# Patient Record
Sex: MALE | Race: WHITE
[De-identification: names, ages, dates, MRNs, and addresses within clinical notes are randomized per-mention and may not be internally consistent; named-entity substitution may affect disease eponyms.]

---

## 2018-06-05 ENCOUNTER — HOSPITAL ENCOUNTER (INPATIENT)
Dept: HOSPITAL 47 - EC | Age: 68
LOS: 1 days | Discharge: HOME | DRG: 190 | End: 2018-06-06
Payer: MEDICARE

## 2018-06-05 VITALS — BODY MASS INDEX: 26.6 KG/M2

## 2018-06-05 VITALS — RESPIRATION RATE: 16 BRPM

## 2018-06-05 DIAGNOSIS — J44.1: Primary | ICD-10-CM

## 2018-06-05 DIAGNOSIS — Z87.891: ICD-10-CM

## 2018-06-05 DIAGNOSIS — Z98.42: ICD-10-CM

## 2018-06-05 DIAGNOSIS — Z79.51: ICD-10-CM

## 2018-06-05 DIAGNOSIS — M06.9: ICD-10-CM

## 2018-06-05 DIAGNOSIS — Z99.81: ICD-10-CM

## 2018-06-05 DIAGNOSIS — J84.10: ICD-10-CM

## 2018-06-05 DIAGNOSIS — E78.5: ICD-10-CM

## 2018-06-05 DIAGNOSIS — Z83.3: ICD-10-CM

## 2018-06-05 DIAGNOSIS — Z80.6: ICD-10-CM

## 2018-06-05 DIAGNOSIS — Z98.41: ICD-10-CM

## 2018-06-05 DIAGNOSIS — G47.33: ICD-10-CM

## 2018-06-05 DIAGNOSIS — J20.8: ICD-10-CM

## 2018-06-05 DIAGNOSIS — Z96.1: ICD-10-CM

## 2018-06-05 DIAGNOSIS — Z79.899: ICD-10-CM

## 2018-06-05 DIAGNOSIS — J96.21: ICD-10-CM

## 2018-06-05 DIAGNOSIS — F32.9: ICD-10-CM

## 2018-06-05 DIAGNOSIS — F41.9: ICD-10-CM

## 2018-06-05 DIAGNOSIS — Z79.52: ICD-10-CM

## 2018-06-05 LAB
ALBUMIN SERPL-MCNC: 3.5 G/DL (ref 3.5–5)
ALP SERPL-CCNC: 93 U/L (ref 38–126)
ALT SERPL-CCNC: 38 U/L (ref 21–72)
ANION GAP SERPL CALC-SCNC: 12 MMOL/L
APTT BLD: 22 SEC (ref 22–30)
AST SERPL-CCNC: 25 U/L (ref 17–59)
BASOPHILS # BLD AUTO: 0.1 K/UL (ref 0–0.2)
BASOPHILS NFR BLD AUTO: 1 %
BUN SERPL-SCNC: 22 MG/DL (ref 9–20)
CALCIUM SPEC-MCNC: 9.3 MG/DL (ref 8.4–10.2)
CHLORIDE SERPL-SCNC: 99 MMOL/L (ref 98–107)
CK SERPL-CCNC: 28 U/L (ref 55–170)
CO2 SERPL-SCNC: 25 MMOL/L (ref 22–30)
EOSINOPHIL # BLD AUTO: 0.3 K/UL (ref 0–0.7)
EOSINOPHIL NFR BLD AUTO: 2 %
ERYTHROCYTE [DISTWIDTH] IN BLOOD BY AUTOMATED COUNT: 4.39 M/UL (ref 4.3–5.9)
ERYTHROCYTE [DISTWIDTH] IN BLOOD: 14.6 % (ref 11.5–15.5)
GLUCOSE SERPL-MCNC: 113 MG/DL (ref 74–99)
HCT VFR BLD AUTO: 41.9 % (ref 39–53)
HGB BLD-MCNC: 13.9 GM/DL (ref 13–17.5)
INR PPP: 1.1 (ref ?–1.2)
LYMPHOCYTES # SPEC AUTO: 1 K/UL (ref 1–4.8)
LYMPHOCYTES NFR SPEC AUTO: 8 %
MCH RBC QN AUTO: 31.6 PG (ref 25–35)
MCHC RBC AUTO-ENTMCNC: 33.1 G/DL (ref 31–37)
MCV RBC AUTO: 95.5 FL (ref 80–100)
MONOCYTES # BLD AUTO: 0.9 K/UL (ref 0–1)
MONOCYTES NFR BLD AUTO: 7 %
NEUTROPHILS # BLD AUTO: 11.2 K/UL (ref 1.3–7.7)
NEUTROPHILS NFR BLD AUTO: 82 %
PLATELET # BLD AUTO: 368 K/UL (ref 150–450)
POTASSIUM SERPL-SCNC: 4.1 MMOL/L (ref 3.5–5.1)
PROT SERPL-MCNC: 6.2 G/DL (ref 6.3–8.2)
PT BLD: 10.4 SEC (ref 9–12)
SODIUM SERPL-SCNC: 136 MMOL/L (ref 137–145)
TROPONIN I SERPL-MCNC: 0.01 NG/ML (ref 0–0.03)
WBC # BLD AUTO: 13.7 K/UL (ref 3.8–10.6)

## 2018-06-05 PROCEDURE — 85025 COMPLETE CBC W/AUTO DIFF WBC: CPT

## 2018-06-05 PROCEDURE — 87070 CULTURE OTHR SPECIMN AEROBIC: CPT

## 2018-06-05 PROCEDURE — 80053 COMPREHEN METABOLIC PANEL: CPT

## 2018-06-05 PROCEDURE — 82550 ASSAY OF CK (CPK): CPT

## 2018-06-05 PROCEDURE — 99291 CRITICAL CARE FIRST HOUR: CPT

## 2018-06-05 PROCEDURE — 96374 THER/PROPH/DIAG INJ IV PUSH: CPT

## 2018-06-05 PROCEDURE — 71250 CT THORAX DX C-: CPT

## 2018-06-05 PROCEDURE — 87186 SC STD MICRODIL/AGAR DIL: CPT

## 2018-06-05 PROCEDURE — 82553 CREATINE MB FRACTION: CPT

## 2018-06-05 PROCEDURE — 84484 ASSAY OF TROPONIN QUANT: CPT

## 2018-06-05 PROCEDURE — 83605 ASSAY OF LACTIC ACID: CPT

## 2018-06-05 PROCEDURE — 94760 N-INVAS EAR/PLS OXIMETRY 1: CPT

## 2018-06-05 PROCEDURE — 87040 BLOOD CULTURE FOR BACTERIA: CPT

## 2018-06-05 PROCEDURE — 36415 COLL VENOUS BLD VENIPUNCTURE: CPT

## 2018-06-05 PROCEDURE — 71046 X-RAY EXAM CHEST 2 VIEWS: CPT

## 2018-06-05 PROCEDURE — 85730 THROMBOPLASTIN TIME PARTIAL: CPT

## 2018-06-05 PROCEDURE — 94640 AIRWAY INHALATION TREATMENT: CPT

## 2018-06-05 PROCEDURE — 87205 SMEAR GRAM STAIN: CPT

## 2018-06-05 PROCEDURE — 85610 PROTHROMBIN TIME: CPT

## 2018-06-05 PROCEDURE — 93005 ELECTROCARDIOGRAM TRACING: CPT

## 2018-06-05 PROCEDURE — 87077 CULTURE AEROBIC IDENTIFY: CPT

## 2018-06-05 RX ADMIN — IPRATROPIUM BROMIDE AND ALBUTEROL SULFATE SCH ML: .5; 3 SOLUTION RESPIRATORY (INHALATION) at 16:01

## 2018-06-05 RX ADMIN — IPRATROPIUM BROMIDE AND ALBUTEROL SULFATE SCH ML: .5; 3 SOLUTION RESPIRATORY (INHALATION) at 11:22

## 2018-06-05 RX ADMIN — PREDNISOLONE ACETATE SCH DROPS: 10 SUSPENSION/ DROPS OPHTHALMIC at 20:52

## 2018-06-05 RX ADMIN — IPRATROPIUM BROMIDE AND ALBUTEROL SULFATE SCH ML: .5; 3 SOLUTION RESPIRATORY (INHALATION) at 19:51

## 2018-06-05 RX ADMIN — BUDESONIDE AND FORMOTEROL FUMARATE DIHYDRATE SCH PUFF: 160; 4.5 AEROSOL RESPIRATORY (INHALATION) at 19:51

## 2018-06-05 RX ADMIN — METHYLPREDNISOLONE SODIUM SUCCINATE SCH MG: 125 INJECTION, POWDER, FOR SOLUTION INTRAMUSCULAR; INTRAVENOUS at 11:48

## 2018-06-05 RX ADMIN — KETOROLAC TROMETHAMINE SCH DROPS: 5 SOLUTION OPHTHALMIC at 20:51

## 2018-06-05 RX ADMIN — BENZONATATE SCH MG: 100 CAPSULE ORAL at 17:52

## 2018-06-05 RX ADMIN — CEFAZOLIN SCH MLS/HR: 330 INJECTION, POWDER, FOR SOLUTION INTRAMUSCULAR; INTRAVENOUS at 11:47

## 2018-06-05 RX ADMIN — METHYLPREDNISOLONE SODIUM SUCCINATE SCH MG: 125 INJECTION, POWDER, FOR SOLUTION INTRAMUSCULAR; INTRAVENOUS at 23:39

## 2018-06-05 RX ADMIN — OFLOXACIN SCH DROPS: 3 SOLUTION/ DROPS OPHTHALMIC at 20:51

## 2018-06-05 RX ADMIN — OFLOXACIN SCH DROPS: 3 SOLUTION/ DROPS OPHTHALMIC at 17:54

## 2018-06-05 RX ADMIN — PREDNISOLONE ACETATE SCH DROPS: 10 SUSPENSION/ DROPS OPHTHALMIC at 17:54

## 2018-06-05 RX ADMIN — CEFAZOLIN SCH: 330 INJECTION, POWDER, FOR SOLUTION INTRAMUSCULAR; INTRAVENOUS at 22:35

## 2018-06-05 RX ADMIN — KETOROLAC TROMETHAMINE SCH DROPS: 5 SOLUTION OPHTHALMIC at 17:52

## 2018-06-05 RX ADMIN — METHYLPREDNISOLONE SODIUM SUCCINATE SCH MG: 125 INJECTION, POWDER, FOR SOLUTION INTRAMUSCULAR; INTRAVENOUS at 17:55

## 2018-06-05 NOTE — XR
EXAMINATION TYPE: XR chest 2V

 

DATE OF EXAM: 6/5/2018

 

COMPARISON: NONE

 

HISTORY: Difficulty breathing, cough and COPD

 

TECHNIQUE:  Frontal and lateral views of the chest are obtained.

 

FINDINGS:  Prominent lung volumes are compatible with COPD. Interstitium is increased. Heart is thoug
ht to be enlarged although patient is rotated. Azygos lobe noted incidentally. No evident pneumothora
x or pleural effusion. Nodular appearance present in the left upper lobe as well as mid and lower luisa
g laterally in the left may be related to callus formation and rib fractures. Difficult to exclude an
 underlying mass. There are

Cardiac leads.

 

IMPRESSION:  Prior rib fractures with nodularity in the left lung, difficult to exclude lung mass. Co
mparison with old films would be of benefit if available or alternatively consider short interval fol
low-up or chest CT. Suspect COPD, interstitial lung disease. Cardiomegaly. Correlate to exclude pulmo
nary venous hypertension and interstitial edema.

## 2018-06-05 NOTE — HP
HISTORY AND PHYSICAL



DATE OF ADMISSION:

2018



DATE OF SERVICE:

2018



PRESENTING COMPLAINT:

Short of breath and wheezing.



HISTORY OF PRESENTING COMPLAINT:

This is a very pleasant 68-year-old patient of Dr. Brittaney Berry who also follows with

pulmonologist, Dr. Salazar.  Chronic stable medical conditions include hyperlipidemia,

rheumatoid arthritis, obstructive sleep apnea, on home oxygen 2 L, anxiety, depression,

rheumatoid arthritis.  The patient presents with worsening short of breath, cough,

clear sputum, and wheezing, getting very restless at rest.  Admitted for the same.

Appetite has been okay.  No nausea, vomiting.  No chest pain.  Started on breathing

treatments and steroids to which he is feeling a shade better.



REVIEW OF SYSTEMS:

CONSTITUTIONAL:  Weak and tired.

HEENT:  As above.

RESPIRATORY:  As above.

CARDIOVASCULAR:  None.

GASTROINTESTINAL:  None.

GENITOURINARY: None.

MUSCULOSKELETAL:  _____ in multiple joints.

DERMATOLOGICAL: None.

HEMATOLOGICAL: None.

LYMPHATICS: None.

PSYCHIATRY: Some anxiety.

NEUROLOGICAL: None.



PAST MEDICAL HISTORY:

COPD, hyperlipidemia, rheumatoid arthritis, obstructive sleep apnea, does not use any

device.  Some low back pain.  Seen by Dr. Morse.  Due for MRI on 2018, home

oxygen 2-3 L, rheumatoid arthritis bilateral multiple joints.



PAST SURGICAL HISTORY:

Bilateral cataract removal, lens implant, left knee arthroscopy, colonoscopy.



SOCIAL HISTORY:

.  Drinks 1 or 2 beers a day.  Smoked for close to 42 years.  Stopped in .

Alcohol occasional.



FAMILY HISTORY:

Father  of leukemia.



HOME MEDICATIONS:

1. Ambien 10 mg at bedtime p.r.n.

2. Ventolin 2.5 t.i.d.

3. Neurontin taper.

4. Combivent 1 puff t.i.d.

5. Symbicort 2 puffs T b.i.d.

6. Prednisone 20 mg b.i.d.

7. Zoloft 100 mg p.o. daily.

8. Acular 1 drop to both eyes t.i.d.

9. Tessalon Perles 200 mg q.8.

10.Prednisone Forte 1% 1 drop to both eyes t.i.d.

11.Ativan 1 mg p.o. t.i.d. p.r.n.

12.Ocuflox 0.3% 1 drop to right eye t.i.d.



ALLERGIES:

None.



PHYSICAL EXAMINATION:

Temperature 97.9, pulse 89, respirations 22, blood pressure 113/71, pulse ox 96% on 4

L.  The patient also had a temperature 100.8 and pulse up to 112, and the patient's

pulse ox initial was 73% on 2.5 L.

GENERAL APPEARANCE:  Resting in bed, short of breath.

EYES:  Pupils are equal.  Conjunctivae are normal.

HEENT:  External appearance of ears and nose normal.  Oral cavity normal.

NECK:  JVD not raised.  Mass not palpable.  Respiratory effort increased.  Accessory

muscles are working.  The patient not able to speak in full sentences.

LUNGS:  Diminished breath sounds, prolonged expiration and wheezing.

CARDIOVASCULAR: First and second sounds normal. No edema.

ABDOMEN:  Soft, nontender.  Liver and spleen not palpable.

LYMPHATICS:  No lymph node palpable in the neck or axillae.

PSYCHIATRY:  Alert and oriented x3.  Mood and affect anxious-appearing.

NEUROLOGICAL:  Pupils equal.  Cranial nerves grossly intact. Power and sensation

grossly intact.



INVESTIGATIONS:

White count 17.7, hemoglobin 13.9, potassium 4.1, BUN 22, creatinine 0.72.  EKG sinus

tachycardia.  Chest x-ray shows some interstitial lung disease, evidence of pulmonary

venous hypertension.  Chest CT shows some pulmonary fibrosis, emphysema.



ASSESSMENT:

1. Acute severe chronic obstructive pulmonary disease exacerbation, probably from

    viral tracheobronchitis, cannot rule out pneumonia.

2. Pulmonary fibrosis.

3. Acute hypoxic respiratory failure present on admission.

4. Chronic hypoxic respiratory failure.

5. Chronic obstructive pulmonary disease in an ex-smoker.

6. Hyperlipidemia.

7. Bilateral rheumatoid arthritis.

8. Obstructive sleep apnea, does not use a CPAP machine.

9. Chronic hypoxic respiratory failure, underlying chronic obstructive pulmonary

    disease.

10.Anxiety and depression, not otherwise specified.



PLAN:

The patient is put on DuoNeb, nebulized bronchodilator and IV steroids.  Also put the

patient on nebulized bronchodilators.  Home medications are resumed.  Care was

discussed with the patient.  Pulmonary was consulted.





MMLATRELLL / IJN: [id number] / Job#: [id number]

## 2018-06-05 NOTE — CT
EXAMINATION TYPE: CT chest wo con

 

DATE OF EXAM: 6/5/2018

 

COMPARISON: NONE

 

HISTORY: SOB, ILD

 

CT DLP: 572.7 mGycm, Automated exposure control for dose reduction was used.

 

CONTRAST: None

 

TECHNIQUE: Axial images were obtained at 1 mm thick sections at 10 mm intervals.   This will limit po
rtions of the examination which may not be visualized within the field-of-view.  Images were obtained
 in the prone and supine views.  

 

FINDINGS: Portion of the thyroid visualized is normal.  There are increased lung markings present com
patible some pulmonary fibrosis. Emphysematous changes are present. There is a calcification mid righ
t lung measuring 0.6 cm periphery. Some patchy increased densities in the periphery of the left lower
 lobe, series 8 images 22-23.

 

 No enlarged mediastinal or hilar adenopathy is evident.  The ascending aorta diameter at the level o
f the main pulmonary artery is 3.7 cm.  The main pulmonary artery diameter at the bifurcation is 3.7 
cm. Some coronary artery calcifications present. Some calcification in the right hilar lymph node is 
present.

 

Limited CT sections are obtained through the upper abdomen. Abdomen is essentially unremarkable.

 

No significant change between prone and supine imaging is evident.

 

IMPRESSIONS:

1. Pulmonary fibrosis.

2. Emphysema.

3. Monitoring chest CT can be performed 6 months. Follow-up of the liver is increased recommended.

## 2018-06-05 NOTE — ED
General Adult HPI





- General


Chief complaint: Shortness of Breath


Stated complaint: SOB


Time Seen by Provider: 06/05/18 08:57


Source: patient, family, RN notes reviewed


Mode of arrival: wheelchair


Limitations: no limitations





- History of Present Illness


Initial comments: 





Patient is a pleasant 68-year-old male presenting to the emergency department 

with difficulty in breathing.  Symptoms have progressed with the past several 

days.  Patient does have cough with occasional clear sputum.  Symptoms are 

similar to previous COPD.  No chest pain.  No leg swelling.  No fevers.





- Related Data


 Home Medications











 Medication  Instructions  Recorded  Confirmed


 


Albuterol Nebulized [Ventolin 2.5 mg INHALATION RT-TID 06/05/18 06/05/18





Nebulized]   


 


Benzonatate [Tessalon Perles] 200 mg PO Q8H 06/05/18 06/05/18


 


Budesonide/Formoterol Fumarate 2 puff INHALATION RT-BID 06/05/18 06/05/18





[Symbicort 160-4.5 Mcg Inhaler]   


 


Gabapentin [Neurontin] See Taper PO AS DIRECTED 06/05/18 06/05/18


 


Ipratropium/Albuterol Sulfate 1 puff INHALATION RT-TID 06/05/18 06/05/18





[Combivent Respimat Inhaler]   


 


Ketorolac 0.5% Ophth Soln [Acular] 1 drops BOTH EYES TID 06/05/18 06/05/18


 


LORazepam [Ativan] 1 mg PO TID PRN 06/05/18 06/05/18


 


Ofloxacin 0.3% Ophth Soln [Ocuflox 1 drops RIGHT EYE TID 06/05/18 06/05/18





Ophth Soln]   


 


Sertraline [Zoloft] 100 mg PO DAILY 06/05/18 06/05/18


 


Zolpidem [Ambien] 10 mg PO HS PRN 06/05/18 06/05/18


 


predniSONE 20 mg PO BID 06/05/18 06/05/18


 


prednisoLONE ACETATE 1% OPHTH 1 drops BOTH EYES TID 06/05/18 06/05/18





[Pred Forte 1%]   











 Allergies











Allergy/AdvReac Type Severity Reaction Status Date / Time


 


No Known Allergies Allergy   Verified 06/05/18 09:17














Review of Systems


ROS Statement: 


Those systems with pertinent positive or pertinent negative responses have been 

documented in the HPI.





ROS Other: All systems not noted in ROS Statement are negative.


Constitutional: Denies: fever


Eyes: Denies: eye pain


ENT: Denies: ear pain


Respiratory: Reports: cough, dyspnea


Cardiovascular: Denies: chest pain


Endocrine: Reports: fatigue


Gastrointestinal: Denies: abdominal pain


Genitourinary: Denies: dysuria


Musculoskeletal: Denies: back pain


Skin: Denies: rash


Neurological: Denies: weakness





Past Medical History


Past Medical History: COPD, Hyperlipidemia, Sleep Apnea/CPAP/BIPAP


Additional Past Medical History / Comment(s): RA, sepsis from pneumonia in 

February 2018


History of Any Multi-Drug Resistant Organisms: None Reported


Additional Past Surgical History / Comment(s): cataract sx, left knee sx


Past Psychological History: Anxiety, Depression


Smoking Status: Former smoker


Past Alcohol Use History: Occasional


Past Drug Use History: None Reported





General Exam


Limitations: no limitations


General appearance: alert


Head exam: Present: atraumatic


Eye exam: Present: normal appearance, PERRL


ENT exam: Present: normal oropharynx


Neck exam: Present: normal inspection


Respiratory exam: Present: wheezes, rhonchi


Cardiovascular Exam: Present: tachycardia


GI/Abdominal exam: Present: soft.  Absent: tenderness


Extremities exam: Present: normal inspection.  Absent: pedal edema, calf 

tenderness


Neurological exam: Present: alert


Psychiatric exam: Present: normal affect, normal mood


Skin exam: Present: normal color





Course


 Vital Signs











  06/05/18 06/05/18 06/05/18





  08:48 09:02 09:05


 


Temperature 97.8 F  


 


Pulse Rate 117 H  112 H


 


Respiratory 24 20 20





Rate   


 


Blood Pressure 115/66  128/78


 


O2 Sat by Pulse 73 L  95





Oximetry   














  06/05/18 06/05/18 06/05/18





  09:14 09:34 09:46


 


Temperature 100.8 F H  


 


Pulse Rate  108 H 109 H


 


Respiratory   





Rate   


 


Blood Pressure   


 


O2 Sat by Pulse   





Oximetry   














- Reevaluation(s)


Reevaluation #1: 





06/05/18 10:17


Patient does meet sepsis criteria diagnosed at 10:17 AM.  Blood culture and 

lactic acid have been ordered.  IV antibiotics will be ordered.





EKG Findings





- EKG Comments:


EKG Findings:: Sinus tachycardia 113.  .  QRS 84.  .  .  

Right axis.  Low QRS voltage.  No acute ST change.





Medical Decision Making





- Medical Decision Making





Patient reevaluated and somewhat improved.  Patient still appears somewhat 

short of breath.  Patient states he does feel much better than when he arrived.

  Case was discussed in detail with Dr. hill, who will admit for Dr. Berry.





- Lab Data


Result diagrams: 


 06/05/18 09:15





 06/05/18 09:15


 Lab Results











  06/05/18 06/05/18 06/05/18 Range/Units





  09:15 09:15 09:15 


 


WBC  13.7 H    (3.8-10.6)  k/uL


 


RBC  4.39    (4.30-5.90)  m/uL


 


Hgb  13.9    (13.0-17.5)  gm/dL


 


Hct  41.9    (39.0-53.0)  %


 


MCV  95.5    (80.0-100.0)  fL


 


MCH  31.6    (25.0-35.0)  pg


 


MCHC  33.1    (31.0-37.0)  g/dL


 


RDW  14.6    (11.5-15.5)  %


 


Plt Count  368    (150-450)  k/uL


 


Neutrophils %  82    %


 


Lymphocytes %  8    %


 


Monocytes %  7    %


 


Eosinophils %  2    %


 


Basophils %  1    %


 


Neutrophils #  11.2 H    (1.3-7.7)  k/uL


 


Lymphocytes #  1.0    (1.0-4.8)  k/uL


 


Monocytes #  0.9    (0-1.0)  k/uL


 


Eosinophils #  0.3    (0-0.7)  k/uL


 


Basophils #  0.1    (0-0.2)  k/uL


 


PT    10.4  (9.0-12.0)  sec


 


INR    1.1  (<1.2)  


 


APTT    22.0  (22.0-30.0)  sec


 


Sodium   136 L   (137-145)  mmol/L


 


Potassium   4.1   (3.5-5.1)  mmol/L


 


Chloride   99   ()  mmol/L


 


Carbon Dioxide   25   (22-30)  mmol/L


 


Anion Gap   12   mmol/L


 


BUN   22 H   (9-20)  mg/dL


 


Creatinine   0.72   (0.66-1.25)  mg/dL


 


Est GFR (CKD-EPI)AfAm   >90   (>60 ml/min/1.73 sqM)  


 


Est GFR (CKD-EPI)NonAf   >90   (>60 ml/min/1.73 sqM)  


 


Glucose   113 H   (74-99)  mg/dL


 


Plasma Lactic Acid José Miguel     (0.7-2.0)  mmol/L


 


Calcium   9.3   (8.4-10.2)  mg/dL


 


Total Bilirubin   0.5   (0.2-1.3)  mg/dL


 


AST   25   (17-59)  U/L


 


ALT   38   (21-72)  U/L


 


Alkaline Phosphatase   93   ()  U/L


 


Total Protein   6.2 L   (6.3-8.2)  g/dL


 


Albumin   3.5   (3.5-5.0)  g/dL














  06/05/18 Range/Units





  09:15 


 


WBC   (3.8-10.6)  k/uL


 


RBC   (4.30-5.90)  m/uL


 


Hgb   (13.0-17.5)  gm/dL


 


Hct   (39.0-53.0)  %


 


MCV   (80.0-100.0)  fL


 


MCH   (25.0-35.0)  pg


 


MCHC   (31.0-37.0)  g/dL


 


RDW   (11.5-15.5)  %


 


Plt Count   (150-450)  k/uL


 


Neutrophils %   %


 


Lymphocytes %   %


 


Monocytes %   %


 


Eosinophils %   %


 


Basophils %   %


 


Neutrophils #   (1.3-7.7)  k/uL


 


Lymphocytes #   (1.0-4.8)  k/uL


 


Monocytes #   (0-1.0)  k/uL


 


Eosinophils #   (0-0.7)  k/uL


 


Basophils #   (0-0.2)  k/uL


 


PT   (9.0-12.0)  sec


 


INR   (<1.2)  


 


APTT   (22.0-30.0)  sec


 


Sodium   (137-145)  mmol/L


 


Potassium   (3.5-5.1)  mmol/L


 


Chloride   ()  mmol/L


 


Carbon Dioxide   (22-30)  mmol/L


 


Anion Gap   mmol/L


 


BUN   (9-20)  mg/dL


 


Creatinine   (0.66-1.25)  mg/dL


 


Est GFR (CKD-EPI)AfAm   (>60 ml/min/1.73 sqM)  


 


Est GFR (CKD-EPI)NonAf   (>60 ml/min/1.73 sqM)  


 


Glucose   (74-99)  mg/dL


 


Plasma Lactic Acid José Miguel  0.8  (0.7-2.0)  mmol/L


 


Calcium   (8.4-10.2)  mg/dL


 


Total Bilirubin   (0.2-1.3)  mg/dL


 


AST   (17-59)  U/L


 


ALT   (21-72)  U/L


 


Alkaline Phosphatase   ()  U/L


 


Total Protein   (6.3-8.2)  g/dL


 


Albumin   (3.5-5.0)  g/dL














- Radiology Data


Radiology results: image reviewed (Chest x-ray shows COPD.  Interstitial lung 

disease.)





Critical Care Time


Critical Care Time: Yes


Total Critical Care Time: 32





Disposition


Clinical Impression: 


 Acute exacerbation of chronic obstructive airways disease, Sepsis





Disposition: ADMITTED AS IP TO THIS HOSP


Is patient prescribed a controlled substance at d/c from ED?: No


Referrals: 


Brittaney Berry MD [Primary Care Provider] - 1-2 days


Decision Time: 10:19

## 2018-06-05 NOTE — P.CNPUL
History of Present Illness


Consult date: 18


Requesting physician: Jasen Persaud


Reason for consult: dyspnea, COPD


Chief complaint: Shortness of breath, cough congestion


History of present illness: 





This is a very pleasant 68-year-old male patient who follows with Dr. Berry as 

her his primary care physician.  He has a history of insomnia anxiety and low 

back pain.  He also has a history of interstitial lung disease and chronic 

obstructive pulmonary disease and follows with Dr. Salazar in our office for the 

same.  His FEV1 value is 46% of predicted.  He is maintained on albuterol, 

Combivent, Symbicort in the outpatient setting.  He is both oxygen and steroid 

dependent on prednisone 10 mg daily.  He quit smoking approximate 6 years ago.  

He was last seen 2018 at that time his COPD he had been under fairly good 

control.  He presented here to the emergency room however after this past week 

having increasing shortness of breath, cough, productive clear sputum.  No 

fever chills or night sweats.  He was having oxygen saturations in the 70s on 2 

L/m per nasal cannula.  Currently in the low 90s on 4 L/m per nasal cannula.  

No fever, chills or night sweats.  White count 13.7.  Hemoglobin 13.9.  

Creatinine 0.72.  He is seen today in consultation on the regular medical 

floor.  He is awake and alert in no acute distress.  His only complaint is that 

of fatigue as he had a difficult night last night.  He is fairly comfortable at 

rest.  He is quite dyspneic on minimal exertion.  He has been initiated on 

DuoNeb inhalations, IV Solu-Medrol and antibiotics in the form of ceftriaxone 

and azithromycin.





Review of Systems


Constitutional: Reports fatigue


Eyes: denies blurred vision (Recent cataract surgery.), denies decreased vision


Ears: bilateral: decreased hearing


Ears, nose, mouth and throat: Denies headache, Denies sore throat


Cardiovascular: Reports dyspnea on exertion, Reports shortness of breath


Respiratory: Reports cough with sputum, Reports dyspnea, Reports home oxygen, 

Reports wheezing


Gastrointestinal: Denies abdominal pain, Denies diarrhea, Denies nausea, Denies 

vomiting


Genitourinary: Reports as per HPI


Musculoskeletal: Reports low back pain


Integumentary: Denies pruritus, Denies rash


Neurological: Denies numbness, Denies weakness


Psychiatric: Denies anxiety, Denies depression


Endocrine: Denies fatigue, Denies weight change





Past Medical History


Past Medical History: COPD, Hyperlipidemia, Pneumonia, Rheumatoid Arthritis (RA)

, Sleep Apnea/CPAP/BIPAP


Additional Past Medical History / Comment(s): Pneumonia with sepsis twice-2018 and in 2016, POLO with no device (cannot tolerate), recently injured low 

back-saw Dr. Morse and is to have a MRI on 18, home O2 at 2-3 L/NC prn, 

rheumatoid arthritis in bilateral hips,knees hands and back.


History of Any Multi-Drug Resistant Organisms: None Reported


Past Surgical History: Orthopedic Surgery


Additional Past Surgical History / Comment(s): bilateral cataract removals with 

lens implants, left knee arthroscopy, colonoscopy.


Past Anesthesia/Blood Transfusion Reactions: No Reported Reaction


Past Psychological History: Anxiety, Depression


Additional Psychological History / Comment(s): Pt resides with his spouse of 38 

yrs.  He has home oxygen which he wears prn at 2-3L/NC, mostly at night.  He 

has a nebulizer.  He uses no assistive device.  He drives.


Smoking Status: Former smoker


Past Alcohol Use History: Occasional


Additional Past Alcohol Use History / Comment(s): Pt started smoking as a teen 

and quit in .  He states he drinks 1-2 beers a day.


Past Drug Use History: None Reported





- Past Family History


  ** Father


Family Medical History: Cancer


Additional Family Medical History / Comment(s): Father  of leukemia.





  ** Mother


Family Medical History: Dementia, Diabetes Mellitus


Additional Family Medical History / Comment(s): Mother is 88yrs old.





Medications and Allergies


 Home Medications











 Medication  Instructions  Recorded  Confirmed  Type


 


Albuterol Nebulized [Ventolin 2.5 mg INHALATION RT-TID 18 History





Nebulized]    


 


Benzonatate [Tessalon Perles] 200 mg PO Q8H 18 History


 


Budesonide/Formoterol Fumarate 2 puff INHALATION RT-BID 18 

History





[Symbicort 160-4.5 Mcg Inhaler]    


 


Gabapentin [Neurontin] See Taper PO AS DIRECTED 18 History


 


Ipratropium/Albuterol Sulfate 1 puff INHALATION RT-TID 18 History





[Combivent Respimat Inhaler]    


 


Ketorolac 0.5% Ophth Soln [Acular] 1 drops BOTH EYES TID 18 

History


 


LORazepam [Ativan] 1 mg PO TID PRN 18 History


 


Ofloxacin 0.3% Ophth Soln [Ocuflox 1 drops RIGHT EYE TID 18 

History





Ophth Soln]    


 


Sertraline [Zoloft] 100 mg PO DAILY 18 History


 


Zolpidem [Ambien] 10 mg PO HS PRN 18 History


 


predniSONE 20 mg PO BID 18 History


 


prednisoLONE ACETATE 1% OPHTH 1 drops BOTH EYES TID 18 History





[Pred Forte 1%]    











 Allergies











Allergy/AdvReac Type Severity Reaction Status Date / Time


 


No Known Allergies Allergy   Verified 18 09:17














Physical Exam


Vitals: 


 Vital Signs











  Temp Pulse Pulse Resp BP BP Pulse Ox


 


 18 11:39   108 H     


 


 18 11:35  98.1 F   74  20   119/48  91 L


 


 18 11:26   100      92 L


 


 18 11:01   98   18  123/71   98


 


 18 09:46   109 H     


 


 18 09:34   108 H     


 


 18 09:14  100.8 F H      


 


 18 09:05   112 H   20  128/78   95


 


 18 09:02     20   


 


 18 08:48  97.8 F  117 H   24  115/66   73 L








 Intake and Output











 18





 22:59 06:59 14:59


 


Other:   


 


  Weight   74.843 kg














- Constitutional


General appearance: average body habitus





- EENT


Eyes: EOMI, PERRLA


ENT: hard of hearing


Ears: bilateral: normal





- Neck


Neck: normal ROM


Carotids: bilateral: upstroke normal


Thyroid: bilateral: normal size





- Respiratory


Respiratory: bilateral: diminished, rales, wheezing, prolonged expiration





- Cardiovascular


Rhythm: regular


Heart sounds: normal: S1, S2





- Gastrointestinal


General gastrointestinal: no organomegaly, soft, no tenderness





- Neurologic


Neurologic: CNII-XII intact





- Musculoskeletal


Musculoskeletal: gait normal, generalized weakness





- Psychiatric


Psychiatric: A&O x's 3, appropriate affect, intact judgment & insight





Results





- Laboratory Findings


CBC and BMP: 


 18 09:15





 18 09:15


PT/INR, D-dimer











PT  10.4 sec (9.0-12.0)   18  09:15    


 


INR  1.1  (<1.2)   18  09:15    








Abnormal lab findings: 


 Abnormal Labs











  18





  09:15 09:15 09:15


 


WBC   13.7 H 


 


Neutrophils #   11.2 H 


 


Sodium    136 L


 


BUN    22 H


 


Glucose    113 H


 


Total Creatine Kinase  28 L  


 


Total Protein    6.2 L














- Diagnostic Findings


Chest x-ray: image reviewed





Assessment and Plan


Assessment: 





Impression:





#1 Acute on chronic hypoxic respiratory failure secondary to an acute 

exacerbation of chronic obstructive pulmonary disease.





#2 Severe oxygen-dependent, steroid-dependent chronic obstructive pulmonary 

disease.  FEV1 value of 46% of predicted.





#3 Interstitial lung disease.





#4 Anxiety.





#5 Low back pain.





#6 Remote history of chronic tobacco dependence.





Plan:





The patient was seen and evaluated by Dr. Thakur.  Chest x-ray labs were 

reviewed.  The patient may require a CT scan of the chest for further 

evaluation.  In the interim, we'll treat him for his COPD exacerbation.  IV Solu

-Medrol, DuoNeb inhalations, add Symbicort, empiric antibiotics.  Titrate down 

the FiO2 well maintain O2 saturations greater than 88%.  At heparin for DVT 

prophylaxis.  Protonix for GI prophylaxis.  We will continue to follow and make 

further recommendations based on his clinical status. 





I, the cosigning physician, performed a history & physical examination of the 

patient. Lungs sounds bilateral end expiratory wheeze, coarse crackles in the 

bases. Maintaining good O2 saturations in the 90s on 4 L/m per nasal cannula.  

I discussed the assessment and plan of care with my nurse practitioner, Sylwia Flaherty. I attest to the above note as dictated by her.


Time with Patient: Greater than 30

## 2018-06-06 VITALS — SYSTOLIC BLOOD PRESSURE: 101 MMHG | DIASTOLIC BLOOD PRESSURE: 59 MMHG | TEMPERATURE: 97.3 F

## 2018-06-06 VITALS — HEART RATE: 86 BPM

## 2018-06-06 RX ADMIN — BENZONATATE SCH: 100 CAPSULE ORAL at 01:25

## 2018-06-06 RX ADMIN — IPRATROPIUM BROMIDE AND ALBUTEROL SULFATE SCH ML: .5; 3 SOLUTION RESPIRATORY (INHALATION) at 07:22

## 2018-06-06 RX ADMIN — METHYLPREDNISOLONE SODIUM SUCCINATE SCH MG: 125 INJECTION, POWDER, FOR SOLUTION INTRAMUSCULAR; INTRAVENOUS at 06:27

## 2018-06-06 RX ADMIN — IPRATROPIUM BROMIDE AND ALBUTEROL SULFATE SCH: .5; 3 SOLUTION RESPIRATORY (INHALATION) at 04:43

## 2018-06-06 RX ADMIN — PREDNISOLONE ACETATE SCH DROPS: 10 SUSPENSION/ DROPS OPHTHALMIC at 08:49

## 2018-06-06 RX ADMIN — IPRATROPIUM BROMIDE AND ALBUTEROL SULFATE SCH: .5; 3 SOLUTION RESPIRATORY (INHALATION) at 11:11

## 2018-06-06 RX ADMIN — METHYLPREDNISOLONE SODIUM SUCCINATE SCH MG: 125 INJECTION, POWDER, FOR SOLUTION INTRAMUSCULAR; INTRAVENOUS at 12:42

## 2018-06-06 RX ADMIN — BUDESONIDE AND FORMOTEROL FUMARATE DIHYDRATE SCH PUFF: 160; 4.5 AEROSOL RESPIRATORY (INHALATION) at 07:22

## 2018-06-06 RX ADMIN — OFLOXACIN SCH DROPS: 3 SOLUTION/ DROPS OPHTHALMIC at 08:50

## 2018-06-06 RX ADMIN — CEFAZOLIN SCH MLS/HR: 330 INJECTION, POWDER, FOR SOLUTION INTRAMUSCULAR; INTRAVENOUS at 09:25

## 2018-06-06 RX ADMIN — KETOROLAC TROMETHAMINE SCH DROPS: 5 SOLUTION OPHTHALMIC at 08:50

## 2018-06-06 RX ADMIN — BENZONATATE SCH MG: 100 CAPSULE ORAL at 08:48

## 2018-06-06 NOTE — P.PN
Subjective


Progress Note Date: 06/06/18


Principal diagnosis: 





Acute on chronic hypoxic respiratory failure secondary to an acute exacerbation 

of COPD


This is a very pleasant 68-year-old male patient who follows with Dr. Berry as 

her his primary care physician.  He has a history of insomnia anxiety and low 

back pain.  He also has a history of interstitial lung disease and chronic 

obstructive pulmonary disease and follows with Dr. Salazar in our office for the 

same.  His FEV1 value is 46% of predicted.  He is maintained on albuterol, 

Combivent, Symbicort in the outpatient setting.  He is both oxygen and steroid 

dependent on prednisone 10 mg daily.  He quit smoking approximate 6 years ago.  

He was last seen 05/11/2018 at that time his COPD he had been under fairly good 

control.  He presented here to the emergency room however after this past week 

having increasing shortness of breath, cough, productive clear sputum.  No 

fever chills or night sweats.  He was having oxygen saturations in the 70s on 2 

L/m per nasal cannula.  Currently in the low 90s on 4 L/m per nasal cannula.  

No fever, chills or night sweats.  White count 13.7.  Hemoglobin 13.9.  

Creatinine 0.72.  He is seen today in consultation on the regular medical 

floor.  He is awake and alert in no acute distress.  His only complaint is that 

of fatigue as he had a difficult night last night.  He is fairly comfortable at 

rest.  He is quite dyspneic on minimal exertion.  He has been initiated on 

DuoNeb inhalations, IV Solu-Medrol and antibiotics in the form of ceftriaxone 

and azithromycin.





On 06/06/2018 patient seen again in follow-up on medical surgical floor.  

Remains on folate liters per nasal cannula with O2 sat 97%, he is afebrile, 

vital signs are stable, respirations nonlabored, patient's CT chest showed 

moderate fibrosis, emphysema, no enlarged mediastinal or hilar adenopathy.  No 

new labs today, patient continues on Zithromax and Rocephin, nebulized 

bronchodilators, and IV Solu-Medrol, he reports  improvement with his breathing

, patient states he is back to his baseline, and is requesting to go home 

today.  Lung sounds are positive for a few scattered crackles, but no wheezes 

or rhonchi, no significant chest congestion, vitals are stable.  Blood cultures 

negative at the 24-hour petey, sputum cultures pending.  Clinically patient has 

significantly improved, and is stable for discharge home today.








Objective





- Vital Signs


Vital signs: 


 Vital Signs











Temp  97.3 F L  06/06/18 06:35


 


Pulse  86   06/06/18 07:35


 


Resp  16   06/06/18 06:35


 


BP  101/59   06/06/18 06:35


 


Pulse Ox  97   06/06/18 06:35








 Intake & Output











 06/05/18 06/06/18 06/06/18





 18:59 06:59 18:59


 


Weight 74.843 kg  


 


Other:   


 


  Voiding Method  Toilet 





  Urinal 


 


  # Voids 1 1 














- Exam


- Constitutional


General appearance: average body habitus





- EENT


Eyes: EOMI, PERRLA


ENT: hard of hearing


Ears: bilateral: normal





- Neck


Neck: normal ROM


Carotids: bilateral: upstroke normal


Thyroid: bilateral: normal size





- Respiratory


Respiratory: bilateral: diminished, minimal scattered crackles





- Cardiovascular


Rhythm: regular


Heart sounds: normal: S1, S2





- Gastrointestinal


General gastrointestinal: no organomegaly, soft, no tenderness





- Neurologic


Neurologic: CNII-XII intact





- Musculoskeletal


Musculoskeletal: gait normal, generalized weakness





- Psychiatric


Psychiatric: A&O x's 3, appropriate affect, intact judgment & insight








- Labs


CBC & Chem 7: 


 06/05/18 09:15





 06/05/18 09:15


Labs: 


 Microbiology - Last 24 Hours (Table)











 06/05/18 19:36 Gram Stain - Preliminary





 Sputum Sputum Culture - Preliminary














Assessment and Plan


Plan: 


Assessment:





#1 Acute on chronic hypoxic respiratory failure secondary to an acute 

exacerbation of chronic obstructive pulmonary disease.





#2 Severe oxygen-dependent, steroid-dependent chronic obstructive pulmonary 

disease.  FEV1 value of 46% of predicted.





#3 Interstitial lung disease.





#4 Anxiety.





#5 Low back pain.





#6 Remote history of chronic tobacco dependence.





Plan:





CT chest results have been reviewed, and showed emphysema, and pulmonary 

fibrosis without adenopathy.  Clinically patient is improving, he states he is 

back to his baseline, breathing easier today, no fever or chills, no worsening 

dyspnea, no worsening chest congestion.  Patient is stable for discharge home 

today from pulmonary standpoint, follow-up with Dr. Dr. Salazar in the outpatient 

setting within 7 days.





I performed a history & physical examination of the patient and discussed their 

management with my nurse practitioner, La Louie.  I reviewed the nurse 

practitioner's note and agree with the documented findings and plan of care.  

Lung sounds are diminished, will minimal rales.  The findings and the 

impression was discussed with the patient.  I attest to the documentation by 

the nurse practitioner. 


Time with Patient: Less than 30

## 2018-06-08 NOTE — DS
DISCHARGE SUMMARY



DATE OF ADMISSION:

06/05/2018.



DATE OF DISCHARGE:

06/08/2018



FINAL DIAGNOSES:

1. Acute severe chronic obstructive pulmonary disease exacerbation, possibly viral

    tracheobronchitis.

2. Chronic pulmonary fibrosis.

3. Acute hypoxic respiratory failure, present on admission from chronic obstructive

    pulmonary disease exacerbation.

4. Chronic hypoxic respiratory failure from chronic obstructive pulmonary disease.

5. Hyperlipidemia.

6. Bilateral rheumatoid arthritis.

7. Obstructive sleep apnea, does not use CPAP.

8. Anxiety and depression, not otherwise specified.



CONSULTATION:

Dr. Thakur.



HOSPITAL COURSE:

This patient presented with COPD exacerbation, doing much better at the time of

discharge.  The patient was treated with bronchodilators, steroids.



EXAM:

LUNGS:  Decreased breath sounds.

CARDIOVASCULAR:  First and second sounds normal.



The patient did have a CT of the chest that revealed pulmonary fibrosis.  The patient

is okayed by Pulmonary to go home.



DISCHARGE MEDICATIONS:

1. Ventolin 2.5 nebulizer t.i.d.

2. Tessalon Perles 200 mg q.8.

3. Symbicort 160/4.5, 2 puffs b.i.d.

4. Neurontin.

5. Combivent.

6. Respimat 1 puff t.i.d.

7. Acular 0.5% 1 drop to both eyes t.i.d.

8. Ativan 1 mg p.o. t.i.d. p.r.n.

9. Ocuflox 1 drop right eye t.i.d.

10.Zoloft 100 mg p.o. daily.

11.Ambien 10 mg q.h.s. p.r.n.

12.Prednisone taper.

13.Prednisolone 1% 1 drop to both eyes t.i.d.

14.Zithromax 100 g p.o. daily 4 tablets.



FOLLOWUP:

With Dr. Brittaney Berry in a week.  Follow up with Dr. Salazar on 06/19/2018.



EXAMINATION:

Decreased breath sounds, minimal wheezing.

CARDIOVASCULAR:  First and second sounds normal.

PSYCH:  AO x3.





MMODL / IJN: [id number] / Job#: [id number]

## 2019-05-23 ENCOUNTER — HOSPITAL ENCOUNTER (INPATIENT)
Dept: HOSPITAL 47 - EC | Age: 69
LOS: 15 days | Discharge: HOME HEALTH | DRG: 326 | End: 2019-06-07
Payer: MEDICARE

## 2019-05-23 VITALS — BODY MASS INDEX: 25.6 KG/M2

## 2019-05-23 DIAGNOSIS — A41.9: ICD-10-CM

## 2019-05-23 DIAGNOSIS — G89.29: ICD-10-CM

## 2019-05-23 DIAGNOSIS — K65.1: ICD-10-CM

## 2019-05-23 DIAGNOSIS — J44.1: ICD-10-CM

## 2019-05-23 DIAGNOSIS — Z99.81: ICD-10-CM

## 2019-05-23 DIAGNOSIS — Z79.52: ICD-10-CM

## 2019-05-23 DIAGNOSIS — Z91.19: ICD-10-CM

## 2019-05-23 DIAGNOSIS — Z87.891: ICD-10-CM

## 2019-05-23 DIAGNOSIS — M19.90: ICD-10-CM

## 2019-05-23 DIAGNOSIS — E87.6: ICD-10-CM

## 2019-05-23 DIAGNOSIS — D63.8: ICD-10-CM

## 2019-05-23 DIAGNOSIS — J96.21: ICD-10-CM

## 2019-05-23 DIAGNOSIS — Z86.19: ICD-10-CM

## 2019-05-23 DIAGNOSIS — M06.9: ICD-10-CM

## 2019-05-23 DIAGNOSIS — E78.5: ICD-10-CM

## 2019-05-23 DIAGNOSIS — Z83.3: ICD-10-CM

## 2019-05-23 DIAGNOSIS — T38.0X5A: ICD-10-CM

## 2019-05-23 DIAGNOSIS — Z98.41: ICD-10-CM

## 2019-05-23 DIAGNOSIS — K26.1: Primary | ICD-10-CM

## 2019-05-23 DIAGNOSIS — K42.0: ICD-10-CM

## 2019-05-23 DIAGNOSIS — E83.41: ICD-10-CM

## 2019-05-23 DIAGNOSIS — Z87.01: ICD-10-CM

## 2019-05-23 DIAGNOSIS — Z88.1: ICD-10-CM

## 2019-05-23 DIAGNOSIS — J84.10: ICD-10-CM

## 2019-05-23 DIAGNOSIS — D72.829: ICD-10-CM

## 2019-05-23 DIAGNOSIS — Z80.0: ICD-10-CM

## 2019-05-23 DIAGNOSIS — M54.9: ICD-10-CM

## 2019-05-23 DIAGNOSIS — E87.2: ICD-10-CM

## 2019-05-23 DIAGNOSIS — F32.9: ICD-10-CM

## 2019-05-23 DIAGNOSIS — Z99.89: ICD-10-CM

## 2019-05-23 DIAGNOSIS — F41.9: ICD-10-CM

## 2019-05-23 DIAGNOSIS — Z98.42: ICD-10-CM

## 2019-05-23 DIAGNOSIS — G47.33: ICD-10-CM

## 2019-05-23 DIAGNOSIS — A04.72: ICD-10-CM

## 2019-05-23 DIAGNOSIS — B96.5: ICD-10-CM

## 2019-05-23 DIAGNOSIS — Z79.899: ICD-10-CM

## 2019-05-23 DIAGNOSIS — Z96.1: ICD-10-CM

## 2019-05-23 DIAGNOSIS — Z80.6: ICD-10-CM

## 2019-05-23 DIAGNOSIS — K57.30: ICD-10-CM

## 2019-05-23 LAB
ALBUMIN SERPL-MCNC: 3.3 G/DL (ref 3.5–5)
ALP SERPL-CCNC: 90 U/L (ref 38–126)
ALT SERPL-CCNC: 30 U/L (ref 21–72)
AMYLASE SERPL-CCNC: 43 U/L (ref 30–110)
ANION GAP SERPL CALC-SCNC: 7 MMOL/L
APTT BLD: 21.2 SEC (ref 22–30)
AST SERPL-CCNC: 20 U/L (ref 17–59)
BASOPHILS # BLD AUTO: 0 K/UL (ref 0–0.2)
BASOPHILS NFR BLD AUTO: 0 %
BUN SERPL-SCNC: 27 MG/DL (ref 9–20)
CALCIUM SPEC-MCNC: 9.5 MG/DL (ref 8.4–10.2)
CHLORIDE SERPL-SCNC: 93 MMOL/L (ref 98–107)
CO2 BLDA-SCNC: 33 MMOL/L (ref 19–24)
CO2 SERPL-SCNC: 32 MMOL/L (ref 22–30)
EOSINOPHIL # BLD AUTO: 0.1 K/UL (ref 0–0.7)
EOSINOPHIL NFR BLD AUTO: 1 %
ERYTHROCYTE [DISTWIDTH] IN BLOOD BY AUTOMATED COUNT: 4.87 M/UL (ref 4.3–5.9)
ERYTHROCYTE [DISTWIDTH] IN BLOOD: 15.3 % (ref 11.5–15.5)
GLUCOSE BLD-MCNC: 119 MG/DL (ref 75–99)
GLUCOSE SERPL-MCNC: 147 MG/DL (ref 74–99)
HCO3 BLDA-SCNC: 31 MMOL/L (ref 21–25)
HCT VFR BLD AUTO: 45.7 % (ref 39–53)
HGB BLD-MCNC: 14.9 GM/DL (ref 13–17.5)
INR PPP: 1 (ref ?–1.2)
LIPASE SERPL-CCNC: 93 U/L (ref 23–300)
LYMPHOCYTES # SPEC AUTO: 0.6 K/UL (ref 1–4.8)
LYMPHOCYTES NFR SPEC AUTO: 4 %
MCH RBC QN AUTO: 30.5 PG (ref 25–35)
MCHC RBC AUTO-ENTMCNC: 32.5 G/DL (ref 31–37)
MCV RBC AUTO: 93.8 FL (ref 80–100)
MONOCYTES # BLD AUTO: 0.3 K/UL (ref 0–1)
MONOCYTES NFR BLD AUTO: 2 %
NEUTROPHILS # BLD AUTO: 15 K/UL (ref 1.3–7.7)
NEUTROPHILS NFR BLD AUTO: 93 %
PCO2 BLDA: 51 MMHG (ref 35–45)
PH BLDA: 7.4 [PH] (ref 7.35–7.45)
PLATELET # BLD AUTO: 590 K/UL (ref 150–450)
PO2 BLDA: 125 MMHG (ref 83–108)
POTASSIUM SERPL-SCNC: 5 MMOL/L (ref 3.5–5.1)
PROT SERPL-MCNC: 6.4 G/DL (ref 6.3–8.2)
PT BLD: 10.8 SEC (ref 9–12)
SODIUM SERPL-SCNC: 132 MMOL/L (ref 137–145)
WBC # BLD AUTO: 16.2 K/UL (ref 3.8–10.6)

## 2019-05-23 PROCEDURE — 99285 EMERGENCY DEPT VISIT HI MDM: CPT

## 2019-05-23 PROCEDURE — 84132 ASSAY OF SERUM POTASSIUM: CPT

## 2019-05-23 PROCEDURE — 87186 SC STD MICRODIL/AGAR DIL: CPT

## 2019-05-23 PROCEDURE — 96361 HYDRATE IV INFUSION ADD-ON: CPT

## 2019-05-23 PROCEDURE — 0WQF0ZZ REPAIR ABDOMINAL WALL, OPEN APPROACH: ICD-10-PCS

## 2019-05-23 PROCEDURE — 96376 TX/PRO/DX INJ SAME DRUG ADON: CPT

## 2019-05-23 PROCEDURE — 96366 THER/PROPH/DIAG IV INF ADDON: CPT

## 2019-05-23 PROCEDURE — 87205 SMEAR GRAM STAIN: CPT

## 2019-05-23 PROCEDURE — 71046 X-RAY EXAM CHEST 2 VIEWS: CPT

## 2019-05-23 PROCEDURE — 87077 CULTURE AEROBIC IDENTIFY: CPT

## 2019-05-23 PROCEDURE — 0DQ90ZZ REPAIR DUODENUM, OPEN APPROACH: ICD-10-PCS

## 2019-05-23 PROCEDURE — 94640 AIRWAY INHALATION TREATMENT: CPT

## 2019-05-23 PROCEDURE — 94003 VENT MGMT INPAT SUBQ DAY: CPT

## 2019-05-23 PROCEDURE — 85027 COMPLETE CBC AUTOMATED: CPT

## 2019-05-23 PROCEDURE — 87086 URINE CULTURE/COLONY COUNT: CPT

## 2019-05-23 PROCEDURE — 80053 COMPREHEN METABOLIC PANEL: CPT

## 2019-05-23 PROCEDURE — 94002 VENT MGMT INPAT INIT DAY: CPT

## 2019-05-23 PROCEDURE — 83690 ASSAY OF LIPASE: CPT

## 2019-05-23 PROCEDURE — 83880 ASSAY OF NATRIURETIC PEPTIDE: CPT

## 2019-05-23 PROCEDURE — 84100 ASSAY OF PHOSPHORUS: CPT

## 2019-05-23 PROCEDURE — 94660 CPAP INITIATION&MGMT: CPT

## 2019-05-23 PROCEDURE — 36600 WITHDRAWAL OF ARTERIAL BLOOD: CPT

## 2019-05-23 PROCEDURE — 96375 TX/PRO/DX INJ NEW DRUG ADDON: CPT

## 2019-05-23 PROCEDURE — 96365 THER/PROPH/DIAG IV INF INIT: CPT

## 2019-05-23 PROCEDURE — 87040 BLOOD CULTURE FOR BACTERIA: CPT

## 2019-05-23 PROCEDURE — 85025 COMPLETE CBC W/AUTO DIFF WBC: CPT

## 2019-05-23 PROCEDURE — 84484 ASSAY OF TROPONIN QUANT: CPT

## 2019-05-23 PROCEDURE — 74177 CT ABD & PELVIS W/CONTRAST: CPT

## 2019-05-23 PROCEDURE — 87070 CULTURE OTHR SPECIMN AEROBIC: CPT

## 2019-05-23 PROCEDURE — 80048 BASIC METABOLIC PNL TOTAL CA: CPT

## 2019-05-23 PROCEDURE — 36415 COLL VENOUS BLD VENIPUNCTURE: CPT

## 2019-05-23 PROCEDURE — 85610 PROTHROMBIN TIME: CPT

## 2019-05-23 PROCEDURE — 83605 ASSAY OF LACTIC ACID: CPT

## 2019-05-23 PROCEDURE — 81001 URINALYSIS AUTO W/SCOPE: CPT

## 2019-05-23 PROCEDURE — 94760 N-INVAS EAR/PLS OXIMETRY 1: CPT

## 2019-05-23 PROCEDURE — 82150 ASSAY OF AMYLASE: CPT

## 2019-05-23 PROCEDURE — 87324 CLOSTRIDIUM AG IA: CPT

## 2019-05-23 PROCEDURE — 82805 BLOOD GASES W/O2 SATURATION: CPT

## 2019-05-23 PROCEDURE — 71045 X-RAY EXAM CHEST 1 VIEW: CPT

## 2019-05-23 PROCEDURE — 85730 THROMBOPLASTIN TIME PARTIAL: CPT

## 2019-05-23 PROCEDURE — 83735 ASSAY OF MAGNESIUM: CPT

## 2019-05-23 PROCEDURE — 93005 ELECTROCARDIOGRAM TRACING: CPT

## 2019-05-23 RX ADMIN — PROPOFOL SCH MLS/HR: 10 INJECTION, EMULSION INTRAVENOUS at 22:27

## 2019-05-23 RX ADMIN — HYDROCORTISONE SODIUM SUCCINATE SCH MG: 100 INJECTION, POWDER, FOR SOLUTION INTRAMUSCULAR; INTRAVENOUS at 22:18

## 2019-05-23 RX ADMIN — CEFAZOLIN SCH MLS/HR: 330 INJECTION, POWDER, FOR SOLUTION INTRAMUSCULAR; INTRAVENOUS at 16:16

## 2019-05-23 RX ADMIN — METRONIDAZOLE SCH: 500 INJECTION, SOLUTION INTRAVENOUS at 22:23

## 2019-05-23 RX ADMIN — METRONIDAZOLE SCH MLS/HR: 500 INJECTION, SOLUTION INTRAVENOUS at 23:50

## 2019-05-23 RX ADMIN — HEPARIN SODIUM SCH: 5000 INJECTION, SOLUTION INTRAVENOUS; SUBCUTANEOUS at 22:11

## 2019-05-23 RX ADMIN — PANTOPRAZOLE SODIUM SCH MG: 40 INJECTION, POWDER, FOR SOLUTION INTRAVENOUS at 22:20

## 2019-05-23 RX ADMIN — IPRATROPIUM BROMIDE AND ALBUTEROL SULFATE SCH: .5; 3 SOLUTION RESPIRATORY (INHALATION) at 20:33

## 2019-05-23 RX ADMIN — FLUCONAZOLE IN SODIUM CHLORIDE SCH MLS/HR: 2 INJECTION, SOLUTION INTRAVENOUS at 22:24

## 2019-05-23 NOTE — P.OP
Date of Procedure: 05/23/19


Procedure(s) Performed: 


PREOPERATIVE DIAGNOSIS: Pneumoperitoneum


POSTOPERATIVE DIAGNOSIS: Perforated duodenal ulcer


PROCEDURE: Exploratory laparotomy with repair perforated duodenal ulcer, repair 

incarcerated umbilical hernia


SURGEON: Jason


EBL: 50 mL


ANESTHESIA: Gen.


COMPLICATIONS: None


OPERATIVE PROCEDURE: Patient placed in the operating table in the supine 

position.  The patient was placed under general anesthesia.  Preoperative 

central line and arterial line placed by anesthesia.  Abdomen prepped and draped

sterilely.  Midline incision was created.  The patient's umbilical hernia 

fascial defect was incorporated into our fascial opening.  Once we entered the 

abdomen there was a large volume of green succus present primarily in the upper 

abdomen although involving all 4 quadrants.  There was a staining of the 

transverse mesocolon.  The gastrocolic ligament was opened and a large volume of

this bilious stained fluid was evacuated.  After careful evaluation a 1 cm 

perforation of the proximal duodenum was identified.  The bowel was viable.  The

defect was closed transversely using interrupted 3-0 GI silk sutures.  The 

abdomen was then copiously irrigated with saline.  Tisseel fibrin glue was used 

over the repair.  A drain was placed exiting from the right lateral abdomen 

crossing over into the lesser sac.  A nasogastric tube was confirmed within the 

stomach.  I then placed 2 horizontal #5 Ethibond sutures to act as horizontal 

retention sutures.  The fascia was reapproximated using 2 separate double-

stranded #1 PDS sutures.  The skin was closed using staples leaving 3 openings 4

weeks.  Telfa jaime were placed.  The retention sutures were tied down over 28-

Malay red rubber catheters.  It should be noted the umbilical hernia was 

repaired with the closure of the fascia.  Sterile dressings were applied.  The 

DAVID drain was sutured to the skin using a 3-0 silk stitch.


DISPOSITION: Stable to recovery room

## 2019-05-23 NOTE — P.GSCN
History of Present Illness


Consult date: 19


Reason for Consult: 





Bowel perforation


History of present illness: 





69-year-old male has had a one-week history of abdominal pain.  He went to [facility] ER last week and had a CAT scan performed and was told he had 

diverticulosis without diverticulitis.  The patient's wife is also helping with 

the history.  Over the week the patient states he has had progressive increase 

in discomfort.  Today the pain was more severe and came to the hospital for 

evaluation.  Patient describes the pain as being diffuse in nature.  Appetite is

diminished.  Some constipation.  No rectal bleeding or melena.  Denies fevers or

chills.  Some nausea but no vomiting.  No history of similar events in the past.

 CAT scan was repeated here and shows significant inflammatory changes in the 

epigastric region involving the posterior aspect of the proximal transverse 

colon.  There is evidence of pneumoperitoneum around that and inflammatory 

changes as well with some free fluid.  The stomach wall itself appears normal.  

The patient is on a prednisone taper.  Patient has severe COPD and underlying 

rheumatoid arthritis as well.  Previously was on Humira but not recently.  Last 

colonoscopy 5 years ago.  Patient is on oxygen at home.





Review of Systems





The patient denies any acute changes in vision or hearing, no dysphagia or tnoy

nophagia, no chest pain, no dysuria or hematuria, no headache, no runny nose, no

rectal bleeding or melena, no unexplained weight loss 





Past Medical History


Past Medical History: COPD, Hyperlipidemia, Pneumonia, Rheumatoid Arthritis 

(RA), Sleep Apnea/CPAP/BIPAP


Additional Past Medical History / Comment(s): Pneumonia with sepsis twice-2018

and in 2016, POLO with no device (cannot tolerate), recently injured low back-saw

Dr. Morse and is to have a MRI on 18, home O2 at 2-3 L/NC prn, rheumatoid

arthritis in bilateral hips,knees hands and back.


History of Any Multi-Drug Resistant Organisms: None Reported


Past Surgical History: Orthopedic Surgery


Additional Past Surgical History / Comment(s): bilateral cataract removals with 

lens implants, left knee arthroscopy, colonoscopy.


Past Anesthesia/Blood Transfusion Reactions: No Reported Reaction


Past Psychological History: Anxiety, Depression


Smoking Status: Former smoker


Past Alcohol Use History: Occasional


Past Drug Use History: None Reported





- Past Family History


  ** Father


Family Medical History: Cancer


Additional Family Medical History / Comment(s): Father  of leukemia.





  ** Mother


Family Medical History: Dementia, Diabetes Mellitus


Additional Family Medical History / Comment(s): Mother is 88yrs old.





Medications and Allergies


                                Home Medications











 Medication  Instructions  Recorded  Confirmed  Type


 


LORazepam [Ativan] 1 mg PO TID PRN 18 History


 


predniSONE 10 mg PO DAILY #30 tab 18 Rx


 


Benzonatate [Tessalon Perles] 100 mg PO TID PRN 19 History


 


HYDROcodone/APAP 10-325MG [Norco 1 tab PO Q8H PRN 19 History





]    


 


Sulfamethoxazole/Trimethoprim 1 tab PO BID 19 History





[Bactrim -160 mg]    


 


metroNIDAZOLE [Flagyl] 500 mg PO TID 19 History








                                    Allergies











Allergy/AdvReac Type Severity Reaction Status Date / Time


 


amoxicillin AdvReac  Nausea & Verified 19 13:48





   Vomiting  














Surgical - Exam


                                   Vital Signs











Temp Pulse Resp BP Pulse Ox


 


 97.3 F L  113 H  24   123/73   87 L


 


 19 12:47  19 12:47  19 12:47  19 12:47  19 12:47

















Physical exam:


General: Well-developed, in some distress with shortness of breath and appears 

uncomfortable


HEENT: Normocephalic, sclerae nonicteric


Abdomen: Mild distention, diffuse tenderness, voluntary and involuntary guarding

 present


Extremities: Mild edema


Neuro: Alert and oriented





Results





- Labs





                                 19 12:55





                                 19 12:55


                  Abnormal Lab Results - Last 24 Hours (Table)











  19 Range/Units





  12:55 12:55 12:55 


 


WBC   16.2 H   (3.8-10.6)  k/uL


 


Plt Count   590 H   (150-450)  k/uL


 


Neutrophils #   15.0 H   (1.3-7.7)  k/uL


 


Lymphocytes #   0.6 L   (1.0-4.8)  k/uL


 


APTT     (22.0-30.0)  sec


 


Sodium  132 L    (137-145)  mmol/L


 


Chloride  93 L    ()  mmol/L


 


Carbon Dioxide  32 H    (22-30)  mmol/L


 


BUN  27 H    (9-20)  mg/dL


 


Glucose  147 H    (74-99)  mg/dL


 


Magnesium    3.2 H  (1.6-2.3)  mg/dL


 


Albumin  3.3 L    (3.5-5.0)  g/dL














  19 Range/Units





  12:55 


 


WBC   (3.8-10.6)  k/uL


 


Plt Count   (150-450)  k/uL


 


Neutrophils #   (1.3-7.7)  k/uL


 


Lymphocytes #   (1.0-4.8)  k/uL


 


APTT  21.2 L  (22.0-30.0)  sec


 


Sodium   (137-145)  mmol/L


 


Chloride   ()  mmol/L


 


Carbon Dioxide   (22-30)  mmol/L


 


BUN   (9-20)  mg/dL


 


Glucose   (74-99)  mg/dL


 


Magnesium   (1.6-2.3)  mg/dL


 


Albumin   (3.5-5.0)  g/dL








                                 Diabetes panel











  19 Range/Units





  12:55 


 


Sodium  132 L  (137-145)  mmol/L


 


Potassium  5.0  (3.5-5.1)  mmol/L


 


Chloride  93 L  ()  mmol/L


 


Carbon Dioxide  32 H  (22-30)  mmol/L


 


BUN  27 H  (9-20)  mg/dL


 


Creatinine  0.78  (0.66-1.25)  mg/dL


 


Glucose  147 H  (74-99)  mg/dL


 


Calcium  9.5  (8.4-10.2)  mg/dL


 


AST  20  (17-59)  U/L


 


ALT  30  (21-72)  U/L


 


Alkaline Phosphatase  90  ()  U/L


 


Total Protein  6.4  (6.3-8.2)  g/dL


 


Albumin  3.3 L  (3.5-5.0)  g/dL








                                  Calcium panel











  19 Range/Units





  12:55 


 


Calcium  9.5  (8.4-10.2)  mg/dL


 


Albumin  3.3 L  (3.5-5.0)  g/dL








                                 Pituitary panel











  19 Range/Units





  12:55 


 


Sodium  132 L  (137-145)  mmol/L


 


Potassium  5.0  (3.5-5.1)  mmol/L


 


Chloride  93 L  ()  mmol/L


 


Carbon Dioxide  32 H  (22-30)  mmol/L


 


BUN  27 H  (9-20)  mg/dL


 


Creatinine  0.78  (0.66-1.25)  mg/dL


 


Glucose  147 H  (74-99)  mg/dL


 


Calcium  9.5  (8.4-10.2)  mg/dL








                                  Adrenal panel











  19 Range/Units





  12:55 


 


Sodium  132 L  (137-145)  mmol/L


 


Potassium  5.0  (3.5-5.1)  mmol/L


 


Chloride  93 L  ()  mmol/L


 


Carbon Dioxide  32 H  (22-30)  mmol/L


 


BUN  27 H  (9-20)  mg/dL


 


Creatinine  0.78  (0.66-1.25)  mg/dL


 


Glucose  147 H  (74-99)  mg/dL


 


Calcium  9.5  (8.4-10.2)  mg/dL


 


Total Bilirubin  0.4  (0.2-1.3)  mg/dL


 


AST  20  (17-59)  U/L


 


ALT  30  (21-72)  U/L


 


Alkaline Phosphatase  90  ()  U/L


 


Total Protein  6.4  (6.3-8.2)  g/dL


 


Albumin  3.3 L  (3.5-5.0)  g/dL














Assessment and Plan


(1) Bowel perforation


Narrative/Plan: 


Patient with a very concerning abdominal examination.  CAT scan findings suggest

 perforation of either colon or stomach.  Patient is high risk particularly 

given his pulmonary status however further observation without exploration is 

felt to carry higher risk of progressive sepsis and possible mortality.  Case 

was discussed with the ER physician as well as pulmonary.  Pulmonary plans to 

see this patient preoperatively.  Patient will be sent back to the intensive 

care unit after exploration on the ventilator.  Exploratory laparotomy with 

possible bowel resection, possible ostomy is the consent being obtained.  Risks 

of bleeding, infection, abscess, leak, inability to identify perforation site, 

respiratory failure, cardiac complications, progressive sepsis and death re

viewed.  The patient and his wife understand and wish to proceed.


Current Visit: Yes   Status: Acute   Code(s): K63.1 - PERFORATION OF INTESTINE 

(NONTRAUMATIC)   SNOMED Code(s): 70964624

## 2019-05-23 NOTE — P.CNPUL
History of Present Illness


Consult date: 19


Chief complaint: Abdominal pain


History of present illness: 





69-year-old male patient came into the emergency department with a one-week 

history of abdominal pain.  He initially went to [facility] 

emergency department with a CAT scan was done and the patient was told to have 

diverticulosis without diverticulitis.  Over the past week, the patient 

developed progressive worsening his abdominal discomfort and today came into the

emergency department having more pain and the pain was rather diffuse in nature.

 He had diminished appetite, diminished oral intake and he was having no 

significant bowel movements.  No GI bleeding.  Denies having any fever or 

chills.  He was nauseated when he was getting progressively more lethargic and 

weak and short of breath.  CAT scan of the abdomen was done in the emergency 

department and showed significant inflammatory changes in the epigastric region 

involving posterior aspect of the proximal transverse colon.  There was evidence

of pneumoperitoneum around that along with some inflammatory changes as well as 

some free fluid in the abdomen.  The stomach itself appeared to be within normal

limits.  The patient has long-term history of COPD and pulmonary fibrosis.  

Based on his pulmonary function test in 2017 he has an FVC of 72% and FEV1 of 

46% and he has been steroid dependent for the past few years taking prednisone a

daily basis.  He has also underlying rheumatoid arthritis and previously he was 

taking immunosuppression with Humira none for now.  His oxygen dependent.  Blood

work showed a white cell count 16.2 with a hemoglobin of 14.9.  Platelet count 

is at 519.  He has a BUN of 27 creatinine of 0.7 and lactic acid was at 1.9 with

a troponin being less than 0.01.  LFTs are all within normal limits.  He is 

tachycardic with temperature 98.2.  He was having sinus tachycardia with a heart

rate of 120.  He is currently on 5 L of oxygen by nasal cannula with a pulse ox 

of 90%.  The patient will be taken to the operating room.  He was seen by the 

surgical team following that I've advised the patient coming back to the 

intensive care unit for further evaluation and treatment.  He may need to be 

kept intubated overnight.





Review of Systems


Constitutional: Reports fatigue, Reports fever, Reports lethargy, Reports poor 

appetite, Reports weakness


Eyes: denies blurred vision, denies bulging eye, denies decreased vision


Ears: deny: decreased hearing, ear discharge, earache, tinnitus


Ears, nose, mouth and throat: Denies headache, Denies sore throat


Cardiovascular: Reports decreased exercise tolerance, Reports dyspnea on 

exertion, Reports edema, Reports shortness of breath


Respiratory: Reports dyspnea, Reports home oxygen, Reports wheezing


Gastrointestinal: Reports abdominal pain, Reports change in bowel habits, 

Reports dyspepsia, Reports indigestion, Reports loss of appetite, Reports nausea


Genitourinary: Reports as per HPI


Musculoskeletal: Reports as per HPI


Musculoskeletal: bilateral: ankle swelling, absent: ankle pain, ankle stiffness


Integumentary: Denies pruritus, Denies rash


Neurological: Reports as per HPI, Reports weakness


Psychiatric: Reports as per HPI


Endocrine: Reports fatigue


Hematologic/Lymphatic: Reports as per HPI


Allergic/Immunologic: Reports as per HPI





Past Medical History


Past Medical History: COPD, Hyperlipidemia, Pneumonia, Rheumatoid Arthritis 

(RA), Sleep Apnea/CPAP/BIPAP


Additional Past Medical History / Comment(s): Pneumonia with sepsis twice-2018

and in 2016, POLO with no device (cannot tolerate), recently injured low back-saw

Dr. Morse and is to have a MRI on 18, home O2 at 2-3 L/NC prn, rheumatoid

arthritis in bilateral hips,knees hands and back.


History of Any Multi-Drug Resistant Organisms: None Reported


Past Surgical History: Orthopedic Surgery


Additional Past Surgical History / Comment(s): bilateral cataract removals with 

lens implants, left knee arthroscopy, colonoscopy.


Past Anesthesia/Blood Transfusion Reactions: No Reported Reaction


Past Psychological History: Anxiety, Depression


Smoking Status: Former smoker


Past Alcohol Use History: Occasional


Past Drug Use History: None Reported





- Past Family History


  ** Father


Family Medical History: Cancer


Additional Family Medical History / Comment(s): Father  of leukemia.





  ** Mother


Family Medical History: Dementia, Diabetes Mellitus


Additional Family Medical History / Comment(s): Mother is 88yrs old.





Medications and Allergies


                                Home Medications











 Medication  Instructions  Recorded  Confirmed  Type


 


LORazepam [Ativan] 1 mg PO TID PRN 18 History


 


predniSONE 10 mg PO DAILY #30 tab 18 Rx


 


Benzonatate [Tessalon Perles] 100 mg PO TID PRN 19 History


 


HYDROcodone/APAP 10-325MG [Norco 1 tab PO Q8H PRN 19 History





]    


 


Sulfamethoxazole/Trimethoprim 1 tab PO BID 19 History





[Bactrim -160 mg]    


 


metroNIDAZOLE [Flagyl] 500 mg PO TID 19 History








                                    Allergies











Allergy/AdvReac Type Severity Reaction Status Date / Time


 


amoxicillin AdvReac  Nausea & Verified 19 13:48





   Vomiting  














Physical Exam


Vitals: 


                                   Vital Signs











  Temp Pulse Pulse Resp BP BP Pulse Ox


 


 19 18:09    122 H  24   129/67  90 L


 


 19 18:04  98.2 F  123 H   22  125/79   94 L


 


 19 15:33   120 H   24  104/71   90 L


 


 19 13:42   114 H     


 


 19 13:40      116/73  


 


 19 13:33   112 H     


 


 19 13:22     24    93 L


 


 19 12:47  97.3 F L  113 H   24  123/73   87 L








                                Intake and Output











 19





 06:59 14:59 22:59


 


Other:   


 


  Weight  71.668 kg 














Appearance the patient in mild-to-moderate degree of respiratory distress, quite

 uncomfortable as the patient is having abdominal pain.  He has obvious 

cushingoid features related to chronic steroid use.  He also seems to be quite 

flushed.


Head exam was generally normal. There was no scleral icterus or corneal arcus. 

Mucous membranes were moist.


Neck was supple and without jugular venous distension, thyromegaly, or carotid 

bruits. Carotids were easily palpable bilaterally. There was no adenopathy.  The

 patient is a Mallampati class IV was significant crowding of posterior 

oropharynx


Lungs are diminished bilaterally and the patient has coarse crackles in lung 

bases and these are Velcro crackles typical of underlying pulmonary fibrosis


Heart sounds are tachycardic, Cardiac exam revealed the PMI to be normally 

situated and sized. The rhythm was regular and no extrasystoles were noted 

during several minutes of auscultation. The first and second heart sounds were 

normal and physiologic splitting of the second heart sound was noted. There were

 no murmurs, rubs, clicks, or gallops.


Abdomen is tender.  There is diffuse tenderness throughout the anterior 

abdominal wall.  No rebound tenderness at this point in time.  No organomegaly. 

 No ascites.  Bowel sounds are hypoactive and quite diminished


Extremities revealed +1 edema lower diminished bilaterally especially in the 

left lower extremity.  Pulses are diminished.  There is no cyanosis or clubbing.


Neurologic the patient is awake and alert.  Following commands and answering 

questions appropriately.  Focal neurological deficit.


Examination of the skin revealed no evidence of significant rashes, suspicious 

appearing nevi or other concerning lesions.





Results





- Laboratory Findings


CBC and BMP: 


                                 19 12:55





                                 19 12:55


PT/INR, D-dimer











PT  10.8 sec (9.0-12.0)   19  12:55    


 


INR  1.0  (<1.2)   19  12:55    








Abnormal lab findings: 


                                  Abnormal Labs











  19





  12:55 12:55 12:55


 


WBC   16.2 H 


 


Plt Count   590 H 


 


Neutrophils #   15.0 H 


 


Lymphocytes #   0.6 L 


 


APTT   


 


Sodium  132 L  


 


Chloride  93 L  


 


Carbon Dioxide  32 H  


 


BUN  27 H  


 


Glucose  147 H  


 


Magnesium    3.2 H


 


Albumin  3.3 L  














  19





  12:55


 


WBC 


 


Plt Count 


 


Neutrophils # 


 


Lymphocytes # 


 


APTT  21.2 L


 


Sodium 


 


Chloride 


 


Carbon Dioxide 


 


BUN 


 


Glucose 


 


Magnesium 


 


Albumin 














- Diagnostic Findings


Chest x-ray: image reviewed





Assessment and Plan


Plan: 











1 acute abdomen with pneumoperitoneum and suspected bowel perforation.  The 

patient has free air/pneumoperitoneum and abdomen along with fluids.  Suspect 

infected diverticulitis with secondary perforation.  The patient will be taken 

to the operating room.





2 diffuse abdominal pain secondary to above





3 acute leukocytosis secondary to above





4 acute sinus tachycardia secondary to above





5 mild lactic acidosis





6 advanced COPD with pulmonary fibrosis, steroid dependent





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





8 rheumatoid arthritis





9 obstructive sleep apnea not receiving any CPAP therapy at this point in time





10 chronic back pain





11 hyperlipidemia





12 osteoarthritis





Plan





Resuscitated the patient IV fluids.  The patient will be taken to the operating 

room.  Meanwhile the patient will need IV access Juve triple-lumen catheter 

that would be inserted and operating room.  Continue normal saline which is 

currently running at 125 mL an hour.  IV Zosyn.  IV Flagyl.  Dilaudid for pain 

control.  Stress dose hydrocortisone as the patient is on long-term steroid 

treatment with 10 mg of prednisone.  We'll likely need postop care unit and ICU 

and further recommendations are to follow.  We'll give the patient heparin subcu

 for DVT prophylaxis, IV Protonix, we'll continue to follow.  Condition is 

critical at this point in time.

## 2019-05-23 NOTE — CT
EXAMINATION TYPE: CT abdomen pelvis w con

 

DATE OF EXAM: 5/23/2019

 

COMPARISON: None

 

INDICATION: generalized abdominal pain

 

DLP: 776.7 mGycm, Automated exposure control for dose reduction was used.

 

CONTRAST:  100 mL of Isovue 370. 

                        Study performed without Oral Contrast

 

TECHNIQUE: Axial images were obtained from above the diaphragm to the pubic rami in the axial plane a
t 5 mm thick sections.  Reconstructed images are reviewed on the computer in the coronal plane.  

 

FINDINGS:

 

Limited CT sections are obtained the lung bases.  The lung bases are clear.  Emphysematous and fibros
is changes are present at the lung bases. Coronary artery calcification is noted.

 

CT ABDOMEN: There are inflammatory changes within the mid mesentery. Small amount of ascites may be p
resent. Small amount of free fluid may be within this area of increased density. Series 201 image 41.
 This may be loculated and not within the nondependent portion of the abdomen phlegmon formation and 
early developing abscess should be considered. Small bowel loops within this region are somewhat prom
inent.

 

Liver: Normal

 

Spleen: No slight splenic granuloma are present.

 

Pancreas: Normal

 

Adrenal glands: The adrenal glands are normal.

 

Gallbladder: Normal  

 

Kidneys: No masses are evident. No hydronephrosis is present.   Right pelvic cyst is within the right
 kidney. No additional cortical renal cysts or masses are evident.  Delayed images were obtained thro
ugh the kidneys, which remain otherwise unremarkable.

 

Aorta: Vascular calcification is within the aorta. 

 

Inferior vena cava: Normal.

 

CT PELVIS: 

Loops of bowel within the abdomen and pelvis are normal.     Diverticular changes are within the sigm
oid colon.

 

Appendix: Normal as visualized.

 

Urinary bladder: No suspicious masses. Some diffuse wall thickening is not excluded. There is incompl
ete distention causing some limitation. 

 

Genitourinary structures: Prostate appears normal

 

Osseous structures: No suspicious lytic or sclerotic lesions.

 

IMPRESSIONS:

1.  Phlegmon formation with a small amount of ascites within the upper mesentery. Small amount of loc
ulated air external to bowel loops may be present suggesting early abscess formation.

2. Report was called to emergency room [facility] by Dr. Hernandez by telephone at time of interpretation
.

## 2019-05-23 NOTE — ED
General Adult HPI





- General


Source: patient, EMS, RN notes reviewed


Mode of arrival: EMS


Limitations: no limitations





<Ghulam Lancaster - Last Filed: 19 15:42>





<London Kiser - Last Filed: 19 15:44>





- General


Chief complaint: Abdominal Pain


Stated complaint: abdominal pain


Time Seen by Provider: 19 12:41





- History of Present Illness


Initial comments: 





69-year-old male with a past medical history of COPD on 4 L at home, 

hyperlipidemia, pneumonia with sepsis presents to the emergency department for a

chief complaint of abdominal pain.  Patient states that he woke up at 2 AM with 

significant abdominal pain.  It is across his entire abdomen.  Denies any 

alleviating or aggravating factors.  States that he did have some abdominal pain

last week and was seen at [facility], CT was negative at that time.  Patient

states he also has COPD exacerbation last week and has been more short of breath

than normal.  Denies any fevers or chills.no history of heart failure but 

patient does have increased swelling in his legs, started on Lasix 2 days ago by

primary care.  Patient has no other complaints at this time including shortness 

of breath, chest pain, nausea or vomiting, headache, or visual changes. 

(Ghulam Lancaster)





- Related Data


                                Home Medications











 Medication  Instructions  Recorded  Confirmed


 


LORazepam [Ativan] 1 mg PO TID PRN 18


 


Benzonatate [Tessalon Perles] 100 mg PO TID PRN 19


 


HYDROcodone/APAP 10-325MG [Norco 1 tab PO Q8H PRN 19





]   


 


Sulfamethoxazole/Trimethoprim 1 tab PO BID 19





[Bactrim -160 mg]   


 


metroNIDAZOLE [Flagyl] 500 mg PO TID 19








                                  Previous Rx's











 Medication  Instructions  Recorded


 


predniSONE 10 mg PO DAILY #30 tab 18











                                    Allergies











Allergy/AdvReac Type Severity Reaction Status Date / Time


 


amoxicillin AdvReac  Nausea & Verified 19 13:48





   Vomiting  














Review of Systems


ROS Other: All systems not noted in ROS Statement are negative.





<Ghulam Lancaster - Last Filed: 19 15:42>


ROS Other: All systems not noted in ROS Statement are negative.





<London Kiser - Last Filed: 19 15:44>


ROS Statement: 


Those systems with pertinent positive or pertinent negative responses have been 

documented in the HPI.








Past Medical History


Past Medical History: COPD, Hyperlipidemia, Pneumonia, Rheumatoid Arthritis 

(RA), Sleep Apnea/CPAP/BIPAP


Additional Past Medical History / Comment(s): Pneumonia with sepsis twice-2018

 and in 2016, POLO with no device (cannot tolerate), recently injured low back-

saw Dr. Morse and is to have a MRI on 18, home O2 at 2-3 L/NC prn, 

rheumatoid arthritis in bilateral hips,knees hands and back.


History of Any Multi-Drug Resistant Organisms: None Reported


Past Surgical History: Orthopedic Surgery


Additional Past Surgical History / Comment(s): bilateral cataract removals with 

lens implants, left knee arthroscopy, colonoscopy.


Past Anesthesia/Blood Transfusion Reactions: No Reported Reaction


Past Psychological History: Anxiety, Depression


Smoking Status: Former smoker


Past Alcohol Use History: Occasional


Past Drug Use History: None Reported





- Past Family History


  ** Father


Family Medical History: Cancer


Additional Family Medical History / Comment(s): Father  of leukemia.





  ** Mother


Family Medical History: Dementia, Diabetes Mellitus


Additional Family Medical History / Comment(s): Mother is 88yrs old.





<Ghulam Lancaster - Last Filed: 19 15:42>





General Exam


Limitations: no limitations


General appearance: alert, in no apparent distress


Head exam: Present: atraumatic, normocephalic, normal inspection


Eye exam: Present: normal appearance, PERRL, EOMI.  Absent: scleral icterus, 

conjunctival injection


ENT exam: Present: normal exam, mucous membranes moist


Neck exam: Present: normal inspection, full ROM.  Absent: tenderness, 

meningismus, lymphadenopathy


Respiratory exam: Present: normal lung sounds bilaterally.  Absent: respiratory 

distress, wheezes, rales, rhonchi, stridor


Cardiovascular Exam: Present: regular rate, normal rhythm, normal heart sounds. 

 Absent: systolic murmur, diastolic murmur, rubs, gallop, clicks


GI/Abdominal exam: Present: soft, distended (Abdomen is distended), tenderness 

(Significant generalized abdominal tenderness with guarding), guarding, normal 

bowel sounds.  Absent: rebound, rigid


Neurological exam: Present: alert, oriented X3, CN II-XII intact


Psychiatric exam: Present: normal affect, normal mood





<Ghulam Lancaster - Last Filed: 19 15:42>





Course





<London Kiser - Last Filed: 19 15:44>





                                   Vital Signs











  19





  12:47 13:22 13:33


 


Temperature 97.3 F L  


 


Pulse Rate 113 H  112 H


 


Respiratory 24 24 





Rate   


 


Blood Pressure 123/73  


 


O2 Sat by Pulse 87 L 93 L 





Oximetry   














  19





  13:40 13:42 15:33


 


Temperature   


 


Pulse Rate  114 H 120 H


 


Respiratory   24





Rate   


 


Blood Pressure 116/73  104/71


 


O2 Sat by Pulse   90 L





Oximetry   














- Reevaluation(s)


Reevaluation #1: 





19 15:43


PA supervision: I proceeded face-to-face evaluation the patient did discuss the 

findings with him and his wife.  Patient does present with shortness of breath 

and abdominal pain.  He was seen at [facility] a week ago and found

 have diverticulosis.  He's had progressively worsening pain since 7 however CAT

 scan today shows evidence of a phlegmon with small area that appears to be 

early abscess.  Small air bubbles noted.  Patient also demonstrates decreased 

breath sounds with expiratory wheezing.  I did discuss the case with Dr. Isaac. 

 Dr. Gomez will be consulted.  Patient will be admitted for inpatient treatment 

also Dr. Power will be consulted. (London Kiser)





EKG Findings





- EKG Comments:


EKG Findings:: Sinus tachycardia, ventricular rate 113, PT int 130, 





<Ghulam Lancaster - Last Filed: 19 15:42>





Medical Decision Making





- Lab Data


Result diagrams: 


                                 19 12:55





                                 19 12:55





<Ghulam Lancaster - Last Filed: 19 15:42>





- Lab Data


Result diagrams: 


                                 19 12:55





                                 19 12:55





<London Kiser - Last Filed: 19 15:44>





- Medical Decision Making





C9-year-old male with a past medical history of COPD on 4 L of O2 at home, 

hyperlipidemia, pneumonia presents to the emergency department for chief pain of

 abdominal pain.  This has been ongoing since last week however worsen s

ignificantly early this morning.  Patient also has had a COPD exacerbation that 

started last week, cough has been improving the patient does doesn't shortness 

of breath.  Patient is in the mid 90s on 6 L of O2.  Patient tachycardic, likely

 secondary to pain.  He did receive 150 mg of fentanyl and a milligram of 

Dilaudid.  Patient will be receiving another milligram of Dilaudid.  Patient did

 have a CT abdomen and pelvis with contrast which did not show any evidence of 

abscess at that time.  This was done at [facility].  Today patient has a 

white count of 16.  Hypermagnesemia noted of 3.2. CO2 noted to be 32, likely 

secondary to hyperventilation.  Chest x-ray shows chronic changes of ILD.  CT 

repeated today does show phlegmon formation with a small amount of ascites in 

the upper mesentery.  Small amount of loculated air external to bowel loop may 

represent early abscess formation.  Patient started on Zosyn.  Given 500 and 

monos of fluids.  Patient will be admitted for further management of possible 

abscess formation and pain management. (Ghulam Lacnaster)





- Lab Data





                                   Lab Results











  19 Range/Units





  12:55 12:55 12:55 


 


WBC   16.2 H   (3.8-10.6)  k/uL


 


RBC   4.87   (4.30-5.90)  m/uL


 


Hgb   14.9   (13.0-17.5)  gm/dL


 


Hct   45.7   (39.0-53.0)  %


 


MCV   93.8   (80.0-100.0)  fL


 


MCH   30.5   (25.0-35.0)  pg


 


MCHC   32.5   (31.0-37.0)  g/dL


 


RDW   15.3   (11.5-15.5)  %


 


Plt Count   590 H   (150-450)  k/uL


 


Neutrophils %   93   %


 


Lymphocytes %   4   %


 


Monocytes %   2   %


 


Eosinophils %   1   %


 


Basophils %   0   %


 


Neutrophils #   15.0 H   (1.3-7.7)  k/uL


 


Lymphocytes #   0.6 L   (1.0-4.8)  k/uL


 


Monocytes #   0.3   (0-1.0)  k/uL


 


Eosinophils #   0.1   (0-0.7)  k/uL


 


Basophils #   0.0   (0-0.2)  k/uL


 


PT     (9.0-12.0)  sec


 


INR     (<1.2)  


 


APTT     (22.0-30.0)  sec


 


Sodium  132 L    (137-145)  mmol/L


 


Potassium  5.0    (3.5-5.1)  mmol/L


 


Chloride  93 L    ()  mmol/L


 


Carbon Dioxide  32 H    (22-30)  mmol/L


 


Anion Gap  7    mmol/L


 


BUN  27 H    (9-20)  mg/dL


 


Creatinine  0.78    (0.66-1.25)  mg/dL


 


Est GFR (CKD-EPI)AfAm  >90    (>60 ml/min/1.73 sqM)  


 


Est GFR (CKD-EPI)NonAf  >90    (>60 ml/min/1.73 sqM)  


 


Glucose  147 H    (74-99)  mg/dL


 


Plasma Lactic Acid José Miguel     (0.7-2.0)  mmol/L


 


Calcium  9.5    (8.4-10.2)  mg/dL


 


Magnesium    3.2 H  (1.6-2.3)  mg/dL


 


Total Bilirubin  0.4    (0.2-1.3)  mg/dL


 


AST  20    (17-59)  U/L


 


ALT  30    (21-72)  U/L


 


Alkaline Phosphatase  90    ()  U/L


 


Troponin I     (0.000-0.034)  ng/mL


 


NT-Pro-B Natriuret Pep     pg/mL


 


Total Protein  6.4    (6.3-8.2)  g/dL


 


Albumin  3.3 L    (3.5-5.0)  g/dL


 


Amylase  43    ()  U/L


 


Lipase  93    ()  U/L














  19 Range/Units





  12:55 12:55 12:55 


 


WBC     (3.8-10.6)  k/uL


 


RBC     (4.30-5.90)  m/uL


 


Hgb     (13.0-17.5)  gm/dL


 


Hct     (39.0-53.0)  %


 


MCV     (80.0-100.0)  fL


 


MCH     (25.0-35.0)  pg


 


MCHC     (31.0-37.0)  g/dL


 


RDW     (11.5-15.5)  %


 


Plt Count     (150-450)  k/uL


 


Neutrophils %     %


 


Lymphocytes %     %


 


Monocytes %     %


 


Eosinophils %     %


 


Basophils %     %


 


Neutrophils #     (1.3-7.7)  k/uL


 


Lymphocytes #     (1.0-4.8)  k/uL


 


Monocytes #     (0-1.0)  k/uL


 


Eosinophils #     (0-0.7)  k/uL


 


Basophils #     (0-0.2)  k/uL


 


PT  10.8    (9.0-12.0)  sec


 


INR  1.0    (<1.2)  


 


APTT  21.2 L    (22.0-30.0)  sec


 


Sodium     (137-145)  mmol/L


 


Potassium     (3.5-5.1)  mmol/L


 


Chloride     ()  mmol/L


 


Carbon Dioxide     (22-30)  mmol/L


 


Anion Gap     mmol/L


 


BUN     (9-20)  mg/dL


 


Creatinine     (0.66-1.25)  mg/dL


 


Est GFR (CKD-EPI)AfAm     (>60 ml/min/1.73 sqM)  


 


Est GFR (CKD-EPI)NonAf     (>60 ml/min/1.73 sqM)  


 


Glucose     (74-99)  mg/dL


 


Plasma Lactic Acid José Miguel    1.9  (0.7-2.0)  mmol/L


 


Calcium     (8.4-10.2)  mg/dL


 


Magnesium     (1.6-2.3)  mg/dL


 


Total Bilirubin     (0.2-1.3)  mg/dL


 


AST     (17-59)  U/L


 


ALT     (21-72)  U/L


 


Alkaline Phosphatase     ()  U/L


 


Troponin I   <0.012   (0.000-0.034)  ng/mL


 


NT-Pro-B Natriuret Pep     pg/mL


 


Total Protein     (6.3-8.2)  g/dL


 


Albumin     (3.5-5.0)  g/dL


 


Amylase     ()  U/L


 


Lipase     ()  U/L














  19 Range/Units





  12:55 


 


WBC   (3.8-10.6)  k/uL


 


RBC   (4.30-5.90)  m/uL


 


Hgb   (13.0-17.5)  gm/dL


 


Hct   (39.0-53.0)  %


 


MCV   (80.0-100.0)  fL


 


MCH   (25.0-35.0)  pg


 


MCHC   (31.0-37.0)  g/dL


 


RDW   (11.5-15.5)  %


 


Plt Count   (150-450)  k/uL


 


Neutrophils %   %


 


Lymphocytes %   %


 


Monocytes %   %


 


Eosinophils %   %


 


Basophils %   %


 


Neutrophils #   (1.3-7.7)  k/uL


 


Lymphocytes #   (1.0-4.8)  k/uL


 


Monocytes #   (0-1.0)  k/uL


 


Eosinophils #   (0-0.7)  k/uL


 


Basophils #   (0-0.2)  k/uL


 


PT   (9.0-12.0)  sec


 


INR   (<1.2)  


 


APTT   (22.0-30.0)  sec


 


Sodium   (137-145)  mmol/L


 


Potassium   (3.5-5.1)  mmol/L


 


Chloride   ()  mmol/L


 


Carbon Dioxide   (22-30)  mmol/L


 


Anion Gap   mmol/L


 


BUN   (9-20)  mg/dL


 


Creatinine   (0.66-1.25)  mg/dL


 


Est GFR (CKD-EPI)AfAm   (>60 ml/min/1.73 sqM)  


 


Est GFR (CKD-EPI)NonAf   (>60 ml/min/1.73 sqM)  


 


Glucose   (74-99)  mg/dL


 


Plasma Lactic Acid José Miguel   (0.7-2.0)  mmol/L


 


Calcium   (8.4-10.2)  mg/dL


 


Magnesium   (1.6-2.3)  mg/dL


 


Total Bilirubin   (0.2-1.3)  mg/dL


 


AST   (17-59)  U/L


 


ALT   (21-72)  U/L


 


Alkaline Phosphatase   ()  U/L


 


Troponin I   (0.000-0.034)  ng/mL


 


NT-Pro-B Natriuret Pep  934  pg/mL


 


Total Protein   (6.3-8.2)  g/dL


 


Albumin   (3.5-5.0)  g/dL


 


Amylase   ()  U/L


 


Lipase   ()  U/L














Disposition


Is patient prescribed a controlled substance at d/c from ED?: No


Time of Disposition: 15:42





<Ghulam Lancaster - Last Filed: 19 15:42>





<London Kiser - Last Filed: 19 15:44>


Clinical Impression: 


 Abdominal abscess, Hypermagnesemia, Leukocytosis





Disposition: ADMITTED AS IP TO THIS HOSP


Condition: Serious


Referrals: 


Brittaney Berry MD [Primary Care Provider] - 1-2 days

## 2019-05-24 LAB
ALBUMIN SERPL-MCNC: 2 G/DL (ref 3.5–5)
ALP SERPL-CCNC: 53 U/L (ref 38–126)
ALT SERPL-CCNC: 24 U/L (ref 21–72)
ANION GAP SERPL CALC-SCNC: 1 MMOL/L
AST SERPL-CCNC: 21 U/L (ref 17–59)
BASOPHILS # BLD AUTO: 0 K/UL (ref 0–0.2)
BASOPHILS NFR BLD AUTO: 0 %
BUN SERPL-SCNC: 25 MG/DL (ref 9–20)
CALCIUM SPEC-MCNC: 7.3 MG/DL (ref 8.4–10.2)
CHLORIDE SERPL-SCNC: 104 MMOL/L (ref 98–107)
CO2 BLDA-SCNC: 29 MMOL/L (ref 19–24)
CO2 BLDA-SCNC: 31 MMOL/L (ref 19–24)
CO2 SERPL-SCNC: 30 MMOL/L (ref 22–30)
EOSINOPHIL # BLD AUTO: 0 K/UL (ref 0–0.7)
EOSINOPHIL NFR BLD AUTO: 0 %
ERYTHROCYTE [DISTWIDTH] IN BLOOD BY AUTOMATED COUNT: 3.88 M/UL (ref 4.3–5.9)
ERYTHROCYTE [DISTWIDTH] IN BLOOD: 15.3 % (ref 11.5–15.5)
GLUCOSE BLD-MCNC: 97 MG/DL (ref 75–99)
GLUCOSE SERPL-MCNC: 109 MG/DL (ref 74–99)
HCO3 BLDA-SCNC: 28 MMOL/L (ref 21–25)
HCO3 BLDA-SCNC: 30 MMOL/L (ref 21–25)
HCT VFR BLD AUTO: 37.3 % (ref 39–53)
HGB BLD-MCNC: 11.6 GM/DL (ref 13–17.5)
LYMPHOCYTES # SPEC AUTO: 0.5 K/UL (ref 1–4.8)
LYMPHOCYTES NFR SPEC AUTO: 4 %
MAGNESIUM SPEC-SCNC: 3.3 MG/DL (ref 1.6–2.3)
MCH RBC QN AUTO: 30 PG (ref 25–35)
MCHC RBC AUTO-ENTMCNC: 31.2 G/DL (ref 31–37)
MCV RBC AUTO: 96.2 FL (ref 80–100)
MONOCYTES # BLD AUTO: 0.3 K/UL (ref 0–1)
MONOCYTES NFR BLD AUTO: 2 %
NEUTROPHILS # BLD AUTO: 11.6 K/UL (ref 1.3–7.7)
NEUTROPHILS NFR BLD AUTO: 93 %
PCO2 BLDA: 53 MMHG (ref 35–45)
PCO2 BLDA: 59 MMHG (ref 35–45)
PH BLDA: 7.28 [PH] (ref 7.35–7.45)
PH BLDA: 7.35 [PH] (ref 7.35–7.45)
PH UR: 6 [PH] (ref 5–8)
PLATELET # BLD AUTO: 475 K/UL (ref 150–450)
PO2 BLDA: 93 MMHG (ref 83–108)
PO2 BLDA: 96 MMHG (ref 83–108)
POTASSIUM SERPL-SCNC: 5.2 MMOL/L (ref 3.5–5.1)
PROT SERPL-MCNC: 4.3 G/DL (ref 6.3–8.2)
PROT UR QL: (no result)
RBC UR QL: 8 /HPF (ref 0–5)
SODIUM SERPL-SCNC: 135 MMOL/L (ref 137–145)
SP GR UR: >1.05 (ref 1–1.03)
UROBILINOGEN UR QL STRIP: <2 MG/DL (ref ?–2)
WBC # BLD AUTO: 12.4 K/UL (ref 3.8–10.6)
WBC #/AREA URNS HPF: 5 /HPF (ref 0–5)

## 2019-05-24 PROCEDURE — 5A09357 ASSISTANCE WITH RESPIRATORY VENTILATION, LESS THAN 24 CONSECUTIVE HOURS, CONTINUOUS POSITIVE AIRWAY PRESSURE: ICD-10-PCS

## 2019-05-24 RX ADMIN — METRONIDAZOLE SCH MLS/HR: 500 INJECTION, SOLUTION INTRAVENOUS at 23:03

## 2019-05-24 RX ADMIN — HEPARIN SODIUM SCH UNIT: 5000 INJECTION, SOLUTION INTRAVENOUS; SUBCUTANEOUS at 08:42

## 2019-05-24 RX ADMIN — HYDROCORTISONE SODIUM SUCCINATE SCH MG: 100 INJECTION, POWDER, FOR SOLUTION INTRAMUSCULAR; INTRAVENOUS at 08:35

## 2019-05-24 RX ADMIN — IPRATROPIUM BROMIDE AND ALBUTEROL SULFATE SCH ML: .5; 3 SOLUTION RESPIRATORY (INHALATION) at 07:47

## 2019-05-24 RX ADMIN — HEPARIN SODIUM SCH UNIT: 5000 INJECTION, SOLUTION INTRAVENOUS; SUBCUTANEOUS at 01:28

## 2019-05-24 RX ADMIN — PIPERACILLIN AND TAZOBACTAM SCH MLS/HR: 3; .375 INJECTION, POWDER, FOR SOLUTION INTRAVENOUS at 08:50

## 2019-05-24 RX ADMIN — HYDROMORPHONE HYDROCHLORIDE PRN MG: 1 INJECTION, SOLUTION INTRAMUSCULAR; INTRAVENOUS; SUBCUTANEOUS at 13:24

## 2019-05-24 RX ADMIN — HYDROMORPHONE HYDROCHLORIDE PRN MG: 1 INJECTION, SOLUTION INTRAMUSCULAR; INTRAVENOUS; SUBCUTANEOUS at 22:48

## 2019-05-24 RX ADMIN — METRONIDAZOLE SCH MLS/HR: 500 INJECTION, SOLUTION INTRAVENOUS at 15:51

## 2019-05-24 RX ADMIN — ACETAMINOPHEN SCH MLS/HR: 10 INJECTION, SOLUTION INTRAVENOUS at 18:41

## 2019-05-24 RX ADMIN — IPRATROPIUM BROMIDE AND ALBUTEROL SULFATE SCH ML: .5; 3 SOLUTION RESPIRATORY (INHALATION) at 12:38

## 2019-05-24 RX ADMIN — HYDROMORPHONE HYDROCHLORIDE PRN MG: 1 INJECTION, SOLUTION INTRAMUSCULAR; INTRAVENOUS; SUBCUTANEOUS at 15:50

## 2019-05-24 RX ADMIN — CEFAZOLIN SCH MLS/HR: 330 INJECTION, POWDER, FOR SOLUTION INTRAMUSCULAR; INTRAVENOUS at 18:44

## 2019-05-24 RX ADMIN — ACETAMINOPHEN SCH MLS/HR: 10 INJECTION, SOLUTION INTRAVENOUS at 12:49

## 2019-05-24 RX ADMIN — IPRATROPIUM BROMIDE AND ALBUTEROL SULFATE SCH ML: .5; 3 SOLUTION RESPIRATORY (INHALATION) at 01:49

## 2019-05-24 RX ADMIN — PROPOFOL SCH MLS/HR: 10 INJECTION, EMULSION INTRAVENOUS at 06:27

## 2019-05-24 RX ADMIN — FLUCONAZOLE IN SODIUM CHLORIDE SCH MLS/HR: 2 INJECTION, SOLUTION INTRAVENOUS at 10:30

## 2019-05-24 RX ADMIN — HYDROCORTISONE SODIUM SUCCINATE SCH: 100 INJECTION, POWDER, FOR SOLUTION INTRAMUSCULAR; INTRAVENOUS at 00:05

## 2019-05-24 RX ADMIN — PANTOPRAZOLE SODIUM SCH MG: 40 INJECTION, POWDER, FOR SOLUTION INTRAVENOUS at 08:37

## 2019-05-24 RX ADMIN — CEFAZOLIN SCH MLS/HR: 330 INJECTION, POWDER, FOR SOLUTION INTRAMUSCULAR; INTRAVENOUS at 11:36

## 2019-05-24 RX ADMIN — METRONIDAZOLE SCH MLS/HR: 500 INJECTION, SOLUTION INTRAVENOUS at 08:43

## 2019-05-24 RX ADMIN — HYDROMORPHONE HYDROCHLORIDE PRN MG: 1 INJECTION, SOLUTION INTRAMUSCULAR; INTRAVENOUS; SUBCUTANEOUS at 11:08

## 2019-05-24 RX ADMIN — HYDROMORPHONE HYDROCHLORIDE PRN MG: 1 INJECTION, SOLUTION INTRAMUSCULAR; INTRAVENOUS; SUBCUTANEOUS at 03:49

## 2019-05-24 RX ADMIN — HYDROCORTISONE SODIUM SUCCINATE SCH MG: 100 INJECTION, POWDER, FOR SOLUTION INTRAMUSCULAR; INTRAVENOUS at 15:52

## 2019-05-24 RX ADMIN — HEPARIN SODIUM SCH UNIT: 5000 INJECTION, SOLUTION INTRAVENOUS; SUBCUTANEOUS at 15:51

## 2019-05-24 RX ADMIN — HYDROMORPHONE HYDROCHLORIDE PRN MG: 1 INJECTION, SOLUTION INTRAMUSCULAR; INTRAVENOUS; SUBCUTANEOUS at 20:19

## 2019-05-24 RX ADMIN — IPRATROPIUM BROMIDE AND ALBUTEROL SULFATE SCH ML: .5; 3 SOLUTION RESPIRATORY (INHALATION) at 19:25

## 2019-05-24 RX ADMIN — CEFAZOLIN SCH MLS/HR: 330 INJECTION, POWDER, FOR SOLUTION INTRAMUSCULAR; INTRAVENOUS at 00:05

## 2019-05-24 RX ADMIN — PIPERACILLIN AND TAZOBACTAM SCH MLS/HR: 3; .375 INJECTION, POWDER, FOR SOLUTION INTRAVENOUS at 15:53

## 2019-05-24 RX ADMIN — PIPERACILLIN AND TAZOBACTAM SCH MLS/HR: 3; .375 INJECTION, POWDER, FOR SOLUTION INTRAVENOUS at 01:20

## 2019-05-24 NOTE — P.HPIM
History of Present Illness





This is a 69 years old male with past medical history of COPD on home oxygen, 

hyperlipidemia, rheumatoid arthritis, sleep apnea on CPAP.  Presents with 

abdominal pain , has been evaluated by surgery on admission and taken to the ope

ration room found to have perforated duodenal ulcer and incarcerated umbilical 

hernia.  After the surgery patient was taken to the intensive care unit, he got 

intubated.  He was started on Zosyn, and Flagyl.  He is also on Protonix for DVT

prophylaxis.  And he is in normal cement 120 L/h.  Patient currently is not on 

pressors.  He has J drain with 200 mL of bloody discharge from abdominal wound. 

NG tube was about 200 mL drainage.


Currently patient is in ICU, vital showing blood pressure is 87/44, saturating 

95% on FiO2 of 50%.  Temperature 97.5.  WBC 12.4 K, potassium 5.2 sodium 135.  

Creatinine 0.7.  Urinalysis is not suspicious of infection.  Chest x-ray: 

Interstitial lung disease, endotracheal tube is in place.  CT of the abdomen and

pelvis done prior to surgery was reviewed.  EKG sinus tachycardia at 113.  No 

significant ST-T changes.











Review of Systems





not applicable as pt is intubated 





Past Medical History


Past Medical History: COPD, Hyperlipidemia, Osteoarthritis (OA), Pneumonia, 

Rheumatoid Arthritis (RA), Sleep Apnea/CPAP/BIPAP


Additional Past Medical History / Comment(s): Pneumonia with sepsis twice-2018

and in 2016. 3.5L home o2, POLO with no device (cannot tolerate), recently 

injured low back-saw Dr. Morse. Rheumatoid arthritis in bilateral hips,knees 

hands and back.


History of Any Multi-Drug Resistant Organisms: None Reported


Past Surgical History: Orthopedic Surgery


Additional Past Surgical History / Comment(s): bilateral cataract removals with 

lens implants, left knee arthroscopy, colonoscopy.


Past Anesthesia/Blood Transfusion Reactions: No Reported Reaction


Past Psychological History: Anxiety, Depression


Additional Psychological History / Comment(s): Pt resides with his spouse of 38 

yrs.  He has home oxygen which he wears prn at 3.5L/NC.  He has a nebulizer.


Smoking Status: Former smoker


Past Alcohol Use History: Occasional


Additional Past Alcohol Use History / Comment(s): Pt started smoking as a teen 

and quit in .  He states he drinks 3-4 beers a day.


Past Drug Use History: None Reported





- Past Family History


  ** Father


Family Medical History: Cancer


Additional Family Medical History / Comment(s): Father  of leukemia.





  ** Mother


Family Medical History: Cancer, CVA/TIA, Dementia, Diabetes Mellitus


Additional Family Medical History / Comment(s): Mother is 88yrs old. Cancer 

colon.





Medications and Allergies


                                Home Medications











 Medication  Instructions  Recorded  Confirmed  Type


 


LORazepam [Ativan] 1 mg PO TID PRN 18 History


 


predniSONE 10 mg PO DAILY #30 tab 18 Rx


 


Benzonatate [Tessalon Perles] 100 mg PO TID PRN 19 History


 


HYDROcodone/APAP 10-325MG [Norco 1 tab PO Q8H PRN 19 History





]    


 


Sulfamethoxazole/Trimethoprim 1 tab PO BID 19 History





[Bactrim -160 mg]    


 


metroNIDAZOLE [Flagyl] 500 mg PO TID 19 History








                                    Allergies











Allergy/AdvReac Type Severity Reaction Status Date / Time


 


amoxicillin AdvReac  Nausea & Verified 19 13:48





   Vomiting  














Physical Exam


Vitals: 


                                   Vital Signs











  Temp Pulse Pulse Resp BP BP Pulse Ox


 


 19 08:13   92     


 


 19 07:51   79     


 


 19 07:00   79   12  89/62  


 


 19 06:00   87   11 L  99/64   95


 


 19 05:00   85   12  88/60   95


 


 19 04:00  97.5 F L  90   12  104/84   96


 


 19 03:00   85   13   


 


 19 02:02   101 H     


 


 19 02:00   97   12  95/78   95


 


 19 01:49   100     


 


 19 01:00   94   17  90/63  


 


 19 00:40   96   12  95/64   96


 


 19 00:00  97.8 F  101 H   12  102/69   90 L


 


 19 23:00   101 H   12  96/64  


 


 19 22:00  97.8 F  101 H   12  114/71  


 


 19 18:09    122 H  24   129/67  90 L


 


 19 18:04  98.2 F  123 H   22  125/79   94 L


 


 19 16:32     12    96


 


 19 15:33   120 H   24  104/71   90 L


 


 19 13:42   114 H     


 


 19 13:40      116/73  


 


 19 13:33   112 H     


 


 19 13:22     24    93 L


 


 19 12:47  97.3 F L  113 H   24  123/73   87 L








                                Intake and Output











 19





 22:59 06:59 14:59


 


Intake Total 1320 1078.00 126


 


Output Total 230 285 40


 


Balance 1090 793.00 86


 


Intake:   


 


  IV 1320 978 126


 


    Pressure  18 6


 


    Sodium Chloride 0.9% 1, 120 960 120





    000 ml @ 120 mls/hr IV .   





    Q8H20M [facility] Rx#:[id number]   


 


  Intake, IV Titration  100.00 





  Amount   


 


    Propofol 1,000 mg In  100.00 





    Empty Bag 1 bag @ Titrate   





    IV .Q0M [facility] Rx#:   





    [id number]   


 


Output:   


 


  Urine 220 285 40


 


  Estimated Blood Loss 10  


 


Other:   


 


  Voiding Method  Indwelling Catheter 


 


  Weight  72 kg 








                         ABP, PAP, CO, CI - Last 8 Hours











Arterial Blood Pressure        87/44


 


Arterial Blood Pressure        88/49


 


Arterial Blood Pressure        88/49


 


Arterial Blood Pressure        94/51


 


Arterial Blood Pressure        86/49


 


Arterial Blood Pressure        92/52


 


Arterial Blood Pressure        82/49


 


Arterial Blood Pressure        88/50

















GENERAL: The patient is intubated and sedated


HEENT: Pupils are round and equally reacting to light. EOMI. No scleral icterus.

 No conjunctival pallor. Normocephalic, atraumatic. No pharyngeal erythema. No 

thyromegaly. 


CARDIOVASCULAR: S1 and S2 present. No murmurs, rubs, or gallops. 


PULMONARY: Chest is clear to auscultation, no wheezing or crackles. 


ABDOMEN: Soft, is status post exploratory laparotomy, dressing is in place, 

wound is CLOSED.


MUSCULOSKELETAL: No joint swelling or deformity. 


EXTREMITIES: No cyanosis, clubbing, or pedal edema. 


NEUROLOGICAL: Gross neurological examination did not reveal any focal deficits. 


SKIN: No rashes.





Results


CBC & Chem 7: 


                                 19 04:50





                                 19 04:50


Labs: 


                  Abnormal Lab Results - Last 24 Hours (Table)











  05/19 Range/Units





  12:55 12:55 12:55 


 


WBC   16.2 H   (3.8-10.6)  k/uL


 


RBC     (4.30-5.90)  m/uL


 


Hgb     (13.0-17.5)  gm/dL


 


Hct     (39.0-53.0)  %


 


Plt Count   590 H   (150-450)  k/uL


 


Neutrophils #   15.0 H   (1.3-7.7)  k/uL


 


Lymphocytes #   0.6 L   (1.0-4.8)  k/uL


 


APTT     (22.0-30.0)  sec


 


ABG pCO2     (35-45)  mmHg


 


ABG pO2     ()  mmHg


 


ABG HCO3     (21-25)  mmol/L


 


ABG Total CO2     (19-24)  mmol/L


 


ABG O2 Saturation     (94-97)  %


 


Sodium  132 L    (137-145)  mmol/L


 


Potassium     (3.5-5.1)  mmol/L


 


Chloride  93 L    ()  mmol/L


 


Carbon Dioxide  32 H    (22-30)  mmol/L


 


BUN  27 H    (9-20)  mg/dL


 


Glucose  147 H    (74-99)  mg/dL


 


POC Glucose (mg/dL)     (75-99)  mg/dL


 


Calcium     (8.4-10.2)  mg/dL


 


Magnesium    3.2 H  (1.6-2.3)  mg/dL


 


Total Protein     (6.3-8.2)  g/dL


 


Albumin  3.3 L    (3.5-5.0)  g/dL


 


Ur Specific Gravity     (1.001-1.035)  


 


Urine Protein     (Negative)  


 


Urine RBC     (0-5)  /hpf


 


Urine Mucus     (None)  /hpf














  19 Range/Units





  12:55 21:08 21:40 


 


WBC     (3.8-10.6)  k/uL


 


RBC     (4.30-5.90)  m/uL


 


Hgb     (13.0-17.5)  gm/dL


 


Hct     (39.0-53.0)  %


 


Plt Count     (150-450)  k/uL


 


Neutrophils #     (1.3-7.7)  k/uL


 


Lymphocytes #     (1.0-4.8)  k/uL


 


APTT  21.2 L    (22.0-30.0)  sec


 


ABG pCO2    51 H  (35-45)  mmHg


 


ABG pO2    125 H  ()  mmHg


 


ABG HCO3    31 H  (21-25)  mmol/L


 


ABG Total CO2    33 H  (19-24)  mmol/L


 


ABG O2 Saturation    98.1 H  (94-97)  %


 


Sodium     (137-145)  mmol/L


 


Potassium     (3.5-5.1)  mmol/L


 


Chloride     ()  mmol/L


 


Carbon Dioxide     (22-30)  mmol/L


 


BUN     (9-20)  mg/dL


 


Glucose     (74-99)  mg/dL


 


POC Glucose (mg/dL)   119 H   (75-99)  mg/dL


 


Calcium     (8.4-10.2)  mg/dL


 


Magnesium     (1.6-2.3)  mg/dL


 


Total Protein     (6.3-8.2)  g/dL


 


Albumin     (3.5-5.0)  g/dL


 


Ur Specific Gravity     (1.001-1.035)  


 


Urine Protein     (Negative)  


 


Urine RBC     (0-5)  /hpf


 


Urine Mucus     (None)  /hpf














  19 Range/Units





  01:50 04:50 04:50 


 


WBC   12.4 H   (3.8-10.6)  k/uL


 


RBC   3.88 L   (4.30-5.90)  m/uL


 


Hgb   11.6 L D   (13.0-17.5)  gm/dL


 


Hct   37.3 L   (39.0-53.0)  %


 


Plt Count   475 H   (150-450)  k/uL


 


Neutrophils #   11.6 H   (1.3-7.7)  k/uL


 


Lymphocytes #   0.5 L   (1.0-4.8)  k/uL


 


APTT     (22.0-30.0)  sec


 


ABG pCO2     (35-45)  mmHg


 


ABG pO2     ()  mmHg


 


ABG HCO3     (21-25)  mmol/L


 


ABG Total CO2     (19-24)  mmol/L


 


ABG O2 Saturation     (94-97)  %


 


Sodium    135 L  (137-145)  mmol/L


 


Potassium    5.2 H  (3.5-5.1)  mmol/L


 


Chloride     ()  mmol/L


 


Carbon Dioxide     (22-30)  mmol/L


 


BUN    25 H  (9-20)  mg/dL


 


Glucose    109 H  (74-99)  mg/dL


 


POC Glucose (mg/dL)     (75-99)  mg/dL


 


Calcium    7.3 L  (8.4-10.2)  mg/dL


 


Magnesium    3.3 H  (1.6-2.3)  mg/dL


 


Total Protein    4.3 L  (6.3-8.2)  g/dL


 


Albumin    2.0 L  (3.5-5.0)  g/dL


 


Ur Specific Gravity  >1.050 H    (1.001-1.035)  


 


Urine Protein  1+ H    (Negative)  


 


Urine RBC  8 H    (0-5)  /hpf


 


Urine Mucus  Rare H    (None)  /hpf














  19 Range/Units





  05:09 


 


WBC   (3.8-10.6)  k/uL


 


RBC   (4.30-5.90)  m/uL


 


Hgb   (13.0-17.5)  gm/dL


 


Hct   (39.0-53.0)  %


 


Plt Count   (150-450)  k/uL


 


Neutrophils #   (1.3-7.7)  k/uL


 


Lymphocytes #   (1.0-4.8)  k/uL


 


APTT   (22.0-30.0)  sec


 


ABG pCO2  53 H  (35-45)  mmHg


 


ABG pO2   ()  mmHg


 


ABG HCO3  30 H  (21-25)  mmol/L


 


ABG Total CO2  31 H  (19-24)  mmol/L


 


ABG O2 Saturation   (94-97)  %


 


Sodium   (137-145)  mmol/L


 


Potassium   (3.5-5.1)  mmol/L


 


Chloride   ()  mmol/L


 


Carbon Dioxide   (22-30)  mmol/L


 


BUN   (9-20)  mg/dL


 


Glucose   (74-99)  mg/dL


 


POC Glucose (mg/dL)   (75-99)  mg/dL


 


Calcium   (8.4-10.2)  mg/dL


 


Magnesium   (1.6-2.3)  mg/dL


 


Total Protein   (6.3-8.2)  g/dL


 


Albumin   (3.5-5.0)  g/dL


 


Ur Specific Gravity   (1.001-1.035)  


 


Urine Protein   (Negative)  


 


Urine RBC   (0-5)  /hpf


 


Urine Mucus   (None)  /hpf














Thrombosis Risk Factor Assmnt





- Choose All That Apply


Each Factor Represents 1 point: Abnormal pulmonary function (COPD), Medical pt 

on bed rest


Each Risk Factor Represents 2 Points: Age 61-74 years, Major surgery


Thrombosis Risk Factor Assessment Total Risk Factor Score: 6


Thrombosis Risk Factor Assessment Level: High Risk





Assessment and Plan


Assessment: 





Acute abdomen, status post exploratory laparotomy with perforated duodenal ulcer

 and incarcerated umbilical hernia


Acute hypoxic respiratory failure.


Acute COPD exacerbation


Hyperlipidemia


History of rheumatoid arthritis











Plan: 





This is a 69 years old male who presents with perforated duodenal ulcers and 

incarcerated hernia, status post exploratory laparotomy.  Patient after the 

surgery went to the intensive care unit.  Status post intubating.  Critical care

 team and surgery team R following the case closely.  Continue with pain 

management, IV fluids and antibiotic.Labs and medication were reviewed..  

Continue same treatment.  Continue with symptomatic treatment.  Resume home 

medication.  Monitor lytes and vitals.  DVT and GI prophylaxis.  Further 

recommendations of the clinical course of the patient


DVT prophylaxis: Subcutaneous heparin


GI Prophylaxis: PPI


Prognosis is guarded

## 2019-05-24 NOTE — P.PN
Subjective


Progress Note Date: 05/24/19


Principal diagnosis: 





Perforated duodenal ulcer





Patient was extubated this morning.  Is currently on CPAP.  Says his pain in the

abdomen is improved.  His DAVID drain is sanguinous.  Nasogastric tube is bilious. 

Dressings are otherwise clean and dry.  Labs noted.  Marginal urine output 

overnight.





Objective





- Vital Signs


Vital signs: 


                                   Vital Signs











Temp  98 F   05/24/19 08:00


 


Pulse  92   05/24/19 08:13


 


Resp  12   05/24/19 08:00


 


BP  86/64   05/24/19 08:00


 


Pulse Ox  97   05/24/19 08:00








                                 Intake & Output











 05/23/19 05/24/19 05/24/19





 18:59 06:59 18:59


 


Intake Total  2398.00 1930


 


Output Total  515 225


 


Balance  1883.00 1705


 


Weight 71.668 kg 72 kg 


 


Intake:   


 


  IV  2298 1930


 


    Fluconazole in NaCl,Iso-   100





    Osm 200 mg In Saline 1   





    100ml.bag @ 100 mls/hr   





    IVPB DAILY [facility] Rx#:   





    [id number]   


 


    Piperacillin-Tazobactam 3   100





    .375 gm In Sodium   





    Chloride 0.9% 100 ml @ 25   





    mls/hr IVPB Q8HR [facility] Rx#   





    [id number]   


 


    Pressure  18 30


 


    Sodium Chloride 0.9% 1,  1080 600





    000 ml @ 120 mls/hr IV .   





    Q8H20M [facility] Rx#:[id number]   


 


    Sodium Chloride 0.9% 1,   1000





    000 ml @ 999 mls/hr IV .   





    Q1H1M [facility] Rx#:[id number]   


 


    metroNIDAZOLE-NS    100





    mg In Saline 1 100ml.bag   





    @ 100 mls/hr IVPB Q8HR   





    [facility] Rx#:[id number]   


 


  Intake, IV Titration  100.00 





  Amount   


 


    Propofol 1,000 mg In  100.00 





    Empty Bag 1 bag @ Titrate   





    IV .Q0M [facility] Rx#:   





    [id number]   


 


Output:   


 


  Urine  505 225


 


  Estimated Blood Loss  10 


 


Other:   


 


  Voiding Method  Indwelling Catheter 








                       ABP, PAP, CO, CI - Last Documented











Arterial Blood Pressure        93/49

















- Exam





Abdomen: Soft, mild distention, incision clean with jaime present, retention 

sutures intact, drain noted





- Labs


CBC & Chem 7: 


                                 05/24/19 04:50





                                 05/24/19 04:50


Labs: 


                  Abnormal Lab Results - Last 24 Hours (Table)











  05/23/19 05/23/19 05/23/19 Range/Units





  12:55 12:55 12:55 


 


WBC   16.2 H   (3.8-10.6)  k/uL


 


RBC     (4.30-5.90)  m/uL


 


Hgb     (13.0-17.5)  gm/dL


 


Hct     (39.0-53.0)  %


 


Plt Count   590 H   (150-450)  k/uL


 


Neutrophils #   15.0 H   (1.3-7.7)  k/uL


 


Lymphocytes #   0.6 L   (1.0-4.8)  k/uL


 


APTT     (22.0-30.0)  sec


 


ABG pH     (7.35-7.45)  


 


ABG pCO2     (35-45)  mmHg


 


ABG pO2     ()  mmHg


 


ABG HCO3     (21-25)  mmol/L


 


ABG Total CO2     (19-24)  mmol/L


 


ABG O2 Saturation     (94-97)  %


 


Sodium  132 L    (137-145)  mmol/L


 


Potassium     (3.5-5.1)  mmol/L


 


Chloride  93 L    ()  mmol/L


 


Carbon Dioxide  32 H    (22-30)  mmol/L


 


BUN  27 H    (9-20)  mg/dL


 


Glucose  147 H    (74-99)  mg/dL


 


POC Glucose (mg/dL)     (75-99)  mg/dL


 


Calcium     (8.4-10.2)  mg/dL


 


Magnesium    3.2 H  (1.6-2.3)  mg/dL


 


Total Protein     (6.3-8.2)  g/dL


 


Albumin  3.3 L    (3.5-5.0)  g/dL


 


Ur Specific Gravity     (1.001-1.035)  


 


Urine Protein     (Negative)  


 


Urine RBC     (0-5)  /hpf


 


Urine Mucus     (None)  /hpf














  05/23/19 05/23/19 05/23/19 Range/Units





  12:55 21:08 21:40 


 


WBC     (3.8-10.6)  k/uL


 


RBC     (4.30-5.90)  m/uL


 


Hgb     (13.0-17.5)  gm/dL


 


Hct     (39.0-53.0)  %


 


Plt Count     (150-450)  k/uL


 


Neutrophils #     (1.3-7.7)  k/uL


 


Lymphocytes #     (1.0-4.8)  k/uL


 


APTT  21.2 L    (22.0-30.0)  sec


 


ABG pH     (7.35-7.45)  


 


ABG pCO2    51 H  (35-45)  mmHg


 


ABG pO2    125 H  ()  mmHg


 


ABG HCO3    31 H  (21-25)  mmol/L


 


ABG Total CO2    33 H  (19-24)  mmol/L


 


ABG O2 Saturation    98.1 H  (94-97)  %


 


Sodium     (137-145)  mmol/L


 


Potassium     (3.5-5.1)  mmol/L


 


Chloride     ()  mmol/L


 


Carbon Dioxide     (22-30)  mmol/L


 


BUN     (9-20)  mg/dL


 


Glucose     (74-99)  mg/dL


 


POC Glucose (mg/dL)   119 H   (75-99)  mg/dL


 


Calcium     (8.4-10.2)  mg/dL


 


Magnesium     (1.6-2.3)  mg/dL


 


Total Protein     (6.3-8.2)  g/dL


 


Albumin     (3.5-5.0)  g/dL


 


Ur Specific Gravity     (1.001-1.035)  


 


Urine Protein     (Negative)  


 


Urine RBC     (0-5)  /hpf


 


Urine Mucus     (None)  /hpf














  05/24/19 05/24/19 05/24/19 Range/Units





  01:50 04:50 04:50 


 


WBC   12.4 H   (3.8-10.6)  k/uL


 


RBC   3.88 L   (4.30-5.90)  m/uL


 


Hgb   11.6 L D   (13.0-17.5)  gm/dL


 


Hct   37.3 L   (39.0-53.0)  %


 


Plt Count   475 H   (150-450)  k/uL


 


Neutrophils #   11.6 H   (1.3-7.7)  k/uL


 


Lymphocytes #   0.5 L   (1.0-4.8)  k/uL


 


APTT     (22.0-30.0)  sec


 


ABG pH     (7.35-7.45)  


 


ABG pCO2     (35-45)  mmHg


 


ABG pO2     ()  mmHg


 


ABG HCO3     (21-25)  mmol/L


 


ABG Total CO2     (19-24)  mmol/L


 


ABG O2 Saturation     (94-97)  %


 


Sodium    135 L  (137-145)  mmol/L


 


Potassium    5.2 H  (3.5-5.1)  mmol/L


 


Chloride     ()  mmol/L


 


Carbon Dioxide     (22-30)  mmol/L


 


BUN    25 H  (9-20)  mg/dL


 


Glucose    109 H  (74-99)  mg/dL


 


POC Glucose (mg/dL)     (75-99)  mg/dL


 


Calcium    7.3 L  (8.4-10.2)  mg/dL


 


Magnesium    3.3 H  (1.6-2.3)  mg/dL


 


Total Protein    4.3 L  (6.3-8.2)  g/dL


 


Albumin    2.0 L  (3.5-5.0)  g/dL


 


Ur Specific Gravity  >1.050 H    (1.001-1.035)  


 


Urine Protein  1+ H    (Negative)  


 


Urine RBC  8 H    (0-5)  /hpf


 


Urine Mucus  Rare H    (None)  /hpf














  05/24/19 05/24/19 Range/Units





  05:09 10:16 


 


WBC    (3.8-10.6)  k/uL


 


RBC    (4.30-5.90)  m/uL


 


Hgb    (13.0-17.5)  gm/dL


 


Hct    (39.0-53.0)  %


 


Plt Count    (150-450)  k/uL


 


Neutrophils #    (1.3-7.7)  k/uL


 


Lymphocytes #    (1.0-4.8)  k/uL


 


APTT    (22.0-30.0)  sec


 


ABG pH   7.28 L  (7.35-7.45)  


 


ABG pCO2  53 H  59 H  (35-45)  mmHg


 


ABG pO2    ()  mmHg


 


ABG HCO3  30 H  28 H  (21-25)  mmol/L


 


ABG Total CO2  31 H  29 H  (19-24)  mmol/L


 


ABG O2 Saturation    (94-97)  %


 


Sodium    (137-145)  mmol/L


 


Potassium    (3.5-5.1)  mmol/L


 


Chloride    ()  mmol/L


 


Carbon Dioxide    (22-30)  mmol/L


 


BUN    (9-20)  mg/dL


 


Glucose    (74-99)  mg/dL


 


POC Glucose (mg/dL)    (75-99)  mg/dL


 


Calcium    (8.4-10.2)  mg/dL


 


Magnesium    (1.6-2.3)  mg/dL


 


Total Protein    (6.3-8.2)  g/dL


 


Albumin    (3.5-5.0)  g/dL


 


Ur Specific Gravity    (1.001-1.035)  


 


Urine Protein    (Negative)  


 


Urine RBC    (0-5)  /hpf


 


Urine Mucus    (None)  /hpf








                      Microbiology - Last 24 Hours (Table)











 05/24/19 01:50 Urine Culture - Preliminary





 Urine,Catheterized 


 


 05/23/19 23:41 Sputum Culture - Preliminary





 Sputum 














Assessment and Plan


(1) Bowel perforation


Narrative/Plan: 


Continue pulmonary toilet.  Continue antiacids antifungals and antibiotics.  

Keep nasogastric tube to suction.  Possibly start TPN 24-48 hours.


Current Visit: Yes   Status: Acute   Code(s): K63.1 - PERFORATION OF INTESTINE 

(NONTRAUMATIC)   SNOMED Code(s): 38012576

## 2019-05-24 NOTE — P.PN
Subjective


Progress Note Date: 05/24/19








On 05/24/2019 the patient is postop day #1.  The patient was taken to the 

operating room for pneumoperitoneum.  The patient was found to have a perforated

duodenal ulcer.  He underwent expiratory laparotomy with repair of a perforated 

duodenal ulcer and repair of an incarcerated umbilical hernia.  Postop, the 

patient was kept intubated and he was brought into the intensive care unit.  

Overnight the patient was given a bolus of IV fluids.  He was kept sedated.  He 

was kept on a mechanical ventilator and earlier this morning with an assist-

control mode at the rate of 12 with an FiO2 of 50% and PEEP of 5 and a tidal 

volume of 500.  The patient otherwise did well.  He did not require any 

pressors.  He is on a combination of Flagyl and Zosyn and Diflucan.  He is 

afebrile hemodynamically stable.  Based on all this, I reviewed the chest x-ray 

and shows chronic fibrotic changes in lung bases bilaterally.  ET tube was in a 

good location.  At that this patient off sedation this morning.  Weaning para

meters were checked.  Following that the patient was given a spontaneous 

breathing trial.  The post trial blood gases showed a component of mild 

respiratory acidosis.  Nevertheless, I decided to extubate this patient to a 

BiPAP and currently is on a BiPAP pressure of 10/5 with an FiO2 of 50%.  He is 

alert and awake and is following simple commands.  He is quite comfortable.  He 

is on stress dose hydrocortisone.  He is on bronchodilators around-the-clock.  

He has no issues with pain.  Surgical wound site is dry clean and intact.  He 

has a right IJ triple-lumen catheter.  He has a Artline catheter in place.  DAVID 

drains also in place.  No abdominal distention.  No other complaints otherwise 

for now.  Family is at the bedside.





Objective





- Vital Signs


Vital signs: 


                                   Vital Signs











Temp  98.2 F   05/24/19 12:00


 


Pulse  98   05/24/19 12:51


 


Resp  15   05/24/19 12:00


 


BP  116/73   05/24/19 12:00


 


Pulse Ox  96   05/24/19 12:00








                                 Intake & Output











 05/23/19 05/24/19 05/24/19





 18:59 06:59 18:59


 


Intake Total  2398.00 2056


 


Output Total  515 260


 


Balance  1883.00 1796


 


Weight 71.668 kg 72 kg 


 


Intake:   


 


  IV  2298 2056


 


    Fluconazole in NaCl,Iso-   100





    Osm 200 mg In Saline 1   





    100ml.bag @ 100 mls/hr   





    IVPB DAILY [facility] Rx#:   





    [id number]   


 


    Piperacillin-Tazobactam 3   100





    .375 gm In Sodium   





    Chloride 0.9% 100 ml @ 25   





    mls/hr IVPB Q8HR [facility] Rx#   





    :[id number]   


 


    Pressure  18 36


 


    Sodium Chloride 0.9% 1,  1080 720





    000 ml @ 120 mls/hr IV .   





    Q8H20M [facility] Rx#:[id number]   


 


    Sodium Chloride 0.9% 1,   1000





    000 ml @ 999 mls/hr IV .   





    Q1H1M [facility] Rx#:[id number]   


 


    metroNIDAZOLE-NS    100





    mg In Saline 1 100ml.bag   





    @ 100 mls/hr IVPB Q8HR   





    [facility] Rx#:[id number]   


 


  Intake, IV Titration  100.00 





  Amount   


 


    Propofol 1,000 mg In  100.00 





    Empty Bag 1 bag @ Titrate   





    IV .Q0M [facility] Rx#:   





    [id number]   


 


Output:   


 


  Urine  505 260


 


  Estimated Blood Loss  10 


 


Other:   


 


  Voiding Method  Indwelling Catheter 








                       ABP, PAP, CO, CI - Last Documented











Arterial Blood Pressure        135/59

















- Exam








Gen. appearance is calm comfortable cushingoid nonacute distress currently mary

ating a BiPAP at a pressure of 10/5 with an FiO2 of 50%, is wearing a full face 

mask


Head exam was generally normal. There was no scleral icterus or corneal arcus. 

Mucous membranes were moist..  The patient is cushingoid features related to 

chronic steroid use.


Neck was supple and without jugular venous distension, thyromegaly, or carotid 

bruits. Carotids were easily palpable bilaterally. There was no adenopathy.  The

patient has a right IJ triple-lumen catheter in place.


Lungs sounds are diminished bilaterally and the patient has coarse crackles in 

the mid and lower lung fields bilaterally consistent with pulmonary fibrosis


Cardiac exam revealed the PMI to be normally situated and sized. The rhythm was 

regular and no extrasystoles were noted during several minutes of auscultation. 

The first and second heart sounds were normal and physiologic splitting of the 

second heart sound was noted. There were no murmurs, rubs, clicks, or gallops.


Abdomen is soft.  Bowel sounds are hypoactive and very sluggish.  Surgical wound

site is dry clean and intact.  DAVID drains in place.  No direct tenderness.  No 

rebound tenderness.  No guarding.


Examination of the extremities revealed easily palpable radial, femoral and 

pedal pulses. There was no cyanosis, clubbing or edema.


Examination of the skin revealed no evidence of significant rashes, suspicious 

appearing nevi or other concerning lesions.


Neurologically is awake and alert and there is a focal neurological deficit.





- Labs


CBC & Chem 7: 


                                 05/24/19 04:50





                                 05/24/19 04:50


Labs: 


                  Abnormal Lab Results - Last 24 Hours (Table)











  05/23/19 05/23/19 05/23/19 Range/Units





  12:55 12:55 21:08 


 


WBC     (3.8-10.6)  k/uL


 


RBC     (4.30-5.90)  m/uL


 


Hgb     (13.0-17.5)  gm/dL


 


Hct     (39.0-53.0)  %


 


Plt Count     (150-450)  k/uL


 


Neutrophils #     (1.3-7.7)  k/uL


 


Lymphocytes #     (1.0-4.8)  k/uL


 


APTT   21.2 L   (22.0-30.0)  sec


 


ABG pH     (7.35-7.45)  


 


ABG pCO2     (35-45)  mmHg


 


ABG pO2     ()  mmHg


 


ABG HCO3     (21-25)  mmol/L


 


ABG Total CO2     (19-24)  mmol/L


 


ABG O2 Saturation     (94-97)  %


 


Sodium     (137-145)  mmol/L


 


Potassium     (3.5-5.1)  mmol/L


 


BUN     (9-20)  mg/dL


 


Glucose     (74-99)  mg/dL


 


POC Glucose (mg/dL)    119 H  (75-99)  mg/dL


 


Calcium     (8.4-10.2)  mg/dL


 


Magnesium  3.2 H    (1.6-2.3)  mg/dL


 


Total Protein     (6.3-8.2)  g/dL


 


Albumin     (3.5-5.0)  g/dL


 


Ur Specific Gravity     (1.001-1.035)  


 


Urine Protein     (Negative)  


 


Urine RBC     (0-5)  /hpf


 


Urine Mucus     (None)  /hpf














  05/23/19 05/24/19 05/24/19 Range/Units





  21:40 01:50 04:50 


 


WBC    12.4 H  (3.8-10.6)  k/uL


 


RBC    3.88 L  (4.30-5.90)  m/uL


 


Hgb    11.6 L D  (13.0-17.5)  gm/dL


 


Hct    37.3 L  (39.0-53.0)  %


 


Plt Count    475 H  (150-450)  k/uL


 


Neutrophils #    11.6 H  (1.3-7.7)  k/uL


 


Lymphocytes #    0.5 L  (1.0-4.8)  k/uL


 


APTT     (22.0-30.0)  sec


 


ABG pH     (7.35-7.45)  


 


ABG pCO2  51 H    (35-45)  mmHg


 


ABG pO2  125 H    ()  mmHg


 


ABG HCO3  31 H    (21-25)  mmol/L


 


ABG Total CO2  33 H    (19-24)  mmol/L


 


ABG O2 Saturation  98.1 H    (94-97)  %


 


Sodium     (137-145)  mmol/L


 


Potassium     (3.5-5.1)  mmol/L


 


BUN     (9-20)  mg/dL


 


Glucose     (74-99)  mg/dL


 


POC Glucose (mg/dL)     (75-99)  mg/dL


 


Calcium     (8.4-10.2)  mg/dL


 


Magnesium     (1.6-2.3)  mg/dL


 


Total Protein     (6.3-8.2)  g/dL


 


Albumin     (3.5-5.0)  g/dL


 


Ur Specific Gravity   >1.050 H   (1.001-1.035)  


 


Urine Protein   1+ H   (Negative)  


 


Urine RBC   8 H   (0-5)  /hpf


 


Urine Mucus   Rare H   (None)  /hpf














  05/24/19 05/24/19 05/24/19 Range/Units





  04:50 05:09 10:16 


 


WBC     (3.8-10.6)  k/uL


 


RBC     (4.30-5.90)  m/uL


 


Hgb     (13.0-17.5)  gm/dL


 


Hct     (39.0-53.0)  %


 


Plt Count     (150-450)  k/uL


 


Neutrophils #     (1.3-7.7)  k/uL


 


Lymphocytes #     (1.0-4.8)  k/uL


 


APTT     (22.0-30.0)  sec


 


ABG pH    7.28 L  (7.35-7.45)  


 


ABG pCO2   53 H  59 H  (35-45)  mmHg


 


ABG pO2     ()  mmHg


 


ABG HCO3   30 H  28 H  (21-25)  mmol/L


 


ABG Total CO2   31 H  29 H  (19-24)  mmol/L


 


ABG O2 Saturation     (94-97)  %


 


Sodium  135 L    (137-145)  mmol/L


 


Potassium  5.2 H    (3.5-5.1)  mmol/L


 


BUN  25 H    (9-20)  mg/dL


 


Glucose  109 H    (74-99)  mg/dL


 


POC Glucose (mg/dL)     (75-99)  mg/dL


 


Calcium  7.3 L    (8.4-10.2)  mg/dL


 


Magnesium  3.3 H    (1.6-2.3)  mg/dL


 


Total Protein  4.3 L    (6.3-8.2)  g/dL


 


Albumin  2.0 L    (3.5-5.0)  g/dL


 


Ur Specific Gravity     (1.001-1.035)  


 


Urine Protein     (Negative)  


 


Urine RBC     (0-5)  /hpf


 


Urine Mucus     (None)  /hpf








                      Microbiology - Last 24 Hours (Table)











 05/24/19 01:50 Urine Culture - Preliminary





 Urine,Catheterized 


 


 05/23/19 23:41 Sputum Culture - Preliminary





 Sputum 














Assessment and Plan


Plan: 











1  pneumoperitoneum and the patient is status post expiratory laparotomy and 

repair of a perforated duodenal ulcer and repair of an incarcerated umbilical 

hernia.  The patient is postop day #1..  The patient is hemodynamically stable. 

The patient is currently on a combination of Zosyn and Flagyl and Diflucan.  He 

is well resuscitated.  The patient was weaned off and extubated earlier this 

morning.





2 ventilator management.  The patient was extubated and the patient is currently

on a BiPAP at a pressure of 10/5 cm of water with an FiO2 of 50%.  The patient 

has bilateral pulmonary fibrosis.





3 acute leukocytosis secondary to above, improving and the white cell count is 

down to 12.4 





4 acute sinus tachycardia secondary to above





5 mild lactic acidosis





6 advanced COPD with pulmonary fibrosis, steroid dependent





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





8 rheumatoid arthritis





9 obstructive sleep apnea not receiving any CPAP therapy at this point in time





10 chronic back pain





11 hyperlipidemia





12 osteoarthritis





Plan





Give the patient another bolus of normal saline 1 L.  He is on a maintenance of 

normal saline at 1 20 mL an hour.  Continue BiPAP for respiratory support.  

Continue the bronchodilators around the clock.  Pain control.  Wound care.  

Monitor output from the DAVID.  Monitor the white count.  Monitor hemodynamics.  

Monitor CVP.  Continue same antibiotic coverage including a combination of Zosyn

and Flagyl and Diflucan.  We'll keep the patient denies to follow 24 hours for 

further monitoring.  Would prefer to keep him BiPAP throughout the day and 

overnight he can be weaned down to a nasal cannula hopefully by tomorrow.  We'll

continue to follow.  Critically care evaluation that was done more than 30 

minutes.


Time with Patient: Greater than 30

## 2019-05-25 LAB
ANION GAP SERPL CALC-SCNC: 5 MMOL/L
BASOPHILS # BLD AUTO: 0 K/UL (ref 0–0.2)
BASOPHILS NFR BLD AUTO: 0 %
BUN SERPL-SCNC: 26 MG/DL (ref 9–20)
CALCIUM SPEC-MCNC: 7.3 MG/DL (ref 8.4–10.2)
CHLORIDE SERPL-SCNC: 110 MMOL/L (ref 98–107)
CO2 BLDA-SCNC: 28 MMOL/L (ref 19–24)
CO2 SERPL-SCNC: 24 MMOL/L (ref 22–30)
EOSINOPHIL # BLD AUTO: 0.1 K/UL (ref 0–0.7)
EOSINOPHIL NFR BLD AUTO: 0 %
ERYTHROCYTE [DISTWIDTH] IN BLOOD BY AUTOMATED COUNT: 3.87 M/UL (ref 4.3–5.9)
ERYTHROCYTE [DISTWIDTH] IN BLOOD: 15.7 % (ref 11.5–15.5)
GLUCOSE BLD-MCNC: 89 MG/DL (ref 75–99)
GLUCOSE BLD-MCNC: 98 MG/DL (ref 75–99)
GLUCOSE SERPL-MCNC: 89 MG/DL (ref 74–99)
HCO3 BLDA-SCNC: 26 MMOL/L (ref 21–25)
HCT VFR BLD AUTO: 38.6 % (ref 39–53)
HGB BLD-MCNC: 11.7 GM/DL (ref 13–17.5)
LYMPHOCYTES # SPEC AUTO: 0.7 K/UL (ref 1–4.8)
LYMPHOCYTES NFR SPEC AUTO: 5 %
MCH RBC QN AUTO: 30.3 PG (ref 25–35)
MCHC RBC AUTO-ENTMCNC: 30.3 G/DL (ref 31–37)
MCV RBC AUTO: 99.9 FL (ref 80–100)
MONOCYTES # BLD AUTO: 0.3 K/UL (ref 0–1)
MONOCYTES NFR BLD AUTO: 2 %
NEUTROPHILS # BLD AUTO: 13.3 K/UL (ref 1.3–7.7)
NEUTROPHILS NFR BLD AUTO: 92 %
PCO2 BLDA: 52 MMHG (ref 35–45)
PH BLDA: 7.32 [PH] (ref 7.35–7.45)
PLATELET # BLD AUTO: 478 K/UL (ref 150–450)
PO2 BLDA: 79 MMHG (ref 83–108)
POTASSIUM SERPL-SCNC: 4.9 MMOL/L (ref 3.5–5.1)
SODIUM SERPL-SCNC: 139 MMOL/L (ref 137–145)
WBC # BLD AUTO: 14.5 K/UL (ref 3.8–10.6)

## 2019-05-25 RX ADMIN — METRONIDAZOLE SCH MLS/HR: 500 INJECTION, SOLUTION INTRAVENOUS at 15:12

## 2019-05-25 RX ADMIN — HYDROMORPHONE HYDROCHLORIDE PRN MG: 1 INJECTION, SOLUTION INTRAMUSCULAR; INTRAVENOUS; SUBCUTANEOUS at 17:44

## 2019-05-25 RX ADMIN — HYDROCORTISONE SODIUM SUCCINATE SCH MG: 100 INJECTION, POWDER, FOR SOLUTION INTRAMUSCULAR; INTRAVENOUS at 10:12

## 2019-05-25 RX ADMIN — IPRATROPIUM BROMIDE AND ALBUTEROL SULFATE SCH ML: .5; 3 SOLUTION RESPIRATORY (INHALATION) at 20:06

## 2019-05-25 RX ADMIN — CEFAZOLIN SCH MLS/HR: 330 INJECTION, POWDER, FOR SOLUTION INTRAMUSCULAR; INTRAVENOUS at 03:36

## 2019-05-25 RX ADMIN — IPRATROPIUM BROMIDE AND ALBUTEROL SULFATE SCH ML: .5; 3 SOLUTION RESPIRATORY (INHALATION) at 07:54

## 2019-05-25 RX ADMIN — METRONIDAZOLE SCH MLS/HR: 500 INJECTION, SOLUTION INTRAVENOUS at 10:12

## 2019-05-25 RX ADMIN — ACETAMINOPHEN SCH MLS/HR: 10 INJECTION, SOLUTION INTRAVENOUS at 06:05

## 2019-05-25 RX ADMIN — HYDROMORPHONE HYDROCHLORIDE PRN MG: 1 INJECTION, SOLUTION INTRAMUSCULAR; INTRAVENOUS; SUBCUTANEOUS at 10:30

## 2019-05-25 RX ADMIN — PIPERACILLIN AND TAZOBACTAM SCH MLS/HR: 3; .375 INJECTION, POWDER, FOR SOLUTION INTRAVENOUS at 00:23

## 2019-05-25 RX ADMIN — PANTOPRAZOLE SODIUM SCH MG: 40 INJECTION, POWDER, FOR SOLUTION INTRAVENOUS at 10:13

## 2019-05-25 RX ADMIN — ACETAMINOPHEN SCH MLS/HR: 10 INJECTION, SOLUTION INTRAVENOUS at 00:18

## 2019-05-25 RX ADMIN — HYDROMORPHONE HYDROCHLORIDE PRN MG: 1 INJECTION, SOLUTION INTRAMUSCULAR; INTRAVENOUS; SUBCUTANEOUS at 22:11

## 2019-05-25 RX ADMIN — HYDROCORTISONE SODIUM SUCCINATE SCH MG: 100 INJECTION, POWDER, FOR SOLUTION INTRAMUSCULAR; INTRAVENOUS at 15:11

## 2019-05-25 RX ADMIN — CEFAZOLIN SCH MLS/HR: 330 INJECTION, POWDER, FOR SOLUTION INTRAMUSCULAR; INTRAVENOUS at 10:12

## 2019-05-25 RX ADMIN — HYDROMORPHONE HYDROCHLORIDE PRN MG: 1 INJECTION, SOLUTION INTRAMUSCULAR; INTRAVENOUS; SUBCUTANEOUS at 12:19

## 2019-05-25 RX ADMIN — HEPARIN SODIUM SCH UNIT: 5000 INJECTION, SOLUTION INTRAVENOUS; SUBCUTANEOUS at 15:12

## 2019-05-25 RX ADMIN — FLUCONAZOLE IN SODIUM CHLORIDE SCH MLS/HR: 2 INJECTION, SOLUTION INTRAVENOUS at 10:30

## 2019-05-25 RX ADMIN — PIPERACILLIN AND TAZOBACTAM SCH MLS/HR: 3; .375 INJECTION, POWDER, FOR SOLUTION INTRAVENOUS at 10:13

## 2019-05-25 RX ADMIN — PIPERACILLIN AND TAZOBACTAM SCH MLS/HR: 3; .375 INJECTION, POWDER, FOR SOLUTION INTRAVENOUS at 15:12

## 2019-05-25 RX ADMIN — HEPARIN SODIUM SCH UNIT: 5000 INJECTION, SOLUTION INTRAVENOUS; SUBCUTANEOUS at 10:12

## 2019-05-25 RX ADMIN — HYDROMORPHONE HYDROCHLORIDE PRN MG: 1 INJECTION, SOLUTION INTRAMUSCULAR; INTRAVENOUS; SUBCUTANEOUS at 05:09

## 2019-05-25 RX ADMIN — IPRATROPIUM BROMIDE AND ALBUTEROL SULFATE SCH ML: .5; 3 SOLUTION RESPIRATORY (INHALATION) at 01:41

## 2019-05-25 RX ADMIN — HEPARIN SODIUM SCH UNIT: 5000 INJECTION, SOLUTION INTRAVENOUS; SUBCUTANEOUS at 00:15

## 2019-05-25 RX ADMIN — HYDROMORPHONE HYDROCHLORIDE PRN MG: 1 INJECTION, SOLUTION INTRAMUSCULAR; INTRAVENOUS; SUBCUTANEOUS at 15:11

## 2019-05-25 RX ADMIN — IPRATROPIUM BROMIDE AND ALBUTEROL SULFATE SCH ML: .5; 3 SOLUTION RESPIRATORY (INHALATION) at 12:39

## 2019-05-25 RX ADMIN — HYDROCORTISONE SODIUM SUCCINATE SCH MG: 100 INJECTION, POWDER, FOR SOLUTION INTRAMUSCULAR; INTRAVENOUS at 00:05

## 2019-05-25 RX ADMIN — HYDROMORPHONE HYDROCHLORIDE PRN MG: 1 INJECTION, SOLUTION INTRAMUSCULAR; INTRAVENOUS; SUBCUTANEOUS at 07:42

## 2019-05-25 RX ADMIN — HYDROMORPHONE HYDROCHLORIDE PRN MG: 1 INJECTION, SOLUTION INTRAMUSCULAR; INTRAVENOUS; SUBCUTANEOUS at 00:54

## 2019-05-25 RX ADMIN — HYDROMORPHONE HYDROCHLORIDE PRN MG: 1 INJECTION, SOLUTION INTRAMUSCULAR; INTRAVENOUS; SUBCUTANEOUS at 03:07

## 2019-05-25 NOTE — P.PN
Subjective


Progress Note Date: 05/25/19


Principal diagnosis: 





Perforated duodenal ulcer





The patient is doing well.  He is awake and alert.  He is requesting more to 

eat.





Objective





- Vital Signs


Vital signs: 


                                   Vital Signs











Temp  98 F   05/25/19 04:00


 


Pulse  92   05/25/19 08:04


 


Resp  12   05/25/19 07:00


 


BP  131/76   05/24/19 20:00


 


Pulse Ox  94 L  05/25/19 07:56








                                 Intake & Output











 05/24/19 05/25/19 05/25/19





 18:59 06:59 18:59


 


Intake Total 3012 2112 126


 


Output Total 655 610 45


 


Balance 2357 1502 81


 


Weight  79.1 kg 


 


Intake:   


 


  IV 3012 2112 126


 


    ACETAMINOPHEN IV (For NPO  400 





    ) 1,000 mg In Empty Bag 1   





    bag @ 400 mls/hr IVPB   





    Q6HR [facility] Rx#:[id number]   


 


    Fluconazole in NaCl,Iso- 100  





    Osm 200 mg In Saline 1   





    100ml.bag @ 100 mls/hr   





    IVPB DAILY [facility] Rx#:   





    [id number]   


 


    Piperacillin-Tazobactam 3 200 100 





    .375 gm In Sodium   





    Chloride 0.9% 100 ml @ 25   





    mls/hr IVPB Q8HR [facility] Rx#   





    :[id number]   


 


    Pressure 72 72 6


 


    Sodium Chloride 0.9% 1, 1440 1440 120





    000 ml @ 120 mls/hr IV .   





    Q8H20M [facility] Rx#:[id number]   


 


    Sodium Chloride 0.9% 1, 1000  





    000 ml @ 999 mls/hr IV .   





    Q1H1M [facility] Rx#:[id number]   


 


    metroNIDAZOLE-NS  200 100 





    mg In Saline 1 100ml.bag   





    @ 100 mls/hr IVPB Q8HR   





    [facility] Rx#:[id number]   


 


Output:   


 


  Drainage 130 130 


 


    Right Lower Abdomen 130 130 


 


  Urine 525 480 45


 


Other:   


 


  Voiding Method Indwelling Catheter Indwelling Catheter 








                       ABP, PAP, CO, CI - Last Documented











Arterial Blood Pressure        115/84

















- Constitutional


General appearance: Present: average body habitus





- Gastrointestinal


Gastrointestinal Comment(s): 





Abdomen soft.  Incision site is clean dry intact.  DAVID drain has mainly serous.





- Labs


CBC & Chem 7: 


                                 05/25/19 04:23





                                 05/25/19 04:23


Labs: 


                  Abnormal Lab Results - Last 24 Hours (Table)











  05/25/19 05/25/19 05/25/19 Range/Units





  04:23 04:23 08:05 


 


WBC  14.5 H    (3.8-10.6)  k/uL


 


RBC  3.87 L    (4.30-5.90)  m/uL


 


Hgb  11.7 L    (13.0-17.5)  gm/dL


 


Hct  38.6 L    (39.0-53.0)  %


 


MCHC  30.3 L    (31.0-37.0)  g/dL


 


RDW  15.7 H    (11.5-15.5)  %


 


Plt Count  478 H    (150-450)  k/uL


 


Neutrophils #  13.3 H    (1.3-7.7)  k/uL


 


Lymphocytes #  0.7 L    (1.0-4.8)  k/uL


 


ABG pH    7.32 L  (7.35-7.45)  


 


ABG pCO2    52 H  (35-45)  mmHg


 


ABG pO2    79 L  ()  mmHg


 


ABG HCO3    26 H  (21-25)  mmol/L


 


ABG Total CO2    28 H  (19-24)  mmol/L


 


Chloride   110 H   ()  mmol/L


 


BUN   26 H   (9-20)  mg/dL


 


Creatinine   0.62 L   (0.66-1.25)  mg/dL


 


Calcium   7.3 L   (8.4-10.2)  mg/dL








                      Microbiology - Last 24 Hours (Table)











 05/23/19 15:45 Blood Culture - Preliminary





 Blood    No Growth after 24 hours


 


 05/23/19 23:41 Gram Stain - Preliminary





 Sputum Sputum Culture - Preliminary


 


 05/24/19 01:50 Urine Culture - Preliminary





 Urine,Catheterized 














Assessment and Plan


Assessment: 





Status post repair of duodenal ulcer.  Patient will remain nothing by mouth.  We

will start clear liquids hopefully tomorrow.

## 2019-05-25 NOTE — P.PN
Subjective


Progress Note Date: 05/25/19











On 05/25/2019 patient is postop day #2.  He was on BiPAP throughout the night.  

This morning he was started on the BiPAP.  I took the BiPAP off and switch this 

patient to a nasal cannula at 5 L in the blood gases showed a pH of 7.32 with a 

pCO2 of 52 and pO2 of 79.  Despite this has not acidosis, the patient's, 

comfortable.  NG tube is in place.  Output has been noted.  Bowel activity is 

still extremely sluggish at this point in time.  Surgical wound site is clean.  

DAVID drain is in place.  No nausea.  No vomiting.  No abdominal pain.  No fever.  

No chills.  Using incentive spirometer.  No significant issues with pain.  The 

patient is on a combination of Zosyn and Levaquin and Diflucan.  He is postop 

day #2 as the patient was found to have a perforated duodenal ulcer and he 

underwent repair of the ulcer with a repair of an incarcerated umbilical hernia.

 The triple-lumen cath is in place.  Artline catheter is also in place.  No 

confusion.  No altered mentation.  Is doing well for now.  He is on stress dose 

hydrocortisone.





Objective





- Vital Signs


Vital signs: 


                                   Vital Signs











Temp  98.1 F   05/25/19 12:00


 


Pulse  100   05/25/19 12:00


 


Resp  26 H  05/25/19 12:00


 


BP  131/76   05/24/19 20:00


 


Pulse Ox  96   05/25/19 12:00








                                 Intake & Output











 05/24/19 05/25/19 05/25/19





 18:59 06:59 18:59


 


Intake Total 3012 2112 656


 


Output Total 


 


Balance 2357 1502 -1339


 


Weight  79.1 kg 


 


Intake:   


 


  IV 3012 2112 656


 


    ACETAMINOPHEN IV (For NPO  400 





    ) 1,000 mg In Empty Bag 1   





    bag @ 400 mls/hr IVPB   





    Q6HR [facility] Rx#:[id number]   


 


    Fluconazole in NaCl,Iso- 100  100





    Osm 200 mg In Saline 1   





    100ml.bag @ 100 mls/hr   





    IVPB DAILY [facility] Rx#:   





    [id number]   


 


    Piperacillin-Tazobactam 3 200 100 100





    .375 gm In Sodium   





    Chloride 0.9% 100 ml @ 25   





    mls/hr IVPB Q8HR [facility] Rx#   





    :[id number]   


 


    Pressure 72 72 36


 


    Sodium Chloride 0.9% 1, 1440 1440 320





    000 ml @ 120 mls/hr IV .   





    Q8H20M [facility] of [facility] Rx#:[id number]   


 


    Sodium Chloride 0.9% 1, 1000  





    000 ml @ 999 mls/hr IV .   





    Q1H1M [facility] Rx#:[id number]   


 


    metroNIDAZOLE-NS  200 100 100





    mg In Saline 1 100ml.bag   





    @ 100 mls/hr IVPB Q8HR   





    [facility] of [facility] Rx#:[id number]   


 


Output:   


 


  Gastric Drainage   500


 


  Drainage 130 130 


 


    Right Lower Abdomen 130 130 


 


  Urine 


 


Other:   


 


  Voiding Method Indwelling Catheter Indwelling Catheter 








                       ABP, PAP, CO, CI - Last Documented











Arterial Blood Pressure        163/70

















- Exam








Gen. appearance is calm comfortable cushingoid nonacute distress currently 

tolerating a BiPAP at a pressure of 10/5 with an FiO2 of 50%, and he was weaned 

down to 8 L of oxygen by nasal cannula


Head exam was generally normal. There was no scleral icterus or corneal arcus. 

Mucous membranes were moist..  The patient is cushingoid features related to 

chronic steroid use.


Neck was supple and without jugular venous distension, thyromegaly, or carotid 

bruits. Carotids were easily palpable bilaterally. There was no adenopathy.  The

patient has a right IJ triple-lumen catheter in place.


Lungs sounds are diminished bilaterally and the patient has coarse crackles in 

the mid and lower lung fields bilaterally consistent with pulmonary fibrosis


Cardiac exam revealed the PMI to be normally situated and sized. The rhythm was 

regular and no extrasystoles were noted during several minutes of auscultation. 

The first and second heart sounds were normal and physiologic splitting of the 

second heart sound was noted. There were no murmurs, rubs, clicks, or gallops.


Abdomen is soft.  Bowel sounds are hypoactive and very sluggish.  Surgical wound

site is dry clean and intact.  DAVID drains in place.  No direct tenderness.  No 

rebound tenderness.  No guarding.


Examination of the extremities revealed easily palpable radial, femoral and 

pedal pulses. There was no cyanosis, clubbing or edema.


Examination of the skin revealed no evidence of significant rashes, suspicious 

appearing nevi or other concerning lesions.


Neurologically is awake and alert and there is a focal neurological deficit.





- Labs


CBC & Chem 7: 


                                 05/25/19 04:23





                                 05/25/19 04:23


Labs: 


                  Abnormal Lab Results - Last 24 Hours (Table)











  05/25/19 05/25/19 05/25/19 Range/Units





  04:23 04:23 08:05 


 


WBC  14.5 H    (3.8-10.6)  k/uL


 


RBC  3.87 L    (4.30-5.90)  m/uL


 


Hgb  11.7 L    (13.0-17.5)  gm/dL


 


Hct  38.6 L    (39.0-53.0)  %


 


MCHC  30.3 L    (31.0-37.0)  g/dL


 


RDW  15.7 H    (11.5-15.5)  %


 


Plt Count  478 H    (150-450)  k/uL


 


Neutrophils #  13.3 H    (1.3-7.7)  k/uL


 


Lymphocytes #  0.7 L    (1.0-4.8)  k/uL


 


ABG pH    7.32 L  (7.35-7.45)  


 


ABG pCO2    52 H  (35-45)  mmHg


 


ABG pO2    79 L  ()  mmHg


 


ABG HCO3    26 H  (21-25)  mmol/L


 


ABG Total CO2    28 H  (19-24)  mmol/L


 


Chloride   110 H   ()  mmol/L


 


BUN   26 H   (9-20)  mg/dL


 


Creatinine   0.62 L   (0.66-1.25)  mg/dL


 


Calcium   7.3 L   (8.4-10.2)  mg/dL








                      Microbiology - Last 24 Hours (Table)











 05/23/19 23:41 Gram Stain - Preliminary





 Sputum Sputum Culture - Preliminary





    Pseudomonas spec


 


 05/24/19 01:50 Urine Culture - Final





 Urine,Catheterized 


 


 05/23/19 15:45 Blood Culture - Preliminary





 Blood    No Growth after 24 hours














Assessment and Plan


Plan: 











1  pneumoperitoneum and the patient is status post expiratory laparotomy and 

repair of a perforated duodenal ulcer and repair of an incarcerated umbilical 

hernia.  The patient is postop day #2.  The patient is hemodynamically stable.  

The patient is currently on a combination of Zosyn and Flagyl and Diflucan.  He 

is well resuscitated.  The patient was weaned off and extubated and currently is

alternating between a BiPAP and high flow oxygen at 8 L per minute nasal 

cannula.





2 ventilator management.  The patient was extubated and the patient is currently

on a BiPAP at a pressure of 10/5 cm of water with an FiO2 of 50% and his 

alternating with 8 L of oxygen by nasal cannula.  He has chronic hypoxic 

respiratory failure.  He has acute on chronic hypercapnic respiratory failure.  

He is extubated.





3 acute leukocytosis secondary to above, improving





4 acute sinus tachycardia secondary to above





5 mild lactic acidosis





6 advanced COPD with pulmonary fibrosis, steroid dependent with chronic hypoxic 

and hypercapnic respiratory failure





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





8 rheumatoid arthritis





9 obstructive sleep apnea not receiving any CPAP therapy at this point in time





10 chronic back pain





11 hyperlipidemia





12 osteoarthritis





Plan





Reduced IV fluids to 50 mL an hour of normal saline.  Give the patient dose of 

Lasix 20 mg IV push.  Oxygen at 8 L per minute nasal cannula alternates with 

BiPAP for respiratory support.  Blood gases been noted.  Keep NG tube  in place.

 Monitor the output.  Hypoactive bowel sounds.  No bowel activity or flatus yet.

 Keep him in ICU for now.  Continue same antibiotic coverage.  Continue stress 

dose hydrocortisone regarding his chronic prednisone intake.

## 2019-05-25 NOTE — P.CONS
History of Present Illness





- Reason for Consult


Consult date: 19


Abdominal abscess


Requesting physician: Corina Pickard





- Chief Complaint


Abdominal pain





- History of Present Illness





Patient is a 69 year old  male presenting to the ER at [facility] on 2019 with a chief complaints of abdominal pain that apparently

woke up the patient around 2 in the morning patient pain has been across his 

entire abdominal without any alleviating or aggravating factor family the 

patient did have a similar pain about a week ago for the patient was evaluated 

at [facility] he did have C. diff abdominal was negative, the 

patient did have a CT of abdominal pelvis repeated here which was felt to 

formation with a small amount of ascites within the upper mesentery small amount

of local it is extended to the bowel was made observed suggesting early abscess 

formation, patient was evaluated by surgery he was taken to the OR and status 

post exploratory laparotomy noticed to have perforated duodenal ulcer patient is

status post repair of perforated ulcer and repair of incarcerated umbilical 

hernia patient has been treated with Zosyn and Flagyl and Diflucan infectious 

disease was consulted for further recommendation regarding antibiotic therapy.


The patient has been afebrile throughout his hospital stay patient did have 

elevated white count was 16.2 on admission is slightly down to 14.5 today the 

patient is a breathing comfortably did mention his abdominal pain is currently 

controlled with the pain medication the patient did have NG to suction denies 

having any nausea and vomiting no chest pain or shortness of breath he did have 

some cough denies having any diarrhea or any other symptoms





Review of Systems





Positive points has been mentioned in HPI rest of the systems are negative





Past Medical History


Past Medical History: COPD, Hyperlipidemia, Osteoarthritis (OA), Pneumonia, 

Rheumatoid Arthritis (RA), Sleep Apnea/CPAP/BIPAP


Additional Past Medical History / Comment(s): Pneumonia with sepsis twice-2018

and in 2016. 3.5L home o2, POLO with no device (cannot tolerate), recently 

injured low back-saw Dr. Morse. Rheumatoid arthritis in bilateral hips,knees 

hands and back.


History of Any Multi-Drug Resistant Organisms: None Reported


Past Surgical History: Orthopedic Surgery


Additional Past Surgical History / Comment(s): bilateral cataract removals with 

lens implants, left knee arthroscopy, colonoscopy.


Past Anesthesia/Blood Transfusion Reactions: No Reported Reaction


Past Psychological History: Anxiety, Depression


Additional Psychological History / Comment(s): Pt resides with his spouse of 38 

yrs.  He has home oxygen which he wears prn at 3.5L/NC.  He has a nebulizer.


Smoking Status: Former smoker


Past Alcohol Use History: Occasional


Additional Past Alcohol Use History / Comment(s): Pt started smoking as a teen 

and quit in .  He states he drinks 3-4 beers a day.


Past Drug Use History: None Reported





- Past Family History


  ** Father


Family Medical History: Cancer


Additional Family Medical History / Comment(s): Father  of leukemia.





  ** Mother


Family Medical History: Cancer, CVA/TIA, Dementia, Diabetes Mellitus


Additional Family Medical History / Comment(s): Mother is 88yrs old. Cancer 

colon.





Medications and Allergies


                                Home Medications











 Medication  Instructions  Recorded  Confirmed  Type


 


LORazepam [Ativan] 1 mg PO TID PRN 18 History


 


predniSONE 10 mg PO DAILY #30 tab 18 Rx


 


Benzonatate [Tessalon Perles] 100 mg PO TID PRN 19 History


 


HYDROcodone/APAP 10-325MG [Norco 1 tab PO Q8H PRN 19 History





]    


 


Sulfamethoxazole/Trimethoprim 1 tab PO BID 19 History





[Bactrim -160 mg]    


 


metroNIDAZOLE [Flagyl] 500 mg PO TID 19 History








                                    Allergies











Allergy/AdvReac Type Severity Reaction Status Date / Time


 


amoxicillin AdvReac  Nausea & Verified 19 13:48





   Vomiting  














Physical Exam


Vitals: 


                                   Vital Signs











  Temp Pulse Pulse Resp BP Pulse Ox


 


 19 15:00   94   13   95


 


 19 14:00   86  101 H  12   94 L


 


 19 13:00   95   12   94 L


 


 19 12:40   96    


 


 19 12:00  98.1 F  100   26 H   96


 


 19 11:00   88   40 H   95


 


 19 10:00   101 H   26 H   96


 


 19 09:00   96   12   95


 


 19 08:04   92    


 


 19 08:00  98.1 F  94   19   97


 


 19 07:56   93     94 L


 


 19 07:00   92   12   96


 


 19 06:00   98   17   95


 


 19 05:00   100   26 H   97


 


 19 04:00  98 F  101 H   20   90 L


 


 19 03:00   92   13   97


 


 19 02:00   104 H   18   97


 


 19 01:53   98    


 


 19 01:41   98    


 


 19 01:00   95   13   96


 


 19 00:23   89   12   96


 


 19 00:00  98.1 F  99   19   94 L


 


 19 23:00   97   23   92 L


 


 19 22:00   98   17   99


 


 19 21:00   98   21   95


 


 19 20:00  98.4 F  95   17  131/76  96


 


 19 19:41   92    


 


 19 19:26   96    


 


 19 19:25       97


 


 19 19:00   89   11 L   97


 


 19 18:00   94   16  131/76  95


 


 19 17:00   92   20  133/81  96


 


 19 16:00  98.1 F  93   19  122/74  96








                                Intake and Output











 19





 06:59 14:59 22:59


 


Intake Total 1408 768 56


 


Output Total 395 2420 100


 


Balance 1013 -6302 -44


 


Intake:   


 


  IV 1408 768 56


 


    ACETAMINOPHEN IV (For   





    ) 1,000 mg In Empty Bag 1   





    bag @ 400 mls/hr IVPB   





    Q6HR [facility] Rx#:[id number]   


 


    Fluconazole in NaCl,Iso-  100 





    Osm 200 mg In Saline 1   





    100ml.bag @ 100 mls/hr   





    IVPB DAILY [facility] Rx#:   





    [id number]   


 


    Piperacillin-Tazobactam 3 100 100 





    .375 gm In Sodium   





    Chloride 0.9% 100 ml @ 25   





    mls/hr IVPB Q8HR [facility] Rx#   





    :[id number]   


 


    Pressure 48 48 6


 


    Sodium Chloride 0.9% 1, 960 320 





    000 ml @ 120 mls/hr IV .   





    Q8H20M [facility] Rx#:[id number]   


 


    Sodium Chloride 0.9% 1,  100 50





    000 ml @ 50 mls/hr IV .   





    Q20H [facility] Rx#:[id number]   


 


    metroNIDAZOLE-NS  100 100 





    mg In Saline 1 100ml.bag   





    @ 100 mls/hr IVPB Q8HR   





    [facility] Rx#:[id number]   


 


Output:   


 


  Gastric Drainage  500 


 


  Drainage 50  


 


    Right Lower Abdomen 50  


 


  Urine 345 1920 100


 


Other:   


 


  Voiding Method Indwelling Catheter Indwelling Catheter 


 


  Weight 79.1 kg  








                         ABP, PAP, CO, CI - Last 8 Hours











Arterial Blood Pressure        153/57


 


Arterial Blood Pressure        164/59


 


Arterial Blood Pressure        160/65


 


Arterial Blood Pressure        169/66


 


Arterial Blood Pressure        163/70


 


Arterial Blood Pressure        161/66


 


Arterial Blood Pressure        138/73


 


Arterial Blood Pressure        157/68


 


Arterial Blood Pressure        158/67














GENERAL DESCRIPTION: Elderly male lying in bed, no distress. No tachypnea or 

accessory muscle of respiration use.


HEENT: Shows Pallor , no scleral icterus. Oral mucous membrane is dry. 


NECK: Trachea central, no thyromegaly.


LUNGS: Unlabored breathing.  Some course breath sound at the base. No wheeze 


HEART: S1, S2, regular rate and rhythm. No loud murmur


ABDOMEN: Soft, slightly distended, mild tenderness , no guarding or rigidity


EXTREMITIES: No edema of feet.


SKIN: No rash, no masses palpable.


NEUROLOGICAL: The patient is awake, alert, oriented x3, mood and affect normal.














Results


CBC & Chem 7: 


                                 19 04:23





                                 19 04:23


Labs: 


                  Abnormal Lab Results - Last 24 Hours (Table)











  19 Range/Units





  04:23 04:23 08:05 


 


WBC  14.5 H    (3.8-10.6)  k/uL


 


RBC  3.87 L    (4.30-5.90)  m/uL


 


Hgb  11.7 L    (13.0-17.5)  gm/dL


 


Hct  38.6 L    (39.0-53.0)  %


 


MCHC  30.3 L    (31.0-37.0)  g/dL


 


RDW  15.7 H    (11.5-15.5)  %


 


Plt Count  478 H    (150-450)  k/uL


 


Neutrophils #  13.3 H    (1.3-7.7)  k/uL


 


Lymphocytes #  0.7 L    (1.0-4.8)  k/uL


 


ABG pH    7.32 L  (7.35-7.45)  


 


ABG pCO2    52 H  (35-45)  mmHg


 


ABG pO2    79 L  ()  mmHg


 


ABG HCO3    26 H  (21-25)  mmol/L


 


ABG Total CO2    28 H  (19-24)  mmol/L


 


Chloride   110 H   ()  mmol/L


 


BUN   26 H   (9-20)  mg/dL


 


Creatinine   0.62 L   (0.66-1.25)  mg/dL


 


Calcium   7.3 L   (8.4-10.2)  mg/dL








                      Microbiology - Last 24 Hours (Table)











 19 23:41 Gram Stain - Preliminary





 Sputum Sputum Culture - Preliminary





    Pseudomonas spec


 


 19 01:50 Urine Culture - Final





 Urine,Catheterized 


 


 19 15:45 Blood Culture - Preliminary





 Blood    No Growth after 24 hours














Assessment and Plan


Assessment: 


Patient admitted to the hospital with abdominal pain in this patient who did 

have evidence of perforated duodenal ulcer status post laparotomy and repair of 

that ulcer in addition to repair of incarcerated ventral hernia the likely 

organism.  Coronary with enteric gram-negative both anaerobes and less likely 

anaerobes in addition to Candida 


2-patient with amoxicillin ALLERGY on the chart however has tolerated Zosyn 

without any problem clinically doubt true penicillin ALLERGY 


(1) Abdominal abscess


Current Visit: Yes   Status: Acute   Code(s): PKN9422 -    SNOMED Code(s): 

98733899


   





(2) Bowel perforation


Current Visit: Yes   Status: Acute   Code(s): K63.1 - PERFORATION OF INTESTINE 

(NONTRAUMATIC)   SNOMED Code(s): 08404418


   


Plan: 





1-We will keep the patient on Zosyn 3.375 g every 8 in addition to IV Diflucan, 

however discontinue the Flagyl


2-gentle IV fluid


We will follow her clinical condition and culture to further this medication if 

needed


Thank you for this consultation will follow this patient along with you

## 2019-05-26 LAB
ANION GAP SERPL CALC-SCNC: 2 MMOL/L
BUN SERPL-SCNC: 25 MG/DL (ref 9–20)
CALCIUM SPEC-MCNC: 7.4 MG/DL (ref 8.4–10.2)
CHLORIDE SERPL-SCNC: 107 MMOL/L (ref 98–107)
CO2 SERPL-SCNC: 31 MMOL/L (ref 22–30)
ERYTHROCYTE [DISTWIDTH] IN BLOOD BY AUTOMATED COUNT: 3.56 M/UL (ref 4.3–5.9)
ERYTHROCYTE [DISTWIDTH] IN BLOOD: 15.8 % (ref 11.5–15.5)
GLUCOSE BLD-MCNC: 93 MG/DL (ref 75–99)
GLUCOSE SERPL-MCNC: 98 MG/DL (ref 74–99)
HCT VFR BLD AUTO: 34.8 % (ref 39–53)
HGB BLD-MCNC: 10.9 GM/DL (ref 13–17.5)
MCH RBC QN AUTO: 30.4 PG (ref 25–35)
MCHC RBC AUTO-ENTMCNC: 31.1 G/DL (ref 31–37)
MCV RBC AUTO: 97.8 FL (ref 80–100)
PLATELET # BLD AUTO: 491 K/UL (ref 150–450)
POTASSIUM SERPL-SCNC: 4 MMOL/L (ref 3.5–5.1)
SODIUM SERPL-SCNC: 140 MMOL/L (ref 137–145)
WBC # BLD AUTO: 14.7 K/UL (ref 3.8–10.6)

## 2019-05-26 RX ADMIN — IPRATROPIUM BROMIDE AND ALBUTEROL SULFATE SCH ML: .5; 3 SOLUTION RESPIRATORY (INHALATION) at 20:19

## 2019-05-26 RX ADMIN — HYDRALAZINE HYDROCHLORIDE PRN MG: 20 INJECTION INTRAMUSCULAR; INTRAVENOUS at 21:19

## 2019-05-26 RX ADMIN — HYDROCORTISONE SODIUM SUCCINATE SCH MG: 100 INJECTION, POWDER, FOR SOLUTION INTRAMUSCULAR; INTRAVENOUS at 00:18

## 2019-05-26 RX ADMIN — HYDROCORTISONE SODIUM SUCCINATE SCH MG: 100 INJECTION, POWDER, FOR SOLUTION INTRAMUSCULAR; INTRAVENOUS at 10:42

## 2019-05-26 RX ADMIN — HYDROMORPHONE HYDROCHLORIDE PRN MG: 1 INJECTION, SOLUTION INTRAMUSCULAR; INTRAVENOUS; SUBCUTANEOUS at 03:47

## 2019-05-26 RX ADMIN — HEPARIN SODIUM SCH UNIT: 5000 INJECTION, SOLUTION INTRAVENOUS; SUBCUTANEOUS at 10:42

## 2019-05-26 RX ADMIN — CEFAZOLIN SCH MLS/HR: 330 INJECTION, POWDER, FOR SOLUTION INTRAMUSCULAR; INTRAVENOUS at 03:47

## 2019-05-26 RX ADMIN — HYDROMORPHONE HYDROCHLORIDE PRN MG: 1 INJECTION, SOLUTION INTRAMUSCULAR; INTRAVENOUS; SUBCUTANEOUS at 22:20

## 2019-05-26 RX ADMIN — IPRATROPIUM BROMIDE AND ALBUTEROL SULFATE SCH ML: .5; 3 SOLUTION RESPIRATORY (INHALATION) at 12:53

## 2019-05-26 RX ADMIN — PIPERACILLIN AND TAZOBACTAM SCH MLS/HR: 3; .375 INJECTION, POWDER, FOR SOLUTION INTRAVENOUS at 00:17

## 2019-05-26 RX ADMIN — IPRATROPIUM BROMIDE AND ALBUTEROL SULFATE SCH ML: .5; 3 SOLUTION RESPIRATORY (INHALATION) at 03:07

## 2019-05-26 RX ADMIN — HEPARIN SODIUM SCH UNIT: 5000 INJECTION, SOLUTION INTRAVENOUS; SUBCUTANEOUS at 00:16

## 2019-05-26 RX ADMIN — HYDROMORPHONE HYDROCHLORIDE PRN MG: 1 INJECTION, SOLUTION INTRAMUSCULAR; INTRAVENOUS; SUBCUTANEOUS at 00:32

## 2019-05-26 RX ADMIN — HYDROMORPHONE HYDROCHLORIDE PRN MG: 1 INJECTION, SOLUTION INTRAMUSCULAR; INTRAVENOUS; SUBCUTANEOUS at 10:46

## 2019-05-26 RX ADMIN — HYDROCORTISONE SODIUM SUCCINATE SCH MG: 100 INJECTION, POWDER, FOR SOLUTION INTRAMUSCULAR; INTRAVENOUS at 16:15

## 2019-05-26 RX ADMIN — HEPARIN SODIUM SCH UNIT: 5000 INJECTION, SOLUTION INTRAVENOUS; SUBCUTANEOUS at 16:15

## 2019-05-26 RX ADMIN — PIPERACILLIN AND TAZOBACTAM SCH MLS/HR: 3; .375 INJECTION, POWDER, FOR SOLUTION INTRAVENOUS at 16:15

## 2019-05-26 RX ADMIN — PANTOPRAZOLE SODIUM SCH MG: 40 INJECTION, POWDER, FOR SOLUTION INTRAVENOUS at 10:49

## 2019-05-26 RX ADMIN — PIPERACILLIN AND TAZOBACTAM SCH MLS/HR: 3; .375 INJECTION, POWDER, FOR SOLUTION INTRAVENOUS at 10:42

## 2019-05-26 RX ADMIN — HYDROMORPHONE HYDROCHLORIDE PRN MG: 1 INJECTION, SOLUTION INTRAMUSCULAR; INTRAVENOUS; SUBCUTANEOUS at 15:26

## 2019-05-26 RX ADMIN — HYDROMORPHONE HYDROCHLORIDE PRN MG: 1 INJECTION, SOLUTION INTRAMUSCULAR; INTRAVENOUS; SUBCUTANEOUS at 19:53

## 2019-05-26 RX ADMIN — FLUCONAZOLE IN SODIUM CHLORIDE SCH MLS/HR: 2 INJECTION, SOLUTION INTRAVENOUS at 11:50

## 2019-05-26 RX ADMIN — IPRATROPIUM BROMIDE AND ALBUTEROL SULFATE SCH ML: .5; 3 SOLUTION RESPIRATORY (INHALATION) at 07:17

## 2019-05-26 NOTE — P.PN
Progress Note - Text


Progress Note Date: 05/26/19





The patient is resting comfortably in his bed.  He states he is hungry.  He is 

requesting something to eat.





His NG tube had 350 mL of mainly icewater in the NG tube canister.





On exam his vital signs appear stable.  His abdomen is soft.  Incisions clean 

dry intact.  DAVID drain has mainly serous.





Status post repair of duodenal ulcer.  Patient will have nasogastric tube 

removed today.  We will start clear liquids tomorrow.

## 2019-05-26 NOTE — P.PN
Subjective





This is a 69 years old male with past medical history of COPD on home oxygen, 

hyperlipidemia, rheumatoid arthritis, sleep apnea on CPAP.  Presents with 

abdominal pain , has been evaluated by surgery on admission and taken to the 

operation room found to have perforated duodenal ulcer and incarcerated 

umbilical hernia.  After the surgery patient was taken to the intensive care 

unit, he got intubated.  He was started on Zosyn, and Flagyl.  He is also on 

Protonix for DVT prophylaxis.  And he is in normal cement 120 L/h.  Patient 

currently is not on pressors.  He has J drain with 200 mL of bloody discharge 

from abdominal wound.  NG tube was about 200 mL drainage.


Currently patient is in ICU, vital showing blood pressure is 87/44, saturating 

95% on FiO2 of 50%.  Temperature 97.5.  WBC 12.4 K, potassium 5.2 sodium 135.  

Creatinine 0.7.  Urinalysis is not suspicious of infection.  Chest x-ray: 

Interstitial lung disease, endotracheal tube is in place.  CT of the abdomen and

pelvis done prior to surgery was reviewed.  EKG sinus tachycardia at 113.  No 

significant ST-T changes.





Subjective


Date of service 05/25/2019


Patient went to the ICU.  He is status post extubation.  With no chest pain or 

dyspnea.  Vitas looks stable.  Still complaining from pain at the surgical 

abdominal site.  No bowel movement yet.  WBC 14.5 K.  Creatinine 0.6.





Objective





- Vital Signs


Vital signs: 


                                   Vital Signs











Temp  98.5 F   05/26/19 04:00


 


Pulse  84   05/26/19 06:00


 


Resp  24   05/26/19 06:00


 


BP  131/76   05/24/19 20:00


 


Pulse Ox  92 L  05/26/19 06:00








                                 Intake & Output











 05/25/19 05/25/19 05/26/19





 06:59 18:59 06:59


 


Intake Total 2112 1248 613


 


Output Total 610 9732 044


 


Balance 9062 -6394 -092


 


Weight 79.1 kg  77.8 kg


 


Intake:   


 


  IV 2112 1248 613


 


    ACETAMINOPHEN IV (For   





    ) 1,000 mg In Empty Bag 1   





    bag @ 400 mls/hr IVPB   





    Q6HR [facility] Rx#:[id number]   


 


    Fluconazole in NaCl,Iso-  100 





    Osm 200 mg In Saline 1   





    100ml.bag @ 100 mls/hr   





    IVPB DAILY [facility] Rx#:   





    [id number]   


 


    Piperacillin-Tazobactam 3 100 200 





    .375 gm In Sodium   





    Chloride 0.9% 100 ml @ 25   





    mls/hr IVPB Q8HR [facility] Rx#   





    :[id number]   


 


    Pressure 72 78 63


 


    Sodium Chloride 0.9% 1, 1440 320 





    000 ml @ 120 mls/hr IV .   





    Q8H20M [facility] Rx#:[id number]   


 


    Sodium Chloride 0.9% 1,  350 550





    000 ml @ 50 mls/hr IV .   





    Q20H [facility] Rx#:[id number]   


 


    metroNIDAZOLE-NS  100 200 





    mg In Saline 1 100ml.bag   





    @ 100 mls/hr IVPB Q8HR   





    [facility] Rx#:[id number]   


 


Output:   


 


  Gastric Drainage  500 250


 


  Drainage 130  25


 


    Right Lower Abdomen 130  25


 


  Urine 480 2240 510


 


  Emesis   150


 


Other:   


 


  Voiding Method Indwelling Catheter Indwelling Catheter Indwelling Catheter








                       ABP, PAP, CO, CI - Last Documented











Arterial Blood Pressure        162/63

















- Exam





GENERAL: The patient is alert oriented, not in distress


HEENT: Pupils are round and equally reacting to light. EOMI. No scleral icterus.

No conjunctival pallor. Normocephalic, atraumatic. No pharyngeal erythema. No 

thyromegaly. 


CARDIOVASCULAR: S1 and S2 present. No murmurs, rubs, or gallops. 


PULMONARY: Chest is clear to auscultation, no wheezing or crackles. 


ABDOMEN: Soft, is status post exploratory laparotomy, dressing is in place, 

wound is CLOSED.


MUSCULOSKELETAL: No joint swelling or deformity. 


EXTREMITIES: No cyanosis, clubbing, or pedal edema. 


NEUROLOGICAL: Gross neurological examination did not reveal any focal deficits. 


SKIN: No rashes.





- Labs


CBC & Chem 7: 


                                 05/26/19 05:10





                                 05/26/19 05:10


Labs: 


                  Abnormal Lab Results - Last 24 Hours (Table)











  05/25/19 05/26/19 05/26/19 Range/Units





  08:05 05:10 05:10 


 


WBC   14.7 H   (3.8-10.6)  k/uL


 


RBC   3.56 L   (4.30-5.90)  m/uL


 


Hgb   10.9 L   (13.0-17.5)  gm/dL


 


Hct   34.8 L   (39.0-53.0)  %


 


RDW   15.8 H   (11.5-15.5)  %


 


Plt Count   491 H   (150-450)  k/uL


 


ABG pH  7.32 L    (7.35-7.45)  


 


ABG pCO2  52 H    (35-45)  mmHg


 


ABG pO2  79 L    ()  mmHg


 


ABG HCO3  26 H    (21-25)  mmol/L


 


ABG Total CO2  28 H    (19-24)  mmol/L


 


Carbon Dioxide    31 H  (22-30)  mmol/L


 


BUN    25 H  (9-20)  mg/dL


 


Creatinine    0.57 L  (0.66-1.25)  mg/dL


 


Calcium    7.4 L  (8.4-10.2)  mg/dL








                      Microbiology - Last 24 Hours (Table)











 05/23/19 15:45 Blood Culture - Preliminary





 Blood    No Growth after 48 hours


 


 05/23/19 23:41 Gram Stain - Preliminary





 Sputum Sputum Culture - Preliminary





    Pseudomonas spec


 


 05/24/19 01:50 Urine Culture - Final





 Urine,Catheterized 














Assessment and Plan


Assessment: 





Acute abdomen, status post exploratory laparotomy with perforated duodenal ulcer

and incarcerated umbilical hernia


Acute hypoxic respiratory failure.


Acute COPD exacerbation


Hyperlipidemia


History of rheumatoid arthritis











Plan: 





This is a 69 years old male who presents with perforated duodenal ulcers and 

incarcerated hernia, status post exploratory laparotomy.  Patient after the 

surgery went to the intensive care unit.  Status post intubating.  Critical care

team and surgery team R following the case closely.  Continue with pain 

management, IV fluids and antibiotic.Labs and medication were reviewed..  

Continue same treatment.  Continue with symptomatic treatment.  Resume home 

medication.  Monitor lytes and vitals.  DVT and GI prophylaxis.  Further 

recommendations of the clinical course of the patient


DVT prophylaxis: Subcutaneous heparin


GI Prophylaxis: PPI


Prognosis is guarded

## 2019-05-26 NOTE — PN
PROGRESS NOTE



DATE OF SERVICE:

05/26/2019.



REASON FOR FOLLOWUP:

Secondary peritonitis from perforated peptic ulcer disease.



INTERVAL HISTORY:

The patient is currently afebrile.  Patient NG has been discontinued.  The 
patient has

been wanting to eat.  No chest pain.  No shortness of breath.  Occasional cough.
 No

nausea, vomiting.  Did not have any bowel movement.



PHYSICAL EXAMINATION:

Blood pressure 152/63 with a pulse of 73, temperature 98.3. He is 97% on 6 L 
high-flow

oxygen.

General description is an elderly male lying in bed in no distress.

Respiratory system:  Unlabored breathing.  Clear to auscultation anteriorly.

Heart S1, S2.  Regular rate and rhythm.

Abdomen soft. No tenderness.



LABS:

Hemoglobin is 10.8, white count 14.7, BUN of 25, creatinine 0.57.  Sputum with

Pseudomonas aeruginosa.  Urine cultures have been negative.  Blood culture has 
been

negative so far.



DIAGNOSTIC IMPRESSION AND PLAN:

Patient admitted to the hospital with abdominal pain , perforated peptic ulcer 
disease.  The

patient is status post laparotomy and operative repair of the same with repair 
of the

incarcerated hernia.  Sputum is showing a Pseudomonas aeruginosa.  Chest x-ray 
is

mostly volume overload with possible basilar atelectasis.  Patient is currently 
covered

with Zosyn and Diflucan to continue to continue while monitoring his clinical 
course

closely.  Continue supportive care.





MMODL / IJN: [id number] / Job#: [id number]

MTDD

## 2019-05-26 NOTE — P.PN
Subjective


Progress Note Date: 05/26/19














On today's evaluation of 05/26/2019 the patient is postop day #3.  Awake and 

alert.  He is currently off the BiPAP.  No significant respiratory distress.  

Upper from the NG tube has been 100 mL over the past 8 hours.  Hypoactive bowel 

sounds.  No flatus or bowel movements..  Surgical wound site is clean.  DAVID 

drains in place.  Surgery evaluated the patient and the NG tube was removed 

today.  He is taking only shifts for now.  No Significant pain and the patient 

is on hydrocortisone stress dose.  Producing adequate amount of urine output.  

No fever.  No chills.  No altered mentation.  He has a chronic cough.  He has 

limited sputum production.  His previous sputum analysis at shown Pseudomonas 

species.  The patient is on IV Zosyn.  We'll be awaiting further cultures and 

sensitivities.





Objective





- Vital Signs


Vital signs: 


                                   Vital Signs











Temp  98.3 F   05/26/19 08:00


 


Pulse  70   05/26/19 10:00


 


Resp  25 H  05/26/19 10:00


 


BP  137/71   05/26/19 10:00


 


Pulse Ox  94 L  05/26/19 10:00








                                 Intake & Output











 05/25/19 05/26/19 05/26/19





 18:59 06:59 18:59


 


Intake Total 1248 613 235


 


Output Total 2740 935 550


 


Balance -8188 -322 -968


 


Weight  77.8 kg 


 


Intake:   


 


  IV 1248 613 235


 


    Fluconazole in NaCl,Iso- 100  





    Osm 200 mg In Saline 1   





    100ml.bag @ 100 mls/hr   





    IVPB DAILY [facility] Rx#:   





    [id number]   


 


    Piperacillin-Tazobactam 3 200  





    .375 gm In Sodium   





    Chloride 0.9% 100 ml @ 25   





    mls/hr IVPB Q8HR [facility] Rx#   





    :[id number]   


 


    Pressure 78 63 15


 


    Sodium Chloride 0.9% 1, 320  





    000 ml @ 120 mls/hr IV .   





    Q8H20M [facility] Rx#:[id number]   


 


    Sodium Chloride 0.9% 1, 350 550 220





    000 ml @ 50 mls/hr IV .   





    Q20H [facility] Rx#:[id number]   


 


    metroNIDAZOLE-NS  200  





    mg In Saline 1 100ml.bag   





    @ 100 mls/hr IVPB Q8HR   





    [facility] Rx#:[id number]   


 


Output:   


 


  Gastric Drainage 500 250 350


 


  Drainage  25 


 


    Right Lower Abdomen  25 


 


  Urine 2240 510 200


 


  Emesis  150 0


 


Other:   


 


  Voiding Method Indwelling Catheter Indwelling Catheter Indwelling Catheter








                       ABP, PAP, CO, CI - Last Documented











Arterial Blood Pressure        146/47

















- Exam








Gen. appearance is calm comfortable cushingoid nonacute distress currently on 

oxygen at 6 L per minute nasal cannula with a pulse ox of 94%.  NG tube has been

removed.


Head exam was generally normal. There was no scleral icterus or corneal arcus. 

Mucous membranes were moist..  The patient is cushingoid features related to 

chronic steroid use.


Neck was supple and without jugular venous distension, thyromegaly, or carotid 

bruits. Carotids were easily palpable bilaterally. There was no adenopathy.  The

patient has a right IJ triple-lumen catheter in place.


Lungs sounds are diminished bilaterally and the patient has coarse crackles in 

the mid and lower lung fields bilaterally consistent with pulmonary fibrosis


Cardiac exam revealed the PMI to be normally situated and sized. The rhythm was 

regular and no extrasystoles were noted during several minutes of auscultation. 

The first and second heart sounds were normal and physiologic splitting of the 

second heart sound was noted. There were no murmurs, rubs, clicks, or gallops.


Abdomen is soft.  Bowel sounds are hypoactive and very sluggish.  Surgical wound

site is dry clean and intact.  DAVID drains in place.  No direct tenderness.  No 

rebound tenderness.  No guarding.


Examination of the extremities revealed easily palpable radial, femoral and 

pedal pulses. There was no cyanosis, clubbing or edema.


Examination of the skin revealed no evidence of significant rashes, suspicious 

appearing nevi or other concerning lesions.


Neurologically is awake and alert and there is a focal neurological deficit.





- Labs


CBC & Chem 7: 


                                 05/26/19 05:10





                                 05/26/19 05:10


Labs: 


                  Abnormal Lab Results - Last 24 Hours (Table)











  05/26/19 05/26/19 Range/Units





  05:10 05:10 


 


WBC  14.7 H   (3.8-10.6)  k/uL


 


RBC  3.56 L   (4.30-5.90)  m/uL


 


Hgb  10.9 L   (13.0-17.5)  gm/dL


 


Hct  34.8 L   (39.0-53.0)  %


 


RDW  15.8 H   (11.5-15.5)  %


 


Plt Count  491 H   (150-450)  k/uL


 


Carbon Dioxide   31 H  (22-30)  mmol/L


 


BUN   25 H  (9-20)  mg/dL


 


Creatinine   0.57 L  (0.66-1.25)  mg/dL


 


Calcium   7.4 L  (8.4-10.2)  mg/dL








                      Microbiology - Last 24 Hours (Table)











 05/23/19 15:45 Blood Culture - Preliminary





 Blood    No Growth after 48 hours


 


 05/23/19 23:41 Gram Stain - Preliminary





 Sputum Sputum Culture - Preliminary





    Pseudomonas spec


 


 05/24/19 01:50 Urine Culture - Final





 Urine,Catheterized 














Assessment and Plan


Plan: 











1  pneumoperitoneum and the patient is status post expiratory laparotomy and 

repair of a perforated duodenal ulcer and repair of an incarcerated umbilical 

hernia.  The patient is postop day #3.  The patient is hemodynamically stable.  

The patient is currently on a combination of Zosyn and Flagyl and Diflucan.  He 

is well resuscitated.  The patient was weaned off the mechanical ventilator and 

had to be placed on BiPAP and currently is on oxygen between 5 l per minute 

nasal cannula.  He is recovering well from the surgery.  His NG tube has been r

emoved.





2 ventilator management.  The patient was extubated currently on 5 L of oxygen 

by nasal cannula





3 acute leukocytosis secondary to above, improving





4 acute sinus tachycardia secondary to above, improved





5 mild lactic acidosis





6 advanced COPD with pulmonary fibrosis, steroid dependent with chronic hypoxic 

and hypercapnic respiratory failure





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





8 rheumatoid arthritis





9 obstructive sleep apnea not receiving any CPAP therapy at this point in time





10 chronic back pain





11 hyperlipidemia





12 osteoarthritis





13 pseudomonas in the sputum, pending further cultures and sensitivities.





Plan





We will wean down the FiO2 as tolerated to maintain a saturation above 90%.  

Continue using incentive spirometer.  Sputum is showing pseudomonas species and 

will continue the IV Zosyn for now pending further cultures and sensitivities.  

Sit him up on a chair.  We will with NG tube.  Ice chips only still the patient 

has more bowel activity and bowel movements.  We'll continue to follow.

## 2019-05-27 LAB
ANION GAP SERPL CALC-SCNC: 4 MMOL/L
BASOPHILS # BLD AUTO: 0 K/UL (ref 0–0.2)
BASOPHILS NFR BLD AUTO: 0 %
BUN SERPL-SCNC: 27 MG/DL (ref 9–20)
CALCIUM SPEC-MCNC: 7.7 MG/DL (ref 8.4–10.2)
CHLORIDE SERPL-SCNC: 108 MMOL/L (ref 98–107)
CO2 SERPL-SCNC: 31 MMOL/L (ref 22–30)
EOSINOPHIL # BLD AUTO: 0 K/UL (ref 0–0.7)
EOSINOPHIL NFR BLD AUTO: 0 %
ERYTHROCYTE [DISTWIDTH] IN BLOOD BY AUTOMATED COUNT: 3.89 M/UL (ref 4.3–5.9)
ERYTHROCYTE [DISTWIDTH] IN BLOOD: 15.9 % (ref 11.5–15.5)
GLUCOSE SERPL-MCNC: 95 MG/DL (ref 74–99)
HCT VFR BLD AUTO: 37.8 % (ref 39–53)
HGB BLD-MCNC: 11.4 GM/DL (ref 13–17.5)
LYMPHOCYTES # SPEC AUTO: 0.6 K/UL (ref 1–4.8)
LYMPHOCYTES NFR SPEC AUTO: 5 %
MCH RBC QN AUTO: 29.3 PG (ref 25–35)
MCHC RBC AUTO-ENTMCNC: 30.1 G/DL (ref 31–37)
MCV RBC AUTO: 97.3 FL (ref 80–100)
MONOCYTES # BLD AUTO: 0.4 K/UL (ref 0–1)
MONOCYTES NFR BLD AUTO: 4 %
NEUTROPHILS # BLD AUTO: 9.8 K/UL (ref 1.3–7.7)
NEUTROPHILS NFR BLD AUTO: 90 %
PLATELET # BLD AUTO: 539 K/UL (ref 150–450)
POTASSIUM SERPL-SCNC: 4 MMOL/L (ref 3.5–5.1)
SODIUM SERPL-SCNC: 143 MMOL/L (ref 137–145)
WBC # BLD AUTO: 10.9 K/UL (ref 3.8–10.6)

## 2019-05-27 RX ADMIN — CEFAZOLIN SCH MLS/HR: 330 INJECTION, POWDER, FOR SOLUTION INTRAMUSCULAR; INTRAVENOUS at 20:53

## 2019-05-27 RX ADMIN — HEPARIN SODIUM SCH UNIT: 5000 INJECTION, SOLUTION INTRAVENOUS; SUBCUTANEOUS at 07:46

## 2019-05-27 RX ADMIN — PIPERACILLIN AND TAZOBACTAM SCH MLS/HR: 3; .375 INJECTION, POWDER, FOR SOLUTION INTRAVENOUS at 16:06

## 2019-05-27 RX ADMIN — HYDROCORTISONE SODIUM SUCCINATE SCH MG: 100 INJECTION, POWDER, FOR SOLUTION INTRAMUSCULAR; INTRAVENOUS at 16:06

## 2019-05-27 RX ADMIN — FLUCONAZOLE IN SODIUM CHLORIDE SCH MLS/HR: 2 INJECTION, SOLUTION INTRAVENOUS at 08:24

## 2019-05-27 RX ADMIN — CEFAZOLIN SCH MLS/HR: 330 INJECTION, POWDER, FOR SOLUTION INTRAMUSCULAR; INTRAVENOUS at 02:15

## 2019-05-27 RX ADMIN — HYDROMORPHONE HYDROCHLORIDE PRN MG: 1 INJECTION, SOLUTION INTRAMUSCULAR; INTRAVENOUS; SUBCUTANEOUS at 12:22

## 2019-05-27 RX ADMIN — HYDROMORPHONE HYDROCHLORIDE PRN MG: 1 INJECTION, SOLUTION INTRAMUSCULAR; INTRAVENOUS; SUBCUTANEOUS at 14:34

## 2019-05-27 RX ADMIN — HEPARIN SODIUM SCH UNIT: 5000 INJECTION, SOLUTION INTRAVENOUS; SUBCUTANEOUS at 00:21

## 2019-05-27 RX ADMIN — IPRATROPIUM BROMIDE AND ALBUTEROL SULFATE SCH ML: .5; 3 SOLUTION RESPIRATORY (INHALATION) at 01:32

## 2019-05-27 RX ADMIN — HYDROMORPHONE HYDROCHLORIDE PRN MG: 1 INJECTION, SOLUTION INTRAMUSCULAR; INTRAVENOUS; SUBCUTANEOUS at 03:33

## 2019-05-27 RX ADMIN — HYDROMORPHONE HYDROCHLORIDE PRN MG: 1 INJECTION, SOLUTION INTRAMUSCULAR; INTRAVENOUS; SUBCUTANEOUS at 20:46

## 2019-05-27 RX ADMIN — IPRATROPIUM BROMIDE AND ALBUTEROL SULFATE SCH ML: .5; 3 SOLUTION RESPIRATORY (INHALATION) at 19:09

## 2019-05-27 RX ADMIN — HYDROMORPHONE HYDROCHLORIDE PRN MG: 1 INJECTION, SOLUTION INTRAMUSCULAR; INTRAVENOUS; SUBCUTANEOUS at 00:21

## 2019-05-27 RX ADMIN — PANTOPRAZOLE SODIUM SCH MG: 40 INJECTION, POWDER, FOR SOLUTION INTRAVENOUS at 08:25

## 2019-05-27 RX ADMIN — HYDROMORPHONE HYDROCHLORIDE PRN MG: 1 INJECTION, SOLUTION INTRAMUSCULAR; INTRAVENOUS; SUBCUTANEOUS at 16:43

## 2019-05-27 RX ADMIN — HYDRALAZINE HYDROCHLORIDE PRN MG: 20 INJECTION INTRAMUSCULAR; INTRAVENOUS at 05:29

## 2019-05-27 RX ADMIN — IPRATROPIUM BROMIDE AND ALBUTEROL SULFATE SCH ML: .5; 3 SOLUTION RESPIRATORY (INHALATION) at 13:16

## 2019-05-27 RX ADMIN — PIPERACILLIN AND TAZOBACTAM SCH MLS/HR: 3; .375 INJECTION, POWDER, FOR SOLUTION INTRAVENOUS at 00:19

## 2019-05-27 RX ADMIN — HYDROMORPHONE HYDROCHLORIDE PRN MG: 1 INJECTION, SOLUTION INTRAMUSCULAR; INTRAVENOUS; SUBCUTANEOUS at 07:45

## 2019-05-27 RX ADMIN — IPRATROPIUM BROMIDE AND ALBUTEROL SULFATE SCH ML: .5; 3 SOLUTION RESPIRATORY (INHALATION) at 06:55

## 2019-05-27 RX ADMIN — HYDROCORTISONE SODIUM SUCCINATE SCH MG: 100 INJECTION, POWDER, FOR SOLUTION INTRAMUSCULAR; INTRAVENOUS at 00:20

## 2019-05-27 RX ADMIN — HEPARIN SODIUM SCH UNIT: 5000 INJECTION, SOLUTION INTRAVENOUS; SUBCUTANEOUS at 16:06

## 2019-05-27 RX ADMIN — HYDROMORPHONE HYDROCHLORIDE PRN MG: 1 INJECTION, SOLUTION INTRAMUSCULAR; INTRAVENOUS; SUBCUTANEOUS at 10:05

## 2019-05-27 RX ADMIN — HYDROCORTISONE SODIUM SUCCINATE SCH MG: 100 INJECTION, POWDER, FOR SOLUTION INTRAMUSCULAR; INTRAVENOUS at 07:46

## 2019-05-27 RX ADMIN — HYDROMORPHONE HYDROCHLORIDE PRN MG: 1 INJECTION, SOLUTION INTRAMUSCULAR; INTRAVENOUS; SUBCUTANEOUS at 05:34

## 2019-05-27 RX ADMIN — HYDRALAZINE HYDROCHLORIDE PRN MG: 20 INJECTION INTRAMUSCULAR; INTRAVENOUS at 00:20

## 2019-05-27 RX ADMIN — HYDRALAZINE HYDROCHLORIDE PRN MG: 20 INJECTION INTRAMUSCULAR; INTRAVENOUS at 22:15

## 2019-05-27 RX ADMIN — PIPERACILLIN AND TAZOBACTAM SCH MLS/HR: 3; .375 INJECTION, POWDER, FOR SOLUTION INTRAVENOUS at 07:46

## 2019-05-27 NOTE — PN
PROGRESS NOTE



DATE OF SERVICE:

05/27/2019.



REASON FOR FOLLOWUP:

Perforated duodenal ulcer/secondary peritonitis.



INTERVAL HISTORY:

The patient is currently afebrile.  The patient has been breathing comfortably.  The

patient was started on a clear liquid which the patient has been tolerating.  Currently

denies having any chest pain or cough or any abdominal pain.  No nausea, no vomiting.



PHYSICAL EXAMINATION:

Blood pressure is 150/73 with a pulse of 82.  Temperature 97.9.  He is 93% on 15 L high-

flow oxygen.

General description is an elderly male lying in bed in no distress.

Respiratory system:  Unlabored breathing.  Clear to auscultation anteriorly.

Heart S1, S2.  Regular rate and rhythm.

Abdomen soft, mildly distended. No nuchal rigidity.



LABS:

Hemoglobin 11.4, white count 10.9 with a BUN of 27, creatinine 0.49.



DIAGNOSTIC IMPRESSION AND PLAN:

Patient with a perforated duodenal ulcer status post operative repair with likely

compressed secondary peritonitis.  The patient's sputum also showing Pseudomonas

aeruginosa, however, no significant cough or worsening respiratory status.  The patient

is currently covered on Zosyn and Diflucan to continue while monitoring his clinical

course closely.  Continue supportive care.





MMODL / IJN: [id number] / Job#: [id number]

## 2019-05-27 NOTE — P.PN
Progress Note - Text


Progress Note Date: 05/27/19





The patient is resting comfortably in his bed.  He is requesting some knee.  

He's had no nausea.





His white count is 10.4.





On exam is lesser stable.  Abdomen soft.  Incision sites clean dry intact.





Patient was started on clear liquid diet.

## 2019-05-27 NOTE — P.PN
Subjective





This is a 69 years old male with past medical history of COPD on home oxygen, 

hyperlipidemia, rheumatoid arthritis, sleep apnea on CPAP.  Presents with 

abdominal pain , has been evaluated by surgery on admission and taken to the 

operation room found to have perforated duodenal ulcer and incarcerated 

umbilical hernia.  After the surgery patient was taken to the intensive care 

unit, he got intubated.  He was started on Zosyn, and Flagyl.  He is also on 

Protonix for DVT prophylaxis.  And he is in normal cement 120 L/h.  Patient 

currently is not on pressors.  He has J drain with 200 mL of bloody discharge 

from abdominal wound.  NG tube was about 200 mL drainage.


Currently patient is in ICU, vital showing blood pressure is 87/44, saturating 

95% on FiO2 of 50%.  Temperature 97.5.  WBC 12.4 K, potassium 5.2 sodium 135.  

Creatinine 0.7.  Urinalysis is not suspicious of infection.  Chest x-ray: 

Interstitial lung disease, endotracheal tube is in place.  CT of the abdomen and

pelvis done prior to surgery was reviewed.  EKG sinus tachycardia at 113.  No 

significant ST-T changes.





Subjective


Date of service 05/25/2019


Patient went to the ICU.  He is status post extubation.  With no chest pain or 

dyspnea.  Vitas looks stable.  Still complaining from pain at the surgical 

abdominal site.  No bowel movement yet.  WBC 14.5 K.  Creatinine 0.6.








05/26/2019


pt is in ICU, he is awake and alert asking for food, still has NG tube with more

than 300 ml drained , sluggish bowel movement, passing gas only, NG tube is 

planed to be taken off by surgery team and start on clear liquid diet tomorrow, 

sputum is growing pseudomonas and pt is currently on zosyn and diflucan, ID team

are following the pt closely , WBC 14.7K, BMP is unremarkable, vials stable





Objective





- Vital Signs


Vital signs: 


                                   Vital Signs











Temp  97.6 F   05/26/19 20:00


 


Pulse  77   05/26/19 21:00


 


Resp  20   05/26/19 21:00


 


BP  161/71   05/26/19 21:00


 


Pulse Ox  96   05/26/19 21:00








                                 Intake & Output











 05/26/19 05/26/19 05/27/19





 06:59 18:59 06:59


 


Intake Total 613 1068 159


 


Output Total 935 935 105


 


Balance -322 133 54


 


Weight 77.8 kg 77.8 kg 


 


Intake:   


 


   868 159


 


    Fluconazole in NaCl,Iso-  100 





    Osm 200 mg In Saline 1   





    100ml.bag @ 100 mls/hr   





    IVPB DAILY [facility] Rx#:   





    [id number]   


 


    Piperacillin-Tazobactam 3  100 





    .375 gm In Sodium   





    Chloride 0.9% 100 ml @ 25   





    mls/hr IVPB Q8HR [facility] Rx#   





    :[id number]   


 


    Pressure 63 48 9


 


    Sodium Chloride 0.9% 1, 550 620 150





    000 ml @ 50 mls/hr IV .   





    Q20H [facility] Rx#:[id number]   


 


  Oral  200 


 


Output:   


 


  Gastric Drainage 250 350 


 


  Drainage 25 10 15


 


    Right Lower Abdomen 25 10 15


 


  Urine 510 575 90


 


  Emesis 150 0 


 


Other:   


 


  Voiding Method Indwelling Catheter Indwelling Catheter Indwelling Catheter








                       ABP, PAP, CO, CI - Last Documented











Arterial Blood Pressure        146/47

















- Exam





GENERAL: The patient is alert oriented, not in distress


HEENT: Pupils are round and equally reacting to light. EOMI. No scleral icterus.

No conjunctival pallor. Normocephalic, atraumatic. No pharyngeal erythema. No 

thyromegaly. 


CARDIOVASCULAR: S1 and S2 present. No murmurs, rubs, or gallops. 


PULMONARY: Chest is clear to auscultation, no wheezing or crackles. 


ABDOMEN: Soft, is status post exploratory laparotomy, dressing is in place, 

wound is CLOSED.


MUSCULOSKELETAL: No joint swelling or deformity. 


EXTREMITIES: No cyanosis, clubbing, or pedal edema. 


NEUROLOGICAL: Gross neurological examination did not reveal any focal deficits. 


SKIN: No rashes.





- Labs


CBC & Chem 7: 


                                 05/26/19 05:10





                                 05/26/19 05:10


Labs: 


                  Abnormal Lab Results - Last 24 Hours (Table)











  05/26/19 05/26/19 Range/Units





  05:10 05:10 


 


WBC  14.7 H   (3.8-10.6)  k/uL


 


RBC  3.56 L   (4.30-5.90)  m/uL


 


Hgb  10.9 L   (13.0-17.5)  gm/dL


 


Hct  34.8 L   (39.0-53.0)  %


 


RDW  15.8 H   (11.5-15.5)  %


 


Plt Count  491 H   (150-450)  k/uL


 


Carbon Dioxide   31 H  (22-30)  mmol/L


 


BUN   25 H  (9-20)  mg/dL


 


Creatinine   0.57 L  (0.66-1.25)  mg/dL


 


Calcium   7.4 L  (8.4-10.2)  mg/dL








                      Microbiology - Last 24 Hours (Table)











 05/23/19 15:45 Blood Culture - Preliminary





 Blood    No Growth after 72 hours


 


 05/23/19 23:41 Gram Stain - Final





 Sputum Sputum Culture - Final





    Pseudomonas aeruginosa














Assessment and Plan


Assessment: 





Acute abdomen, status post exploratory laparotomy with perforated duodenal ulcer

and incarcerated umbilical hernia


Acute hypoxic respiratory failure.


Acute COPD exacerbation


Hyperlipidemia


History of rheumatoid arthritis











Plan: 





This is a 69 years old male who presents with perforated duodenal ulcers and 

incarcerated hernia, status post exploratory laparotomy.  Patient after the 

surgery went to the intensive care unit.  Status post intubating.  Critical care

team and surgery team R following the case closely.  Continue with pain 

management, IV fluids and antibiotic.Labs and medication were reviewed..  

Continue same treatment.  Continue with symptomatic treatment.  Resume home 

medication.  Monitor lytes and vitals.  DVT and GI prophylaxis.  Further rec

ommendations of the clinical course of the patient


DVT prophylaxis: Subcutaneous heparin


GI Prophylaxis: PPI


Prognosis is guarded

## 2019-05-27 NOTE — P.PN
Subjective


Progress Note Date: 05/27/19











On 05/27/2019, the patient is postop day #4.  The patient is doing well.  He is 

awake and alert.  History requiring high flow oxygen at 10 L.  He doesn't have 

much reserve and he desaturates easily.  NG tube was removed yesterday and this 

morning the patient was given some clear liquid diet.  Surgical wound site is 

clean.  Output from the DAVID drain is minimal in the order of 30 mL over the past 

24 hours.  Surgical wound site is dry clean and intact.  He is passing flatus.  

No bowel activity as.  He remains on IV Zosyn.  He is using incentive 

spirometer.  He is on status post hydrocortisone.  He is awake and alert.  He 

has pseudomonas aeruginosa in his sputum for which she is covered with IV Zosyn.

 No other significant events overnight.





Objective





- Vital Signs


Vital signs: 


                                   Vital Signs











Temp  97.7 F   05/27/19 08:00


 


Pulse  102 H  05/27/19 10:00


 


Resp  16   05/27/19 10:00


 


BP  144/73   05/27/19 10:00


 


Pulse Ox  94 L  05/27/19 10:00








                                 Intake & Output











 05/26/19 05/27/19 05/27/19





 18:59 06:59 18:59


 


Intake Total 1068 1236 712


 


Output Total 935 485 165


 


Balance 133 751 547


 


Weight 77.8 kg 76.9 kg 


 


Intake:   


 


   736 412


 


    Fluconazole in NaCl,Iso- 100  100





    Osm 200 mg In Saline 1   





    100ml.bag @ 100 mls/hr   





    IVPB DAILY [facility] Rx#:   





    [id number]   


 


    Piperacillin-Tazobactam 3 100 100 100





    .375 gm In Sodium   





    Chloride 0.9% 100 ml @ 25   





    mls/hr IVPB Q8HR [facility] Rx#   





    :[id number]   


 


    Pressure 48 36 12


 


    Sodium Chloride 0.9% 1, 620 600 200





    000 ml @ 50 mls/hr IV .   





    Q20H [facility] Rx#:[id number]   


 


  Oral 200 500 300


 


Output:   


 


  Gastric Drainage 350  


 


  Drainage 10 15 0


 


    Right Lower Abdomen 10 15 0


 


  Urine 575 470 165


 


  Emesis 0 0 


 


Other:   


 


  Voiding Method Indwelling Catheter Indwelling Catheter Indwelling Catheter








                       ABP, PAP, CO, CI - Last Documented











Arterial Blood Pressure        146/47

















- Exam








Gen. appearance is calm comfortable cushingoid nonacute distress currently on 

oxygen at 10 L per minute nasal cannula with a pulse ox of 94%.  NG tube has 

been removed.


Head exam was generally normal. There was no scleral icterus or corneal arcus. 

Mucous membranes were moist..  The patient is cushingoid features related to 

chronic steroid use.


Neck was supple and without jugular venous distension, thyromegaly, or carotid 

bruits. Carotids were easily palpable bilaterally. There was no adenopathy.  The

patient has a right IJ triple-lumen catheter in place.


Lungs sounds are diminished bilaterally and the patient has coarse crackles in 

the mid and lower lung fields bilaterally consistent with pulmonary fibrosis


Cardiac exam revealed the PMI to be normally situated and sized. The rhythm was 

regular and no extrasystoles were noted during several minutes of auscultation. 

The first and second heart sounds were normal and physiologic splitting of the 

second heart sound was noted. There were no murmurs, rubs, clicks, or gallops.


Abdomen is soft.  Bowel sounds are hypoactive and very sluggish.  Surgical wound

site is dry clean and intact.  DAVID drains in place.  No direct tenderness.  No 

rebound tenderness.  No guarding.


Examination of the extremities revealed easily palpable radial, femoral and 

pedal pulses. There was no cyanosis, clubbing or edema.


Examination of the skin revealed no evidence of significant rashes, suspicious 

appearing nevi or other concerning lesions.


Neurologically is awake and alert and there is a focal neurological deficit.





- Labs


CBC & Chem 7: 


                                 05/27/19 04:25





                                 05/27/19 04:25


Labs: 


                  Abnormal Lab Results - Last 24 Hours (Table)











  05/27/19 05/27/19 Range/Units





  04:25 04:25 


 


WBC   10.9 H  (3.8-10.6)  k/uL


 


RBC   3.89 L  (4.30-5.90)  m/uL


 


Hgb   11.4 L  (13.0-17.5)  gm/dL


 


Hct   37.8 L  (39.0-53.0)  %


 


MCHC   30.1 L  (31.0-37.0)  g/dL


 


RDW   15.9 H  (11.5-15.5)  %


 


Plt Count   539 H  (150-450)  k/uL


 


Neutrophils #   9.8 H  (1.3-7.7)  k/uL


 


Lymphocytes #   0.6 L  (1.0-4.8)  k/uL


 


Chloride  108 H   ()  mmol/L


 


Carbon Dioxide  31 H   (22-30)  mmol/L


 


BUN  27 H   (9-20)  mg/dL


 


Creatinine  0.49 L   (0.66-1.25)  mg/dL


 


Calcium  7.7 L   (8.4-10.2)  mg/dL








                      Microbiology - Last 24 Hours (Table)











 05/23/19 15:45 Blood Culture - Preliminary





 Blood    No Growth after 72 hours


 


 05/23/19 23:41 Gram Stain - Final





 Sputum Sputum Culture - Final





    Pseudomonas aeruginosa














Assessment and Plan


Plan: 











1  pneumoperitoneum and the patient is status post expiratory laparotomy and 

repair of a perforated duodenal ulcer and repair of an incarcerated umbilical 

hernia.  The patient is postop day #4.  The patient is hemodynamically stable.  

The patient is currently on a combination of Zosyn and Diflucan.  The NG tube 

has been removed and the patient is currently on liquid diet.





2 ventilator management.  The patient was extubated currently on 10 L of oxygen 

by nasal cannula





3 acute leukocytosis secondary to above, improving





4 acute sinus tachycardia secondary to above, improved





5 mild lactic acidosis





6 advanced COPD with pulmonary fibrosis, steroid dependent with chronic hypoxic 

and hypercapnic respiratory failure





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





8 rheumatoid arthritis





9 obstructive sleep apnea not receiving any CPAP therapy at this point in time





10 chronic back pain





11 hyperlipidemia





12 osteoarthritis





13 pseudomonas aeruginosa in the sputum, infection is felt to be less likely.  

Consider colonization.  Doubt a pseudomonal pneumonia.





Plan





Will continue tapering the FiO2 as tolerated.  The patient be asked to continue 

using incentive spirometer.  Advance diet as tolerated.  He is currently on 

liquid diet.  Monitor bowel sounds.  Monitor for any bowel movement with 

activity.  Last the patient to sit up on a chair.  History cardiac is abdominal 

surgery.  He has advanced lung disease in the form of COPD and fibrosis.  

Continue bronchodilators.  Aspiration precautions.  We'll continue to follow.  

Continue the stressors hydrocortisone to the patient out of the ICU.

## 2019-05-28 LAB
ANION GAP SERPL CALC-SCNC: 2 MMOL/L
BASOPHILS # BLD AUTO: 0 K/UL (ref 0–0.2)
BASOPHILS NFR BLD AUTO: 0 %
BUN SERPL-SCNC: 17 MG/DL (ref 9–20)
CALCIUM SPEC-MCNC: 7.5 MG/DL (ref 8.4–10.2)
CHLORIDE SERPL-SCNC: 103 MMOL/L (ref 98–107)
CO2 SERPL-SCNC: 35 MMOL/L (ref 22–30)
EOSINOPHIL # BLD AUTO: 0 K/UL (ref 0–0.7)
EOSINOPHIL NFR BLD AUTO: 0 %
ERYTHROCYTE [DISTWIDTH] IN BLOOD BY AUTOMATED COUNT: 3.81 M/UL (ref 4.3–5.9)
ERYTHROCYTE [DISTWIDTH] IN BLOOD: 15.7 % (ref 11.5–15.5)
GLUCOSE SERPL-MCNC: 124 MG/DL (ref 74–99)
HCT VFR BLD AUTO: 36.8 % (ref 39–53)
HGB BLD-MCNC: 11.5 GM/DL (ref 13–17.5)
LYMPHOCYTES # SPEC AUTO: 0.4 K/UL (ref 1–4.8)
LYMPHOCYTES NFR SPEC AUTO: 4 %
MAGNESIUM SPEC-SCNC: 2.3 MG/DL (ref 1.6–2.3)
MCH RBC QN AUTO: 30.2 PG (ref 25–35)
MCHC RBC AUTO-ENTMCNC: 31.2 G/DL (ref 31–37)
MCV RBC AUTO: 96.6 FL (ref 80–100)
MONOCYTES # BLD AUTO: 0.4 K/UL (ref 0–1)
MONOCYTES NFR BLD AUTO: 5 %
NEUTROPHILS # BLD AUTO: 7.8 K/UL (ref 1.3–7.7)
NEUTROPHILS NFR BLD AUTO: 89 %
PLATELET # BLD AUTO: 458 K/UL (ref 150–450)
POTASSIUM SERPL-SCNC: 3.2 MMOL/L (ref 3.5–5.1)
SODIUM SERPL-SCNC: 140 MMOL/L (ref 137–145)
WBC # BLD AUTO: 8.7 K/UL (ref 3.8–10.6)

## 2019-05-28 RX ADMIN — HEPARIN SODIUM SCH UNIT: 5000 INJECTION, SOLUTION INTRAVENOUS; SUBCUTANEOUS at 00:38

## 2019-05-28 RX ADMIN — HYDROCORTISONE SODIUM SUCCINATE SCH MG: 100 INJECTION, POWDER, FOR SOLUTION INTRAMUSCULAR; INTRAVENOUS at 23:21

## 2019-05-28 RX ADMIN — PIPERACILLIN AND TAZOBACTAM SCH MLS/HR: 3; .375 INJECTION, POWDER, FOR SOLUTION INTRAVENOUS at 15:56

## 2019-05-28 RX ADMIN — KETOROLAC TROMETHAMINE SCH MG: 30 INJECTION, SOLUTION INTRAMUSCULAR at 16:10

## 2019-05-28 RX ADMIN — POTASSIUM CHLORIDE SCH MLS/HR: 14.9 INJECTION, SOLUTION INTRAVENOUS at 09:34

## 2019-05-28 RX ADMIN — HYDROMORPHONE HYDROCHLORIDE PRN MG: 1 INJECTION, SOLUTION INTRAMUSCULAR; INTRAVENOUS; SUBCUTANEOUS at 00:39

## 2019-05-28 RX ADMIN — HYDROMORPHONE HYDROCHLORIDE PRN MG: 1 INJECTION, SOLUTION INTRAMUSCULAR; INTRAVENOUS; SUBCUTANEOUS at 12:58

## 2019-05-28 RX ADMIN — HEPARIN SODIUM SCH UNIT: 5000 INJECTION, SOLUTION INTRAVENOUS; SUBCUTANEOUS at 23:21

## 2019-05-28 RX ADMIN — HYDROMORPHONE HYDROCHLORIDE PRN MG: 1 INJECTION, SOLUTION INTRAMUSCULAR; INTRAVENOUS; SUBCUTANEOUS at 17:46

## 2019-05-28 RX ADMIN — HYDROMORPHONE HYDROCHLORIDE PRN MG: 1 INJECTION, SOLUTION INTRAMUSCULAR; INTRAVENOUS; SUBCUTANEOUS at 04:24

## 2019-05-28 RX ADMIN — HYDRALAZINE HYDROCHLORIDE PRN MG: 20 INJECTION INTRAMUSCULAR; INTRAVENOUS at 06:06

## 2019-05-28 RX ADMIN — IPRATROPIUM BROMIDE AND ALBUTEROL SULFATE SCH ML: .5; 3 SOLUTION RESPIRATORY (INHALATION) at 19:06

## 2019-05-28 RX ADMIN — IPRATROPIUM BROMIDE AND ALBUTEROL SULFATE SCH ML: .5; 3 SOLUTION RESPIRATORY (INHALATION) at 02:02

## 2019-05-28 RX ADMIN — CEFAZOLIN SCH MLS/HR: 330 INJECTION, POWDER, FOR SOLUTION INTRAMUSCULAR; INTRAVENOUS at 15:56

## 2019-05-28 RX ADMIN — HYDRALAZINE HYDROCHLORIDE PRN MG: 20 INJECTION INTRAMUSCULAR; INTRAVENOUS at 13:53

## 2019-05-28 RX ADMIN — HYDROMORPHONE HYDROCHLORIDE PRN MG: 1 INJECTION, SOLUTION INTRAMUSCULAR; INTRAVENOUS; SUBCUTANEOUS at 15:24

## 2019-05-28 RX ADMIN — HYDROCORTISONE SODIUM SUCCINATE SCH MG: 100 INJECTION, POWDER, FOR SOLUTION INTRAMUSCULAR; INTRAVENOUS at 00:38

## 2019-05-28 RX ADMIN — HEPARIN SODIUM SCH UNIT: 5000 INJECTION, SOLUTION INTRAVENOUS; SUBCUTANEOUS at 15:55

## 2019-05-28 RX ADMIN — IPRATROPIUM BROMIDE AND ALBUTEROL SULFATE SCH ML: .5; 3 SOLUTION RESPIRATORY (INHALATION) at 08:06

## 2019-05-28 RX ADMIN — PANTOPRAZOLE SODIUM SCH MG: 40 INJECTION, POWDER, FOR SOLUTION INTRAVENOUS at 08:17

## 2019-05-28 RX ADMIN — HYDROMORPHONE HYDROCHLORIDE PRN MG: 1 INJECTION, SOLUTION INTRAMUSCULAR; INTRAVENOUS; SUBCUTANEOUS at 10:24

## 2019-05-28 RX ADMIN — PIPERACILLIN AND TAZOBACTAM SCH MLS/HR: 3; .375 INJECTION, POWDER, FOR SOLUTION INTRAVENOUS at 08:18

## 2019-05-28 RX ADMIN — PIPERACILLIN AND TAZOBACTAM SCH MLS/HR: 3; .375 INJECTION, POWDER, FOR SOLUTION INTRAVENOUS at 23:22

## 2019-05-28 RX ADMIN — PIPERACILLIN AND TAZOBACTAM SCH MLS/HR: 3; .375 INJECTION, POWDER, FOR SOLUTION INTRAVENOUS at 00:38

## 2019-05-28 RX ADMIN — HEPARIN SODIUM SCH UNIT: 5000 INJECTION, SOLUTION INTRAVENOUS; SUBCUTANEOUS at 08:18

## 2019-05-28 RX ADMIN — IPRATROPIUM BROMIDE AND ALBUTEROL SULFATE SCH ML: .5; 3 SOLUTION RESPIRATORY (INHALATION) at 13:06

## 2019-05-28 RX ADMIN — HYDROCORTISONE SODIUM SUCCINATE SCH MG: 100 INJECTION, POWDER, FOR SOLUTION INTRAMUSCULAR; INTRAVENOUS at 08:17

## 2019-05-28 RX ADMIN — HYDROMORPHONE HYDROCHLORIDE PRN MG: 1 INJECTION, SOLUTION INTRAMUSCULAR; INTRAVENOUS; SUBCUTANEOUS at 06:04

## 2019-05-28 RX ADMIN — HYDROMORPHONE HYDROCHLORIDE PRN MG: 1 INJECTION, SOLUTION INTRAMUSCULAR; INTRAVENOUS; SUBCUTANEOUS at 08:15

## 2019-05-28 RX ADMIN — FLUCONAZOLE IN SODIUM CHLORIDE SCH MLS/HR: 2 INJECTION, SOLUTION INTRAVENOUS at 08:18

## 2019-05-28 RX ADMIN — HYDROCORTISONE SODIUM SUCCINATE SCH MG: 100 INJECTION, POWDER, FOR SOLUTION INTRAMUSCULAR; INTRAVENOUS at 15:54

## 2019-05-28 RX ADMIN — KETOROLAC TROMETHAMINE SCH MG: 30 INJECTION, SOLUTION INTRAMUSCULAR at 23:21

## 2019-05-28 RX ADMIN — POTASSIUM CHLORIDE SCH MLS/HR: 14.9 INJECTION, SOLUTION INTRAVENOUS at 05:35

## 2019-05-28 NOTE — P.PN
Subjective


Progress Note Date: 05/28/19


Principal diagnosis: 





Acute pneumoperitoneum secondary to perforated duodenal ulcer.


69-year-old male patient came into the emergency department with a one-week 

history of abdominal pain.  He initially went to [facility] 

emergency department with a CAT scan was done and the patient was told to have 

diverticulosis without diverticulitis.  Over the past week, the patient 

developed progressive worsening his abdominal discomfort and today came into the

emergency department having more pain and the pain was rather diffuse in nature.

 He had diminished appetite, diminished oral intake and he was having no 

significant bowel movements.  No GI bleeding.  Denies having any fever or 

chills.  He was nauseated when he was getting progressively more lethargic and 

weak and short of breath.  CAT scan of the abdomen was done in the emergency 

department and showed significant inflammatory changes in the epigastric region 

involving posterior aspect of the proximal transverse colon.  There was evidence

of pneumoperitoneum around that along with some inflammatory changes as well as 

some free fluid in the abdomen.  The stomach itself appeared to be within normal

limits.  The patient has long-term history of COPD and pulmonary fibrosis.  

Based on his pulmonary function test in 2017 he has an FVC of 72% and FEV1 of 

46% and he has been steroid dependent for the past few years taking prednisone a

daily basis.  He has also underlying rheumatoid arthritis and previously he was 

taking immunosuppression with Humira none for now.  His oxygen dependent.  Blood

work showed a white cell count 16.2 with a hemoglobin of 14.9.  Platelet count 

is at 519.  He has a BUN of 27 creatinine of 0.7 and lactic acid was at 1.9 with

a troponin being less than 0.01.  LFTs are all within normal limits.  He is 

tachycardic with temperature 98.2.  He was having sinus tachycardia with a heart

rate of 120.  He is currently on 5 L of oxygen by nasal cannula with a pulse ox 

of 90%.  The patient will be taken to the operating room.  He was seen by the 

surgical team following that I've advised the patient coming back to the 

intensive care unit for further evaluation and treatment.  He may need to be 

kept intubated overnight.





On 05/27/2019, the patient is postop day #4.  The patient is doing well.  He is 

awake and alert.  History requiring high flow oxygen at 10 L.  He doesn't have 

much reserve and he desaturates easily.  NG tube was removed yesterday and this 

morning the patient was given some clear liquid diet.  Surgical wound site is 

clean.  Output from the DAVID drain is minimal in the order of 30 mL over the past 

24 hours.  Surgical wound site is dry clean and intact.  He is passing flatus.  

No bowel activity as.  He remains on IV Zosyn.  He is using incentive 

spirometer.  He is on status post hydrocortisone.  He is awake and alert.  He 

has pseudomonas aeruginosa in his sputum for which she is covered with IV Zosyn.

 No other significant events overnight.





Patient was reevaluated today on 5/28/2019, remains on high flow nasal cannula 7

L/m, continues to have significantly abnormal chest x-ray with diffuse 

interstitial lung disease consistent with pulmonary fibrosis, underlying 

pneumonia is not entirely ruled out.  His FiO2 is being titrated down, patient 

is noted to be short of breath with any activity.  His sputum was positive for 

pseudomonas aeruginosa, remains on Zosyn.  All labs were reviewed today, 

potassium is a bit low at 3.2 being corrected as per protocol.








Objective





- Vital Signs


Vital signs: 


                                   Vital Signs











Temp  98.3 F   05/28/19 08:00


 


Pulse  96   05/28/19 13:09


 


Resp  22   05/28/19 11:00


 


BP  131/70   05/28/19 11:00


 


Pulse Ox  91 L  05/28/19 11:00








                                 Intake & Output











 05/27/19 05/28/19 05/28/19





 18:59 06:59 18:59


 


Intake Total 1939 1371 1321


 


Output Total 570 730 320


 


Balance 1547 897 5121


 


Weight   76.9 kg


 


Intake:   


 


   786 721


 


    Fluconazole in NaCl,Iso- 100  100





    Osm 200 mg In Saline 1   





    100ml.bag @ 100 mls/hr   





    IVPB DAILY IMTIAZ Rx#:   





    [id number]   


 


    Piperacillin-Tazobactam 3 200 100 100





    .375 gm In Sodium   





    Chloride 0.9% 100 ml @ 25   





    mls/hr IVPB Q8HR IMTIAZ Rx#   





    [id number]   


 


    Potassium Chloride 20 meq  50 150





    In Water For Injection 1   





    100ml.bag @ 50 mls/hr   





    IVPB Q2H IMTIAZ Rx#:   





    [id number]   


 


    Pressure 39 36 21


 


    Sodium Chloride 0.9% 1, 600 600 350





    000 ml @ 50 mls/hr IV .   





    Q20H IMTIAZ Rx#:[id number]   


 


  Oral 1000 585 600


 


Output:   


 


  Drainage 30 15 


 


    Right Lower Abdomen 30 15 


 


  Urine 540 715 320


 


  Emesis   0


 


Other:   


 


  Voiding Method Indwelling Catheter Indwelling Catheter Indwelling Catheter








                       ABP, PAP, CO, CI - Last Documented











Arterial Blood Pressure        146/47

















- Exam





Gen. appearance revealed a 69-year-old white male in no distress, on high flow 

nasal cannula.  Noted to be short of breath with any activity.


HEENT: Cushingoid, no neck masses, no JVD, no stridor, moist mucous membranes, 

PERRLA, EOMI.


Lungs sounds minimal fine crackles at the bases, no rhonchi no wheezes 

mechanical chest expansion


Cardiac exam revealed normal S1 and S2, no S3 gallop, no murmur. 


Abdomen is soft.  Nontender, no megaly, no rebound, DAVID drain is noted


Examination of the extremities no clubbing edema or cyanosis


Examination of the skin revealed no rashes.


Neurologically alert oriented 3, no gross focal neurologic deficits.


Acute: Normal mood, affect and mental status examination.





- Labs


CBC & Chem 7: 


                                 05/28/19 04:15





                                 05/28/19 04:15


Labs: 


                  Abnormal Lab Results - Last 24 Hours (Table)











  05/28/19 05/28/19 Range/Units





  04:15 04:15 


 


RBC  3.81 L   (4.30-5.90)  m/uL


 


Hgb  11.5 L   (13.0-17.5)  gm/dL


 


Hct  36.8 L   (39.0-53.0)  %


 


RDW  15.7 H   (11.5-15.5)  %


 


Plt Count  458 H   (150-450)  k/uL


 


Neutrophils #  7.8 H   (1.3-7.7)  k/uL


 


Lymphocytes #  0.4 L   (1.0-4.8)  k/uL


 


Potassium   3.2 L  (3.5-5.1)  mmol/L


 


Carbon Dioxide   35 H  (22-30)  mmol/L


 


Creatinine   0.39 L  (0.66-1.25)  mg/dL


 


Glucose   124 H  (74-99)  mg/dL


 


Calcium   7.5 L  (8.4-10.2)  mg/dL


 


Phosphorus   1.7 L  (2.5-4.5)  mg/dL








                      Microbiology - Last 24 Hours (Table)











 05/23/19 15:45 Blood Culture - Preliminary





 Blood    No Growth after 96 hours














Assessment and Plan


Assessment: 





Impression:





1 acute pneumoperitoneum secondary to perforation of duodenal ulcer status post 

treatment.  Of duodenal ulcer and repair of incarcerated umbilical hernia 

postoperative day #5.





2 advanced COPD and severe pulmonary fibrosis with chronic hypoxic respiratory 

failure





3 history of rheumatoid arthritis





4 obstructive sleep apnea syndrome not compliant with CPAP.





5 chronic back pain





6 osteoarthritis





7 pseudomonas aeruginosa in the sputum, could be a colonization or could be 

secondary to pseudomonal pneumonia.  Best to continue antibiotics at present 

since the patient's pulmonary status is rather marginal.





Recommendation: Continue to treat with antibiotics, steroids, bronchodilators, 

incentive spirometry, hydrocortisone, prognosis is definitely poor and guarded 

considering his underlying interstitial lung disease and COPD.  We'll continue 

to monitor in the ICU for the next 24 hours.


Time with Patient: Less than 30

## 2019-05-28 NOTE — PN
PROGRESS NOTE



DATE OF SERVICE:

05/28/2019



REASON FOR FOLLOWUP:

1. Secondary peritonitis.

2. Positive sputum culture with pseudomonas.



INTERVAL HISTORY:

The patient is currently afebrile.  The patient has been breathing comfortably.  The

patient did have a bowel movement today.  No nausea. No vomiting.  He has been

tolerating his diet and his breathing has been baseline.



PHYSICAL EXAMINATION:

Blood pressure 127/82 with a pulse of 113, temperature of 98. He is 93% on 9 L high-

flow oxygen.

General description is an elderly male up in the chair in no distress.

RESPIRATORY SYSTEM: Unlabored breathing. Clear to auscultation anteriorly.

HEART: S1, S2.  Regular rate and rhythm.

ABDOMEN: Soft. There is no tenderness.



LABS:

Hemoglobin is 11.5, white count normal at 8.7, BUN of 17, creatinine 0.39.  Blood

culture has been negative.  Sputum with Pseudomonas aeruginosa.



DIAGNOSTIC IMPRESSION AND PLAN:

Patient admitted to hospital with abdominal pain.  He did have a perforated duodenal

ulcer.  Patient is status post laparotomy and surgical repair of the same.  Currently

covered with Zosyn and Diflucan; to continue while watching his clinical course

closely.  Continue supportive care.





MMODL / IJN: [id number] / Job#: [id number]

## 2019-05-28 NOTE — P.PN
Subjective





This is a 69 years old male with past medical history of COPD on home oxygen, 

hyperlipidemia, rheumatoid arthritis, sleep apnea on CPAP.  Presents with 

abdominal pain , has been evaluated by surgery on admission and taken to the 

operation room found to have perforated duodenal ulcer and incarcerated 

umbilical hernia.  After the surgery patient was taken to the intensive care 

unit, he got intubated.  He was started on Zosyn, and Flagyl.  He is also on 

Protonix for DVT prophylaxis.  And he is in normal cement 120 L/h.  Patient 

currently is not on pressors.  He has J drain with 200 mL of bloody discharge 

from abdominal wound.  NG tube was about 200 mL drainage.


Currently patient is in ICU, vital showing blood pressure is 87/44, saturating 

95% on FiO2 of 50%.  Temperature 97.5.  WBC 12.4 K, potassium 5.2 sodium 135.  

Creatinine 0.7.  Urinalysis is not suspicious of infection.  Chest x-ray: 

Interstitial lung disease, endotracheal tube is in place.  CT of the abdomen and

pelvis done prior to surgery was reviewed.  EKG sinus tachycardia at 113.  No 

significant ST-T changes.





Subjective


Date of service 05/25/2019


Patient went to the ICU.  He is status post extubation.  With no chest pain or 

dyspnea.  Vitas looks stable.  Still complaining from pain at the surgical 

abdominal site.  No bowel movement yet.  WBC 14.5 K.  Creatinine 0.6.








05/26/2019


pt is in ICU, he is awake and alert asking for food, still has NG tube with more

than 300 ml drained , sluggish bowel movement, passing gas only, NG tube is 

planed to be taken off by surgery team and start on clear liquid diet tomorrow, 

sputum is growing pseudomonas and pt is currently on zosyn and diflucan, ID team

are following the pt closely , WBC 14.7K, BMP is unremarkable, vials stable





05/27/2019


Patient remains in the ICU.  His sputum culture is growing Pseudomonas, patient 

is currently on Zosyn.  Chest x-ray possible right lower pneumonia but patient 

with no respiratory symptoms.  Infectious disease are following the case 

closely.  Patient is currently on clear liquid.  Once daily, vitals are stable 

and patient is afebrile








Objective





- Vital Signs


Vital signs: 


                                   Vital Signs











Temp  97.9 F   05/27/19 16:00


 


Pulse  70   05/27/19 17:00


 


Resp  22   05/27/19 17:00


 


BP  141/63   05/27/19 17:00


 


Pulse Ox  93 L  05/27/19 17:00








                                 Intake & Output











 05/26/19 05/27/19 05/27/19





 18:59 06:59 18:59


 


Intake Total 1068 1236 1530


 


Output Total 935 485 470


 


Balance 


 


Weight 77.8 kg 76.9 kg 


 


Intake:   


 


   736 830


 


    Fluconazole in NaCl,Iso- 100  100





    Osm 200 mg In Saline 1   





    100ml.bag @ 100 mls/hr   





    IVPB DAILY IMTIAZ Rx#:   





    [id number]   


 


    Piperacillin-Tazobactam 3 100 100 200





    .375 gm In Sodium   





    Chloride 0.9% 100 ml @ 25   





    mls/hr IVPB Q8HR IMTIAZ Rx#   





    [id number]   


 


    Pressure 48 36 30


 


    Sodium Chloride 0.9% 1, 620 600 500





    000 ml @ 50 mls/hr IV .   





    Q20H IMTIAZ Rx#:[id number]   


 


  Oral 200 500 700


 


Output:   


 


  Gastric Drainage 350  


 


  Drainage 10 15 30


 


    Right Lower Abdomen 10 15 30


 


  Urine 575 470 440


 


  Emesis 0 0 


 


Other:   


 


  Voiding Method Indwelling Catheter Indwelling Catheter Indwelling Catheter








                       ABP, PAP, CO, CI - Last Documented











Arterial Blood Pressure        146/47

















- Exam





GENERAL: The patient is alert oriented, not in distress


HEENT: Pupils are round and equally reacting to light. EOMI. No scleral icterus.

No conjunctival pallor. Normocephalic, atraumatic. No pharyngeal erythema. No 

thyromegaly. 


CARDIOVASCULAR: S1 and S2 present. No murmurs, rubs, or gallops. 


PULMONARY: Chest is clear to auscultation, no wheezing or crackles. 


ABDOMEN: Soft, is status post exploratory laparotomy, dressing is in place, 

wound is CLOSED.


MUSCULOSKELETAL: No joint swelling or deformity. 


EXTREMITIES: No cyanosis, clubbing, or pedal edema. 


NEUROLOGICAL: Gross neurological examination did not reveal any focal deficits. 


SKIN: No rashes.





- Labs


CBC & Chem 7: 


                                 05/28/19 04:15





                                 05/28/19 04:15


Labs: 


                  Abnormal Lab Results - Last 24 Hours (Table)











  05/27/19 05/27/19 Range/Units





  04:25 04:25 


 


WBC   10.9 H  (3.8-10.6)  k/uL


 


RBC   3.89 L  (4.30-5.90)  m/uL


 


Hgb   11.4 L  (13.0-17.5)  gm/dL


 


Hct   37.8 L  (39.0-53.0)  %


 


MCHC   30.1 L  (31.0-37.0)  g/dL


 


RDW   15.9 H  (11.5-15.5)  %


 


Plt Count   539 H  (150-450)  k/uL


 


Neutrophils #   9.8 H  (1.3-7.7)  k/uL


 


Lymphocytes #   0.6 L  (1.0-4.8)  k/uL


 


Chloride  108 H   ()  mmol/L


 


Carbon Dioxide  31 H   (22-30)  mmol/L


 


BUN  27 H   (9-20)  mg/dL


 


Creatinine  0.49 L   (0.66-1.25)  mg/dL


 


Calcium  7.7 L   (8.4-10.2)  mg/dL








                      Microbiology - Last 24 Hours (Table)











 05/23/19 15:45 Blood Culture - Preliminary





 Blood    No Growth after 96 hours














Assessment and Plan


Assessment: 





Acute abdomen, status post exploratory laparotomy with perforated duodenal ulcer

and incarcerated umbilical hernia


Acute hypoxic respiratory failure.


Acute COPD exacerbation


Hyperlipidemia


History of rheumatoid arthritis











Plan: 





This is a 69 years old male who presents with perforated duodenal ulcers and 

incarcerated hernia, status post exploratory laparotomy.  Patient after the 

surgery went to the intensive care unit.  Status post intubating.  Critical care

team and surgery team R following the case closely.  Continue with pain manag

ement, IV fluids and antibiotic.Labs and medication were reviewed..  Continue 

same treatment.  Continue with symptomatic treatment.  Resume home medication.  

Monitor lytes and vitals.  DVT and GI prophylaxis.  Further recommendations of 

the clinical course of the patient


DVT prophylaxis: Subcutaneous heparin


GI Prophylaxis: PPI


Prognosis is guarded

## 2019-05-28 NOTE — P.PN
Subjective


Progress Note Date: 05/28/19


Principal diagnosis: 





Perforated duodenal ulcer





Patient doing better today.  Pain is gradually improving.  DAVID drain 

serosanguineous.  Tolerating clears.  Less short of breath.  White blood cell 

count normal.





Objective





- Vital Signs


Vital signs: 


                                   Vital Signs











Temp  98 F   05/28/19 12:00


 


Pulse  101 H  05/28/19 15:00


 


Resp  23   05/28/19 15:00


 


BP  140/78   05/28/19 15:00


 


Pulse Ox  92 L  05/28/19 15:00








                                 Intake & Output











 05/27/19 05/28/19 05/28/19





 18:59 06:59 18:59


 


Intake Total 1939 1371 1527


 


Output Total 570 730 450


 


Balance 0743 409 1616


 


Weight   76.9 kg


 


Intake:   


 


   786 827


 


    Fluconazole in NaCl,Iso- 100  100





    Osm 200 mg In Saline 1   





    100ml.bag @ 100 mls/hr   





    IVPB DAILY IMTIAZ Rx#:   





    [id number]   


 


    Piperacillin-Tazobactam 3 200 100 100





    .375 gm In Sodium   





    Chloride 0.9% 100 ml @ 25   





    mls/hr IVPB Q8HR IMTIAZ Rx#   





    [id number]   


 


    Potassium Chloride 20 meq  50 150





    In Water For Injection 1   





    100ml.bag @ 50 mls/hr   





    IVPB Q2H IMTIAZ Rx#:   





    [id number]   


 


    Pressure 39 36 27


 


    Sodium Chloride 0.9% 1, 600 600 450





    000 ml @ 50 mls/hr IV .   





    Q20H IMTIAZ Rx#:[id number]   


 


  Oral 1000 585 700


 


Output:   


 


  Drainage 30 15 40


 


    Right Lower Abdomen 30 15 40


 


  Urine 540 715 410


 


  Emesis   0


 


Other:   


 


  Voiding Method Indwelling Catheter Indwelling Catheter Indwelling Catheter


 


  # Bowel Movements   1








                       ABP, PAP, CO, CI - Last Documented











Arterial Blood Pressure        146/47

















- Exam





Abdomen: Soft, mild distention, retention sutures intact, dressing clean





- Labs


CBC & Chem 7: 


                                 05/28/19 04:15





                                 05/28/19 04:15


Labs: 


                  Abnormal Lab Results - Last 24 Hours (Table)











  05/28/19 05/28/19 Range/Units





  04:15 04:15 


 


RBC  3.81 L   (4.30-5.90)  m/uL


 


Hgb  11.5 L   (13.0-17.5)  gm/dL


 


Hct  36.8 L   (39.0-53.0)  %


 


RDW  15.7 H   (11.5-15.5)  %


 


Plt Count  458 H   (150-450)  k/uL


 


Neutrophils #  7.8 H   (1.3-7.7)  k/uL


 


Lymphocytes #  0.4 L   (1.0-4.8)  k/uL


 


Potassium   3.2 L  (3.5-5.1)  mmol/L


 


Carbon Dioxide   35 H  (22-30)  mmol/L


 


Creatinine   0.39 L  (0.66-1.25)  mg/dL


 


Glucose   124 H  (74-99)  mg/dL


 


Calcium   7.5 L  (8.4-10.2)  mg/dL


 


Phosphorus   1.7 L  (2.5-4.5)  mg/dL








                      Microbiology - Last 24 Hours (Table)











 05/23/19 15:45 Blood Culture - Preliminary





 Blood    No Growth after 96 hours














Assessment and Plan


(1) Bowel perforation


Narrative/Plan: 


Continue clear liquids.  Add Toradol for pain control.  Start changing midline 

jaime.  Continue pulmonary toilet


Current Visit: Yes   Status: Acute   Code(s): K63.1 - PERFORATION OF INTESTINE 

(NONTRAUMATIC)   SNOMED Code(s): 82058385

## 2019-05-29 LAB
ANION GAP SERPL CALC-SCNC: 0 MMOL/L
BASOPHILS # BLD AUTO: 0 K/UL (ref 0–0.2)
BASOPHILS NFR BLD AUTO: 0 %
BUN SERPL-SCNC: 13 MG/DL (ref 9–20)
CALCIUM SPEC-MCNC: 7.5 MG/DL (ref 8.4–10.2)
CHLORIDE SERPL-SCNC: 102 MMOL/L (ref 98–107)
CO2 SERPL-SCNC: 36 MMOL/L (ref 22–30)
EOSINOPHIL # BLD AUTO: 0 K/UL (ref 0–0.7)
EOSINOPHIL NFR BLD AUTO: 0 %
ERYTHROCYTE [DISTWIDTH] IN BLOOD BY AUTOMATED COUNT: 3.81 M/UL (ref 4.3–5.9)
ERYTHROCYTE [DISTWIDTH] IN BLOOD: 15.8 % (ref 11.5–15.5)
GLUCOSE SERPL-MCNC: 111 MG/DL (ref 74–99)
HCT VFR BLD AUTO: 36.2 % (ref 39–53)
HGB BLD-MCNC: 11.5 GM/DL (ref 13–17.5)
LYMPHOCYTES # SPEC AUTO: 0.6 K/UL (ref 1–4.8)
LYMPHOCYTES NFR SPEC AUTO: 5 %
MCH RBC QN AUTO: 30.2 PG (ref 25–35)
MCHC RBC AUTO-ENTMCNC: 31.7 G/DL (ref 31–37)
MCV RBC AUTO: 95 FL (ref 80–100)
MONOCYTES # BLD AUTO: 0.5 K/UL (ref 0–1)
MONOCYTES NFR BLD AUTO: 4 %
NEUTROPHILS # BLD AUTO: 9.3 K/UL (ref 1.3–7.7)
NEUTROPHILS NFR BLD AUTO: 89 %
PLATELET # BLD AUTO: 436 K/UL (ref 150–450)
POTASSIUM SERPL-SCNC: 3.3 MMOL/L (ref 3.5–5.1)
SODIUM SERPL-SCNC: 138 MMOL/L (ref 137–145)
WBC # BLD AUTO: 10.5 K/UL (ref 3.8–10.6)

## 2019-05-29 RX ADMIN — FLUCONAZOLE IN SODIUM CHLORIDE SCH MLS/HR: 2 INJECTION, SOLUTION INTRAVENOUS at 08:34

## 2019-05-29 RX ADMIN — HYDROCORTISONE SODIUM SUCCINATE SCH MG: 100 INJECTION, POWDER, FOR SOLUTION INTRAMUSCULAR; INTRAVENOUS at 08:33

## 2019-05-29 RX ADMIN — PANTOPRAZOLE SODIUM SCH MG: 40 INJECTION, POWDER, FOR SOLUTION INTRAVENOUS at 08:34

## 2019-05-29 RX ADMIN — HEPARIN SODIUM SCH UNIT: 5000 INJECTION, SOLUTION INTRAVENOUS; SUBCUTANEOUS at 15:18

## 2019-05-29 RX ADMIN — KETOROLAC TROMETHAMINE SCH MG: 30 INJECTION, SOLUTION INTRAMUSCULAR at 23:00

## 2019-05-29 RX ADMIN — HYDROMORPHONE HYDROCHLORIDE PRN MG: 1 INJECTION, SOLUTION INTRAMUSCULAR; INTRAVENOUS; SUBCUTANEOUS at 18:55

## 2019-05-29 RX ADMIN — KETOROLAC TROMETHAMINE SCH MG: 30 INJECTION, SOLUTION INTRAMUSCULAR at 05:29

## 2019-05-29 RX ADMIN — IPRATROPIUM BROMIDE AND ALBUTEROL SULFATE SCH ML: .5; 3 SOLUTION RESPIRATORY (INHALATION) at 13:30

## 2019-05-29 RX ADMIN — HYDROCORTISONE SODIUM SUCCINATE SCH MG: 100 INJECTION, POWDER, FOR SOLUTION INTRAMUSCULAR; INTRAVENOUS at 15:17

## 2019-05-29 RX ADMIN — IPRATROPIUM BROMIDE AND ALBUTEROL SULFATE SCH ML: .5; 3 SOLUTION RESPIRATORY (INHALATION) at 01:34

## 2019-05-29 RX ADMIN — KETOROLAC TROMETHAMINE SCH MG: 30 INJECTION, SOLUTION INTRAMUSCULAR at 17:57

## 2019-05-29 RX ADMIN — HEPARIN SODIUM SCH UNIT: 5000 INJECTION, SOLUTION INTRAVENOUS; SUBCUTANEOUS at 08:33

## 2019-05-29 RX ADMIN — POTASSIUM BICARBONATE SCH MEQ: 782 TABLET, EFFERVESCENT ORAL at 06:36

## 2019-05-29 RX ADMIN — KETOROLAC TROMETHAMINE SCH MG: 30 INJECTION, SOLUTION INTRAMUSCULAR at 13:01

## 2019-05-29 RX ADMIN — HYDROMORPHONE HYDROCHLORIDE PRN MG: 1 INJECTION, SOLUTION INTRAMUSCULAR; INTRAVENOUS; SUBCUTANEOUS at 21:51

## 2019-05-29 RX ADMIN — HYDROMORPHONE HYDROCHLORIDE PRN MG: 1 INJECTION, SOLUTION INTRAMUSCULAR; INTRAVENOUS; SUBCUTANEOUS at 15:18

## 2019-05-29 RX ADMIN — IPRATROPIUM BROMIDE AND ALBUTEROL SULFATE SCH ML: .5; 3 SOLUTION RESPIRATORY (INHALATION) at 08:20

## 2019-05-29 RX ADMIN — HYDROMORPHONE HYDROCHLORIDE PRN MG: 1 INJECTION, SOLUTION INTRAMUSCULAR; INTRAVENOUS; SUBCUTANEOUS at 08:34

## 2019-05-29 RX ADMIN — PIPERACILLIN AND TAZOBACTAM SCH MLS/HR: 3; .375 INJECTION, POWDER, FOR SOLUTION INTRAVENOUS at 15:17

## 2019-05-29 RX ADMIN — HYDROCORTISONE SODIUM SUCCINATE SCH MG: 100 INJECTION, POWDER, FOR SOLUTION INTRAMUSCULAR; INTRAVENOUS at 23:01

## 2019-05-29 RX ADMIN — PIPERACILLIN AND TAZOBACTAM SCH MLS/HR: 3; .375 INJECTION, POWDER, FOR SOLUTION INTRAVENOUS at 23:01

## 2019-05-29 RX ADMIN — IPRATROPIUM BROMIDE AND ALBUTEROL SULFATE SCH ML: .5; 3 SOLUTION RESPIRATORY (INHALATION) at 19:03

## 2019-05-29 RX ADMIN — HEPARIN SODIUM SCH UNIT: 5000 INJECTION, SOLUTION INTRAVENOUS; SUBCUTANEOUS at 23:00

## 2019-05-29 RX ADMIN — POTASSIUM BICARBONATE SCH MEQ: 782 TABLET, EFFERVESCENT ORAL at 08:34

## 2019-05-29 RX ADMIN — HYDROMORPHONE HYDROCHLORIDE PRN MG: 1 INJECTION, SOLUTION INTRAMUSCULAR; INTRAVENOUS; SUBCUTANEOUS at 01:09

## 2019-05-29 RX ADMIN — PIPERACILLIN AND TAZOBACTAM SCH MLS/HR: 3; .375 INJECTION, POWDER, FOR SOLUTION INTRAVENOUS at 08:33

## 2019-05-29 NOTE — P.PN
Subjective





This is a 69 years old male with past medical history of COPD on home oxygen, 

hyperlipidemia, rheumatoid arthritis, sleep apnea on CPAP.  Presents with 

abdominal pain , has been evaluated by surgery on admission and taken to the 

operation room found to have perforated duodenal ulcer and incarcerated 

umbilical hernia.  After the surgery patient was taken to the intensive care 

unit, he got intubated.  He was started on Zosyn, and Flagyl.  He is also on 

Protonix for DVT prophylaxis.  And he is in normal cement 120 L/h.  Patient 

currently is not on pressors.  He has J drain with 200 mL of bloody discharge 

from abdominal wound.  NG tube was about 200 mL drainage.


Currently patient is in ICU, vital showing blood pressure is 87/44, saturating 

95% on FiO2 of 50%.  Temperature 97.5.  WBC 12.4 K, potassium 5.2 sodium 135.  

Creatinine 0.7.  Urinalysis is not suspicious of infection.  Chest x-ray: 

Interstitial lung disease, endotracheal tube is in place.  CT of the abdomen and

pelvis done prior to surgery was reviewed.  EKG sinus tachycardia at 113.  No 

significant ST-T changes.





Subjective


Date of service 05/25/2019


Patient went to the ICU.  He is status post extubation.  With no chest pain or 

dyspnea.  Vitas looks stable.  Still complaining from pain at the surgical 

abdominal site.  No bowel movement yet.  WBC 14.5 K.  Creatinine 0.6.








05/26/2019


pt is in ICU, he is awake and alert asking for food, still has NG tube with more

than 300 ml drained , sluggish bowel movement, passing gas only, NG tube is 

planed to be taken off by surgery team and start on clear liquid diet tomorrow, 

sputum is growing pseudomonas and pt is currently on zosyn and diflucan, ID team

are following the pt closely , WBC 14.7K, BMP is unremarkable, vials stable





05/27/2019


Patient remains in the ICU.  His sputum culture is growing Pseudomonas, patient 

is currently on Zosyn.  Chest x-ray possible right lower pneumonia but patient 

with no respiratory symptoms.  Infectious disease are following the case 

closely.  Patient is currently on clear liquid.  Once daily, vitals are stable 

and patient is afebrile








04/28/2019


Patient seen in the ICU.  His doing well and improving with controlled abdominal

pain, isn't clear liquid diet.  Patient is afebrile and saturating 95% on 5 L of

oxygen.  He is currently on antibiotic Zosyn and fluconazole for Pseudomonas in 

his sputum.  WBC is coming back to normal at 10.5 K, hemoglobin stable at 11.5. 

His also on his steroids Cortef 3 times a day.  Continue breathing treatments 

and oxygen for his advanced COPD and interstitial pulmonary disease.  Chest x-

ray also showed nodular density and the cardiologist recommended 6 month follow-

up with CAT scan.





Objective





- Vital Signs


Vital signs: 


                                   Vital Signs











Temp  98 F   05/28/19 16:00


 


Pulse  96   05/28/19 19:18


 


Resp  28 H  05/28/19 19:00


 


BP  155/82   05/28/19 19:00


 


Pulse Ox  90 L  05/28/19 19:00








                                 Intake & Output











 05/28/19 05/28/19 05/29/19





 06:59 18:59 06:59


 


Intake Total 1371 1839 


 


Output Total 730 625 


 


Balance 641 1214 


 


Weight  76.9 kg 


 


Intake:   


 


   1039 


 


    Fluconazole in NaCl,Iso-  100 





    Osm 200 mg In Saline 1   





    100ml.bag @ 100 mls/hr   





    IVPB DAILY [facility] Rx#:   





    [id number]   


 


    Piperacillin-Tazobactam 3 100 200 





    .375 gm In Sodium   





    Chloride 0.9% 100 ml @ 25   





    mls/hr IVPB Q8HR [facility] Rx#   





    [id number]   


 


    Potassium Chloride 20 meq 50 150 





    In Water For Injection 1   





    100ml.bag @ 50 mls/hr   





    IVPB Q2H [facility] Rx#:   





    [id number]   


 


    Pressure 36 39 


 


    Sodium Chloride 0.9% 1, 600 550 





    000 ml @ 50 mls/hr IV .   





    Q20H [facility] Rx#:[id number]   


 


  Intake, IV Titration  100 





  Amount   


 


    Sodium Chloride 0.9% 1,  100 





    000 ml @ 50 mls/hr IV .   





    [id number] ONE with Mvi,   





    Adult No.4 with Vit K 10   





    ml with Thiamine 100 mg   





    with Folic Acid 1 mg Rx#:   





    [id number]   


 


  Oral 585 700 


 


Output:   


 


  Drainage 15 40 


 


    Right Lower Abdomen 15 40 


 


  Urine 715 585 


 


  Emesis  0 


 


Other:   


 


  Voiding Method Indwelling Catheter Indwelling Catheter 


 


  # Bowel Movements  1 








                       ABP, PAP, CO, CI - Last Documented











Arterial Blood Pressure        146/47

















- Exam





GENERAL: The patient is alert oriented, not in distress


HEENT: Pupils are round and equally reacting to light. EOMI. No scleral icterus.

No conjunctival pallor. Normocephalic, atraumatic. No pharyngeal erythema. No 

thyromegaly. 


CARDIOVASCULAR: S1 and S2 present. No murmurs, rubs, or gallops. 


PULMONARY: Chest is clear to auscultation, no wheezing or crackles. 


ABDOMEN: Soft, is status post exploratory laparotomy, dressing is in place, 

wound is CLOSED.


MUSCULOSKELETAL: No joint swelling or deformity. 


EXTREMITIES: No cyanosis, clubbing, or pedal edema. 


NEUROLOGICAL: Gross neurological examination did not reveal any focal deficits. 


SKIN: No rashes.





- Labs


CBC & Chem 7: 


                                 05/29/19 04:45





                                 05/28/19 04:15


Labs: 


                  Abnormal Lab Results - Last 24 Hours (Table)











  05/28/19 05/28/19 Range/Units





  04:15 04:15 


 


RBC  3.81 L   (4.30-5.90)  m/uL


 


Hgb  11.5 L   (13.0-17.5)  gm/dL


 


Hct  36.8 L   (39.0-53.0)  %


 


RDW  15.7 H   (11.5-15.5)  %


 


Plt Count  458 H   (150-450)  k/uL


 


Neutrophils #  7.8 H   (1.3-7.7)  k/uL


 


Lymphocytes #  0.4 L   (1.0-4.8)  k/uL


 


Potassium   3.2 L  (3.5-5.1)  mmol/L


 


Carbon Dioxide   35 H  (22-30)  mmol/L


 


Creatinine   0.39 L  (0.66-1.25)  mg/dL


 


Glucose   124 H  (74-99)  mg/dL


 


Calcium   7.5 L  (8.4-10.2)  mg/dL


 


Phosphorus   1.7 L  (2.5-4.5)  mg/dL








                      Microbiology - Last 24 Hours (Table)











 05/23/19 15:45 Blood Culture - Preliminary





 Blood    No Growth after 120 hours














Assessment and Plan


Assessment: 





Acute abdomen, status post exploratory laparotomy with perforated duodenal ulcer

and incarcerated umbilical hernia


Acute hypoxic respiratory failure.


Acute COPD exacerbation


Hyperlipidemia


History of rheumatoid arthritis











Plan: 





This is a 69 years old male who presents with perforated duodenal ulcers and 

incarcerated hernia, status post exploratory laparotomy.  Patient after the 

surgery went to the intensive care unit.  Status post intubating.  Critical care

team and surgery team R following the case closely.  Continue with pain 

management, IV fluids and antibiotic.Labs and medication were reviewed..  

Continue same treatment.  Continue with symptomatic treatment.  Resume home 

medication.  Monitor lytes and vitals.  DVT and GI prophylaxis.  Further 

recommendations of the clinical course of the patient


DVT prophylaxis: Subcutaneous heparin


GI Prophylaxis: PPI


Prognosis is guarded

## 2019-05-29 NOTE — P.PN
Subjective


Progress Note Date: 05/29/19


Principal diagnosis: 





Acute pneumoperitoneum secondary to perforated duodenal ulcer.


69-year-old male patient came into the emergency department with a one-week 

history of abdominal pain.  He initially went to [facility] 

emergency department with a CAT scan was done and the patient was told to have 

diverticulosis without diverticulitis.  Over the past week, the patient 

developed progressive worsening his abdominal discomfort and today came into the

emergency department having more pain and the pain was rather diffuse in nature.

 He had diminished appetite, diminished oral intake and he was having no 

significant bowel movements.  No GI bleeding.  Denies having any fever or 

chills.  He was nauseated when he was getting progressively more lethargic and 

weak and short of breath.  CAT scan of the abdomen was done in the emergency 

department and showed significant inflammatory changes in the epigastric region 

involving posterior aspect of the proximal transverse colon.  There was evidence

of pneumoperitoneum around that along with some inflammatory changes as well as 

some free fluid in the abdomen.  The stomach itself appeared to be within normal

limits.  The patient has long-term history of COPD and pulmonary fibrosis.  

Based on his pulmonary function test in 2017 he has an FVC of 72% and FEV1 of 

46% and he has been steroid dependent for the past few years taking prednisone a

daily basis.  He has also underlying rheumatoid arthritis and previously he was 

taking immunosuppression with Humira none for now.  His oxygen dependent.  Blood

work showed a white cell count 16.2 with a hemoglobin of 14.9.  Platelet count 

is at 519.  He has a BUN of 27 creatinine of 0.7 and lactic acid was at 1.9 with

a troponin being less than 0.01.  LFTs are all within normal limits.  He is 

tachycardic with temperature 98.2.  He was having sinus tachycardia with a heart

rate of 120.  He is currently on 5 L of oxygen by nasal cannula with a pulse ox 

of 90%.  The patient will be taken to the operating room.  He was seen by the 

surgical team following that I've advised the patient coming back to the 

intensive care unit for further evaluation and treatment.  He may need to be 

kept intubated overnight.





On 05/27/2019, the patient is postop day #4.  The patient is doing well.  He is 

awake and alert.  History requiring high flow oxygen at 10 L.  He doesn't have 

much reserve and he desaturates easily.  NG tube was removed yesterday and this 

morning the patient was given some clear liquid diet.  Surgical wound site is 

clean.  Output from the DAVID drain is minimal in the order of 30 mL over the past 

24 hours.  Surgical wound site is dry clean and intact.  He is passing flatus.  

No bowel activity as.  He remains on IV Zosyn.  He is using incentive 

spirometer.  He is on status post hydrocortisone.  He is awake and alert.  He 

has pseudomonas aeruginosa in his sputum for which she is covered with IV Zosyn.

 No other significant events overnight.





Patient was reevaluated today on 5/28/2019, remains on high flow nasal cannula 7

L/m, continues to have significantly abnormal chest x-ray with diffuse 

interstitial lung disease consistent with pulmonary fibrosis, underlying 

pneumonia is not entirely ruled out.  His FiO2 is being titrated down, patient 

is noted to be short of breath with any activity.  His sputum was positive for 

pseudomonas aeruginosa, remains on Zosyn.  All labs were reviewed today, 

potassium is a bit low at 3.2 being corrected as per protocol.





Patient was reevaluated today on 5/29/2019, remains on high flow nasal cannula, 

90 L/m, patient is basically about the same, chest x-ray is basically about the 

same showing bibasilar interstitial lung disease consistent with pulmonary 

fibrosis.  Again underlying pneumonia is not entirely ruled out but felt to be 

less likely.  Patient is hemodynamically stable, in no distress, being treated f

or pseudomonas aeruginosa in the sputum, remains on Zosyn.  CBC is relatively 

normal WBC count is 10.5 hemoglobin is 11.5 light was normal except for low 

potassium being corrected as per protocol.  Patient continues to have good urine

output.








Objective





- Vital Signs


Vital signs: 


                                   Vital Signs











Temp  97.3 F L  05/29/19 08:00


 


Pulse  79   05/29/19 11:00


 


Resp  16   05/29/19 11:00


 


BP  163/74   05/29/19 11:00


 


Pulse Ox  97   05/29/19 11:00








                                 Intake & Output











 05/28/19 05/29/19 05/29/19





 18:59 06:59 18:59


 


Intake Total 1839 773 578


 


Output Total 458 730 250


 


Balance 1214 43 328


 


Weight 76.9 kg 79.5 kg 


 


Intake:   


 


  IV 1039 223 278


 


    Fluconazole in NaCl,Iso- 100  100





    Osm 200 mg In Saline 1   





    100ml.bag @ 100 mls/hr   





    IVPB DAILY [facility] Rx#:   





    [id number]   


 


    Piperacillin-Tazobactam 3 200 100 100





    .375 gm In Sodium   





    Chloride 0.9% 100 ml @ 25   





    mls/hr IVPB Q8HR [facility] Rx#   





    [id number]   


 


    Potassium Chloride 20 meq 150  





    In Water For Injection 1   





    100ml.bag @ 50 mls/hr   





    IVPB Q2H [facility] Rx#:   





    [id number]   


 


    Pressure 39 33 18


 


    Sodium Chloride 0.9%  90 60


 


    Sodium Chloride 0.9% 1, 550  





    000 ml @ 50 mls/hr IV .   





    Q20H [facility] Rx#:[id number]   


 


  Intake, IV Titration 100 550 300





  Amount   


 


    Sodium Chloride 0.9% 1, 100 550 300





    000 ml @ 50 mls/hr IV .   





    [id number] ONE with Mvi,   





    Adult No.4 with Vit K 10   





    ml with Thiamine 100 mg   





    with Folic Acid 1 mg Rx#:   





    [id number]   


 


  Oral 700  


 


Output:   


 


  Drainage 40 30 10


 


    Right Lower Abdomen 40 30 10


 


  Urine 585 700 240


 


  Emesis 0  


 


Other:   


 


  Voiding Method Indwelling Catheter Indwelling Catheter Indwelling Catheter


 


  # Bowel Movements 1  








                       ABP, PAP, CO, CI - Last Documented











Arterial Blood Pressure        146/47

















- Exam





Gen. appearance revealed a 69-year-old white male in no distress, on high flow 

nasal cannula.  


HEENT: Cushingoid, no neck masses, no JVD, no stridor, moist mucous membranes, 

PERRLA, EOMI.


Lungs sounds minimal fine crackles at the bases, no rhonchi no wheezes, 

symmetrical chest expansion is noted


Cardiac exam revealed normal S1 and S2, no S3 gallop, no murmur. 


Abdomen is soft.  Nontender, no megaly, no rebound, DAVID drain is noted


Examination of the extremities no clubbing edema or cyanosis


Examination of the skin revealed no rashes.


Neurologically alert oriented 3, no gross focal neurologic deficits.


Acute: Normal mood, affect and mental status examination.





- Labs


CBC & Chem 7: 


                                 05/29/19 04:45





                                 05/29/19 04:45


Labs: 


                  Abnormal Lab Results - Last 24 Hours (Table)











  05/29/19 05/29/19 Range/Units





  04:45 04:45 


 


RBC  3.81 L   (4.30-5.90)  m/uL


 


Hgb  11.5 L   (13.0-17.5)  gm/dL


 


Hct  36.2 L   (39.0-53.0)  %


 


RDW  15.8 H   (11.5-15.5)  %


 


Neutrophils #  9.3 H   (1.3-7.7)  k/uL


 


Lymphocytes #  0.6 L   (1.0-4.8)  k/uL


 


Potassium   3.3 L  (3.5-5.1)  mmol/L


 


Carbon Dioxide   36 H  (22-30)  mmol/L


 


Creatinine   0.46 L  (0.66-1.25)  mg/dL


 


Glucose   111 H  (74-99)  mg/dL


 


Calcium   7.5 L  (8.4-10.2)  mg/dL








                      Microbiology - Last 24 Hours (Table)











 05/23/19 15:45 Blood Culture - Preliminary





 Blood    No Growth after 120 hours














Assessment and Plan


Assessment: 





Impression:





1 acute pneumoperitoneum secondary to perforation of duodenal ulcer status post 

treatment.  Of duodenal ulcer and repair of incarcerated umbilical hernia 

postoperative day #6





2 advanced COPD and severe pulmonary fibrosis with chronic hypoxic respiratory 

failure





3 history of rheumatoid arthritis





4 obstructive sleep apnea syndrome not compliant with CPAP.





5 chronic back pain





6 osteoarthritis





7 pseudomonas aeruginosa in the sputum, could be a colonization or could be 

secondary to pseudomonal pneumonia.  Best to continue antibiotics at present 

since the patient's pulmonary status is rather marginal.





Recommendation: Continue high flow oxygen, continue bronchodilators, 

antibiotics, steroids, advanced diet as tolerated and as recommended by surgery,

patient will be transferred out of the ICU to a surgical floor today.  We'll 

continue to follow considering the patient has severe underlying pulmonary lung 

disease, mostly COPD and interstitial lung disease/pulmonary fibrosis.  We'll 

titrate the FiO2 accordingly.  Continue incentive spirometry.











Time with Patient: Less than 30

## 2019-05-29 NOTE — P.PN
Subjective





This is a 69 years old male with past medical history of COPD on home oxygen, 

hyperlipidemia, rheumatoid arthritis, sleep apnea on CPAP.  Presents with 

abdominal pain , has been evaluated by surgery on admission and taken to the 

operation room found to have perforated duodenal ulcer and incarcerated 

umbilical hernia.  After the surgery patient was taken to the intensive care 

unit, he got intubated.  He was started on Zosyn, and Flagyl.  He is also on 

Protonix for DVT prophylaxis.  And he is in normal cement 120 L/h.  Patient 

currently is not on pressors.  He has J drain with 200 mL of bloody discharge 

from abdominal wound.  NG tube was about 200 mL drainage.


Currently patient is in ICU, vital showing blood pressure is 87/44, saturating 

95% on FiO2 of 50%.  Temperature 97.5.  WBC 12.4 K, potassium 5.2 sodium 135.  

Creatinine 0.7.  Urinalysis is not suspicious of infection.  Chest x-ray: 

Interstitial lung disease, endotracheal tube is in place.  CT of the abdomen and

pelvis done prior to surgery was reviewed.  EKG sinus tachycardia at 113.  No 

significant ST-T changes.





Subjective


Date of service 05/25/2019


Patient went to the ICU.  He is status post extubation.  With no chest pain or 

dyspnea.  Vitas looks stable.  Still complaining from pain at the surgical 

abdominal site.  No bowel movement yet.  WBC 14.5 K.  Creatinine 0.6.








05/26/2019


pt is in ICU, he is awake and alert asking for food, still has NG tube with more

than 300 ml drained , sluggish bowel movement, passing gas only, NG tube is 

planed to be taken off by surgery team and start on clear liquid diet tomorrow, 

sputum is growing pseudomonas and pt is currently on zosyn and diflucan, ID team

are following the pt closely , WBC 14.7K, BMP is unremarkable, vials stable





05/27/2019


Patient remains in the ICU.  His sputum culture is growing Pseudomonas, patient 

is currently on Zosyn.  Chest x-ray possible right lower pneumonia but patient 

with no respiratory symptoms.  Infectious disease are following the case 

closely.  Patient is currently on clear liquid.  Once daily, vitals are stable 

and patient is afebrile








05/28/2019


Patient seen in the ICU.  His doing well and improving with controlled abdominal

pain, isn't clear liquid diet.  Patient is afebrile and saturating 95% on 5 L of

oxygen.  He is currently on antibiotic Zosyn and fluconazole for Pseudomonas in 

his sputum.  WBC is coming back to normal at 10.5 K, hemoglobin stable at 11.5. 

His also on his steroids Cortef 3 times a day.  Continue breathing treatments 

and oxygen for his advanced COPD and interstitial pulmonary disease.  Chest x-

ray also showed nodular density and the cardiologist recommended 6 month follow-

up with CAT scan.





05/29/2019


Patient keep improving, he is awake, slightly improved but better than when he 

came seen.  He is starting his diet, his diet today has been advised to follow 

liquid diet.  Pain at surgical site is controlled and IV fluid was stopped.  

Patient is hemodynamically stable.  He saturating 97% on 9 L oxygen via high 

flow cannula.  Continue to be on Zosyn for his Pseudomonas.  Since he is 

improving his going to be transferred to the general medical floor today





Objective





- Vital Signs


Vital signs: 


                                   Vital Signs











Temp  98.0 F   05/29/19 12:00


 


Pulse  78   05/29/19 15:00


 


Resp  21   05/29/19 15:00


 


BP  212/179   05/29/19 15:00


 


Pulse Ox  97   05/29/19 15:00








                                 Intake & Output











 05/28/19 05/29/19 05/29/19





 18:59 06:59 18:59


 


Intake Total 1839 773 691


 


Output Total 625 730 550


 


Balance 1214 43 141


 


Weight 76.9 kg 79.5 kg 


 


Intake:   


 


  IV 1039 223 391


 


    Fluconazole in NaCl,Iso- 100  100





    Osm 200 mg In Saline 1   





    100ml.bag @ 100 mls/hr   





    IVPB DAILY [facility] Rx#:   





    [id number]   


 


    Piperacillin-Tazobactam 3 200 100 200





    .375 gm In Sodium   





    Chloride 0.9% 100 ml @ 25   





    mls/hr IVPB Q8HR [facility] Rx#   





    [id number]   


 


    Potassium Chloride 20 meq 150  





    In Water For Injection 1   





    100ml.bag @ 50 mls/hr   





    IVPB Q2H [facility] Rx#:   





    [id number]   


 


    Pressure 39 33 21


 


    Sodium Chloride 0.9%  90 70


 


    Sodium Chloride 0.9% 1, 550  





    000 ml @ 50 mls/hr IV .   





    Q20H [facility] Rx#:[id number]   


 


  Intake, IV Titration 100 550 300





  Amount   


 


    Sodium Chloride 0.9% 1, 100 550 300





    000 ml @ 50 mls/hr IV .   





    [id number] ONE with Mvi,   





    Adult No.4 with Vit K 10   





    ml with Thiamine 100 mg   





    with Folic Acid 1 mg Rx#:   





    [id number]   


 


  Oral 700  


 


Output:   


 


  Drainage 40 30 10


 


    Right Lower Abdomen 40 30 10


 


  Urine 585 700 540


 


  Emesis 0  


 


Other:   


 


  Voiding Method Indwelling Catheter Indwelling Catheter Indwelling Catheter


 


  # Bowel Movements 1  








                       ABP, PAP, CO, CI - Last Documented











Arterial Blood Pressure        146/47

















- Exam





GENERAL: The patient is alert oriented, not in distress


HEENT: Pupils are round and equally reacting to light. EOMI. No scleral icterus.

No conjunctival pallor. Normocephalic, atraumatic. No pharyngeal erythema. No 

thyromegaly. 


CARDIOVASCULAR: S1 and S2 present. No murmurs, rubs, or gallops. 


PULMONARY: Chest is clear to auscultation, no wheezing or crackles. 


ABDOMEN: Soft, is status post exploratory laparotomy, dressing is in place, 

wound is CLOSED.


MUSCULOSKELETAL: No joint swelling or deformity. 


EXTREMITIES: No cyanosis, clubbing, or pedal edema. 


NEUROLOGICAL: Gross neurological examination did not reveal any focal deficits. 


SKIN: No rashes.





- Labs


CBC & Chem 7: 


                                 05/29/19 04:45





                                 05/29/19 04:45


Labs: 


                  Abnormal Lab Results - Last 24 Hours (Table)











  05/29/19 05/29/19 Range/Units





  04:45 04:45 


 


RBC  3.81 L   (4.30-5.90)  m/uL


 


Hgb  11.5 L   (13.0-17.5)  gm/dL


 


Hct  36.2 L   (39.0-53.0)  %


 


RDW  15.8 H   (11.5-15.5)  %


 


Neutrophils #  9.3 H   (1.3-7.7)  k/uL


 


Lymphocytes #  0.6 L   (1.0-4.8)  k/uL


 


Potassium   3.3 L  (3.5-5.1)  mmol/L


 


Carbon Dioxide   36 H  (22-30)  mmol/L


 


Creatinine   0.46 L  (0.66-1.25)  mg/dL


 


Glucose   111 H  (74-99)  mg/dL


 


Calcium   7.5 L  (8.4-10.2)  mg/dL








                      Microbiology - Last 24 Hours (Table)











 05/23/19 15:45 Blood Culture - Preliminary





 Blood    No Growth after 120 hours














Assessment and Plan


Assessment: 





Acute abdomen, status post exploratory laparotomy with perforated duodenal ulcer

and incarcerated umbilical hernia


Acute hypoxic respiratory failure.


Acute COPD exacerbation


Hyperlipidemia


History of rheumatoid arthritis











Plan: 





This is a 69 years old male who presents with perforated duodenal ulcers and 

incarcerated hernia, status post exploratory laparotomy.  Patient after the 

surgery went to the intensive care unit.  Status post intubating.  Critical care

team and surgery team R following the case closely.  Continue with pain manageme

nt, IV fluids and antibiotic.Labs and medication were reviewed..  Continue same 

treatment.  Continue with symptomatic treatment.  Resume home medication.  

Monitor lytes and vitals.  DVT and GI prophylaxis.  Further recommendations of 

the clinical course of the patient


DVT prophylaxis: Subcutaneous heparin


GI Prophylaxis: PPI


Prognosis is guarded

## 2019-05-29 NOTE — PN
PROGRESS NOTE



DATE OF SERVICE:

05/29/2019



REASON FOR FOLLOWUP:

Perforated peptic ulcer disease/secondary peritonitis.



INTERVAL HISTORY:

The patient is currently afebrile.  The patient is breathing comfortably.  Has been

tolerating his diet.  No nausea, no vomiting.  Did have a bowel movement. No abdominal

pain.



PHYSICAL EXAMINATION:

Blood pressure is 142/72 with a pulse of 82, temperature 98.3. He is 97% on 8 L high-

flow oxygen.

General description is an elderly male up in the chair in no distress.

RESPIRATORY SYSTEM: Unlabored breathing. Clear to auscultation anteriorly.

HEART: S1, S2.  Regular rate and rhythm.

ABDOMEN: Soft. No tenderness. DAVID drain with minimal secretions.



LABS:

Hemoglobin 11.5, white count 10.5, BUN of 17, creatinine 0.46.



DIAGNOSTIC IMPRESSION AND PLAN:

Patient with secondary peritonitis from perforated duodenal ulcer, status post

laparotomy with repair of the same and repair of _____ hernia.  The patient also had

sputum positive for Pseudomonas aeruginosa, currently covered with Zosyn and Diflucan.

White count is normal.  No fever.  Continue with current antibiotic therapy while

monitoring his clinical course closely.





MMODL / IJN: [id number] / Job#: [id number]

## 2019-05-29 NOTE — P.PN
Subjective


Progress Note Date: 05/29/19


Principal diagnosis: 





Perforated duodenal ulcer





Patient doing well today.  His pain is improved.  Shortness of breath is about 

same.  DAVID draining serous.  White blood cell count normal.  He is hungry for 

more than simply clear liquids.





Objective





- Vital Signs


Vital signs: 


                                   Vital Signs











Temp  97.3 F L  05/29/19 08:00


 


Pulse  79   05/29/19 11:00


 


Resp  16   05/29/19 11:00


 


BP  163/74   05/29/19 11:00


 


Pulse Ox  97   05/29/19 11:00








                                 Intake & Output











 05/28/19 05/29/19 05/29/19





 18:59 06:59 18:59


 


Intake Total 1839 773 578


 


Output Total 625 730 250


 


Balance 1214 43 328


 


Weight 76.9 kg 79.5 kg 


 


Intake:   


 


  IV 1039 223 278


 


    Fluconazole in NaCl,Iso- 100  100





    Osm 200 mg In Saline 1   





    100ml.bag @ 100 mls/hr   





    IVPB DAILY [facility] Rx#:   





    [id number]   


 


    Piperacillin-Tazobactam 3 200 100 100





    .375 gm In Sodium   





    Chloride 0.9% 100 ml @ 25   





    mls/hr IVPB Q8HR [facility] Rx#   





    :[id number]   


 


    Potassium Chloride 20 meq 150  





    In Water For Injection 1   





    100ml.bag @ 50 mls/hr   





    IVPB Q2H [facility] Rx#:   





    [id number]   


 


    Pressure 39 33 18


 


    Sodium Chloride 0.9%  90 60


 


    Sodium Chloride 0.9% 1, 550  





    000 ml @ 50 mls/hr IV .   





    Q20H [facility] Rx#:[id number]   


 


  Intake, IV Titration 100 550 300





  Amount   


 


    Sodium Chloride 0.9% 1, 100 550 300





    000 ml @ 50 mls/hr IV .   





    [id number] ONE with Mvi,   





    Adult No.4 with Vit K 10   





    ml with Thiamine 100 mg   





    with Folic Acid 1 mg Rx#:   





    [id number]   


 


  Oral 700  


 


Output:   


 


  Drainage 40 30 10


 


    Right Lower Abdomen 40 30 10


 


  Urine 585 700 240


 


  Emesis 0  


 


Other:   


 


  Voiding Method Indwelling Catheter Indwelling Catheter Indwelling Catheter


 


  # Bowel Movements 1  








                       ABP, PAP, CO, CI - Last Documented











Arterial Blood Pressure        146/47

















- Exam





Abdomen: Soft, mild incisional tenderness, dressings clean with minimal drainage

at jaime sites





- Labs


CBC & Chem 7: 


                                 05/29/19 04:45





                                 05/29/19 04:45


Labs: 


                  Abnormal Lab Results - Last 24 Hours (Table)











  05/29/19 05/29/19 Range/Units





  04:45 04:45 


 


RBC  3.81 L   (4.30-5.90)  m/uL


 


Hgb  11.5 L   (13.0-17.5)  gm/dL


 


Hct  36.2 L   (39.0-53.0)  %


 


RDW  15.8 H   (11.5-15.5)  %


 


Neutrophils #  9.3 H   (1.3-7.7)  k/uL


 


Lymphocytes #  0.6 L   (1.0-4.8)  k/uL


 


Potassium   3.3 L  (3.5-5.1)  mmol/L


 


Carbon Dioxide   36 H  (22-30)  mmol/L


 


Creatinine   0.46 L  (0.66-1.25)  mg/dL


 


Glucose   111 H  (74-99)  mg/dL


 


Calcium   7.5 L  (8.4-10.2)  mg/dL








                      Microbiology - Last 24 Hours (Table)











 05/23/19 15:45 Blood Culture - Preliminary





 Blood    No Growth after 120 hours














Assessment and Plan


(1) Bowel perforation


Narrative/Plan: 


Advance to full liquids.  Continue antiacids.  Continue antibiotics.


Current Visit: Yes   Status: Acute   Code(s): K63.1 - PERFORATION OF INTESTINE 

(NONTRAUMATIC)   SNOMED Code(s): 45152428

## 2019-05-30 LAB
ANION GAP SERPL CALC-SCNC: 3 MMOL/L
BUN SERPL-SCNC: 9 MG/DL (ref 9–20)
CALCIUM SPEC-MCNC: 7.4 MG/DL (ref 8.4–10.2)
CHLORIDE SERPL-SCNC: 98 MMOL/L (ref 98–107)
CO2 SERPL-SCNC: 35 MMOL/L (ref 22–30)
GLUCOSE BLD-MCNC: 102 MG/DL (ref 75–99)
GLUCOSE BLD-MCNC: 113 MG/DL (ref 75–99)
GLUCOSE BLD-MCNC: 122 MG/DL (ref 75–99)
GLUCOSE BLD-MCNC: 180 MG/DL (ref 75–99)
GLUCOSE SERPL-MCNC: 164 MG/DL (ref 74–99)
MAGNESIUM SPEC-SCNC: 2 MG/DL (ref 1.6–2.3)
POTASSIUM SERPL-SCNC: 3.2 MMOL/L (ref 3.5–5.1)
SODIUM SERPL-SCNC: 136 MMOL/L (ref 137–145)

## 2019-05-30 RX ADMIN — IPRATROPIUM BROMIDE AND ALBUTEROL SULFATE SCH ML: .5; 3 SOLUTION RESPIRATORY (INHALATION) at 19:52

## 2019-05-30 RX ADMIN — PANTOPRAZOLE SODIUM SCH MG: 40 INJECTION, POWDER, FOR SOLUTION INTRAVENOUS at 08:59

## 2019-05-30 RX ADMIN — POTASSIUM BICARBONATE SCH MEQ: 782 TABLET, EFFERVESCENT ORAL at 06:33

## 2019-05-30 RX ADMIN — INSULIN ASPART SCH: 100 INJECTION, SOLUTION INTRAVENOUS; SUBCUTANEOUS at 18:12

## 2019-05-30 RX ADMIN — PIPERACILLIN AND TAZOBACTAM SCH MLS/HR: 3; .375 INJECTION, POWDER, FOR SOLUTION INTRAVENOUS at 16:16

## 2019-05-30 RX ADMIN — HYDROMORPHONE HYDROCHLORIDE PRN MG: 1 INJECTION, SOLUTION INTRAMUSCULAR; INTRAVENOUS; SUBCUTANEOUS at 21:28

## 2019-05-30 RX ADMIN — HEPARIN SODIUM SCH UNIT: 5000 INJECTION, SOLUTION INTRAVENOUS; SUBCUTANEOUS at 08:58

## 2019-05-30 RX ADMIN — IPRATROPIUM BROMIDE AND ALBUTEROL SULFATE SCH ML: .5; 3 SOLUTION RESPIRATORY (INHALATION) at 01:16

## 2019-05-30 RX ADMIN — HYDROCORTISONE SODIUM SUCCINATE SCH MG: 100 INJECTION, POWDER, FOR SOLUTION INTRAMUSCULAR; INTRAVENOUS at 16:20

## 2019-05-30 RX ADMIN — FUROSEMIDE SCH MG: 20 TABLET ORAL at 16:17

## 2019-05-30 RX ADMIN — HYDROCORTISONE SODIUM SUCCINATE SCH MG: 100 INJECTION, POWDER, FOR SOLUTION INTRAMUSCULAR; INTRAVENOUS at 08:58

## 2019-05-30 RX ADMIN — HYDROMORPHONE HYDROCHLORIDE PRN MG: 1 INJECTION, SOLUTION INTRAMUSCULAR; INTRAVENOUS; SUBCUTANEOUS at 01:08

## 2019-05-30 RX ADMIN — KETOROLAC TROMETHAMINE SCH MG: 30 INJECTION, SOLUTION INTRAMUSCULAR at 12:23

## 2019-05-30 RX ADMIN — HYDROMORPHONE HYDROCHLORIDE PRN MG: 1 INJECTION, SOLUTION INTRAMUSCULAR; INTRAVENOUS; SUBCUTANEOUS at 03:26

## 2019-05-30 RX ADMIN — FLUCONAZOLE IN SODIUM CHLORIDE SCH MLS/HR: 2 INJECTION, SOLUTION INTRAVENOUS at 08:59

## 2019-05-30 RX ADMIN — INSULIN ASPART SCH: 100 INJECTION, SOLUTION INTRAVENOUS; SUBCUTANEOUS at 06:53

## 2019-05-30 RX ADMIN — HYDROMORPHONE HYDROCHLORIDE PRN MG: 1 INJECTION, SOLUTION INTRAMUSCULAR; INTRAVENOUS; SUBCUTANEOUS at 16:22

## 2019-05-30 RX ADMIN — INSULIN ASPART SCH: 100 INJECTION, SOLUTION INTRAVENOUS; SUBCUTANEOUS at 12:18

## 2019-05-30 RX ADMIN — IPRATROPIUM BROMIDE AND ALBUTEROL SULFATE SCH ML: .5; 3 SOLUTION RESPIRATORY (INHALATION) at 07:05

## 2019-05-30 RX ADMIN — HEPARIN SODIUM SCH UNIT: 5000 INJECTION, SOLUTION INTRAVENOUS; SUBCUTANEOUS at 16:19

## 2019-05-30 RX ADMIN — IPRATROPIUM BROMIDE AND ALBUTEROL SULFATE SCH ML: .5; 3 SOLUTION RESPIRATORY (INHALATION) at 12:32

## 2019-05-30 RX ADMIN — PIPERACILLIN AND TAZOBACTAM SCH MLS/HR: 3; .375 INJECTION, POWDER, FOR SOLUTION INTRAVENOUS at 08:58

## 2019-05-30 RX ADMIN — KETOROLAC TROMETHAMINE SCH MG: 30 INJECTION, SOLUTION INTRAMUSCULAR at 05:24

## 2019-05-30 RX ADMIN — HYDROMORPHONE HYDROCHLORIDE PRN MG: 1 INJECTION, SOLUTION INTRAMUSCULAR; INTRAVENOUS; SUBCUTANEOUS at 18:15

## 2019-05-30 RX ADMIN — HYDROMORPHONE HYDROCHLORIDE PRN MG: 1 INJECTION, SOLUTION INTRAMUSCULAR; INTRAVENOUS; SUBCUTANEOUS at 14:17

## 2019-05-30 RX ADMIN — HYDROMORPHONE HYDROCHLORIDE PRN MG: 1 INJECTION, SOLUTION INTRAMUSCULAR; INTRAVENOUS; SUBCUTANEOUS at 06:34

## 2019-05-30 RX ADMIN — HYDROMORPHONE HYDROCHLORIDE PRN MG: 1 INJECTION, SOLUTION INTRAMUSCULAR; INTRAVENOUS; SUBCUTANEOUS at 09:00

## 2019-05-30 RX ADMIN — POTASSIUM BICARBONATE SCH MEQ: 782 TABLET, EFFERVESCENT ORAL at 05:45

## 2019-05-30 RX ADMIN — INSULIN ASPART SCH UNIT: 100 INJECTION, SOLUTION INTRAVENOUS; SUBCUTANEOUS at 21:28

## 2019-05-30 NOTE — P.PN
Subjective


Progress Note Date: 05/30/19


Principal diagnosis: 





Acute pneumoperitoneum secondary to perforated duodenal ulcer.


69-year-old male patient came into the emergency department with a one-week 

history of abdominal pain.  He initially went to [facility] 

emergency department with a CAT scan was done and the patient was told to have 

diverticulosis without diverticulitis.  Over the past week, the patient 

developed progressive worsening his abdominal discomfort and today came into the

emergency department having more pain and the pain was rather diffuse in nature.

 He had diminished appetite, diminished oral intake and he was having no 

significant bowel movements.  No GI bleeding.  Denies having any fever or 

chills.  He was nauseated when he was getting progressively more lethargic and 

weak and short of breath.  CAT scan of the abdomen was done in the emergency 

department and showed significant inflammatory changes in the epigastric region 

involving posterior aspect of the proximal transverse colon.  There was evidence

of pneumoperitoneum around that along with some inflammatory changes as well as 

some free fluid in the abdomen.  The stomach itself appeared to be within normal

limits.  The patient has long-term history of COPD and pulmonary fibrosis.  

Based on his pulmonary function test in 2017 he has an FVC of 72% and FEV1 of 

46% and he has been steroid dependent for the past few years taking prednisone a

daily basis.  He has also underlying rheumatoid arthritis and previously he was 

taking immunosuppression with Humira none for now.  His oxygen dependent.  Blood

work showed a white cell count 16.2 with a hemoglobin of 14.9.  Platelet count 

is at 519.  He has a BUN of 27 creatinine of 0.7 and lactic acid was at 1.9 with

a troponin being less than 0.01.  LFTs are all within normal limits.  He is 

tachycardic with temperature 98.2.  He was having sinus tachycardia with a heart

rate of 120.  He is currently on 5 L of oxygen by nasal cannula with a pulse ox 

of 90%.  The patient will be taken to the operating room.  He was seen by the 

surgical team following that I've advised the patient coming back to the 

intensive care unit for further evaluation and treatment.  He may need to be 

kept intubated overnight.





On 05/27/2019, the patient is postop day #4.  The patient is doing well.  He is 

awake and alert.  History requiring high flow oxygen at 10 L.  He doesn't have 

much reserve and he desaturates easily.  NG tube was removed yesterday and this 

morning the patient was given some clear liquid diet.  Surgical wound site is 

clean.  Output from the DAVID drain is minimal in the order of 30 mL over the past 

24 hours.  Surgical wound site is dry clean and intact.  He is passing flatus.  

No bowel activity as.  He remains on IV Zosyn.  He is using incentive 

spirometer.  He is on status post hydrocortisone.  He is awake and alert.  He 

has pseudomonas aeruginosa in his sputum for which she is covered with IV Zosyn.

 No other significant events overnight.





Patient was reevaluated today on 5/28/2019, remains on high flow nasal cannula 7

L/m, continues to have significantly abnormal chest x-ray with diffuse 

interstitial lung disease consistent with pulmonary fibrosis, underlying 

pneumonia is not entirely ruled out.  His FiO2 is being titrated down, patient 

is noted to be short of breath with any activity.  His sputum was positive for 

pseudomonas aeruginosa, remains on Zosyn.  All labs were reviewed today, 

potassium is a bit low at 3.2 being corrected as per protocol.





Patient was reevaluated today on 5/29/2019, remains on high flow nasal cannula, 

90 L/m, patient is basically about the same, chest x-ray is basically about the 

same showing bibasilar interstitial lung disease consistent with pulmonary 

fibrosis.  Again underlying pneumonia is not entirely ruled out but felt to be 

less likely.  Patient is hemodynamically stable, in no distress, being treated f

or pseudomonas aeruginosa in the sputum, remains on Zosyn.  CBC is relatively 

normal WBC count is 10.5 hemoglobin is 11.5 light was normal except for low 

potassium being corrected as per protocol.  Patient continues to have good urine

output.





Reevaluated today on 5/30/2019, remains in the ICU, he is presently overflow 

from selective.  Patient remains on 8 L high flow nasal cannula, pulmonary stat

us remains marginal.  Overall it is better than expected considering his 

underlying COPD and underlying interstitial lung disease.  Patient seems to be 

recovering slowly.  Last chest x-ray showed basically chronic changes of COPD 

and interstitial lung disease.  Underlying pneumonia is definitely not entirely 

ruled out.  Patient had positive Pseudomonas in the sputum and he is being 

treated as such.  Electrolytes are normal except for low potassium being 

corrected as per protocol.  The patient himself denies shortness of breath, 

denies any pain, he is complaining of significant swelling in his upper and 

lower extremities, hence I initiated diuretics today.








Objective





- Vital Signs


Vital signs: 


                                   Vital Signs











Temp  97.7 F   05/30/19 08:00


 


Pulse  86   05/30/19 08:00


 


Resp  18   05/30/19 08:00


 


BP  113/79   05/30/19 08:00


 


Pulse Ox  95   05/30/19 08:00








                                 Intake & Output











 05/29/19 05/30/19 05/30/19





 18:59 06:59 18:59


 


Intake Total 691 235 680


 


Output Total 550 2245 350


 


Balance 141 -2010 330


 


Weight  76 kg 


 


Intake:   


 


   235 200


 


    Fluconazole in NaCl,Iso- 100  100





    Osm 200 mg In Saline 1   





    100ml.bag @ 100 mls/hr   





    IVPB DAILY [facility] Rx#:   





    [id number]   


 


    Piperacillin-Tazobactam 3 200 100 100





    .375 gm In Sodium   





    Chloride 0.9% 100 ml @ 25   





    mls/hr IVPB Q8HR [facility] Rx#   





    :[id number]   


 


    Pressure 21  


 


    Sodium Chloride 0.9% 70 135 


 


  Intake, IV Titration 300 0 





  Amount   


 


    Sodium Chloride 0.9% 1, 300 0 





    000 ml @ 50 mls/hr IV .   





    [id number] ONE with Mvi,   





    Adult No.4 with Vit K 10   





    ml with Thiamine 100 mg   





    with Folic Acid 1 mg Rx#:   





    [id number]   


 


  Oral   480


 


Output:   


 


  Drainage 10 45 


 


    Right Lower Abdomen 10 45 


 


  Urine 540 2200 350


 


Other:   


 


  Voiding Method Indwelling Catheter Indwelling Catheter Indwelling Catheter








                       ABP, PAP, CO, CI - Last Documented











Arterial Blood Pressure        146/47

















- Exam





Gen. appearance revealed a 69-year-old white male in no distress, on 8 L high 

flow nasal cannula.


HEENT: Cushingoid, no neck masses, no JVD, no stridor, moist mucous membranes, 

PERRLA, EOMI.


Lungs sounds minimal fine crackles at the bases, no rhonchi no wheezes, sym

metrical chest expansion is noted


Cardiac exam revealed normal S1 and S2, no S3 gallop, no murmur. 


Abdomen is soft.  Nontender, no megaly, no rebound, DAVID drain is noted


Examination of the extremities no clubbing, 2+ bipedal edema, no cyanosis.  

There is also swelling of upper extremities and hands noted.


Examination of the skin revealed no rashes.


Neurologically alert oriented 3, no gross focal neurologic deficits.


Psychiatric:: Normal mood, affect and mental status examination.


Lymphatics: No lymphadenopathy.





- Labs


CBC & Chem 7: 


                                 05/29/19 04:45





                                 05/30/19 04:27


Labs: 


                  Abnormal Lab Results - Last 24 Hours (Table)











  05/30/19 05/30/19 Range/Units





  04:27 06:50 


 


Sodium  136 L   (137-145)  mmol/L


 


Potassium  3.2 L   (3.5-5.1)  mmol/L


 


Carbon Dioxide  35 H   (22-30)  mmol/L


 


Creatinine  0.46 L   (0.66-1.25)  mg/dL


 


Glucose  164 H   (74-99)  mg/dL


 


POC Glucose (mg/dL)   113 H  (75-99)  mg/dL


 


Calcium  7.4 L   (8.4-10.2)  mg/dL








                      Microbiology - Last 24 Hours (Table)











 05/23/19 15:45 Blood Culture - Final





 Blood    No Growth after 144 hours














Assessment and Plan


Assessment: 





Impression:





1 acute pneumoperitoneum secondary to perforation of duodenal ulcer status post 

treatment.  Of duodenal ulcer and repair of incarcerated umbilical hernia 

postoperative day #7





2 advanced COPD and severe pulmonary fibrosis with chronic hypoxic respiratory 

failure





3 history of rheumatoid arthritis





4 obstructive sleep apnea syndrome not compliant with CPAP.





5 chronic back pain





6 osteoarthritis





7 pseudomonas aeruginosa in the sputum, could be a colonization or could be 

secondary to pseudomonal pneumonia.  Best to continue antibiotics at present 

since the patient's pulmonary status is rather marginal.





8 acute on chronic hypoxic respiratory failure multifactorial mostly related to 

his COPD, pulmonary fibrosis, and suspect some component of p

neumonia/pseudomonal pneumonia.





Recommendation: Continue high flow oxygen, continue bronchodilators, 

antibiotics, steroids, added diuretics today.   patient will be transferred out 

of the ICU to a surgical floor today.  We'll continue to follow considering the 

patient has severe underlying pulmonary lung disease, mostly COPD and 

interstitial lung disease/pulmonary fibrosis.  Advanced diet as recommended by 

surgery, transfer patient out of the ICU once a bed becomes available, we will 

follow.  Monitor electrolytes daily since diuretics were added today.





Time with Patient: Less than 30

## 2019-05-30 NOTE — P.PN
Subjective


Progress Note Date: 05/30/19





CHIEF COMPLAINT: Perforated duodenal ulcer





HISTORY OF PRESENT ILLNESS: Patient seen and examined at the bedside in the 

intensive care unit.  Patient sitting up on the side of the bed.  Patient states

his pain is tolerable. Denies nausea or vomiting. Patient reports only eating a 

couple bites of his oatmeal this morning.





PHYSICAL EXAM: 


VITAL SIGNS: Reviewed.


GENERAL: Well-developed in no acute distress. 


HEENT:  No sclera icterus. Extraocular movements grossly intact.  Moist buccal 

mucosa. Head is atraumatic, normocephalic. 


ABDOMEN:  Soft.  Nondistended. Dressing clean dry intact. DAVID with serous 

drainage.


NEUROLOGIC: Alert and oriented. Cranial nerves II through XII grossly intact.





ASSESSMENT: 


1.  Perforated duodenal ulcer, status post exploratory laparotomy with repair of

perforated duodenal ulcer and repair of incarcerated umbilical hernia





PLAN: 


1. Patient only eating a few bites of full liquid diet. Continue full liquid 

diet until PO intake increases to ensure patient tolerates


2. Ok to DC lewis from surgical standpoint


3. Continue dressing changes


4. Incentive spirometry


5. Activity as tolerated





Nurse practitioner note has been reviewed by physician. Signing provider agrees 

with the documented findings, assessment, and plan of care. 








Objective





- Vital Signs


Vital signs: 


                                   Vital Signs











Temp  97.7 F   05/30/19 08:00


 


Pulse  86   05/30/19 08:00


 


Resp  18   05/30/19 08:00


 


BP  113/79   05/30/19 08:00


 


Pulse Ox  95   05/30/19 08:00








                                 Intake & Output











 05/29/19 05/30/19 05/30/19





 18:59 06:59 18:59


 


Intake Total 691 235 680


 


Output Total 550 2245 350


 


Balance 141 -2010 330


 


Weight  76 kg 76 kg


 


Intake:   


 


   235 200


 


    Fluconazole in NaCl,Iso- 100  100





    Osm 200 mg In Saline 1   





    100ml.bag @ 100 mls/hr   





    IVPB DAILY [facility] Rx#:   





    [id number]   


 


    Piperacillin-Tazobactam 3 200 100 100





    .375 gm In Sodium   





    Chloride 0.9% 100 ml @ 25   





    mls/hr IVPB Q8HR [facility] Rx#   





    :[id number]   


 


    Pressure 21  


 


    Sodium Chloride 0.9% 70 135 


 


  Intake, IV Titration 300 0 





  Amount   


 


    Sodium Chloride 0.9% 1, 300 0 





    000 ml @ 50 mls/hr IV .   





    [id number] ONE with Mvi,   





    Adult No.4 with Vit K 10   





    ml with Thiamine 100 mg   





    with Folic Acid 1 mg Rx#:   





    [id number]   


 


  Oral   480


 


Output:   


 


  Drainage 10 45 


 


    Right Lower Abdomen 10 45 


 


  Urine 540 2200 350


 


Other:   


 


  Voiding Method Indwelling Catheter Indwelling Catheter Indwelling Catheter








                       ABP, PAP, CO, CI - Last Documented











Arterial Blood Pressure        146/47

















- Labs


CBC & Chem 7: 


                                 05/29/19 04:45





                                 05/30/19 04:27


Labs: 


                  Abnormal Lab Results - Last 24 Hours (Table)











  05/30/19 05/30/19 Range/Units





  04:27 06:50 


 


Sodium  136 L   (137-145)  mmol/L


 


Potassium  3.2 L   (3.5-5.1)  mmol/L


 


Carbon Dioxide  35 H   (22-30)  mmol/L


 


Creatinine  0.46 L   (0.66-1.25)  mg/dL


 


Glucose  164 H   (74-99)  mg/dL


 


POC Glucose (mg/dL)   113 H  (75-99)  mg/dL


 


Calcium  7.4 L   (8.4-10.2)  mg/dL








                      Microbiology - Last 24 Hours (Table)











 05/23/19 15:45 Blood Culture - Final





 Blood    No Growth after 144 hours

## 2019-05-30 NOTE — PN
PROGRESS NOTE



DATE OF SERVICE:

05/30/2019.



REASON FOR FOLLOWUP:

Secondary peritonitis from perforated peptic ulcer disease.



INTERVAL HISTORY:

The patient is currently afebrile.  Patient is breathing comfortably.  The patient did

have some occasional cough.  No worsening.  No chest pain.  No abdominal pain or any

diarrhea.



PHYSICAL EXAMINATION:

Blood pressure 155/68 with a pulse of 100.  Temperature of 98.7.

General description is an elderly male up in the chair in no distress.

Respiratory system:  Unlabored breathing, clear to auscultation.  No wheeze or

crackles.

Heart:  S1, S2.  Regular rate and rhythm.

Abdomen: Soft, no tenderness.



LABS:

No new labs have been obtained today.



DIAGNOSTIC IMPRESSION AND PLAN:

Patient admitted to the hospital with perforated peptic ulcer, status post laparotomy,

repair of same.  Sputum culture positive for Pseudomonas.  Patient is currently covered

with Zosyn and fluconazole.  Continue transition to oral antibiotics _____ stable for

discharge.  Continue supportive care.





MMODL / IJN: [id number] / Job#: [id number]

## 2019-05-31 LAB
ANION GAP SERPL CALC-SCNC: 2 MMOL/L
BASOPHILS # BLD AUTO: 0 K/UL (ref 0–0.2)
BASOPHILS NFR BLD AUTO: 0 %
BUN SERPL-SCNC: 13 MG/DL (ref 9–20)
CALCIUM SPEC-MCNC: 7.9 MG/DL (ref 8.4–10.2)
CHLORIDE SERPL-SCNC: 95 MMOL/L (ref 98–107)
CO2 SERPL-SCNC: 39 MMOL/L (ref 22–30)
EOSINOPHIL # BLD AUTO: 0 K/UL (ref 0–0.7)
EOSINOPHIL NFR BLD AUTO: 0 %
ERYTHROCYTE [DISTWIDTH] IN BLOOD BY AUTOMATED COUNT: 3.94 M/UL (ref 4.3–5.9)
ERYTHROCYTE [DISTWIDTH] IN BLOOD: 16.1 % (ref 11.5–15.5)
GLUCOSE BLD-MCNC: 134 MG/DL (ref 75–99)
GLUCOSE BLD-MCNC: 154 MG/DL (ref 75–99)
GLUCOSE BLD-MCNC: 154 MG/DL (ref 75–99)
GLUCOSE BLD-MCNC: 191 MG/DL (ref 75–99)
GLUCOSE SERPL-MCNC: 109 MG/DL (ref 74–99)
HCT VFR BLD AUTO: 36.8 % (ref 39–53)
HGB BLD-MCNC: 11.6 GM/DL (ref 13–17.5)
LYMPHOCYTES # SPEC AUTO: 0.6 K/UL (ref 1–4.8)
LYMPHOCYTES NFR SPEC AUTO: 3 %
MAGNESIUM SPEC-SCNC: 1.9 MG/DL (ref 1.6–2.3)
MCH RBC QN AUTO: 29.6 PG (ref 25–35)
MCHC RBC AUTO-ENTMCNC: 31.6 G/DL (ref 31–37)
MCV RBC AUTO: 93.5 FL (ref 80–100)
MONOCYTES # BLD AUTO: 0.7 K/UL (ref 0–1)
MONOCYTES NFR BLD AUTO: 3 %
NEUTROPHILS # BLD AUTO: 19.5 K/UL (ref 1.3–7.7)
NEUTROPHILS NFR BLD AUTO: 93 %
PLATELET # BLD AUTO: 484 K/UL (ref 150–450)
POTASSIUM SERPL-SCNC: 2.7 MMOL/L (ref 3.5–5.1)
SODIUM SERPL-SCNC: 136 MMOL/L (ref 137–145)
WBC # BLD AUTO: 20.9 K/UL (ref 3.8–10.6)

## 2019-05-31 RX ADMIN — HYDROMORPHONE HYDROCHLORIDE PRN MG: 1 INJECTION, SOLUTION INTRAMUSCULAR; INTRAVENOUS; SUBCUTANEOUS at 10:44

## 2019-05-31 RX ADMIN — HYDROCODONE BITARTRATE AND ACETAMINOPHEN PRN EACH: 5; 325 TABLET ORAL at 20:27

## 2019-05-31 RX ADMIN — HYDROMORPHONE HYDROCHLORIDE PRN MG: 1 INJECTION, SOLUTION INTRAMUSCULAR; INTRAVENOUS; SUBCUTANEOUS at 00:35

## 2019-05-31 RX ADMIN — INSULIN ASPART SCH: 100 INJECTION, SOLUTION INTRAVENOUS; SUBCUTANEOUS at 07:31

## 2019-05-31 RX ADMIN — IPRATROPIUM BROMIDE AND ALBUTEROL SULFATE SCH ML: .5; 3 SOLUTION RESPIRATORY (INHALATION) at 09:05

## 2019-05-31 RX ADMIN — HYDROCODONE BITARTRATE AND ACETAMINOPHEN PRN EACH: 5; 325 TABLET ORAL at 10:33

## 2019-05-31 RX ADMIN — HYDROMORPHONE HYDROCHLORIDE PRN MG: 1 INJECTION, SOLUTION INTRAMUSCULAR; INTRAVENOUS; SUBCUTANEOUS at 08:34

## 2019-05-31 RX ADMIN — POTASSIUM CHLORIDE SCH MEQ: 20 TABLET, EXTENDED RELEASE ORAL at 13:18

## 2019-05-31 RX ADMIN — HYDROCORTISONE SODIUM SUCCINATE SCH MG: 100 INJECTION, POWDER, FOR SOLUTION INTRAMUSCULAR; INTRAVENOUS at 08:34

## 2019-05-31 RX ADMIN — PIPERACILLIN AND TAZOBACTAM SCH MLS/HR: 3; .375 INJECTION, POWDER, FOR SOLUTION INTRAVENOUS at 00:36

## 2019-05-31 RX ADMIN — PIPERACILLIN AND TAZOBACTAM SCH MLS/HR: 3; .375 INJECTION, POWDER, FOR SOLUTION INTRAVENOUS at 17:39

## 2019-05-31 RX ADMIN — FUROSEMIDE SCH MG: 20 TABLET ORAL at 17:39

## 2019-05-31 RX ADMIN — HYDROCODONE BITARTRATE AND ACETAMINOPHEN PRN EACH: 5; 325 TABLET ORAL at 15:36

## 2019-05-31 RX ADMIN — IPRATROPIUM BROMIDE AND ALBUTEROL SULFATE SCH ML: .5; 3 SOLUTION RESPIRATORY (INHALATION) at 16:00

## 2019-05-31 RX ADMIN — IOPAMIDOL PRN ML: 612 INJECTION, SOLUTION INTRAVENOUS at 13:41

## 2019-05-31 RX ADMIN — HEPARIN SODIUM SCH UNIT: 5000 INJECTION, SOLUTION INTRAVENOUS; SUBCUTANEOUS at 23:39

## 2019-05-31 RX ADMIN — PIPERACILLIN AND TAZOBACTAM SCH MLS/HR: 3; .375 INJECTION, POWDER, FOR SOLUTION INTRAVENOUS at 08:34

## 2019-05-31 RX ADMIN — HYDROCODONE BITARTRATE AND ACETAMINOPHEN PRN EACH: 5; 325 TABLET ORAL at 23:40

## 2019-05-31 RX ADMIN — PIPERACILLIN AND TAZOBACTAM SCH MLS/HR: 3; .375 INJECTION, POWDER, FOR SOLUTION INTRAVENOUS at 23:39

## 2019-05-31 RX ADMIN — HYDROCORTISONE SODIUM SUCCINATE SCH MG: 100 INJECTION, POWDER, FOR SOLUTION INTRAMUSCULAR; INTRAVENOUS at 00:35

## 2019-05-31 RX ADMIN — PANTOPRAZOLE SODIUM SCH MG: 40 INJECTION, POWDER, FOR SOLUTION INTRAVENOUS at 08:33

## 2019-05-31 RX ADMIN — MAGNESIUM SULFATE IN DEXTROSE SCH MLS/HR: 10 INJECTION, SOLUTION INTRAVENOUS at 11:57

## 2019-05-31 RX ADMIN — MAGNESIUM SULFATE IN DEXTROSE SCH MLS/HR: 10 INJECTION, SOLUTION INTRAVENOUS at 10:53

## 2019-05-31 RX ADMIN — INSULIN ASPART SCH: 100 INJECTION, SOLUTION INTRAVENOUS; SUBCUTANEOUS at 18:00

## 2019-05-31 RX ADMIN — HYDROCORTISONE SODIUM SUCCINATE SCH MG: 100 INJECTION, POWDER, FOR SOLUTION INTRAMUSCULAR; INTRAVENOUS at 23:39

## 2019-05-31 RX ADMIN — HEPARIN SODIUM SCH UNIT: 5000 INJECTION, SOLUTION INTRAVENOUS; SUBCUTANEOUS at 00:36

## 2019-05-31 RX ADMIN — IPRATROPIUM BROMIDE AND ALBUTEROL SULFATE SCH ML: .5; 3 SOLUTION RESPIRATORY (INHALATION) at 21:40

## 2019-05-31 RX ADMIN — FLUCONAZOLE IN SODIUM CHLORIDE SCH MLS/HR: 2 INJECTION, SOLUTION INTRAVENOUS at 13:05

## 2019-05-31 RX ADMIN — HYDROMORPHONE HYDROCHLORIDE PRN MG: 1 INJECTION, SOLUTION INTRAMUSCULAR; INTRAVENOUS; SUBCUTANEOUS at 13:17

## 2019-05-31 RX ADMIN — IPRATROPIUM BROMIDE AND ALBUTEROL SULFATE SCH ML: .5; 3 SOLUTION RESPIRATORY (INHALATION) at 01:03

## 2019-05-31 RX ADMIN — POTASSIUM CHLORIDE SCH MEQ: 20 TABLET, EXTENDED RELEASE ORAL at 12:05

## 2019-05-31 RX ADMIN — HYDROCORTISONE SODIUM SUCCINATE SCH MG: 100 INJECTION, POWDER, FOR SOLUTION INTRAMUSCULAR; INTRAVENOUS at 17:39

## 2019-05-31 RX ADMIN — IOPAMIDOL PRN ML: 612 INJECTION, SOLUTION INTRAVENOUS at 12:41

## 2019-05-31 RX ADMIN — INSULIN ASPART SCH: 100 INJECTION, SOLUTION INTRAVENOUS; SUBCUTANEOUS at 15:24

## 2019-05-31 RX ADMIN — FUROSEMIDE SCH MG: 20 TABLET ORAL at 08:34

## 2019-05-31 RX ADMIN — HEPARIN SODIUM SCH UNIT: 5000 INJECTION, SOLUTION INTRAVENOUS; SUBCUTANEOUS at 17:38

## 2019-05-31 RX ADMIN — HEPARIN SODIUM SCH UNIT: 5000 INJECTION, SOLUTION INTRAVENOUS; SUBCUTANEOUS at 08:33

## 2019-05-31 RX ADMIN — INSULIN ASPART SCH UNIT: 100 INJECTION, SOLUTION INTRAVENOUS; SUBCUTANEOUS at 20:28

## 2019-05-31 RX ADMIN — HYDROMORPHONE HYDROCHLORIDE PRN MG: 1 INJECTION, SOLUTION INTRAMUSCULAR; INTRAVENOUS; SUBCUTANEOUS at 17:39

## 2019-05-31 NOTE — PN
PROGRESS NOTE



DATE OF SERVICE:

05/31/2019



REASON FOR FOLLOWUP:

Secondary peritonitis from perforated peptic ulcer disease.



INTERVAL HISTORY:

The patient is currently afebrile.  The patient has been breathing comfortably. 
The

patient did have some cough but denies abdominal pain.  No nausea, no vomiting 
or any

diarrhea.



PHYSICAL EXAMINATION:

Blood pressure 150/87 with a pulse of 117, temperature 98. He is 93% on 6 L 
high-flow

oxygen.

General description is an elderly male up in the bed in no distress.

RESPIRATORY SYSTEM: Unlabored breathing with decreased breath sounds at the 
base. No

wheeze.

HEART: S1, S2.  Regular rate and rhythm.

ABDOMEN: Soft. No tenderness.



LABS:

Hemoglobin is 11.6. White count jumped to 20,000. BUN of 13, creatinine 0.57.



DIAGNOSTIC IMPRESSION AND PLAN:

Patient with secondary peritonitis from perforated peptic ulcer disease.  
Patient is

status post laparotomy and repair of the same.  The patient did have a 
significant jump

in his white count today, for which a CT of abdomen and pelvis was completed. It
showed

a decrease in the patchy fluid accumulation involving the small bowel mesentery.

Patient at this time is covered with Zosyn and Diflucan; to continue while 
watching his

clinical course closely.  re-culture and adjust antibiotics if further jump in 
the white 

count develops any fever. Continue with supportive care.





MMODL / IJN: [id number] / Job#: [id number]

MTDD

## 2019-05-31 NOTE — P.PN
<Dilcia Clark FIGUEROA - Last Filed: 05/31/19 14:06>





Subjective


Progress Note Date: 05/31/19





CHIEF COMPLAINT: Perforated duodenal ulcer





HISTORY OF PRESENT ILLNESS: Patient seen and examined at the bedside. Patient 

reports increased abdominal discomfort this morning. He denies nausea or 

vomiting. Reports passing flatus and having BMs. Tolerating full liquid diet. HR

low 100s. Afebrile. WBC increased to 20.9. Hemoglobin 11.6 Potassium 2.7. 

Magnesium 1.9





PHYSICAL EXAM: 


VITAL SIGNS: Reviewed.


GENERAL: Well-developed in no acute distress. 


HEENT:  No sclera icterus. Extraocular movements grossly intact.  Moist buccal 

mucosa. Head is atraumatic, normocephalic. 


ABDOMEN:  Soft.  Nondistended. Dressing clean dry intact. DAVID with dark old 

appearing bloody drainage.


NEUROLOGIC: Alert and oriented. Cranial nerves II through XII grossly intact.





ASSESSMENT: 


1.  Perforated duodenal ulcer, status post exploratory laparotomy with repair of

perforated duodenal ulcer and repair of incarcerated umbilical hernia





PLAN: 


1. Continue full liquid diet


2. Replace potassium (80meq) and magnesium (2gram). Repeat potassium at 1500.


3. Continue dressing changes with Telfa jaime


4. Incentive spirometry


5. Activity as tolerated


6. Continue antibiotics. Monitor WBC


7. CT abdomen/pelvis





Nurse practitioner note has been reviewed by physician. Signing provider agrees 

with the documented findings, assessment, and plan of care. 








Objective





- Vital Signs


Vital signs: 


                                   Vital Signs











Temp  97.5 F L  05/31/19 07:00


 


Pulse  88   05/31/19 07:00


 


Resp  16   05/31/19 07:00


 


BP  113/69   05/31/19 07:00


 


Pulse Ox  95   05/31/19 07:00








                                 Intake & Output











 05/30/19 05/31/19 05/31/19





 18:59 06:59 18:59


 


Intake Total 1160  


 


Output Total 475 430 


 


Balance 685 -430 


 


Weight 76 kg  


 


Intake:   


 


    


 


    Fluconazole in NaCl,Iso- 100  





    Osm 200 mg In Saline 1   





    100ml.bag @ 100 mls/hr   





    IVPB DAILY [facility] Rx#:   





    [id number]   


 


    Piperacillin-Tazobactam 3 100  





    .375 gm In Sodium   





    Chloride 0.9% 100 ml @ 25   





    mls/hr IVPB Q8HR [facility] Rx#   





    :[id number]   


 


  Oral 960  


 


Output:   


 


  Drainage  30 


 


    Right Lower Abdomen  30 


 


  Urine 475 400 


 


Other:   


 


  Voiding Method Indwelling Catheter  


 


  # Voids  1 


 


  # Bowel Movements  1 








                       ABP, PAP, CO, CI - Last Documented











Arterial Blood Pressure        146/47

















- Labs


CBC & Chem 7: 


                                 05/31/19 07:26





                                 05/31/19 07:26


Labs: 


                  Abnormal Lab Results - Last 24 Hours (Table)











  05/30/19 05/30/19 05/30/19 Range/Units





  12:08 12:43 17:54 


 


WBC     (3.8-10.6)  k/uL


 


RBC     (4.30-5.90)  m/uL


 


Hgb     (13.0-17.5)  gm/dL


 


Hct     (39.0-53.0)  %


 


RDW     (11.5-15.5)  %


 


Plt Count     (150-450)  k/uL


 


Neutrophils #     (1.3-7.7)  k/uL


 


Lymphocytes #     (1.0-4.8)  k/uL


 


Sodium     (137-145)  mmol/L


 


Potassium   3.2 L   (3.5-5.1)  mmol/L


 


Chloride     ()  mmol/L


 


Carbon Dioxide     (22-30)  mmol/L


 


Creatinine     (0.66-1.25)  mg/dL


 


Glucose     (74-99)  mg/dL


 


POC Glucose (mg/dL)  102 H   122 H  (75-99)  mg/dL


 


Calcium     (8.4-10.2)  mg/dL














  05/30/19 05/31/19 05/31/19 Range/Units





  21:14 06:55 07:26 


 


WBC    20.9 H  (3.8-10.6)  k/uL


 


RBC    3.94 L  (4.30-5.90)  m/uL


 


Hgb    11.6 L  (13.0-17.5)  gm/dL


 


Hct    36.8 L  (39.0-53.0)  %


 


RDW    16.1 H  (11.5-15.5)  %


 


Plt Count    484 H  (150-450)  k/uL


 


Neutrophils #    19.5 H  (1.3-7.7)  k/uL


 


Lymphocytes #    0.6 L  (1.0-4.8)  k/uL


 


Sodium     (137-145)  mmol/L


 


Potassium     (3.5-5.1)  mmol/L


 


Chloride     ()  mmol/L


 


Carbon Dioxide     (22-30)  mmol/L


 


Creatinine     (0.66-1.25)  mg/dL


 


Glucose     (74-99)  mg/dL


 


POC Glucose (mg/dL)  180 H  134 H   (75-99)  mg/dL


 


Calcium     (8.4-10.2)  mg/dL














  05/31/19 Range/Units





  07:26 


 


WBC   (3.8-10.6)  k/uL


 


RBC   (4.30-5.90)  m/uL


 


Hgb   (13.0-17.5)  gm/dL


 


Hct   (39.0-53.0)  %


 


RDW   (11.5-15.5)  %


 


Plt Count   (150-450)  k/uL


 


Neutrophils #   (1.3-7.7)  k/uL


 


Lymphocytes #   (1.0-4.8)  k/uL


 


Sodium  136 L  (137-145)  mmol/L


 


Potassium  2.7 L*  (3.5-5.1)  mmol/L


 


Chloride  95 L  ()  mmol/L


 


Carbon Dioxide  39 H  (22-30)  mmol/L


 


Creatinine  0.57 L  (0.66-1.25)  mg/dL


 


Glucose  109 H  (74-99)  mg/dL


 


POC Glucose (mg/dL)   (75-99)  mg/dL


 


Calcium  7.9 L  (8.4-10.2)  mg/dL














<Chivo Gomez - Last Filed: 05/31/19 14:30>





Subjective


As above.  Patient's white blood cell count has increased today.  Shortness of 

breath and abdominal pain both seem improved.  Abdominal exam relatively benign.

 We'll order CT abdomen and pelvis at this time.  Continue antibiotics.








Objective





- Vital Signs


Vital signs: 


                                   Vital Signs











Temp  98 F   05/31/19 14:06


 


Pulse  117 H  05/31/19 14:06


 


Resp  16   05/31/19 14:06


 


BP  150/87   05/31/19 14:06


 


Pulse Ox  93 L  05/31/19 14:06








                                 Intake & Output











 05/30/19 05/31/19 05/31/19





 18:59 06:59 18:59


 


Intake Total 1160  


 


Output Total 475 430 


 


Balance 685 -430 


 


Weight 76 kg  


 


Intake:   


 


    


 


    Fluconazole in NaCl,Iso- 100  





    Osm 200 mg In Saline 1   





    100ml.bag @ 100 mls/hr   





    IVPB DAILY [facility] Rx#:   





    [id number]   


 


    Piperacillin-Tazobactam 3 100  





    .375 gm In Sodium   





    Chloride 0.9% 100 ml @ 25   





    mls/hr IVPB Q8HR [facility] Rx#   





    :[id number]   


 


  Oral 960  


 


Output:   


 


  Drainage  30 


 


    Right Lower Abdomen  30 


 


  Urine 475 400 


 


Other:   


 


  Voiding Method Indwelling Catheter  


 


  # Voids  1 


 


  # Bowel Movements  1 








                       ABP, PAP, CO, CI - Last Documented











Arterial Blood Pressure        146/47

















- Labs


CBC & Chem 7: 


                                 05/31/19 07:26





                                 05/31/19 07:26


Labs: 


                  Abnormal Lab Results - Last 24 Hours (Table)











  05/30/19 05/30/19 05/31/19 Range/Units





  17:54 21:14 06:55 


 


WBC     (3.8-10.6)  k/uL


 


RBC     (4.30-5.90)  m/uL


 


Hgb     (13.0-17.5)  gm/dL


 


Hct     (39.0-53.0)  %


 


RDW     (11.5-15.5)  %


 


Plt Count     (150-450)  k/uL


 


Neutrophils #     (1.3-7.7)  k/uL


 


Lymphocytes #     (1.0-4.8)  k/uL


 


Sodium     (137-145)  mmol/L


 


Potassium     (3.5-5.1)  mmol/L


 


Chloride     ()  mmol/L


 


Carbon Dioxide     (22-30)  mmol/L


 


Creatinine     (0.66-1.25)  mg/dL


 


Glucose     (74-99)  mg/dL


 


POC Glucose (mg/dL)  122 H  180 H  134 H  (75-99)  mg/dL


 


Calcium     (8.4-10.2)  mg/dL














  05/31/19 05/31/19 05/31/19 Range/Units





  07:26 07:26 11:07 


 


WBC  20.9 H    (3.8-10.6)  k/uL


 


RBC  3.94 L    (4.30-5.90)  m/uL


 


Hgb  11.6 L    (13.0-17.5)  gm/dL


 


Hct  36.8 L    (39.0-53.0)  %


 


RDW  16.1 H    (11.5-15.5)  %


 


Plt Count  484 H    (150-450)  k/uL


 


Neutrophils #  19.5 H    (1.3-7.7)  k/uL


 


Lymphocytes #  0.6 L    (1.0-4.8)  k/uL


 


Sodium   136 L   (137-145)  mmol/L


 


Potassium   2.7 L*   (3.5-5.1)  mmol/L


 


Chloride   95 L   ()  mmol/L


 


Carbon Dioxide   39 H   (22-30)  mmol/L


 


Creatinine   0.57 L   (0.66-1.25)  mg/dL


 


Glucose   109 H   (74-99)  mg/dL


 


POC Glucose (mg/dL)    154 H  (75-99)  mg/dL


 


Calcium   7.9 L   (8.4-10.2)  mg/dL














Assessment and Plan


(1) Bowel perforation


Current Visit: Yes   Status: Acute   Code(s): K63.1 - PERFORATION OF INTESTINE 

(NONTRAUMATIC)   SNOMED Code(s): 18063550

## 2019-05-31 NOTE — P.PN
Subjective


Progress Note Date: 05/31/19


Principal diagnosis: 


 Acute pneumoperitoneum secondary to perforated duodenal ulcer





69-year-old male patient came into the emergency department with a one-week 

history of abdominal pain.  He initially went to [facility] 

emergency department with a CAT scan was done and the patient was told to have 

diverticulosis without diverticulitis.  Over the past week, the patient 

developed progressive worsening his abdominal discomfort and today came into the

emergency department having more pain and the pain was rather diffuse in nature.

 He had diminished appetite, diminished oral intake and he was having no 

significant bowel movements.  No GI bleeding.  Denies having any fever or 

chills.  He was nauseated when he was getting progressively more lethargic and 

weak and short of breath.  CAT scan of the abdomen was done in the emergency 

department and showed significant inflammatory changes in the epigastric region 

involving posterior aspect of the proximal transverse colon.  There was evidence

of pneumoperitoneum around that along with some inflammatory changes as well as 

some free fluid in the abdomen.  The stomach itself appeared to be within normal

limits.  The patient has long-term history of COPD and pulmonary fibrosis.  

Based on his pulmonary function test in 2017 he has an FVC of 72% and FEV1 of 

46% and he has been steroid dependent for the past few years taking prednisone a

daily basis.  He has also underlying rheumatoid arthritis and previously he was 

taking immunosuppression with Humira none for now.  His oxygen dependent.  Blood

work showed a white cell count 16.2 with a hemoglobin of 14.9.  Platelet count 

is at 519.  He has a BUN of 27 creatinine of 0.7 and lactic acid was at 1.9 with

a troponin being less than 0.01.  LFTs are all within normal limits.  He is 

tachycardic with temperature 98.2.  He was having sinus tachycardia with a heart

rate of 120.  He is currently on 5 L of oxygen by nasal cannula with a pulse ox 

of 90%.  The patient will be taken to the operating room.  He was seen by the 

surgical team following that I've advised the patient coming back to the 

intensive care unit for further evaluation and treatment.  He may need to be 

kept intubated overnight.





On 05/27/2019, the patient is postop day #4.  The patient is doing well.  He is 

awake and alert.  History requiring high flow oxygen at 10 L.  He doesn't have 

much reserve and he desaturates easily.  NG tube was removed yesterday and this 

morning the patient was given some clear liquid diet.  Surgical wound site is 

clean.  Output from the DAVID drain is minimal in the order of 30 mL over the past 

24 hours.  Surgical wound site is dry clean and intact.  He is passing flatus.  

No bowel activity as.  He remains on IV Zosyn.  He is using incentive 

spirometer.  He is on status post hydrocortisone.  He is awake and alert.  He 

has pseudomonas aeruginosa in his sputum for which she is covered with IV Zosyn.

 No other significant events overnight.





Patient was reevaluated today on 5/28/2019, remains on high flow nasal cannula 7

L/m, continues to have significantly abnormal chest x-ray with diffuse 

interstitial lung disease consistent with pulmonary fibrosis, underlying 

pneumonia is not entirely ruled out.  His FiO2 is being titrated down, patient 

is noted to be short of breath with any activity.  His sputum was positive for 

pseudomonas aeruginosa, remains on Zosyn.  All labs were reviewed today, 

potassium is a bit low at 3.2 being corrected as per protocol.





Patient was reevaluated today on 5/29/2019, remains on high flow nasal cannula, 

90 L/m, patient is basically about the same, chest x-ray is basically about the 

same showing bibasilar interstitial lung disease consistent with pulmonary 

fibrosis.  Again underlying pneumonia is not entirely ruled out but felt to be 

less likely.  Patient is hemodynamically stable, in no distress, being treated f

or pseudomonas aeruginosa in the sputum, remains on Zosyn.  CBC is relatively 

normal WBC count is 10.5 hemoglobin is 11.5 light was normal except for low 

potassium being corrected as per protocol.  Patient continues to have good urine

output.





Reevaluated today on 5/30/2019, remains in the ICU, he is presently overflow 

from selective.  Patient remains on 8 L high flow nasal cannula, pulmonary stat

us remains marginal.  Overall it is better than expected considering his 

underlying COPD and underlying interstitial lung disease.  Patient seems to be 

recovering slowly.  Last chest x-ray showed basically chronic changes of COPD 

and interstitial lung disease.  Underlying pneumonia is definitely not entirely 

ruled out.  Patient had positive Pseudomonas in the sputum and he is being 

treated as such.  Electrolytes are normal except for low potassium being 

corrected as per protocol.  The patient himself denies shortness of breath, 

denies any pain, he is complaining of significant swelling in his upper and 

lower extremities, hence I initiated diuretics today.





On 05/31/2019 patient seen in follow-up on medical surgical floor.  He is awake 

and alert, in no acute distress, he is on 6 L of oxygen, with a pulse ox of 95-

96%, he is afebrile, hemodynamically stable.  Work on his incentive spirometer, 

achieving about 1000 on the today.  This is postop day 8 status post exploratory

laparotomy with repair of the perforated duodenal ulcer and repair of the 

incarcerated umbilical hernia.  he is doing well, he was started on some oral 

Lasix yesterday, for generalized anasarca, and he is in -1869 mL fluid balance 

over the last 24 hours.  Still has quite a bit of swelling in his upper and 

lower extremities.  His lab work has been reviewed, showing white blood cell 

count of 20.9, hemoglobin of 11.6, serum sodium is 136, potassium is 2.7, this 

being replaced per protocol, chloride is 95, CO2 39, creatinine of 0.57 and BUN 

of 13.  Patient is tolerating oral intake, he is passing bowel movements.  No 

new chest x-rays today








Objective





- Vital Signs


Vital signs: 


                                   Vital Signs











Temp  97.5 F L  05/31/19 07:00


 


Pulse  88   05/31/19 07:00


 


Resp  16   05/31/19 07:00


 


BP  113/69   05/31/19 07:00


 


Pulse Ox  95   05/31/19 07:00








                                 Intake & Output











 05/30/19 05/31/19 05/31/19





 18:59 06:59 18:59


 


Intake Total 1160  


 


Output Total 475 430 


 


Balance 685 -430 


 


Weight 76 kg  


 


Intake:   


 


    


 


    Fluconazole in NaCl,Iso- 100  





    Osm 200 mg In Saline 1   





    100ml.bag @ 100 mls/hr   





    IVPB DAILY [facility] Rx#:   





    [id number]   


 


    Piperacillin-Tazobactam 3 100  





    .375 gm In Sodium   





    Chloride 0.9% 100 ml @ 25   





    mls/hr IVPB Q8HR [facility] Rx#   





    :[id number]   


 


  Oral 960  


 


Output:   


 


  Drainage  30 


 


    Right Lower Abdomen  30 


 


  Urine 475 400 


 


Other:   


 


  Voiding Method Indwelling Catheter  


 


  # Voids  1 


 


  # Bowel Movements  1 








                       ABP, PAP, CO, CI - Last Documented











Arterial Blood Pressure        146/47

















- Exam


 GENERAL EXAM: Alert, pleasant, 69-year-old white male, on 6 L of oxygen, 

comfortable in no apparent distress.


HEAD: Normocephalic/atraumatic.


EYES: Normal reaction of pupils, equal size.  Conjunctiva pink, sclera white.


NOSE: Clear with pink turbinates.


THROAT: No erythema or exudates.


NECK: No masses, no JVD, no thyroid enlargement, no adenopathy.


CHEST: No chest wall deformity.  Symmetrical expansion. 


LUNGS: Equal air entry with dementia breath sounds, and basilar rales


CVS: Regular rate and rhythm, normal S1 and S2, no gallops, no murmurs, no rubs


ABDOMEN: Soft, nontender.  No hepatosplenomegaly, normal bowel sounds, no 

guarding or rigidity.


EXTREMITIES: No clubbing, generalized edema, no cyanosis, 2+ pulses and upper 

and lower extremities.


MUSCULOSKELETAL: Muscle strength and tone normal.


SPINE: No scoliosis or deformity


SKIN: No rashes


CENTRAL NERVOUS SYSTEM: Alert and oriented -3.  No focal deficits, tone is 

normal in all 4 extremities.


PSYCHIATRIC: Alert and oriented -3.  Appropriate affect.  Intact judgment and 

insight.











- Labs


CBC & Chem 7: 


                                 05/31/19 07:26





                                 05/31/19 07:26


Labs: 


                  Abnormal Lab Results - Last 24 Hours (Table)











  05/30/19 05/30/19 05/30/19 Range/Units





  12:08 12:43 17:54 


 


WBC     (3.8-10.6)  k/uL


 


RBC     (4.30-5.90)  m/uL


 


Hgb     (13.0-17.5)  gm/dL


 


Hct     (39.0-53.0)  %


 


RDW     (11.5-15.5)  %


 


Plt Count     (150-450)  k/uL


 


Neutrophils #     (1.3-7.7)  k/uL


 


Lymphocytes #     (1.0-4.8)  k/uL


 


Sodium     (137-145)  mmol/L


 


Potassium   3.2 L   (3.5-5.1)  mmol/L


 


Chloride     ()  mmol/L


 


Carbon Dioxide     (22-30)  mmol/L


 


Creatinine     (0.66-1.25)  mg/dL


 


Glucose     (74-99)  mg/dL


 


POC Glucose (mg/dL)  102 H   122 H  (75-99)  mg/dL


 


Calcium     (8.4-10.2)  mg/dL














  05/30/19 05/31/19 05/31/19 Range/Units





  21:14 06:55 07:26 


 


WBC    20.9 H  (3.8-10.6)  k/uL


 


RBC    3.94 L  (4.30-5.90)  m/uL


 


Hgb    11.6 L  (13.0-17.5)  gm/dL


 


Hct    36.8 L  (39.0-53.0)  %


 


RDW    16.1 H  (11.5-15.5)  %


 


Plt Count    484 H  (150-450)  k/uL


 


Neutrophils #    19.5 H  (1.3-7.7)  k/uL


 


Lymphocytes #    0.6 L  (1.0-4.8)  k/uL


 


Sodium     (137-145)  mmol/L


 


Potassium     (3.5-5.1)  mmol/L


 


Chloride     ()  mmol/L


 


Carbon Dioxide     (22-30)  mmol/L


 


Creatinine     (0.66-1.25)  mg/dL


 


Glucose     (74-99)  mg/dL


 


POC Glucose (mg/dL)  180 H  134 H   (75-99)  mg/dL


 


Calcium     (8.4-10.2)  mg/dL














  05/31/19 05/31/19 Range/Units





  07:26 11:07 


 


WBC    (3.8-10.6)  k/uL


 


RBC    (4.30-5.90)  m/uL


 


Hgb    (13.0-17.5)  gm/dL


 


Hct    (39.0-53.0)  %


 


RDW    (11.5-15.5)  %


 


Plt Count    (150-450)  k/uL


 


Neutrophils #    (1.3-7.7)  k/uL


 


Lymphocytes #    (1.0-4.8)  k/uL


 


Sodium  136 L   (137-145)  mmol/L


 


Potassium  2.7 L*   (3.5-5.1)  mmol/L


 


Chloride  95 L   ()  mmol/L


 


Carbon Dioxide  39 H   (22-30)  mmol/L


 


Creatinine  0.57 L   (0.66-1.25)  mg/dL


 


Glucose  109 H   (74-99)  mg/dL


 


POC Glucose (mg/dL)   154 H  (75-99)  mg/dL


 


Calcium  7.9 L   (8.4-10.2)  mg/dL














Assessment and Plan


Plan: 


 Assessment:





1 acute pneumoperitoneum secondary to perforation of duodenal ulcer status post 

treatment.  Of duodenal ulcer and repair of incarcerated umbilical hernia 

postoperative day #8





2 advanced COPD and severe pulmonary fibrosis with chronic hypoxic respiratory 

failure





3 history of rheumatoid arthritis





4 obstructive sleep apnea syndrome not compliant with CPAP.





5 chronic back pain





6 osteoarthritis





7 pseudomonas aeruginosa in the sputum, could be a colonization or could be 

secondary to pseudomonal pneumonia.  Best to continue antibiotics at present 

since the patient's pulmonary status is rather marginal.





8 acute on chronic hypoxic respiratory failure multifactorial mostly related to 

his COPD, pulmonary fibrosis, and suspect some component of 

pneumonia/pseudomonal pneumonia.





Plan:





Continue encouraging deep breathing and coughing, continue with oral diuretics, 

patient is maintaining negative fluid balance, still has quite a bit of 

generalized swelling, but no focal to breathing, wean FiO2, monitor electrolytes

and renal profile daily.  Continue current antibiotics, vital signs are stable, 

no fever or chills, tolerating diet, passing bowel movements.  We'll continue to

follow





I performed a history & physical examination of the patient and discussed their 

management with my nurse practitioner, La Louie.  I reviewed the nurse 

practitioner's note and agree with the documented findings and plan of care.  

Lung sounds are positive for diminished breath sounds, with basilar rales.  The 

findings and the impression was discussed with the patient.  I attest to the 

documentation by the nurse practitioner. 





Time with Patient: Less than 30

## 2019-05-31 NOTE — P.PN
Subjective





This is a 69 years old male with past medical history of COPD on home oxygen, 

hyperlipidemia, rheumatoid arthritis, sleep apnea on CPAP.  Presents with 

abdominal pain , has been evaluated by surgery on admission and taken to the 

operation room found to have perforated duodenal ulcer and incarcerated 

umbilical hernia.  After the surgery patient was taken to the intensive care 

unit, he got intubated.  He was started on Zosyn, and Flagyl.  He is also on 

Protonix for DVT prophylaxis.  And he is in normal cement 120 L/h.  Patient 

currently is not on pressors.  He has J drain with 200 mL of bloody discharge 

from abdominal wound.  NG tube was about 200 mL drainage.


Currently patient is in ICU, vital showing blood pressure is 87/44, saturating 

95% on FiO2 of 50%.  Temperature 97.5.  WBC 12.4 K, potassium 5.2 sodium 135.  

Creatinine 0.7.  Urinalysis is not suspicious of infection.  Chest x-ray: 

Interstitial lung disease, endotracheal tube is in place.  CT of the abdomen and

pelvis done prior to surgery was reviewed.  EKG sinus tachycardia at 113.  No 

significant ST-T changes.





Subjective


Date of service 05/25/2019


Patient went to the ICU.  He is status post extubation.  With no chest pain or 

dyspnea.  Vitas looks stable.  Still complaining from pain at the surgical 

abdominal site.  No bowel movement yet.  WBC 14.5 K.  Creatinine 0.6.








05/26/2019


pt is in ICU, he is awake and alert asking for food, still has NG tube with more

than 300 ml drained , sluggish bowel movement, passing gas only, NG tube is 

planed to be taken off by surgery team and start on clear liquid diet tomorrow, 

sputum is growing pseudomonas and pt is currently on zosyn and diflucan, ID team

are following the pt closely , WBC 14.7K, BMP is unremarkable, vials stable





05/27/2019


Patient remains in the ICU.  His sputum culture is growing Pseudomonas, patient 

is currently on Zosyn.  Chest x-ray possible right lower pneumonia but patient 

with no respiratory symptoms.  Infectious disease are following the case 

closely.  Patient is currently on clear liquid.  Once daily, vitals are stable 

and patient is afebrile








05/28/2019


Patient seen in the ICU.  His doing well and improving with controlled abdominal

pain, isn't clear liquid diet.  Patient is afebrile and saturating 95% on 5 L of

oxygen.  He is currently on antibiotic Zosyn and fluconazole for Pseudomonas in 

his sputum.  WBC is coming back to normal at 10.5 K, hemoglobin stable at 11.5. 

His also on his steroids Cortef 3 times a day.  Continue breathing treatments 

and oxygen for his advanced COPD and interstitial pulmonary disease.  Chest x-

ray also showed nodular density and the cardiologist recommended 6 month follow-

up with CAT scan.





05/29/2019


Patient keep improving, he is awake, slightly improved but better than when he 

came seen.  He is starting his diet, his diet today has been advised to follow 

liquid diet.  Pain at surgical site is controlled and IV fluid was stopped.  

Patient is hemodynamically stable.  He saturating 97% on 9 L oxygen via high 

flow cannula.  Continue to be on Zosyn for his Pseudomonas.  Since he is 

improving his going to be transferred to the general medical floor today





05/13/2019


Patient in ICU, going to the general medical floor.  He is awake, tolerating 

diet well with no problems of 18, his abdominal pain is controlled and expected 

some residual tenderness at the surgical site.  Patient is hemodynamically 

stable.  That is been advanced.  Surgery team are following the case closely.  

Hemodynamically stable.  WBC is back to normal level.  Potassium mildly low 

sugar is controlled.  Patient is currently on Lasix 20 mg twice a day.





Objective





- Vital Signs


Vital signs: 


                                   Vital Signs











Temp  98.7 F   05/30/19 14:51


 


Pulse  100   05/30/19 15:12


 


Resp  16   05/30/19 14:51


 


BP  155/68   05/30/19 14:51


 


Pulse Ox  95   05/30/19 14:51








                                 Intake & Output











 05/30/19 05/30/19 05/31/19





 06:59 18:59 06:59


 


Intake Total 235 1160 


 


Output Total 2245 475 


 


Balance -2010 685 


 


Weight 76 kg 76 kg 


 


Intake:   


 


   200 


 


    Fluconazole in NaCl,Iso-  100 





    Osm 200 mg In Saline 1   





    100ml.bag @ 100 mls/hr   





    IVPB DAILY IMTIAZ Rx#:   





    [id number]   


 


    Piperacillin-Tazobactam 3 100 100 





    .375 gm In Sodium   





    Chloride 0.9% 100 ml @ 25   





    mls/hr IVPB Q8HR IMT[facility] Rx#   





    [id number]   


 


    Sodium Chloride 0.9% 135  


 


  Intake, IV Titration 0  





  Amount   


 


    Sodium Chloride 0.9% 1, 0  





    000 ml @ 50 mls/hr IV .   





    [id number] ONE with Mvi,   





    Adult No.4 with Vit K 10   





    ml with Thiamine 100 mg   





    with Folic Acid 1 mg Rx#:   





    [id number]   


 


  Oral  960 


 


Output:   


 


  Drainage 45  


 


    Right Lower Abdomen 45  


 


  Urine 2200 475 


 


Other:   


 


  Voiding Method Indwelling Catheter Indwelling Catheter 








                       ABP, PAP, CO, CI - Last Documented











Arterial Blood Pressure        146/47

















- Exam





GENERAL: The patient is alert oriented, not in distress


HEENT: Pupils are round and equally reacting to light. EOMI. No scleral icterus.

No conjunctival pallor. Normocephalic, atraumatic. No pharyngeal erythema. No 

thyromegaly. 


CARDIOVASCULAR: S1 and S2 present. No murmurs, rubs, or gallops. 


PULMONARY: Chest is clear to auscultation, no wheezing or crackles. 


ABDOMEN: Soft, is status post exploratory laparotomy, dressing is in place, 

wound is CLOSED.


MUSCULOSKELETAL: No joint swelling or deformity. 


EXTREMITIES: No cyanosis, clubbing, or pedal edema. 


NEUROLOGICAL: Gross neurological examination did not reveal any focal deficits. 


SKIN: No rashes.





- Labs


CBC & Chem 7: 


                                 05/29/19 04:45





                                 05/30/19 12:43


Labs: 


                  Abnormal Lab Results - Last 24 Hours (Table)











  05/30/19 05/30/19 05/30/19 Range/Units





  04:27 06:50 12:08 


 


Sodium  136 L    (137-145)  mmol/L


 


Potassium  3.2 L    (3.5-5.1)  mmol/L


 


Carbon Dioxide  35 H    (22-30)  mmol/L


 


Creatinine  0.46 L    (0.66-1.25)  mg/dL


 


Glucose  164 H    (74-99)  mg/dL


 


POC Glucose (mg/dL)   113 H  102 H  (75-99)  mg/dL


 


Calcium  7.4 L    (8.4-10.2)  mg/dL














  05/30/19 05/30/19 Range/Units





  12:43 17:54 


 


Sodium    (137-145)  mmol/L


 


Potassium  3.2 L   (3.5-5.1)  mmol/L


 


Carbon Dioxide    (22-30)  mmol/L


 


Creatinine    (0.66-1.25)  mg/dL


 


Glucose    (74-99)  mg/dL


 


POC Glucose (mg/dL)   122 H  (75-99)  mg/dL


 


Calcium    (8.4-10.2)  mg/dL








                      Microbiology - Last 24 Hours (Table)











 05/23/19 15:45 Blood Culture - Final





 Blood    No Growth after 144 hours














Assessment and Plan


Assessment: 





Acute abdomen, status post exploratory laparotomy with perforated duodenal ulcer

and incarcerated umbilical hernia


Acute hypoxic respiratory failure.


Acute COPD exacerbation


Hyperlipidemia


History of rheumatoid arthritis











Plan: 





This is a 69 years old male who presents with perforated duodenal ulcers and 

incarcerated hernia, status post exploratory laparotomy.  Patient after the 

surgery went to the intensive care unit.  Status post intubating.  Critical care

team and surgery team R following the case closely.  Continue with pain 

management, IV fluids and antibiotic.Labs and medication were reviewed..  

Continue same treatment.  Continue with symptomatic treatment.  Resume home 

medication.  Monitor lytes and vitals.  DVT and GI prophylaxis.  Further re

commendations of the clinical course of the patient


DVT prophylaxis: Subcutaneous heparin


GI Prophylaxis: PPI


Prognosis is guarded

## 2019-05-31 NOTE — CT
EXAMINATION TYPE: CT abdomen pelvis w con

 

DATE OF EXAM: 5/31/2019

 

COMPARISON: 5/23/2019

 

HISTORY: increasing WBC post op.

 

CT DLP: 872.9 mGycm

Automated exposure control for dose reduction was used.

 

TECHNIQUE:  Helical acquisition of images was performed from the lung bases through the pelvis.

 

CONTRAST: 

Performed with Oral Contrast and with IV Contrast, patient injected with 100 mL of Isovue 300.

 

FINDINGS: 

 

There is patchy airspace infiltrate and atelectasis in the lung bases bilaterally. There are bilatera
l small pleural effusions. Stomach appears normal. There is large drainage catheter in the upper abdo
men. There are skin staples in the midline anterior abdomen. Liver shows no focal defect. There is no
 pericardial effusion. There are small calcified splenic granulomata. There is no evidence of pancrea
tic mass. There is patchy fluid accumulation in the anterior upper abdomen involving small bowel mese
ntery and the omentum. Fluid measures up to 1.5 cm.

 

There are numerous sigmoid diverticula. There is no free fluid in the pelvis. There is fluid level in
 the urinary bladder consistent with catheterization. I see no definite free air in the abdomen. Ther
e is no evidence of a bowel obstruction.

 

There is no adrenal mass. Kidneys show satisfactory contrast opacification. There is no hydronephrosi
s. There is 2.3 cm cyst in the parapelvic lower pole right kidney.

 

I see no bony destructive process but there is apparent old mild compression fractures of L3 and L4. 
There is L2-3 spinal stenosis related to endplate spur formation and compression fracture of superior
 L3 vertebral body.

IMPRESSION: 

COMPARED TO LAST EXAM THERE IS RECENT SURGERY WITH ANTERIOR MIDLINE SKIN STAPLES. THERE IS A NEW DRAI
N IN THE ANTERIOR UPPER ABDOMEN. THERE IS DECREASE IN THE PATCHY FLUID ACCUMULATION INVOLVING THE SMA
LL BOWEL MESENTERY. NO EVIDENCE OF ANY NO FLUID COLLECTION. THERE IS REDUCTION OF THE UMBILICAL HERNI
A COMPARED TO LAST EXAM. THERE IS STABLE SIGMOID DIVERTICULOSIS WITHOUT DIVERTICULITIS.

 

THERE ARE PATCHY BILATERAL BASILAR PULMONARY INFILTRATES AND ATELECTASIS AND PLEURAL FLUID INCREASED 
COMPARED TO LAST EXAM.

## 2019-06-01 LAB
ALBUMIN SERPL-MCNC: 2.7 G/DL (ref 3.5–5)
ALP SERPL-CCNC: 75 U/L (ref 38–126)
ALT SERPL-CCNC: 42 U/L (ref 21–72)
ANION GAP SERPL CALC-SCNC: 2 MMOL/L
ANION GAP SERPL CALC-SCNC: 3 MMOL/L
AST SERPL-CCNC: 29 U/L (ref 17–59)
BASOPHILS # BLD AUTO: 0 K/UL (ref 0–0.2)
BASOPHILS # BLD AUTO: 0 K/UL (ref 0–0.2)
BASOPHILS NFR BLD AUTO: 0 %
BASOPHILS NFR BLD AUTO: 0 %
BUN SERPL-SCNC: 12 MG/DL (ref 9–20)
BUN SERPL-SCNC: 15 MG/DL (ref 9–20)
CALCIUM SPEC-MCNC: 8.4 MG/DL (ref 8.4–10.2)
CALCIUM SPEC-MCNC: 8.5 MG/DL (ref 8.4–10.2)
CHLORIDE SERPL-SCNC: 94 MMOL/L (ref 98–107)
CHLORIDE SERPL-SCNC: 94 MMOL/L (ref 98–107)
CO2 SERPL-SCNC: 41 MMOL/L (ref 22–30)
CO2 SERPL-SCNC: 43 MMOL/L (ref 22–30)
EOSINOPHIL # BLD AUTO: 0.1 K/UL (ref 0–0.7)
EOSINOPHIL # BLD AUTO: 0.1 K/UL (ref 0–0.7)
EOSINOPHIL NFR BLD AUTO: 0 %
EOSINOPHIL NFR BLD AUTO: 0 %
ERYTHROCYTE [DISTWIDTH] IN BLOOD BY AUTOMATED COUNT: 4.45 M/UL (ref 4.3–5.9)
ERYTHROCYTE [DISTWIDTH] IN BLOOD BY AUTOMATED COUNT: 4.49 M/UL (ref 4.3–5.9)
ERYTHROCYTE [DISTWIDTH] IN BLOOD: 16.5 % (ref 11.5–15.5)
ERYTHROCYTE [DISTWIDTH] IN BLOOD: 17.1 % (ref 11.5–15.5)
GLUCOSE BLD-MCNC: 105 MG/DL (ref 75–99)
GLUCOSE BLD-MCNC: 141 MG/DL (ref 75–99)
GLUCOSE BLD-MCNC: 144 MG/DL (ref 75–99)
GLUCOSE BLD-MCNC: 155 MG/DL (ref 75–99)
GLUCOSE SERPL-MCNC: 108 MG/DL (ref 74–99)
GLUCOSE SERPL-MCNC: 96 MG/DL (ref 74–99)
HCT VFR BLD AUTO: 42 % (ref 39–53)
HCT VFR BLD AUTO: 43.1 % (ref 39–53)
HGB BLD-MCNC: 13 GM/DL (ref 13–17.5)
HGB BLD-MCNC: 13.2 GM/DL (ref 13–17.5)
LYMPHOCYTES # SPEC AUTO: 0.6 K/UL (ref 1–4.8)
LYMPHOCYTES # SPEC AUTO: 0.9 K/UL (ref 1–4.8)
LYMPHOCYTES NFR SPEC AUTO: 2 %
LYMPHOCYTES NFR SPEC AUTO: 3 %
MAGNESIUM SPEC-SCNC: 2.1 MG/DL (ref 1.6–2.3)
MCH RBC QN AUTO: 29.2 PG (ref 25–35)
MCH RBC QN AUTO: 29.4 PG (ref 25–35)
MCHC RBC AUTO-ENTMCNC: 30.6 G/DL (ref 31–37)
MCHC RBC AUTO-ENTMCNC: 30.9 G/DL (ref 31–37)
MCV RBC AUTO: 94.4 FL (ref 80–100)
MCV RBC AUTO: 96 FL (ref 80–100)
MONOCYTES # BLD AUTO: 0.8 K/UL (ref 0–1)
MONOCYTES # BLD AUTO: 1 K/UL (ref 0–1)
MONOCYTES NFR BLD AUTO: 3 %
MONOCYTES NFR BLD AUTO: 3 %
NEUTROPHILS # BLD AUTO: 27.6 K/UL (ref 1.3–7.7)
NEUTROPHILS # BLD AUTO: 29.3 K/UL (ref 1.3–7.7)
NEUTROPHILS NFR BLD AUTO: 93 %
NEUTROPHILS NFR BLD AUTO: 95 %
PLATELET # BLD AUTO: 522 K/UL (ref 150–450)
PLATELET # BLD AUTO: 524 K/UL (ref 150–450)
POTASSIUM SERPL-SCNC: 3.5 MMOL/L (ref 3.5–5.1)
POTASSIUM SERPL-SCNC: 3.6 MMOL/L (ref 3.5–5.1)
PROT SERPL-MCNC: 5.4 G/DL (ref 6.3–8.2)
SODIUM SERPL-SCNC: 138 MMOL/L (ref 137–145)
SODIUM SERPL-SCNC: 139 MMOL/L (ref 137–145)
WBC # BLD AUTO: 29.7 K/UL (ref 3.8–10.6)
WBC # BLD AUTO: 30.9 K/UL (ref 3.8–10.6)

## 2019-06-01 RX ADMIN — METRONIDAZOLE SCH MLS/HR: 500 INJECTION, SOLUTION INTRAVENOUS at 21:21

## 2019-06-01 RX ADMIN — IPRATROPIUM BROMIDE AND ALBUTEROL SULFATE SCH ML: .5; 3 SOLUTION RESPIRATORY (INHALATION) at 14:07

## 2019-06-01 RX ADMIN — HEPARIN SODIUM SCH UNIT: 5000 INJECTION, SOLUTION INTRAVENOUS; SUBCUTANEOUS at 18:14

## 2019-06-01 RX ADMIN — FLUCONAZOLE IN SODIUM CHLORIDE SCH MLS/HR: 2 INJECTION, SOLUTION INTRAVENOUS at 08:32

## 2019-06-01 RX ADMIN — INSULIN ASPART SCH: 100 INJECTION, SOLUTION INTRAVENOUS; SUBCUTANEOUS at 21:17

## 2019-06-01 RX ADMIN — HYDROMORPHONE HYDROCHLORIDE PRN MG: 1 INJECTION, SOLUTION INTRAMUSCULAR; INTRAVENOUS; SUBCUTANEOUS at 21:59

## 2019-06-01 RX ADMIN — PIPERACILLIN AND TAZOBACTAM SCH MLS/HR: 3; .375 INJECTION, POWDER, FOR SOLUTION INTRAVENOUS at 18:14

## 2019-06-01 RX ADMIN — HYDROMORPHONE HYDROCHLORIDE PRN MG: 1 INJECTION, SOLUTION INTRAMUSCULAR; INTRAVENOUS; SUBCUTANEOUS at 12:41

## 2019-06-01 RX ADMIN — IPRATROPIUM BROMIDE AND ALBUTEROL SULFATE SCH ML: .5; 3 SOLUTION RESPIRATORY (INHALATION) at 20:16

## 2019-06-01 RX ADMIN — IPRATROPIUM BROMIDE AND ALBUTEROL SULFATE SCH ML: .5; 3 SOLUTION RESPIRATORY (INHALATION) at 03:43

## 2019-06-01 RX ADMIN — HYDROCORTISONE SODIUM SUCCINATE SCH MG: 100 INJECTION, POWDER, FOR SOLUTION INTRAMUSCULAR; INTRAVENOUS at 08:33

## 2019-06-01 RX ADMIN — FUROSEMIDE SCH MG: 20 TABLET ORAL at 08:40

## 2019-06-01 RX ADMIN — INSULIN ASPART SCH: 100 INJECTION, SOLUTION INTRAVENOUS; SUBCUTANEOUS at 12:57

## 2019-06-01 RX ADMIN — IPRATROPIUM BROMIDE AND ALBUTEROL SULFATE SCH ML: .5; 3 SOLUTION RESPIRATORY (INHALATION) at 08:45

## 2019-06-01 RX ADMIN — HYDROCODONE BITARTRATE AND ACETAMINOPHEN PRN EACH: 5; 325 TABLET ORAL at 10:55

## 2019-06-01 RX ADMIN — INSULIN ASPART SCH: 100 INJECTION, SOLUTION INTRAVENOUS; SUBCUTANEOUS at 07:24

## 2019-06-01 RX ADMIN — HYDROCORTISONE SODIUM SUCCINATE SCH MG: 100 INJECTION, POWDER, FOR SOLUTION INTRAMUSCULAR; INTRAVENOUS at 18:14

## 2019-06-01 RX ADMIN — PANTOPRAZOLE SODIUM SCH MG: 40 INJECTION, POWDER, FOR SOLUTION INTRAVENOUS at 08:41

## 2019-06-01 RX ADMIN — FUROSEMIDE SCH MG: 20 TABLET ORAL at 18:14

## 2019-06-01 RX ADMIN — HYDROCODONE BITARTRATE AND ACETAMINOPHEN PRN EACH: 5; 325 TABLET ORAL at 05:58

## 2019-06-01 RX ADMIN — HEPARIN SODIUM SCH UNIT: 5000 INJECTION, SOLUTION INTRAVENOUS; SUBCUTANEOUS at 08:38

## 2019-06-01 RX ADMIN — HYDROMORPHONE HYDROCHLORIDE PRN MG: 1 INJECTION, SOLUTION INTRAMUSCULAR; INTRAVENOUS; SUBCUTANEOUS at 00:45

## 2019-06-01 RX ADMIN — INSULIN ASPART SCH: 100 INJECTION, SOLUTION INTRAVENOUS; SUBCUTANEOUS at 18:15

## 2019-06-01 RX ADMIN — PIPERACILLIN AND TAZOBACTAM SCH MLS/HR: 3; .375 INJECTION, POWDER, FOR SOLUTION INTRAVENOUS at 10:11

## 2019-06-01 NOTE — P.PN
Subjective





This is a 69 years old male with past medical history of COPD on home oxygen, 

hyperlipidemia, rheumatoid arthritis, sleep apnea on CPAP.  Presents with 

abdominal pain , has been evaluated by surgery on admission and taken to the 

operation room found to have perforated duodenal ulcer and incarcerated 

umbilical hernia.  After the surgery patient was taken to the intensive care 

unit, he got intubated.  He was started on Zosyn, and Flagyl.  He is also on 

Protonix for DVT prophylaxis.  And he is in normal cement 120 L/h.  Patient 

currently is not on pressors.  He has J drain with 200 mL of bloody discharge 

from abdominal wound.  NG tube was about 200 mL drainage.


Currently patient is in ICU, vital showing blood pressure is 87/44, saturating 

95% on FiO2 of 50%.  Temperature 97.5.  WBC 12.4 K, potassium 5.2 sodium 135.  

Creatinine 0.7.  Urinalysis is not suspicious of infection.  Chest x-ray: 

Interstitial lung disease, endotracheal tube is in place.  CT of the abdomen and

pelvis done prior to surgery was reviewed.  EKG sinus tachycardia at 113.  No 

significant ST-T changes.





Subjective


Date of service 05/25/2019


Patient went to the ICU.  He is status post extubation.  With no chest pain or 

dyspnea.  Vitas looks stable.  Still complaining from pain at the surgical 

abdominal site.  No bowel movement yet.  WBC 14.5 K.  Creatinine 0.6.








05/26/2019


pt is in ICU, he is awake and alert asking for food, still has NG tube with more

than 300 ml drained , sluggish bowel movement, passing gas only, NG tube is 

planed to be taken off by surgery team and start on clear liquid diet tomorrow, 

sputum is growing pseudomonas and pt is currently on zosyn and diflucan, ID team

are following the pt closely , WBC 14.7K, BMP is unremarkable, vials stable





05/27/2019


Patient remains in the ICU.  His sputum culture is growing Pseudomonas, patient 

is currently on Zosyn.  Chest x-ray possible right lower pneumonia but patient 

with no respiratory symptoms.  Infectious disease are following the case 

closely.  Patient is currently on clear liquid.  Once daily, vitals are stable 

and patient is afebrile








05/28/2019


Patient seen in the ICU.  His doing well and improving with controlled abdominal

pain, isn't clear liquid diet.  Patient is afebrile and saturating 95% on 5 L of

oxygen.  He is currently on antibiotic Zosyn and fluconazole for Pseudomonas in 

his sputum.  WBC is coming back to normal at 10.5 K, hemoglobin stable at 11.5. 

His also on his steroids Cortef 3 times a day.  Continue breathing treatments 

and oxygen for his advanced COPD and interstitial pulmonary disease.  Chest x-

ray also showed nodular density and the cardiologist recommended 6 month follow-

up with CAT scan.





05/29/2019


Patient keep improving, he is awake, slightly improved but better than when he 

came seen.  He is starting his diet, his diet today has been advised to follow 

liquid diet.  Pain at surgical site is controlled and IV fluid was stopped.  

Patient is hemodynamically stable.  He saturating 97% on 9 L oxygen via high 

flow cannula.  Continue to be on Zosyn for his Pseudomonas.  Since he is 

improving his going to be transferred to the general medical floor today





05/30/2019


Patient in ICU, going to the general medical floor.  He is awake, tolerating 

diet well with no problems of 18, his abdominal pain is controlled and expected 

some residual tenderness at the surgical site.  Patient is hemodynamically 

stable.  That is been advanced.  Surgery team are following the case closely.  

Hemodynamically stable.  WBC is back to normal level.  Potassium mildly low 

sugar is controlled.  Patient is currently on Lasix 20 mg twice a day.








05/31/2019


pt was sitting at bed side, shaving and wife helping him , he was upset he has 

big bowel movement earlier in the morning, pt with no significant resp or 

urinary symptoms , he has abdominal tenderness but also he has healing abdomen 

incision from his recent surgery , he has increased in wbc from 10 to 20k , 

surgical team evaluated pt , he has ct of abd: no fluid collection , bilateral 

pulmonary infilterate. pul and ID team are following the case . pt currently is 

on zosyn





Objective





- Vital Signs


Vital signs: 


                                   Vital Signs











Temp  98 F   05/31/19 14:06


 


Pulse  100   05/31/19 16:12


 


Resp  6 L  05/31/19 15:00


 


BP  150/87   05/31/19 14:06


 


Pulse Ox  93 L  05/31/19 14:06








                                 Intake & Output











 05/31/19 05/31/19 06/01/19





 06:59 18:59 06:59


 


Intake Total  1860 


 


Output Total 430 430 


 


Balance -430 1430 


 


Intake:   


 


  IV  300 


 


    Fluconazole in NaCl,Iso-  200 





    Osm 200 mg In Saline 1   





    100ml.bag @ 100 mls/hr   





    IVPB DAILY IMT[facility] Rx#:   





    [id number]   


 


    Piperacillin-Tazobactam 3  100 





    .375 gm In Sodium   





    Chloride 0.9% 100 ml @ 25   





    mls/hr IVPB Q8HR IMT[facility] Rx#   





    [id number]   


 


  Intake, IV Titration  200 





  Amount   


 


    Magnesium Sulfate-D5w Pmx  200 





    1 gm In Dextrose/Water 1   





    100ml.bag @ 100 mls/hr   





    IVPB Q1H IMT[facility] Rx#:   





    [id number]   


 


  Oral  1360 


 


Output:   


 


  Drainage 30 30 


 


    Right Lower Abdomen 30 30 


 


  Urine 400 400 


 


Other:   


 


  Voiding Method  Indwelling Catheter 


 


  # Voids 1 2 


 


  # Bowel Movements 1 1 








                       ABP, PAP, CO, CI - Last Documented











Arterial Blood Pressure        146/47

















- Exam





GENERAL: The patient is alert oriented, not in distress


HEENT: Pupils are round and equally reacting to light. EOMI. No scleral icterus.

No conjunctival pallor. Normocephalic, atraumatic. No pharyngeal erythema. No 

thyromegaly. 


CARDIOVASCULAR: S1 and S2 present. No murmurs, rubs, or gallops. 


PULMONARY: Chest is clear to auscultation, no wheezing or crackles. 


ABDOMEN: Soft, is status post exploratory laparotomy, dressing is in place, 

wound is CLOSED.


MUSCULOSKELETAL: No joint swelling or deformity. 


EXTREMITIES: No cyanosis, clubbing, or pedal edema. 


NEUROLOGICAL: Gross neurological examination did not reveal any focal deficits. 


SKIN: No rashes.





- Labs


CBC & Chem 7: 


                                 05/31/19 07:26





                                 06/01/19 07:33


Labs: 


                  Abnormal Lab Results - Last 24 Hours (Table)











  05/31/19 05/31/19 05/31/19 Range/Units





  06:55 07:26 07:26 


 


WBC   20.9 H   (3.8-10.6)  k/uL


 


RBC   3.94 L   (4.30-5.90)  m/uL


 


Hgb   11.6 L   (13.0-17.5)  gm/dL


 


Hct   36.8 L   (39.0-53.0)  %


 


RDW   16.1 H   (11.5-15.5)  %


 


Plt Count   484 H   (150-450)  k/uL


 


Neutrophils #   19.5 H   (1.3-7.7)  k/uL


 


Lymphocytes #   0.6 L   (1.0-4.8)  k/uL


 


Sodium    136 L  (137-145)  mmol/L


 


Potassium    2.7 L*  (3.5-5.1)  mmol/L


 


Chloride    95 L  ()  mmol/L


 


Carbon Dioxide    39 H  (22-30)  mmol/L


 


Creatinine    0.57 L  (0.66-1.25)  mg/dL


 


Glucose    109 H  (74-99)  mg/dL


 


POC Glucose (mg/dL)  134 H    (75-99)  mg/dL


 


Calcium    7.9 L  (8.4-10.2)  mg/dL














  05/31/19 05/31/19 05/31/19 Range/Units





  11:07 15:17 17:08 


 


WBC     (3.8-10.6)  k/uL


 


RBC     (4.30-5.90)  m/uL


 


Hgb     (13.0-17.5)  gm/dL


 


Hct     (39.0-53.0)  %


 


RDW     (11.5-15.5)  %


 


Plt Count     (150-450)  k/uL


 


Neutrophils #     (1.3-7.7)  k/uL


 


Lymphocytes #     (1.0-4.8)  k/uL


 


Sodium     (137-145)  mmol/L


 


Potassium   3.0 L   (3.5-5.1)  mmol/L


 


Chloride     ()  mmol/L


 


Carbon Dioxide     (22-30)  mmol/L


 


Creatinine     (0.66-1.25)  mg/dL


 


Glucose     (74-99)  mg/dL


 


POC Glucose (mg/dL)  154 H   154 H  (75-99)  mg/dL


 


Calcium     (8.4-10.2)  mg/dL














  05/31/19 Range/Units





  20:22 


 


WBC   (3.8-10.6)  k/uL


 


RBC   (4.30-5.90)  m/uL


 


Hgb   (13.0-17.5)  gm/dL


 


Hct   (39.0-53.0)  %


 


RDW   (11.5-15.5)  %


 


Plt Count   (150-450)  k/uL


 


Neutrophils #   (1.3-7.7)  k/uL


 


Lymphocytes #   (1.0-4.8)  k/uL


 


Sodium   (137-145)  mmol/L


 


Potassium   (3.5-5.1)  mmol/L


 


Chloride   ()  mmol/L


 


Carbon Dioxide   (22-30)  mmol/L


 


Creatinine   (0.66-1.25)  mg/dL


 


Glucose   (74-99)  mg/dL


 


POC Glucose (mg/dL)  191 H  (75-99)  mg/dL


 


Calcium   (8.4-10.2)  mg/dL














Assessment and Plan


Assessment: 





Acute abdomen, status post exploratory laparotomy with perforated duodenal ulcer

and incarcerated umbilical hernia


Acute hypoxic respiratory failure.


Acute COPD exacerbation


Hyperlipidemia


History of rheumatoid arthritis











Plan: 





This is a 69 years old male who presents with perforated duodenal ulcers and 

incarcerated hernia, status post exploratory laparotomy.  Patient after the 

surgery went to the intensive care unit.  Status post intubating.  Critical care

team and surgery team R following the case closely.  Continue with pain 

management, IV fluids and antibiotic.Labs and medication were reviewed..  

Continue same treatment.  Continue with symptomatic treatment.  Resume home 

medication.  Monitor lytes and vitals.  DVT and GI prophylaxis.  Further 

recommendations of the clinical course of the patient


DVT prophylaxis: Subcutaneous heparin


GI Prophylaxis: PPI


Prognosis is guarded

## 2019-06-01 NOTE — P.PN
Subjective


Progress Note Date: 06/01/19


Principal diagnosis: 





Acute pneumoperitoneum secondary to perforated duodenal ulcer





69-year-old male patient came into the emergency department with a one-week 

history of abdominal pain.  He initially went to [facility] 

emergency department with a CAT scan was done and the patient was told to have 

diverticulosis without diverticulitis.  Over the past week, the patient 

developed progressive worsening his abdominal discomfort and today came into the

emergency department having more pain and the pain was rather diffuse in nature.

 He had diminished appetite, diminished oral intake and he was having no 

significant bowel movements.  No GI bleeding.  Denies having any fever or 

chills.  He was nauseated when he was getting progressively more lethargic and 

weak and short of breath.  CAT scan of the abdomen was done in the emergency 

department and showed significant inflammatory changes in the epigastric region 

involving posterior aspect of the proximal transverse colon.  There was evidence

of pneumoperitoneum around that along with some inflammatory changes as well as 

some free fluid in the abdomen.  The stomach itself appeared to be within normal

limits.  The patient has long-term history of COPD and pulmonary fibrosis.  

Based on his pulmonary function test in 2017 he has an FVC of 72% and FEV1 of 

46% and he has been steroid dependent for the past few years taking prednisone a

daily basis.  He has also underlying rheumatoid arthritis and previously he was 

taking immunosuppression with Humira none for now.  His oxygen dependent.  Blood

work showed a white cell count 16.2 with a hemoglobin of 14.9.  Platelet count 

is at 519.  He has a BUN of 27 creatinine of 0.7 and lactic acid was at 1.9 with

a troponin being less than 0.01.  LFTs are all within normal limits.  He is 

tachycardic with temperature 98.2.  He was having sinus tachycardia with a heart

rate of 120.  He is currently on 5 L of oxygen by nasal cannula with a pulse ox 

of 90%.  The patient will be taken to the operating room.  He was seen by the 

surgical team following that I've advised the patient coming back to the 

intensive care unit for further evaluation and treatment.  He may need to be 

kept intubated overnight.





On 05/27/2019, the patient is postop day #4.  The patient is doing well.  He is 

awake and alert.  History requiring high flow oxygen at 10 L.  He doesn't have 

much reserve and he desaturates easily.  NG tube was removed yesterday and this 

morning the patient was given some clear liquid diet.  Surgical wound site is 

clean.  Output from the DAVID drain is minimal in the order of 30 mL over the past 

24 hours.  Surgical wound site is dry clean and intact.  He is passing flatus.  

No bowel activity as.  He remains on IV Zosyn.  He is using incentive 

spirometer.  He is on status post hydrocortisone.  He is awake and alert.  He 

has pseudomonas aeruginosa in his sputum for which she is covered with IV Zosyn.

 No other significant events overnight.





Patient was reevaluated today on 5/28/2019, remains on high flow nasal cannula 7

L/m, continues to have significantly abnormal chest x-ray with diffuse 

interstitial lung disease consistent with pulmonary fibrosis, underlying 

pneumonia is not entirely ruled out.  His FiO2 is being titrated down, patient 

is noted to be short of breath with any activity.  His sputum was positive for 

pseudomonas aeruginosa, remains on Zosyn.  All labs were reviewed today, 

potassium is a bit low at 3.2 being corrected as per protocol.





Patient was reevaluated today on 5/29/2019, remains on high flow nasal cannula, 

90 L/m, patient is basically about the same, chest x-ray is basically about the 

same showing bibasilar interstitial lung disease consistent with pulmonary 

fibrosis.  Again underlying pneumonia is not entirely ruled out but felt to be 

less likely.  Patient is hemodynamically stable, in no distress, being treated f

or pseudomonas aeruginosa in the sputum, remains on Zosyn.  CBC is relatively 

normal WBC count is 10.5 hemoglobin is 11.5 light was normal except for low 

potassium being corrected as per protocol.  Patient continues to have good urine

output.





Reevaluated today on 5/30/2019, remains in the ICU, he is presently overflow 

from selective.  Patient remains on 8 L high flow nasal cannula, pulmonary stat

us remains marginal.  Overall it is better than expected considering his 

underlying COPD and underlying interstitial lung disease.  Patient seems to be 

recovering slowly.  Last chest x-ray showed basically chronic changes of COPD 

and interstitial lung disease.  Underlying pneumonia is definitely not entirely 

ruled out.  Patient had positive Pseudomonas in the sputum and he is being 

treated as such.  Electrolytes are normal except for low potassium being 

corrected as per protocol.  The patient himself denies shortness of breath, 

denies any pain, he is complaining of significant swelling in his upper and 

lower extremities, hence I initiated diuretics today.





On 05/31/2019 patient seen in follow-up on medical surgical floor.  He is awake 

and alert, in no acute distress, he is on 6 L of oxygen, with a pulse ox of 95-

96%, he is afebrile, hemodynamically stable.  Work on his incentive spirometer, 

achieving about 1000 on the today.  This is postop day 8 status post exploratory

laparotomy with repair of the perforated duodenal ulcer and repair of the 

incarcerated umbilical hernia.  he is doing well, he was started on some oral 

Lasix yesterday, for generalized anasarca, and he is in -1869 mL fluid balance 

over the last 24 hours.  Still has quite a bit of swelling in his upper and 

lower extremities.  His lab work has been reviewed, showing white blood cell 

count of 20.9, hemoglobin of 11.6, serum sodium is 136, potassium is 2.7, this 

being replaced per protocol, chloride is 95, CO2 39, creatinine of 0.57 and BUN 

of 13.  Patient is tolerating oral intake, he is passing bowel movements.  No 

new chest x-rays today





The patient is seen today 06/01/2019 in follow-up on the regular medical floor. 

He is awake and alert in no acute distress.  Currently sitting up at the 

bedside.  Currently on 6 L high flow nasal cannula.  He utilizes 3.5 L at home. 

He is afebrile.  Hemodynamically stable.  White count 30.9.  Hemoglobin 13.0.  

Creatinine 0.54.  Remains on Zosyn, Solu-Cortef, DuoNeb inhalations.








Objective





- Vital Signs


Vital signs: 


                                   Vital Signs











Temp  98.8 F   06/01/19 07:00


 


Pulse  100   06/01/19 08:56


 


Resp  14   06/01/19 07:00


 


BP  110/72   06/01/19 07:00


 


Pulse Ox  91 L  06/01/19 07:00








                                 Intake & Output











 05/31/19 06/01/19 06/01/19





 18:59 06:59 18:59


 


Intake Total 1860  400


 


Output Total 430 3 400


 


Balance 1430 -3 0


 


Intake:   


 


    


 


    Fluconazole in NaCl,Iso- 200  





    Osm 200 mg In Saline 1   





    100ml.bag @ 100 mls/hr   





    IVPB DAILY [facility] Rx#:   





    [id number]   


 


    Piperacillin-Tazobactam 3 100  





    .375 gm In Sodium   





    Chloride 0.9% 100 ml @ 25   





    mls/hr IVPB Q8HR [facility] Rx#   





    :[id number]   


 


  Intake, IV Titration 200  





  Amount   


 


    Magnesium Sulfate-D5w Pmx 200  





    1 gm In Dextrose/Water 1   





    100ml.bag @ 100 mls/hr   





    IVPB Q1H [facility] Rx#:   





    [id number]   


 


  Oral 1360  400


 


Output:   


 


  Drainage 30  


 


    Right Lower Abdomen 30  


 


  Urine 400  400


 


  Urine/Stool Mix  3 


 


Other:   


 


  Voiding Method Indwelling Catheter Bedside Commode Bedside Commode


 


  # Voids 2 2 2


 


  # Bowel Movements 1  1








                       ABP, PAP, CO, CI - Last Documented











Arterial Blood Pressure        146/47

















- Exam





 GENERAL EXAM: Alert, pleasant, 69-year-old male, on 6 L of oxygen, comfortable 

in no apparent distress.


HEAD: Normocephalic/atraumatic.


EYES: Normal reaction of pupils, equal size.  Conjunctiva pink, sclera white.


NOSE: Clear with pink turbinates.


THROAT: No erythema or exudates.


NECK: No masses, no JVD, no thyroid enlargement, no adenopathy.


CHEST: No chest wall deformity.  Symmetrical expansion. 


LUNGS: Equal air entry with dementia breath sounds, and basilar rales


CVS: Regular rate and rhythm, normal S1 and S2, no gallops, no murmurs, no rubs


ABDOMEN: Soft, nontender.  No hepatosplenomegaly, normal bowel sounds, no 

guarding or rigidity.


EXTREMITIES: No clubbing, generalized edema, no cyanosis, 2+ pulses and upper 

and lower extremities.


MUSCULOSKELETAL: Muscle strength and tone normal.


SPINE: No scoliosis or deformity


SKIN: No rashes


CENTRAL NERVOUS SYSTEM: No focal deficits, tone is normal in all 4 extremities.


PSYCHIATRIC: Alert and oriented -3.  Appropriate affect.  Intact judgment and 

insight.





- Labs


CBC & Chem 7: 


                                 06/01/19 07:33





                                 06/01/19 07:33


Labs: 


                  Abnormal Lab Results - Last 24 Hours (Table)











  05/31/19 05/31/19 05/31/19 Range/Units





  15:17 17:08 20:22 


 


WBC     (3.8-10.6)  k/uL


 


MCHC     (31.0-37.0)  g/dL


 


RDW     (11.5-15.5)  %


 


Plt Count     (150-450)  k/uL


 


Neutrophils #     (1.3-7.7)  k/uL


 


Lymphocytes #     (1.0-4.8)  k/uL


 


Potassium  3.0 L    (3.5-5.1)  mmol/L


 


Chloride     ()  mmol/L


 


Carbon Dioxide     (22-30)  mmol/L


 


Creatinine     (0.66-1.25)  mg/dL


 


Glucose     (74-99)  mg/dL


 


POC Glucose (mg/dL)   154 H  191 H  (75-99)  mg/dL














  06/01/19 06/01/19 06/01/19 Range/Units





  07:12 07:33 07:33 


 


WBC   30.9 H   (3.8-10.6)  k/uL


 


MCHC   30.9 L   (31.0-37.0)  g/dL


 


RDW   16.5 H   (11.5-15.5)  %


 


Plt Count   524 H   (150-450)  k/uL


 


Neutrophils #   29.3 H   (1.3-7.7)  k/uL


 


Lymphocytes #   0.6 L   (1.0-4.8)  k/uL


 


Potassium     (3.5-5.1)  mmol/L


 


Chloride    94 L  ()  mmol/L


 


Carbon Dioxide    41 H*  (22-30)  mmol/L


 


Creatinine    0.54 L  (0.66-1.25)  mg/dL


 


Glucose    108 H  (74-99)  mg/dL


 


POC Glucose (mg/dL)  105 H    (75-99)  mg/dL














  06/01/19 Range/Units





  11:47 


 


WBC   (3.8-10.6)  k/uL


 


MCHC   (31.0-37.0)  g/dL


 


RDW   (11.5-15.5)  %


 


Plt Count   (150-450)  k/uL


 


Neutrophils #   (1.3-7.7)  k/uL


 


Lymphocytes #   (1.0-4.8)  k/uL


 


Potassium   (3.5-5.1)  mmol/L


 


Chloride   ()  mmol/L


 


Carbon Dioxide   (22-30)  mmol/L


 


Creatinine   (0.66-1.25)  mg/dL


 


Glucose   (74-99)  mg/dL


 


POC Glucose (mg/dL)  141 H  (75-99)  mg/dL














Assessment and Plan


Assessment: 





 Assessment:





1 acute pneumoperitoneum secondary to perforation of duodenal ulcer status post 

treatment.  Of duodenal ulcer and repair of incarcerated umbilical hernia 





2 advanced COPD and severe pulmonary fibrosis with chronic hypoxic respiratory 

failure





3 history of rheumatoid arthritis





4 obstructive sleep apnea syndrome not compliant with CPAP.





5 chronic back pain





6 osteoarthritis





7 pseudomonas aeruginosa in the sputum, could be a colonization or could be 

secondary to pseudomonal pneumonia.  Best to continue antibiotics at present 

since the patient's pulmonary status is rather marginal.





8 acute on chronic hypoxic respiratory failure multifactorial mostly related to 

his COPD, pulmonary fibrosis, and suspect some component of 

pneumonia/pseudomonal pneumonia.





Plan:





The patient was seen and evaluated by Dr. Thakur.  He is breathing a bit easier 

today compared to yesterday.  Remains on 6 L high flow nasal cannula.  Continue 

to titrate down the FiO2 as tolerated.  Continue the current treatment plan.  

Increase his activity as tolerated.  We'll continue to follow.





I, the cosigning physician, performed a history & physical examination of the 

patient. Lungs sounds crackles in the bilateral posterior bases, diminished.  

Maintaining good O2 saturations in the 90s on 6 L high flow nasal cannula.  I 

discussed the assessment and plan of care with my nurse practitioner, Sylwia Flaherty. I attest to the above note as dictated by her.

## 2019-06-01 NOTE — P.PN
Subjective


Progress Note Date: 06/01/19




















CHIEF COMPLAINT: Perforated duodenal ulcer





HISTORY OF PRESENT ILLNESS: The patient is a 69-year-old male status post repair

of duodenal ulcer 05/23/2019.  In the last 2 days, he has acute rise in his 

white blood cell count.  Recent CT of the abdomen and pelvis show postsurgical 

changes yesterday.  Last 24 hours rise of WBC from 20-30,000.  At bedside, 

patient is lethargic and difficult to arouse.  Per discussion with nurse, A-TEAM

protocol was initiated.  Patient was refused ICU transfer.





ROS: No reports of nausea and vomiting.  Multiple bowel movements.





PHYSICAL EXAM: 


VITAL SIGNS: Reviewed


CONSTITUTIONAL:  Well developed and in no acute distress. 


EYES:  Conjuctivae without sclera icterus. Extraocular movements grossly intact.




HEAD, EARS, NOSE, THROAT: Moist buccal mucosa. Head is atraumatic, 

normocephalic. Hears conversational speech. No nasal drainage.


NECK:  Supple. No thyroidomegaly.


RESPIRATORY:  Non-labored respirations and equal bilateral excursions.


CARDIOVASCULAR:  Palpable 2+ radial pulses.  Tachycardic.


ABDOMEN: Dressing clean dry and intact.  No peritonitis.


MUSCULOSKELETAL:  No gross deformity of the lower extremities noted.  No 

clubbing.  No cyanosis.


SKIN: Good skin turgor. Well perfused. 


NEUROLOGIC: Cranial nerves I through XII grossly intact.  No focal or l

ateralizing signs. 


PSYCH: Somnolent.  Difficult to arouse.





CLINCAL LABS: White blood cell count up from 20,000 over 30,000.  Additionally 

CO2 level moderately high over 40.





STUDIES: CT of the abdomen and pelvis independent reviewed without 

pneumoperitoneum.  Postsurgical changes identified.





REPORT: Radiology report also reviewed without new masses or lesions in the 

abdomen.





ASSESSMENT: 


1.  Hypercapnia.


2.  Perforated duodenal ulcer.


3.  New sepsis.





PLAN: 


1.  With acute rise of white blood cell count and negative CT, patient high risk

for C. diff colitis which may give similar picture.  Stat C. diff assay 

obtained.  


2.  He has acute and critical changes, Flagyl started 


3.  He also has moderate CO2 retention.  Recommend repeat labs.  





With the patient critical presentation included somnolence and sepsis with 

management, critical care time 32 minutes








Objective





- Vital Signs


Vital signs: 


                                   Vital Signs











Temp  98.2 F   06/01/19 15:00


 


Pulse  119 H  06/01/19 15:00


 


Resp  16   06/01/19 15:00


 


BP  110/72   06/01/19 07:00


 


Pulse Ox  96   06/01/19 15:00








                                 Intake & Output











 05/31/19 06/01/19 06/01/19





 18:59 06:59 18:59


 


Intake Total 1860  1400


 


Output Total 430 3 400


 


Balance 1430 -3 1000


 


Intake:   


 


    200


 


    Fluconazole in NaCl,Iso- 200  100





    Osm 200 mg In Saline 1   





    100ml.bag @ 100 mls/hr   





    IVPB DAILY [facility] Rx#:   





    [id number]   


 


    Piperacillin-Tazobactam 3 100  100





    .375 gm In Sodium   





    Chloride 0.9% 100 ml @ 25   





    mls/hr IVPB Q8HR [facility] Rx#   





    [id number]   


 


  Intake, IV Titration 200  





  Amount   


 


    Magnesium Sulfate-D5w Pmx 200  





    1 gm In Dextrose/Water 1   





    100ml.bag @ 100 mls/hr   





    IVPB Q1H [facility] Rx#:   





    [id number]   


 


  Oral 1360  1200


 


Output:   


 


  Drainage 30  


 


    Right Lower Abdomen 30  


 


  Urine 400  400


 


  Urine/Stool Mix  3 


 


Other:   


 


  Voiding Method Indwelling Catheter Bedside Commode Bedside Commode


 


  # Voids 2 2 2


 


  # Bowel Movements 1  1








                       ABP, PAP, CO, CI - Last Documented











Arterial Blood Pressure        146/47

















- Labs


CBC & Chem 7: 


                                 06/01/19 07:33





                                 06/01/19 07:33


Labs: 


                  Abnormal Lab Results - Last 24 Hours (Table)











  05/31/19 06/01/19 06/01/19 Range/Units





  20:22 07:12 07:33 


 


WBC    30.9 H  (3.8-10.6)  k/uL


 


MCHC    30.9 L  (31.0-37.0)  g/dL


 


RDW    16.5 H  (11.5-15.5)  %


 


Plt Count    524 H  (150-450)  k/uL


 


Neutrophils #    29.3 H  (1.3-7.7)  k/uL


 


Lymphocytes #    0.6 L  (1.0-4.8)  k/uL


 


Chloride     ()  mmol/L


 


Carbon Dioxide     (22-30)  mmol/L


 


Creatinine     (0.66-1.25)  mg/dL


 


Glucose     (74-99)  mg/dL


 


POC Glucose (mg/dL)  191 H  105 H   (75-99)  mg/dL














  06/01/19 06/01/19 06/01/19 Range/Units





  07:33 11:47 16:52 


 


WBC     (3.8-10.6)  k/uL


 


MCHC     (31.0-37.0)  g/dL


 


RDW     (11.5-15.5)  %


 


Plt Count     (150-450)  k/uL


 


Neutrophils #     (1.3-7.7)  k/uL


 


Lymphocytes #     (1.0-4.8)  k/uL


 


Chloride  94 L    ()  mmol/L


 


Carbon Dioxide  41 H*    (22-30)  mmol/L


 


Creatinine  0.54 L    (0.66-1.25)  mg/dL


 


Glucose  108 H    (74-99)  mg/dL


 


POC Glucose (mg/dL)   141 H  144 H  (75-99)  mg/dL














Assessment and Plan


(1) Duodenal ulcer, perforated


Current Visit: Yes   Status: Acute   Code(s): K26.5 - CHRONIC OR UNSPECIFIED 

DUODENAL ULCER WITH PERFORATION   SNOMED Code(s): 55392672


   





(2) Abdominal abscess


Current Visit: Yes   Status: Acute   Code(s): XOS9556 -    SNOMED Code(s): 

45378167


   





(3) Bowel perforation


Current Visit: Yes   Status: Acute   Code(s): K63.1 - PERFORATION OF INTESTINE 

(NONTRAUMATIC)   SNOMED Code(s): 88097853


   





(4) Sepsis


Current Visit: No   Status: Acute   Code(s): A41.9 - SEPSIS, UNSPECIFIED 

ORGANISM   SNOMED Code(s): 88030318

## 2019-06-02 LAB
ANION GAP SERPL CALC-SCNC: 0 MMOL/L
BASOPHILS # BLD AUTO: 0 K/UL (ref 0–0.2)
BASOPHILS NFR BLD AUTO: 0 %
BUN SERPL-SCNC: 17 MG/DL (ref 9–20)
CALCIUM SPEC-MCNC: 7.8 MG/DL (ref 8.4–10.2)
CHLORIDE SERPL-SCNC: 96 MMOL/L (ref 98–107)
CO2 SERPL-SCNC: 44 MMOL/L (ref 22–30)
EOSINOPHIL # BLD AUTO: 0 K/UL (ref 0–0.7)
EOSINOPHIL NFR BLD AUTO: 0 %
ERYTHROCYTE [DISTWIDTH] IN BLOOD BY AUTOMATED COUNT: 3.81 M/UL (ref 4.3–5.9)
ERYTHROCYTE [DISTWIDTH] IN BLOOD: 17 % (ref 11.5–15.5)
GLUCOSE BLD-MCNC: 115 MG/DL (ref 75–99)
GLUCOSE BLD-MCNC: 134 MG/DL (ref 75–99)
GLUCOSE BLD-MCNC: 151 MG/DL (ref 75–99)
GLUCOSE BLD-MCNC: 163 MG/DL (ref 75–99)
GLUCOSE SERPL-MCNC: 114 MG/DL (ref 74–99)
HCT VFR BLD AUTO: 36.5 % (ref 39–53)
HGB BLD-MCNC: 11.3 GM/DL (ref 13–17.5)
LYMPHOCYTES # SPEC AUTO: 0.5 K/UL (ref 1–4.8)
LYMPHOCYTES NFR SPEC AUTO: 2 %
MAGNESIUM SPEC-SCNC: 2 MG/DL (ref 1.6–2.3)
MCH RBC QN AUTO: 29.7 PG (ref 25–35)
MCHC RBC AUTO-ENTMCNC: 31 G/DL (ref 31–37)
MCV RBC AUTO: 95.8 FL (ref 80–100)
MONOCYTES # BLD AUTO: 0.6 K/UL (ref 0–1)
MONOCYTES NFR BLD AUTO: 2 %
NEUTROPHILS # BLD AUTO: 23.8 K/UL (ref 1.3–7.7)
NEUTROPHILS NFR BLD AUTO: 95 %
PLATELET # BLD AUTO: 455 K/UL (ref 150–450)
POTASSIUM SERPL-SCNC: 2.9 MMOL/L (ref 3.5–5.1)
SODIUM SERPL-SCNC: 140 MMOL/L (ref 137–145)
WBC # BLD AUTO: 25 K/UL (ref 3.8–10.6)

## 2019-06-02 RX ADMIN — FUROSEMIDE SCH MG: 20 TABLET ORAL at 15:52

## 2019-06-02 RX ADMIN — HYDROCORTISONE SODIUM SUCCINATE SCH MG: 100 INJECTION, POWDER, FOR SOLUTION INTRAMUSCULAR; INTRAVENOUS at 08:05

## 2019-06-02 RX ADMIN — FUROSEMIDE SCH MG: 20 TABLET ORAL at 08:06

## 2019-06-02 RX ADMIN — HYDROCODONE BITARTRATE AND ACETAMINOPHEN PRN EACH: 5; 325 TABLET ORAL at 17:56

## 2019-06-02 RX ADMIN — PIPERACILLIN AND TAZOBACTAM SCH MLS/HR: 3; .375 INJECTION, POWDER, FOR SOLUTION INTRAVENOUS at 23:10

## 2019-06-02 RX ADMIN — METRONIDAZOLE SCH MLS/HR: 500 INJECTION, SOLUTION INTRAVENOUS at 11:31

## 2019-06-02 RX ADMIN — METRONIDAZOLE SCH MLS/HR: 500 INJECTION, SOLUTION INTRAVENOUS at 05:05

## 2019-06-02 RX ADMIN — HYDROCORTISONE SODIUM SUCCINATE SCH MG: 100 INJECTION, POWDER, FOR SOLUTION INTRAMUSCULAR; INTRAVENOUS at 15:51

## 2019-06-02 RX ADMIN — HEPARIN SODIUM SCH UNIT: 5000 INJECTION, SOLUTION INTRAVENOUS; SUBCUTANEOUS at 00:52

## 2019-06-02 RX ADMIN — HEPARIN SODIUM SCH UNIT: 5000 INJECTION, SOLUTION INTRAVENOUS; SUBCUTANEOUS at 08:05

## 2019-06-02 RX ADMIN — METRONIDAZOLE SCH MLS/HR: 500 INJECTION, SOLUTION INTRAVENOUS at 00:52

## 2019-06-02 RX ADMIN — METRONIDAZOLE SCH MLS/HR: 500 INJECTION, SOLUTION INTRAVENOUS at 12:26

## 2019-06-02 RX ADMIN — INSULIN ASPART SCH UNIT: 100 INJECTION, SOLUTION INTRAVENOUS; SUBCUTANEOUS at 17:58

## 2019-06-02 RX ADMIN — METRONIDAZOLE SCH MLS/HR: 500 INJECTION, SOLUTION INTRAVENOUS at 23:10

## 2019-06-02 RX ADMIN — METRONIDAZOLE SCH MLS/HR: 500 INJECTION, SOLUTION INTRAVENOUS at 19:00

## 2019-06-02 RX ADMIN — PIPERACILLIN AND TAZOBACTAM SCH MLS/HR: 3; .375 INJECTION, POWDER, FOR SOLUTION INTRAVENOUS at 08:04

## 2019-06-02 RX ADMIN — IPRATROPIUM BROMIDE AND ALBUTEROL SULFATE SCH ML: .5; 3 SOLUTION RESPIRATORY (INHALATION) at 08:37

## 2019-06-02 RX ADMIN — IPRATROPIUM BROMIDE AND ALBUTEROL SULFATE SCH ML: .5; 3 SOLUTION RESPIRATORY (INHALATION) at 20:42

## 2019-06-02 RX ADMIN — HYDROMORPHONE HYDROCHLORIDE PRN MG: 1 INJECTION, SOLUTION INTRAMUSCULAR; INTRAVENOUS; SUBCUTANEOUS at 19:50

## 2019-06-02 RX ADMIN — PIPERACILLIN AND TAZOBACTAM SCH MLS/HR: 3; .375 INJECTION, POWDER, FOR SOLUTION INTRAVENOUS at 15:51

## 2019-06-02 RX ADMIN — HEPARIN SODIUM SCH UNIT: 5000 INJECTION, SOLUTION INTRAVENOUS; SUBCUTANEOUS at 15:51

## 2019-06-02 RX ADMIN — INSULIN ASPART SCH UNIT: 100 INJECTION, SOLUTION INTRAVENOUS; SUBCUTANEOUS at 20:51

## 2019-06-02 RX ADMIN — PIPERACILLIN AND TAZOBACTAM SCH MLS/HR: 3; .375 INJECTION, POWDER, FOR SOLUTION INTRAVENOUS at 00:52

## 2019-06-02 RX ADMIN — HYDROCODONE BITARTRATE AND ACETAMINOPHEN PRN EACH: 5; 325 TABLET ORAL at 08:05

## 2019-06-02 RX ADMIN — HYDROCODONE BITARTRATE AND ACETAMINOPHEN PRN EACH: 5; 325 TABLET ORAL at 13:25

## 2019-06-02 RX ADMIN — INSULIN ASPART SCH UNIT: 100 INJECTION, SOLUTION INTRAVENOUS; SUBCUTANEOUS at 11:31

## 2019-06-02 RX ADMIN — IPRATROPIUM BROMIDE AND ALBUTEROL SULFATE SCH ML: .5; 3 SOLUTION RESPIRATORY (INHALATION) at 03:11

## 2019-06-02 RX ADMIN — HYDROCORTISONE SODIUM SUCCINATE SCH MG: 100 INJECTION, POWDER, FOR SOLUTION INTRAMUSCULAR; INTRAVENOUS at 00:52

## 2019-06-02 RX ADMIN — HYDROCORTISONE SODIUM SUCCINATE SCH MG: 100 INJECTION, POWDER, FOR SOLUTION INTRAMUSCULAR; INTRAVENOUS at 23:11

## 2019-06-02 RX ADMIN — HYDROMORPHONE HYDROCHLORIDE PRN MG: 1 INJECTION, SOLUTION INTRAMUSCULAR; INTRAVENOUS; SUBCUTANEOUS at 16:02

## 2019-06-02 RX ADMIN — PANTOPRAZOLE SODIUM SCH MG: 40 INJECTION, POWDER, FOR SOLUTION INTRAVENOUS at 08:05

## 2019-06-02 RX ADMIN — HYDROMORPHONE HYDROCHLORIDE PRN MG: 1 INJECTION, SOLUTION INTRAMUSCULAR; INTRAVENOUS; SUBCUTANEOUS at 23:11

## 2019-06-02 RX ADMIN — FLUCONAZOLE SCH MG: 100 TABLET ORAL at 08:05

## 2019-06-02 RX ADMIN — HEPARIN SODIUM SCH UNIT: 5000 INJECTION, SOLUTION INTRAVENOUS; SUBCUTANEOUS at 23:10

## 2019-06-02 RX ADMIN — HYDROMORPHONE HYDROCHLORIDE PRN MG: 1 INJECTION, SOLUTION INTRAMUSCULAR; INTRAVENOUS; SUBCUTANEOUS at 11:27

## 2019-06-02 RX ADMIN — INSULIN ASPART SCH: 100 INJECTION, SOLUTION INTRAVENOUS; SUBCUTANEOUS at 07:26

## 2019-06-02 RX ADMIN — IPRATROPIUM BROMIDE AND ALBUTEROL SULFATE SCH ML: .5; 3 SOLUTION RESPIRATORY (INHALATION) at 13:30

## 2019-06-02 NOTE — P.PN
Subjective


Progress Note Date: 06/02/19




















CHIEF COMPLAINT: Perforated duodenal ulcer





HISTORY OF PRESENT ILLNESS: The patient is a 69-year-old male status post repair

of duodenal ulcer 05/23/2019.  Yesterday, he had an acute event with altered 

sensorium, somnolence requiring A-team evaluation.  Today he is awake, alert, 

tolerating diet.  "Feel much better."  White blood cell count improving after 

adding Flagyl yesterday for C. diff/colitis.  Additionally, C. diff assay was 

not ran as stool was not liquid per discussion with nurse





ROS: No reports of nausea and vomiting.  Multiple bowel movements.  No active 

dyspnea on exertion.





PHYSICAL EXAM: 


VITAL SIGNS: Reviewed


CONSTITUTIONAL:  Well developed and in no acute distress. 


EYES:  Conjuctivae without sclera icterus. Extraocular movements grossly intact.




HEAD, EARS, NOSE, THROAT: Moist buccal mucosa. Head is atraumatic, 

normocephalic. Hears conversational speech. No nasal drainage.


NECK:  Supple. No thyroidomegaly.


RESPIRATORY:  Non-labored respirations and equal bilateral excursions.


CARDIOVASCULAR:  Palpable 2+ radial pulses.  Regular rate.  Regular rhythm.


ABDOMEN: Dressing clean dry and intact.  No peritonitis.


MUSCULOSKELETAL:  No gross deformity of the lower extremities noted.  No 

clubbing.  No cyanosis.


SKIN: Good skin turgor. Well perfused. 


NEUROLOGIC: Cranial nerves I through XII grossly intact.  No focal or 

lateralizing signs. 


PSYCH: Somnolent.  Difficult to arouse.





CLINCAL LABS: White blood cell count from over 30,000-25,000 after start of 

Flagyl.





ASSESSMENT: 


1.  Hypercapnia.


2.  Perforated duodenal ulcer.


3.  New sepsis.


4.  Hypercarbia





PLAN: 


1.  Leukocytosis improved after initiation of Flagyl.  Recommend repeat WBC 

count


2.  Management of hypercarbia by pulmonology group











Objective





- Vital Signs


Vital signs: 


                                   Vital Signs











Temp  98.5 F   06/02/19 07:00


 


Pulse  97   06/02/19 13:30


 


Resp  16   06/02/19 07:27


 


BP  128/49   06/02/19 07:00


 


Pulse Ox  96   06/02/19 10:00








                                 Intake & Output











 06/01/19 06/02/19 06/02/19





 18:59 06:59 18:59


 


Intake Total 1400 200 


 


Output Total 400 50 


 


Balance 1000 150 


 


Weight   76.5 kg


 


Intake:   


 


   200 


 


    Fluconazole in NaCl,Iso- 100 100 





    Osm 200 mg In Saline 1   





    100ml.bag @ 100 mls/hr   





    IVPB DAILY [facility] Rx#:   





    [id number]   


 


    Piperacillin-Tazobactam 3 100 100 





    .375 gm In Sodium   





    Chloride 0.9% 100 ml @ 25   





    mls/hr IVPB Q8HR [facility] Rx#   





    :[id number]   


 


  Oral 1200  


 


Output:   


 


  Drainage  50 


 


    Right Lower Abdomen  50 


 


  Urine 400  


 


Other:   


 


  Voiding Method Bedside Commode  Bedside Commode


 


  # Voids 2 1 


 


  # Bowel Movements 1 1 








                       ABP, PAP, CO, CI - Last Documented











Arterial Blood Pressure        146/47

















- Labs


CBC & Chem 7: 


                                 06/02/19 08:10





                                 06/02/19 08:10


Labs: 


                  Abnormal Lab Results - Last 24 Hours (Table)











  06/01/19 06/01/19 06/01/19 Range/Units





  16:52 18:36 18:36 


 


WBC   29.7 H   (3.8-10.6)  k/uL


 


RBC     (4.30-5.90)  m/uL


 


Hgb     (13.0-17.5)  gm/dL


 


Hct     (39.0-53.0)  %


 


MCHC   30.6 L   (31.0-37.0)  g/dL


 


RDW   17.1 H   (11.5-15.5)  %


 


Plt Count   522 H   (150-450)  k/uL


 


Neutrophils #   27.6 H   (1.3-7.7)  k/uL


 


Lymphocytes #   0.9 L   (1.0-4.8)  k/uL


 


Potassium     (3.5-5.1)  mmol/L


 


Chloride    94 L  ()  mmol/L


 


Carbon Dioxide    43 H*  (22-30)  mmol/L


 


Creatinine     (0.66-1.25)  mg/dL


 


Glucose     (74-99)  mg/dL


 


POC Glucose (mg/dL)  144 H    (75-99)  mg/dL


 


Calcium     (8.4-10.2)  mg/dL


 


Total Protein    5.4 L  (6.3-8.2)  g/dL


 


Albumin    2.7 L  (3.5-5.0)  g/dL














  06/01/19 06/02/19 06/02/19 Range/Units





  20:48 07:03 08:10 


 


WBC    25.0 H  (3.8-10.6)  k/uL


 


RBC    3.81 L  (4.30-5.90)  m/uL


 


Hgb    11.3 L  (13.0-17.5)  gm/dL


 


Hct    36.5 L  (39.0-53.0)  %


 


MCHC     (31.0-37.0)  g/dL


 


RDW    17.0 H  (11.5-15.5)  %


 


Plt Count    455 H  (150-450)  k/uL


 


Neutrophils #    23.8 H  (1.3-7.7)  k/uL


 


Lymphocytes #    0.5 L  (1.0-4.8)  k/uL


 


Potassium     (3.5-5.1)  mmol/L


 


Chloride     ()  mmol/L


 


Carbon Dioxide     (22-30)  mmol/L


 


Creatinine     (0.66-1.25)  mg/dL


 


Glucose     (74-99)  mg/dL


 


POC Glucose (mg/dL)  155 H  115 H   (75-99)  mg/dL


 


Calcium     (8.4-10.2)  mg/dL


 


Total Protein     (6.3-8.2)  g/dL


 


Albumin     (3.5-5.0)  g/dL














  06/02/19 06/02/19 Range/Units





  08:10 11:17 


 


WBC    (3.8-10.6)  k/uL


 


RBC    (4.30-5.90)  m/uL


 


Hgb    (13.0-17.5)  gm/dL


 


Hct    (39.0-53.0)  %


 


MCHC    (31.0-37.0)  g/dL


 


RDW    (11.5-15.5)  %


 


Plt Count    (150-450)  k/uL


 


Neutrophils #    (1.3-7.7)  k/uL


 


Lymphocytes #    (1.0-4.8)  k/uL


 


Potassium  2.9 L   (3.5-5.1)  mmol/L


 


Chloride  96 L   ()  mmol/L


 


Carbon Dioxide  44 H*   (22-30)  mmol/L


 


Creatinine  0.57 L   (0.66-1.25)  mg/dL


 


Glucose  114 H   (74-99)  mg/dL


 


POC Glucose (mg/dL)   151 H  (75-99)  mg/dL


 


Calcium  7.8 L   (8.4-10.2)  mg/dL


 


Total Protein    (6.3-8.2)  g/dL


 


Albumin    (3.5-5.0)  g/dL














Assessment and Plan


(1) Duodenal ulcer, perforated


Current Visit: Yes   Status: Acute   Code(s): K26.5 - CHRONIC OR UNSPECIFIED 

DUODENAL ULCER WITH PERFORATION   SNOMED Code(s): 51134292


   





(2) Abdominal abscess


Current Visit: Yes   Status: Acute   Code(s): GUE1861 -    SNOMED Code(s): 

68825202


   





(3) Bowel perforation


Current Visit: Yes   Status: Acute   Code(s): K63.1 - PERFORATION OF INTESTINE 

(NONTRAUMATIC)   SNOMED Code(s): 30601511


   





(4) Sepsis


Current Visit: No   Status: Acute   Code(s): A41.9 - SEPSIS, UNSPECIFIED 

ORGANISM   SNOMED Code(s): 10447892

## 2019-06-02 NOTE — P.PN
Subjective


Progress Note Date: 06/02/19


Principal diagnosis: 


 Acute pneumoperitoneum secondary to perforated duodenal ulcer





69-year-old male patient came into the emergency department with a one-week 

history of abdominal pain.  He initially went to [facility] 

emergency department with a CAT scan was done and the patient was told to have 

diverticulosis without diverticulitis.  Over the past week, the patient 

developed progressive worsening his abdominal discomfort and today came into the

emergency department having more pain and the pain was rather diffuse in nature.

 He had diminished appetite, diminished oral intake and he was having no 

significant bowel movements.  No GI bleeding.  Denies having any fever or 

chills.  He was nauseated when he was getting progressively more lethargic and 

weak and short of breath.  CAT scan of the abdomen was done in the emergency 

department and showed significant inflammatory changes in the epigastric region 

involving posterior aspect of the proximal transverse colon.  There was evidence

of pneumoperitoneum around that along with some inflammatory changes as well as 

some free fluid in the abdomen.  The stomach itself appeared to be within normal

limits.  The patient has long-term history of COPD and pulmonary fibrosis.  

Based on his pulmonary function test in 2017 he has an FVC of 72% and FEV1 of 

46% and he has been steroid dependent for the past few years taking prednisone a

daily basis.  He has also underlying rheumatoid arthritis and previously he was 

taking immunosuppression with Humira none for now.  His oxygen dependent.  Blood

work showed a white cell count 16.2 with a hemoglobin of 14.9.  Platelet count 

is at 519.  He has a BUN of 27 creatinine of 0.7 and lactic acid was at 1.9 with

a troponin being less than 0.01.  LFTs are all within normal limits.  He is 

tachycardic with temperature 98.2.  He was having sinus tachycardia with a heart

rate of 120.  He is currently on 5 L of oxygen by nasal cannula with a pulse ox 

of 90%.  The patient will be taken to the operating room.  He was seen by the 

surgical team following that I've advised the patient coming back to the 

intensive care unit for further evaluation and treatment.  He may need to be 

kept intubated overnight.





On 05/27/2019, the patient is postop day #4.  The patient is doing well.  He is 

awake and alert.  History requiring high flow oxygen at 10 L.  He doesn't have 

much reserve and he desaturates easily.  NG tube was removed yesterday and this 

morning the patient was given some clear liquid diet.  Surgical wound site is 

clean.  Output from the DAVID drain is minimal in the order of 30 mL over the past 

24 hours.  Surgical wound site is dry clean and intact.  He is passing flatus.  

No bowel activity as.  He remains on IV Zosyn.  He is using incentive 

spirometer.  He is on status post hydrocortisone.  He is awake and alert.  He 

has pseudomonas aeruginosa in his sputum for which she is covered with IV Zosyn.

 No other significant events overnight.





Patient was reevaluated today on 5/28/2019, remains on high flow nasal cannula 7

L/m, continues to have significantly abnormal chest x-ray with diffuse 

interstitial lung disease consistent with pulmonary fibrosis, underlying 

pneumonia is not entirely ruled out.  His FiO2 is being titrated down, patient 

is noted to be short of breath with any activity.  His sputum was positive for 

pseudomonas aeruginosa, remains on Zosyn.  All labs were reviewed today, 

potassium is a bit low at 3.2 being corrected as per protocol.





Patient was reevaluated today on 5/29/2019, remains on high flow nasal cannula, 

90 L/m, patient is basically about the same, chest x-ray is basically about the 

same showing bibasilar interstitial lung disease consistent with pulmonary 

fibrosis.  Again underlying pneumonia is not entirely ruled out but felt to be 

less likely.  Patient is hemodynamically stable, in no distress, being treated f

or pseudomonas aeruginosa in the sputum, remains on Zosyn.  CBC is relatively 

normal WBC count is 10.5 hemoglobin is 11.5 light was normal except for low 

potassium being corrected as per protocol.  Patient continues to have good urine

output.





Reevaluated today on 5/30/2019, remains in the ICU, he is presently overflow 

from selective.  Patient remains on 8 L high flow nasal cannula, pulmonary stat

us remains marginal.  Overall it is better than expected considering his 

underlying COPD and underlying interstitial lung disease.  Patient seems to be 

recovering slowly.  Last chest x-ray showed basically chronic changes of COPD 

and interstitial lung disease.  Underlying pneumonia is definitely not entirely 

ruled out.  Patient had positive Pseudomonas in the sputum and he is being 

treated as such.  Electrolytes are normal except for low potassium being 

corrected as per protocol.  The patient himself denies shortness of breath, 

denies any pain, he is complaining of significant swelling in his upper and 

lower extremities, hence I initiated diuretics today.





On 05/31/2019 patient seen in follow-up on medical surgical floor.  He is awake 

and alert, in no acute distress, he is on 6 L of oxygen, with a pulse ox of 95-

96%, he is afebrile, hemodynamically stable.  Work on his incentive spirometer, 

achieving about 1000 on the today.  This is postop day 8 status post exploratory

laparotomy with repair of the perforated duodenal ulcer and repair of the 

incarcerated umbilical hernia.  he is doing well, he was started on some oral 

Lasix yesterday, for generalized anasarca, and he is in -1869 mL fluid balance 

over the last 24 hours.  Still has quite a bit of swelling in his upper and 

lower extremities.  His lab work has been reviewed, showing white blood cell 

count of 20.9, hemoglobin of 11.6, serum sodium is 136, potassium is 2.7, this 

being replaced per protocol, chloride is 95, CO2 39, creatinine of 0.57 and BUN 

of 13.  Patient is tolerating oral intake, he is passing bowel movements.  No 

new chest x-rays today





On 06/02/2019 patient seen in follow-up on medical surgical floor.  He sitting 

up in the recliner, in no acute distress, was on 15 L high flow oxygen this 

morning, and his pulse ox was 88-91%, was encouraged to deep breathe and cough, 

and oxygen level did come up to 97%, we will start weaning FiO2, encouraged deep

breathing and coughing, patient is pulling of 1000 ML on his incentive 

spirometer, no fever or chills, lung sounds reveal good air entry bilaterally, 

no significant wheezes or rhonchi, there are bilateral crackles at the bases, 

but they are limited.  We will obtain follow-up chest x-ray today, he is on 

maintenance dose of oral Lasix at 20 mg twice daily, antibiotic coverage in the 

form of Flagyl, and Zosyn, there has been no fever, no chills, hemodynamically 

stable.  He remains on stress doses of hydrocortisone, we will start weaning it 

down, patient is normally on prednisone 10 mg daily for his history of COPD.  

Tolerating oral diet, no nausea vomiting or diarrhea, mid abdominal incision is 

intact, tension sutures are intact, bowel sounds are active, surgery is 

following, CT of abdomen and pelvis showed decrease in fluid accumulation 

involving the small bowel mesentery, no evidence of any fluid collection, there 

is a reduction of the umbilical hernia compared to last exam, and stable sigmoid

diverticulosis without diverticulitis.  It also showed patchy bilateral basilar 

pulmonary infiltrates and atelectasis and pleural fluid. 








Objective





- Vital Signs


Vital signs: 


                                   Vital Signs











Temp  98.5 F   06/02/19 07:00


 


Pulse  92   06/02/19 08:38


 


Resp  16   06/02/19 07:27


 


BP  128/49   06/02/19 07:00


 


Pulse Ox  91 L  06/02/19 07:00








                                 Intake & Output











 06/01/19 06/02/19 06/02/19





 18:59 06:59 18:59


 


Intake Total 1400 200 


 


Output Total 400 50 


 


Balance 1000 150 


 


Intake:   


 


   200 


 


    Fluconazole in NaCl,Iso- 100 100 





    Osm 200 mg In Saline 1   





    100ml.bag @ 100 mls/hr   





    IVPB DAILY IMTIAZ Rx#:   





    [id number]   


 


    Piperacillin-Tazobactam 3 100 100 





    .375 gm In Sodium   





    Chloride 0.9% 100 ml @ 25   





    mls/hr IVPB Q8HR IMTIAZ Rx#   





    :[id number]   


 


  Oral 1200  


 


Output:   


 


  Drainage  50 


 


    Right Lower Abdomen  50 


 


  Urine 400  


 


Other:   


 


  Voiding Method Bedside Commode  Bedside Commode


 


  # Voids 2 1 


 


  # Bowel Movements 1 1 








                       ABP, PAP, CO, CI - Last Documented











Arterial Blood Pressure        146/47

















- Exam


 GENERAL EXAM: Alert, pleasant, 69-year-old white male, on 15 L of oxygen, 

comfortable in no apparent distress.


HEAD: Normocephalic/atraumatic.


EYES: Normal reaction of pupils, equal size.  Conjunctiva pink, sclera white.


NOSE: Clear with pink turbinates.


THROAT: No erythema or exudates.


NECK: No masses, no JVD, no thyroid enlargement, no adenopathy.


CHEST: No chest wall deformity.  Symmetrical expansion. 


LUNGS: Equal air entry with diminished breath sounds, and basilar rales


CVS: Regular rate and rhythm, normal S1 and S2, no gallops, no murmurs, no rubs


ABDOMEN: Soft, nontender.  No hepatosplenomegaly, normal bowel sounds, no 

guarding or rigidity.  Midabdominal incision is clean dry and intact retention 

sutures are intact, DAVID drain is compressed, draining serosanguineous fluid


EXTREMITIES: No clubbing, generalized edema, no cyanosis, 2+ pulses and upper 

and lower extremities.


MUSCULOSKELETAL: Muscle strength and tone normal.


SPINE: No scoliosis or deformity


SKIN: No rashes


CENTRAL NERVOUS SYSTEM: Alert and oriented -3.  No focal deficits, tone is 

normal in all 4 extremities.


PSYCHIATRIC: Alert and oriented -3.  Appropriate affect.  Intact judgment and 

insight.











- Labs


CBC & Chem 7: 


                                 06/01/19 18:36





                                 06/01/19 18:36


Labs: 


                  Abnormal Lab Results - Last 24 Hours (Table)











  06/01/19 06/01/19 06/01/19 Range/Units





  11:47 16:52 18:36 


 


WBC    29.7 H  (3.8-10.6)  k/uL


 


MCHC    30.6 L  (31.0-37.0)  g/dL


 


RDW    17.1 H  (11.5-15.5)  %


 


Plt Count    522 H  (150-450)  k/uL


 


Neutrophils #    27.6 H  (1.3-7.7)  k/uL


 


Lymphocytes #    0.9 L  (1.0-4.8)  k/uL


 


Chloride     ()  mmol/L


 


Carbon Dioxide     (22-30)  mmol/L


 


POC Glucose (mg/dL)  141 H  144 H   (75-99)  mg/dL


 


Total Protein     (6.3-8.2)  g/dL


 


Albumin     (3.5-5.0)  g/dL














  06/01/19 06/01/19 06/02/19 Range/Units





  18:36 20:48 07:03 


 


WBC     (3.8-10.6)  k/uL


 


MCHC     (31.0-37.0)  g/dL


 


RDW     (11.5-15.5)  %


 


Plt Count     (150-450)  k/uL


 


Neutrophils #     (1.3-7.7)  k/uL


 


Lymphocytes #     (1.0-4.8)  k/uL


 


Chloride  94 L    ()  mmol/L


 


Carbon Dioxide  43 H*    (22-30)  mmol/L


 


POC Glucose (mg/dL)   155 H  115 H  (75-99)  mg/dL


 


Total Protein  5.4 L    (6.3-8.2)  g/dL


 


Albumin  2.7 L    (3.5-5.0)  g/dL














Assessment and Plan


Plan: 


 Assessment:





1 acute pneumoperitoneum secondary to perforation of duodenal ulcer status post 

treatment.  Of duodenal ulcer and repair of incarcerated umbilical hernia 

postoperative day #8





2 advanced COPD and severe pulmonary fibrosis with chronic hypoxic respiratory 

failure





3 history of rheumatoid arthritis





4 obstructive sleep apnea syndrome not compliant with CPAP.





5 chronic back pain





6 osteoarthritis





7 pseudomonas aeruginosa in the sputum, could be a colonization or could be 

secondary to pseudomonal pneumonia.  Best to continue antibiotics at present 

since the patient's pulmonary status is rather marginal.





8 acute on chronic hypoxic respiratory failure multifactorial mostly related to 

his COPD, pulmonary fibrosis, and suspect some component of 

pneumonia/pseudomonal pneumonia.





Plan:





Wean FiO2, we'll give an extra dose of IV Lasix today, accurate I&O's, daily 

weights, repeat chest x-ray tomorrow, encourage deep breathing and coughing, we 

will decrease the dose of IV hydrocortisone to 50 mg every 8 hours, continue 

breathing treatments.  Continue to follow.





I performed a history & physical examination of the patient and discussed their 

management with my nurse practitioner, La Louie.  I reviewed the nurse 

practitioner's note and agree with the documented findings and plan of care.  

Lung sounds are positive for diminished breath sounds, with basilar rales.  The 

findings and the impression was discussed with the patient.  I attest to the 

documentation by the nurse practitioner. 





Time with Patient: Less than 30

## 2019-06-02 NOTE — P.PN
Subjective


Progress Note Date: 06/01/19


Principal diagnosis: 





Acute pneumoperitoneum secondary to perforated duodenal ulcer





06/01/2019 


patient is seen and evaluated in roon in follow-up on the regular medical floor.

 He is awake and alert in no acute distress.  Currently sitting up at the beds

maría elena.  Currently on 6 L high flow nasal cannula.  He utilizes 3.5 L at home.  He 

is afebrile.  Hemodynamically stable.  White count 30.9.  Hemoglobin 13.0.  

Creatinine 0.54.  Remains on Zosyn, Solu-Cortef, DuoNeb inhalations.








Objective





- Vital Signs


Vital signs: 


                                   Vital Signs











Temp  97.4 F L  06/01/19 01:06


 


Pulse  100   06/01/19 03:45


 


Resp  20   06/01/19 01:06


 


BP  154/85   06/01/19 01:06


 


Pulse Ox  93 L  06/01/19 01:06








                                 Intake & Output











 05/31/19 06/01/19 06/01/19





 18:59 06:59 18:59


 


Intake Total 1860  


 


Output Total 430 3 


 


Balance 1430 -3 


 


Intake:   


 


    


 


    Fluconazole in NaCl,Iso- 200  





    Osm 200 mg In Saline 1   





    100ml.bag @ 100 mls/hr   





    IVPB DAILY IMTIAZ Rx#:   





    [id number]   


 


    Piperacillin-Tazobactam 3 100  





    .375 gm In Sodium   





    Chloride 0.9% 100 ml @ 25   





    mls/hr IVPB Q8HR IMTIAZ Rx#   





    [id number]   


 


  Intake, IV Titration 200  





  Amount   


 


    Magnesium Sulfate-D5w Pmx 200  





    1 gm In Dextrose/Water 1   





    100ml.bag @ 100 mls/hr   





    IVPB Q1H IMTIAZ Rx#:   





    [id number]   


 


  Oral 1360  


 


Output:   


 


  Drainage 30  


 


    Right Lower Abdomen 30  


 


  Urine 400  


 


  Urine/Stool Mix  3 


 


Other:   


 


  Voiding Method Indwelling Catheter Bedside Commode 


 


  # Voids 2 2 


 


  # Bowel Movements 1  








                       ABP, PAP, CO, CI - Last Documented











Arterial Blood Pressure        146/47

















- Exam


GENERAL EXAM: Alert, pleasant, 69-year-old male, on 6 L of oxygen, comfortable 

in no apparent distress.


HEAD: Normocephalic/atraumatic.


EYES: Normal reaction of pupils, equal size.  Conjunctiva pink, sclera white.


NOSE: Clear with pink turbinates.


THROAT: No erythema or exudates.


NECK: No masses, no JVD, no thyroid enlargement, no adenopathy.


CHEST: No chest wall deformity.  Symmetrical expansion. 


LUNGS: Equal air entry with dementia breath sounds, and basilar rales


CVS: Regular rate and rhythm, normal S1 and S2, no gallops, no murmurs, no rubs


ABDOMEN: Soft, nontender.  No hepatosplenomegaly, normal bowel sounds, no 

guarding or rigidity.


EXTREMITIES: No clubbing, generalized edema, no cyanosis, 2+ pulses and upper 

and lower extremities.


MUSCULOSKELETAL: Muscle strength and tone normal.








- Labs


CBC & Chem 7: 


                                 06/02/19 08:10





                                 06/02/19 08:10


Labs: 


                  Abnormal Lab Results - Last 24 Hours (Table)











  05/31/19 05/31/19 05/31/19 Range/Units





  07:26 07:26 11:07 


 


WBC  20.9 H    (3.8-10.6)  k/uL


 


RBC  3.94 L    (4.30-5.90)  m/uL


 


Hgb  11.6 L    (13.0-17.5)  gm/dL


 


Hct  36.8 L    (39.0-53.0)  %


 


RDW  16.1 H    (11.5-15.5)  %


 


Plt Count  484 H    (150-450)  k/uL


 


Neutrophils #  19.5 H    (1.3-7.7)  k/uL


 


Lymphocytes #  0.6 L    (1.0-4.8)  k/uL


 


Sodium   136 L   (137-145)  mmol/L


 


Potassium   2.7 L*   (3.5-5.1)  mmol/L


 


Chloride   95 L   ()  mmol/L


 


Carbon Dioxide   39 H   (22-30)  mmol/L


 


Creatinine   0.57 L   (0.66-1.25)  mg/dL


 


Glucose   109 H   (74-99)  mg/dL


 


POC Glucose (mg/dL)    154 H  (75-99)  mg/dL


 


Calcium   7.9 L   (8.4-10.2)  mg/dL














  05/31/19 05/31/19 05/31/19 Range/Units





  15:17 17:08 20:22 


 


WBC     (3.8-10.6)  k/uL


 


RBC     (4.30-5.90)  m/uL


 


Hgb     (13.0-17.5)  gm/dL


 


Hct     (39.0-53.0)  %


 


RDW     (11.5-15.5)  %


 


Plt Count     (150-450)  k/uL


 


Neutrophils #     (1.3-7.7)  k/uL


 


Lymphocytes #     (1.0-4.8)  k/uL


 


Sodium     (137-145)  mmol/L


 


Potassium  3.0 L    (3.5-5.1)  mmol/L


 


Chloride     ()  mmol/L


 


Carbon Dioxide     (22-30)  mmol/L


 


Creatinine     (0.66-1.25)  mg/dL


 


Glucose     (74-99)  mg/dL


 


POC Glucose (mg/dL)   154 H  191 H  (75-99)  mg/dL


 


Calcium     (8.4-10.2)  mg/dL














  06/01/19 Range/Units





  07:12 


 


WBC   (3.8-10.6)  k/uL


 


RBC   (4.30-5.90)  m/uL


 


Hgb   (13.0-17.5)  gm/dL


 


Hct   (39.0-53.0)  %


 


RDW   (11.5-15.5)  %


 


Plt Count   (150-450)  k/uL


 


Neutrophils #   (1.3-7.7)  k/uL


 


Lymphocytes #   (1.0-4.8)  k/uL


 


Sodium   (137-145)  mmol/L


 


Potassium   (3.5-5.1)  mmol/L


 


Chloride   ()  mmol/L


 


Carbon Dioxide   (22-30)  mmol/L


 


Creatinine   (0.66-1.25)  mg/dL


 


Glucose   (74-99)  mg/dL


 


POC Glucose (mg/dL)  105 H  (75-99)  mg/dL


 


Calcium   (8.4-10.2)  mg/dL














Assessment and Plan


Plan: 


1 acute pneumoperitoneum secondary to perforation of duodenal ulcer status post 

treatment.  Of duodenal ulcer and repair of incarcerated umbilical hernia 





2 advanced COPD and severe pulmonary fibrosis with chronic hypoxic respiratory 

failure





3 history of rheumatoid arthritis





4 obstructive sleep apnea syndrome not compliant with CPAP.





5 chronic back pain





6 osteoarthritis





7 pseudomonas aeruginosa in the sputum, could be a colonization or could be 

secondary to pseudomonal pneumonia.  Best to continue antibiotics at present 

since the patient's pulmonary status is rather marginal.





8 acute on chronic hypoxic respiratory failure multifactorial mostly related to 

his COPD, pulmonary fibrosis, and suspect some component of 

pneumonia/pseudomonal pneumonia.





Time with Patient: Greater than 30

## 2019-06-02 NOTE — P.PN
Progress Note - Text


Progress Note Date: 06/02/19





PROGRESS NOTE





DATE OF SERVICE:


06/02/2019





REASON FOR FOLLOWUP:


1-Secondary peritonitis from perforated peptic ulcer disease.


2-leukocytosis likely steroid effect





INTERVAL HISTORY:


The patient remains to be afebrile.  The patient has been breathing comfortably.

 The


patient did have occasional cough but no sputum production the patient denies 


abdominal pain.  No nausea, no vomiting or any diarrhea reported by the nursing 

staff





PHYSICAL EXAMINATION:


Blood pressure 119/79 with a pulse of 104, temperature 98. He is 93% on 6 L 

high-flow


oxygen.


General description is an elderly male up in the bed in no distress.


RESPIRATORY SYSTEM: Unlabored breathing with decreased breath sounds at the 

base. No


wheeze.


HEART: S1, S2.  Regular rate and rhythm.


ABDOMEN: Soft. No tenderness.





LABS:


. White count down to 25,000 from yesterday of 29,000. BUN of 13, creatinine 

0.57.





DIAGNOSTIC IMPRESSION AND PLAN:


Patient with secondary peritonitis from perforated peptic ulcer disease.  

Patient is


status post laparotomy and repair of the same.  The patient did have a repeat 


CT of abdomen and pelvis  completed on 05/31/2019 did not show evidence of 

abscess. 


The patient white count elevation is more likely related to steroid that the 

patient has been receiving


With improvement in his white count today as his steroid dose has been cut down


We'll continue to monitor the patient closely continue with the Zosyn and 

Diflucan

## 2019-06-02 NOTE — P.PN
Subjective


Progress Note Date: 06/02/19


Principal diagnosis: 





Acute pneumoperitoneum secondary to perforated duodenal ulcer





06/01/2019 


patient is seen and evaluated in roon in follow-up on the regular medical floor.

 He is awake and alert in no acute distress.  Currently sitting up at the beds

maría elena.  Currently on 6 L high flow nasal cannula.  He utilizes 3.5 L at home.  He 

is afebrile.  Hemodynamically stable.  White count 30.9.  Hemoglobin 13.0.  

Creatinine 0.54.  Remains on Zosyn, Solu-Cortef, DuoNeb inhalations.








06/02/2019 


patient seen in follow-up on medical surgical floor.  He sitting up in the 

recliner, in no acute distress, was on 15 L high flow oxygen this morning, and 

his pulse ox was 88-91%, was encouraged to deep breathe and cough, and oxygen 

level did come up to 97%, we will start weaning FiO2, encouraged deep breathing 

and coughing, patient is pulling of 1000 ML on his incentive spirometer, no 

fever or chills, lung sounds reveal good air entry bilaterally, no significant 

wheezes or rhonchi, there are bilateral crackles at the bases, but they are 

limited.  We will obtain follow-up chest x-ray today, he is on maintenance dose 

of oral Lasix at 20 mg twice daily, antibiotic coverage in the form of Flagyl, 

and Zosyn, there has been no fever, no chills, hemodynamically stable.  He 

remains on stress doses of hydrocortisone, we will start weaning it down, 

patient is normally on prednisone 10 mg daily for his history of COPD.  

Tolerating oral diet, no nausea vomiting or diarrhea, mid abdominal incision is 

intact, tension sutures are intact, bowel sounds are active, surgery is 

following, CT of abdomen and pelvis showed decrease in fluid accumulation 

involving the small bowel mesentery, no evidence of any fluid collection, there 

is a reduction of the umbilical hernia compared to last exam, and stable sigmoid

diverticulosis without diverticulitis.  It also showed patchy bilateral basilar 

pulmonary infiltrates and atelectasis and pleural fluid. 





Objective





- Vital Signs


Vital signs: 


                                   Vital Signs











Temp  98.5 F   06/02/19 07:00


 


Pulse  92   06/02/19 08:50


 


Resp  16   06/02/19 07:27


 


BP  128/49   06/02/19 07:00


 


Pulse Ox  96   06/02/19 10:00








                                 Intake & Output











 06/01/19 06/02/19 06/02/19





 18:59 06:59 18:59


 


Intake Total 1400 200 


 


Output Total 400 50 


 


Balance 1000 150 


 


Weight   76.5 kg


 


Intake:   


 


   200 


 


    Fluconazole in NaCl,Iso- 100 100 





    Osm 200 mg In Saline 1   





    100ml.bag @ 100 mls/hr   





    IVPB DAILY [facility] Rx#:   





    [id number]   


 


    Piperacillin-Tazobactam 3 100 100 





    .375 gm In Sodium   





    Chloride 0.9% 100 ml @ 25   





    mls/hr IVPB Q8HR [facility] Rx#   





    [id number]   


 


  Oral 1200  


 


Output:   


 


  Drainage  50 


 


    Right Lower Abdomen  50 


 


  Urine 400  


 


Other:   


 


  Voiding Method Bedside Commode  Bedside Commode


 


  # Voids 2 1 


 


  # Bowel Movements 1 1 








                       ABP, PAP, CO, CI - Last Documented











Arterial Blood Pressure        146/47

















- Exam


GENERAL EXAM: Alert, pleasant, 69-year-old male, on 6 L of oxygen, comfortable 

in no apparent distress.


HEAD: Normocephalic/atraumatic.


EYES: Normal reaction of pupils, equal size.  Conjunctiva pink, sclera white.


NOSE: Clear with pink turbinates.


THROAT: No erythema or exudates.


NECK: No masses, no JVD, no thyroid enlargement, no adenopathy.


CHEST: No chest wall deformity.  Symmetrical expansion. 


LUNGS: Equal air entry with dementia breath sounds, and basilar rales


CVS: Regular rate and rhythm, normal S1 and S2, no gallops, no murmurs, no rubs


ABDOMEN: Soft, nontender.  No hepatosplenomegaly, normal bowel sounds, no 

guarding or rigidity.


EXTREMITIES: No clubbing, generalized edema, no cyanosis, 2+ pulses and upper 

and lower extremities.


MUSCULOSKELETAL: Muscle strength and tone normal.








- Labs


CBC & Chem 7: 


                                 06/02/19 08:10





                                 06/02/19 08:10


Labs: 


                  Abnormal Lab Results - Last 24 Hours (Table)











  06/01/19 06/01/19 06/01/19 Range/Units





  16:52 18:36 18:36 


 


WBC   29.7 H   (3.8-10.6)  k/uL


 


RBC     (4.30-5.90)  m/uL


 


Hgb     (13.0-17.5)  gm/dL


 


Hct     (39.0-53.0)  %


 


MCHC   30.6 L   (31.0-37.0)  g/dL


 


RDW   17.1 H   (11.5-15.5)  %


 


Plt Count   522 H   (150-450)  k/uL


 


Neutrophils #   27.6 H   (1.3-7.7)  k/uL


 


Lymphocytes #   0.9 L   (1.0-4.8)  k/uL


 


Potassium     (3.5-5.1)  mmol/L


 


Chloride    94 L  ()  mmol/L


 


Carbon Dioxide    43 H*  (22-30)  mmol/L


 


Creatinine     (0.66-1.25)  mg/dL


 


Glucose     (74-99)  mg/dL


 


POC Glucose (mg/dL)  144 H    (75-99)  mg/dL


 


Calcium     (8.4-10.2)  mg/dL


 


Total Protein    5.4 L  (6.3-8.2)  g/dL


 


Albumin    2.7 L  (3.5-5.0)  g/dL














  06/01/19 06/02/19 06/02/19 Range/Units





  20:48 07:03 08:10 


 


WBC    25.0 H  (3.8-10.6)  k/uL


 


RBC    3.81 L  (4.30-5.90)  m/uL


 


Hgb    11.3 L  (13.0-17.5)  gm/dL


 


Hct    36.5 L  (39.0-53.0)  %


 


MCHC     (31.0-37.0)  g/dL


 


RDW    17.0 H  (11.5-15.5)  %


 


Plt Count    455 H  (150-450)  k/uL


 


Neutrophils #    23.8 H  (1.3-7.7)  k/uL


 


Lymphocytes #    0.5 L  (1.0-4.8)  k/uL


 


Potassium     (3.5-5.1)  mmol/L


 


Chloride     ()  mmol/L


 


Carbon Dioxide     (22-30)  mmol/L


 


Creatinine     (0.66-1.25)  mg/dL


 


Glucose     (74-99)  mg/dL


 


POC Glucose (mg/dL)  155 H  115 H   (75-99)  mg/dL


 


Calcium     (8.4-10.2)  mg/dL


 


Total Protein     (6.3-8.2)  g/dL


 


Albumin     (3.5-5.0)  g/dL














  06/02/19 06/02/19 Range/Units





  08:10 11:17 


 


WBC    (3.8-10.6)  k/uL


 


RBC    (4.30-5.90)  m/uL


 


Hgb    (13.0-17.5)  gm/dL


 


Hct    (39.0-53.0)  %


 


MCHC    (31.0-37.0)  g/dL


 


RDW    (11.5-15.5)  %


 


Plt Count    (150-450)  k/uL


 


Neutrophils #    (1.3-7.7)  k/uL


 


Lymphocytes #    (1.0-4.8)  k/uL


 


Potassium  2.9 L   (3.5-5.1)  mmol/L


 


Chloride  96 L   ()  mmol/L


 


Carbon Dioxide  44 H*   (22-30)  mmol/L


 


Creatinine  0.57 L   (0.66-1.25)  mg/dL


 


Glucose  114 H   (74-99)  mg/dL


 


POC Glucose (mg/dL)   151 H  (75-99)  mg/dL


 


Calcium  7.8 L   (8.4-10.2)  mg/dL


 


Total Protein    (6.3-8.2)  g/dL


 


Albumin    (3.5-5.0)  g/dL














Assessment and Plan


Plan: 


1 acute pneumoperitoneum secondary to perforation of duodenal ulcer status post 

treatment.  Of duodenal ulcer and repair of incarcerated umbilical hernia 





2 advanced COPD and severe pulmonary fibrosis with chronic hypoxic respiratory f

ailure





3 history of rheumatoid arthritis





4 obstructive sleep apnea syndrome not compliant with CPAP.





5 chronic back pain





6 osteoarthritis





7 pseudomonas aeruginosa in the sputum, could be a colonization or could be 

secondary to pseudomonal pneumonia.  Best to continue antibiotics at present 

since the patient's pulmonary status is rather marginal.





8 acute on chronic hypoxic respiratory failure multifactorial mostly related to 

his COPD, pulmonary fibrosis, and suspect some component of pneumo

mariann/pseudomonal pneumonia.

## 2019-06-03 LAB
ANION GAP SERPL CALC-SCNC: 3 MMOL/L
BASOPHILS # BLD AUTO: 0 K/UL (ref 0–0.2)
BASOPHILS NFR BLD AUTO: 0 %
BUN SERPL-SCNC: 16 MG/DL (ref 9–20)
CALCIUM SPEC-MCNC: 7.7 MG/DL (ref 8.4–10.2)
CHLORIDE SERPL-SCNC: 89 MMOL/L (ref 98–107)
CO2 SERPL-SCNC: 45 MMOL/L (ref 22–30)
EOSINOPHIL # BLD AUTO: 0.1 K/UL (ref 0–0.7)
EOSINOPHIL NFR BLD AUTO: 1 %
ERYTHROCYTE [DISTWIDTH] IN BLOOD BY AUTOMATED COUNT: 4.09 M/UL (ref 4.3–5.9)
ERYTHROCYTE [DISTWIDTH] IN BLOOD: 16.9 % (ref 11.5–15.5)
GLUCOSE BLD-MCNC: 115 MG/DL (ref 75–99)
GLUCOSE BLD-MCNC: 117 MG/DL (ref 75–99)
GLUCOSE BLD-MCNC: 147 MG/DL (ref 75–99)
GLUCOSE BLD-MCNC: 157 MG/DL (ref 75–99)
GLUCOSE SERPL-MCNC: 116 MG/DL (ref 74–99)
HCT VFR BLD AUTO: 39.1 % (ref 39–53)
HGB BLD-MCNC: 12 GM/DL (ref 13–17.5)
LYMPHOCYTES # SPEC AUTO: 0.5 K/UL (ref 1–4.8)
LYMPHOCYTES NFR SPEC AUTO: 2 %
MCH RBC QN AUTO: 29.3 PG (ref 25–35)
MCHC RBC AUTO-ENTMCNC: 30.6 G/DL (ref 31–37)
MCV RBC AUTO: 95.7 FL (ref 80–100)
MONOCYTES # BLD AUTO: 0.7 K/UL (ref 0–1)
MONOCYTES NFR BLD AUTO: 3 %
NEUTROPHILS # BLD AUTO: 20.7 K/UL (ref 1.3–7.7)
NEUTROPHILS NFR BLD AUTO: 93 %
PLATELET # BLD AUTO: 493 K/UL (ref 150–450)
POTASSIUM SERPL-SCNC: 2.8 MMOL/L (ref 3.5–5.1)
SODIUM SERPL-SCNC: 137 MMOL/L (ref 137–145)
WBC # BLD AUTO: 22.1 K/UL (ref 3.8–10.6)

## 2019-06-03 RX ADMIN — IPRATROPIUM BROMIDE AND ALBUTEROL SULFATE SCH ML: .5; 3 SOLUTION RESPIRATORY (INHALATION) at 05:05

## 2019-06-03 RX ADMIN — METRONIDAZOLE SCH MLS/HR: 500 INJECTION, SOLUTION INTRAVENOUS at 05:32

## 2019-06-03 RX ADMIN — IPRATROPIUM BROMIDE AND ALBUTEROL SULFATE SCH ML: .5; 3 SOLUTION RESPIRATORY (INHALATION) at 20:32

## 2019-06-03 RX ADMIN — FUROSEMIDE SCH MG: 10 INJECTION, SOLUTION INTRAMUSCULAR; INTRAVENOUS at 15:39

## 2019-06-03 RX ADMIN — POTASSIUM CHLORIDE SCH MEQ: 20 TABLET, EXTENDED RELEASE ORAL at 15:40

## 2019-06-03 RX ADMIN — HYDROCODONE BITARTRATE AND ACETAMINOPHEN PRN EACH: 5; 325 TABLET ORAL at 02:59

## 2019-06-03 RX ADMIN — HEPARIN SODIUM SCH UNIT: 5000 INJECTION, SOLUTION INTRAVENOUS; SUBCUTANEOUS at 07:59

## 2019-06-03 RX ADMIN — HYDROMORPHONE HYDROCHLORIDE PRN MG: 1 INJECTION, SOLUTION INTRAMUSCULAR; INTRAVENOUS; SUBCUTANEOUS at 19:15

## 2019-06-03 RX ADMIN — PIPERACILLIN AND TAZOBACTAM SCH MLS/HR: 3; .375 INJECTION, POWDER, FOR SOLUTION INTRAVENOUS at 23:50

## 2019-06-03 RX ADMIN — POTASSIUM CHLORIDE SCH MEQ: 20 TABLET, EXTENDED RELEASE ORAL at 17:41

## 2019-06-03 RX ADMIN — HYDROMORPHONE HYDROCHLORIDE PRN MG: 1 INJECTION, SOLUTION INTRAMUSCULAR; INTRAVENOUS; SUBCUTANEOUS at 23:50

## 2019-06-03 RX ADMIN — METRONIDAZOLE SCH MLS/HR: 500 INJECTION, SOLUTION INTRAVENOUS at 12:18

## 2019-06-03 RX ADMIN — HYDROCORTISONE SODIUM SUCCINATE SCH MG: 100 INJECTION, POWDER, FOR SOLUTION INTRAMUSCULAR; INTRAVENOUS at 08:00

## 2019-06-03 RX ADMIN — IPRATROPIUM BROMIDE AND ALBUTEROL SULFATE SCH ML: .5; 3 SOLUTION RESPIRATORY (INHALATION) at 11:17

## 2019-06-03 RX ADMIN — FUROSEMIDE SCH MG: 20 TABLET ORAL at 07:59

## 2019-06-03 RX ADMIN — HYDROCORTISONE SODIUM SUCCINATE SCH MG: 100 INJECTION, POWDER, FOR SOLUTION INTRAMUSCULAR; INTRAVENOUS at 15:38

## 2019-06-03 RX ADMIN — PANTOPRAZOLE SODIUM SCH MG: 40 INJECTION, POWDER, FOR SOLUTION INTRAVENOUS at 07:58

## 2019-06-03 RX ADMIN — FLUCONAZOLE SCH MG: 100 TABLET ORAL at 07:59

## 2019-06-03 RX ADMIN — PIPERACILLIN AND TAZOBACTAM SCH MLS/HR: 3; .375 INJECTION, POWDER, FOR SOLUTION INTRAVENOUS at 15:40

## 2019-06-03 RX ADMIN — HYDROMORPHONE HYDROCHLORIDE PRN MG: 1 INJECTION, SOLUTION INTRAMUSCULAR; INTRAVENOUS; SUBCUTANEOUS at 21:40

## 2019-06-03 RX ADMIN — HYDROMORPHONE HYDROCHLORIDE PRN MG: 1 INJECTION, SOLUTION INTRAMUSCULAR; INTRAVENOUS; SUBCUTANEOUS at 07:59

## 2019-06-03 RX ADMIN — INSULIN ASPART SCH UNIT: 100 INJECTION, SOLUTION INTRAVENOUS; SUBCUTANEOUS at 17:41

## 2019-06-03 RX ADMIN — INSULIN ASPART SCH: 100 INJECTION, SOLUTION INTRAVENOUS; SUBCUTANEOUS at 07:51

## 2019-06-03 RX ADMIN — HYDROMORPHONE HYDROCHLORIDE PRN MG: 1 INJECTION, SOLUTION INTRAMUSCULAR; INTRAVENOUS; SUBCUTANEOUS at 11:12

## 2019-06-03 RX ADMIN — HYDROMORPHONE HYDROCHLORIDE PRN MG: 1 INJECTION, SOLUTION INTRAMUSCULAR; INTRAVENOUS; SUBCUTANEOUS at 16:16

## 2019-06-03 RX ADMIN — POTASSIUM CHLORIDE SCH MEQ: 20 TABLET, EXTENDED RELEASE ORAL at 12:16

## 2019-06-03 RX ADMIN — HEPARIN SODIUM SCH UNIT: 5000 INJECTION, SOLUTION INTRAVENOUS; SUBCUTANEOUS at 15:39

## 2019-06-03 RX ADMIN — INSULIN ASPART SCH UNIT: 100 INJECTION, SOLUTION INTRAVENOUS; SUBCUTANEOUS at 21:10

## 2019-06-03 RX ADMIN — POTASSIUM CHLORIDE SCH MEQ: 20 TABLET, EXTENDED RELEASE ORAL at 14:36

## 2019-06-03 RX ADMIN — INSULIN ASPART SCH: 100 INJECTION, SOLUTION INTRAVENOUS; SUBCUTANEOUS at 11:57

## 2019-06-03 RX ADMIN — FUROSEMIDE SCH MG: 10 INJECTION, SOLUTION INTRAMUSCULAR; INTRAVENOUS at 23:49

## 2019-06-03 RX ADMIN — HEPARIN SODIUM SCH UNIT: 5000 INJECTION, SOLUTION INTRAVENOUS; SUBCUTANEOUS at 23:49

## 2019-06-03 RX ADMIN — IPRATROPIUM BROMIDE AND ALBUTEROL SULFATE SCH: .5; 3 SOLUTION RESPIRATORY (INHALATION) at 15:57

## 2019-06-03 RX ADMIN — HYDROCORTISONE SODIUM SUCCINATE SCH MG: 100 INJECTION, POWDER, FOR SOLUTION INTRAMUSCULAR; INTRAVENOUS at 23:49

## 2019-06-03 RX ADMIN — HYDROMORPHONE HYDROCHLORIDE PRN MG: 1 INJECTION, SOLUTION INTRAMUSCULAR; INTRAVENOUS; SUBCUTANEOUS at 13:56

## 2019-06-03 RX ADMIN — HYDROMORPHONE HYDROCHLORIDE PRN MG: 1 INJECTION, SOLUTION INTRAMUSCULAR; INTRAVENOUS; SUBCUTANEOUS at 05:31

## 2019-06-03 RX ADMIN — PIPERACILLIN AND TAZOBACTAM SCH MLS/HR: 3; .375 INJECTION, POWDER, FOR SOLUTION INTRAVENOUS at 08:00

## 2019-06-03 NOTE — P.PN
Subjective





Acute pneumoperitoneum secondary to perforated duodenal ulcer





06/01/2019 


patient is seen and evaluated in roon in follow-up on the regular medical floor.

 He is awake and alert in no acute distress.  Currently sitting up at the 

bedside.  Currently on 6 L high flow nasal cannula.  He utilizes 3.5 L at home. 

He is afebrile.  Hemodynamically stable.  White count 30.9.  Hemoglobin 13.0.  

Creatinine 0.54.  Remains on Zosyn, Solu-Cortef, DuoNeb inhalations.








06/02/2019 


patient seen in follow-up on medical surgical floor.  He sitting up in the 

recliner, in no acute distress, was on 15 L high flow oxygen this morning, and 

his pulse ox was 88-91%, was encouraged to deep breathe and cough, and oxygen 

level did come up to 97%, we will start weaning FiO2, encouraged deep breathing 

and coughing, patient is pulling of 1000 ML on his incentive spirometer, no 

fever or chills, lung sounds reveal good air entry bilaterally, no significant 

wheezes or rhonchi, there are bilateral crackles at the bases, but they are 

limited.  We will obtain follow-up chest x-ray today, he is on maintenance dose 

of oral Lasix at 20 mg twice daily, antibiotic coverage in the form of Flagyl, 

and Zosyn, there has been no fever, no chills, hemodynamically stable.  He 

remains on stress doses of hydrocortisone, we will start weaning it down, 

patient is normally on prednisone 10 mg daily for his history of COPD.  To

lerating oral diet, no nausea vomiting or diarrhea, mid abdominal incision is 

intact, tension sutures are intact, bowel sounds are active, surgery is 

following, CT of abdomen and pelvis showed decrease in fluid accumulation 

involving the small bowel mesentery, no evidence of any fluid collection, there 

is a reduction of the umbilical hernia compared to last exam, and stable sigmoid

diverticulosis without diverticulitis.  It also showed patchy bilateral basilar 

pulmonary infiltrates and atelectasis and pleural fluid. 





06/03/2019


Patient is presently on 98 L of oxygen saturating at 92%.  Does have bibasilar 

crackles on exam.  Patient does appear to have advanced pulmonary fibrosis use 

in half liters of oxygen does have COPD as well.  Patient's overall prognosis is

not great same thing was discussed with the patient and talked about the comfort

care down the line if he continues to get readmitted and the explain to him that

the his pulmonary condition is incurable.  After this discussion, patient asked 

for different position.  Patient did or his bowel does have bowel sounds.











Constitutional: Denied any fatigue denied any fever.


Cardio vascular: denied any chest pain, palpitations


Gastrointestinal denied any nausea vomiting


Pulmonary: Shortness of breath is better


Neurologic denied any new focal deficits





All inpatient medications were reviewed and appropriate changes in these m

edications as dictated in the interval history and assessment and plan.








Objective





- Vital Signs


Vital signs: 


                                   Vital Signs











Temp  97.9 F   06/03/19 07:09


 


Pulse  97   06/03/19 11:29


 


Resp  18   06/03/19 08:00


 


BP  141/68   06/03/19 07:09


 


Pulse Ox  92 L  06/03/19 11:20








                                 Intake & Output











 06/02/19 06/03/19 06/03/19





 18:59 06:59 18:59


 


Output Total 20 10 


 


Balance -20 -10 


 


Weight 76.5 kg  76.5 kg


 


Output:   


 


  Drainage 20 10 


 


    Right Lower Abdomen 20 10 


 


Other:   


 


  Voiding Method Bedside Commode  








                       ABP, PAP, CO, CI - Last Documented











Arterial Blood Pressure        146/47

















- Exam








PHYSICAL EXAMINATION: 





GENERAL: The patient is alert and oriented x3, not in any acute distress.  

Obese, does have anasarca edema for both upper and lower left


HEENT: Pupils are round and equally reacting to light. EOMI. No scleral icterus.

No conjunctival pallor. Normocephalic, atraumatic. No pharyngeal erythema. No 

thyromegaly. 


CARDIOVASCULAR: S1 and S2 present. No murmurs, rubs, or gallops. 


PULMONARY: Chest is clear to auscultation, no wheezing or crackles. 


ABDOMEN: Does have bowel sounds surgical site areas are clean


MUSCULOSKELETAL: No joint swelling or deformity.


EXTREMITIES: No cyanosis, clubbing, does have edema of both upper and lower 

limbs.


NEUROLOGICAL: Gross neurological examination did not reveal any focal deficits. 


SKIN: No rashes. 

















- Labs


CBC & Chem 7: 


                                 06/03/19 11:10





                                 06/03/19 11:10


Labs: 


                  Abnormal Lab Results - Last 24 Hours (Table)











  06/02/19 06/02/19 06/03/19 Range/Units





  16:47 20:36 07:10 


 


WBC     (3.8-10.6)  k/uL


 


RBC     (4.30-5.90)  m/uL


 


Hgb     (13.0-17.5)  gm/dL


 


MCHC     (31.0-37.0)  g/dL


 


RDW     (11.5-15.5)  %


 


Plt Count     (150-450)  k/uL


 


Neutrophils #     (1.3-7.7)  k/uL


 


Lymphocytes #     (1.0-4.8)  k/uL


 


Potassium     (3.5-5.1)  mmol/L


 


Chloride     ()  mmol/L


 


Carbon Dioxide     (22-30)  mmol/L


 


Creatinine     (0.66-1.25)  mg/dL


 


Glucose     (74-99)  mg/dL


 


POC Glucose (mg/dL)  134 H  163 H  115 H  (75-99)  mg/dL


 


Calcium     (8.4-10.2)  mg/dL














  06/03/19 06/03/19 06/03/19 Range/Units





  11:10 11:10 11:37 


 


WBC  22.1 H    (3.8-10.6)  k/uL


 


RBC  4.09 L    (4.30-5.90)  m/uL


 


Hgb  12.0 L    (13.0-17.5)  gm/dL


 


MCHC  30.6 L    (31.0-37.0)  g/dL


 


RDW  16.9 H    (11.5-15.5)  %


 


Plt Count  493 H    (150-450)  k/uL


 


Neutrophils #  20.7 H    (1.3-7.7)  k/uL


 


Lymphocytes #  0.5 L    (1.0-4.8)  k/uL


 


Potassium   2.8 L   (3.5-5.1)  mmol/L


 


Chloride   89 L   ()  mmol/L


 


Carbon Dioxide   45 H*   (22-30)  mmol/L


 


Creatinine   0.57 L   (0.66-1.25)  mg/dL


 


Glucose   116 H   (74-99)  mg/dL


 


POC Glucose (mg/dL)    117 H  (75-99)  mg/dL


 


Calcium   7.7 L   (8.4-10.2)  mg/dL














Assessment and Plan


Plan: 





1 acute pneumoperitoneum secondary to perforation of duodenal ulcer status post 

treatment.  Of duodenal ulcer and repair of incarcerated umbilical hernia .  

Patient is presently on Zosyn and metronidazole and infectious disease is 

managing these.  Patient does have pseudomonas in the sputum which is 

pansensitive Zosyn is basically for intra-abdominal pathogens and the 

pseudomonas as well.





2 advanced COPD and severe pulmonary fibrosis with chronic hypoxic respiratory 

failure, presently on 8 L of oxygen patient does have acute respiratory failure 

from there is patient does have chronic hypoxic respiratory failure and uses 3 

and half liters of oxygen at home





3 history of rheumatoid arthritis





4 obstructive sleep apnea syndrome not compliant with CPAP.





5 chronic back pain





6 osteoarthritis





7 pseudomonas aeruginosa in the sputum, could be a colonization or could be 

secondary to pseudomonal pneumonia.  Best to continue antibiotics 





8 acute on chronic hypoxic respiratory failure multifactorial mostly related to 

his COPD, pulmonary fibrosis, and suspect some component of pneumonia/

pseudomonal pneumonia.





Overall prognosis is poor

## 2019-06-03 NOTE — P.PN
<Dilcia Clark - Last Filed: 06/03/19 10:40>





Subjective


Progress Note Date: 06/03/19





CHIEF COMPLAINT: Perforated duodenal ulcer





HISTORY OF PRESENT ILLNESS: Patient seen and examined this morning. He is 

sitting up in the chair.  Patient reports his abdominal pain is controlled. 

Denies nausea or vomiting. Tolerating PO intake but has decreased appetite. 

Passing flatus. Had a bowel movement this morning. Patient remains on 8L high 

flow cannula. He is receiving Lasix and Solu-cortef.  Chest x-ray this morning 

reveals stable trace pleural effusions and bibasilar airspace disease favored to

represent pneumonia. Pulmonary and infectious disease are following. 





PHYSICAL EXAM: 


VITAL SIGNS: Reviewed.


GENERAL: Well-developed in no acute distress. 


HEENT:  No sclera icterus. Extraocular movements grossly intact.  Moist buccal 

mucosa. Head is atraumatic, normocephalic. 


ABDOMEN:  Soft.  Nondistended. Dressing clean dry intact-changed yesterday per 

nursing. DAVID with serosanguinous drainage.


NEUROLOGIC: Alert and oriented. Cranial nerves II through XII grossly intact.





ASSESSMENT: 


1.  Perforated duodenal ulcer, status post exploratory laparotomy with repair of

perforated duodenal ulcer and repair of incarcerated umbilical hernia





PLAN: 


1. Continue current diet


2. CBC and BMP


3. Wound care. Dressing to be changed tomorrow per nursing.


4. Incentive spirometry


5. Activity as tolerated.


6. Continue antibiotics. Monitor WBC.


7. Pulmonary and infectious disease following. 





Nurse practitioner note has been reviewed by physician. Signing provider agrees 

with the documented findings, assessment, and plan of care. 








Objective





- Vital Signs


Vital signs: 


                                   Vital Signs











Temp  97.9 F   06/03/19 07:09


 


Pulse  98   06/03/19 07:09


 


Resp  18   06/03/19 08:00


 


BP  141/68   06/03/19 07:09


 


Pulse Ox  91 L  06/03/19 07:09








                                 Intake & Output











 06/02/19 06/03/19 06/03/19





 18:59 06:59 18:59


 


Output Total 20 10 


 


Balance -20 -10 


 


Weight 76.5 kg  


 


Output:   


 


  Drainage 20 10 


 


    Right Lower Abdomen 20 10 


 


Other:   


 


  Voiding Method Bedside Commode  








                       ABP, PAP, CO, CI - Last Documented











Arterial Blood Pressure        146/47

















- Labs


CBC & Chem 7: 


                                 06/02/19 08:10





                                 06/02/19 08:10


Labs: 


                  Abnormal Lab Results - Last 24 Hours (Table)











  06/02/19 06/02/19 06/02/19 Range/Units





  11:17 16:47 20:36 


 


POC Glucose (mg/dL)  151 H  134 H  163 H  (75-99)  mg/dL














  06/03/19 Range/Units





  07:10 


 


POC Glucose (mg/dL)  115 H  (75-99)  mg/dL














<Chivo Gomez - Last Filed: 06/03/19 13:26>





Subjective





As above.  Patient's white blood cell count remains elevated.  Patient states 

his abdominal pain is minimal at this time.  Chest x-ray shows possible 

pneumonia.  Continue pulmonary optimization.  Continue chopped diet.  Keep DAVID 

drain 1-2 days longer.  Continue local wound care.  We'll follow.





Objective





- Vital Signs


Vital signs: 


                                   Vital Signs











Temp  97.9 F   06/03/19 07:09


 


Pulse  97   06/03/19 11:29


 


Resp  18   06/03/19 08:00


 


BP  141/68   06/03/19 07:09


 


Pulse Ox  92 L  06/03/19 11:20








                                 Intake & Output











 06/02/19 06/03/19 06/03/19





 18:59 06:59 18:59


 


Output Total 20 10 


 


Balance -20 -10 


 


Weight 76.5 kg  76.5 kg


 


Output:   


 


  Drainage 20 10 


 


    Right Lower Abdomen 20 10 


 


Other:   


 


  Voiding Method Bedside Commode  








                       ABP, PAP, CO, CI - Last Documented











Arterial Blood Pressure        146/47

















- Labs


CBC & Chem 7: 


                                 06/03/19 11:10





                                 06/03/19 11:10


Labs: 


                  Abnormal Lab Results - Last 24 Hours (Table)











  06/02/19 06/02/19 06/03/19 Range/Units





  16:47 20:36 07:10 


 


WBC     (3.8-10.6)  k/uL


 


RBC     (4.30-5.90)  m/uL


 


Hgb     (13.0-17.5)  gm/dL


 


MCHC     (31.0-37.0)  g/dL


 


RDW     (11.5-15.5)  %


 


Plt Count     (150-450)  k/uL


 


Neutrophils #     (1.3-7.7)  k/uL


 


Lymphocytes #     (1.0-4.8)  k/uL


 


Potassium     (3.5-5.1)  mmol/L


 


Chloride     ()  mmol/L


 


Carbon Dioxide     (22-30)  mmol/L


 


Creatinine     (0.66-1.25)  mg/dL


 


Glucose     (74-99)  mg/dL


 


POC Glucose (mg/dL)  134 H  163 H  115 H  (75-99)  mg/dL


 


Calcium     (8.4-10.2)  mg/dL














  06/03/19 06/03/19 06/03/19 Range/Units





  11:10 11:10 11:37 


 


WBC  22.1 H    (3.8-10.6)  k/uL


 


RBC  4.09 L    (4.30-5.90)  m/uL


 


Hgb  12.0 L    (13.0-17.5)  gm/dL


 


MCHC  30.6 L    (31.0-37.0)  g/dL


 


RDW  16.9 H    (11.5-15.5)  %


 


Plt Count  493 H    (150-450)  k/uL


 


Neutrophils #  20.7 H    (1.3-7.7)  k/uL


 


Lymphocytes #  0.5 L    (1.0-4.8)  k/uL


 


Potassium   2.8 L   (3.5-5.1)  mmol/L


 


Chloride   89 L   ()  mmol/L


 


Carbon Dioxide   45 H*   (22-30)  mmol/L


 


Creatinine   0.57 L   (0.66-1.25)  mg/dL


 


Glucose   116 H   (74-99)  mg/dL


 


POC Glucose (mg/dL)    117 H  (75-99)  mg/dL


 


Calcium   7.7 L   (8.4-10.2)  mg/dL














Assessment and Plan


(1) Bowel perforation


Current Visit: Yes   Status: Acute   Code(s): K63.1 - PERFORATION OF INTESTINE 

(NONTRAUMATIC)   SNOMED Code(s): 33815371

## 2019-06-03 NOTE — P.PN
Subjective


Progress Note Date: 06/03/19


Principal diagnosis: 


 Acute pneumoperitoneum secondary to perforated duodenal ulcer





69-year-old male patient came into the emergency department with a one-week 

history of abdominal pain.  He initially went to [facility] 

emergency department with a CAT scan was done and the patient was told to have 

diverticulosis without diverticulitis.  Over the past week, the patient 

developed progressive worsening his abdominal discomfort and today came into the

emergency department having more pain and the pain was rather diffuse in nature.

 He had diminished appetite, diminished oral intake and he was having no 

significant bowel movements.  No GI bleeding.  Denies having any fever or 

chills.  He was nauseated when he was getting progressively more lethargic and 

weak and short of breath.  CAT scan of the abdomen was done in the emergency 

department and showed significant inflammatory changes in the epigastric region 

involving posterior aspect of the proximal transverse colon.  There was evidence

of pneumoperitoneum around that along with some inflammatory changes as well as 

some free fluid in the abdomen.  The stomach itself appeared to be within normal

limits.  The patient has long-term history of COPD and pulmonary fibrosis.  

Based on his pulmonary function test in 2017 he has an FVC of 72% and FEV1 of 

46% and he has been steroid dependent for the past few years taking prednisone a

daily basis.  He has also underlying rheumatoid arthritis and previously he was 

taking immunosuppression with Humira none for now.  His oxygen dependent.  Blood

work showed a white cell count 16.2 with a hemoglobin of 14.9.  Platelet count 

is at 519.  He has a BUN of 27 creatinine of 0.7 and lactic acid was at 1.9 with

a troponin being less than 0.01.  LFTs are all within normal limits.  He is 

tachycardic with temperature 98.2.  He was having sinus tachycardia with a heart

rate of 120.  He is currently on 5 L of oxygen by nasal cannula with a pulse ox 

of 90%.  The patient will be taken to the operating room.  He was seen by the 

surgical team following that I've advised the patient coming back to the 

intensive care unit for further evaluation and treatment.  He may need to be 

kept intubated overnight.





On 05/27/2019, the patient is postop day #4.  The patient is doing well.  He is 

awake and alert.  History requiring high flow oxygen at 10 L.  He doesn't have 

much reserve and he desaturates easily.  NG tube was removed yesterday and this 

morning the patient was given some clear liquid diet.  Surgical wound site is 

clean.  Output from the DAVID drain is minimal in the order of 30 mL over the past 

24 hours.  Surgical wound site is dry clean and intact.  He is passing flatus.  

No bowel activity as.  He remains on IV Zosyn.  He is using incentive 

spirometer.  He is on status post hydrocortisone.  He is awake and alert.  He 

has pseudomonas aeruginosa in his sputum for which she is covered with IV Zosyn.

 No other significant events overnight.





Patient was reevaluated today on 5/28/2019, remains on high flow nasal cannula 7

L/m, continues to have significantly abnormal chest x-ray with diffuse 

interstitial lung disease consistent with pulmonary fibrosis, underlying 

pneumonia is not entirely ruled out.  His FiO2 is being titrated down, patient 

is noted to be short of breath with any activity.  His sputum was positive for 

pseudomonas aeruginosa, remains on Zosyn.  All labs were reviewed today, 

potassium is a bit low at 3.2 being corrected as per protocol.





Patient was reevaluated today on 5/29/2019, remains on high flow nasal cannula, 

90 L/m, patient is basically about the same, chest x-ray is basically about the 

same showing bibasilar interstitial lung disease consistent with pulmonary 

fibrosis.  Again underlying pneumonia is not entirely ruled out but felt to be 

less likely.  Patient is hemodynamically stable, in no distress, being treated f

or pseudomonas aeruginosa in the sputum, remains on Zosyn.  CBC is relatively 

normal WBC count is 10.5 hemoglobin is 11.5 light was normal except for low 

potassium being corrected as per protocol.  Patient continues to have good urine

output.





Reevaluated today on 5/30/2019, remains in the ICU, he is presently overflow 

from selective.  Patient remains on 8 L high flow nasal cannula, pulmonary stat

us remains marginal.  Overall it is better than expected considering his 

underlying COPD and underlying interstitial lung disease.  Patient seems to be 

recovering slowly.  Last chest x-ray showed basically chronic changes of COPD 

and interstitial lung disease.  Underlying pneumonia is definitely not entirely 

ruled out.  Patient had positive Pseudomonas in the sputum and he is being 

treated as such.  Electrolytes are normal except for low potassium being 

corrected as per protocol.  The patient himself denies shortness of breath, 

denies any pain, he is complaining of significant swelling in his upper and 

lower extremities, hence I initiated diuretics today.





On 05/31/2019 patient seen in follow-up on medical surgical floor.  He is awake 

and alert, in no acute distress, he is on 6 L of oxygen, with a pulse ox of 95-

96%, he is afebrile, hemodynamically stable.  Work on his incentive spirometer, 

achieving about 1000 on the today.  This is postop day 8 status post exploratory

laparotomy with repair of the perforated duodenal ulcer and repair of the 

incarcerated umbilical hernia.  he is doing well, he was started on some oral 

Lasix yesterday, for generalized anasarca, and he is in -1869 mL fluid balance 

over the last 24 hours.  Still has quite a bit of swelling in his upper and 

lower extremities.  His lab work has been reviewed, showing white blood cell 

count of 20.9, hemoglobin of 11.6, serum sodium is 136, potassium is 2.7, this 

being replaced per protocol, chloride is 95, CO2 39, creatinine of 0.57 and BUN 

of 13.  Patient is tolerating oral intake, he is passing bowel movements.  No 

new chest x-rays today





On 06/02/2019 patient seen in follow-up on medical surgical floor.  He sitting 

up in the recliner, in no acute distress, was on 15 L high flow oxygen this 

morning, and his pulse ox was 88-91%, was encouraged to deep breathe and cough, 

and oxygen level did come up to 97%, we will start weaning FiO2, encouraged deep

breathing and coughing, patient is pulling of 1000 ML on his incentive 

spirometer, no fever or chills, lung sounds reveal good air entry bilaterally, 

no significant wheezes or rhonchi, there are bilateral crackles at the bases, 

but they are limited.  We will obtain follow-up chest x-ray today, he is on 

maintenance dose of oral Lasix at 20 mg twice daily, antibiotic coverage in the 

form of Flagyl, and Zosyn, there has been no fever, no chills, hemodynamically 

stable.  He remains on stress doses of hydrocortisone, we will start weaning it 

down, patient is normally on prednisone 10 mg daily for his history of COPD.  

Tolerating oral diet, no nausea vomiting or diarrhea, mid abdominal incision is 

intact, tension sutures are intact, bowel sounds are active, surgery is 

following, CT of abdomen and pelvis showed decrease in fluid accumulation 

involving the small bowel mesentery, no evidence of any fluid collection, there 

is a reduction of the umbilical hernia compared to last exam, and stable sigmoid

diverticulosis without diverticulitis.  It also showed patchy bilateral basilar 

pulmonary infiltrates and atelectasis and pleural fluid.





On 06/03/2090 patient seen in follow-up on medical surgical floor.  He is awake 

and alert, in no acute distress, currently down to 8 L, with a pulse ox of 92%, 

he is afebrile, hemodynamically stable, lung sounds reveal coarse basilar 

crackles over posterior lower lobes.  No fever or chills, today's labs have been

reviewed, white blood cell count is trending down, down to 22.1, hemoglobin is 

12.0, sodium is 137, potassium is 2.8, chloride is 89, CO2 25, BUN is 16 

creatinine 0.57.  Today's chest x-ray shows bibasilar opacities and trace 

pleural effusions.  He is working on his incentive spirometry.  I&O's are 

difficult to estimate as the patient is voiding, The weight is actually up 0.5 

kg.  And patient still has quite significant amount of generalized edema.  








Objective





- Vital Signs


Vital signs: 


                                   Vital Signs











Temp  97.9 F   06/03/19 07:09


 


Pulse  97   06/03/19 11:29


 


Resp  18   06/03/19 08:00


 


BP  141/68   06/03/19 07:09


 


Pulse Ox  92 L  06/03/19 11:20








                                 Intake & Output











 06/02/19 06/03/19 06/03/19





 18:59 06:59 18:59


 


Output Total 20 10 


 


Balance -20 -10 


 


Weight 76.5 kg  76.5 kg


 


Output:   


 


  Drainage 20 10 


 


    Right Lower Abdomen 20 10 


 


Other:   


 


  Voiding Method Bedside Commode  








                       ABP, PAP, CO, CI - Last Documented











Arterial Blood Pressure        146/47

















- Exam


 GENERAL EXAM: Alert, pleasant, 69-year-old white male, on 8 L of oxygen, 

comfortable in no apparent distress.


HEAD: Normocephalic/atraumatic.


EYES: Normal reaction of pupils, equal size.  Conjunctiva pink, sclera white.


NOSE: Clear with pink turbinates.


THROAT: No erythema or exudates.


NECK: No masses, no JVD, no thyroid enlargement, no adenopathy.


CHEST: No chest wall deformity.  Symmetrical expansion. 


LUNGS: Equal air entry with diminished breath sounds, and basilar rales


CVS: Regular rate and rhythm, normal S1 and S2, no gallops, no murmurs, no rubs


ABDOMEN: Soft, nontender.  No hepatosplenomegaly, normal bowel sounds, no 

guarding or rigidity.  Midabdominal incision is clean dry and intact retention 

sutures are intact, DAVID drain is compressed, draining serosanguineous fluid


EXTREMITIES: No clubbing, generalized edema, no cyanosis, 2+ pulses and upper 

and lower extremities.


MUSCULOSKELETAL: Muscle strength and tone normal.


SPINE: No scoliosis or deformity


SKIN: No rashes


CENTRAL NERVOUS SYSTEM: Alert and oriented -3.  No focal deficits, tone is 

normal in all 4 extremities.


PSYCHIATRIC: Alert and oriented -3.  Appropriate affect.  Intact judgment and 

insight.











- Labs


CBC & Chem 7: 


                                 06/03/19 11:10





                                 06/03/19 11:10


Labs: 


                  Abnormal Lab Results - Last 24 Hours (Table)











  06/02/19 06/02/19 06/03/19 Range/Units





  16:47 20:36 07:10 


 


WBC     (3.8-10.6)  k/uL


 


RBC     (4.30-5.90)  m/uL


 


Hgb     (13.0-17.5)  gm/dL


 


MCHC     (31.0-37.0)  g/dL


 


RDW     (11.5-15.5)  %


 


Plt Count     (150-450)  k/uL


 


Neutrophils #     (1.3-7.7)  k/uL


 


Lymphocytes #     (1.0-4.8)  k/uL


 


Potassium     (3.5-5.1)  mmol/L


 


Chloride     ()  mmol/L


 


Carbon Dioxide     (22-30)  mmol/L


 


Creatinine     (0.66-1.25)  mg/dL


 


Glucose     (74-99)  mg/dL


 


POC Glucose (mg/dL)  134 H  163 H  115 H  (75-99)  mg/dL


 


Calcium     (8.4-10.2)  mg/dL














  06/03/19 06/03/19 06/03/19 Range/Units





  11:10 11:10 11:37 


 


WBC  22.1 H    (3.8-10.6)  k/uL


 


RBC  4.09 L    (4.30-5.90)  m/uL


 


Hgb  12.0 L    (13.0-17.5)  gm/dL


 


MCHC  30.6 L    (31.0-37.0)  g/dL


 


RDW  16.9 H    (11.5-15.5)  %


 


Plt Count  493 H    (150-450)  k/uL


 


Neutrophils #  20.7 H    (1.3-7.7)  k/uL


 


Lymphocytes #  0.5 L    (1.0-4.8)  k/uL


 


Potassium   2.8 L   (3.5-5.1)  mmol/L


 


Chloride   89 L   ()  mmol/L


 


Carbon Dioxide   45 H*   (22-30)  mmol/L


 


Creatinine   0.57 L   (0.66-1.25)  mg/dL


 


Glucose   116 H   (74-99)  mg/dL


 


POC Glucose (mg/dL)    117 H  (75-99)  mg/dL


 


Calcium   7.7 L   (8.4-10.2)  mg/dL














Assessment and Plan


Plan: 


 Assessment:





1 acute pneumoperitoneum secondary to perforation of duodenal ulcer status post 

treatment.  Of duodenal ulcer and repair of incarcerated umbilical hernia 

postoperative day #9





2 advanced COPD and severe pulmonary fibrosis with chronic hypoxic respiratory 

failure





3 history of rheumatoid arthritis





4 obstructive sleep apnea syndrome not compliant with CPAP.





5 chronic back pain





6 osteoarthritis





7 pseudomonas aeruginosa in the sputum, could be a colonization or could be s

econdary to pseudomonal pneumonia.  Best to continue antibiotics at present 

since the patient's pulmonary status is rather marginal.





8 acute on chronic hypoxic respiratory failure multifactorial mostly related to 

his COPD, pulmonary fibrosis, and suspect some component of 

pneumonia/pseudomonal pneumonia.





Plan:





Today's x-ray has been reviewed, and shows stable bibasilar airspace disease, 

and bilateral pleural effusions, patient still has significant generalized 

edema, anasarca, we will switch the oral Lasix to IV Lasix, acute I know's, 

daily weights, continue with current antibiotic coverage, wean FiO2, aggressive 

pulmonary toileting.  We'll continue to follow.  Repeat chest x-ray in the [facility]


I performed a history & physical examination of the patient and discussed their 

management with my nurse practitioner, La Louie.  I reviewed the nurse 

practitioner's note and agree with the documented findings and plan of care.  

Lung sounds are positive for diminished breath sounds, with basilar rales.  The 

findings and the impression was discussed with the patient.  I attest to the d

ocumentation by the nurse practitioner. 





Time with Patient: Less than 30

## 2019-06-03 NOTE — PN
PROGRESS NOTE



DATE OF SERVICE:

06/03/2019.



REASON FOR FOLLOWUP:

1. Secondary peritonitis from perforated peptic ulcer diseased.

2. Rash on the back area.

3. Leukocytosis.



INTERVAL HISTORY:

The patient is currently afebrile. The patient has been breathing comfortably.  Denies

having any chest pain.  Did have some cough.  No nausea, vomiting.  No abdominal pain.

The patient did have a developed a rash on upper back area.  Mostly vesicular, did have

some itching to it and wanted to scratch it.  No rash on any other part of the body

reported.



PHYSICAL EXAMINATION:

Blood pressure is 157/71 with a pulse of 94, temperature of 98.1.  He is 98% on 8 L

high-flow oxygen. General description is an elderly male up in the chair in no

distress.  Respiratory system:  Unlabored breathing, decreased intensity of breath

sounds.

HEART: S1, S2.  Regular rate and rhythm.  Abdomen is soft, no tenderness. Examination

of the backside did have a visible rash _____ both sides of the midline with no

evidence of cellulitis.



LABS:

Hemoglobin is 12, white count 2.1 with a BUN of 16, creatinine 0.57.



DIAGNOSTIC IMPRESSION/PLAN:

1. Patient with secondary peritonitis from a perforated _____ for repair of the same.

    Currently covered with Zosyn _____ to continue, finish therapy with oral

    antibiotics.

2. Elevated white count more likely steroid effect, showing downward trend.

3. Rash on the back, to keep the area dry and off the pressure.  No need for any

    specific lotion or cream.

Discussed with the RN and the family.  Questions were answered.





MMODL / IJN: [id number] / Job#: [id number]

## 2019-06-04 LAB
ANION GAP SERPL CALC-SCNC: 3 MMOL/L
BASOPHILS # BLD AUTO: 0 K/UL (ref 0–0.2)
BASOPHILS NFR BLD AUTO: 0 %
BUN SERPL-SCNC: 16 MG/DL (ref 9–20)
CALCIUM SPEC-MCNC: 7.8 MG/DL (ref 8.4–10.2)
CHLORIDE SERPL-SCNC: 88 MMOL/L (ref 98–107)
CO2 SERPL-SCNC: 47 MMOL/L (ref 22–30)
EOSINOPHIL # BLD AUTO: 0.1 K/UL (ref 0–0.7)
EOSINOPHIL NFR BLD AUTO: 0 %
ERYTHROCYTE [DISTWIDTH] IN BLOOD BY AUTOMATED COUNT: 4 M/UL (ref 4.3–5.9)
ERYTHROCYTE [DISTWIDTH] IN BLOOD: 17.1 % (ref 11.5–15.5)
GLUCOSE BLD-MCNC: 122 MG/DL (ref 75–99)
GLUCOSE BLD-MCNC: 138 MG/DL (ref 75–99)
GLUCOSE BLD-MCNC: 146 MG/DL (ref 75–99)
GLUCOSE BLD-MCNC: 196 MG/DL (ref 75–99)
GLUCOSE SERPL-MCNC: 164 MG/DL (ref 74–99)
HCT VFR BLD AUTO: 38 % (ref 39–53)
HGB BLD-MCNC: 11.7 GM/DL (ref 13–17.5)
LYMPHOCYTES # SPEC AUTO: 0.7 K/UL (ref 1–4.8)
LYMPHOCYTES NFR SPEC AUTO: 4 %
MAGNESIUM SPEC-SCNC: 1.7 MG/DL (ref 1.6–2.3)
MCH RBC QN AUTO: 29.3 PG (ref 25–35)
MCHC RBC AUTO-ENTMCNC: 30.8 G/DL (ref 31–37)
MCV RBC AUTO: 95.1 FL (ref 80–100)
MONOCYTES # BLD AUTO: 0.9 K/UL (ref 0–1)
MONOCYTES NFR BLD AUTO: 5 %
NEUTROPHILS # BLD AUTO: 17.6 K/UL (ref 1.3–7.7)
NEUTROPHILS NFR BLD AUTO: 91 %
PLATELET # BLD AUTO: 540 K/UL (ref 150–450)
POTASSIUM SERPL-SCNC: 2.7 MMOL/L (ref 3.5–5.1)
POTASSIUM SERPL-SCNC: 2.8 MMOL/L (ref 3.5–5.1)
SODIUM SERPL-SCNC: 138 MMOL/L (ref 137–145)
WBC # BLD AUTO: 19.4 K/UL (ref 3.8–10.6)

## 2019-06-04 RX ADMIN — INSULIN ASPART SCH UNIT: 100 INJECTION, SOLUTION INTRAVENOUS; SUBCUTANEOUS at 08:03

## 2019-06-04 RX ADMIN — PANTOPRAZOLE SODIUM SCH MG: 40 INJECTION, POWDER, FOR SOLUTION INTRAVENOUS at 08:02

## 2019-06-04 RX ADMIN — TEMAZEPAM PRN MG: 15 CAPSULE ORAL at 22:02

## 2019-06-04 RX ADMIN — HEPARIN SODIUM SCH UNIT: 5000 INJECTION, SOLUTION INTRAVENOUS; SUBCUTANEOUS at 23:55

## 2019-06-04 RX ADMIN — POTASSIUM CHLORIDE SCH MEQ: 20 TABLET, EXTENDED RELEASE ORAL at 21:23

## 2019-06-04 RX ADMIN — HYDROCORTISONE SODIUM SUCCINATE SCH MG: 100 INJECTION, POWDER, FOR SOLUTION INTRAMUSCULAR; INTRAVENOUS at 16:17

## 2019-06-04 RX ADMIN — INSULIN ASPART SCH: 100 INJECTION, SOLUTION INTRAVENOUS; SUBCUTANEOUS at 21:16

## 2019-06-04 RX ADMIN — POTASSIUM CHLORIDE SCH MEQ: 20 TABLET, EXTENDED RELEASE ORAL at 10:01

## 2019-06-04 RX ADMIN — HYDROMORPHONE HYDROCHLORIDE PRN MG: 1 INJECTION, SOLUTION INTRAMUSCULAR; INTRAVENOUS; SUBCUTANEOUS at 08:11

## 2019-06-04 RX ADMIN — HYDROCORTISONE SODIUM SUCCINATE SCH MG: 100 INJECTION, POWDER, FOR SOLUTION INTRAMUSCULAR; INTRAVENOUS at 08:01

## 2019-06-04 RX ADMIN — HYDROCODONE BITARTRATE AND ACETAMINOPHEN PRN EACH: 5; 325 TABLET ORAL at 14:01

## 2019-06-04 RX ADMIN — FUROSEMIDE SCH MG: 10 INJECTION, SOLUTION INTRAMUSCULAR; INTRAVENOUS at 23:55

## 2019-06-04 RX ADMIN — FLUCONAZOLE SCH MG: 100 TABLET ORAL at 08:02

## 2019-06-04 RX ADMIN — INSULIN ASPART SCH UNIT: 100 INJECTION, SOLUTION INTRAVENOUS; SUBCUTANEOUS at 17:36

## 2019-06-04 RX ADMIN — HYDROMORPHONE HYDROCHLORIDE PRN MG: 1 INJECTION, SOLUTION INTRAMUSCULAR; INTRAVENOUS; SUBCUTANEOUS at 03:41

## 2019-06-04 RX ADMIN — POTASSIUM CHLORIDE SCH MEQ: 20 TABLET, EXTENDED RELEASE ORAL at 11:19

## 2019-06-04 RX ADMIN — FUROSEMIDE SCH MG: 10 INJECTION, SOLUTION INTRAMUSCULAR; INTRAVENOUS at 16:17

## 2019-06-04 RX ADMIN — INSULIN ASPART SCH: 100 INJECTION, SOLUTION INTRAVENOUS; SUBCUTANEOUS at 12:30

## 2019-06-04 RX ADMIN — IPRATROPIUM BROMIDE AND ALBUTEROL SULFATE SCH ML: .5; 3 SOLUTION RESPIRATORY (INHALATION) at 00:47

## 2019-06-04 RX ADMIN — HYDROCODONE BITARTRATE AND ACETAMINOPHEN PRN EACH: 5; 325 TABLET ORAL at 23:56

## 2019-06-04 RX ADMIN — PIPERACILLIN AND TAZOBACTAM SCH MLS/HR: 3; .375 INJECTION, POWDER, FOR SOLUTION INTRAVENOUS at 08:02

## 2019-06-04 RX ADMIN — IPRATROPIUM BROMIDE AND ALBUTEROL SULFATE SCH ML: .5; 3 SOLUTION RESPIRATORY (INHALATION) at 21:11

## 2019-06-04 RX ADMIN — PIPERACILLIN AND TAZOBACTAM SCH MLS/HR: 3; .375 INJECTION, POWDER, FOR SOLUTION INTRAVENOUS at 16:17

## 2019-06-04 RX ADMIN — HYDROCORTISONE SODIUM SUCCINATE SCH MG: 100 INJECTION, POWDER, FOR SOLUTION INTRAMUSCULAR; INTRAVENOUS at 23:56

## 2019-06-04 RX ADMIN — POTASSIUM CHLORIDE SCH MEQ: 20 TABLET, EXTENDED RELEASE ORAL at 14:01

## 2019-06-04 RX ADMIN — POTASSIUM CHLORIDE SCH MEQ: 20 TABLET, EXTENDED RELEASE ORAL at 23:55

## 2019-06-04 RX ADMIN — IPRATROPIUM BROMIDE AND ALBUTEROL SULFATE SCH ML: .5; 3 SOLUTION RESPIRATORY (INHALATION) at 13:22

## 2019-06-04 RX ADMIN — PIPERACILLIN AND TAZOBACTAM SCH MLS/HR: 3; .375 INJECTION, POWDER, FOR SOLUTION INTRAVENOUS at 23:56

## 2019-06-04 RX ADMIN — HEPARIN SODIUM SCH UNIT: 5000 INJECTION, SOLUTION INTRAVENOUS; SUBCUTANEOUS at 08:01

## 2019-06-04 RX ADMIN — FUROSEMIDE SCH MG: 10 INJECTION, SOLUTION INTRAMUSCULAR; INTRAVENOUS at 08:02

## 2019-06-04 RX ADMIN — HEPARIN SODIUM SCH UNIT: 5000 INJECTION, SOLUTION INTRAVENOUS; SUBCUTANEOUS at 16:17

## 2019-06-04 RX ADMIN — IPRATROPIUM BROMIDE AND ALBUTEROL SULFATE SCH ML: .5; 3 SOLUTION RESPIRATORY (INHALATION) at 09:26

## 2019-06-04 RX ADMIN — HYDROCODONE BITARTRATE AND ACETAMINOPHEN PRN EACH: 5; 325 TABLET ORAL at 18:08

## 2019-06-04 NOTE — P.PN
Subjective


Progress Note Date: 06/04/19


Principal diagnosis: 





Acute pneumoperitoneum secondary to perforated duodenal ulcer





69-year-old male patient came into the emergency department with a one-week 

history of abdominal pain.  He initially went to [facility] 

emergency department with a CAT scan was done and the patient was told to have 

diverticulosis without diverticulitis.  Over the past week, the patient 

developed progressive worsening his abdominal discomfort and today came into the

emergency department having more pain and the pain was rather diffuse in nature.

 He had diminished appetite, diminished oral intake and he was having no 

significant bowel movements.  No GI bleeding.  Denies having any fever or 

chills.  He was nauseated when he was getting progressively more lethargic and 

weak and short of breath.  CAT scan of the abdomen was done in the emergency 

department and showed significant inflammatory changes in the epigastric region 

involving posterior aspect of the proximal transverse colon.  There was evidence

of pneumoperitoneum around that along with some inflammatory changes as well as 

some free fluid in the abdomen.  The stomach itself appeared to be within normal

limits.  The patient has long-term history of COPD and pulmonary fibrosis.  

Based on his pulmonary function test in 2017 he has an FVC of 72% and FEV1 of 

46% and he has been steroid dependent for the past few years taking prednisone a

daily basis.  He has also underlying rheumatoid arthritis and previously he was 

taking immunosuppression with Humira none for now.  His oxygen dependent.  Blood

work showed a white cell count 16.2 with a hemoglobin of 14.9.  Platelet count 

is at 519.  He has a BUN of 27 creatinine of 0.7 and lactic acid was at 1.9 with

a troponin being less than 0.01.  LFTs are all within normal limits.  He is 

tachycardic with temperature 98.2.  He was having sinus tachycardia with a heart

rate of 120.  He is currently on 5 L of oxygen by nasal cannula with a pulse ox 

of 90%.  The patient will be taken to the operating room.  He was seen by the 

surgical team following that I've advised the patient coming back to the 

intensive care unit for further evaluation and treatment.  He may need to be 

kept intubated overnight.





On 05/27/2019, the patient is postop day #4.  The patient is doing well.  He is 

awake and alert.  History requiring high flow oxygen at 10 L.  He doesn't have 

much reserve and he desaturates easily.  NG tube was removed yesterday and this 

morning the patient was given some clear liquid diet.  Surgical wound site is 

clean.  Output from the DAVID drain is minimal in the order of 30 mL over the past 

24 hours.  Surgical wound site is dry clean and intact.  He is passing flatus.  

No bowel activity as.  He remains on IV Zosyn.  He is using incentive 

spirometer.  He is on status post hydrocortisone.  He is awake and alert.  He 

has pseudomonas aeruginosa in his sputum for which she is covered with IV Zosyn.

 No other significant events overnight.





Patient was reevaluated today on 5/28/2019, remains on high flow nasal cannula 7

L/m, continues to have significantly abnormal chest x-ray with diffuse 

interstitial lung disease consistent with pulmonary fibrosis, underlying 

pneumonia is not entirely ruled out.  His FiO2 is being titrated down, patient 

is noted to be short of breath with any activity.  His sputum was positive for 

pseudomonas aeruginosa, remains on Zosyn.  All labs were reviewed today, 

potassium is a bit low at 3.2 being corrected as per protocol.





Patient was reevaluated today on 5/29/2019, remains on high flow nasal cannula, 

90 L/m, patient is basically about the same, chest x-ray is basically about the 

same showing bibasilar interstitial lung disease consistent with pulmonary 

fibrosis.  Again underlying pneumonia is not entirely ruled out but felt to be 

less likely.  Patient is hemodynamically stable, in no distress, being treated f

or pseudomonas aeruginosa in the sputum, remains on Zosyn.  CBC is relatively 

normal WBC count is 10.5 hemoglobin is 11.5 light was normal except for low 

potassium being corrected as per protocol.  Patient continues to have good urine

output.





Reevaluated today on 5/30/2019, remains in the ICU, he is presently overflow 

from selective.  Patient remains on 8 L high flow nasal cannula, pulmonary stat

us remains marginal.  Overall it is better than expected considering his 

underlying COPD and underlying interstitial lung disease.  Patient seems to be 

recovering slowly.  Last chest x-ray showed basically chronic changes of COPD 

and interstitial lung disease.  Underlying pneumonia is definitely not entirely 

ruled out.  Patient had positive Pseudomonas in the sputum and he is being 

treated as such.  Electrolytes are normal except for low potassium being 

corrected as per protocol.  The patient himself denies shortness of breath, 

denies any pain, he is complaining of significant swelling in his upper and 

lower extremities, hence I initiated diuretics today.





On 05/31/2019 patient seen in follow-up on medical surgical floor.  He is awake 

and alert, in no acute distress, he is on 6 L of oxygen, with a pulse ox of 95-

96%, he is afebrile, hemodynamically stable.  Work on his incentive spirometer, 

achieving about 1000 on the today.  This is postop day 8 status post exploratory

laparotomy with repair of the perforated duodenal ulcer and repair of the 

incarcerated umbilical hernia.  he is doing well, he was started on some oral 

Lasix yesterday, for generalized anasarca, and he is in -1869 mL fluid balance 

over the last 24 hours.  Still has quite a bit of swelling in his upper and 

lower extremities.  His lab work has been reviewed, showing white blood cell 

count of 20.9, hemoglobin of 11.6, serum sodium is 136, potassium is 2.7, this 

being replaced per protocol, chloride is 95, CO2 39, creatinine of 0.57 and BUN 

of 13.  Patient is tolerating oral intake, he is passing bowel movements.  No 

new chest x-rays today





The patient is seen today 06/01/2019 in follow-up on the regular medical floor. 

He is awake and alert in no acute distress.  Currently sitting up at the 

bedside.  Currently on 6 L high flow nasal cannula.  He utilizes 3.5 L at home. 

He is afebrile.  Hemodynamically stable.  White count 30.9.  Hemoglobin 13.0.  

Creatinine 0.54.  Remains on Zosyn, Solu-Cortef, DuoNeb inhalations.





On 06/02/2019 patient seen in follow-up on medical surgical floor.  He sitting 

up in the recliner, in no acute distress, was on 15 L high flow oxygen this 

morning, and his pulse ox was 88-91%, was encouraged to deep breathe and cough, 

and oxygen level did come up to 97%, we will start weaning FiO2, encouraged deep

breathing and coughing, patient is pulling of 1000 ML on his incentive 

spirometer, no fever or chills, lung sounds reveal good air entry bilaterally, 

no significant wheezes or rhonchi, there are bilateral crackles at the bases, 

but they are limited.  We will obtain follow-up chest x-ray today, he is on 

maintenance dose of oral Lasix at 20 mg twice daily, antibiotic coverage in the 

form of Flagyl, and Zosyn, there has been no fever, no chills, hemodynamically 

stable.  He remains on stress doses of hydrocortisone, we will start weaning it 

down, patient is normally on prednisone 10 mg daily for his history of COPD.  

Tolerating oral diet, no nausea vomiting or diarrhea, mid abdominal incision is 

intact, tension sutures are intact, bowel sounds are active, surgery is 

following, CT of abdomen and pelvis showed decrease in fluid accumulation 

involving the small bowel mesentery, no evidence of any fluid collection, there 

is a reduction of the umbilical hernia compared to last exam, and stable sigmoid

diverticulosis without diverticulitis.  It also showed patchy bilateral basilar 

pulmonary infiltrates and atelectasis and pleural fluid.





On 06/03/2090 patient seen in follow-up on medical surgical floor.  He is awake 

and alert, in no acute distress, currently down to 8 L, with a pulse ox of 92%, 

he is afebrile, hemodynamically stable, lung sounds reveal coarse basilar 

crackles over posterior lower lobes.  No fever or chills, today's labs have been

reviewed, white blood cell count is trending down, down to 22.1, hemoglobin is 

12.0, sodium is 137, potassium is 2.8, chloride is 89, CO2 25, BUN is 16 

creatinine 0.57.  Today's chest x-ray shows bibasilar opacities and trace 

pleural effusions.  He is working on his incentive spirometry.  I&O's are 

difficult to estimate as the patient is voiding, The weight is actually up 0.5 

kg.  And patient still has quite significant amount of generalized edema.  





The patient is seen today 06/04/2019 in follow-up on the regular medical floor. 

He is currently sitting up in a chair at the bedside.  Awake and alert in no 

acute distress.  He denies any worsening shortness of breath, cough or 

congestion.  6 L high flow nasal cannula with O2 saturations in the low 90s.  He

is afebrile.  Hemodynamically stable.  Chest x-ray reveals suspected underlying 

interstitial lung disease.  Difficult to exclude basilar airspace disease versus

atelectasis.  He continues to work well with the incentive spirometer. Sputum 

was positive for pseudomonas aeruginosa.  He is currently on Zosyn.  He also 

remains on IV diuretics.  White count 19.4.  Hemoglobin 11.7.  Potassium 2.7.  

Bicarb 47.  Creatinine 0.55.





Objective





- Vital Signs


Vital signs: 


                                   Vital Signs











Temp  98.4 F   06/04/19 07:46


 


Pulse  88   06/04/19 09:26


 


Resp  18   06/04/19 08:00


 


BP  139/74   06/04/19 07:46


 


Pulse Ox  92 L  06/04/19 07:46








                                 Intake & Output











 06/03/19 06/04/19 06/04/19





 18:59 06:59 18:59


 


Intake Total 800 250 150


 


Output Total 10 15 


 


Balance 790 235 150


 


Weight 76.5 kg 74.9 kg 


 


Intake:   


 


  IV  100 


 


    Piperacillin-Tazobactam 3  100 





    .375 gm In Sodium   





    Chloride 0.9% 100 ml @ 25   





    mls/hr IVPB Q8HR [facility] Rx#   





    :[id number]   


 


  Oral 800 150 150


 


Output:   


 


  Drainage 10 15 


 


    Right Lower Abdomen 10 15 


 


Other:   


 


  Voiding Method  Bedside Commode 





  Urinal 


 


  # Voids  2 


 


  # Bowel Movements   1








                       ABP, PAP, CO, CI - Last Documented











Arterial Blood Pressure        146/47

















- Exam





 GENERAL EXAM: Alert, pleasant, 69-year-old male, on 6 L of oxygen, comfortable 

in no apparent distress.


HEAD: Normocephalic/atraumatic.


EYES: Normal reaction of pupils, equal size.  Conjunctiva pink, sclera white.


NOSE: Clear with pink turbinates.


THROAT: No erythema or exudates.


NECK: No masses, no JVD, no thyroid enlargement, no adenopathy.


CHEST: Symmetric with equal expansion 


LUNGS: Equal air entry with diminished breath sounds, and basilar rales


CVS: Regular rate and rhythm, normal S1 and S2, no gallops, no murmurs, no rubs


ABDOMEN: Soft, nontender.  No hepatosplenomegaly, normal bowel sounds, no 

guarding or rigidity.


EXTREMITIES: No clubbing, generalized edema, no cyanosis, 2+ pulses and upper 

and lower extremities.


MUSCULOSKELETAL: Muscle strength and tone normal.


SPINE: No scoliosis or deformity


SKIN: No rashes


CENTRAL NERVOUS SYSTEM: No focal deficits, tone is normal in all 4 extremities.


PSYCHIATRIC: Alert and oriented -3.  Appropriate affect.  Intact judgment and 

insight.





- Labs


CBC & Chem 7: 


                                 06/04/19 07:12





                                 06/04/19 07:12


Labs: 


                  Abnormal Lab Results - Last 24 Hours (Table)











  06/03/19 06/03/19 06/03/19 Range/Units





  11:10 11:10 11:37 


 


WBC  22.1 H    (3.8-10.6)  k/uL


 


RBC  4.09 L    (4.30-5.90)  m/uL


 


Hgb  12.0 L    (13.0-17.5)  gm/dL


 


Hct     (39.0-53.0)  %


 


MCHC  30.6 L    (31.0-37.0)  g/dL


 


RDW  16.9 H    (11.5-15.5)  %


 


Plt Count  493 H    (150-450)  k/uL


 


Neutrophils #  20.7 H    (1.3-7.7)  k/uL


 


Lymphocytes #  0.5 L    (1.0-4.8)  k/uL


 


Potassium   2.8 L   (3.5-5.1)  mmol/L


 


Chloride   89 L   ()  mmol/L


 


Carbon Dioxide   45 H*   (22-30)  mmol/L


 


Creatinine   0.57 L   (0.66-1.25)  mg/dL


 


Glucose   116 H   (74-99)  mg/dL


 


POC Glucose (mg/dL)    117 H  (75-99)  mg/dL


 


Calcium   7.7 L   (8.4-10.2)  mg/dL














  06/03/19 06/03/19 06/04/19 Range/Units





  17:07 19:45 07:12 


 


WBC    19.4 H  (3.8-10.6)  k/uL


 


RBC    4.00 L  (4.30-5.90)  m/uL


 


Hgb    11.7 L  (13.0-17.5)  gm/dL


 


Hct    38.0 L  (39.0-53.0)  %


 


MCHC    30.8 L  (31.0-37.0)  g/dL


 


RDW    17.1 H  (11.5-15.5)  %


 


Plt Count    540 H  (150-450)  k/uL


 


Neutrophils #    17.6 H  (1.3-7.7)  k/uL


 


Lymphocytes #    0.7 L  (1.0-4.8)  k/uL


 


Potassium     (3.5-5.1)  mmol/L


 


Chloride     ()  mmol/L


 


Carbon Dioxide     (22-30)  mmol/L


 


Creatinine     (0.66-1.25)  mg/dL


 


Glucose     (74-99)  mg/dL


 


POC Glucose (mg/dL)  157 H  147 H   (75-99)  mg/dL


 


Calcium     (8.4-10.2)  mg/dL














  06/04/19 06/04/19 Range/Units





  07:12 07:42 


 


WBC    (3.8-10.6)  k/uL


 


RBC    (4.30-5.90)  m/uL


 


Hgb    (13.0-17.5)  gm/dL


 


Hct    (39.0-53.0)  %


 


MCHC    (31.0-37.0)  g/dL


 


RDW    (11.5-15.5)  %


 


Plt Count    (150-450)  k/uL


 


Neutrophils #    (1.3-7.7)  k/uL


 


Lymphocytes #    (1.0-4.8)  k/uL


 


Potassium  2.7 L*   (3.5-5.1)  mmol/L


 


Chloride  88 L   ()  mmol/L


 


Carbon Dioxide  47 H*   (22-30)  mmol/L


 


Creatinine  0.55 L   (0.66-1.25)  mg/dL


 


Glucose  164 H   (74-99)  mg/dL


 


POC Glucose (mg/dL)   146 H  (75-99)  mg/dL


 


Calcium  7.8 L   (8.4-10.2)  mg/dL














Assessment and Plan


Assessment: 





 Assessment:





1 acute pneumoperitoneum secondary to perforation of duodenal ulcer status post 

treatment.  Of duodenal ulcer and repair of incarcerated umbilical hernia 





2 advanced COPD and severe pulmonary fibrosis with chronic hypoxic respiratory 

failure





3 history of rheumatoid arthritis





4 obstructive sleep apnea syndrome not compliant with CPAP.





5 chronic back pain





6 osteoarthritis





7 pseudomonas aeruginosa in the sputum, could be a colonization or could be 

secondary to pseudomonal pneumonia.  Best to continue antibiotics at present 

since the patient's pulmonary status is rather marginal.





8 acute on chronic hypoxic respiratory failure multifactorial mostly related to 

his COPD, pulmonary fibrosis, and suspect some component of 

pneumonia/pseudomonal pneumonia.





Plan:





The patient was seen and evaluated by Dr. Salazar.  Chest x-ray and labs reviewed.

 Remains on 6 L high flow nasal cannula.  Continue to titrate down the FiO2 as 

tolerated.  Continue the current treatment plan.  Increase his activity as 

tolerated.  We'll continue to follow.





I, the cosigning physician, performed a history & physical examination of the 

patient. Lungs sounds crackles in the bilateral posterior bases, diminished.  

Maintaining good O2 saturations in the 90s on 6 L high flow nasal cannula.  I 

discussed the assessment and plan of care with my nurse practitioner, Sylwia Flaherty. I attest to the above note as dictated by her.

## 2019-06-04 NOTE — P.PN
<ClarkDilcia FIGUEROA - Last Filed: 06/04/19 11:24>





Subjective


Progress Note Date: 06/04/19





CHIEF COMPLAINT: Perforated duodenal ulcer





HISTORY OF PRESENT ILLNESS: Patient seen and examined this morning. He is 

sitting up in the chair. Patient reports his abdominal pain is tolerable. 

However he is utilizing the Dilaudid more than the Norco. Tolerating diet. No 

nausea or vomiting. DAVID with minimal drainage. WBC 19.4. Hemoglobin 11.7.





PHYSICAL EXAM: 


VITAL SIGNS: Reviewed.


GENERAL: Well-developed in no acute distress. 


HEENT:  No sclera icterus. Extraocular movements grossly intact.  Moist buccal 

mucosa. Head is atraumatic, normocephalic. 


ABDOMEN:  Soft.  Nondistended. Dressing clean dry intact. DAVID with serosanguinous

drainage.


NEUROLOGIC: Alert and oriented. Cranial nerves II through XII grossly intact.





ASSESSMENT: 


1.  Perforated duodenal ulcer, status post exploratory laparotomy with repair of

perforated duodenal ulcer and repair of incarcerated umbilical hernia





PLAN: 


1. Continue current diet


2. CBC and BMP daily


3. Wound care. Dressing to be changed today per nursing.


4. Incentive spirometry


5. Activity as tolerated.


6. Continue antibiotics. Monitor WBC.


7. Pulmonary and infectious disease following. 


8. Replace potassium. Check magnesium level. 


9. Likely DC DAVID drain tomorrow





Nurse practitioner note has been reviewed by physician. Signing provider agrees 

with the documented findings, assessment, and plan of care. 








Objective





- Vital Signs


Vital signs: 


                                   Vital Signs











Temp  98.4 F   06/04/19 07:46


 


Pulse  88   06/04/19 09:26


 


Resp  18   06/04/19 08:00


 


BP  139/74   06/04/19 07:46


 


Pulse Ox  92 L  06/04/19 07:46








                                 Intake & Output











 06/03/19 06/04/19 06/04/19





 18:59 06:59 18:59


 


Intake Total 800 250 150


 


Output Total 10 15 


 


Balance 790 235 150


 


Weight 76.5 kg 74.9 kg 


 


Intake:   


 


  IV  100 


 


    Piperacillin-Tazobactam 3  100 





    .375 gm In Sodium   





    Chloride 0.9% 100 ml @ 25   





    mls/hr IVPB Q8HR [facility] Rx#   





    :[id number]   


 


  Oral 800 150 150


 


Output:   


 


  Drainage 10 15 


 


    Right Lower Abdomen 10 15 


 


Other:   


 


  Voiding Method  Bedside Commode 





  Urinal 


 


  # Voids  2 


 


  # Bowel Movements   1








                       ABP, PAP, CO, CI - Last Documented











Arterial Blood Pressure        146/47

















- Labs


CBC & Chem 7: 


                                 06/04/19 07:12





                                 06/04/19 07:12


Labs: 


                  Abnormal Lab Results - Last 24 Hours (Table)











  06/03/19 06/03/19 06/03/19 Range/Units





  11:10 11:10 11:37 


 


WBC  22.1 H    (3.8-10.6)  k/uL


 


RBC  4.09 L    (4.30-5.90)  m/uL


 


Hgb  12.0 L    (13.0-17.5)  gm/dL


 


Hct     (39.0-53.0)  %


 


MCHC  30.6 L    (31.0-37.0)  g/dL


 


RDW  16.9 H    (11.5-15.5)  %


 


Plt Count  493 H    (150-450)  k/uL


 


Neutrophils #  20.7 H    (1.3-7.7)  k/uL


 


Lymphocytes #  0.5 L    (1.0-4.8)  k/uL


 


Potassium   2.8 L   (3.5-5.1)  mmol/L


 


Chloride   89 L   ()  mmol/L


 


Carbon Dioxide   45 H*   (22-30)  mmol/L


 


Creatinine   0.57 L   (0.66-1.25)  mg/dL


 


Glucose   116 H   (74-99)  mg/dL


 


POC Glucose (mg/dL)    117 H  (75-99)  mg/dL


 


Calcium   7.7 L   (8.4-10.2)  mg/dL














  06/03/19 06/03/19 06/04/19 Range/Units





  17:07 19:45 07:12 


 


WBC    19.4 H  (3.8-10.6)  k/uL


 


RBC    4.00 L  (4.30-5.90)  m/uL


 


Hgb    11.7 L  (13.0-17.5)  gm/dL


 


Hct    38.0 L  (39.0-53.0)  %


 


MCHC    30.8 L  (31.0-37.0)  g/dL


 


RDW    17.1 H  (11.5-15.5)  %


 


Plt Count    540 H  (150-450)  k/uL


 


Neutrophils #    17.6 H  (1.3-7.7)  k/uL


 


Lymphocytes #    0.7 L  (1.0-4.8)  k/uL


 


Potassium     (3.5-5.1)  mmol/L


 


Chloride     ()  mmol/L


 


Carbon Dioxide     (22-30)  mmol/L


 


Creatinine     (0.66-1.25)  mg/dL


 


Glucose     (74-99)  mg/dL


 


POC Glucose (mg/dL)  157 H  147 H   (75-99)  mg/dL


 


Calcium     (8.4-10.2)  mg/dL














  06/04/19 06/04/19 Range/Units





  07:12 07:42 


 


WBC    (3.8-10.6)  k/uL


 


RBC    (4.30-5.90)  m/uL


 


Hgb    (13.0-17.5)  gm/dL


 


Hct    (39.0-53.0)  %


 


MCHC    (31.0-37.0)  g/dL


 


RDW    (11.5-15.5)  %


 


Plt Count    (150-450)  k/uL


 


Neutrophils #    (1.3-7.7)  k/uL


 


Lymphocytes #    (1.0-4.8)  k/uL


 


Potassium  2.7 L*   (3.5-5.1)  mmol/L


 


Chloride  88 L   ()  mmol/L


 


Carbon Dioxide  47 H*   (22-30)  mmol/L


 


Creatinine  0.55 L   (0.66-1.25)  mg/dL


 


Glucose  164 H   (74-99)  mg/dL


 


POC Glucose (mg/dL)   146 H  (75-99)  mg/dL


 


Calcium  7.8 L   (8.4-10.2)  mg/dL














<Chivo Gomez - Last Filed: 06/04/19 17:55>





Subjective





As above.  Minimal abdominal pain.  Tolerating diet.  DAVID drain output minimal.  

White blood cell count is improved.  Continue antibiotics.  Pulmonary toilet.





Objective





- Vital Signs


Vital signs: 


                                   Vital Signs











Temp  98.4 F   06/04/19 07:46


 


Pulse  98   06/04/19 16:59


 


Resp  18   06/04/19 08:00


 


BP  139/74   06/04/19 07:46


 


Pulse Ox  92 L  06/04/19 07:46








                                 Intake & Output











 06/03/19 06/04/19 06/04/19





 18:59 06:59 18:59


 


Intake Total 800 250 300


 


Output Total 10 15 


 


Balance 790 235 300


 


Weight 76.5 kg 74.9 kg 


 


Intake:   


 


  IV  100 


 


    Piperacillin-Tazobactam 3  100 





    .375 gm In Sodium   





    Chloride 0.9% 100 ml @ 25   





    mls/hr IVPB Q8HR [facility] Rx#   





    :[id number]   


 


  Oral 800 150 300


 


Output:   


 


  Drainage 10 15 


 


    Right Lower Abdomen 10 15 


 


Other:   


 


  Voiding Method  Bedside Commode 





  Urinal 


 


  # Voids  2 


 


  # Bowel Movements   1








                       ABP, PAP, CO, CI - Last Documented











Arterial Blood Pressure        146/47

















- Labs


CBC & Chem 7: 


                                 06/04/19 07:12





                                 06/04/19 07:12


Labs: 


                  Abnormal Lab Results - Last 24 Hours (Table)











  06/03/19 06/04/19 06/04/19 Range/Units





  19:45 07:12 07:12 


 


WBC   19.4 H   (3.8-10.6)  k/uL


 


RBC   4.00 L   (4.30-5.90)  m/uL


 


Hgb   11.7 L   (13.0-17.5)  gm/dL


 


Hct   38.0 L   (39.0-53.0)  %


 


MCHC   30.8 L   (31.0-37.0)  g/dL


 


RDW   17.1 H   (11.5-15.5)  %


 


Plt Count   540 H   (150-450)  k/uL


 


Neutrophils #   17.6 H   (1.3-7.7)  k/uL


 


Lymphocytes #   0.7 L   (1.0-4.8)  k/uL


 


Potassium    2.7 L*  (3.5-5.1)  mmol/L


 


Chloride    88 L  ()  mmol/L


 


Carbon Dioxide    47 H*  (22-30)  mmol/L


 


Creatinine    0.55 L  (0.66-1.25)  mg/dL


 


Glucose    164 H  (74-99)  mg/dL


 


POC Glucose (mg/dL)  147 H    (75-99)  mg/dL


 


Calcium    7.8 L  (8.4-10.2)  mg/dL














  06/04/19 06/04/19 06/04/19 Range/Units





  07:42 11:42 17:11 


 


WBC     (3.8-10.6)  k/uL


 


RBC     (4.30-5.90)  m/uL


 


Hgb     (13.0-17.5)  gm/dL


 


Hct     (39.0-53.0)  %


 


MCHC     (31.0-37.0)  g/dL


 


RDW     (11.5-15.5)  %


 


Plt Count     (150-450)  k/uL


 


Neutrophils #     (1.3-7.7)  k/uL


 


Lymphocytes #     (1.0-4.8)  k/uL


 


Potassium     (3.5-5.1)  mmol/L


 


Chloride     ()  mmol/L


 


Carbon Dioxide     (22-30)  mmol/L


 


Creatinine     (0.66-1.25)  mg/dL


 


Glucose     (74-99)  mg/dL


 


POC Glucose (mg/dL)  146 H  138 H  196 H  (75-99)  mg/dL


 


Calcium     (8.4-10.2)  mg/dL














Assessment and Plan


(1) Bowel perforation


Current Visit: Yes   Status: Acute   Code(s): K63.1 - PERFORATION OF INTESTINE 

(NONTRAUMATIC)   SNOMED Code(s): 55652079

## 2019-06-04 NOTE — XR
EXAMINATION TYPE: XR chest 1V

 

DATE OF EXAM: 6/3/2019

 

COMPARISON: 6/3/2019

 

HISTORY: Shortness of breath

 

TECHNIQUE: Single frontal view of the chest is obtained.

 

FINDINGS:  Stable exam in comparison to the prior earlier on the same day with particular basilar pre
dominant fibrosis, trace pleural effusions (right greater than left) incidentally noted azygous lobe 
and fissure, old healed left rib fracture deformities, diffuse osseous demineralization, and enlarged
 cardiac mediastinal silhouette. Patient's chin obscures the lung apices on this exam.

 

IMPRESSION:  Unchanged exam in comparison the prior of 6/3/2019 at 6:25 AM with bibasilar opacities a
nd trace pleural effusions favored to represent pneumonia superimposed upon pulmonary fibrosis.
EXAMINATION TYPE: XR chest 1V portable

 

DATE OF EXAM: 5/24/2019

 

COMPARISON: 5/23/2019

 

INDICATION: Tube placement

 

TECHNIQUE: Single frontal view of the chest is obtained.

 

FINDINGS:  

The heart size is normal.  

The pulmonary vasculature is slightly prominent.  

Mild bibasilar infiltrates are present. This is slightly increasing from comparison. Fluid is within 
the azygos fissure.  

Endotracheal tube tip is above the thony. Nasogastric tube transverses the thorax the tip in the upp
er abdomen. Right central venous catheter is present with the tip in the superior vena cava region.

 

IMPRESSION:  

1. Increasing bibasilar infiltrates.

2. Lines and catheters discussed above.

3. Prominent pulmonary vascular markings. Correlate for volume overload.
EXAMINATION TYPE: XR chest 1V portable

 

DATE OF EXAM: 5/25/2019

 

COMPARISON: 5/24/2019

 

INDICATION: Tube placement

 

TECHNIQUE: Single frontal view of the chest is obtained.

 

FINDINGS:  

The heart size is normal.  

The pulmonary vasculature is normal.  

Bibasilar infiltrates are present. This appears to be worsening. Correlate for atelectasis and pneumo
mariann.  Azygos fissure is present. Old left rib fractures are present.

Right central venous catheter is present with the tip in the superior vena cava region, stable. Nasog
astric tube transverses the thorax. Endotracheal tube is not clearly identified.

 

IMPRESSION:  

1. Bibasilar infiltrates worsening. Correlate for atelectasis and pneumonia.

2. Lines and catheters discussed above
EXAMINATION TYPE: XR chest 1V portable

 

DATE OF EXAM: 5/26/2019

 

COMPARISON: Prior chest x-ray 5/25/2019

 

HISTORY: Abnormal chest x-ray, ICU management

 

TECHNIQUE: Single frontal view of the chest is obtained.

 

FINDINGS:  Right jugular central venous catheter shows the distal tip overlying superior vena cava. G
astric tube is present with the tube coursing into the region of the stomach, distal tip not included
 in the exam. Interstitium is increased bilaterally. Heart size is stable and enlarged. Bibasilar inc
reased density persists. There is an azygos lobe. No pneumothorax. Old left-sided rib fractures are p
resent.

 

IMPRESSION:  Correlate to exclude volume overload, pulmonary venous hypertension and interstitial amanda
ma. Possible basilar atelectasis versus edema, correlate to exclude pneumonia. Follow-up recommended.
EXAMINATION TYPE: XR chest 1V portable

 

DATE OF EXAM: 5/27/2019

 

COMPARISON: 5/26/2019, 6/5/2018

 

INDICATION: Tube placement

 

TECHNIQUE: Single frontal view of the chest is obtained.

 

FINDINGS:  

The heart size is normal.  

The pulmonary vasculature is prominent.  

There are increased lung markings present bilaterally.  This is greater in the periphery of the lung 
bases. Portion of this may be chronic.

There is a right central venous catheter with the tip in the superior vena cava, stable in position. 
The right base infiltrate there is a 1.5 cm nodular type density. This should be followed up to clear
ing. This could be related to pneumonia. Azygos fissure is present.

 

IMPRESSION:  

1. Bibasilar infiltrates some of which are chronic. However, this is greater than 2018. Consider pulm
onary edema within the differential.

2. Nodular density in the right lung base could be related to pneumonia. Follow-up to clearing is rec
ommended
EXAMINATION TYPE: XR chest 1V portable

 

DATE OF EXAM: 5/28/2019

 

COMPARISON: CT thorax dated 6/5/2018 and chest x-ray dated 5/27/2019.

 

HISTORY: Shortness of breath. Follow-up exam.

 

TECHNIQUE: Single frontal view of the chest is obtained.

 

FINDINGS:  The previously seen 1.5 cm rounded nodule is less conspicuous on today's exam than on the 
prior of 5/27/2019. Peripheral basilar predominant reticulations suggests a component of fibrosis. Un
derlying emphysema is seen. Azygos lobe is noted. Persistent right basilar opacity overall similar to
 the prior of 5/27/2019. Osseous structures are grossly intact. Cardiomediastinal silhouette is withi
n normal limits of size. Right central venous catheter is unchanged.

 

IMPRESSION:  Suspected underlying pulmonary fibrosis and persistent right basilar opacity that may re
present superimposed pneumonia. The nodular density seen on the prior of 5/27/2019 is less conspicuou
s although 6 month follow-up CT was recommended on the exam of 6/5/2018 for which the patient is due 
is not performed at an outside institution.
EXAMINATION TYPE: XR chest 1V portable

 

DATE OF EXAM: 5/29/2019

 

HISTORY: Shortness of breath.

 

COMPARISON: 5/28/2019

 

TECHNIQUE: Single view of the chest is submitted.

 

FINDINGS:

Demonstrated are scattered senescent parenchymal change.  Underlying fibrosis persists.

 

Patchy basilar densities are stable.

 

The heart is stable.

 

Hilar and mediastinal structures are within normal limits.  

 

Degenerative changes are seen of the dorsal spine. 

 

 IMPRESSION: 

 

1.  Stable chest.
EXAMINATION TYPE: XR chest 1V portable

 

DATE OF EXAM: 6/2/2019

 

HISTORY: shortness of breath.

 

REFERENCE: Previous study dated 5/29/2019.

 

FINDINGS: Lung volumes are prominent. Heart is mildly enlarged. There is worsening right basilar airs
pace disease is also left basilar airspace disease. There are small, bilateral effusions.

 

The patient's right internal jugular catheter has been removed.

 

There is some linear scarring in the right upper lobe. Alternatively, this may represent an azygos fi
ssure.

 

IMPRESSION: 

1. COPD.

2. WORSENING RIGHT BASILAR AIRSPACE DISEASE.

3. CONTINUING LEFT BASILAR AIRSPACE DISEASE.

4. I SUSPECT SMALL, BILATERAL EFFUSIONS.

5. MILD CARDIOMEGALY.
EXAMINATION TYPE: XR chest 1V portable

 

DATE OF EXAM: 6/3/2019

 

COMPARISON: 6/2/2019

 

HISTORY: Shortness of breath. Follow up exam. History of COPD.

 

TECHNIQUE: Single frontal view of the chest is obtained.

 

FINDINGS:  There is peripheral basilar predominant reticulation suggesting underlying fibrosis. Azygo
us lobe and fissure are incidentally noted. Bibasilar airspace disease is similar to the prior. There
 are trace pleural effusions blunting the costophrenic angles. No acute osseous pathology is seen. Ca
rdia mediastinal silhouette is stable and enlarged.

 

IMPRESSION:  Stable trace pleural effusions and bibasilar airspace disease favored to represent pneum
onia given their absence on the exam of 5/23/2019 with underlying pulmonary fibrosis suspected.
EXAMINATION TYPE: XR chest 1V portable

 

DATE OF EXAM: 6/4/2019

 

COMPARISON: Prior chest x-ray 6/3/2019

 

HISTORY: Follow-up, history COPD, abnormal chest x-ray

 

TECHNIQUE: Single frontal view of the chest is obtained.

 

FINDINGS:  Pleural parenchymal changes are similar to prior exam. Azygos lobe noted incidentally. Hea
rt size is likely stable. Prominent lung volumes are noted. Aorta is dense. Old left-sided rib fractu
re suspected, mid left clavicular fracture uncertain age.

 

IMPRESSION:  Possible underlying interstitial lung disease, difficult to exclude basilar airspace dis
ease versus atelectasis or scarring.
EXAMINATION TYPE: XR chest 2V

 

DATE OF EXAM: 5/23/2019

 

COMPARISON: 6/5/2018

 

HISTORY: Chest pain

 

TECHNIQUE:  Frontal and lateral views of the chest are obtained.

 

FINDINGS:  There is chronic peripheral predominant fibrosis an interstitial prominence. Focal thicken
ing is seen along the pleural surfaces laterally similar to the prior. Underlying emphysematous talbot
es noted. No new focal consolidation is seen. There is generalized osseous demineralization. Cardia m
ediastinal silhouette is upper limits of normal.

 

IMPRESSION:  Pulmonary fibrosis and interstitial lung disease or chronic with underlying COPD. No new
 focal consolidation.
EXAMINATION: XR chest 1V portable

DATE AND TIME:  5/23/2019 9:42 PM

 

CLINICAL INDICATION: PHH; mech vent

 

TECHNIQUE: Departmental protocol

 

COMPARISON: 5/23/2019 at 3:13 PM

 

FINDINGS: Since prior study the patient has been intubated, with the ET tube tip superimposed over th
e mid trachea. 

 

Also, an NG tube has been placed, which courses over the expected course of the thoracic esophagus an
d over the stomach. 

 

Right IJ central line tip superimposed over the cavoatrial junction. 

 

EKG leads.

 

The previously seen coarse interstitial pattern is redemonstrated. The current lung inflation pattern
 is similar to the preintubation study. There is no pneumothorax.

 

IMPRESSION: 

Post intubation chest radiograph.
DC instructions

## 2019-06-04 NOTE — PN
PROGRESS NOTE



DATE OF SERVICE:

06/04/2019



This 69-year-old gentleman admitted with acute pneumoperitoneum secondary to duodenal

perforation had surgery. The patient also had significant respiratory issues including

COPD and pulmonary fibrosis, also.  Patient remains on oxygen.  The patient also

complains of weakness and tiredness and PT, OT is also following the patient as well.

ECF rehab is a possibility.  Of note also, the sputum culture showed pseudomonas.  The

patient is on IV Zosyn currently.  The chest x-ray showed bibasilar opacities

consistent with pulmonary fibrosis.  The patient underwent exploratory laparotomy and

repair of perforated duodenal ulcer, repair of incarcerated umbilical hernia by Dr. Gomez.  There is no history of fever, rigors.  No history of headache, loss of

consciousness, seizures.



PAST MEDICAL HISTORY:

Reviewed.



REVIEW OF SYSTEMS:

CARDIOVASCULAR SYSTEM: No angina.

RESPIRATION;  As mentioned earlier.

GI: As mentioned earlier.

:  No dysuria.

NERVOUS SYSTEM:  No numbness or weakness.



CURRENT MEDICATIONS:

1. Norco 5 mg q.4 p.r.n.

2. DuoNeb q.i.d. and p.r.n.

3. Diflucan 200 mg daily.

4. Lasix 40 mg IV q.8.

5. Heparin 5 subcu q.8.

6. Apresoline 10 mg q.4.

7. Solu-Cortef 50 mg IV q.8.

8. Dilaudid 0.5 mg q.4.

9. NovoLog scale.

10.Flagyl 500 mg daily.

11.Potassium.

12.Narcan.

13.Protonix 40 mg.

14.Zosyn 3.375 IV q.8.

15.K-Dur 10 mg p.o. b.i.d.



PHYSICAL EXAMINATION:

Patient is alert, oriented x3.  Pulse 98, Blood pressure 139/74, respiration 18,

temperature 98.4, pulse ox 98% on 6 L.

HEENT:  Conjunctivae normal.  Oral mucosa moist.

NECK:  No jugular venous distention.  No lymph node enlargement.

CARDIOVASCULAR SYSTEM:  S1, S2.

RESPIRATORY:  Breathing efforts increased, breath sounds diminished at the bases.

Bilateral scattered rhonchi and basal crackles also heard.

ABDOMEN:  Soft, status post surgery.

LEGS:  No edema.  No swelling.

NERVOUS SYSTEM: Higher functions as mentioned earlier, moves all 4 limbs.  No focal

motor signs.

LYMPHATICS:  No lymph node enlargement.

JOINTS: No active deforming arthropathy.



LABS:

WBC 19.3, hemoglobin 11.7, sodium 138, potassium 2.7, CO2 is 47.



ASSESSMENT:

1. Acute duodenal ulcer perforation with pneumoperitoneum, status post exploratory

    laparotomy with repair of perforated duodenal ulcer and repair of incarcerated

    umbilical hernia.

2. Advanced chronic obstructive pulmonary disease and severe pulmonary fibrosis with

    acute on chronic hypoxic respiratory failure.

3. Chronic hypoxic respiratory failure on 3 L oxygen at night at home.

4. History rheumatoid arthritis.

5. Gait dysfunction and generalized asthenia.

6. Obstructive sleep apnea.

7. Chronic back pain, degenerative joint disease.

8. Pseudomonas aeruginosa in the sputum.

9. Increased WBC.

10.Anemia of chronic disease.

11.Severe hypokalemia.

12.Hyperlipidemia.

13.History of sleep apnea.

14.Remote history of pneumonia.

15.History of anxiety, depression.

16.Remote history of nicotine dependence.

17.FULL CODE.



RECOMMENDATION:

In this 69-year-old gentleman who presented with multiple complex medical issues, will

monitor the patient closely, continue with the current management and symptomatic

treatment. Will continue with the bronchodilators.  Continue with steroids.  Continue

with the rest of medications.  Incentive spirometry.  Continue with broad-spectrum IV

antibiotics, PT, OT evaluation, possible ECF rehab.  Discussed at length with the

patient and family and further recommendations to follow.  We will also replace the

potassium. Will also check for repeat lytes, magnesium has been checked. Further

recommendations to follow.





MMODL / IJN: [id number] / Job#: [id number]

## 2019-06-05 LAB
ANION GAP SERPL CALC-SCNC: 3 MMOL/L
BASOPHILS # BLD AUTO: 0 K/UL (ref 0–0.2)
BASOPHILS NFR BLD AUTO: 0 %
BUN SERPL-SCNC: 18 MG/DL (ref 9–20)
CALCIUM SPEC-MCNC: 8.2 MG/DL (ref 8.4–10.2)
CHLORIDE SERPL-SCNC: 90 MMOL/L (ref 98–107)
CO2 SERPL-SCNC: 47 MMOL/L (ref 22–30)
EOSINOPHIL # BLD AUTO: 0.1 K/UL (ref 0–0.7)
EOSINOPHIL NFR BLD AUTO: 0 %
ERYTHROCYTE [DISTWIDTH] IN BLOOD BY AUTOMATED COUNT: 3.93 M/UL (ref 4.3–5.9)
ERYTHROCYTE [DISTWIDTH] IN BLOOD: 17 % (ref 11.5–15.5)
GLUCOSE BLD-MCNC: 106 MG/DL (ref 75–99)
GLUCOSE BLD-MCNC: 141 MG/DL (ref 75–99)
GLUCOSE BLD-MCNC: 146 MG/DL (ref 75–99)
GLUCOSE BLD-MCNC: 194 MG/DL (ref 75–99)
GLUCOSE SERPL-MCNC: 142 MG/DL (ref 74–99)
HCT VFR BLD AUTO: 37.8 % (ref 39–53)
HGB BLD-MCNC: 11.9 GM/DL (ref 13–17.5)
LYMPHOCYTES # SPEC AUTO: 0.9 K/UL (ref 1–4.8)
LYMPHOCYTES NFR SPEC AUTO: 7 %
MCH RBC QN AUTO: 30.3 PG (ref 25–35)
MCHC RBC AUTO-ENTMCNC: 31.5 G/DL (ref 31–37)
MCV RBC AUTO: 96.1 FL (ref 80–100)
MONOCYTES # BLD AUTO: 0.7 K/UL (ref 0–1)
MONOCYTES NFR BLD AUTO: 5 %
NEUTROPHILS # BLD AUTO: 11.3 K/UL (ref 1.3–7.7)
NEUTROPHILS NFR BLD AUTO: 86 %
PLATELET # BLD AUTO: 508 K/UL (ref 150–450)
POTASSIUM SERPL-SCNC: 3.2 MMOL/L (ref 3.5–5.1)
SODIUM SERPL-SCNC: 140 MMOL/L (ref 137–145)
WBC # BLD AUTO: 13.1 K/UL (ref 3.8–10.6)

## 2019-06-05 RX ADMIN — PIPERACILLIN AND TAZOBACTAM SCH MLS/HR: 3; .375 INJECTION, POWDER, FOR SOLUTION INTRAVENOUS at 16:48

## 2019-06-05 RX ADMIN — INSULIN ASPART SCH UNIT: 100 INJECTION, SOLUTION INTRAVENOUS; SUBCUTANEOUS at 14:31

## 2019-06-05 RX ADMIN — HYDROCODONE BITARTRATE AND ACETAMINOPHEN PRN EACH: 5; 325 TABLET ORAL at 11:15

## 2019-06-05 RX ADMIN — HYDROCORTISONE SODIUM SUCCINATE SCH MG: 100 INJECTION, POWDER, FOR SOLUTION INTRAMUSCULAR; INTRAVENOUS at 08:01

## 2019-06-05 RX ADMIN — FUROSEMIDE SCH MG: 10 INJECTION, SOLUTION INTRAMUSCULAR; INTRAVENOUS at 16:47

## 2019-06-05 RX ADMIN — IPRATROPIUM BROMIDE AND ALBUTEROL SULFATE SCH ML: .5; 3 SOLUTION RESPIRATORY (INHALATION) at 20:11

## 2019-06-05 RX ADMIN — PIPERACILLIN AND TAZOBACTAM SCH MLS/HR: 3; .375 INJECTION, POWDER, FOR SOLUTION INTRAVENOUS at 08:01

## 2019-06-05 RX ADMIN — IPRATROPIUM BROMIDE AND ALBUTEROL SULFATE SCH ML: .5; 3 SOLUTION RESPIRATORY (INHALATION) at 01:44

## 2019-06-05 RX ADMIN — FUROSEMIDE SCH MG: 10 INJECTION, SOLUTION INTRAMUSCULAR; INTRAVENOUS at 08:00

## 2019-06-05 RX ADMIN — HYDROCODONE BITARTRATE AND ACETAMINOPHEN PRN EACH: 5; 325 TABLET ORAL at 21:06

## 2019-06-05 RX ADMIN — HEPARIN SODIUM SCH UNIT: 5000 INJECTION, SOLUTION INTRAVENOUS; SUBCUTANEOUS at 16:47

## 2019-06-05 RX ADMIN — HYDROCODONE BITARTRATE AND ACETAMINOPHEN PRN EACH: 5; 325 TABLET ORAL at 18:01

## 2019-06-05 RX ADMIN — HYDROCODONE BITARTRATE AND ACETAMINOPHEN PRN EACH: 5; 325 TABLET ORAL at 14:30

## 2019-06-05 RX ADMIN — HEPARIN SODIUM SCH UNIT: 5000 INJECTION, SOLUTION INTRAVENOUS; SUBCUTANEOUS at 23:03

## 2019-06-05 RX ADMIN — INSULIN ASPART SCH: 100 INJECTION, SOLUTION INTRAVENOUS; SUBCUTANEOUS at 16:55

## 2019-06-05 RX ADMIN — IPRATROPIUM BROMIDE AND ALBUTEROL SULFATE SCH ML: .5; 3 SOLUTION RESPIRATORY (INHALATION) at 11:31

## 2019-06-05 RX ADMIN — HYDROCODONE BITARTRATE AND ACETAMINOPHEN PRN EACH: 5; 325 TABLET ORAL at 08:03

## 2019-06-05 RX ADMIN — HEPARIN SODIUM SCH UNIT: 5000 INJECTION, SOLUTION INTRAVENOUS; SUBCUTANEOUS at 08:01

## 2019-06-05 RX ADMIN — PIPERACILLIN AND TAZOBACTAM SCH MLS/HR: 3; .375 INJECTION, POWDER, FOR SOLUTION INTRAVENOUS at 23:02

## 2019-06-05 RX ADMIN — INSULIN ASPART SCH UNIT: 100 INJECTION, SOLUTION INTRAVENOUS; SUBCUTANEOUS at 21:07

## 2019-06-05 RX ADMIN — PANTOPRAZOLE SODIUM SCH MG: 40 TABLET, DELAYED RELEASE ORAL at 08:00

## 2019-06-05 RX ADMIN — INSULIN ASPART SCH UNIT: 100 INJECTION, SOLUTION INTRAVENOUS; SUBCUTANEOUS at 08:00

## 2019-06-05 RX ADMIN — FUROSEMIDE SCH MG: 10 INJECTION, SOLUTION INTRAMUSCULAR; INTRAVENOUS at 23:03

## 2019-06-05 RX ADMIN — POTASSIUM CHLORIDE SCH MEQ: 20 TABLET, EXTENDED RELEASE ORAL at 01:55

## 2019-06-05 RX ADMIN — FLUCONAZOLE SCH MG: 100 TABLET ORAL at 10:42

## 2019-06-05 RX ADMIN — POTASSIUM CHLORIDE SCH MEQ: 20 TABLET, EXTENDED RELEASE ORAL at 21:06

## 2019-06-05 RX ADMIN — POTASSIUM CHLORIDE SCH MEQ: 20 TABLET, EXTENDED RELEASE ORAL at 00:26

## 2019-06-05 RX ADMIN — IPRATROPIUM BROMIDE AND ALBUTEROL SULFATE SCH ML: .5; 3 SOLUTION RESPIRATORY (INHALATION) at 08:41

## 2019-06-05 RX ADMIN — POTASSIUM CHLORIDE SCH MEQ: 20 TABLET, EXTENDED RELEASE ORAL at 10:42

## 2019-06-05 RX ADMIN — TEMAZEPAM PRN MG: 15 CAPSULE ORAL at 21:06

## 2019-06-05 NOTE — PN
PROGRESS NOTE



DATE OF SERVICE:

06/05/2019



This 69-year-old gentleman who was admitted with acute pneumoperitoneum secondary to

duodenal perforation had surgery.  The patient is complaining of abdominal pain.  The

patient taking multiple pain medications.  Patient also had hypoxia with pulse ox 90.

PT/OT evaluating the patient.  At this time, the wife and the patient would like to

return home rather than ECF.  No chest pain.  No palpitations.  No fever.  The most

recent chest x-ray done on June 4 is reviewed.



PAST MEDICAL HISTORY:

Reviewed.



REVIEW OF SYSTEMS:

CARDIOVASCULAR:  No angina. No palpitations.

RESPIRATORY: As mentioned earlier.

  GI mentioned earlier.

: No dysuria.

CENTRAL NERVOUS SYSTEM: No numbness or weakness.



CURRENT MEDICATIONS:

Reviewed and include:

1. Norco 5 mg q.4 p.r.n.

2. DuoNeb q.i.d. and p.r.n.

3. Diflucan 200 mg p.o. daily.

4. Lasix 40 mg IV q.8h.

5. Heparin 5000 subcu q8h.

6. Apresoline 10 mg q.4 p.r.n.

7. Dilaudid 0.5 q.4 p.r.n.

8. NovoLog.

9. Flagyl 500 mg p.o. q.i.d.

10.Potassium.

11.Narcan 0.2 q.2h p.r.n.

12.Zofran.

13.Protonix.

14.Zosyn 3.375 IV q8h.

15.K-Dur 40 mEq p.o. b.i.d.

16.Prednisone 10 mg p.o. daily.

17.Restoril 15 q.h.s. p.r.n.



PHYSICAL EXAM:

Patient is alert, oriented x3.  The pulse is 93, blood pressure 143/70, respirations

18, temperature 97.7, pulse ox 98% on 6 L.

HEENT:  Conjunctivae normal.  Oral mucosa moist.  NECK is no jugular venous distention.

No carotid bruit.  No lymph node enlargement.

CARDIOVASCULAR SYSTEM:  S1, S2 muffled.

RESPIRATORY: Breath sounds diminished in the bases.  Bilateral scattered rhonchi and

crackles.

ABDOMEN:  Soft, nontender.  No mass palpable.

LEGS:  No edema.  No swelling.

CENTRAL NERVOUS SYSTEM:  Higher functions as mentioned earlier.  Moves all four

extremities.  No focal motor or sensory deficits.

LYMPHATICS: No lymph nodes palpable in the neck, axillae or groin.

SKIN: No ulcer. No rashes.  No bleeding.

JOINTS: No active deforming arthropathy.



LABORATORY DATA:

WBC 13.2, hemoglobin 11.7, potassium 3.2.



ASSESSMENT:

1. Acute duodenal ulcer perforation with pneumoperitoneum, status post exploratory

    laparotomy, repair of perforated duodenal ulcer and repair of incarcerated

    umbilical hernia.

2. Advanced chronic obstructive pulmonary disease with severe pulmonary fibrosis with

    acute on chronic hypoxic respiratory failure.

3. Chronic hypoxic respiratory failure on 3 L oxygen at home.

4. History of rheumatoid arthritis.

5. History of gait dysfunction, generalized asthenia.

6. Obstructive sleep apnea.

7. Chronic back pain, degenerative joint disease.

8. Pseudomonas aeruginosa in the sputum.

9. Increased WBC.

10.Anemia of chronic disease.

11.Severe hypokalemia.

12.Hyperlipidemia.

13.History of sleep apnea.

14.Remote history of pneumonia.

15.History of anxiety, depression.

16.Remote history of nicotine dependence.

17.FULL CODE.



RECOMMENDATIONS AND DISCUSSION:

I recommend to continue current medications, continue with monitoring, management.

Symptomatic treatment.  Continue to optimize bronchodilator treatment.  Otherwise pain

treatment. Try to cut down the IV pain medication.  Continue the antibiotics.  PT/OT

evaluation.  Once the patient is stable, the patient may be discharged in the next 24-

48 hours.  Prognosis guarded.  Further recommendations to follow.



ALEXANDER / BLAS: [id number] / Job#: [id number]

## 2019-06-05 NOTE — P.PN
Subjective


Progress Note Date: 06/05/19


Principal diagnosis: 





Perforated duodenal ulcer





Patient doing well today.  Breathing is comfortable and non-labored.  White 

blood cell count is improved at 13.  DAVID drain output remains low.





Objective





- Vital Signs


Vital signs: 


                                   Vital Signs











Temp  97.7 F   06/05/19 07:05


 


Pulse  89   06/05/19 08:51


 


Resp  18   06/05/19 07:05


 


BP  134/71   06/05/19 07:05


 


Pulse Ox  90 L  06/05/19 07:05








                                 Intake & Output











 06/04/19 06/05/19 06/05/19





 18:59 06:59 18:59


 


Intake Total 300 336 


 


Output Total  10 


 


Balance 300 326 


 


Intake:   


 


  IV  100 


 


    Piperacillin-Tazobactam 3  100 





    .375 gm In Sodium   





    Chloride 0.9% 100 ml @ 25   





    mls/hr IVPB Q8HR [facility] Rx#   





    :[id number]   


 


  Oral 300 236 


 


Output:   


 


  Drainage  10 


 


    Right Lower Abdomen  10 


 


Other:   


 


  Voiding Method  Bedside Commode 





  Urinal 


 


  # Voids  1 


 


  # Bowel Movements 1  








                       ABP, PAP, CO, CI - Last Documented











Arterial Blood Pressure        146/47

















- Exam





Abdomen: Soft, nondistended, mild tenderness along incision, wounds clean





- Labs


CBC & Chem 7: 


                                 06/05/19 07:24





                                 06/05/19 07:24


Labs: 


                  Abnormal Lab Results - Last 24 Hours (Table)











  06/04/19 06/04/19 06/04/19 Range/Units





  11:42 17:11 17:47 


 


WBC     (3.8-10.6)  k/uL


 


RBC     (4.30-5.90)  m/uL


 


Hgb     (13.0-17.5)  gm/dL


 


Hct     (39.0-53.0)  %


 


RDW     (11.5-15.5)  %


 


Plt Count     (150-450)  k/uL


 


Neutrophils #     (1.3-7.7)  k/uL


 


Lymphocytes #     (1.0-4.8)  k/uL


 


Potassium    2.8 L  (3.5-5.1)  mmol/L


 


Chloride     ()  mmol/L


 


Carbon Dioxide     (22-30)  mmol/L


 


Creatinine     (0.66-1.25)  mg/dL


 


Glucose     (74-99)  mg/dL


 


POC Glucose (mg/dL)  138 H  196 H   (75-99)  mg/dL


 


Calcium     (8.4-10.2)  mg/dL














  06/04/19 06/05/19 06/05/19 Range/Units





  19:40 07:06 07:24 


 


WBC    13.1 H  (3.8-10.6)  k/uL


 


RBC    3.93 L  (4.30-5.90)  m/uL


 


Hgb    11.9 L  (13.0-17.5)  gm/dL


 


Hct    37.8 L  (39.0-53.0)  %


 


RDW    17.0 H  (11.5-15.5)  %


 


Plt Count    508 H  (150-450)  k/uL


 


Neutrophils #    11.3 H  (1.3-7.7)  k/uL


 


Lymphocytes #    0.9 L  (1.0-4.8)  k/uL


 


Potassium     (3.5-5.1)  mmol/L


 


Chloride     ()  mmol/L


 


Carbon Dioxide     (22-30)  mmol/L


 


Creatinine     (0.66-1.25)  mg/dL


 


Glucose     (74-99)  mg/dL


 


POC Glucose (mg/dL)  122 H  141 H   (75-99)  mg/dL


 


Calcium     (8.4-10.2)  mg/dL














  06/05/19 Range/Units





  07:24 


 


WBC   (3.8-10.6)  k/uL


 


RBC   (4.30-5.90)  m/uL


 


Hgb   (13.0-17.5)  gm/dL


 


Hct   (39.0-53.0)  %


 


RDW   (11.5-15.5)  %


 


Plt Count   (150-450)  k/uL


 


Neutrophils #   (1.3-7.7)  k/uL


 


Lymphocytes #   (1.0-4.8)  k/uL


 


Potassium  3.2 L  (3.5-5.1)  mmol/L


 


Chloride  90 L  ()  mmol/L


 


Carbon Dioxide  47 H*  (22-30)  mmol/L


 


Creatinine  0.58 L  (0.66-1.25)  mg/dL


 


Glucose  142 H  (74-99)  mg/dL


 


POC Glucose (mg/dL)   (75-99)  mg/dL


 


Calcium  8.2 L  (8.4-10.2)  mg/dL














Assessment and Plan


(1) Bowel perforation


Narrative/Plan: 


Remove DAVID drain today.  Continue chopped foods diet.  Antibiotics per infectious

disease.  Continue physical therapy.


Current Visit: Yes   Status: Acute   Code(s): K63.1 - PERFORATION OF INTESTINE 

(NONTRAUMATIC)   SNOMED Code(s): 23835373

## 2019-06-05 NOTE — PN
PROGRESS NOTE



DATE OF SERVICE:

06/05/2019.



REASON FOR FOLLOWUP:

Secondary peritonitis from a perforated peptic ulcer disease.



INTERVAL HISTORY:

The patient is currently afebrile.  The patient has been breathing comfortably.  The

patient did have some cough.  Denies having any worsening sputum production.  No

nausea, vomiting.  No abdominal pain.  No diarrhea.



PHYSICAL EXAMINATION:

Blood pressure 128/70 with a pulse of 95, temperature 98.5.  He is 93% on 4 L nasal

cannula. General  description is an elderly male up in the bed in no distress.

Respiratory system:  Unlabored breathing.  Decreased breath sounds in the bases, with

no wheeze.  Heart S1, S2.  Regular rate and rhythm. Abdomen soft, no tenderness.



LABS:

Hemoglobin 11.2, white count of 13.9, BUN of 18, creatinine 0.58.



DIAGNOSTIC IMPRESSION AND PLAN:

1. Patient with secondary peritonitis from a perforated peptic ulcer disease status

    post _____repair.  Patient is currently on Zosyn and Diflucan transition to oral

    short course to finish course of therapy.

2. White count elevation, more likely steroid dependent, showing downward trend.

3. Continue supportive care.





MMODL / IJN: [id number] / Job#: [id number]

## 2019-06-05 NOTE — P.PN
Subjective


Progress Note Date: 06/05/19


Principal diagnosis: 


 Acute pneumoperitoneum secondary to perforated duodenal ulcer





69-year-old male patient came into the emergency department with a one-week 

history of abdominal pain.  He initially went to [facility] 

emergency department with a CAT scan was done and the patient was told to have 

diverticulosis without diverticulitis.  Over the past week, the patient 

developed progressive worsening his abdominal discomfort and today came into the

emergency department having more pain and the pain was rather diffuse in nature.

 He had diminished appetite, diminished oral intake and he was having no 

significant bowel movements.  No GI bleeding.  Denies having any fever or 

chills.  He was nauseated when he was getting progressively more lethargic and 

weak and short of breath.  CAT scan of the abdomen was done in the emergency 

department and showed significant inflammatory changes in the epigastric region 

involving posterior aspect of the proximal transverse colon.  There was evidence

of pneumoperitoneum around that along with some inflammatory changes as well as 

some free fluid in the abdomen.  The stomach itself appeared to be within normal

limits.  The patient has long-term history of COPD and pulmonary fibrosis.  

Based on his pulmonary function test in 2017 he has an FVC of 72% and FEV1 of 

46% and he has been steroid dependent for the past few years taking prednisone a

daily basis.  He has also underlying rheumatoid arthritis and previously he was 

taking immunosuppression with Humira none for now.  His oxygen dependent.  Blood

work showed a white cell count 16.2 with a hemoglobin of 14.9.  Platelet count 

is at 519.  He has a BUN of 27 creatinine of 0.7 and lactic acid was at 1.9 with

a troponin being less than 0.01.  LFTs are all within normal limits.  He is 

tachycardic with temperature 98.2.  He was having sinus tachycardia with a heart

rate of 120.  He is currently on 5 L of oxygen by nasal cannula with a pulse ox 

of 90%.  The patient will be taken to the operating room.  He was seen by the 

surgical team following that I've advised the patient coming back to the 

intensive care unit for further evaluation and treatment.  He may need to be 

kept intubated overnight.





On 05/27/2019, the patient is postop day #4.  The patient is doing well.  He is 

awake and alert.  History requiring high flow oxygen at 10 L.  He doesn't have 

much reserve and he desaturates easily.  NG tube was removed yesterday and this 

morning the patient was given some clear liquid diet.  Surgical wound site is 

clean.  Output from the DAVID drain is minimal in the order of 30 mL over the past 

24 hours.  Surgical wound site is dry clean and intact.  He is passing flatus.  

No bowel activity as.  He remains on IV Zosyn.  He is using incentive 

spirometer.  He is on status post hydrocortisone.  He is awake and alert.  He 

has pseudomonas aeruginosa in his sputum for which she is covered with IV Zosyn.

 No other significant events overnight.





Patient was reevaluated today on 5/28/2019, remains on high flow nasal cannula 7

L/m, continues to have significantly abnormal chest x-ray with diffuse 

interstitial lung disease consistent with pulmonary fibrosis, underlying 

pneumonia is not entirely ruled out.  His FiO2 is being titrated down, patient 

is noted to be short of breath with any activity.  His sputum was positive for 

pseudomonas aeruginosa, remains on Zosyn.  All labs were reviewed today, 

potassium is a bit low at 3.2 being corrected as per protocol.





Patient was reevaluated today on 5/29/2019, remains on high flow nasal cannula, 

90 L/m, patient is basically about the same, chest x-ray is basically about the 

same showing bibasilar interstitial lung disease consistent with pulmonary 

fibrosis.  Again underlying pneumonia is not entirely ruled out but felt to be 

less likely.  Patient is hemodynamically stable, in no distress, being treated f

or pseudomonas aeruginosa in the sputum, remains on Zosyn.  CBC is relatively 

normal WBC count is 10.5 hemoglobin is 11.5 light was normal except for low 

potassium being corrected as per protocol.  Patient continues to have good urine

output.





Reevaluated today on 5/30/2019, remains in the ICU, he is presently overflow 

from selective.  Patient remains on 8 L high flow nasal cannula, pulmonary stat

us remains marginal.  Overall it is better than expected considering his 

underlying COPD and underlying interstitial lung disease.  Patient seems to be 

recovering slowly.  Last chest x-ray showed basically chronic changes of COPD 

and interstitial lung disease.  Underlying pneumonia is definitely not entirely 

ruled out.  Patient had positive Pseudomonas in the sputum and he is being 

treated as such.  Electrolytes are normal except for low potassium being 

corrected as per protocol.  The patient himself denies shortness of breath, 

denies any pain, he is complaining of significant swelling in his upper and 

lower extremities, hence I initiated diuretics today.





On 05/31/2019 patient seen in follow-up on medical surgical floor.  He is awake 

and alert, in no acute distress, he is on 6 L of oxygen, with a pulse ox of 95-

96%, he is afebrile, hemodynamically stable.  Work on his incentive spirometer, 

achieving about 1000 on the today.  This is postop day 8 status post exploratory

laparotomy with repair of the perforated duodenal ulcer and repair of the 

incarcerated umbilical hernia.  he is doing well, he was started on some oral 

Lasix yesterday, for generalized anasarca, and he is in -1869 mL fluid balance 

over the last 24 hours.  Still has quite a bit of swelling in his upper and 

lower extremities.  His lab work has been reviewed, showing white blood cell 

count of 20.9, hemoglobin of 11.6, serum sodium is 136, potassium is 2.7, this 

being replaced per protocol, chloride is 95, CO2 39, creatinine of 0.57 and BUN 

of 13.  Patient is tolerating oral intake, he is passing bowel movements.  No 

new chest x-rays today





On 06/02/2019 patient seen in follow-up on medical surgical floor.  He sitting 

up in the recliner, in no acute distress, was on 15 L high flow oxygen this 

morning, and his pulse ox was 88-91%, was encouraged to deep breathe and cough, 

and oxygen level did come up to 97%, we will start weaning FiO2, encouraged deep

breathing and coughing, patient is pulling of 1000 ML on his incentive 

spirometer, no fever or chills, lung sounds reveal good air entry bilaterally, 

no significant wheezes or rhonchi, there are bilateral crackles at the bases, 

but they are limited.  We will obtain follow-up chest x-ray today, he is on 

maintenance dose of oral Lasix at 20 mg twice daily, antibiotic coverage in the 

form of Flagyl, and Zosyn, there has been no fever, no chills, hemodynamically 

stable.  He remains on stress doses of hydrocortisone, we will start weaning it 

down, patient is normally on prednisone 10 mg daily for his history of COPD.  

Tolerating oral diet, no nausea vomiting or diarrhea, mid abdominal incision is 

intact, tension sutures are intact, bowel sounds are active, surgery is 

following, CT of abdomen and pelvis showed decrease in fluid accumulation 

involving the small bowel mesentery, no evidence of any fluid collection, there 

is a reduction of the umbilical hernia compared to last exam, and stable sigmoid

diverticulosis without diverticulitis.  It also showed patchy bilateral basilar 

pulmonary infiltrates and atelectasis and pleural fluid.





On 06/03/2090 patient seen in follow-up on medical surgical floor.  He is awake 

and alert, in no acute distress, currently down to 8 L, with a pulse ox of 92%, 

he is afebrile, hemodynamically stable, lung sounds reveal coarse basilar 

crackles over posterior lower lobes.  No fever or chills, today's labs have been

reviewed, white blood cell count is trending down, down to 22.1, hemoglobin is 

12.0, sodium is 137, potassium is 2.8, chloride is 89, CO2 25, BUN is 16 

creatinine 0.57.  Today's chest x-ray shows bibasilar opacities and trace 

pleural effusions.  He is working on his incentive spirometry.  I&O's are 

difficult to estimate as the patient is voiding, The weight is actually up 0.5 

kg.  And patient still has quite significant amount of generalized edema.





On 06/05/2019 patient seen in follow-up on medical surgical floor.  Doing 

better, breathing easier, fluid volume status is improving, less edema in 

bilateral lower extremities and upper extremities.  Lung sounds reveal coarse 

crackles, patient remains on 6 L of oxygen.  Yesterday's chest x-ray has been 

reviewed, showing bibasilar opacities and trace pleural effusions.  Patient is 

working on incentive spirometer, he is maintaining negative fluid balance.  

Today's labs have been reviewed, showed a white blood cell count of 13.1, hemogl

obin is 11.9, serum sodium is 140, potassium is 3.2, chloride is 90, CO2 is 47, 

B1 of 18, creatinine 0.58.  Serum potassium is being replaced per protocol.  

Patient remains on Zosyn for pseudomonas aeruginosa in the sputum cultures, 

remains on Flagyl, and IV hydrocortisone which we are weaning right now.





  








Objective





- Vital Signs


Vital signs: 


                                   Vital Signs











Temp  97.7 F   06/05/19 07:05


 


Pulse  92   06/05/19 11:45


 


Resp  18   06/05/19 07:05


 


BP  134/71   06/05/19 07:05


 


Pulse Ox  90 L  06/05/19 07:05








                                 Intake & Output











 06/04/19 06/05/19 06/05/19





 18:59 06:59 18:59


 


Intake Total 300 336 


 


Output Total  10 


 


Balance 300 326 


 


Intake:   


 


  IV  100 


 


    Piperacillin-Tazobactam 3  100 





    .375 gm In Sodium   





    Chloride 0.9% 100 ml @ 25   





    mls/hr IVPB Q8HR [facility] Rx#   





    :[id number]   


 


  Oral 300 236 


 


Output:   


 


  Drainage  10 


 


    Right Lower Abdomen  10 


 


Other:   


 


  Voiding Method  Bedside Commode 





  Urinal 


 


  # Voids  1 


 


  # Bowel Movements 1  








                       ABP, PAP, CO, CI - Last Documented











Arterial Blood Pressure        146/47

















- Exam


 GENERAL EXAM: Alert, pleasant, 69-year-old white male, on 6 L of oxygen, 

comfortable in no apparent distress.


HEAD: Normocephalic/atraumatic.


EYES: Normal reaction of pupils, equal size.  Conjunctiva pink, sclera white.


NOSE: Clear with pink turbinates.


THROAT: No erythema or exudates.


NECK: No masses, no JVD, no thyroid enlargement, no adenopathy.


CHEST: No chest wall deformity.  Symmetrical expansion. 


LUNGS: Equal air entry with diminished breath sounds, and basilar rales


CVS: Regular rate and rhythm, normal S1 and S2, no gallops, no murmurs, no rubs


ABDOMEN: Soft, nontender.  No hepatosplenomegaly, normal bowel sounds, no 

guarding or rigidity.  Midabdominal incision is clean dry and intact retention 

sutures are intact, DAVID drain is compressed, draining serosanguineous fluid


EXTREMITIES: No clubbing, generalized edema, no cyanosis, 2+ pulses and upper 

and lower extremities.


MUSCULOSKELETAL: Muscle strength and tone normal.


SPINE: No scoliosis or deformity


SKIN: No rashes


CENTRAL NERVOUS SYSTEM: Alert and oriented -3.  No focal deficits, tone is 

normal in all 4 extremities.


PSYCHIATRIC: Alert and oriented -3.  Appropriate affect.  Intact judgment and 

insight.











- Labs


CBC & Chem 7: 


                                 06/05/19 07:24





                                 06/05/19 07:24


Labs: 


                  Abnormal Lab Results - Last 24 Hours (Table)











  06/04/19 06/04/19 06/04/19 Range/Units





  17:11 17:47 19:40 


 


WBC     (3.8-10.6)  k/uL


 


RBC     (4.30-5.90)  m/uL


 


Hgb     (13.0-17.5)  gm/dL


 


Hct     (39.0-53.0)  %


 


RDW     (11.5-15.5)  %


 


Plt Count     (150-450)  k/uL


 


Neutrophils #     (1.3-7.7)  k/uL


 


Lymphocytes #     (1.0-4.8)  k/uL


 


Potassium   2.8 L   (3.5-5.1)  mmol/L


 


Chloride     ()  mmol/L


 


Carbon Dioxide     (22-30)  mmol/L


 


Creatinine     (0.66-1.25)  mg/dL


 


Glucose     (74-99)  mg/dL


 


POC Glucose (mg/dL)  196 H   122 H  (75-99)  mg/dL


 


Calcium     (8.4-10.2)  mg/dL














  06/05/19 06/05/19 06/05/19 Range/Units





  07:06 07:24 07:24 


 


WBC   13.1 H   (3.8-10.6)  k/uL


 


RBC   3.93 L   (4.30-5.90)  m/uL


 


Hgb   11.9 L   (13.0-17.5)  gm/dL


 


Hct   37.8 L   (39.0-53.0)  %


 


RDW   17.0 H   (11.5-15.5)  %


 


Plt Count   508 H   (150-450)  k/uL


 


Neutrophils #   11.3 H   (1.3-7.7)  k/uL


 


Lymphocytes #   0.9 L   (1.0-4.8)  k/uL


 


Potassium    3.2 L  (3.5-5.1)  mmol/L


 


Chloride    90 L  ()  mmol/L


 


Carbon Dioxide    47 H*  (22-30)  mmol/L


 


Creatinine    0.58 L  (0.66-1.25)  mg/dL


 


Glucose    142 H  (74-99)  mg/dL


 


POC Glucose (mg/dL)  141 H    (75-99)  mg/dL


 


Calcium    8.2 L  (8.4-10.2)  mg/dL














  06/05/19 Range/Units





  11:27 


 


WBC   (3.8-10.6)  k/uL


 


RBC   (4.30-5.90)  m/uL


 


Hgb   (13.0-17.5)  gm/dL


 


Hct   (39.0-53.0)  %


 


RDW   (11.5-15.5)  %


 


Plt Count   (150-450)  k/uL


 


Neutrophils #   (1.3-7.7)  k/uL


 


Lymphocytes #   (1.0-4.8)  k/uL


 


Potassium   (3.5-5.1)  mmol/L


 


Chloride   ()  mmol/L


 


Carbon Dioxide   (22-30)  mmol/L


 


Creatinine   (0.66-1.25)  mg/dL


 


Glucose   (74-99)  mg/dL


 


POC Glucose (mg/dL)  146 H  (75-99)  mg/dL


 


Calcium   (8.4-10.2)  mg/dL














Assessment and Plan


Plan: 


 Assessment:





1 acute pneumoperitoneum secondary to perforation of duodenal ulcer status post 

treatment.  Of duodenal ulcer and repair of incarcerated umbilical hernia 

postoperative day #9





2 advanced COPD and severe pulmonary fibrosis with chronic hypoxic respiratory 

failure





3 history of rheumatoid arthritis





4 obstructive sleep apnea syndrome not compliant with CPAP.





5 chronic back pain





6 osteoarthritis





7 pseudomonas aeruginosa in the sputum, could be a colonization or could be 

secondary to pseudomonal pneumonia.  Best to continue antibiotics at present 

since the patient's pulmonary status is rather marginal.





8 acute on chronic hypoxic respiratory failure multifactorial mostly related to 

his COPD, pulmonary fibrosis, and suspect some component of 

pneumonia/pseudomonal pneumonia.





Plan:





We'll continue with the IV diuretics for another 24 hours, we'll obtain a 

follow-up chest x-ray in the morning, acute I know's, daily weights, patient is 

improving in terms of dyspnea, and generalized edema.  His labs have been 

reviewed, serum potassium is being replaced per protocol.  Continue antibiotic 

coverage per ID service recommendations.  We'll continue to follow and make 

further recommendations.





I performed a history & physical examination of the patient and discussed their 

management with my nurse practitioner, La Louie.  I reviewed the nurse 

practitioner's note and agree with the documented findings and plan of care.  

Lung sounds are positive for diminished breath sounds, with basilar rales.  The 

findings and the impression was discussed with the patient.  I attest to the 

documentation by the nurse practitioner. 





Time with Patient: Less than 30

## 2019-06-06 LAB
ANION GAP SERPL CALC-SCNC: 3 MMOL/L
BASOPHILS # BLD AUTO: 0 K/UL (ref 0–0.2)
BASOPHILS NFR BLD AUTO: 0 %
BUN SERPL-SCNC: 21 MG/DL (ref 9–20)
CALCIUM SPEC-MCNC: 8.1 MG/DL (ref 8.4–10.2)
CHLORIDE SERPL-SCNC: 92 MMOL/L (ref 98–107)
CO2 SERPL-SCNC: 45 MMOL/L (ref 22–30)
EOSINOPHIL # BLD AUTO: 0.1 K/UL (ref 0–0.7)
EOSINOPHIL NFR BLD AUTO: 1 %
ERYTHROCYTE [DISTWIDTH] IN BLOOD BY AUTOMATED COUNT: 3.97 M/UL (ref 4.3–5.9)
ERYTHROCYTE [DISTWIDTH] IN BLOOD: 17.4 % (ref 11.5–15.5)
GLUCOSE BLD-MCNC: 107 MG/DL (ref 75–99)
GLUCOSE BLD-MCNC: 111 MG/DL (ref 75–99)
GLUCOSE BLD-MCNC: 122 MG/DL (ref 75–99)
GLUCOSE BLD-MCNC: 136 MG/DL (ref 75–99)
GLUCOSE SERPL-MCNC: 88 MG/DL (ref 74–99)
HCT VFR BLD AUTO: 38.4 % (ref 39–53)
HGB BLD-MCNC: 11.7 GM/DL (ref 13–17.5)
LYMPHOCYTES # SPEC AUTO: 1.9 K/UL (ref 1–4.8)
LYMPHOCYTES NFR SPEC AUTO: 12 %
MCH RBC QN AUTO: 29.5 PG (ref 25–35)
MCHC RBC AUTO-ENTMCNC: 30.4 G/DL (ref 31–37)
MCV RBC AUTO: 96.9 FL (ref 80–100)
MONOCYTES # BLD AUTO: 0.6 K/UL (ref 0–1)
MONOCYTES NFR BLD AUTO: 4 %
NEUTROPHILS # BLD AUTO: 12.5 K/UL (ref 1.3–7.7)
NEUTROPHILS NFR BLD AUTO: 82 %
PLATELET # BLD AUTO: 466 K/UL (ref 150–450)
POTASSIUM SERPL-SCNC: 3.6 MMOL/L (ref 3.5–5.1)
SODIUM SERPL-SCNC: 140 MMOL/L (ref 137–145)
WBC # BLD AUTO: 15.3 K/UL (ref 3.8–10.6)

## 2019-06-06 RX ADMIN — INSULIN ASPART SCH: 100 INJECTION, SOLUTION INTRAVENOUS; SUBCUTANEOUS at 20:19

## 2019-06-06 RX ADMIN — FUROSEMIDE SCH MG: 10 INJECTION, SOLUTION INTRAMUSCULAR; INTRAVENOUS at 15:23

## 2019-06-06 RX ADMIN — PANTOPRAZOLE SODIUM SCH MG: 40 TABLET, DELAYED RELEASE ORAL at 07:47

## 2019-06-06 RX ADMIN — HEPARIN SODIUM SCH UNIT: 5000 INJECTION, SOLUTION INTRAVENOUS; SUBCUTANEOUS at 07:46

## 2019-06-06 RX ADMIN — HYDROCODONE BITARTRATE AND ACETAMINOPHEN PRN EACH: 5; 325 TABLET ORAL at 22:30

## 2019-06-06 RX ADMIN — IPRATROPIUM BROMIDE AND ALBUTEROL SULFATE SCH ML: .5; 3 SOLUTION RESPIRATORY (INHALATION) at 01:18

## 2019-06-06 RX ADMIN — INSULIN ASPART SCH: 100 INJECTION, SOLUTION INTRAVENOUS; SUBCUTANEOUS at 07:39

## 2019-06-06 RX ADMIN — FLUCONAZOLE SCH MG: 100 TABLET ORAL at 07:47

## 2019-06-06 RX ADMIN — HYDROCODONE BITARTRATE AND ACETAMINOPHEN PRN EACH: 5; 325 TABLET ORAL at 10:38

## 2019-06-06 RX ADMIN — PIPERACILLIN AND TAZOBACTAM SCH MLS/HR: 3; .375 INJECTION, POWDER, FOR SOLUTION INTRAVENOUS at 15:23

## 2019-06-06 RX ADMIN — INSULIN ASPART SCH: 100 INJECTION, SOLUTION INTRAVENOUS; SUBCUTANEOUS at 12:15

## 2019-06-06 RX ADMIN — HYDROCODONE BITARTRATE AND ACETAMINOPHEN PRN EACH: 5; 325 TABLET ORAL at 06:10

## 2019-06-06 RX ADMIN — HYDROCODONE BITARTRATE AND ACETAMINOPHEN PRN EACH: 5; 325 TABLET ORAL at 14:12

## 2019-06-06 RX ADMIN — IPRATROPIUM BROMIDE AND ALBUTEROL SULFATE SCH ML: .5; 3 SOLUTION RESPIRATORY (INHALATION) at 12:38

## 2019-06-06 RX ADMIN — HYDROCODONE BITARTRATE AND ACETAMINOPHEN PRN EACH: 5; 325 TABLET ORAL at 00:07

## 2019-06-06 RX ADMIN — POTASSIUM CHLORIDE SCH MEQ: 20 TABLET, EXTENDED RELEASE ORAL at 22:10

## 2019-06-06 RX ADMIN — POTASSIUM CHLORIDE SCH MEQ: 20 TABLET, EXTENDED RELEASE ORAL at 07:47

## 2019-06-06 RX ADMIN — TEMAZEPAM PRN MG: 15 CAPSULE ORAL at 22:30

## 2019-06-06 RX ADMIN — HEPARIN SODIUM SCH UNIT: 5000 INJECTION, SOLUTION INTRAVENOUS; SUBCUTANEOUS at 15:23

## 2019-06-06 RX ADMIN — FUROSEMIDE SCH MG: 10 INJECTION, SOLUTION INTRAMUSCULAR; INTRAVENOUS at 07:46

## 2019-06-06 RX ADMIN — HYDROCODONE BITARTRATE AND ACETAMINOPHEN PRN EACH: 5; 325 TABLET ORAL at 18:36

## 2019-06-06 RX ADMIN — IPRATROPIUM BROMIDE AND ALBUTEROL SULFATE SCH ML: .5; 3 SOLUTION RESPIRATORY (INHALATION) at 08:12

## 2019-06-06 RX ADMIN — IPRATROPIUM BROMIDE AND ALBUTEROL SULFATE SCH ML: .5; 3 SOLUTION RESPIRATORY (INHALATION) at 19:40

## 2019-06-06 RX ADMIN — INSULIN ASPART SCH: 100 INJECTION, SOLUTION INTRAVENOUS; SUBCUTANEOUS at 16:54

## 2019-06-06 RX ADMIN — PIPERACILLIN AND TAZOBACTAM SCH MLS/HR: 3; .375 INJECTION, POWDER, FOR SOLUTION INTRAVENOUS at 07:47

## 2019-06-06 NOTE — P.PN
Subjective


Progress Note Date: 06/06/19


Principal diagnosis: 





Acute pneumoperitoneum secondary to perforated duodenal ulcer





69-year-old male patient came into the emergency department with a one-week 

history of abdominal pain.  He initially went to [facility] 

emergency department with a CAT scan was done and the patient was told to have 

diverticulosis without diverticulitis.  Over the past week, the patient 

developed progressive worsening his abdominal discomfort and today came into the

emergency department having more pain and the pain was rather diffuse in nature.

 He had diminished appetite, diminished oral intake and he was having no 

significant bowel movements.  No GI bleeding.  Denies having any fever or 

chills.  He was nauseated when he was getting progressively more lethargic and 

weak and short of breath.  CAT scan of the abdomen was done in the emergency 

department and showed significant inflammatory changes in the epigastric region 

involving posterior aspect of the proximal transverse colon.  There was evidence

of pneumoperitoneum around that along with some inflammatory changes as well as 

some free fluid in the abdomen.  The stomach itself appeared to be within normal

limits.  The patient has long-term history of COPD and pulmonary fibrosis.  

Based on his pulmonary function test in 2017 he has an FVC of 72% and FEV1 of 

46% and he has been steroid dependent for the past few years taking prednisone a

daily basis.  He has also underlying rheumatoid arthritis and previously he was 

taking immunosuppression with Humira none for now.  His oxygen dependent.  Blood

work showed a white cell count 16.2 with a hemoglobin of 14.9.  Platelet count 

is at 519.  He has a BUN of 27 creatinine of 0.7 and lactic acid was at 1.9 with

a troponin being less than 0.01.  LFTs are all within normal limits.  He is 

tachycardic with temperature 98.2.  He was having sinus tachycardia with a heart

rate of 120.  He is currently on 5 L of oxygen by nasal cannula with a pulse ox 

of 90%.  The patient will be taken to the operating room.  He was seen by the 

surgical team following that I've advised the patient coming back to the 

intensive care unit for further evaluation and treatment.  He may need to be 

kept intubated overnight.





On 05/27/2019, the patient is postop day #4.  The patient is doing well.  He is 

awake and alert.  History requiring high flow oxygen at 10 L.  He doesn't have 

much reserve and he desaturates easily.  NG tube was removed yesterday and this 

morning the patient was given some clear liquid diet.  Surgical wound site is 

clean.  Output from the DAVID drain is minimal in the order of 30 mL over the past 

24 hours.  Surgical wound site is dry clean and intact.  He is passing flatus.  

No bowel activity as.  He remains on IV Zosyn.  He is using incentive 

spirometer.  He is on status post hydrocortisone.  He is awake and alert.  He 

has pseudomonas aeruginosa in his sputum for which she is covered with IV Zosyn.

 No other significant events overnight.





Patient was reevaluated today on 5/28/2019, remains on high flow nasal cannula 7

L/m, continues to have significantly abnormal chest x-ray with diffuse 

interstitial lung disease consistent with pulmonary fibrosis, underlying 

pneumonia is not entirely ruled out.  His FiO2 is being titrated down, patient 

is noted to be short of breath with any activity.  His sputum was positive for 

pseudomonas aeruginosa, remains on Zosyn.  All labs were reviewed today, 

potassium is a bit low at 3.2 being corrected as per protocol.





Patient was reevaluated today on 5/29/2019, remains on high flow nasal cannula, 

90 L/m, patient is basically about the same, chest x-ray is basically about the 

same showing bibasilar interstitial lung disease consistent with pulmonary 

fibrosis.  Again underlying pneumonia is not entirely ruled out but felt to be 

less likely.  Patient is hemodynamically stable, in no distress, being treated f

or pseudomonas aeruginosa in the sputum, remains on Zosyn.  CBC is relatively 

normal WBC count is 10.5 hemoglobin is 11.5 light was normal except for low 

potassium being corrected as per protocol.  Patient continues to have good urine

output.





Reevaluated today on 5/30/2019, remains in the ICU, he is presently overflow 

from selective.  Patient remains on 8 L high flow nasal cannula, pulmonary stat

us remains marginal.  Overall it is better than expected considering his 

underlying COPD and underlying interstitial lung disease.  Patient seems to be 

recovering slowly.  Last chest x-ray showed basically chronic changes of COPD 

and interstitial lung disease.  Underlying pneumonia is definitely not entirely 

ruled out.  Patient had positive Pseudomonas in the sputum and he is being 

treated as such.  Electrolytes are normal except for low potassium being 

corrected as per protocol.  The patient himself denies shortness of breath, 

denies any pain, he is complaining of significant swelling in his upper and 

lower extremities, hence I initiated diuretics today.





On 05/31/2019 patient seen in follow-up on medical surgical floor.  He is awake 

and alert, in no acute distress, he is on 6 L of oxygen, with a pulse ox of 95-

96%, he is afebrile, hemodynamically stable.  Work on his incentive spirometer, 

achieving about 1000 on the today.  This is postop day 8 status post exploratory

laparotomy with repair of the perforated duodenal ulcer and repair of the 

incarcerated umbilical hernia.  he is doing well, he was started on some oral 

Lasix yesterday, for generalized anasarca, and he is in -1869 mL fluid balance 

over the last 24 hours.  Still has quite a bit of swelling in his upper and 

lower extremities.  His lab work has been reviewed, showing white blood cell 

count of 20.9, hemoglobin of 11.6, serum sodium is 136, potassium is 2.7, this 

being replaced per protocol, chloride is 95, CO2 39, creatinine of 0.57 and BUN 

of 13.  Patient is tolerating oral intake, he is passing bowel movements.  No 

new chest x-rays today





The patient is seen today 06/01/2019 in follow-up on the regular medical floor. 

He is awake and alert in no acute distress.  Currently sitting up at the 

bedside.  Currently on 6 L high flow nasal cannula.  He utilizes 3.5 L at home. 

He is afebrile.  Hemodynamically stable.  White count 30.9.  Hemoglobin 13.0.  

Creatinine 0.54.  Remains on Zosyn, Solu-Cortef, DuoNeb inhalations.





On 06/02/2019 patient seen in follow-up on medical surgical floor.  He sitting 

up in the recliner, in no acute distress, was on 15 L high flow oxygen this 

morning, and his pulse ox was 88-91%, was encouraged to deep breathe and cough, 

and oxygen level did come up to 97%, we will start weaning FiO2, encouraged deep

breathing and coughing, patient is pulling of 1000 ML on his incentive 

spirometer, no fever or chills, lung sounds reveal good air entry bilaterally, 

no significant wheezes or rhonchi, there are bilateral crackles at the bases, 

but they are limited.  We will obtain follow-up chest x-ray today, he is on 

maintenance dose of oral Lasix at 20 mg twice daily, antibiotic coverage in the 

form of Flagyl, and Zosyn, there has been no fever, no chills, hemodynamically 

stable.  He remains on stress doses of hydrocortisone, we will start weaning it 

down, patient is normally on prednisone 10 mg daily for his history of COPD.  

Tolerating oral diet, no nausea vomiting or diarrhea, mid abdominal incision is 

intact, tension sutures are intact, bowel sounds are active, surgery is 

following, CT of abdomen and pelvis showed decrease in fluid accumulation 

involving the small bowel mesentery, no evidence of any fluid collection, there 

is a reduction of the umbilical hernia compared to last exam, and stable sigmoid

diverticulosis without diverticulitis.  It also showed patchy bilateral basilar 

pulmonary infiltrates and atelectasis and pleural fluid.





On 06/03/2090 patient seen in follow-up on medical surgical floor.  He is awake 

and alert, in no acute distress, currently down to 8 L, with a pulse ox of 92%, 

he is afebrile, hemodynamically stable, lung sounds reveal coarse basilar 

crackles over posterior lower lobes.  No fever or chills, today's labs have been

reviewed, white blood cell count is trending down, down to 22.1, hemoglobin is 

12.0, sodium is 137, potassium is 2.8, chloride is 89, CO2 25, BUN is 16 

creatinine 0.57.  Today's chest x-ray shows bibasilar opacities and trace 

pleural effusions.  He is working on his incentive spirometry.  I&O's are 

difficult to estimate as the patient is voiding, The weight is actually up 0.5 

kg.  And patient still has quite significant amount of generalized edema.  





The patient is seen today 06/04/2019 in follow-up on the regular medical floor. 

He is currently sitting up in a chair at the bedside.  Awake and alert in no 

acute distress.  He denies any worsening shortness of breath, cough or 

congestion.  6 L high flow nasal cannula with O2 saturations in the low 90s.  He

is afebrile.  Hemodynamically stable.  Chest x-ray reveals suspected underlying 

interstitial lung disease.  Difficult to exclude basilar airspace disease versus

atelectasis.  He continues to work well with the incentive spirometer. Sputum 

was positive for pseudomonas aeruginosa.  He is currently on Zosyn.  He also 

remains on IV diuretics.  White count 19.4.  Hemoglobin 11.7.  Potassium 2.7.  

Bicarb 47.  Creatinine 0.55.





On 06/05/2019 patient seen in follow-up on medical surgical floor.  Doing 

better, breathing easier, fluid volume status is improving, less edema in 

bilateral lower extremities and upper extremities.  Lung sounds reveal coarse 

crackles, patient remains on 6 L of oxygen.  Yesterday's chest x-ray has been 

reviewed, showing bibasilar opacities and trace pleural effusions.  Patient is 

working on incentive spirometer, he is maintaining negative fluid balance.  

Today's labs have been reviewed, showed a white blood cell count of 13.1, 

hemoglobin is 11.9, serum sodium is 140, potassium is 3.2, chloride is 90, CO2 

is 47, B1 of 18, creatinine 0.58.  Serum potassium is being replaced per 

protocol.  Patient remains on Zosyn for pseudomonas aeruginosa in the sputum 

cultures, remains on Flagyl, and IV hydrocortisone which we are weaning right 

now.





The patient is seen today 06/06/2019 in follow-up on the regular medical floor. 

He is currently sitting up in a chair at the bedside.  Awake and alert in no 

acute distress.  Breathing easier today as compared to yesterday.  Currently 

maintaining O2 saturations in the 90s on 4 L/m per nasal cannula.  Sputum was 

positive for pseudomonas aeruginosa.  Urine culture negative.  Blood culture 

negative.  White count 15.3.  Hemoglobin 11.7.  Bicarb 45.  Creatinine 0.75.  

Remains on DuoNeb inhalations, IV diuretics, Flagyl and Zosyn.





Objective





- Vital Signs


Vital signs: 


                                   Vital Signs











Temp  97.2 F L  06/06/19 07:37


 


Pulse  104 H  06/06/19 12:48


 


Resp  18   06/06/19 08:00


 


BP  109/70   06/06/19 07:37


 


Pulse Ox  92 L  06/06/19 07:37








                                 Intake & Output











 06/05/19 06/06/19 06/06/19





 18:59 06:59 18:59


 


Intake Total 486 358 716


 


Balance 486 358 716


 


Intake:   


 


  Oral 486 358 716


 


Other:   


 


  Voiding Method Bedside Commode Bedside Commode 





 Urinal Urinal 


 


  # Voids   1








                       ABP, PAP, CO, CI - Last Documented











Arterial Blood Pressure        146/47

















- Exam





 GENERAL EXAM: Alert, pleasant, 69-year-old male, on 4 L of oxygen, comfortable 

in no apparent distress.


HEAD: Normocephalic/atraumatic.


EYES: Normal reaction of pupils, equal size.  Conjunctiva pink, sclera white.


NOSE: Clear with pink turbinates.


THROAT: No erythema or exudates.


NECK: No masses, no JVD, no thyroid enlargement, no adenopathy.


CHEST: Symmetric with equal expansion 


LUNGS: Equal air entry with diminished breath sounds, and basilar rales


CVS: Regular rate and rhythm, normal S1 and S2, no gallops, no murmurs, no rubs


ABDOMEN: Soft, nontender.  No hepatosplenomegaly, normal bowel sounds, no 

guarding or rigidity.


EXTREMITIES: No clubbing, generalized edema, no cyanosis, 2+ pulses and upper 

and lower extremities.


MUSCULOSKELETAL: Muscle strength and tone normal.


SPINE: No scoliosis or deformity


SKIN: No rashes


CENTRAL NERVOUS SYSTEM: No focal deficits, tone is normal in all 4 extremities.


PSYCHIATRIC: Alert and oriented -3.  Appropriate affect.  Intact judgment and 

insight.





- Labs


CBC & Chem 7: 


                                 06/06/19 05:38





                                 06/06/19 05:38


Labs: 


                  Abnormal Lab Results - Last 24 Hours (Table)











  06/05/19 06/05/19 06/06/19 Range/Units





  16:55 20:37 05:38 


 


WBC    15.3 H  (3.8-10.6)  k/uL


 


RBC    3.97 L  (4.30-5.90)  m/uL


 


Hgb    11.7 L  (13.0-17.5)  gm/dL


 


Hct    38.4 L  (39.0-53.0)  %


 


MCHC    30.4 L  (31.0-37.0)  g/dL


 


RDW    17.4 H  (11.5-15.5)  %


 


Plt Count    466 H  (150-450)  k/uL


 


Neutrophils #    12.5 H  (1.3-7.7)  k/uL


 


Chloride     ()  mmol/L


 


Carbon Dioxide     (22-30)  mmol/L


 


BUN     (9-20)  mg/dL


 


POC Glucose (mg/dL)  106 H  194 H   (75-99)  mg/dL


 


Calcium     (8.4-10.2)  mg/dL














  06/06/19 06/06/19 06/06/19 Range/Units





  05:38 07:38 11:35 


 


WBC     (3.8-10.6)  k/uL


 


RBC     (4.30-5.90)  m/uL


 


Hgb     (13.0-17.5)  gm/dL


 


Hct     (39.0-53.0)  %


 


MCHC     (31.0-37.0)  g/dL


 


RDW     (11.5-15.5)  %


 


Plt Count     (150-450)  k/uL


 


Neutrophils #     (1.3-7.7)  k/uL


 


Chloride  92 L    ()  mmol/L


 


Carbon Dioxide  45 H*    (22-30)  mmol/L


 


BUN  21 H    (9-20)  mg/dL


 


POC Glucose (mg/dL)   107 H  136 H  (75-99)  mg/dL


 


Calcium  8.1 L    (8.4-10.2)  mg/dL














Assessment and Plan


Assessment: 





 Assessment:





1 acute pneumoperitoneum secondary to perforation of duodenal ulcer status post 

duodenal ulcer repair and incarcerated umbilical hernia 





2 advanced COPD and severe pulmonary fibrosis with chronic hypoxic respiratory 

failure





3 history of rheumatoid arthritis





4 obstructive sleep apnea syndrome not compliant with CPAP.





5 chronic back pain





6 osteoarthritis





7 pseudomonas aeruginosa in the sputum, could be a colonization or could be se

condary to pseudomonal pneumonia.  Best to continue antibiotics at present since

the patient's pulmonary status is rather marginal.





8 acute on chronic hypoxic respiratory failure multifactorial mostly related to 

his COPD, pulmonary fibrosis, and suspect some component of 

pneumonia/pseudomonal pneumonia.





Plan:





The patient was seen and evaluated by Dr. Salazar.  He is improved from the 

pulmonary standpoint.  Remains on 4 L high flow nasal cannula. Continue the 

current treatment plan.  Increase his activity as tolerated.  We'll continue to 

follow.





I, the cosigning physician, performed a history & physical examination of the 

patient. Lungs sounds crackles in the bilateral posterior bases, diminished.  

Maintaining good O2 saturations in the 90s on 4 L high flow nasal cannula.  I 

discussed the assessment and plan of care with my nurse practitioner, Sylwia Flaherty. I attest to the above note as dictated by her.

## 2019-06-06 NOTE — P.PN
Subjective


Progress Note Date: 06/06/19





CHIEF COMPLAINT: Perforated duodenal ulcer





HISTORY OF PRESENT ILLNESS: Patient seen and examined this morning. He is 

sitting up in the chair. Patient reports his abdominal pain is tolerable. 

Tolerating diet. No nausea or vomiting. DAVID DC yesterday. WBC 15.3. Hemoglobin 

11.7.Oxygen has been weaned down to 4L.





PHYSICAL EXAM: 


VITAL SIGNS: Reviewed.


GENERAL: Well-developed in no acute distress. 


HEENT:  No sclera icterus. Extraocular movements grossly intact.  Moist buccal 

mucosa. Head is atraumatic, normocephalic. 


ABDOMEN:  Soft.  Nondistended. Dressing clean dry intact.


NEUROLOGIC: Alert and oriented. Cranial nerves II through XII grossly intact.





ASSESSMENT: 


1.  Perforated duodenal ulcer, status post exploratory laparotomy with repair of

perforated duodenal ulcer and repair of incarcerated umbilical hernia





PLAN: 


1. Continue current diet


2. Monitor WBC. Continue antibiotics. 


3. Wound care. Dressing to be changed today per nursing.


4. Incentive spirometry


5. Activity as tolerated.


6. Pulmonary and infectious disease following. 





Nurse practitioner note has been reviewed by physician. Signing provider agrees 

with the documented findings, assessment, and plan of care. 








Objective





- Vital Signs


Vital signs: 


                                   Vital Signs











Temp  97.2 F L  06/06/19 07:37


 


Pulse  88   06/06/19 08:27


 


Resp  18   06/06/19 08:00


 


BP  109/70   06/06/19 07:37


 


Pulse Ox  92 L  06/06/19 07:37








                                 Intake & Output











 06/05/19 06/06/19 06/06/19





 18:59 06:59 18:59


 


Intake Total 486 358 716


 


Balance 486 358 716


 


Intake:   


 


  Oral 486 358 716


 


Other:   


 


  Voiding Method Bedside Commode Bedside Commode 





 Urinal Urinal 


 


  # Voids   1








                       ABP, PAP, CO, CI - Last Documented











Arterial Blood Pressure        146/47

















- Labs


CBC & Chem 7: 


                                 06/06/19 05:38





                                 06/06/19 05:38


Labs: 


                  Abnormal Lab Results - Last 24 Hours (Table)











  06/05/19 06/05/19 06/05/19 Range/Units





  11:27 16:55 20:37 


 


WBC     (3.8-10.6)  k/uL


 


RBC     (4.30-5.90)  m/uL


 


Hgb     (13.0-17.5)  gm/dL


 


Hct     (39.0-53.0)  %


 


MCHC     (31.0-37.0)  g/dL


 


RDW     (11.5-15.5)  %


 


Plt Count     (150-450)  k/uL


 


Neutrophils #     (1.3-7.7)  k/uL


 


Chloride     ()  mmol/L


 


Carbon Dioxide     (22-30)  mmol/L


 


BUN     (9-20)  mg/dL


 


POC Glucose (mg/dL)  146 H  106 H  194 H  (75-99)  mg/dL


 


Calcium     (8.4-10.2)  mg/dL














  06/06/19 06/06/19 06/06/19 Range/Units





  05:38 05:38 07:38 


 


WBC  15.3 H    (3.8-10.6)  k/uL


 


RBC  3.97 L    (4.30-5.90)  m/uL


 


Hgb  11.7 L    (13.0-17.5)  gm/dL


 


Hct  38.4 L    (39.0-53.0)  %


 


MCHC  30.4 L    (31.0-37.0)  g/dL


 


RDW  17.4 H    (11.5-15.5)  %


 


Plt Count  466 H    (150-450)  k/uL


 


Neutrophils #  12.5 H    (1.3-7.7)  k/uL


 


Chloride   92 L   ()  mmol/L


 


Carbon Dioxide   45 H*   (22-30)  mmol/L


 


BUN   21 H   (9-20)  mg/dL


 


POC Glucose (mg/dL)    107 H  (75-99)  mg/dL


 


Calcium   8.1 L   (8.4-10.2)  mg/dL

## 2019-06-06 NOTE — PN
PROGRESS NOTE



DATE OF SERVICE:

06/06/2019.



REASON FOR FOLLOW UP:

Secondary peritonitis from perforated duodenal ulcer.



INTERVAL HISTORY:

The patient is currently afebrile.  The patient has been breathing comfortably.  No

chest pain, shortness of breath or cough.  No worsening abdominal pain or any diarrhea.



PHYSICAL EXAMINATION:

Blood pressure is 105/60 with a pulse of 112, temperature 98.  He is 91% on 4 L.

General description is an elderly male lying in bed in no distress.

Respiratory system:  Unlabored breathing.  Clear to auscultation anteriorly.

HEART S1, S2.  Regular rate and rhythm.

ABDOMEN: Soft, no tenderness.



LABS:

Hemoglobin 11.7, WBC 15.3, BUN of 21, creatinine 0.75.



DIAGNOSTIC IMPRESSION AND PLAN:

Patient with a perforated duodenal ulcer, status post laparotomy and repair of _____.

Currently covered with Zosyn and Diflucan who which the patient has tolerated.

Transition to a short course of oral Augmentin, Diflucan for about a week with close

outpatient followup.





MMODL / IJN: [id number] / Job#: [id number]

## 2019-06-06 NOTE — PN
PROGRESS NOTE



DATE OF SERVICE:

06/06/2019



This is a 69-year-old gentleman who was admitted with multiple medical problems

including acute duodenal ulcer perforation and surgery and as well as COPD is improving

significantly.  The patient also had Pseudomonas grown from the culture.  No chest

pain.  No palpitations.  No fever.  Patient still has some shortness of breath,

saturating satisfactorily at 4 L nasal cannula.



PHYSICAL EXAM:

Alert and oriented x3.  Pulse is 88, blood pressure 109/87, respiration 18, temperature

97.2, pulse ox 98% on 4 L.

HEENT:  Conjunctivae normal.

NECK:  No jugular venous distension.

CARDIOVASCULAR:  S1, S2, muffled.

RESPIRATION:  Breath sounds diminished at the bases, bilateral scattered rhonchi, no

crackles.

ABDOMEN: Soft, status post surgery.

LEGS:  No edema, no swelling.

NERVOUS SYSTEM:  No focal deficits.



LABS:

WBC is 15.8, hemoglobin 11.7, sodium 140, potassium 3.6, CO2 is 45.



ASSESSMENT:

1. Acute duodenal ulcer perforation with pneumoperitoneum, status post exploratory

    laparotomy and repair of perforated duodenal ulcer and repair of incarcerated

    umbilical hernia.

2. Advanced chronic obstructive pulmonary disease with severe pulmonary fibrosis,

    acute on chronic hypoxic respiratory failure.

3. Chronic hypoxic respiratory failure on 3 L nasal cannula at home.

4. History of rheumatoid arthritis, history of gait dysfunction and generalized

    asthenia.

5. History of sleep apnea.

6. Chronic back pain, degenerative joint disease.

7. Pseudomonas aeruginosa in the sputum.

8. Increased WBC.

9. Anemia of chronic disease.

10.Severe hypokalemia.

11.Hyperlipidemia.

12.History of sleep apnea.

13.Pneumonia.

14.History of anxiety, depression.

15.Remote history of nicotine dependence.

16.FULL CODE.



RECOMMENDATION:

In this 69-year-old gentleman who presented with multiple complex medical issues, will

monitor the patient closely, continue with the current management and symptomatic

treatment.  Otherwise, continue with steroids.  Continue with bronchodilators.

Continue the antibiotics.  Closely follow with multiple consultants.  Increase

ambulation.  Pain medications.  Further recommendations to follow. Closely follow with

Surgery.





MMLATRELLL / RUBENN: [id number] / Job#: [id number]

## 2019-06-07 VITALS — TEMPERATURE: 97.5 F | SYSTOLIC BLOOD PRESSURE: 124 MMHG | DIASTOLIC BLOOD PRESSURE: 86 MMHG

## 2019-06-07 VITALS — HEART RATE: 76 BPM

## 2019-06-07 VITALS — RESPIRATION RATE: 16 BRPM

## 2019-06-07 LAB
ANION GAP SERPL CALC-SCNC: 4 MMOL/L
BUN SERPL-SCNC: 20 MG/DL (ref 9–20)
CALCIUM SPEC-MCNC: 8.9 MG/DL (ref 8.4–10.2)
CHLORIDE SERPL-SCNC: 93 MMOL/L (ref 98–107)
CO2 SERPL-SCNC: 42 MMOL/L (ref 22–30)
GLUCOSE BLD-MCNC: 81 MG/DL (ref 75–99)
GLUCOSE BLD-MCNC: 95 MG/DL (ref 75–99)
GLUCOSE SERPL-MCNC: 101 MG/DL (ref 74–99)
POTASSIUM SERPL-SCNC: 5.5 MMOL/L (ref 3.5–5.1)
SODIUM SERPL-SCNC: 139 MMOL/L (ref 137–145)

## 2019-06-07 RX ADMIN — PANTOPRAZOLE SODIUM SCH MG: 40 TABLET, DELAYED RELEASE ORAL at 08:52

## 2019-06-07 RX ADMIN — HEPARIN SODIUM SCH UNIT: 5000 INJECTION, SOLUTION INTRAVENOUS; SUBCUTANEOUS at 08:52

## 2019-06-07 RX ADMIN — INSULIN ASPART SCH: 100 INJECTION, SOLUTION INTRAVENOUS; SUBCUTANEOUS at 11:52

## 2019-06-07 RX ADMIN — FLUCONAZOLE SCH MG: 100 TABLET ORAL at 08:51

## 2019-06-07 RX ADMIN — INSULIN ASPART SCH: 100 INJECTION, SOLUTION INTRAVENOUS; SUBCUTANEOUS at 08:36

## 2019-06-07 RX ADMIN — FUROSEMIDE SCH: 10 INJECTION, SOLUTION INTRAMUSCULAR; INTRAVENOUS at 11:19

## 2019-06-07 RX ADMIN — PIPERACILLIN AND TAZOBACTAM SCH MLS/HR: 3; .375 INJECTION, POWDER, FOR SOLUTION INTRAVENOUS at 00:28

## 2019-06-07 RX ADMIN — IPRATROPIUM BROMIDE AND ALBUTEROL SULFATE SCH ML: .5; 3 SOLUTION RESPIRATORY (INHALATION) at 12:04

## 2019-06-07 RX ADMIN — HYDROCODONE BITARTRATE AND ACETAMINOPHEN PRN EACH: 5; 325 TABLET ORAL at 11:52

## 2019-06-07 RX ADMIN — FUROSEMIDE SCH MG: 10 INJECTION, SOLUTION INTRAMUSCULAR; INTRAVENOUS at 00:28

## 2019-06-07 RX ADMIN — HEPARIN SODIUM SCH UNIT: 5000 INJECTION, SOLUTION INTRAVENOUS; SUBCUTANEOUS at 00:28

## 2019-06-07 RX ADMIN — POTASSIUM CHLORIDE SCH MEQ: 20 TABLET, EXTENDED RELEASE ORAL at 08:51

## 2019-06-07 RX ADMIN — IPRATROPIUM BROMIDE AND ALBUTEROL SULFATE SCH: .5; 3 SOLUTION RESPIRATORY (INHALATION) at 09:04

## 2019-06-07 RX ADMIN — PIPERACILLIN AND TAZOBACTAM SCH MLS/HR: 3; .375 INJECTION, POWDER, FOR SOLUTION INTRAVENOUS at 08:52

## 2019-06-07 RX ADMIN — IPRATROPIUM BROMIDE AND ALBUTEROL SULFATE SCH ML: .5; 3 SOLUTION RESPIRATORY (INHALATION) at 01:14

## 2019-06-07 RX ADMIN — HYDROCODONE BITARTRATE AND ACETAMINOPHEN PRN EACH: 5; 325 TABLET ORAL at 06:14

## 2019-06-07 NOTE — PN
PROGRESS NOTE



DATE OF SERVICE:

06/07/2019



REASON FOR FOLLOWUP:

Secondary peritonitis from a perforated duodenal ulcer.



INTERVAL HISTORY:

The patient is currently afebrile.  The patient is currently breathing comfortably.

Denies any chest pain.  Occasional cough.  No nausea or vomiting.  No worsening

abdominal pain or any diarrhea.



PHYSICAL EXAMINATION:

On physical examination, blood pressure is 124/86, pulse of 72, temperature 97.5. He is

90% on 4 L high-flow oxygen.

General description is an elderly male up in the chair in no distress.

RESPIRATORY SYSTEM: Unlabored breathing, Decreased breath sounds at the bases. No

wheeze.

HEART: S1, S2.  Regular rate and rhythm.

ABDOMEN:  Soft, no tenderness.



LABS:

BUN of 20, creatinine 0.87.



DIAGNOSTIC IMPRESSION AND PLAN:

Patient with secondary peritonitis from perforated duodenal ulcer, status post _____

repair _____possible pneumonia.  The patient has shown overall clinical improvement.

He will finish therapy with a short a course of oral Augmentin and Diflucan and a close

outpatient followup.  Continue supportive care.





MMODL / IJN: [id number] / Job#: [id number]

## 2019-06-07 NOTE — DS
DISCHARGE SUMMARY



FINAL DIAGNOSES:

1. Acute duodenal ulcer, perforated perforation with pneumoperitoneum, status post

    exploratory laparotomy, repair of perforated duodenal ulcer as well as repair of

    incarcerated umbilical hernia.

2. Advanced chronic obstructive pulmonary disease  with severe pulmonary fibrosis,

    acute on chronic hypoxic respiratory failure.

3. Chronic hypoxic respiratory failure on 3 L nasal cannula at home.

4. History of rheumatoid arthritis.

5. History of gait dysfunction, generalized asthenia.

6. History of sleep apnea.

7. Chronic back pain, degenerative joint disease.

8. Pseudomonas aeruginosa in the sputum.

9. Increased WBC.

10.Anemia of chronic disease.

11.Severe hypokalemia.

12.Hyperlipidemia.

13.History of sleep apnea.

14.History of pneumonia.

15.History of anxiety, depression.

16.Remote history of nicotine dependence.

17.FULL CODE.



DISCHARGE DISPOSITION:

The patient is being discharged in stable condition with guarded prognosis.



HISTORY OF PRESENT ILLNESS:

This 69-year-old gentleman with a past medical history of multiple medical problems had

duodenal ulcer perforation, multiple pulmonary problems causing hypoxic respiratory

failure.  The patient underwent surgery and subsequently treatment for the lung issues.

Patient was seen by multiple consultants including Dr. Thakur,  Dr. Salazar, Dr. Rogers

and Dr. Gomez.  The patient tolerated the procedure well.  Patient improved

significantly.



On exam, vitals are stable.  Cardiovascular: S1, S2. Respirations: A few scattered

rhonchi and crackles.  Abdomen is soft.  Nervous System: No focal deficits.



The patient also complains of depression.  I recommend to resume the home dose of

antidepressant at home and subsequently follow with Dr. Berry for further evaluation

and treatment.



DISCHARGE ADVICE AND MEDICATIONS:

1. Diet is cardiac diet.

2. Activity limited until followup.

3. Follow up with Dr. Gomez.

4. The rest of the recommendations per Dr. Gomez from surgical point of view.

5. Follow up with Dr. Berry in 2-3 days.

6. Follow up with Pulmonary as recommended.



DISCHARGE MEDICATIONS:

Are as follows:

1. Ativan 1 mg p.o. t.i.d. p.r.n.

2. Norco 10 mg q.8 p.r.n.

3. Tessalon Perles 100 mg t.i.d. p.r.n.

4. Augmentin 875 mg 1 p.o. b.i.d. for 4 days.

5. Diflucan 200 mg for 5 days.

6. DuoNeb q.i.d.

7. Flagyl 500 mg t.i.d.

8. K-Dur 40 mEq p.o. b.i.d.

9. Lasix 40 mg p.o. daily.

10.Prednisone 10 mg p.o. daily as before.

11.Protonix 40 mg with breakfast.

12.Symbicort 160/4.5 two puffs b.i.d.

CBC and BMP in the outpatient setting.



Once again,  the patient is being discharged in stable condition with guarded

prognosis.  Total time taken 35 minutes.





ALEXANDER / RUBENN: [id number] / Job#: [id number]

## 2019-06-07 NOTE — P.PN
Subjective


Progress Note Date: 06/07/19





CHIEF COMPLAINT: Perforated duodenal ulcer





HISTORY OF PRESENT ILLNESS: Patient seen and examined this morning. He is 

sitting up in the chair. Pain is tolerable. He is tolerating diet. Remains on 4L

NC.





PHYSICAL EXAM: 


VITAL SIGNS: Reviewed.


GENERAL: Well-developed in no acute distress. 


HEENT:  No sclera icterus. Extraocular movements grossly intact.  Moist buccal 

mucosa. Head is atraumatic, normocephalic. 


ABDOMEN:  Soft.  Nondistended. Dressing clean dry intact.


NEUROLOGIC: Alert and oriented. Cranial nerves II through XII grossly intact.





ASSESSMENT: 


1.  Perforated duodenal ulcer, status post exploratory laparotomy with repair of

perforated duodenal ulcer and repair of incarcerated umbilical hernia





PLAN: 


Continue diet as tolerated


Discontinue Telfa jaime to incision


Remove every other staple today


Stable for discharge home today


Follow up with Dr. Gomez in 1 week


Patient to resume Norco 10mg at the time of discharge which he was taking prior 

to hospitalization.  Patient agreeable.





Nurse practitioner note has been reviewed by physician. Signing provider agrees 

with the documented findings, assessment, and plan of care. 








Objective





- Vital Signs


Vital signs: 


                                   Vital Signs











Temp  97.5 F L  06/07/19 07:00


 


Pulse  113 H  06/07/19 07:00


 


Resp  16   06/07/19 07:00


 


BP  124/86   06/07/19 07:00


 


Pulse Ox  90 L  06/07/19 07:00








                                 Intake & Output











 06/06/19 06/07/19 06/07/19





 18:59 06:59 18:59


 


Intake Total 1432  


 


Balance 1432  


 


Intake:   


 


  Oral 1432  


 


Other:   


 


  Voiding Method  Bedside Commode 





  Urinal 


 


  # Voids 1  








                       ABP, PAP, CO, CI - Last Documented











Arterial Blood Pressure        146/47

















- Labs


CBC & Chem 7: 


                                 06/06/19 05:38





                                 06/06/19 05:38


Labs: 


                  Abnormal Lab Results - Last 24 Hours (Table)











  06/06/19 06/06/19 06/06/19 Range/Units





  11:35 16:51 20:17 


 


POC Glucose (mg/dL)  136 H  111 H  122 H  (75-99)  mg/dL

## 2019-06-07 NOTE — P.PN
Subjective


Progress Note Date: 06/07/19


Principal diagnosis: 


 Acute pneumoperitoneum secondary to perforated duodenal ulcer





69-year-old male patient came into the emergency department with a one-week 

history of abdominal pain.  He initially went to [facility] 

emergency department with a CAT scan was done and the patient was told to have 

diverticulosis without diverticulitis.  Over the past week, the patient 

developed progressive worsening his abdominal discomfort and today came into the

emergency department having more pain and the pain was rather diffuse in nature.

 He had diminished appetite, diminished oral intake and he was having no 

significant bowel movements.  No GI bleeding.  Denies having any fever or 

chills.  He was nauseated when he was getting progressively more lethargic and 

weak and short of breath.  CAT scan of the abdomen was done in the emergency 

department and showed significant inflammatory changes in the epigastric region 

involving posterior aspect of the proximal transverse colon.  There was evidence

of pneumoperitoneum around that along with some inflammatory changes as well as 

some free fluid in the abdomen.  The stomach itself appeared to be within normal

limits.  The patient has long-term history of COPD and pulmonary fibrosis.  

Based on his pulmonary function test in 2017 he has an FVC of 72% and FEV1 of 

46% and he has been steroid dependent for the past few years taking prednisone a

daily basis.  He has also underlying rheumatoid arthritis and previously he was 

taking immunosuppression with Humira none for now.  His oxygen dependent.  Blood

work showed a white cell count 16.2 with a hemoglobin of 14.9.  Platelet count 

is at 519.  He has a BUN of 27 creatinine of 0.7 and lactic acid was at 1.9 with

a troponin being less than 0.01.  LFTs are all within normal limits.  He is 

tachycardic with temperature 98.2.  He was having sinus tachycardia with a heart

rate of 120.  He is currently on 5 L of oxygen by nasal cannula with a pulse ox 

of 90%.  The patient will be taken to the operating room.  He was seen by the 

surgical team following that I've advised the patient coming back to the 

intensive care unit for further evaluation and treatment.  He may need to be 

kept intubated overnight.





On 05/27/2019, the patient is postop day #4.  The patient is doing well.  He is 

awake and alert.  History requiring high flow oxygen at 10 L.  He doesn't have 

much reserve and he desaturates easily.  NG tube was removed yesterday and this 

morning the patient was given some clear liquid diet.  Surgical wound site is 

clean.  Output from the DAVID drain is minimal in the order of 30 mL over the past 

24 hours.  Surgical wound site is dry clean and intact.  He is passing flatus.  

No bowel activity as.  He remains on IV Zosyn.  He is using incentive 

spirometer.  He is on status post hydrocortisone.  He is awake and alert.  He 

has pseudomonas aeruginosa in his sputum for which she is covered with IV Zosyn.

 No other significant events overnight.





Patient was reevaluated today on 5/28/2019, remains on high flow nasal cannula 7

L/m, continues to have significantly abnormal chest x-ray with diffuse 

interstitial lung disease consistent with pulmonary fibrosis, underlying 

pneumonia is not entirely ruled out.  His FiO2 is being titrated down, patient 

is noted to be short of breath with any activity.  His sputum was positive for 

pseudomonas aeruginosa, remains on Zosyn.  All labs were reviewed today, 

potassium is a bit low at 3.2 being corrected as per protocol.





Patient was reevaluated today on 5/29/2019, remains on high flow nasal cannula, 

90 L/m, patient is basically about the same, chest x-ray is basically about the 

same showing bibasilar interstitial lung disease consistent with pulmonary 

fibrosis.  Again underlying pneumonia is not entirely ruled out but felt to be 

less likely.  Patient is hemodynamically stable, in no distress, being treated f

or pseudomonas aeruginosa in the sputum, remains on Zosyn.  CBC is relatively 

normal WBC count is 10.5 hemoglobin is 11.5 light was normal except for low 

potassium being corrected as per protocol.  Patient continues to have good urine

output.





Reevaluated today on 5/30/2019, remains in the ICU, he is presently overflow 

from selective.  Patient remains on 8 L high flow nasal cannula, pulmonary stat

us remains marginal.  Overall it is better than expected considering his 

underlying COPD and underlying interstitial lung disease.  Patient seems to be 

recovering slowly.  Last chest x-ray showed basically chronic changes of COPD 

and interstitial lung disease.  Underlying pneumonia is definitely not entirely 

ruled out.  Patient had positive Pseudomonas in the sputum and he is being 

treated as such.  Electrolytes are normal except for low potassium being 

corrected as per protocol.  The patient himself denies shortness of breath, 

denies any pain, he is complaining of significant swelling in his upper and 

lower extremities, hence I initiated diuretics today.





On 05/31/2019 patient seen in follow-up on medical surgical floor.  He is awake 

and alert, in no acute distress, he is on 6 L of oxygen, with a pulse ox of 95-

96%, he is afebrile, hemodynamically stable.  Work on his incentive spirometer, 

achieving about 1000 on the today.  This is postop day 8 status post exploratory

laparotomy with repair of the perforated duodenal ulcer and repair of the 

incarcerated umbilical hernia.  he is doing well, he was started on some oral 

Lasix yesterday, for generalized anasarca, and he is in -1869 mL fluid balance 

over the last 24 hours.  Still has quite a bit of swelling in his upper and 

lower extremities.  His lab work has been reviewed, showing white blood cell 

count of 20.9, hemoglobin of 11.6, serum sodium is 136, potassium is 2.7, this 

being replaced per protocol, chloride is 95, CO2 39, creatinine of 0.57 and BUN 

of 13.  Patient is tolerating oral intake, he is passing bowel movements.  No 

new chest x-rays today





On 06/02/2019 patient seen in follow-up on medical surgical floor.  He sitting 

up in the recliner, in no acute distress, was on 15 L high flow oxygen this 

morning, and his pulse ox was 88-91%, was encouraged to deep breathe and cough, 

and oxygen level did come up to 97%, we will start weaning FiO2, encouraged deep

breathing and coughing, patient is pulling of 1000 ML on his incentive 

spirometer, no fever or chills, lung sounds reveal good air entry bilaterally, 

no significant wheezes or rhonchi, there are bilateral crackles at the bases, 

but they are limited.  We will obtain follow-up chest x-ray today, he is on 

maintenance dose of oral Lasix at 20 mg twice daily, antibiotic coverage in the 

form of Flagyl, and Zosyn, there has been no fever, no chills, hemodynamically 

stable.  He remains on stress doses of hydrocortisone, we will start weaning it 

down, patient is normally on prednisone 10 mg daily for his history of COPD.  

Tolerating oral diet, no nausea vomiting or diarrhea, mid abdominal incision is 

intact, tension sutures are intact, bowel sounds are active, surgery is 

following, CT of abdomen and pelvis showed decrease in fluid accumulation 

involving the small bowel mesentery, no evidence of any fluid collection, there 

is a reduction of the umbilical hernia compared to last exam, and stable sigmoid

diverticulosis without diverticulitis.  It also showed patchy bilateral basilar 

pulmonary infiltrates and atelectasis and pleural fluid.





On 06/03/2090 patient seen in follow-up on medical surgical floor.  He is awake 

and alert, in no acute distress, currently down to 8 L, with a pulse ox of 92%, 

he is afebrile, hemodynamically stable, lung sounds reveal coarse basilar 

crackles over posterior lower lobes.  No fever or chills, today's labs have been

reviewed, white blood cell count is trending down, down to 22.1, hemoglobin is 

12.0, sodium is 137, potassium is 2.8, chloride is 89, CO2 25, BUN is 16 

creatinine 0.57.  Today's chest x-ray shows bibasilar opacities and trace 

pleural effusions.  He is working on his incentive spirometry.  I&O's are 

difficult to estimate as the patient is voiding, The weight is actually up 0.5 

kg.  And patient still has quite significant amount of generalized edema.





On 06/05/2019 patient seen in follow-up on medical surgical floor.  Doing 

better, breathing easier, fluid volume status is improving, less edema in 

bilateral lower extremities and upper extremities.  Lung sounds reveal coarse 

crackles, patient remains on 6 L of oxygen.  Yesterday's chest x-ray has been 

reviewed, showing bibasilar opacities and trace pleural effusions.  Patient is 

working on incentive spirometer, he is maintaining negative fluid balance.  

Today's labs have been reviewed, showed a white blood cell count of 13.1, hemogl

obin is 11.9, serum sodium is 140, potassium is 3.2, chloride is 90, CO2 is 47, 

B1 of 18, creatinine 0.58.  Serum potassium is being replaced per protocol.  

Patient remains on Zosyn for pseudomonas aeruginosa in the sputum cultures, 

remains on Flagyl, and IV hydrocortisone which we are weaning right now.





On 06/07/2019 patient seen in follow-up on medical surgical floor.  He is awake 

and alert, sitting on the edge of the bed, in no acute distress, FiO2 is down to

4 L, with a pulse ox of 97%, afebrile, hemodynamically patient is stable.  

Pulmonary perspective patient is improving, he has been given IV diuretics for 

48 hours, lower extremity edema and generalized edema improving, fluid volume 

status improving.  There has been no weight in the last 72 hours, but overall 

generalized edema has improved.  Continues on Zosyn for pseudomonas aeruginosa 

in the sputum, continues on Flagyl and Diflucan.  Clinically patient is stable, 

no acute events overnight, he is on his home dose FiO2.  Worsening dyspnea, he 

is working on his incentive spirometer, lung sounds reveal some minimal rales at

the bases.  Vital signs are stable.





  








Objective





- Vital Signs


Vital signs: 


                                   Vital Signs











Temp  97.5 F L  06/07/19 07:00


 


Pulse  113 H  06/07/19 07:00


 


Resp  16   06/07/19 07:00


 


BP  124/86   06/07/19 07:00


 


Pulse Ox  90 L  06/07/19 07:00








                                 Intake & Output











 06/06/19 06/07/19 06/07/19





 18:59 06:59 18:59


 


Intake Total 1432  


 


Balance 1432  


 


Intake:   


 


  Oral 1432  


 


Other:   


 


  Voiding Method  Bedside Commode 





  Urinal 


 


  # Voids 1  








                       ABP, PAP, CO, CI - Last Documented











Arterial Blood Pressure        146/47

















- Exam


 GENERAL EXAM: Alert, pleasant, 69-year-old white male, on 4 L of oxygen, 

comfortable in no apparent distress.


HEAD: Normocephalic/atraumatic.


EYES: Normal reaction of pupils, equal size.  Conjunctiva pink, sclera white.


NOSE: Clear with pink turbinates.


THROAT: No erythema or exudates.


NECK: No masses, no JVD, no thyroid enlargement, no adenopathy.


CHEST: No chest wall deformity.  Symmetrical expansion. 


LUNGS: Equal air entry with diminished breath sounds, and basilar rales


CVS: Regular rate and rhythm, normal S1 and S2, no gallops, no murmurs, no rubs


ABDOMEN: Soft, nontender.  No hepatosplenomegaly, normal bowel sounds, no 

guarding or rigidity.  Midabdominal incision is clean dry and intact retention 

sutures are intact, DAVID drain is compressed, draining serosanguineous fluid


EXTREMITIES: No clubbing, generalized edema, no cyanosis, 2+ pulses and upper 

and lower extremities.


MUSCULOSKELETAL: Muscle strength and tone normal.


SPINE: No scoliosis or deformity


SKIN: No rashes


CENTRAL NERVOUS SYSTEM: Alert and oriented -3.  No focal deficits, tone is 

normal in all 4 extremities.


PSYCHIATRIC: Alert and oriented -3.  Appropriate affect.  Intact judgment and 

insight.











- Labs


CBC & Chem 7: 


                                 06/06/19 05:38





                                 06/06/19 05:38


Labs: 


                  Abnormal Lab Results - Last 24 Hours (Table)











  06/06/19 06/06/19 06/06/19 Range/Units





  11:35 16:51 20:17 


 


POC Glucose (mg/dL)  136 H  111 H  122 H  (75-99)  mg/dL














Assessment and Plan


Plan: 


 Assessment:





1 acute pneumoperitoneum secondary to perforation of duodenal ulcer status post 

treatment.  Of duodenal ulcer and repair of incarcerated umbilical hernia 

postoperative day #11





2 advanced COPD and severe pulmonary fibrosis with chronic hypoxic respiratory 

failure





3 history of rheumatoid arthritis





4 obstructive sleep apnea syndrome not compliant with CPAP.





5 chronic back pain





6 osteoarthritis





7 pseudomonas aeruginosa in the sputum, could be a colonization or could be 

secondary to pseudomonal pneumonia.  Best to continue antibiotics at present 

since the patient's pulmonary status is rather marginal.





8 acute on chronic hypoxic respiratory failure multifactorial mostly related to 

his COPD, pulmonary fibrosis, and suspect some component of 

pneumonia/pseudomonal pneumonia.





Plan:


Encourage deep breathing and coughing, ambulation, patient is doing well, 

continues to improve, improving in terms of dyspnea, and fluid volume status, 

folllow up labs are pending for today.  Patient is on his home dose FiO2, no 

fever or chills, vital signs are stable, from pulmonary perspective he could be 

considered for discharge home today if cleared by surgery.  We'll need follow-up

in the office in 7-10 days with Dr. Salazar.





I performed a history & physical examination of the patient and discussed their 

management with my nurse practitioner, La Louie.  I reviewed the nurse 

practitioner's note and agree with the documented findings and plan of care.  

Lung sounds are positive for diminished breath sounds, with basilar rales.  The 

findings and the impression was discussed with the patient.  I attest to the 

documentation by the nurse practitioner. 





Time with Patient: Less than 30

## 2019-06-10 ENCOUNTER — HOSPITAL ENCOUNTER (INPATIENT)
Dept: HOSPITAL 47 - EC | Age: 69
LOS: 3 days | Discharge: NURSING, REHAB OR LONG-TERM CARE | DRG: 196 | End: 2019-06-13
Attending: FAMILY MEDICINE | Admitting: FAMILY MEDICINE
Payer: MEDICARE

## 2019-06-10 VITALS — BODY MASS INDEX: 25 KG/M2

## 2019-06-10 DIAGNOSIS — Z80.6: ICD-10-CM

## 2019-06-10 DIAGNOSIS — J96.11: ICD-10-CM

## 2019-06-10 DIAGNOSIS — G47.00: ICD-10-CM

## 2019-06-10 DIAGNOSIS — M15.9: ICD-10-CM

## 2019-06-10 DIAGNOSIS — M54.9: ICD-10-CM

## 2019-06-10 DIAGNOSIS — Z79.899: ICD-10-CM

## 2019-06-10 DIAGNOSIS — E78.5: ICD-10-CM

## 2019-06-10 DIAGNOSIS — Z87.11: ICD-10-CM

## 2019-06-10 DIAGNOSIS — Z79.51: ICD-10-CM

## 2019-06-10 DIAGNOSIS — E43: ICD-10-CM

## 2019-06-10 DIAGNOSIS — J96.12: ICD-10-CM

## 2019-06-10 DIAGNOSIS — Z82.3: ICD-10-CM

## 2019-06-10 DIAGNOSIS — Z87.891: ICD-10-CM

## 2019-06-10 DIAGNOSIS — Z79.52: ICD-10-CM

## 2019-06-10 DIAGNOSIS — M06.9: ICD-10-CM

## 2019-06-10 DIAGNOSIS — G93.41: ICD-10-CM

## 2019-06-10 DIAGNOSIS — J44.0: ICD-10-CM

## 2019-06-10 DIAGNOSIS — Z99.81: ICD-10-CM

## 2019-06-10 DIAGNOSIS — F33.9: ICD-10-CM

## 2019-06-10 DIAGNOSIS — F41.1: ICD-10-CM

## 2019-06-10 DIAGNOSIS — Z96.1: ICD-10-CM

## 2019-06-10 DIAGNOSIS — E87.1: ICD-10-CM

## 2019-06-10 DIAGNOSIS — G89.29: ICD-10-CM

## 2019-06-10 DIAGNOSIS — Z98.42: ICD-10-CM

## 2019-06-10 DIAGNOSIS — Z88.1: ICD-10-CM

## 2019-06-10 DIAGNOSIS — Z98.41: ICD-10-CM

## 2019-06-10 DIAGNOSIS — G47.33: ICD-10-CM

## 2019-06-10 DIAGNOSIS — J84.10: Primary | ICD-10-CM

## 2019-06-10 DIAGNOSIS — J20.9: ICD-10-CM

## 2019-06-10 DIAGNOSIS — I49.3: ICD-10-CM

## 2019-06-10 DIAGNOSIS — Z80.0: ICD-10-CM

## 2019-06-10 DIAGNOSIS — Z83.3: ICD-10-CM

## 2019-06-10 LAB
ALBUMIN SERPL-MCNC: 2.7 G/DL (ref 3.5–5)
ALP SERPL-CCNC: 75 U/L (ref 38–126)
ALT SERPL-CCNC: 37 U/L (ref 21–72)
AMMONIA PLAS-SCNC: <9 UMOL/L (ref ?–30)
ANION GAP SERPL CALC-SCNC: 7 MMOL/L
APTT BLD: 22.7 SEC (ref 22–30)
AST SERPL-CCNC: 62 U/L (ref 17–59)
BASOPHILS # BLD AUTO: 0 K/UL (ref 0–0.2)
BASOPHILS NFR BLD AUTO: 0 %
BUN SERPL-SCNC: 10 MG/DL (ref 9–20)
CALCIUM SPEC-MCNC: 7.9 MG/DL (ref 8.4–10.2)
CHLORIDE SERPL-SCNC: 99 MMOL/L (ref 98–107)
CO2 BLDA-SCNC: 28 MMOL/L (ref 19–24)
CO2 SERPL-SCNC: 23 MMOL/L (ref 22–30)
EOSINOPHIL # BLD AUTO: 0.1 K/UL (ref 0–0.7)
EOSINOPHIL NFR BLD AUTO: 0 %
ERYTHROCYTE [DISTWIDTH] IN BLOOD BY AUTOMATED COUNT: 3.97 M/UL (ref 4.3–5.9)
ERYTHROCYTE [DISTWIDTH] IN BLOOD: 16.8 % (ref 11.5–15.5)
GLUCOSE SERPL-MCNC: 161 MG/DL (ref 74–99)
HCO3 BLDA-SCNC: 27 MMOL/L (ref 21–25)
HCT VFR BLD AUTO: 38.2 % (ref 39–53)
HGB BLD-MCNC: 12 GM/DL (ref 13–17.5)
INR PPP: 0.9 (ref ?–1.2)
LACTATE BLDV-SCNC: 2.1 MMOL/L (ref 0.7–2)
LIPASE SERPL-CCNC: 59 U/L (ref 23–300)
LYMPHOCYTES # SPEC AUTO: 0.5 K/UL (ref 1–4.8)
LYMPHOCYTES NFR SPEC AUTO: 3 %
MAGNESIUM SPEC-SCNC: 2 MG/DL (ref 1.6–2.3)
MCH RBC QN AUTO: 30.2 PG (ref 25–35)
MCHC RBC AUTO-ENTMCNC: 31.3 G/DL (ref 31–37)
MCV RBC AUTO: 96.4 FL (ref 80–100)
MONOCYTES # BLD AUTO: 0.3 K/UL (ref 0–1)
MONOCYTES NFR BLD AUTO: 2 %
NEUTROPHILS # BLD AUTO: 13.5 K/UL (ref 1.3–7.7)
NEUTROPHILS NFR BLD AUTO: 94 %
PCO2 BLDA: 36 MMHG (ref 35–45)
PH BLDA: 7.48 [PH] (ref 7.35–7.45)
PH UR: 7.5 [PH] (ref 5–8)
PLATELET # BLD AUTO: 474 K/UL (ref 150–450)
PO2 BLDA: 100 MMHG (ref 83–108)
POTASSIUM SERPL-SCNC: 4.9 MMOL/L (ref 3.5–5.1)
PROT SERPL-MCNC: 5.6 G/DL (ref 6.3–8.2)
PT BLD: 10.2 SEC (ref 9–12)
SODIUM SERPL-SCNC: 129 MMOL/L (ref 137–145)
SP GR UR: 1.01 (ref 1–1.03)
UROBILINOGEN UR QL STRIP: <2 MG/DL (ref ?–2)
WBC # BLD AUTO: 14.5 K/UL (ref 3.8–10.6)

## 2019-06-10 PROCEDURE — 36415 COLL VENOUS BLD VENIPUNCTURE: CPT

## 2019-06-10 PROCEDURE — 96375 TX/PRO/DX INJ NEW DRUG ADDON: CPT

## 2019-06-10 PROCEDURE — 85730 THROMBOPLASTIN TIME PARTIAL: CPT

## 2019-06-10 PROCEDURE — 80202 ASSAY OF VANCOMYCIN: CPT

## 2019-06-10 PROCEDURE — 96366 THER/PROPH/DIAG IV INF ADDON: CPT

## 2019-06-10 PROCEDURE — 80048 BASIC METABOLIC PNL TOTAL CA: CPT

## 2019-06-10 PROCEDURE — 87205 SMEAR GRAM STAIN: CPT

## 2019-06-10 PROCEDURE — 87040 BLOOD CULTURE FOR BACTERIA: CPT

## 2019-06-10 PROCEDURE — 85610 PROTHROMBIN TIME: CPT

## 2019-06-10 PROCEDURE — 94640 AIRWAY INHALATION TREATMENT: CPT

## 2019-06-10 PROCEDURE — 85027 COMPLETE CBC AUTOMATED: CPT

## 2019-06-10 PROCEDURE — 83605 ASSAY OF LACTIC ACID: CPT

## 2019-06-10 PROCEDURE — 99285 EMERGENCY DEPT VISIT HI MDM: CPT

## 2019-06-10 PROCEDURE — 83880 ASSAY OF NATRIURETIC PEPTIDE: CPT

## 2019-06-10 PROCEDURE — 84443 ASSAY THYROID STIM HORMONE: CPT

## 2019-06-10 PROCEDURE — 84100 ASSAY OF PHOSPHORUS: CPT

## 2019-06-10 PROCEDURE — 87077 CULTURE AEROBIC IDENTIFY: CPT

## 2019-06-10 PROCEDURE — 87070 CULTURE OTHR SPECIMN AEROBIC: CPT

## 2019-06-10 PROCEDURE — 96368 THER/DIAG CONCURRENT INF: CPT

## 2019-06-10 PROCEDURE — 84484 ASSAY OF TROPONIN QUANT: CPT

## 2019-06-10 PROCEDURE — 82805 BLOOD GASES W/O2 SATURATION: CPT

## 2019-06-10 PROCEDURE — 81003 URINALYSIS AUTO W/O SCOPE: CPT

## 2019-06-10 PROCEDURE — 93005 ELECTROCARDIOGRAM TRACING: CPT

## 2019-06-10 PROCEDURE — 96365 THER/PROPH/DIAG IV INF INIT: CPT

## 2019-06-10 PROCEDURE — 85025 COMPLETE CBC W/AUTO DIFF WBC: CPT

## 2019-06-10 PROCEDURE — 36600 WITHDRAWAL OF ARTERIAL BLOOD: CPT

## 2019-06-10 PROCEDURE — 82140 ASSAY OF AMMONIA: CPT

## 2019-06-10 PROCEDURE — 94760 N-INVAS EAR/PLS OXIMETRY 1: CPT

## 2019-06-10 PROCEDURE — 83735 ASSAY OF MAGNESIUM: CPT

## 2019-06-10 PROCEDURE — 74022 RADEX COMPL AQT ABD SERIES: CPT

## 2019-06-10 PROCEDURE — 80053 COMPREHEN METABOLIC PANEL: CPT

## 2019-06-10 PROCEDURE — 83690 ASSAY OF LIPASE: CPT

## 2019-06-10 PROCEDURE — 87186 SC STD MICRODIL/AGAR DIL: CPT

## 2019-06-10 RX ADMIN — POTASSIUM CHLORIDE SCH MEQ: 20 TABLET, EXTENDED RELEASE ORAL at 21:38

## 2019-06-10 RX ADMIN — HYDROCODONE BITARTRATE AND ACETAMINOPHEN PRN EACH: 10; 325 TABLET ORAL at 21:38

## 2019-06-10 NOTE — ED
Weakness HPI





- General


Chief complaint: Weakness


Stated complaint: poss sepsis


Time Seen by Provider: 06/10/19 17:16


Source: patient


Mode of arrival: EMS


Limitations: no limitations





- History of Present Illness


Initial comments: 





This 69-year-old white male presents with his wife with the complaint of some 

weakness and confusion.  The wife relates that he was just discharged 3 days ago

after having a 15 day hospital stay.  He had onset of his current symptoms 2 

days ago.  She relates that he normally is quite sharp but now is very confused.

 He is weak to the point where he cannot safely ambulate.  She actually brought 

him to the nursing home this morning and they stated that he was ill the point 

that he needed to come to the emergency department.  He was hospitalized for a 

perforated peptic ulcer and subsequent peritonitis.  He apparently had an 

umbilical hernia.  He apparently has multiple comorbidities and also is on 

oxygen daily.  The wife denies any known fever.  He has had a cough with 

occasional slight yellowish production.  He states that his abdominal 

postsurgical pain is minimal.  No other identifiable complaints or modifying fa

ctors.





- Related Data


                                Home Medications











 Medication  Instructions  Recorded  Confirmed


 


LORazepam [Ativan] 1 mg PO TID PRN 06/05/18 06/10/19


 


HYDROcodone/APAP 10-325MG [Norco 1 tab PO Q8H PRN 05/23/19 06/10/19





]   


 


Budesonide/Formoterol Fumarate 1 puff IH RT-BID 06/10/19 06/10/19





[Symbicort 160-4.5 Mcg Inhaler]   


 


Potassium Chloride [Klor-Con 20] 20 meq PO BID 06/10/19 06/10/19


 


Sertraline [Zoloft] 100 mg PO DAILY 06/10/19 06/10/19








                                  Previous Rx's











 Medication  Instructions  Recorded


 


predniSONE 10 mg PO DAILY #30 tab 18


 


Amoxic-Pot Clav 875-125Mg 1 tab PO Q12HR #8 tablet 19





[Augmentin 875-125]  


 


Fluconazole [Diflucan] 200 mg PO DAILY #5 tab 19


 


Furosemide [Lasix] 40 mg PO DAILY #30 tab 19


 


Ipratropium-Albuterol Nebulize 3 ml INHALATION RT-Q6H #120 19





[Duoneb 0.5 mg-3 mg/3 ml Soln] ampul.neb 


 


Pantoprazole [Protonix] 40 mg PO PÉREZ-BRKFST #30 tablet. 19


 


metroNIDAZOLE [Flagyl] 500 mg PO TID #10 tab 19











                                    Allergies











Allergy/AdvReac Type Severity Reaction Status Date / Time


 


amoxicillin AdvReac  Nausea & Verified 19 13:48





   Vomiting  














Review of Systems


ROS Statement: 


Those systems with pertinent positive or pertinent negative responses have been 

documented in the HPI.





ROS Other: All systems not noted in ROS Statement are negative.





Past Medical History


Past Medical History: COPD, Hyperlipidemia, Osteoarthritis (OA), Pneumonia, 

Rheumatoid Arthritis (RA), Sleep Apnea/CPAP/BIPAP


Additional Past Medical History / Comment(s): Pneumonia with sepsis twice-2018

and in 2016. 3.5L home o2, POLO with no device (cannot tolerate), recently 

injured low back-saw Dr. Morse. Rheumatoid arthritis in bilateral hips,knees 

hands and back.


History of Any Multi-Drug Resistant Organisms: None Reported


Past Surgical History: Orthopedic Surgery


Additional Past Surgical History / Comment(s): bilateral cataract removals with 

lens implants, left knee arthroscopy, colonoscopy, GI perforated ulcer.


Past Anesthesia/Blood Transfusion Reactions: No Reported Reaction


Past Psychological History: Anxiety, Depression


Smoking Status: Former smoker


Past Alcohol Use History: Daily


Past Drug Use History: None Reported





- Past Family History


  ** Father


Family Medical History: Cancer


Additional Family Medical History / Comment(s): Father  of leukemia.





  ** Mother


Family Medical History: Cancer, CVA/TIA, Dementia, Diabetes Mellitus


Additional Family Medical History / Comment(s): Mother is 88yrs old. Cancer 

colon.





General Exam





- General Exam Comments


Initial Comments: 





GENERAL: The patient is well nourished and well hydrated. 


VITAL SIGNS: Heart rate, blood pressure, respiratory rate reviewed as recorded 

in nurse's notes. 


EYES: Pupils are round and reactive. Extraocular movements are intact. No 

conjunctival / lid redness or swelling. 


ENT: No external evidence of injury, swelling, or ecchymosis. Airway is patent. 

Throat is clear. 


NECK: Nontender. No swelling or evidence of injury. No subcutaneous emphysema. 

Trachea is midline. No thyroid mass. 


HEART: Regular rate and rhythm. Good peripheral pulses. 


LUNGS/CHEST: Breath sounds clear and equal bilaterally. No rales, rhonchi, or 

wheezes. No ecchymosis, subcutaneous emphysema, or tenderness. 


ABDOMEN: There is a complex dressing noted over the anterior abdomen.  There is 

only minimal tenderness upon palpation.  No palpable masses or organomegaly. No 

peritoneal signs. No abdominal wall swelling or ecchymosis. 


EXTREMITIES: No extremity tenderness. Normal muscle tone and function. No 

thoracolumbar tenderness. 


NEUROLOGIC: Sensation is grossly intact. Cranial nerve exam reveals face is 

symmetrical, tongue is midline, speech is clear. 


SKIN: No abrasions or ecchymosis is noted. No induration or masses noted. 


PSYCHIATRIC: Alert and pleasant but confused at times.





Limitations: no limitations





Course


                                   Vital Signs











  06/10/19 06/10/19 06/10/19





  17:11 17:29 17:42


 


Temperature  97.1 F L 


 


Pulse Rate  104 H 


 


Respiratory  16 16





Rate   


 


Blood Pressure 110/65 110/65 


 


O2 Sat by Pulse  97 





Oximetry   














  06/10/19 06/10/19





  18:00 19:00


 


Temperature  


 


Pulse Rate 96 93


 


Respiratory 21 22





Rate  


 


Blood Pressure 117/74 106/75


 


O2 Sat by Pulse 97 98





Oximetry  














Medical Decision Making





- Medical Decision Making





The patient was seen and examined.  All diagnostics were reviewed.  He did have 

a arterial blood gas done to evaluate his CO2.  His CO2 is normal at 36.4.  The 

vision also had an EKG done which shows a sinus tachycardia at a rate of 104 

with occasional PVC.  There is no acute ST-T wave changes noted.  The 

ventricular rate is 104, NH intervals 1:30, QRS duration is 82, and the QTC inte

rvals 481.  The chest x-ray looks as though there is a left lower lobe pneumonia

with final radiologic review pending.  This looks slightly increased as compared

to x-ray done last week.  The laboratories reviewed and patient does have a 

leukocytosis and hyponatremia and elevated lactic acid.  It is felt as though he

with Dr. Bazan and she is agreeable with admission, Levaquin, and vancomycin 

with pulmonology to consult.





- Lab Data


Result diagrams: 


                                 06/10/19 17:12





                                 06/10/19 17:12


                                   Lab Results











  06/10/19 06/10/19 06/10/19 Range/Units





  17:12 17:12 17:12 


 


WBC  14.5 H    (3.8-10.6)  k/uL


 


RBC  3.97 L    (4.30-5.90)  m/uL


 


Hgb  12.0 L    (13.0-17.5)  gm/dL


 


Hct  38.2 L    (39.0-53.0)  %


 


MCV  96.4    (80.0-100.0)  fL


 


MCH  30.2    (25.0-35.0)  pg


 


MCHC  31.3    (31.0-37.0)  g/dL


 


RDW  16.8 H    (11.5-15.5)  %


 


Plt Count  474 H    (150-450)  k/uL


 


Neutrophils %  94    %


 


Lymphocytes %  3    %


 


Monocytes %  2    %


 


Eosinophils %  0    %


 


Basophils %  0    %


 


Neutrophils #  13.5 H    (1.3-7.7)  k/uL


 


Lymphocytes #  0.5 L    (1.0-4.8)  k/uL


 


Monocytes #  0.3    (0-1.0)  k/uL


 


Eosinophils #  0.1    (0-0.7)  k/uL


 


Basophils #  0.0    (0-0.2)  k/uL


 


Anisocytosis  Slight    


 


Macrocytosis  Slight    


 


PT     (9.0-12.0)  sec


 


INR     (<1.2)  


 


APTT     (22.0-30.0)  sec


 


Sample Site     


 


ABG pH     (7.35-7.45)  


 


ABG pCO2     (35-45)  mmHg


 


ABG pO2     ()  mmHg


 


ABG HCO3     (21-25)  mmol/L


 


ABG Total CO2     (19-24)  mmol/L


 


ABG O2 Saturation     (94-97)  %


 


ABG Base Excess     mmol/L


 


Celestino Test     


 


FiO2     %


 


Sodium   129 L   (137-145)  mmol/L


 


Potassium   4.9   (3.5-5.1)  mmol/L


 


Chloride   99   ()  mmol/L


 


Carbon Dioxide   23   (22-30)  mmol/L


 


Anion Gap   7   mmol/L


 


BUN   10   (9-20)  mg/dL


 


Creatinine   0.55 L   (0.66-1.25)  mg/dL


 


Est GFR (CKD-EPI)AfAm   >90   (>60 ml/min/1.73 sqM)  


 


Est GFR (CKD-EPI)NonAf   >90   (>60 ml/min/1.73 sqM)  


 


Glucose   161 H   (74-99)  mg/dL


 


Plasma Lactic Acid José Miguel    2.1 H*  (0.7-2.0)  mmol/L


 


Calcium   7.9 L   (8.4-10.2)  mg/dL


 


Phosphorus   2.7   (2.5-4.5)  mg/dL


 


Magnesium   2.0   (1.6-2.3)  mg/dL


 


Total Bilirubin   0.6   (0.2-1.3)  mg/dL


 


AST   62 H   (17-59)  U/L


 


ALT   37   (21-72)  U/L


 


Alkaline Phosphatase   75   ()  U/L


 


Ammonia    <9  (<30)  umol/L


 


Troponin I     (0.000-0.034)  ng/mL


 


NT-Pro-B Natriuret Pep     pg/mL


 


Total Protein   5.6 L   (6.3-8.2)  g/dL


 


Albumin   2.7 L   (3.5-5.0)  g/dL


 


Lipase   59   ()  U/L


 


TSH   1.410   (0.465-4.680)  mIU/L


 


Urine Color     


 


Urine Appearance     (Clear)  


 


Urine pH     (5.0-8.0)  


 


Ur Specific Gravity     (1.001-1.035)  


 


Urine Protein     (Negative)  


 


Urine Glucose (UA)     (Negative)  


 


Urine Ketones     (Negative)  


 


Urine Blood     (Negative)  


 


Urine Nitrite     (Negative)  


 


Urine Bilirubin     (Negative)  


 


Urine Urobilinogen     (<2.0)  mg/dL


 


Ur Leukocyte Esterase     (Negative)  














  06/10/19 06/10/19 06/10/19 Range/Units





  17:12 17:12 17:12 


 


WBC     (3.8-10.6)  k/uL


 


RBC     (4.30-5.90)  m/uL


 


Hgb     (13.0-17.5)  gm/dL


 


Hct     (39.0-53.0)  %


 


MCV     (80.0-100.0)  fL


 


MCH     (25.0-35.0)  pg


 


MCHC     (31.0-37.0)  g/dL


 


RDW     (11.5-15.5)  %


 


Plt Count     (150-450)  k/uL


 


Neutrophils %     %


 


Lymphocytes %     %


 


Monocytes %     %


 


Eosinophils %     %


 


Basophils %     %


 


Neutrophils #     (1.3-7.7)  k/uL


 


Lymphocytes #     (1.0-4.8)  k/uL


 


Monocytes #     (0-1.0)  k/uL


 


Eosinophils #     (0-0.7)  k/uL


 


Basophils #     (0-0.2)  k/uL


 


Anisocytosis     


 


Macrocytosis     


 


PT  10.2    (9.0-12.0)  sec


 


INR  0.9    (<1.2)  


 


APTT  22.7    (22.0-30.0)  sec


 


Sample Site     


 


ABG pH     (7.35-7.45)  


 


ABG pCO2     (35-45)  mmHg


 


ABG pO2     ()  mmHg


 


ABG HCO3     (21-25)  mmol/L


 


ABG Total CO2     (19-24)  mmol/L


 


ABG O2 Saturation     (94-97)  %


 


ABG Base Excess     mmol/L


 


Celestino Test     


 


FiO2     %


 


Sodium     (137-145)  mmol/L


 


Potassium     (3.5-5.1)  mmol/L


 


Chloride     ()  mmol/L


 


Carbon Dioxide     (22-30)  mmol/L


 


Anion Gap     mmol/L


 


BUN     (9-20)  mg/dL


 


Creatinine     (0.66-1.25)  mg/dL


 


Est GFR (CKD-EPI)AfAm     (>60 ml/min/1.73 sqM)  


 


Est GFR (CKD-EPI)NonAf     (>60 ml/min/1.73 sqM)  


 


Glucose     (74-99)  mg/dL


 


Plasma Lactic Acid José Miguel     (0.7-2.0)  mmol/L


 


Calcium     (8.4-10.2)  mg/dL


 


Phosphorus     (2.5-4.5)  mg/dL


 


Magnesium     (1.6-2.3)  mg/dL


 


Total Bilirubin     (0.2-1.3)  mg/dL


 


AST     (17-59)  U/L


 


ALT     (21-72)  U/L


 


Alkaline Phosphatase     ()  U/L


 


Ammonia     (<30)  umol/L


 


Troponin I   <0.012   (0.000-0.034)  ng/mL


 


NT-Pro-B Natriuret Pep    304  pg/mL


 


Total Protein     (6.3-8.2)  g/dL


 


Albumin     (3.5-5.0)  g/dL


 


Lipase     ()  U/L


 


TSH     (0.465-4.680)  mIU/L


 


Urine Color     


 


Urine Appearance     (Clear)  


 


Urine pH     (5.0-8.0)  


 


Ur Specific Gravity     (1.001-1.035)  


 


Urine Protein     (Negative)  


 


Urine Glucose (UA)     (Negative)  


 


Urine Ketones     (Negative)  


 


Urine Blood     (Negative)  


 


Urine Nitrite     (Negative)  


 


Urine Bilirubin     (Negative)  


 


Urine Urobilinogen     (<2.0)  mg/dL


 


Ur Leukocyte Esterase     (Negative)  














  06/10/19 06/10/19 Range/Units





  17:49 19:10 


 


WBC    (3.8-10.6)  k/uL


 


RBC    (4.30-5.90)  m/uL


 


Hgb    (13.0-17.5)  gm/dL


 


Hct    (39.0-53.0)  %


 


MCV    (80.0-100.0)  fL


 


MCH    (25.0-35.0)  pg


 


MCHC    (31.0-37.0)  g/dL


 


RDW    (11.5-15.5)  %


 


Plt Count    (150-450)  k/uL


 


Neutrophils %    %


 


Lymphocytes %    %


 


Monocytes %    %


 


Eosinophils %    %


 


Basophils %    %


 


Neutrophils #    (1.3-7.7)  k/uL


 


Lymphocytes #    (1.0-4.8)  k/uL


 


Monocytes #    (0-1.0)  k/uL


 


Eosinophils #    (0-0.7)  k/uL


 


Basophils #    (0-0.2)  k/uL


 


Anisocytosis    


 


Macrocytosis    


 


PT    (9.0-12.0)  sec


 


INR    (<1.2)  


 


APTT    (22.0-30.0)  sec


 


Sample Site  LRA   


 


ABG pH  7.48 H   (7.35-7.45)  


 


ABG pCO2  36   (35-45)  mmHg


 


ABG pO2  100   ()  mmHg


 


ABG HCO3  27 H   (21-25)  mmol/L


 


ABG Total CO2  28 H   (19-24)  mmol/L


 


ABG O2 Saturation  97.8 H   (94-97)  %


 


ABG Base Excess  3.2   mmol/L


 


Celestino Test  Yes   


 


FiO2  36   %


 


Sodium    (137-145)  mmol/L


 


Potassium    (3.5-5.1)  mmol/L


 


Chloride    ()  mmol/L


 


Carbon Dioxide    (22-30)  mmol/L


 


Anion Gap    mmol/L


 


BUN    (9-20)  mg/dL


 


Creatinine    (0.66-1.25)  mg/dL


 


Est GFR (CKD-EPI)AfAm    (>60 ml/min/1.73 sqM)  


 


Est GFR (CKD-EPI)NonAf    (>60 ml/min/1.73 sqM)  


 


Glucose    (74-99)  mg/dL


 


Plasma Lactic Acid José Miguel    (0.7-2.0)  mmol/L


 


Calcium    (8.4-10.2)  mg/dL


 


Phosphorus    (2.5-4.5)  mg/dL


 


Magnesium    (1.6-2.3)  mg/dL


 


Total Bilirubin    (0.2-1.3)  mg/dL


 


AST    (17-59)  U/L


 


ALT    (21-72)  U/L


 


Alkaline Phosphatase    ()  U/L


 


Ammonia    (<30)  umol/L


 


Troponin I    (0.000-0.034)  ng/mL


 


NT-Pro-B Natriuret Pep    pg/mL


 


Total Protein    (6.3-8.2)  g/dL


 


Albumin    (3.5-5.0)  g/dL


 


Lipase    ()  U/L


 


TSH    (0.465-4.680)  mIU/L


 


Urine Color   Yellow  


 


Urine Appearance   Clear  (Clear)  


 


Urine pH   7.5  (5.0-8.0)  


 


Ur Specific Gravity   1.010  (1.001-1.035)  


 


Urine Protein   Negative  (Negative)  


 


Urine Glucose (UA)   Negative  (Negative)  


 


Urine Ketones   Negative  (Negative)  


 


Urine Blood   Negative  (Negative)  


 


Urine Nitrite   Negative  (Negative)  


 


Urine Bilirubin   Negative  (Negative)  


 


Urine Urobilinogen   <2.0  (<2.0)  mg/dL


 


Ur Leukocyte Esterase   Negative  (Negative)  














Disposition


Clinical Impression: 


 Confusion, Weakness, Leukocytosis, Hyponatremia, Hospital-acquired pneumonia, 

Unable to ambulate





Disposition: ADMITTED AS IP TO THIS HOSP


Condition: Fair


Is patient prescribed a controlled substance at d/c from ED?: No


Referrals: 


Amalia Bazan MD [Primary Care Provider] - 1-2 days


Time of Disposition: 19:57


Decision Date: 06/10/19


Decision Time: 19:58

## 2019-06-10 NOTE — XR
EXAMINATION TYPE: XR abdomen acute w cxr, 3 views

 

DATE OF EXAM: 6/10/2019

 

COMPARISON: Chest radiograph 6/4/2019

 

HISTORY: Pain, dyspnea

 

TECHNIQUE:  Upright chest and abdomen and supine KUB

 

FINDINGS:  

 

CHEST: The marked chronic interstitial lung pattern is redemonstrated. Overall lung inflation appears
 similar to 6/4/2019 chest radiograph. No new chest process is evident.

 

ABDOMEN AND PELVIS:  Gas is seen scattered throughout the small and large bowel. There is no bowel ob
struction. No abnormal gas collection. No definite acute skeletal or soft tissue findings.

 

IMPRESSION: 

No definite acute process.

## 2019-06-11 LAB
ANION GAP SERPL CALC-SCNC: 7 MMOL/L
BUN SERPL-SCNC: 9 MG/DL (ref 9–20)
CALCIUM SPEC-MCNC: 8 MG/DL (ref 8.4–10.2)
CHLORIDE SERPL-SCNC: 100 MMOL/L (ref 98–107)
CO2 SERPL-SCNC: 26 MMOL/L (ref 22–30)
ERYTHROCYTE [DISTWIDTH] IN BLOOD BY AUTOMATED COUNT: 3.94 M/UL (ref 4.3–5.9)
ERYTHROCYTE [DISTWIDTH] IN BLOOD: 18.1 % (ref 11.5–15.5)
GLUCOSE SERPL-MCNC: 97 MG/DL (ref 74–99)
HCT VFR BLD AUTO: 38.1 % (ref 39–53)
HGB BLD-MCNC: 11.9 GM/DL (ref 13–17.5)
MCH RBC QN AUTO: 30.1 PG (ref 25–35)
MCHC RBC AUTO-ENTMCNC: 31.1 G/DL (ref 31–37)
MCV RBC AUTO: 96.8 FL (ref 80–100)
PLATELET # BLD AUTO: 442 K/UL (ref 150–450)
POTASSIUM SERPL-SCNC: 4.1 MMOL/L (ref 3.5–5.1)
SODIUM SERPL-SCNC: 133 MMOL/L (ref 137–145)
WBC # BLD AUTO: 13.8 K/UL (ref 3.8–10.6)

## 2019-06-11 RX ADMIN — ENOXAPARIN SODIUM SCH MG: 40 INJECTION SUBCUTANEOUS at 08:09

## 2019-06-11 RX ADMIN — Medication SCH MG: at 21:12

## 2019-06-11 RX ADMIN — HYDROCODONE BITARTRATE AND ACETAMINOPHEN PRN EACH: 10; 325 TABLET ORAL at 09:13

## 2019-06-11 RX ADMIN — HYDROCODONE BITARTRATE AND ACETAMINOPHEN PRN EACH: 10; 325 TABLET ORAL at 18:41

## 2019-06-11 RX ADMIN — FUROSEMIDE SCH MG: 40 TABLET ORAL at 08:09

## 2019-06-11 RX ADMIN — SODIUM CHLORIDE SCH MLS/HR: 9 INJECTION, SOLUTION INTRAVENOUS at 04:56

## 2019-06-11 RX ADMIN — BUDESONIDE AND FORMOTEROL FUMARATE DIHYDRATE SCH PUFF: 160; 4.5 AEROSOL RESPIRATORY (INHALATION) at 19:59

## 2019-06-11 RX ADMIN — POTASSIUM CHLORIDE SCH MEQ: 20 TABLET, EXTENDED RELEASE ORAL at 08:09

## 2019-06-11 RX ADMIN — IPRATROPIUM BROMIDE AND ALBUTEROL SULFATE PRN ML: .5; 3 SOLUTION RESPIRATORY (INHALATION) at 11:33

## 2019-06-11 RX ADMIN — PANTOPRAZOLE SODIUM SCH MG: 40 TABLET, DELAYED RELEASE ORAL at 08:09

## 2019-06-11 RX ADMIN — BUDESONIDE AND FORMOTEROL FUMARATE DIHYDRATE SCH PUFF: 160; 4.5 AEROSOL RESPIRATORY (INHALATION) at 08:01

## 2019-06-11 RX ADMIN — SERTRALINE HYDROCHLORIDE SCH MG: 100 TABLET ORAL at 08:09

## 2019-06-11 RX ADMIN — SODIUM CHLORIDE SCH MLS/HR: 9 INJECTION, SOLUTION INTRAVENOUS at 11:55

## 2019-06-11 RX ADMIN — POTASSIUM CHLORIDE SCH MEQ: 20 TABLET, EXTENDED RELEASE ORAL at 21:12

## 2019-06-11 RX ADMIN — IPRATROPIUM BROMIDE AND ALBUTEROL SULFATE PRN ML: .5; 3 SOLUTION RESPIRATORY (INHALATION) at 08:00

## 2019-06-11 RX ADMIN — SODIUM CHLORIDE SCH MLS/HR: 9 INJECTION, SOLUTION INTRAVENOUS at 19:12

## 2019-06-11 NOTE — P.CNPUL
History of Present Illness


Consult date: 19


Requesting physician: Amalia Bazan


Reason for consult: dyspnea, abnormal CXR/CT (Left lower lobe infiltrate)


Chief complaint: Weakness, altered mental status


History of present illness: 





This a very pleasant 69-year-old gentleman with a known history of 

hyperlipidemia, rheumatoid arthritis, obstructive sleep apnea intolerant to 

CPAP, significant COPD with FEV1 value of 46% of predicted, pulmonary fibrosis 

and home oxygen at 3.5 L, former smoker, daily alcohol use.  He was just 

recently discharged from here on 2019 after sustaining an acute duodenal 

ulcer with perforation and pneumoperitoneum.  Status post exploratory 

laparotomy, repair perforated ulcer as well as repair of incarcerated umbilical 

hernia.  He was discharged home on Augmentin, Flagyl, Diflucan.  Retention 

sutures still in place.  He was brought back here to the emergency room 

yesterday after developing altered mental status and profound weakness.  His 

wife had given him initially to a nursing home for placement but they said he 

was too ill and was referred here to the emergency department.  Chest x-ray 

revealed no acute pulmonary process.  Abdomen revealed no acute process.  He is 

seen today in consultation on the regular medical floor.  He is currently 

sitting up in a chair at the bedside.  Awake and alert in no acute distress.  

Somewhat confused to date and time.  Aware he is in the hospital.  He is cu

rrently afebrile.  Maintaining O2 saturations in the mid 90s on 3 L/m per nasal 

cannula.  Hemodynamically stable.  White count 13.8.  Hemoglobin 11.9.  

Creatinine 0.58.  Arterial blood gases revealed a PaO2 of 100, pCO2 36, pH 7.48.

 He's been initiated on vancomycin and Levaquin.





Review of Systems





REVIEW OF SYSTEMS:


CONSTITUTIONAL: Denies any recent significant weight loss or weight gain.


EYES: Denies change in vision.


EARS, NOSE, MOUTH, THROAT: Denies headaches, denies sore throat.


CARDIOVASCULAR: Denies chest pain, palpitations or syncopal episodes.


RESPIRATORY: Positive for shortness of breath, cough, congestion no hemoptysis.


GASTROINTESTINAL: Poor appetite, abdominal pain due to recent surgery.


GENITOURINARY: Denies hematuria, denies infections.


MUSKULOSKELETAL: Denies pain, denies swelling.


INTEGUMENTARY: Denies rash, denies eczema.


NEUROLOGICAL: Positive for recent memory loss, no recent seizure activity. 


PSYCHIATRIC: Denies anxiety, denies depression.


HEMATOLOGIC/LYMPHATIC: Denies anemia, denies enlarged lymph nodes.








Past Medical History


Past Medical History: COPD, Hyperlipidemia, Osteoarthritis (OA), Pneumonia, 

Rheumatoid Arthritis (RA), Sleep Apnea/CPAP/BIPAP


Additional Past Medical History / Comment(s): Pneumonia with sepsis twice-2018

and in 2016. 3.5L home o2, POLO with no device (cannot tolerate), recently 

injured low back-saw Dr. Morse. Rheumatoid arthritis in bilateral hips,knees 

hands and back.


History of Any Multi-Drug Resistant Organisms: None Reported


Past Surgical History: Orthopedic Surgery


Additional Past Surgical History / Comment(s): bilateral cataract removals with 

lens implants, left knee arthroscopy, colonoscopy, GI perforated ulcer.


Past Anesthesia/Blood Transfusion Reactions: No Reported Reaction


Past Psychological History: Anxiety, Depression


Additional Psychological History / Comment(s): Pt resides with his spouse of 38 

yrs.  He has home oxygen which he wears prn at 3.5L/NC.  He has a nebulizer.


Smoking Status: Former smoker


Past Alcohol Use History: Daily


Additional Past Alcohol Use History / Comment(s): Pt started smoking as a teen 

and quit in .  He states he drinks 3-4 beers a day.


Past Drug Use History: None Reported





- Past Family History


  ** Father


Family Medical History: Cancer


Additional Family Medical History / Comment(s): Father  of leukemia.





  ** Mother


Family Medical History: Cancer, CVA/TIA, Dementia, Diabetes Mellitus


Additional Family Medical History / Comment(s): Mother is 88yrs old. Cancer 

colon.





Medications and Allergies


                                Home Medications











 Medication  Instructions  Recorded  Confirmed  Type


 


LORazepam [Ativan] 1 mg PO TID PRN 06/05/18 06/10/19 History


 


predniSONE 10 mg PO DAILY #30 tab 06/06/18 06/10/19 Rx


 


HYDROcodone/APAP 10-325MG [Norco 1 tab PO Q8H PRN 05/23/19 06/10/19 History





]    


 


Amoxic-Pot Clav 875-125Mg 1 tab PO Q12HR #8 tablet 06/07/19 06/10/19 Rx





[Augmentin 875-125]    


 


Fluconazole [Diflucan] 200 mg PO DAILY #5 tab 06/07/19 06/10/19 Rx


 


Furosemide [Lasix] 40 mg PO DAILY #30 tab 06/07/19 06/10/19 Rx


 


Ipratropium-Albuterol Nebulize 3 ml INHALATION RT-Q6H #120 06/07/19 06/10/19 Rx





[Duoneb 0.5 mg-3 mg/3 ml Soln] ampul.neb   


 


Pantoprazole [Protonix] 40 mg PO AC-BRKFST #30 tablet. 06/07/19 06/10/19 Rx


 


metroNIDAZOLE [Flagyl] 500 mg PO TID #10 tab 06/07/19 06/10/19 Rx


 


Budesonide/Formoterol Fumarate 1 puff IH RT-BID 06/10/19 06/10/19 History





[Symbicort 160-4.5 Mcg Inhaler]    


 


Potassium Chloride [Klor-Con 20] 20 meq PO BID 06/10/19 06/10/19 History


 


Sertraline [Zoloft] 100 mg PO DAILY 06/10/19 06/10/19 History








                                    Allergies











Allergy/AdvReac Type Severity Reaction Status Date / Time


 


amoxicillin AdvReac  Nausea & Verified 19 13:48





   Vomiting  














Physical Exam


Vitals: 


                                   Vital Signs











  Temp Pulse Pulse Resp BP BP Pulse Ox


 


 19 11:45   88     


 


 19 11:34   88     


 


 19 08:14   86     


 


 19 08:04   90      96


 


 19 07:00  97.8 F   93  16   129/74  95


 


 19 00:15  98.0 F   77  18   112/62  96


 


 06/10/19 22:56   92   18  110/74   97


 


 06/10/19 21:43   92   18  116/73   98


 


 06/10/19 20:04   98   18  112/80   96


 


 06/10/19 19:00   93   22  106/75   98


 


 06/10/19 18:00   96   21  117/74   97


 


 06/10/19 17:42     16   


 


 06/10/19 17:29  97.1 F L  104 H   16  110/65   97


 


 06/10/19 17:11      110/65  








                                Intake and Output











 06/10/19 06/11/19 06/11/19





 22:59 06:59 14:59


 


Intake Total  500 1050


 


Balance  500 1050


 


Intake:   


 


  IV   1050


 


    Sodium Chloride 0.9% 1,   800





    000 ml @ 100 mls/hr IV .   





    Q10H STA Rx#:[id number]   


 


    Vancomycin 1,250 mg In   250





    Sodium Chloride 0.9% 250   





    ml @ 125 mls/hr IVPB Q8H   





    IMTIAZ Rx#:[id number]   


 


  Intake, IV Titration  500 





  Amount   


 


    Sodium Chloride 0.9% 1,  500 





    000 ml @ 100 mls/hr IV .   





    Q10H STA Rx#:[id number]   


 


Other:   


 


  # Voids  1 


 


  Weight 74.843 kg  














GENERAL EXAM: Alert, up in a chair at the bedside, comfortable in no apparent 

distress.  On 3 L/m


HEAD: Normocephalic.


EYES: Normal reaction of pupils, equal size.


NOSE: Clear with pink turbinates.


THROAT: No erythema or exudates.


NECK: No masses, no JVD.


CHEST: No chest wall deformity.


LUNGS: Equal air entry with coarse crackles in the posterior bases


CVS: S1 and S2 normal with no audible murmur, regular rhythm.


ABDOMEN: Soft, slightly tender, retention sutures in place, normal bowel sounds,

 no guarding or rigidity.


SPINE: No scoliosis or deformity


SKIN: No rashes


CENTRAL NERVOUS SYSTEM: No focal deficits, tone is normal in all 4 extremities.


EXTREMITIES: There is no peripheral edema.  No clubbing, no cyanosis.  

Peripheral pulses are intact.





Results





- Laboratory Findings


CBC and BMP: 


                                 19 10:37





                                 19 10:37


ABG











ABG pH  7.48  (7.35-7.45)  H  06/10/19  17:49    


 


ABG pCO2  36 mmHg (35-45)   06/10/19  17:49    


 


ABG pO2  100 mmHg ()   06/10/19  17:49    


 


ABG O2 Saturation  97.8 % (94-97)  H  06/10/19  17:49    





PT/INR, D-dimer











PT  10.2 sec (9.0-12.0)   06/10/19  17:12    


 


INR  0.9  (<1.2)   06/10/19  17:12    








Abnormal lab findings: 


                                  Abnormal Labs











  06/10/19 06/10/19 06/10/19





  17:12 17:12 17:12


 


WBC  14.5 H  


 


RBC  3.97 L  


 


Hgb  12.0 L  


 


Hct  38.2 L  


 


RDW  16.8 H  


 


Plt Count  474 H  


 


Neutrophils #  13.5 H  


 


Lymphocytes #  0.5 L  


 


ABG pH   


 


ABG HCO3   


 


ABG Total CO2   


 


ABG O2 Saturation   


 


Sodium   129 L 


 


Creatinine   0.55 L 


 


Glucose   161 H 


 


Plasma Lactic Acid José Miguel    2.1 H*


 


Calcium   7.9 L 


 


AST   62 H 


 


Total Protein   5.6 L 


 


Albumin   2.7 L 














  06/10/19 06/10/19 06/11/19





  17:49 21:47 10:37


 


WBC    13.8 H


 


RBC    3.94 L


 


Hgb    11.9 L


 


Hct    38.1 L


 


RDW    18.1 H


 


Plt Count   


 


Neutrophils #   


 


Lymphocytes #   


 


ABG pH  7.48 H  


 


ABG HCO3  27 H  


 


ABG Total CO2  28 H  


 


ABG O2 Saturation  97.8 H  


 


Sodium   


 


Creatinine   


 


Glucose   


 


Plasma Lactic Acid José Miguel   0.6 L 


 


Calcium   


 


AST   


 


Total Protein   


 


Albumin   














  19





  10:37


 


WBC 


 


RBC 


 


Hgb 


 


Hct 


 


RDW 


 


Plt Count 


 


Neutrophils # 


 


Lymphocytes # 


 


ABG pH 


 


ABG HCO3 


 


ABG Total CO2 


 


ABG O2 Saturation 


 


Sodium  133 L


 


Creatinine  0.58 L


 


Glucose 


 


Plasma Lactic Acid José Miguel 


 


Calcium  8.0 L


 


AST 


 


Total Protein 


 


Albumin 














- Diagnostic Findings


Chest x-ray: image reviewed (No acute pulmonary process)





Assessment and Plan


Assessment: 





Impression:





#1 Altered mental status and medical debility secondary to prolonged recent 

hospitalization.





#2 Pulmonary fibrosis, no clear indication of acute process.





#3 Moderate chronic obstructive pulmonary disease, currently inactive and 

stable.





#4 Chronic hypoxemic respiratory failure secondary to above.





#5 Obstructive sleep apnea not currently on CPAP, patient intolerant.





#6  Recent hospitalization for acute duodenal ulcer perforation with 

pneumoperitoneum, status post exploratory laparotomy, repair of perforated 

duodenal ulcers as well as repair of incarcerated umbilical hernia.





#7 History of rheumatoid arthritis.





#8 Chronic back pain.





#9 Hyperlipidemia.





#10History of anxiety/depression.





#11 History of nicotine dependence.





#12 History of daily alcohol use.





#13 Poor overall functional performance based on the above-mentioned multiple 

comorbidities.





Plan:





The patient was seen and evaluated by Dr. Pickard.  Chest x-ray and labs 

reviewed.  Doubt any significant pneumonia.  He does have underlying severe 

pulmonary fibrosis and COPD.  Continue bronchodilators, empiric antibiotics, 

oxygen therapy.  Overall prognosis is guarded.  We will continue to follow and 

make further recommendations based on his clinical status.





I, the cosigning physician, performed a history & physical examination of the 

patient. Lungs sounds with coarse crackles in the posterior bases.  Maintaining 

good O2 saturations in the 90s on 3 L/m per nasal cannula I discussed the 

assessment and plan of care with my nurse practitioner, Sylwia Flaherty. I attest to 

the above note as dictated by her.


Time with Patient: Greater than 30

## 2019-06-11 NOTE — P.HPIM
History of Present Illness


H&P Date: 19





This is a 69-year-old male patient of Dr. Brittaney Berry currently under the care

of Dr. Bazan at [facility] with past medical history of advanced steroid-dependent 

COPD, hyperlipidemia, rheumatoid arthritis, obstructive sleep apnea unable to 

tolerate CPAP, chronic hypoxic respiratory failure on home O2 at 3 L nasal 

cannula, generalized osteoarthritis, chronic back pain pseudomonas in sputum on 

last admission secondary to colonization or pseudomonal pneumonia.  Known FVC of

72% and FEV1 of 46%.  Patient had recent hospitalization May 23 through  

for perforated duodenal ulcer status post exploratory laparotomy with repair of 

perforated duodenal ulcer and repair of incarcerated umbilical hernia surgery 

performed by Dr. Gomez.  The patient was initially discharged home with home 

care but family admitted him to [facility] yesterday morning.  He did have a cough 

with yellow sputum production.  Patient had increased weakness and confusion and

met sepsis criteria. Dr. Bazan was contacted and recommended that patient be 

sent to [facility] emergency center for evaluation.  He was 

afebrile, heart rate 104, blood pressure 110/65, pulse ox 97%.  White count was 

14.5, hemoglobin 12.0, platelet count 474.  Sodium 129, BUN 10 and creatinine 

0.55, blood sugar 161.  Lactic acid 2.1, ammonia less than 9.  TSH 1.410.  AST 

62.  Troponin negative.  ProBNP 304.  ABGs pH 7.48, pCO2 36, pO2 100, bicarb 27,

total CO2 28, O2 saturation 97.8 on FiO2 of 36%.  Urinalysis was clear nitrate 

and leukoesterase negative.  Chest x-ray shows no definite acute process.  

Patient was started on Levaquin and vancomycin and admitted to the [facility] floor

for suspected pneumonia.  Consult with pulmonary medicine.  Patient's wife is at

the bedside and states that he is improved from yesterday he was very lethargic 

all weekend he also has had diarrhea for 4 days but he looks much better today. 

He has had no fever or chills.  Sputum green colored and cough for a couple of 

days at least.





Review of Systems


Constitutional: Reports fatigue, Reports lethargy, Reports malaise, Reports poor

appetite, Reports weakness, Denies chills, Denies fever


Ears, nose, mouth and throat: Denies nasal congestion, Denies nasal discharge, 

Denies vertigo


Cardiovascular: Reports decreased exercise tolerance, Reports dyspnea on 

exertion, Reports shortness of breath, Denies edema, Denies leg edema, Denies 

lightheadedness, Denies syncope


Respiratory: Reports cough, Reports cough with sputum, Reports dyspnea, Reports 

excessive sputum, Reports home oxygen, Denies hemoptysis


Gastrointestinal: Reports diarrhea, Denies abdominal pain, Denies loss of 

appetite, Denies nausea, Denies vomiting


Genitourinary: Denies dysuria, Denies urinary retention


Musculoskeletal: Reports muscle weakness, Denies frequent falls, Denies gait 

dysfunction, Denies myalgias


Integumentary: Denies pruritus, Denies rash, Denies wounds


Neurological: Reports change in mentation, Reports confusion, Denies aphasia, 

Denies change in speech, Denies gait dysfunction, Denies head injury, Denies 

seizures, Denies syncope, Denies vertigo


Psychiatric: Denies anxiety, Denies depression





Past Medical History


Past Medical History: COPD, Hyperlipidemia, Osteoarthritis (OA), Pneumonia, 

Rheumatoid Arthritis (RA), Sleep Apnea/CPAP/BIPAP


Additional Past Medical History / Comment(s): Pneumonia with sepsis twice-2018

and in . 3.5L home o2, POLO with no device (cannot tolerate), recently 

injured low back-saw Dr. Morse. Rheumatoid arthritis in bilateral hips,knees 

hands and back.


History of Any Multi-Drug Resistant Organisms: None Reported


Past Surgical History: Orthopedic Surgery


Additional Past Surgical History / Comment(s): bilateral cataract removals with 

lens implants, left knee arthroscopy, colonoscopy, GI perforated ulcer.


Past Anesthesia/Blood Transfusion Reactions: No Reported Reaction


Past Psychological History: Anxiety, Depression


Additional Psychological History / Comment(s): Pt resides with his spouse of 38 

yrs.  He has home oxygen which he wears prn at 3.5L/NC.  He has a nebulizer.


Smoking Status: Former smoker


Past Alcohol Use History: Daily


Additional Past Alcohol Use History / Comment(s): Patient smoked 2 packs per day

for 40+ years.  He quit 6 years ago.  He denies any illicit drug use or 

marijuana use.  He drinks 3-4 beers per day but none since previous admission 

and May.  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


Past Drug Use History: None Reported





- Past Family History


  ** Father


Family Medical History: Cancer


Additional Family Medical History / Comment(s): Father  of leukemia.





  ** Mother


Family Medical History: Cancer, CVA/TIA, Dementia, Diabetes Mellitus


Additional Family Medical History / Comment(s): Mother is 88yrs old with history

of dementia and colon cancer.





  ** Sister(s)


Additional Family Medical History / Comment(s): Patient has 1 full sister with 

history of lung cancer.  Patient has one half-sister with adrenal problems.  

Patient does not have any brothers.  Patient 3 sons with no major medical 

problems.





Medications and Allergies


                                Home Medications











 Medication  Instructions  Recorded  Confirmed  Type


 


LORazepam [Ativan] 1 mg PO TID PRN 06/05/18 06/10/19 History


 


predniSONE 10 mg PO DAILY #30 tab 06/06/18 06/10/19 Rx


 


HYDROcodone/APAP 10-325MG [Norco 1 tab PO Q8H PRN 05/23/19 06/10/19 History





]    


 


Amoxic-Pot Clav 875-125Mg 1 tab PO Q12HR #8 tablet 06/07/19 06/10/19 Rx





[Augmentin 875-125]    


 


Fluconazole [Diflucan] 200 mg PO DAILY #5 tab 06/07/19 06/10/19 Rx


 


Furosemide [Lasix] 40 mg PO DAILY #30 tab 06/07/19 06/10/19 Rx


 


Ipratropium-Albuterol Nebulize 3 ml INHALATION RT-Q6H #120 06/07/19 06/10/19 Rx





[Duoneb 0.5 mg-3 mg/3 ml Soln] ampul.neb   


 


Pantoprazole [Protonix] 40 mg PO AC-BRKFST #30 tablet. 06/07/19 06/10/19 Rx


 


metroNIDAZOLE [Flagyl] 500 mg PO TID #10 tab 06/07/19 06/10/19 Rx


 


Budesonide/Formoterol Fumarate 1 puff IH RT-BID 06/10/19 06/10/19 History





[Symbicort 160-4.5 Mcg Inhaler]    


 


Potassium Chloride [Klor-Con 20] 20 meq PO BID 06/10/19 06/10/19 History


 


Sertraline [Zoloft] 100 mg PO DAILY 06/10/19 06/10/19 History








                                    Allergies











Allergy/AdvReac Type Severity Reaction Status Date / Time


 


amoxicillin AdvReac  Nausea & Verified 19 13:48





   Vomiting  














Physical Exam


Vitals: 


                                   Vital Signs











  Temp Pulse Pulse Resp BP BP Pulse Ox


 


 19 08:14   86     


 


 19 08:04   90      96


 


 19 00:15  98.0 F   77  18   112/62  96


 


 06/10/19 22:56   92   18  110/74   97


 


 06/10/19 21:43   92   18  116/73   98


 


 06/10/19 20:04   98   18  112/80   96


 


 06/10/19 19:00   93   22  106/75   98


 


 06/10/19 18:00   96   21  117/74   97


 


 06/10/19 17:42     16   


 


 06/10/19 17:29  97.1 F L  104 H   16  110/65   97


 


 06/10/19 17:11      110/65  








                                Intake and Output











 06/10/19 06/11/19 06/11/19





 22:59 06:59 14:59


 


Intake Total  500 


 


Balance  500 


 


Intake:   


 


  Intake, IV Titration  500 





  Amount   


 


    Sodium Chloride 0.9% 1,  500 





    000 ml @ 100 mls/hr IV .   





    Q10H STA Rx#:[id number]   


 


Other:   


 


  # Voids  1 


 


  Weight 74.843 kg  

















Gen: This is a 69-year-old  male.  Patient is resting in bed appears to

 be comfortable and in no acute distress.


HEENT: Head is atraumatic, normocephalic. Pupils equal, round. Sclerae is an

icteric. 


NECK: Supple. No JVD. No lymphadenopathy. No thyromegaly. 


LUNGS: Coarse crackles bilaterally.  No intercostal retractions.


HEART: Regular rate and rhythm. No murmur. 


ABDOMEN: Soft. Bowel sounds are present. No masses.  No tenderness.


EXTREMITIES: No pedal edema.  No calf tenderness.


NEUROLOGICAL: Patient is awake, alert and oriented x3. Cranial nerves 2 through 

12 are grossly intact. 








Results


CBC & Chem 7: 


                                 19 10:37





                                 19 10:37


Labs: 


                  Abnormal Lab Results - Last 24 Hours (Table)











  06/10/19 06/10/19 06/10/19 Range/Units





  17:12 17:12 17:12 


 


WBC  14.5 H    (3.8-10.6)  k/uL


 


RBC  3.97 L    (4.30-5.90)  m/uL


 


Hgb  12.0 L    (13.0-17.5)  gm/dL


 


Hct  38.2 L    (39.0-53.0)  %


 


RDW  16.8 H    (11.5-15.5)  %


 


Plt Count  474 H    (150-450)  k/uL


 


Neutrophils #  13.5 H    (1.3-7.7)  k/uL


 


Lymphocytes #  0.5 L    (1.0-4.8)  k/uL


 


ABG pH     (7.35-7.45)  


 


ABG HCO3     (21-25)  mmol/L


 


ABG Total CO2     (19-24)  mmol/L


 


ABG O2 Saturation     (94-97)  %


 


Sodium   129 L   (137-145)  mmol/L


 


Creatinine   0.55 L   (0.66-1.25)  mg/dL


 


Glucose   161 H   (74-99)  mg/dL


 


Plasma Lactic Acid José Miguel    2.1 H*  (0.7-2.0)  mmol/L


 


Calcium   7.9 L   (8.4-10.2)  mg/dL


 


AST   62 H   (17-59)  U/L


 


Total Protein   5.6 L   (6.3-8.2)  g/dL


 


Albumin   2.7 L   (3.5-5.0)  g/dL














  06/10/19 06/10/19 Range/Units





  17:49 21:47 


 


WBC    (3.8-10.6)  k/uL


 


RBC    (4.30-5.90)  m/uL


 


Hgb    (13.0-17.5)  gm/dL


 


Hct    (39.0-53.0)  %


 


RDW    (11.5-15.5)  %


 


Plt Count    (150-450)  k/uL


 


Neutrophils #    (1.3-7.7)  k/uL


 


Lymphocytes #    (1.0-4.8)  k/uL


 


ABG pH  7.48 H   (7.35-7.45)  


 


ABG HCO3  27 H   (21-25)  mmol/L


 


ABG Total CO2  28 H   (19-24)  mmol/L


 


ABG O2 Saturation  97.8 H   (94-97)  %


 


Sodium    (137-145)  mmol/L


 


Creatinine    (0.66-1.25)  mg/dL


 


Glucose    (74-99)  mg/dL


 


Plasma Lactic Acid José Miguel   0.6 L  (0.7-2.0)  mmol/L


 


Calcium    (8.4-10.2)  mg/dL


 


AST    (17-59)  U/L


 


Total Protein    (6.3-8.2)  g/dL


 


Albumin    (3.5-5.0)  g/dL














Thrombosis Risk Factor Assmnt





- DVT/VTE Prophylaxis


DVT/VTE Prophylaxis: Pharmacologic Prophylaxis ordered





- Choose All That Apply


Any of the Below Risk Factors Present?: Yes


Each Factor Represents 1 point: Obesity (BMI >25)


Other Risk Factors: Yes


Each Risk Factor Represents 2 Points: Age 61-74 years


Other congenital or acquired thrombophilia - If yes, enter type in comment: No


Thrombosis Risk Factor Assessment Total Risk Factor Score: 3


Thrombosis Risk Factor Assessment Level: Moderate Risk





Assessment and Plan


Plan: 





1.  Mental status changes, metabolic encephalopathy multifactorial including 

hyponatremia, COPD and chronic respiratory failure, pulmonary fibrosis and 

recent extended hospitalization.





2.  Hyponatremia secondary to diarrhea.  Check stool for C. difficile toxin.  

Patient was on IV fluids 100 mL per hour.  Recheck lab work in the morning.





3.  Advanced COPD with pulmonary fibrosis, steroid dependent and chronic hypoxic

 and hypercapnic respiratory failure on home O2 at 3 L. pneumonia is doubtful 

per Dr. Pickard.  Continue Symbicort, DuoNeb treatments, Levaquin and 

vancomycin, prednisone 10 mg daily.





4.  Rheumatoid arthritis, stable.





5.  Recent hospitalization for perforated duodenal ulcer status post exploratory

 laparotomy with repair of perforated duodenal ulcer and repair of incarcerated 

umbilical hernia surgery performed by Dr. Gomez.  Consult with Dr. Gomez.  

Patient is currently on a regular diet.





6.  Hyperlipidemia.





7.  Obstructive sleep apnea unable to tolerate CPAP.





8.  Chronic back pain and generalized osteoarthritis.





9.  Severe protein calorie malnutrition.  Ensure added.  





10.  Generalized anxiety disorder and recurrent depression.  Continue Zoloft 100

 mg daily, Ativan 1 mg 3 times daily as needed. 





11.  DVT prophylaxis.  Continue Lovenox daily





10.  GI prophylaxis.  Continue Protonix daily.





12.  Insomnia.  Melatonin added.





Patient will be admitted to the hospital for a minimum of 3 night stay.





Discharge plan: Return to [facility] most likely on Thursday.  PT and OT.





Impression and plan of care have been directed as dictated by the signing 

physician.  Chloé Maldonado nurse practitioner acting as scribe for signing 

physician.

## 2019-06-12 LAB
ALBUMIN SERPL-MCNC: 2.3 G/DL (ref 3.5–5)
ALP SERPL-CCNC: 74 U/L (ref 38–126)
ALT SERPL-CCNC: 31 U/L (ref 21–72)
ANION GAP SERPL CALC-SCNC: 3 MMOL/L
AST SERPL-CCNC: 28 U/L (ref 17–59)
BUN SERPL-SCNC: 11 MG/DL (ref 9–20)
CALCIUM SPEC-MCNC: 7.7 MG/DL (ref 8.4–10.2)
CHLORIDE SERPL-SCNC: 103 MMOL/L (ref 98–107)
CO2 SERPL-SCNC: 25 MMOL/L (ref 22–30)
ERYTHROCYTE [DISTWIDTH] IN BLOOD BY AUTOMATED COUNT: 3.47 M/UL (ref 4.3–5.9)
ERYTHROCYTE [DISTWIDTH] IN BLOOD: 17.2 % (ref 11.5–15.5)
GLUCOSE BLD-MCNC: 117 MG/DL (ref 75–99)
GLUCOSE BLD-MCNC: 148 MG/DL (ref 75–99)
GLUCOSE BLD-MCNC: 167 MG/DL (ref 75–99)
GLUCOSE BLD-MCNC: 84 MG/DL (ref 75–99)
GLUCOSE SERPL-MCNC: 88 MG/DL (ref 74–99)
HCT VFR BLD AUTO: 34 % (ref 39–53)
HGB BLD-MCNC: 10.6 GM/DL (ref 13–17.5)
MCH RBC QN AUTO: 30.6 PG (ref 25–35)
MCHC RBC AUTO-ENTMCNC: 31.1 G/DL (ref 31–37)
MCV RBC AUTO: 98.1 FL (ref 80–100)
PLATELET # BLD AUTO: 392 K/UL (ref 150–450)
POTASSIUM SERPL-SCNC: 4.3 MMOL/L (ref 3.5–5.1)
PROT SERPL-MCNC: 4.8 G/DL (ref 6.3–8.2)
SODIUM SERPL-SCNC: 131 MMOL/L (ref 137–145)
WBC # BLD AUTO: 9.9 K/UL (ref 3.8–10.6)

## 2019-06-12 RX ADMIN — ENOXAPARIN SODIUM SCH MG: 40 INJECTION SUBCUTANEOUS at 09:35

## 2019-06-12 RX ADMIN — PANTOPRAZOLE SODIUM SCH MG: 40 TABLET, DELAYED RELEASE ORAL at 09:35

## 2019-06-12 RX ADMIN — HYDROCODONE BITARTRATE AND ACETAMINOPHEN PRN EACH: 10; 325 TABLET ORAL at 17:05

## 2019-06-12 RX ADMIN — BUDESONIDE AND FORMOTEROL FUMARATE DIHYDRATE SCH PUFF: 160; 4.5 AEROSOL RESPIRATORY (INHALATION) at 21:16

## 2019-06-12 RX ADMIN — POTASSIUM CHLORIDE SCH MEQ: 20 TABLET, EXTENDED RELEASE ORAL at 09:35

## 2019-06-12 RX ADMIN — FUROSEMIDE SCH MG: 40 TABLET ORAL at 09:35

## 2019-06-12 RX ADMIN — Medication SCH MG: at 20:48

## 2019-06-12 RX ADMIN — IPRATROPIUM BROMIDE AND ALBUTEROL SULFATE PRN ML: .5; 3 SOLUTION RESPIRATORY (INHALATION) at 21:16

## 2019-06-12 RX ADMIN — BUDESONIDE AND FORMOTEROL FUMARATE DIHYDRATE SCH PUFF: 160; 4.5 AEROSOL RESPIRATORY (INHALATION) at 07:39

## 2019-06-12 RX ADMIN — SODIUM CHLORIDE SCH MLS/HR: 9 INJECTION, SOLUTION INTRAVENOUS at 04:37

## 2019-06-12 RX ADMIN — POTASSIUM CHLORIDE SCH MEQ: 20 TABLET, EXTENDED RELEASE ORAL at 20:48

## 2019-06-12 RX ADMIN — HYDROCODONE BITARTRATE AND ACETAMINOPHEN PRN EACH: 10; 325 TABLET ORAL at 01:52

## 2019-06-12 RX ADMIN — HYDROCODONE BITARTRATE AND ACETAMINOPHEN PRN EACH: 10; 325 TABLET ORAL at 09:45

## 2019-06-12 RX ADMIN — SERTRALINE HYDROCHLORIDE SCH MG: 100 TABLET ORAL at 09:35

## 2019-06-12 NOTE — P.PN
Subjective


Progress Note Date: 06/12/19


Principal diagnosis: 





Altered mental status and medical debility. 





This a very pleasant 69-year-old gentleman with a known history of 

hyperlipidemia, rheumatoid arthritis, obstructive sleep apnea intolerant to 

CPAP, significant COPD with FEV1 value of 46% of predicted, pulmonary fibrosis 

and home oxygen at 3.5 L, former smoker, daily alcohol use.  He was just 

recently discharged from here on 06/07/2019 after sustaining an acute duodenal 

ulcer with perforation and pneumoperitoneum.  Status post exploratory 

laparotomy, repair perforated ulcer as well as repair of incarcerated umbilical 

hernia.  He was discharged home on Augmentin, Flagyl, Diflucan.  Retention 

sutures still in place.  He was brought back here to the emergency room 

yesterday after developing altered mental status and profound weakness.  His 

wife had given him initially to a nursing home for placement but they said he 

was too ill and was referred here to the emergency department.  Chest x-ray 

revealed no acute pulmonary process.  Abdomen revealed no acute process.  He is 

seen today in consultation on the regular medical floor.  He is currently 

sitting up in a chair at the bedside.  Awake and alert in no acute distress.  

Somewhat confused to date and time.  Aware he is in the hospital.  He is curren

tly afebrile.  Maintaining O2 saturations in the mid 90s on 3 L/m per nasal 

cannula.  Hemodynamically stable.  White count 13.8.  Hemoglobin 11.9.  

Creatinine 0.58.  Arterial blood gases revealed a PaO2 of 100, pCO2 36, pH 7.48.

 He's been initiated on vancomycin and Levaquin.





The patient was seen today 06/12/2019 in follow-up on the regular medical floor.

 He is awake and alert currently sitting up in a chair at the bedside.  He 

denies any worsening shortness of breath, cough or congestion.  Maintaining good

O2 saturations in the 90s on 2 L/m per nasal cannula.  Afebrile.  A culture 

reveals no growth to date.  White count 9.9.  Hemoglobin 10.6.  Creatinine 0.56.

 He remains on DuoNeb inhalations, Symbicort, Levaquin.





Objective





- Vital Signs


Vital signs: 


                                   Vital Signs











Temp  98 F   06/12/19 07:00


 


Pulse  84   06/12/19 07:00


 


Resp  12   06/12/19 07:00


 


BP  104/65   06/12/19 07:00


 


Pulse Ox  96   06/12/19 07:00








                                 Intake & Output











 06/11/19 06/12/19 06/12/19





 18:59 06:59 18:59


 


Intake Total 1050 880 


 


Balance 1050 880 


 


Weight   74.843 kg


 


Intake:   


 


  IV 1050  


 


    Sodium Chloride 0.9% 1, 800  





    000 ml @ 100 mls/hr IV .   





    Q10H STA Rx#:[id number]   


 


    Vancomycin 1,250 mg In 250  





    Sodium Chloride 0.9% 250   





    ml @ 125 mls/hr IVPB Q8H   





    IMTIAZ Rx#:[id number]   


 


  Intake, IV Titration  400 





  Amount   


 


    Levofloxacin 750Mg-D5w  150 





    Pmx 750 mg In Dextrose/   





    Water 1 150ml.bag @ 100   





    mls/hr IVPB Q24H IMTIAZ Rx#:   





    [id number]   


 


    Vancomycin 1,250 mg In  250 





    Sodium Chloride 0.9% 250   





    ml @ 125 mls/hr IVPB Q8H   





    IMTIAZ Rx#:[id number]   


 


  Oral  480 


 


Other:   


 


  # Voids  3 














- Exam





GENERAL EXAM: Alert, up in a chair at the bedside, comfortable in no apparent 

distress.  On 2 L/m


HEAD: Normocephalic.


EYES: Normal reaction of pupils, equal size.


NOSE: Clear with pink turbinates.


THROAT: No erythema or exudates.


NECK: No masses, no JVD.


CHEST: No chest wall deformity.


LUNGS: Equal air entry with coarse crackles in the posterior bases


CVS: S1 and S2 normal with no audible murmur, regular rhythm.


ABDOMEN: Soft, slightly tender, retention sutures in place, normal bowel sounds,

no guarding or rigidity.


SPINE: No scoliosis or deformity


SKIN: No rashes


CENTRAL NERVOUS SYSTEM: No focal deficits, tone is normal in all 4 extremities.


EXTREMITIES: There is no peripheral edema.  No clubbing, no cyanosis.  

Peripheral pulses are intact.





- Labs


CBC & Chem 7: 


                                 06/12/19 02:56





                                 06/12/19 02:56


Labs: 


                  Abnormal Lab Results - Last 24 Hours (Table)











  06/12/19 06/12/19 06/12/19 Range/Units





  02:56 02:56 12:01 


 


RBC  3.47 L    (4.30-5.90)  m/uL


 


Hgb  10.6 L    (13.0-17.5)  gm/dL


 


Hct  34.0 L    (39.0-53.0)  %


 


RDW  17.2 H    (11.5-15.5)  %


 


Sodium   131 L   (137-145)  mmol/L


 


Creatinine   0.56 L   (0.66-1.25)  mg/dL


 


POC Glucose (mg/dL)    117 H  (75-99)  mg/dL


 


Calcium   7.7 L   (8.4-10.2)  mg/dL


 


Total Protein   4.8 L   (6.3-8.2)  g/dL


 


Albumin   2.3 L   (3.5-5.0)  g/dL








                      Microbiology - Last 24 Hours (Table)











 06/10/19 17:47 Blood Culture - Preliminary





 Blood    No Growth after 24 hours














Assessment and Plan


Assessment: 





Impression:





#1 Altered mental status and medical debility secondary to prolonged recent 

hospitalization.





#2 Pulmonary fibrosis, no clear indication of acute process.





#3 Moderate chronic obstructive pulmonary disease, currently inactive and 

stable.





#4 Chronic hypoxemic respiratory failure secondary to above.





#5 Obstructive sleep apnea not currently on CPAP, patient intolerant.





#6  Recent hospitalization for acute duodenal ulcer perforation with 

pneumoperitoneum, status post exploratory laparotomy, repair of perforated 

duodenal ulcers as well as repair of incarcerated umbilical hernia.





#7 History of rheumatoid arthritis.





#8 Chronic back pain.





#9 Hyperlipidemia.





#10History of anxiety/depression.





#11 History of nicotine dependence.





#12 History of daily alcohol use.





#13 Poor overall functional performance based on the above-mentioned multiple 

comorbidities.





Plan:





The patient was seen and evaluated by Dr. Pickard.  We'll continue with the 

current treatment plan.  Overall prognosis is guarded.  We will continue to 

follow and make further recommendations based on his clinical status.





I, the cosigning physician, performed a history & physical examination of the 

patient. Lungs sounds with coarse crackles in the posterior bases.  Maintaining 

good O2 saturations in the 90s on 2 L/m per nasal cannula I discussed the 

assessment and plan of care with my nurse practitioner, Sylwia Flaherty. I attest to 

the above note as dictated by her.

## 2019-06-12 NOTE — P.GSCN
History of Present Illness


Consult date: 19


Reason for Consult: 





Perforated duodenal ulcer


History of present illness: 





Patient returns to the hospital because of complaints of weakness, shortness of 

breath, confusion, and some abdominal discomfort.  Patient known to our service 

from recent hospitalization after he presented with a perforated duodenal ulcer 

with peritonitis.  Patient's wife states that she was having difficulty caring 

for him at home because of his weakness and inability to care for himself.  He 

was only at [facility] rehab for approximately a few hours and was then brought to 

the hospital for above complaints.  Patient's wife states he was not having 

complaints of pain at home.  His appetite however has been poor.  No nausea or 

vomiting.  No fevers.  White blood cell count slightly elevated on arrival.  

Today is normal.  Abdominal x-rays are normal.  Appears and states he is 

comfortable currently.





Review of Systems





The patient denies any acute changes in vision or hearing, no odynophagia,  no 

dysuria or hematuria, no headache, no runny nose, no rectal bleeding or melena, 

no unexplained weight loss 





Past Medical History


Past Medical History: COPD, Hyperlipidemia, Osteoarthritis (OA), Pneumonia, 

Rheumatoid Arthritis (RA), Sleep Apnea/CPAP/BIPAP


Additional Past Medical History / Comment(s): Pneumonia with sepsis twice-2018

and in 2016. 3.5L home o2, POLO with no device (cannot tolerate), recently 

injured low back-saw Dr. Morse. Rheumatoid arthritis in bilateral hips,knees 

hands and back.


History of Any Multi-Drug Resistant Organisms: None Reported


Past Surgical History: Orthopedic Surgery


Additional Past Surgical History / Comment(s): bilateral cataract removals with 

lens implants, left knee arthroscopy, colonoscopy, GI perforated ulcer.


Past Anesthesia/Blood Transfusion Reactions: No Reported Reaction


Past Psychological History: Anxiety, Depression


Additional Psychological History / Comment(s): Pt resides with his spouse of 38 

yrs.  He has home oxygen which he wears prn at 3.5L/NC.  He has a nebulizer.


Smoking Status: Former smoker


Past Alcohol Use History: Daily


Additional Past Alcohol Use History / Comment(s): Patient smoked 2 packs per day

for 40+ years.  He quit 6 years ago.  He denies any illicit drug use or m

arijuana use.  He drinks 3-4 beers per day but none since previous admission and

May.  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


Past Drug Use History: None Reported





- Past Family History


  ** Father


Family Medical History: Cancer


Additional Family Medical History / Comment(s): Father  of leukemia.





  ** Mother


Family Medical History: Cancer, CVA/TIA, Dementia, Diabetes Mellitus


Additional Family Medical History / Comment(s): Mother is 88yrs old with history

of dementia and colon cancer.





  ** Sister(s)


Additional Family Medical History / Comment(s): Patient has 1 full sister with 

history of lung cancer.  Patient has one half-sister with adrenal problems.  

Patient does not have any brothers.  Patient 3 sons with no major medical 

problems.





Medications and Allergies


                                Home Medications











 Medication  Instructions  Recorded  Confirmed  Type


 


LORazepam [Ativan] 1 mg PO TID PRN 06/05/18 06/10/19 History


 


predniSONE 10 mg PO DAILY #30 tab 06/06/18 06/10/19 Rx


 


HYDROcodone/APAP 10-325MG [Norco 1 tab PO Q8H PRN 05/23/19 06/10/19 History





]    


 


Amoxic-Pot Clav 875-125Mg 1 tab PO Q12HR #8 tablet 06/07/19 06/10/19 Rx





[Augmentin 875-125]    


 


Fluconazole [Diflucan] 200 mg PO DAILY #5 tab 06/07/19 06/10/19 Rx


 


Furosemide [Lasix] 40 mg PO DAILY #30 tab 06/07/19 06/10/19 Rx


 


Ipratropium-Albuterol Nebulize 3 ml INHALATION RT-Q6H #120 06/07/19 06/10/19 Rx





[Duoneb 0.5 mg-3 mg/3 ml Soln] ampul.neb   


 


Pantoprazole [Protonix] 40 mg PO AC-BRKFST #30 tablet. 06/07/19 06/10/19 Rx


 


metroNIDAZOLE [Flagyl] 500 mg PO TID #10 tab 06/07/19 06/10/19 Rx


 


Budesonide/Formoterol Fumarate 1 puff IH RT-BID 06/10/19 06/10/19 History





[Symbicort 160-4.5 Mcg Inhaler]    


 


Potassium Chloride [Klor-Con 20] 20 meq PO BID 06/10/19 06/10/19 History


 


Sertraline [Zoloft] 100 mg PO DAILY 06/10/19 06/10/19 History








                                    Allergies











Allergy/AdvReac Type Severity Reaction Status Date / Time


 


amoxicillin AdvReac  Nausea & Verified 19 13:48





   Vomiting  














Surgical - Exam


                                   Vital Signs











BP


 


 110/65 


 


 06/10/19 17:11

















Physical exam:


General: Well-developed, well-nourished


HEENT: Normocephalic, sclerae nonicteric


Abdomen: Mild incisional tenderness, wounds clean, retention sutures in place


Extremities: No edema


Neuro: Alert and oriented





Results





- Labs





                                 19 02:56





                                 19 02:56


                  Abnormal Lab Results - Last 24 Hours (Table)











  19 Range/Units





  10:37 10:37 02:56 


 


WBC  13.8 H    (3.8-10.6)  k/uL


 


RBC  3.94 L   3.47 L  (4.30-5.90)  m/uL


 


Hgb  11.9 L   10.6 L  (13.0-17.5)  gm/dL


 


Hct  38.1 L   34.0 L  (39.0-53.0)  %


 


RDW  18.1 H   17.2 H  (11.5-15.5)  %


 


Sodium   133 L   (137-145)  mmol/L


 


Creatinine   0.58 L   (0.66-1.25)  mg/dL


 


Calcium   8.0 L   (8.4-10.2)  mg/dL


 


Total Protein     (6.3-8.2)  g/dL


 


Albumin     (3.5-5.0)  g/dL














  19 Range/Units





  02:56 


 


WBC   (3.8-10.6)  k/uL


 


RBC   (4.30-5.90)  m/uL


 


Hgb   (13.0-17.5)  gm/dL


 


Hct   (39.0-53.0)  %


 


RDW   (11.5-15.5)  %


 


Sodium  131 L  (137-145)  mmol/L


 


Creatinine  0.56 L  (0.66-1.25)  mg/dL


 


Calcium  7.7 L  (8.4-10.2)  mg/dL


 


Total Protein  4.8 L  (6.3-8.2)  g/dL


 


Albumin  2.3 L  (3.5-5.0)  g/dL








                      Microbiology - Last 24 Hours (Table)











 06/10/19 17:47 Blood Culture - Preliminary





 Blood    No Growth after 24 hours








                                 Diabetes panel











  19 Range/Units





  10:37 02:56 


 


Sodium  133 L  131 L  (137-145)  mmol/L


 


Potassium  4.1  4.3  (3.5-5.1)  mmol/L


 


Chloride  100  103  ()  mmol/L


 


Carbon Dioxide  26  25  (22-30)  mmol/L


 


BUN  9  11  (9-20)  mg/dL


 


Creatinine  0.58 L  0.56 L  (0.66-1.25)  mg/dL


 


Glucose  97  88  (74-99)  mg/dL


 


Calcium  8.0 L  7.7 L  (8.4-10.2)  mg/dL


 


AST   28  (17-59)  U/L


 


ALT   31  (21-72)  U/L


 


Alkaline Phosphatase   74  ()  U/L


 


Total Protein   4.8 L  (6.3-8.2)  g/dL


 


Albumin   2.3 L  (3.5-5.0)  g/dL








                                  Calcium panel











  19 Range/Units





  10:37 02:56 


 


Calcium  8.0 L  7.7 L  (8.4-10.2)  mg/dL


 


Albumin   2.3 L  (3.5-5.0)  g/dL








                                 Pituitary panel











  19 Range/Units





  10:37 02:56 


 


Sodium  133 L  131 L  (137-145)  mmol/L


 


Potassium  4.1  4.3  (3.5-5.1)  mmol/L


 


Chloride  100  103  ()  mmol/L


 


Carbon Dioxide  26  25  (22-30)  mmol/L


 


BUN  9  11  (9-20)  mg/dL


 


Creatinine  0.58 L  0.56 L  (0.66-1.25)  mg/dL


 


Glucose  97  88  (74-99)  mg/dL


 


Calcium  8.0 L  7.7 L  (8.4-10.2)  mg/dL








                                  Adrenal panel











  19 Range/Units





  10:37 02:56 


 


Sodium  133 L  131 L  (137-145)  mmol/L


 


Potassium  4.1  4.3  (3.5-5.1)  mmol/L


 


Chloride  100  103  ()  mmol/L


 


Carbon Dioxide  26  25  (22-30)  mmol/L


 


BUN  9  11  (9-20)  mg/dL


 


Creatinine  0.58 L  0.56 L  (0.66-1.25)  mg/dL


 


Glucose  97  88  (74-99)  mg/dL


 


Calcium  8.0 L  7.7 L  (8.4-10.2)  mg/dL


 


Total Bilirubin   0.3  (0.2-1.3)  mg/dL


 


AST   28  (17-59)  U/L


 


ALT   31  (21-72)  U/L


 


Alkaline Phosphatase   74  ()  U/L


 


Total Protein   4.8 L  (6.3-8.2)  g/dL


 


Albumin   2.3 L  (3.5-5.0)  g/dL














Assessment and Plan


(1) Duodenal ulcer, perforated


Narrative/Plan: 


Continue regular diet.  Continue antiacid therapy.  If pain increases Will 

repeat CAT scan but for now clinical suspicion of intra-abdominal source of 

problems remains low.


Current Visit: No   Status: Acute   Code(s): K26.5 - CHRONIC OR UNSPECIFIED 

DUODENAL ULCER WITH PERFORATION   SNOMED Code(s): 82295301

## 2019-06-13 VITALS — DIASTOLIC BLOOD PRESSURE: 62 MMHG | RESPIRATION RATE: 16 BRPM | TEMPERATURE: 98.4 F | SYSTOLIC BLOOD PRESSURE: 102 MMHG

## 2019-06-13 VITALS — HEART RATE: 94 BPM

## 2019-06-13 LAB
GLUCOSE BLD-MCNC: 101 MG/DL (ref 75–99)
GLUCOSE BLD-MCNC: 127 MG/DL (ref 75–99)

## 2019-06-13 RX ADMIN — BUDESONIDE AND FORMOTEROL FUMARATE DIHYDRATE SCH PUFF: 160; 4.5 AEROSOL RESPIRATORY (INHALATION) at 07:47

## 2019-06-13 RX ADMIN — ONDANSETRON PRN MG: 2 INJECTION INTRAMUSCULAR; INTRAVENOUS at 06:47

## 2019-06-13 RX ADMIN — FUROSEMIDE SCH MG: 40 TABLET ORAL at 08:25

## 2019-06-13 RX ADMIN — ENOXAPARIN SODIUM SCH MG: 40 INJECTION SUBCUTANEOUS at 08:25

## 2019-06-13 RX ADMIN — HYDROCODONE BITARTRATE AND ACETAMINOPHEN PRN EACH: 10; 325 TABLET ORAL at 09:46

## 2019-06-13 RX ADMIN — ONDANSETRON PRN MG: 2 INJECTION INTRAMUSCULAR; INTRAVENOUS at 13:17

## 2019-06-13 RX ADMIN — SERTRALINE HYDROCHLORIDE SCH MG: 100 TABLET ORAL at 08:25

## 2019-06-13 RX ADMIN — POTASSIUM CHLORIDE SCH MEQ: 20 TABLET, EXTENDED RELEASE ORAL at 08:25

## 2019-06-13 RX ADMIN — HYDROCODONE BITARTRATE AND ACETAMINOPHEN PRN EACH: 10; 325 TABLET ORAL at 01:08

## 2019-06-13 RX ADMIN — PANTOPRAZOLE SODIUM SCH MG: 40 TABLET, DELAYED RELEASE ORAL at 08:25

## 2019-06-13 RX ADMIN — IPRATROPIUM BROMIDE AND ALBUTEROL SULFATE PRN ML: .5; 3 SOLUTION RESPIRATORY (INHALATION) at 07:47

## 2019-06-13 NOTE — P.DS
Providers


Date of admission: 


06/10/19 20:07





Expected date of discharge: 06/13/19


Attending physician: 


Amalia Bazan





Consults: 





                                        





06/10/19 20:03


Consult Physician Routine 


   Consulting Provider: Merari Thakur


   Consult Reason/Comments: pneumonia


   Do you want consulting provider notified?: Yes





06/11/19 12:36


Consult Physician Routine 


   Consulting Provider: Chivo Gomez


   Consult Reason/Comments: postop care


   Do you want consulting provider notified?: Yes











Primary care physician: 


Amalia Bazan





Hospital Course: 





This is a 69-year-old male patient of Dr. Brittaney Berry currently under the care

of Dr. Bazan at [facility] with past medical history of advanced steroid-dependent 

COPD, hyperlipidemia, rheumatoid arthritis, obstructive sleep apnea unable to 

tolerate CPAP, chronic hypoxic respiratory failure on home O2 at 3 L nasal 

cannula, generalized osteoarthritis, chronic back pain pseudomonas in sputum on 

last admission secondary to colonization or pseudomonal pneumonia.  Known FVC of

72% and FEV1 of 46%.  Patient had recent hospitalization May 23 through June 6 

for perforated duodenal ulcer status post exploratory laparotomy with repair of 

perforated duodenal ulcer and repair of incarcerated umbilical hernia surgery 

performed by Dr. Gomez.  The patient was initially discharged home with home 

care but family admitted him to [facility] yesterday morning.  He did have a cough 

with yellow sputum production.  Patient had increased weakness and confusion and

met sepsis criteria. Dr. Bazan was contacted and recommended that patient be 

sent to [facility] emergency center for evaluation.  He was 

afebrile, heart rate 104, blood pressure 110/65, pulse ox 97%.  White count was 

14.5, hemoglobin 12.0, platelet count 474.  Sodium 129, BUN 10 and creatinine 

0.55, blood sugar 161.  Lactic acid 2.1, ammonia less than 9.  TSH 1.410.  AST 

62.  Troponin negative.  ProBNP 304.  ABGs pH 7.48, pCO2 36, pO2 100, bicarb 27,

total CO2 28, O2 saturation 97.8 on FiO2 of 36%.  Urinalysis was clear nitrate 

and leukoesterase negative.  Chest x-ray shows no definite acute process.  

Patient was started on Levaquin and vancomycin and admitted to the [facility] floor

for suspected pneumonia.  Consult with pulmonary medicine.  Patient's wife is at

the bedside and states that he is improved from yesterday he was very lethargic 

all weekend he also has had diarrhea for 4 days but he looks much better today. 

He has had no fever or chills.  Sputum green colored and cough for a couple of 

days at least.





6/12: I repeat lab work shows a normal white count of 9.9, hemoglobin 10.6.  

Sodium is 131, BUN 11 creatinine was 0.56.  Blood cultures no growth after 24 

hours.  Patient remains afebrile, blood pressure 104/65, pulse ox 96% on 2 L 

nasal cannula, heart rate 84.  Sputum culture is in process.  Incentive 

spirometry added.  Patient complains of decreased appetite and nausea Zofran was

added.  Patient family of ongoing concerns regarding depression decreased 

appetite and lack of sleeping.  Melatonin did not seem to help much.  Patient is

currently on Zoloft and was taken off this placed on Prozac and was unable to 

tolerate this and return to Zoloft.  We will add on Remeron at night to help 

with symptoms.  Lorazepam will be discontinued.  PT and OT added.  Patient has 

been seen by Dr. Gomez will plan to remove sutures before he is discharged from 

the hospital.





6/13: Patient remains afebrile, heart rate 94, blood pressure 102/62, pulse ox 

95% on 3 L nasal cannula.  Yesterday antibiotics were changed from IV Levaquin 

750 and vancomycin IV were discontinued and patient placed on Levaquin 500 daily

oral for tracheobronchitis.  The patient has had no further diarrhea.  Sputum 

culture remains in progress and blood cultures showing no growth at 48 hours.  

Patient has worked with PT and OT with recommendations for subacute rehab.  

Patient denies any new complaints.  He states he is eating fine.  Breathing 

status is stable at this point. Patient will be discharged back to [facility] today

in stable condition.








Discharge diagnoses:


1.  Mental status changes, metabolic encephalopathy multifactorial including 

hyponatremia, COPD and chronic respiratory failure, pulmonary fibrosis and 

recent extended hospitalization.


2.  Hyponatremia secondary to diarrhea.  


3.  Advanced COPD with pulmonary fibrosis, steroid dependent and chronic hypoxic

and hypercapnic respiratory failure on home O2 at 3 L. pneumonia is doubtful per

Dr. Pickard. 


4.  Rheumatoid arthritis, stable.


5.  Recent hospitalization for perforated duodenal ulcer status post exploratory

laparotomy with repair of perforated duodenal ulcer and repair of incarcerated 

umbilical hernia surgery performed by Dr. Gomez. 


6.  Hyperlipidemia.


7.  Obstructive sleep apnea unable to tolerate CPAP.


8.  Chronic back pain and generalized osteoarthritis.


9.  Severe protein calorie malnutrition. 


10.  Generalized anxiety disorder and recurrent depression. 


11.  Insomnia.  Melatonin added.











Discharge plan: Return to [facility]





Impression and plan of care have been directed as dictated by the signing 

physician.  Chloé Maldonado nurse practitioner acting as scribe for signing 

physician.








Patient Condition at Discharge: Good





Plan - Discharge Summary


Discharge Rx Participant: Yes


New Discharge Prescriptions: 


New


   Levofloxacin [Levaquin] 500 mg PO Q24H #3 tab


   Melatonin 10 mg PO HS  tablet


   Mirtazapine [Remeron] 7.5 mg PO 1800  tab





Continue


   predniSONE 10 mg PO DAILY #30 tab


   Ipratropium-Albuterol Nebulize [Duoneb 0.5 mg-3 mg/3 ml Soln] 3 ml INHALATION

RT-Q6H #120 ampul.neb


   Furosemide [Lasix] 40 mg PO DAILY #30 tab


   Pantoprazole [Protonix] 40 mg PO AC-BRKFST #30 tablet.


   Potassium Chloride [Klor-Con 20] 20 meq PO BID


   Budesonide/Formoterol Fumarate [Symbicort 160-4.5 Mcg Inhaler] 1 puff IH RT-

BID


   Sertraline [Zoloft] 100 mg PO DAILY


   HYDROcodone/APAP 10-325MG [Norco ] 1 tab PO Q8H PRN #9 tab


     PRN Reason: Pain





Discontinued


   LORazepam [Ativan] 1 mg PO TID PRN


     PRN Reason: Anxiety


   Fluconazole [Diflucan] 200 mg PO DAILY #5 tab


   metroNIDAZOLE [Flagyl] 500 mg PO TID #10 tab


   Amoxic-Pot Clav 875-125Mg [Augmentin 875-125] 1 tab PO Q12HR #8 tablet


Discharge Medication List





predniSONE 10 mg PO DAILY #30 tab 06/06/18 [Rx]


Furosemide [Lasix] 40 mg PO DAILY #30 tab 06/07/19 [Rx]


Ipratropium-Albuterol Nebulize [Duoneb 0.5 mg-3 mg/3 ml Soln] 3 ml INHALATION 

RT-Q6H #120 ampul.neb 06/07/19 [Rx]


Pantoprazole [Protonix] 40 mg PO AC-BRKFST #30 tablet. 06/07/19 [Rx]


Budesonide/Formoterol Fumarate [Symbicort 160-4.5 Mcg Inhaler] 1 puff IH RT-BID 

06/10/19 [History]


Potassium Chloride [Klor-Con 20] 20 meq PO BID 06/10/19 [History]


Sertraline [Zoloft] 100 mg PO DAILY 06/10/19 [History]


HYDROcodone/APAP 10-325MG [Norco ] 1 tab PO Q8H PRN #9 tab 06/13/19 [Rx]


Levofloxacin [Levaquin] 500 mg PO Q24H #3 tab 06/13/19 [Rx]


Melatonin 10 mg PO HS  tablet 06/13/19 [Rx]


Mirtazapine [Remeron] 7.5 mg PO 1800  tab 06/13/19 [Rx]








Follow up Appointment(s)/Referral(s): 


Amalia Bazan MD [Primary Care Provider] - 1 Week (at [facility])


Chivo Gomez MD [Medical Doctor] - 1 Week


Activity/Diet/Wound Care/Special Instructions: 


Change midline abdominal wound with Aquacel silver gauze every other day.


Discharge Disposition: TRANSFER TO SNF/ECF

## 2019-06-13 NOTE — P.PN
Subjective


Progress Note Date: 06/13/19


Principal diagnosis: 


 Altered mental status and medical debility





This a very pleasant 69-year-old gentleman with a known history of 

hyperlipidemia, rheumatoid arthritis, obstructive sleep apnea intolerant to 

CPAP, significant COPD with FEV1 value of 46% of predicted, pulmonary fibrosis 

and home oxygen at 3.5 L, former smoker, daily alcohol use.  He was just 

recently discharged from here on 06/07/2019 after sustaining an acute duodenal 

ulcer with perforation and pneumoperitoneum.  Status post exploratory 

laparotomy, repair perforated ulcer as well as repair of incarcerated umbilical 

hernia.  He was discharged home on Augmentin, Flagyl, Diflucan.  Retention 

sutures still in place.  He was brought back here to the emergency room 

yesterday after developing altered mental status and profound weakness.  His 

wife had given him initially to a nursing home for placement but they said he 

was too ill and was referred here to the emergency department.  Chest x-ray 

revealed no acute pulmonary process.  Abdomen revealed no acute process.  He is 

seen today in consultation on the regular medical floor.  He is currently 

sitting up in a chair at the bedside.  Awake and alert in no acute distress.  

Somewhat confused to date and time.  Aware he is in the hospital.  He is 

currently afebrile.  Maintaining O2 saturations in the mid 90s on 3 L/m per 

nasal cannula.  Hemodynamically stable.  White count 13.8.  Hemoglobin 11.9.  

Creatinine 0.58.  Arterial blood gases revealed a PaO2 of 100, pCO2 36, pH 7.48.

 He's been initiated on vancomycin and Levaquin.





The patient was seen today 06/12/2019 in follow-up on the regular medical floor.

 He is awake and alert currently sitting up in a chair at the bedside.  He 

denies any worsening shortness of breath, cough or congestion.  Maintaining good

O2 saturations in the 90s on 2 L/m per nasal cannula.  Afebrile.  A culture 

reveals no growth to date.  White count 9.9.  Hemoglobin 10.6.  Creatinine 0.56.

 He remains on DuoNeb inhalations, Symbicort, Levaquin.





On 06/13/2019 patient seen in follow-up on medical surgical floor.  He is 

resting comfortably in the recliner, in no acute distress, he denies any 

shortness of breath.  Lung sounds are positive for a few scattered crackles at 

the bases, no rhonchi, no wheezes.  He is on 3 L of oxygen with a pulse ox of 

95%, hemodynamics are stable, no fever or chills.  Blood and sputum cultures are

pending, no organisms seen so far and Gram stain.  He remains weak, but 

improving, no altered mentation, he is responding appropriately.  No abdominal 

pain, retention sutures are intact.  Tolerating oral diet.











Objective





- Vital Signs


Vital signs: 


                                   Vital Signs











Temp  98.4 F   06/13/19 07:00


 


Pulse  94   06/13/19 07:58


 


Resp  16   06/13/19 07:00


 


BP  102/62   06/13/19 07:00


 


Pulse Ox  95   06/13/19 07:47








                                 Intake & Output











 06/12/19 06/13/19 06/13/19





 18:59 06:59 18:59


 


Intake Total 950 830 


 


Output Total  300 


 


Balance 950 530 


 


Weight 74.843 kg  


 


Intake:   


 


    


 


    Vancomycin 1,250 mg In 250  





    Sodium Chloride 0.9% 250   





    ml @ 125 mls/hr IVPB Q8H   





    [facility] Rx#:[id number]   


 


  Intake, IV Titration 100  





  Amount   


 


    Levofloxacin 750Mg-D5w 100  





    Pmx 750 mg In Dextrose/   





    Water 1 150ml.bag @ 100   





    mls/hr IVPB Q24H [facility] Rx#:   





    [id number]   


 


  Oral 600 830 


 


Output:   


 


  Urine  300 


 


Other:   


 


  Voiding Method Diaper  





 Incontinent  


 


  # Voids 1 1 1














- Exam


 GENERAL EXAM: Alert, table in no apparent distress.


HEAD: Normocephalic/atraumatic.


EYES: Normal reaction of pupils, equal size.  Conjunctiva pink, sclera white.


NOSE: Clear with pink turbinates.


THROAT: No erythema or exudates.


NECK: No masses, no JVD, no thyroid enlargement, no adenopathy.


CHEST: No chest wall deformity.  Symmetrical expansion. 


LUNGS: Equal air entry with scattered crackles, wheeze, rhonchi or dullness.


CVS: Regular rate and rhythm, normal S1 and S2, no gallops, no murmurs, no rubs


ABDOMEN: Soft, nontender.  No hepatosplenomegaly, normal bowel sounds, no 

guarding or rigidity.


EXTREMITIES: No clubbing, no edema, no cyanosis, 2+ pulses and upper and lower 

extremities.


MUSCULOSKELETAL: Muscle strength and tone normal.


SPINE: No scoliosis or deformity


SKIN: No rashes


CENTRAL NERVOUS SYSTEM: Alert and oriented -3.  No focal deficits, tone is 

normal in all 4 extremities.


PSYCHIATRIC: Alert and oriented -3.  Appropriate affect.  Intact judgment and 

insight.











- Labs


CBC & Chem 7: 


                                 06/12/19 02:56





                                 06/12/19 02:56


Labs: 


                  Abnormal Lab Results - Last 24 Hours (Table)











  06/12/19 06/12/19 06/13/19 Range/Units





  17:00 20:21 07:16 


 


POC Glucose (mg/dL)  148 H  167 H  101 H  (75-99)  mg/dL














  06/13/19 Range/Units





  11:28 


 


POC Glucose (mg/dL)  127 H  (75-99)  mg/dL








                      Microbiology - Last 24 Hours (Table)











 06/12/19 09:36 Gram Stain - Preliminary





 Sputum Sputum Culture - Preliminary


 


 06/10/19 17:47 Blood Culture - Preliminary





 Blood    No Growth after 48 hours














Assessment and Plan


Plan: 


#1 Altered mental status and medical debility secondary to prolonged recent ho

spitalization.





#2 Pulmonary fibrosis, no clear indication of acute process.





#3 Moderate chronic obstructive pulmonary disease, currently inactive and 

stable.





#4 Chronic hypoxemic respiratory failure secondary to above.





#5 Obstructive sleep apnea not currently on CPAP, patient intolerant.





#6  Recent hospitalization for acute duodenal ulcer perforation with 

pneumoperitoneum, status post exploratory laparotomy, repair of perforated 

duodenal ulcers as well as repair of incarcerated umbilical hernia.





#7 History of rheumatoid arthritis.





#8 Chronic back pain.





#9 Hyperlipidemia.





#10History of anxiety/depression.





#11 History of nicotine dependence.





#12 History of daily alcohol use.





#13 Poor overall functional performance based on the above-mentioned multiple 

comorbidities.





Plan:





Patient is stable from pulmonary perspective, he can continue nebulized 

bronchodilators, no altered mentation, vital signs are stable, cultures are 

negative thus far.  No fever or chills. Tolerating oral diet.  He is being 

discharged to rehab today





I performed a history & physical examination of the patient and discussed their 

management with my nurse practitioner, La Louie.  I reviewed the nurse 

practitioner's note and agree with the documented findings and plan of care.  

Lung sounds are positive for a few scattered crackles at the bases.  The 

findings and the impression was discussed with the patient.  I attest to the 

documentation by the nurse practitioner. 


Time with Patient: Less than 30

## 2019-06-13 NOTE — P.PN
Subjective


Progress Note Date: 06/12/19





This is a 69-year-old male patient of Dr. Brittaney Berry currently under the care

of Dr. Bazan at [facility] with past medical history of advanced steroid-dependent 

COPD, hyperlipidemia, rheumatoid arthritis, obstructive sleep apnea unable to 

tolerate CPAP, chronic hypoxic respiratory failure on home O2 at 3 L nasal 

cannula, generalized osteoarthritis, chronic back pain pseudomonas in sputum on 

last admission secondary to colonization or pseudomonal pneumonia.  Known FVC of

72% and FEV1 of 46%.  Patient had recent hospitalization May 23 through June 6 

for perforated duodenal ulcer status post exploratory laparotomy with repair of 

perforated duodenal ulcer and repair of incarcerated umbilical hernia surgery 

performed by Dr. Gomez.  The patient was initially discharged home with home 

care but family admitted him to [facility] yesterday morning.  He did have a cough 

with yellow sputum production.  Patient had increased weakness and confusion and

met sepsis criteria. Dr. Bazan was contacted and recommended that patient be 

sent to [facility] emergency center for evaluation.  He was af

ebrile, heart rate 104, blood pressure 110/65, pulse ox 97%.  White count was 

14.5, hemoglobin 12.0, platelet count 474.  Sodium 129, BUN 10 and creatinine 

0.55, blood sugar 161.  Lactic acid 2.1, ammonia less than 9.  TSH 1.410.  AST 

62.  Troponin negative.  ProBNP 304.  ABGs pH 7.48, pCO2 36, pO2 100, bicarb 27,

total CO2 28, O2 saturation 97.8 on FiO2 of 36%.  Urinalysis was clear nitrate 

and leukoesterase negative.  Chest x-ray shows no definite acute process.  

Patient was started on Levaquin and vancomycin and admitted to the [facility] floor

for suspected pneumonia.  Consult with pulmonary medicine.  Patient's wife is at

the bedside and states that he is improved from yesterday he was very lethargic 

all weekend he also has had diarrhea for 4 days but he looks much better today. 

He has had no fever or chills.  Sputum green colored and cough for a couple of 

days at least.





6/12: I repeat lab work shows a normal white count of 9.9, hemoglobin 10.6.  

Sodium is 131, BUN 11 creatinine was 0.56.  Blood cultures no growth after 24 

hours.  Patient remains afebrile, blood pressure 104/65, pulse ox 96% on 2 L 

nasal cannula, heart rate 84.  Sputum culture is in process.  Incentive spiromet

ry added.  Patient complains of decreased appetite and nausea Zofran was added. 

Patient family of ongoing concerns regarding depression decreased appetite and 

lack of sleeping.  Melatonin did not seem to help much.  Patient is currently on

Zoloft and was taken off this placed on Prozac and was unable to tolerate this 

and return to Zoloft.  We will add on Remeron at night to help with symptoms.  L

orazepam will be discontinued.  PT and OT added.  Patient has been seen by Dr. Gomez will plan to remove sutures before he is discharged from the hospital.

















Objective





- Vital Signs


Vital signs: 


                                   Vital Signs











Temp  98 F   06/12/19 07:00


 


Pulse  84   06/12/19 07:00


 


Resp  12   06/12/19 07:00


 


BP  104/65   06/12/19 07:00


 


Pulse Ox  96   06/12/19 07:00








                                 Intake & Output











 06/11/19 06/12/19 06/12/19





 18:59 06:59 18:59


 


Intake Total 1050 880 


 


Balance 1050 880 


 


Intake:   


 


  IV 1050  


 


    Sodium Chloride 0.9% 1, 800  





    000 ml @ 100 mls/hr IV .   





    Q10H [facility] Rx#:[id number]   


 


    Vancomycin 1,250 mg In 250  





    Sodium Chloride 0.9% 250   





    ml @ 125 mls/hr IVPB Q8H   





    [facility] Rx#:[id number]   


 


  Intake, IV Titration  400 





  Amount   


 


    Levofloxacin 750Mg-D5w  150 





    Pmx 750 mg In Dextrose/   





    Water 1 150ml.bag @ 100   





    mls/hr IVPB Q24H [facility] Rx#:   





    [id number]   


 


    Vancomycin 1,250 mg In  250 





    Sodium Chloride 0.9% 250   





    ml @ 125 mls/hr IVPB Q8H   





    [facility] Rx#:[id number]   


 


  Oral  480 


 


Other:   


 


  # Voids  3 














- Exam





Review of Systems


Constitutional: Reports fatigue, Reports lethargy, Reports malaise, Reports poor

appetite, Reports weakness, Denies chills, Denies fever


Ears, nose, mouth and throat: Denies nasal congestion, Denies nasal discharge, 

Denies vertigo


Cardiovascular: Reports decreased exercise tolerance, Reports dyspnea on 

exertion, Reports shortness of breath, Denies edema, Denies leg edema, Denies 

lightheadedness, Denies syncope


Respiratory: Reports cough, Reports cough with sputum, Reports dyspnea, Reports 

excessive sputum, Reports home oxygen, Denies hemoptysis


Gastrointestinal: Reports diarrhea, Denies abdominal pain, Denies loss of 

appetite, Denies nausea, Denies vomiting


Genitourinary: Denies dysuria, Denies urinary retention


Musculoskeletal: Reports muscle weakness, Denies frequent falls, Denies gait 

dysfunction, Denies myalgias


Integumentary: Denies pruritus, Denies rash, Denies wounds


Neurological: Reports change in mentation, Reports confusion, Denies aphasia, 

Denies change in speech, Denies gait dysfunction, Denies head injury, Denies sei

zures, Denies syncope, Denies vertigo


Psychiatric: Denies anxiety, reports depression, reports insomnia, reports 

anorexia








Gen: This is a 69-year-old  male.  Patient is resting in a recliner 

appears to be comfortable and in no acute distress.


HEENT: Head is atraumatic, normocephalic. Pupils equal, round. Sclerae is 

anicteric. 


NECK: Supple. No JVD. No lymphadenopathy. No thyromegaly. 


LUNGS: Coarse crackles bilaterally.  No intercostal retractions.


HEART: Regular rate and rhythm. No murmur. 


ABDOMEN: Soft. Bowel sounds are present. No masses.  No tenderness.


EXTREMITIES: No pedal edema.  No calf tenderness.


NEUROLOGICAL: Patient is awake, alert and oriented x3. Cranial nerves 2 through 

12 are grossly intact. 





- Labs


CBC & Chem 7: 


                                 06/12/19 02:56





                                 06/12/19 02:56


Labs: 


                  Abnormal Lab Results - Last 24 Hours (Table)











  06/11/19 06/11/19 06/12/19 Range/Units





  10:37 10:37 02:56 


 


WBC  13.8 H    (3.8-10.6)  k/uL


 


RBC  3.94 L   3.47 L  (4.30-5.90)  m/uL


 


Hgb  11.9 L   10.6 L  (13.0-17.5)  gm/dL


 


Hct  38.1 L   34.0 L  (39.0-53.0)  %


 


RDW  18.1 H   17.2 H  (11.5-15.5)  %


 


Sodium   133 L   (137-145)  mmol/L


 


Creatinine   0.58 L   (0.66-1.25)  mg/dL


 


Calcium   8.0 L   (8.4-10.2)  mg/dL


 


Total Protein     (6.3-8.2)  g/dL


 


Albumin     (3.5-5.0)  g/dL














  06/12/19 Range/Units





  02:56 


 


WBC   (3.8-10.6)  k/uL


 


RBC   (4.30-5.90)  m/uL


 


Hgb   (13.0-17.5)  gm/dL


 


Hct   (39.0-53.0)  %


 


RDW   (11.5-15.5)  %


 


Sodium  131 L  (137-145)  mmol/L


 


Creatinine  0.56 L  (0.66-1.25)  mg/dL


 


Calcium  7.7 L  (8.4-10.2)  mg/dL


 


Total Protein  4.8 L  (6.3-8.2)  g/dL


 


Albumin  2.3 L  (3.5-5.0)  g/dL








                      Microbiology - Last 24 Hours (Table)











 06/10/19 17:47 Blood Culture - Preliminary





 Blood    No Growth after 24 hours














Assessment and Plan


Plan: 





1.  Mental status changes, metabolic encephalopathy multifactorial including 

hyponatremia, COPD and chronic respiratory failure, pulmonary fibrosis and 

recent extended hospitalization.





2.  Hyponatremia secondary to diarrhea.  Check stool for C. difficile toxin 

negative.  Patient was on IV fluids 100 mL per hour.  Recheck lab work in the 

morning.





3.  Advanced COPD with pulmonary fibrosis, steroid dependent and chronic hypoxic

and hypercapnic respiratory failure on home O2 at 3 L. pneumonia is doubtful per

Dr. Pickard.  Continue Symbicort, DuoNeb treatments, Levaquin and vancomycin, 

prednisone 10 mg daily.





4.  Rheumatoid arthritis, stable.





5.  Recent hospitalization for perforated duodenal ulcer status post exploratory

laparotomy with repair of perforated duodenal ulcer and repair of incarcerated 

umbilical hernia surgery performed by Dr. Gomez.  Consult with Dr. Gomez.  

Patient is currently on a regular diet.  Sutures to be removed prior to dis

charge.





6.  Hyperlipidemia.





7.  Obstructive sleep apnea unable to tolerate CPAP.





8.  Chronic back pain and generalized osteoarthritis.





9.  Severe protein calorie malnutrition.  Ensure added.  





10.  Generalized anxiety disorder and recurrent depression.  Continue Zoloft 100

mg daily, Ativan discontinued.  Remeron added. 





11.  DVT prophylaxis.  Continue Lovenox daily





10.  GI prophylaxis.  Continue Protonix daily.





12.  Insomnia.  Melatonin added.











Discharge plan: Return to [facility] most likely on Thursday.  PT and OT.





Impression and plan of care have been directed as dictated by the signing 

physician.  Chloé Maldonado nurse practitioner acting as scribe for signing 

physician.

## 2019-07-13 ENCOUNTER — HOSPITAL ENCOUNTER (INPATIENT)
Dept: HOSPITAL 47 - EC | Age: 69
LOS: 6 days | Discharge: HOME | DRG: 357 | End: 2019-07-19
Attending: SURGERY | Admitting: SURGERY
Payer: MEDICARE

## 2019-07-13 VITALS — BODY MASS INDEX: 25 KG/M2

## 2019-07-13 DIAGNOSIS — F32.9: ICD-10-CM

## 2019-07-13 DIAGNOSIS — Z80.6: ICD-10-CM

## 2019-07-13 DIAGNOSIS — E87.1: ICD-10-CM

## 2019-07-13 DIAGNOSIS — J44.9: ICD-10-CM

## 2019-07-13 DIAGNOSIS — M15.9: ICD-10-CM

## 2019-07-13 DIAGNOSIS — Z79.899: ICD-10-CM

## 2019-07-13 DIAGNOSIS — Z79.51: ICD-10-CM

## 2019-07-13 DIAGNOSIS — G47.00: ICD-10-CM

## 2019-07-13 DIAGNOSIS — Z87.891: ICD-10-CM

## 2019-07-13 DIAGNOSIS — E78.5: ICD-10-CM

## 2019-07-13 DIAGNOSIS — F41.9: ICD-10-CM

## 2019-07-13 DIAGNOSIS — J96.11: ICD-10-CM

## 2019-07-13 DIAGNOSIS — B96.89: ICD-10-CM

## 2019-07-13 DIAGNOSIS — M06.9: ICD-10-CM

## 2019-07-13 DIAGNOSIS — Z96.1: ICD-10-CM

## 2019-07-13 DIAGNOSIS — L02.211: ICD-10-CM

## 2019-07-13 DIAGNOSIS — Z79.52: ICD-10-CM

## 2019-07-13 DIAGNOSIS — R53.81: ICD-10-CM

## 2019-07-13 DIAGNOSIS — Z99.81: ICD-10-CM

## 2019-07-13 DIAGNOSIS — Z98.42: ICD-10-CM

## 2019-07-13 DIAGNOSIS — B96.4: ICD-10-CM

## 2019-07-13 DIAGNOSIS — Z88.1: ICD-10-CM

## 2019-07-13 DIAGNOSIS — Z98.41: ICD-10-CM

## 2019-07-13 DIAGNOSIS — Z87.11: ICD-10-CM

## 2019-07-13 DIAGNOSIS — Z87.01: ICD-10-CM

## 2019-07-13 DIAGNOSIS — G47.33: ICD-10-CM

## 2019-07-13 DIAGNOSIS — Z81.8: ICD-10-CM

## 2019-07-13 DIAGNOSIS — Z80.0: ICD-10-CM

## 2019-07-13 DIAGNOSIS — K65.1: Primary | ICD-10-CM

## 2019-07-13 LAB
ALBUMIN SERPL-MCNC: 3.2 G/DL (ref 3.5–5)
ALP SERPL-CCNC: 100 U/L (ref 38–126)
ALT SERPL-CCNC: 42 U/L (ref 21–72)
AMYLASE SERPL-CCNC: 38 U/L (ref 30–110)
ANION GAP SERPL CALC-SCNC: 11 MMOL/L
AST SERPL-CCNC: 21 U/L (ref 17–59)
BASOPHILS # BLD AUTO: 0.1 K/UL (ref 0–0.2)
BASOPHILS NFR BLD AUTO: 0 %
BUN SERPL-SCNC: 21 MG/DL (ref 9–20)
CALCIUM SPEC-MCNC: 9.2 MG/DL (ref 8.4–10.2)
CHLORIDE SERPL-SCNC: 97 MMOL/L (ref 98–107)
CO2 SERPL-SCNC: 26 MMOL/L (ref 22–30)
EOSINOPHIL # BLD AUTO: 0.1 K/UL (ref 0–0.7)
EOSINOPHIL NFR BLD AUTO: 0 %
ERYTHROCYTE [DISTWIDTH] IN BLOOD BY AUTOMATED COUNT: 4.23 M/UL (ref 4.3–5.9)
ERYTHROCYTE [DISTWIDTH] IN BLOOD: 16.1 % (ref 11.5–15.5)
GLUCOSE SERPL-MCNC: 117 MG/DL (ref 74–99)
HCT VFR BLD AUTO: 39.4 % (ref 39–53)
HGB BLD-MCNC: 12.6 GM/DL (ref 13–17.5)
LIPASE SERPL-CCNC: 92 U/L (ref 23–300)
LYMPHOCYTES # SPEC AUTO: 1.4 K/UL (ref 1–4.8)
LYMPHOCYTES NFR SPEC AUTO: 6 %
MCH RBC QN AUTO: 29.7 PG (ref 25–35)
MCHC RBC AUTO-ENTMCNC: 31.9 G/DL (ref 31–37)
MCV RBC AUTO: 93.1 FL (ref 80–100)
MONOCYTES # BLD AUTO: 0.9 K/UL (ref 0–1)
MONOCYTES NFR BLD AUTO: 4 %
NEUTROPHILS # BLD AUTO: 22.2 K/UL (ref 1.3–7.7)
NEUTROPHILS NFR BLD AUTO: 89 %
PH UR: 7 [PH] (ref 5–8)
PLATELET # BLD AUTO: 595 K/UL (ref 150–450)
POTASSIUM SERPL-SCNC: 4.5 MMOL/L (ref 3.5–5.1)
PROT SERPL-MCNC: 6.4 G/DL (ref 6.3–8.2)
SODIUM SERPL-SCNC: 134 MMOL/L (ref 137–145)
SP GR UR: 1.02 (ref 1–1.03)
UROBILINOGEN UR QL STRIP: <2 MG/DL (ref ?–2)
WBC # BLD AUTO: 25 K/UL (ref 3.8–10.6)

## 2019-07-13 PROCEDURE — 81003 URINALYSIS AUTO W/O SCOPE: CPT

## 2019-07-13 PROCEDURE — 94760 N-INVAS EAR/PLS OXIMETRY 1: CPT

## 2019-07-13 PROCEDURE — 87205 SMEAR GRAM STAIN: CPT

## 2019-07-13 PROCEDURE — 83605 ASSAY OF LACTIC ACID: CPT

## 2019-07-13 PROCEDURE — 36415 COLL VENOUS BLD VENIPUNCTURE: CPT

## 2019-07-13 PROCEDURE — 85610 PROTHROMBIN TIME: CPT

## 2019-07-13 PROCEDURE — 87186 SC STD MICRODIL/AGAR DIL: CPT

## 2019-07-13 PROCEDURE — 83690 ASSAY OF LIPASE: CPT

## 2019-07-13 PROCEDURE — 74177 CT ABD & PELVIS W/CONTRAST: CPT

## 2019-07-13 PROCEDURE — 99285 EMERGENCY DEPT VISIT HI MDM: CPT

## 2019-07-13 PROCEDURE — 71046 X-RAY EXAM CHEST 2 VIEWS: CPT

## 2019-07-13 PROCEDURE — 74176 CT ABD & PELVIS W/O CONTRAST: CPT

## 2019-07-13 PROCEDURE — 87040 BLOOD CULTURE FOR BACTERIA: CPT

## 2019-07-13 PROCEDURE — 96361 HYDRATE IV INFUSION ADD-ON: CPT

## 2019-07-13 PROCEDURE — 82150 ASSAY OF AMYLASE: CPT

## 2019-07-13 PROCEDURE — 0W9G0ZZ DRAINAGE OF PERITONEAL CAVITY, OPEN APPROACH: ICD-10-PCS

## 2019-07-13 PROCEDURE — 85025 COMPLETE CBC W/AUTO DIFF WBC: CPT

## 2019-07-13 PROCEDURE — 87070 CULTURE OTHR SPECIMN AEROBIC: CPT

## 2019-07-13 PROCEDURE — 80048 BASIC METABOLIC PNL TOTAL CA: CPT

## 2019-07-13 PROCEDURE — 94640 AIRWAY INHALATION TREATMENT: CPT

## 2019-07-13 PROCEDURE — 96365 THER/PROPH/DIAG IV INF INIT: CPT

## 2019-07-13 PROCEDURE — 80053 COMPREHEN METABOLIC PANEL: CPT

## 2019-07-13 PROCEDURE — 87075 CULTR BACTERIA EXCEPT BLOOD: CPT

## 2019-07-13 PROCEDURE — 96375 TX/PRO/DX INJ NEW DRUG ADDON: CPT

## 2019-07-13 PROCEDURE — 76937 US GUIDE VASCULAR ACCESS: CPT

## 2019-07-13 PROCEDURE — 36410 VNPNXR 3YR/> PHY/QHP DX/THER: CPT

## 2019-07-13 PROCEDURE — 87077 CULTURE AEROBIC IDENTIFY: CPT

## 2019-07-13 RX ADMIN — CEFAZOLIN SCH MLS/HR: 330 INJECTION, POWDER, FOR SOLUTION INTRAMUSCULAR; INTRAVENOUS at 10:29

## 2019-07-13 RX ADMIN — MORPHINE SULFATE PRN MG: 4 INJECTION, SOLUTION INTRAMUSCULAR; INTRAVENOUS at 17:21

## 2019-07-13 RX ADMIN — MEROPENEM SCH MLS/HR: 1 INJECTION, POWDER, FOR SOLUTION INTRAVENOUS at 18:05

## 2019-07-13 RX ADMIN — MORPHINE SULFATE PRN MG: 4 INJECTION, SOLUTION INTRAMUSCULAR; INTRAVENOUS at 21:40

## 2019-07-13 NOTE — P.GSHP
History of Present Illness


H&P Date: 19


Chief Complaint: Abdominal pain





69-year-old male known to our service.  Patient underwent exploratory laparotomy

with repair of a duodenal ulcer on .  He was just seen in the office a few 

days ago and was doing fairly well.  Yesterday he called the office stating that

he had a bulge at his upper abdomen incision site. Today the area was noted to 

increase in size slightly and was warm to the touch.  They came to the ER for 

further evaluation.  He has been having some mild intermittent upper abdominal 

discomfort.  Postoperatively the patient had a follow-up CAT scan while his 

drain was still in place which showed no evidence of leak.  At this time the 

patient's CAT scan was performed without contrast.  There appears to be multiple

small abscess collections involving the superficial subcutaneous tissues, 

fascial layer, and intra-abdominal location.  His white blood cell count is 

elevated at 25,000.  The duodenal sweep and stomach did not appear directly in 

contact with these fluid collections.  Looking back at the previous CAT scan 

there was the suggestion of some fluid along the tract of the drain.  

Additionally it should be noted the patient's previous retention suture was 

placed horizontally at that exact location and this could be a source of abscess

as well.  The patient denies fevers.  No increased shortness of breath.  Patient

has history of significant pulmonary disease and has been on chronic steroids.  





- Review of Systems


Comment: 





The patient denies any acute changes in vision or hearing, no dysphagia or 

odynophagia, no chest pain or shortness of breath, no dysuria or hematuria, no 

headache, no runny nose, no rectal bleeding or melena, no unexplained weight 

loss 





Past Medical History


Past Medical History: COPD, Hyperlipidemia, Osteoarthritis (OA), Pneumonia, 

Rheumatoid Arthritis (RA), Sleep Apnea/CPAP/BIPAP


Additional Past Medical History / Comment(s): Pneumonia with sepsis twice-2018

and in 2016. 3.5L home o2, POLO with no device (cannot tolerate), recently 

injured low back-saw Dr. Morse. Rheumatoid arthritis in bilateral hips,knees 

hands and back.


History of Any Multi-Drug Resistant Organisms: None Reported


Past Surgical History: Orthopedic Surgery


Additional Past Surgical History / Comment(s): bilateral cataract removals with 

lens implants, left knee arthroscopy, colonoscopy, GI perforated ulcer.


Past Anesthesia/Blood Transfusion Reactions: No Reported Reaction


Past Psychological History: Anxiety, Depression


Smoking Status: Former smoker


Past Alcohol Use History: Daily


Past Drug Use History: None Reported





- Past Family History


  ** Father


Family Medical History: Cancer


Additional Family Medical History / Comment(s): Father  of leukemia.





  ** Mother


Family Medical History: Cancer, CVA/TIA, Dementia, Diabetes Mellitus


Additional Family Medical History / Comment(s): Mother is 88yrs old with history

of dementia and colon cancer.





  ** Sister(s)


Additional Family Medical History / Comment(s): Patient has 1 full sister with 

history of lung cancer.  Patient has one half-sister with adrenal problems.  

Patient does not have any brothers.  Patient 3 sons with no major medical 

problems.





Medications and Allergies


                                Home Medications











 Medication  Instructions  Recorded  Confirmed  Type


 


predniSONE 10 mg PO DAILY #30 tab 18 Rx


 


Furosemide [Lasix] 40 mg PO DAILY #30 tab 19 Rx


 


Ipratropium-Albuterol Nebulize 3 ml INHALATION RT-Q6H #120 19 Rx





[Duoneb 0.5 mg-3 mg/3 ml Soln] ampul.neb   


 


Pantoprazole [Protonix] 40 mg PO AC-BRKFST #30 tablet. 19 Rx


 


Budesonide/Formoterol Fumarate 1 puff INHALATION RT-BID 06/10/19 07/13/19 

History





[Symbicort 160-4.5 Mcg Inhaler]    


 


Potassium Chloride [Klor-Con 20] 20 meq PO BID 06/10/19 07/13/19 History


 


Sertraline [Zoloft] 100 mg PO DAILY 06/10/19 07/13/19 History


 


HYDROcodone/APAP 10-325MG [Norco 1 tab PO Q8H PRN #9 tab 19 Rx





]    


 


Melatonin 10 mg PO HS  tablet 19 Rx


 


Acetaminophen Tab [Tylenol Tab] 650 mg PO Q4H PRN 19 History


 


Mirtazapine [Remeron] 7.5 mg PO HS@1800 19 History








                                    Allergies











Allergy/AdvReac Type Severity Reaction Status Date / Time


 


amoxicillin AdvReac  Nausea & Verified 07/13/19 08:59





   Vomiting  














Surgical - Exam


                                   Vital Signs











Temp Pulse Resp BP Pulse Ox


 


 97.5 F L  121 H  20   103/64   90 L


 


 19 07:10  19 07:10  19 07:10  19 07:10  19 07:10

















Physical exam:


General: Well-developed, well-nourished


HEENT: Normocephalic, sclerae nonicteric


Abdomen: Upper midline incision with fullness, fluctuance, tenderness, and mild 

erythema, no diffuse tenderness noted, no peritoneal signs, nondistended


Extremities: No edema


Neuro: Alert and oriented





Results





- Labs





                                 19 07:44





                                 19 07:44


                  Abnormal Lab Results - Last 24 Hours (Table)











  19 Range/Units





  07:44 07:44 


 


WBC   25.0 H  (3.8-10.6)  k/uL


 


RBC   4.23 L  (4.30-5.90)  m/uL


 


Hgb   12.6 L  (13.0-17.5)  gm/dL


 


RDW   16.1 H  (11.5-15.5)  %


 


Plt Count   595 H  (150-450)  k/uL


 


Neutrophils #   22.2 H  (1.3-7.7)  k/uL


 


Sodium  134 L   (137-145)  mmol/L


 


Chloride  97 L   ()  mmol/L


 


BUN  21 H   (9-20)  mg/dL


 


Glucose  117 H   (74-99)  mg/dL


 


Albumin  3.2 L   (3.5-5.0)  g/dL








                                 Diabetes panel











  19 Range/Units





  07:44 


 


Sodium  134 L  (137-145)  mmol/L


 


Potassium  4.5  (3.5-5.1)  mmol/L


 


Chloride  97 L  ()  mmol/L


 


Carbon Dioxide  26  (22-30)  mmol/L


 


BUN  21 H  (9-20)  mg/dL


 


Creatinine  0.67  (0.66-1.25)  mg/dL


 


Glucose  117 H  (74-99)  mg/dL


 


Calcium  9.2  (8.4-10.2)  mg/dL


 


AST  21  (17-59)  U/L


 


ALT  42  (21-72)  U/L


 


Alkaline Phosphatase  100  ()  U/L


 


Total Protein  6.4  (6.3-8.2)  g/dL


 


Albumin  3.2 L  (3.5-5.0)  g/dL








                                  Calcium panel











  19 Range/Units





  07:44 


 


Calcium  9.2  (8.4-10.2)  mg/dL


 


Albumin  3.2 L  (3.5-5.0)  g/dL








                                 Pituitary panel











  19 Range/Units





  07:44 


 


Sodium  134 L  (137-145)  mmol/L


 


Potassium  4.5  (3.5-5.1)  mmol/L


 


Chloride  97 L  ()  mmol/L


 


Carbon Dioxide  26  (22-30)  mmol/L


 


BUN  21 H  (9-20)  mg/dL


 


Creatinine  0.67  (0.66-1.25)  mg/dL


 


Glucose  117 H  (74-99)  mg/dL


 


Calcium  9.2  (8.4-10.2)  mg/dL








                                  Adrenal panel











  19 Range/Units





  07:44 


 


Sodium  134 L  (137-145)  mmol/L


 


Potassium  4.5  (3.5-5.1)  mmol/L


 


Chloride  97 L  ()  mmol/L


 


Carbon Dioxide  26  (22-30)  mmol/L


 


BUN  21 H  (9-20)  mg/dL


 


Creatinine  0.67  (0.66-1.25)  mg/dL


 


Glucose  117 H  (74-99)  mg/dL


 


Calcium  9.2  (8.4-10.2)  mg/dL


 


Total Bilirubin  0.6  (0.2-1.3)  mg/dL


 


AST  21  (17-59)  U/L


 


ALT  42  (21-72)  U/L


 


Alkaline Phosphatase  100  ()  U/L


 


Total Protein  6.4  (6.3-8.2)  g/dL


 


Albumin  3.2 L  (3.5-5.0)  g/dL














Assessment and Plan


(1) Abdominal abscess


Narrative/Plan: 


69-year-old male with abdominal wall and abdominal abscess.  We'll proceed with 

incision and drainage under sedation.  We'll obtain cultures and consult 

infectious disease as well as the his primary physician's service.  Begin IV 

antibiotics.  Clinical scenario discussed in detail with the patient and his 

wife.  No immediate plans for laparotomy at this time.  Risks of the procedure 

were noted to include but not limited to bleeding, infection, wound formation, 

fistula formation, possible need for additional procedures, respiratory and ca

rdiac complications.  He understands and wishes to proceed.


Current Visit: No   Status: Acute   Code(s): PKG7970 -    SNOMED Code(s): 

[id number]

## 2019-07-13 NOTE — P.CONS
History of Present Illness





- Reason for Consult


Consult date: 19





- Chief Complaint


abdominal pain





- History of Present Illness





69-year-old  male who had significant abdominal pain in May.  In on 

2019 was taken to the operating room and exploratory laparotomy revealed 

evidence of the perforated gastric ulcer.  He underwent the surgical repair and 

was doing relatively well.  Given the difficulties at the time a retention 

suture was utilized.  The patient did have office follow-up some was having some

improvement.  The patient then relates that he recently started developed some 

bulging to the abdominal wall at the incision site and because of this he sought

care.  Imaging studies were performed proving evidence of a significant fluid 

collection and constantly he has been taken to the operating room and the site 

has been explored.  The surgeon relates that with very minimal manipulation the 

site recently opened and a large amount of purulent material extruded.  He 

patient is now postoperative in the site is packed.  He is with complaints of 

pain postoperative but is denying nausea or emesis.  He or he has an appetite.  

He did have a fever but denies chills or rigors at this time.  Does have some 

weakness related to the some long-term recovery that he has been suffering from.





Review of Systems





HEENT:Denies headache or acute visual change.  Denies sinus or mouth 

discomforts.  Denies neck stiffness or pain.  Denies significant oral cavity 

pain.  Denies difficulty on swallowing.





Lungs: Denies significant shortness of breath, cough, sputum production, or 

hemoptysis.





Cardiovascular: Denies significant shortness of breath, chest pain, chest wall 

pain, 


orthopnea, dyspnea on exertion, syncope





Gastrointestinal  he has complained about ongoing abdominal pain, has had the 

evidence of the significant bulging abdominal site, is appetites been somewhat 

poor, denies significant nausea or emesis.  He's had no hematemesis melena or 

hematochezia.


Musculoskeletal: denies significant myalgias or arthralgias.  No new joint 

swelling.  Denies new back pain.





Skin: Denies new rash or lesions.  No new ulcers or wounds are related..





Neuro: Denies headache or visual change.  Denies any new onset weakness or 

difficulty with ambulation.  Denies falls or seizures.





Psychiatric:Denies anxiety or depression.





Endocrine: Denies significant fatigue, denies significant weight loss or weight 

gain.














Past Medical History


Past Medical History: COPD, Hyperlipidemia, Osteoarthritis (OA), Pneumonia, 

Rheumatoid Arthritis (RA), Sleep Apnea/CPAP/BIPAP


Additional Past Medical History / Comment(s): Pneumonia with sepsis twice-2018

and in 2016. 3.5L home o2, POLO with no device (cannot tolerate), recently 

injured low back-saw Dr. Morse. Rheumatoid arthritis in bilateral hips,knees 

hands and back.


History of Any Multi-Drug Resistant Organisms: None Reported


Past Surgical History: Orthopedic Surgery


Additional Past Surgical History / Comment(s): bilateral cataract removals with 

lens implants, left knee arthroscopy, colonoscopy, GI perforated ulcer.


Past Anesthesia/Blood Transfusion Reactions: No Reported Reaction


Past Psychological History: Anxiety, Depression


Additional Psychological History / Comment(s): Pt resides with his spouse of 38 

yrs.  He has home oxygen which he wears prn at 3.5L/NC.  He has a nebulizer.


Smoking Status: Former smoker


Past Alcohol Use History: Daily


Additional Past Alcohol Use History / Comment(s): Patient smoked 2 packs per day

for 40+ years.  He quit 6 years ago.  He denies any illicit drug use or 

marijuana use.  He drinks 3-4 beers per day but none since previous admission 

and May.  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 

involved


Past Drug Use History: None Reported





- Past Family History


  ** Father


Family Medical History: Cancer


Additional Family Medical History / Comment(s): Father  of leukemia.





  ** Mother


Family Medical History: Cancer, CVA/TIA, Dementia, Diabetes Mellitus


Additional Family Medical History / Comment(s): Mother is 88yrs old with history

of dementia and colon cancer.





  ** Sister(s)


Additional Family Medical History / Comment(s): Patient has 1 full sister with 

history of lung cancer.  Patient has one half-sister with adrenal problems.  

Patient does not have any brothers.  Patient 3 sons with no major medical 

problems.





Medications and Allergies


Home Medications and Allergies Comment(s): 





                               Current Medications





Acetaminophen (Tylenol Tab)  650 mg PO Q6HR PRN


   PRN Reason: Mild Pain or Fever > 100.5


Hydrocodone Bitart/Acetaminophen (Norco 5-325)  1 each PO Q4HR PRN


   PRN Reason: Pain


Hydromorphone HCl (Dilaudid)  0.5 mg IM Q3HR PRN


   PRN Reason: Pain


Sodium Chloride (Saline 0.9%)  1,000 mls @ 50 mls/hr IV .Q20H IMTIAZ


   Last Admin: 19 10:29 Dose:  50 mls/hr


   Documented by: 


Meropenem 2 gm/ Sodium (Chloride)  100 mls @ 200 mls/hr IVPB Q8HR IMTIAZ; Protocol


   Last Admin: 19 18:05 Dose:  200 mls/hr


   Documented by: 


Anidulafungin 100 mg/ Sodium (Chloride)  100 mls @ 84 mls/hr IVPB Q24H IMTIAZ


Morphine Sulfate (Morphine Sulfate (Inj))  4 mg IV Q4HR PRN


   PRN Reason: Severe Pain


   Last Admin: 19 21:40 Dose:  4 mg


   Documented by: 


Naloxone HCl (Narcan)  0.2 mg IV Q2M PRN


   PRN Reason: Opioid Reversal


Ondansetron HCl (Zofran)  4 mg IVP Q8HR PRN


   PRN Reason: Nausea And Vomiting








                                Home Medications











 Medication  Instructions  Recorded  Confirmed  Type


 


predniSONE 10 mg PO DAILY #30 tab 18 Rx


 


Furosemide [Lasix] 40 mg PO DAILY #30 tab 19 Rx


 


Ipratropium-Albuterol Nebulize 3 ml INHALATION RT-Q6H #120 19 Rx





[Duoneb 0.5 mg-3 mg/3 ml Soln] ampul.neb   


 


Pantoprazole [Protonix] 40 mg PO AC-BRKFST #30 tablet. 19 Rx


 


Budesonide/Formoterol Fumarate 1 puff INHALATION RT-BID 06/10/19 07/13/19 

History





[Symbicort 160-4.5 Mcg Inhaler]    


 


Potassium Chloride [Klor-Con 20] 20 meq PO BID 06/10/19 07/13/19 History


 


Sertraline [Zoloft] 100 mg PO DAILY 06/10/19 07/13/19 History


 


HYDROcodone/APAP 10-325MG [Norco 1 tab PO Q8H PRN #9 tab 19 Rx





]    


 


Melatonin 10 mg PO HS  tablet 19 Rx


 


Acetaminophen Tab [Tylenol Tab] 650 mg PO Q4H PRN 19 History


 


Mirtazapine [Remeron] 7.5 mg PO HS@1800 19 History








                                    Allergies











Allergy/AdvReac Type Severity Reaction Status Date / Time


 


amoxicillin AdvReac  Nausea & Verified 19 08:59





   Vomiting  














Physical Exam


Vitals: 


                                   Vital Signs











  Temp Pulse Pulse Resp BP BP BP


 


 19 18:15    97     103/62


 


 19 18:01    96     110/68


 


 19 17:45    94     109/65


 


 19 17:32    101 H     101/40


 


 19 17:15  98.3 F   91  15    107/69


 


 19 16:45    91  16    109/65


 


 19 16:30    96  16    108/65


 


 19 16:20  99.0 F   101 H  20    109/69


 


 19 13:18  97.8 F   105 H    98/60 


 


 19 11:58  98.4 F  89   18  92/58  


 


 19 11:19   96   18  92/65  


 


 19 09:56   99   18  91/63  


 


 19 09:04   102 H   20  97/70  


 


 19 07:46   108 H   22  95/72  


 


 19 07:10  97.5 F L  121 H   20  103/64  














  Pulse Ox


 


 19 18:15 


 


 19 18:01 


 


 19 17:45 


 


 19 17:32 


 


 19 17:15  94 L


 


 19 16:45  95


 


 19 16:30  92 L


 


 19 16:20  99


 


 19 13:18  92 L


 


 19 11:58  99


 


 19 11:19  97


 


 19 09:56  97


 


 19 09:04  97


 


 19 07:46  96


 


 19 07:10  90 L








                                Intake and Output











 19





 14:59 22:59 06:59


 


Intake Total  1370 


 


Output Total  5 


 


Balance  1365 


 


Intake:   


 


  IV  1050 


 


  Intake, IV Titration  200 





  Amount   


 


    Meropenem 2 gm In Sodium  100 





    Chloride 0.9% 100 ml @   





    200 mls/hr IVPB Q8HR [facility]   





    Rx#:[id number]   


 


    Sodium Chloride 0.9% 1,  100 





    000 ml @ 50 mls/hr IV .   





    Q20H [facility] Rx#:[id number]   


 


  Oral  120 


 


Output:   


 


  Estimated Blood Loss  5 


 


Other:   


 


  Weight 72.575 kg  

















HEENT: Anicteric conjunctiva are pink and moist nasal mucosa grossly intact 

without significant lesions, there is no thrush.


Neck: The neck is supple without significant lymphadenopathy or thyromegaly.


Lungs: Good bilateral air entry without significant crackles or wheezing.  There

 is no significant bronchial sounds.  There is no egophony or dullness.


Heart: Regular rate and rhythm with an audible S1-S2, no S3 no S4.  There is no 

significant murmur click or rub, PMI was nondisplaced.


Abdomen: Abdomen is postoperative, it is tender, dressing is intact, few bowel 

sounds are heard.


Extremities: The upper extremities have excellent pulses they are symmetric, no 

significant petechiae or telangiectasia.  No splinter hemorrhages were noted.  

The lower extremities are free from significant edema.  The peripheral pulses 

were 2+ and symmetric.


Neuro: Awake alert oriented to person place and time.  There are no acute new 

gross focal sensory motor deficits.








Results


CBC & Chem 7: 


                                 19 07:44





                                 19 07:44


Labs: 


                  Abnormal Lab Results - Last 24 Hours (Table)











  19 Range/Units





  07:44 07:44 


 


WBC   25.0 H  (3.8-10.6)  k/uL


 


RBC   4.23 L  (4.30-5.90)  m/uL


 


Hgb   12.6 L  (13.0-17.5)  gm/dL


 


RDW   16.1 H  (11.5-15.5)  %


 


Plt Count   595 H  (150-450)  k/uL


 


Neutrophils #   22.2 H  (1.3-7.7)  k/uL


 


Sodium  134 L   (137-145)  mmol/L


 


Chloride  97 L   ()  mmol/L


 


BUN  21 H   (9-20)  mg/dL


 


Glucose  117 H   (74-99)  mg/dL


 


Albumin  3.2 L   (3.5-5.0)  g/dL








                      Microbiology - Last 24 Hours (Table)











 19 16:12 Wound Culture - Preliminary





 Abdomen 


 


 19 16:12 Anaerobic Culture - Preliminary





 Abdomen 








                               Laboratory Results











WBC  25.0 k/uL (3.8-10.6)  H  19  07:44    


 


RBC  4.23 m/uL (4.30-5.90)  L  19  07:44    


 


Hgb  12.6 gm/dL (13.0-17.5)  L  19  07:44    


 


Hct  39.4 % (39.0-53.0)   19  07:44    


 


MCV  93.1 fL (80.0-100.0)  D 19  07:44    


 


MCH  29.7 pg (25.0-35.0)   19  07:44    


 


MCHC  31.9 g/dL (31.0-37.0)   19  07:44    


 


RDW  16.1 % (11.5-15.5)  H  19  07:44    


 


Plt Count  595 k/uL (150-450)  H  19  07:44    


 


Neutrophils %  89 %  19  07:44    


 


Lymphocytes %  6 %  19  07:44    


 


Monocytes %  4 %  19  07:44    


 


Eosinophils %  0 %  19  07:44    


 


Basophils %  0 %  19  07:44    


 


Neutrophils #  22.2 k/uL (1.3-7.7)  H  19  07:44    


 


Lymphocytes #  1.4 k/uL (1.0-4.8)   19  07:44    


 


Monocytes #  0.9 k/uL (0-1.0)   19  07:44    


 


Eosinophils #  0.1 k/uL (0-0.7)   19  07:44    


 


Basophils #  0.1 k/uL (0-0.2)   19  07:44    


 


Anisocytosis  Slight   19  07:44    


 


Sodium  134 mmol/L (137-145)  L  19  07:44    


 


Potassium  4.5 mmol/L (3.5-5.1)   19  07:44    


 


Chloride  97 mmol/L ()  L  19  07:44    


 


Carbon Dioxide  26 mmol/L (22-30)   19  07:44    


 


Anion Gap  11 mmol/L  19  07:44    


 


BUN  21 mg/dL (9-20)  H  19  07:44    


 


Creatinine  0.67 mg/dL (0.66-1.25)   19  07:44    


 


Est GFR (CKD-EPI)AfAm  >90  (>60 ml/min/1.73 sqM)   19  07:44    


 


Est GFR (CKD-EPI)NonAf  >90  (>60 ml/min/1.73 sqM)   19  07:44    


 


Glucose  117 mg/dL (74-99)  H  19  07:44    


 


Plasma Lactic Acid José Miguel  1.1 mmol/L (0.7-2.0)   19  07:44    


 


Calcium  9.2 mg/dL (8.4-10.2)   19  07:44    


 


Total Bilirubin  0.6 mg/dL (0.2-1.3)   19  07:44    


 


AST  21 U/L (17-59)   19  07:44    


 


ALT  42 U/L (21-72)   19  07:44    


 


Alkaline Phosphatase  100 U/L ()   19  07:44    


 


Total Protein  6.4 g/dL (6.3-8.2)   19  07:44    


 


Albumin  3.2 g/dL (3.5-5.0)  L  19  07:44    


 


Amylase  38 U/L ()   19  07:44    


 


Lipase  92 U/L ()   19  07:44    


 


Urine Color  Light Yellow   19  10:19    


 


Urine Appearance  Clear  (Clear)   19  10:19    


 


Urine pH  7.0  (5.0-8.0)   19  10:19    


 


Ur Specific Gravity  1.018  (1.001-1.035)   19  10:19    


 


Urine Protein  Negative  (Negative)   19  10:19    


 


Urine Glucose (UA)  Negative  (Negative)   19  10:19    


 


Urine Ketones  Negative  (Negative)   19  10:19    


 


Urine Blood  Negative  (Negative)   19  10:19    


 


Urine Nitrite  Negative  (Negative)   19  10:19    


 


Urine Bilirubin  Negative  (Negative)   19  10:19    


 


Urine Urobilinogen  <2.0 mg/dL (<2.0)   19  10:19    


 


Ur Leukocyte Esterase  Negative  (Negative)   19  10:19    








                                  Microbiology





19 16:12   Abdomen   Wound Culture - Preliminary


19 16:12   Abdomen   Anaerobic Culture - Preliminary











Assessment and Plan


(1) Abdominal abscess


Narrative/Plan: 


Pleasant 69-year-old male who has a many week history of difficulties with his 

abdomen.  On 2019 he was taken to the operating room and exporter 

laparotomy reveal evidence of the perforated gastric ulcer he underwent repair. 

 Patient was showing some improvement but now presents to Hospital with 

significant increasing amounts of abdominal pain associated with some lack of 

appetite and fever.  He noticed significant bulging to the midline incision and 

constantly was seen by the surgeon.  He was taken to the operating room where 

there is evidence of abscess at the site.  The area has been packed and 

antibiotic therapy was requested.  Zosyn has been initiated and will add Eraxis 

for now until we have further culture.  Concern to intra-abdominal pathogens 

including gram-positive gram-negative anaerobes and fungus.  The patient may 

require outpatient intravenous antibiotic therapy depending on the findings.  

His pain control is being adjusted this point in time.  Nutritional 

supplementation with Shane or other protein supplement will be helpful and 

potential multivitamin also to help his healing as he improves.


Current Visit: Yes   Status: Acute   Code(s): AGU3440 -    SNOMED Code(s): 

70279616


   





(2) Duodenal ulcer, perforated


Current Visit: No   Status: Acute   Code(s): K26.5 - CHRONIC OR UNSPECIFIED 

DUODENAL ULCER WITH PERFORATION   SNOMED Code(s): 03112054


   





(3) Abdominal pain


Current Visit: Yes   Status: Acute   Code(s): R10.9 - UNSPECIFIED ABDOMINAL PAIN

   SNOMED Code(s): 37964025

## 2019-07-13 NOTE — P.OP
Date of Procedure: 07/13/19


Procedure(s) Performed: 


PREOPERATIVE DIAGNOSIS: Abdominal abscess


POSTOPERATIVE DIAGNOSIS: Same


PROCEDURE: Incision and drainage abdominal wall abscess and intraperitoneal 

abscess


SURGEON: Jason


EBL: 10 mL


ANESTHESIA: Sedation and local


COMPLICATIONS: None


OPERATIVE PROCEDURE: Patient placed in the operating table in the supine 

position.  The patient was placed under sedation.  The abdomen was prepped and 

draped in the usual sterile fashion.  The skin was localized with quarter 

percent Marcaine solution.  The area where the fluctuant area was present was 

incised through the previous incision site using the scalpel.  Purulent fluid 

was quickly identified cultured and evacuated.  The incision was lengthened 

somewhat sharply for a distance of 5 cm total with blunt dissection on the skin 

edges the previous incision split spontaneously superiorly and inferiorly by 

another 1-2 cm.  The subcutaneous tissues were evacuated of any purulent fluid. 

The fascia was already open at that location.  The PDS sutures were still intact

but not helping with approximation.  The PDS sutures in that area were excised. 

Blunt dissection beneath the level of the fascia took place where there was 

purulent fluid identified.  This allowed us to identify and intra-abdominal 

abscess cavity extending another 4-5 cm deep to the superficial abscess and 

fascial layer.  It appeared that the majority of the intra-abdominal abscess was

evacuated at that time using simply blunt dissection.  I was not able to 

identify intra-abdominal structures given the inflammatory reaction in that 

area.  I was not able to visualize a definite perforation site or fistula.  At 

one point when we were suctioning the purulent fluid I did see a small 7-8 mm 

piece of tissue that may have represented bowel contents.  The abscess site was 

thoroughly irrigated with saline.  Beneath the level of the fascia the abscess 

was packed with Aquacel silver rope.  Superficial to the level of the fascia 2 

inch Gwyn roll was utilized moistened with saline.  Sterile dressings and 

abdominal binder was applied.


DISPOSITION: Stable to recovery room

## 2019-07-13 NOTE — ED
General Adult HPI





<London Kiser - Last Filed: 19 10:10>





- General


Source: patient, family


Mode of arrival: wheelchair





<Krissy Santana - Last Filed: 19 11:08>





- General


Chief complaint: Chest Pain


Stated complaint: Abdominal Pain


Time Seen by Provider: 19 07:26





- History of Present Illness


Initial comments: 


69-year-old male patient who is status post laparotomy with repair of perforated

duodenal ulcer and incarcerated umbilical hernia on 2019 presents to the 

emergency department today for evaluation of pain to his incision.  Patient 

states that he developed swelling to the superior portion of the incision 

yesterday.  Family member states that the area of swelling has enlarged and is 

now feeling warm to touch and becoming red.  Patient states that the area is 

very tender to touch and painful.  Patient denies any drainage from the 

incision.  Denies any nausea or vomiting.  States his bowel movements have been 

normal.  Denies any hematochezia or melena.  Denies any fever or chills with th

is. Patient denies any recent rash, shortness breath, chest pain, back pain, 

numbness, tingling, dizziness, weakness, hematuria, dysuria, urinary urgency, 

urinary frequency, headache, visual changes, or any other complaints.


 (Krissy Santana)





- Related Data


                                Home Medications











 Medication  Instructions  Recorded  Confirmed


 


Budesonide/Formoterol Fumarate 1 puff INHALATION RT-BID 06/10/19 07/13/19





[Symbicort 160-4.5 Mcg Inhaler]   


 


Potassium Chloride [Klor-Con 20] 20 meq PO BID 06/10/19 07/13/19


 


Sertraline [Zoloft] 100 mg PO DAILY 06/10/19 07/13/19


 


Acetaminophen Tab [Tylenol Tab] 650 mg PO Q4H PRN 19


 


Mirtazapine [Remeron] 7.5 mg PO HS@1800 19








                                  Previous Rx's











 Medication  Instructions  Recorded


 


predniSONE 10 mg PO DAILY #30 tab 18


 


Furosemide [Lasix] 40 mg PO DAILY #30 tab 19


 


Ipratropium-Albuterol Nebulize 3 ml INHALATION RT-Q6H #120 19





[Duoneb 0.5 mg-3 mg/3 ml Soln] ampul.neb 


 


Pantoprazole [Protonix] 40 mg PO PÉREZ-ANNEMARIEKFST #30 tablet. 19


 


HYDROcodone/APAP 10-325MG [Norco 1 tab PO Q8H PRN #9 tab 19





]  


 


Melatonin 10 mg PO HS  tablet 19











                                    Allergies











Allergy/AdvReac Type Severity Reaction Status Date / Time


 


amoxicillin AdvReac  Nausea & Verified 19 08:59





   Vomiting  














Review of Systems


ROS Other: All systems not noted in ROS Statement are negative.





<London Kiser - Last Filed: 19 10:10>


ROS Other: All systems not noted in ROS Statement are negative.





<Krissy Santana - Last Filed: 19 11:08>


ROS Statement: 


Those systems with pertinent positive or pertinent negative responses have been 

documented in the HPI.








Past Medical History


Past Medical History: COPD, Hyperlipidemia, Osteoarthritis (OA), Pneumonia, 

Rheumatoid Arthritis (RA), Sleep Apnea/CPAP/BIPAP


Additional Past Medical History / Comment(s): Pneumonia with sepsis twice-2018

and in 2016. 3.5L home o2, POLO with no device (cannot tolerate), recently 

injured low back-saw Dr. Morse. Rheumatoid arthritis in bilateral hips,knees 

hands and back.


History of Any Multi-Drug Resistant Organisms: None Reported


Past Surgical History: Orthopedic Surgery


Additional Past Surgical History / Comment(s): bilateral cataract removals with 

lens implants, left knee arthroscopy, colonoscopy, GI perforated ulcer.


Past Anesthesia/Blood Transfusion Reactions: No Reported Reaction


Past Psychological History: Anxiety, Depression


Smoking Status: Former smoker


Past Alcohol Use History: Daily


Past Drug Use History: None Reported





- Past Family History


  ** Father


Family Medical History: Cancer


Additional Family Medical History / Comment(s): Father  of leukemia.





  ** Mother


Family Medical History: Cancer, CVA/TIA, Dementia, Diabetes Mellitus


Additional Family Medical History / Comment(s): Mother is 88yrs old with history

of dementia and colon cancer.





  ** Sister(s)


Additional Family Medical History / Comment(s): Patient has 1 full sister with 

history of lung cancer.  Patient has one half-sister with adrenal problems.  

Patient does not have any brothers.  Patient 3 sons with no major medical 

problems.





<Krissy Santana - Last Filed: 19 11:08>





General Exam


General appearance: alert, in no apparent distress, other (Physical well-

developed, well-nourished adult male patient in no acute distress.  Vital signs 

upon presentation are temperature 97.5F, pulse 121, respirations 20, blood 

pressure 103/64, pulse ox 90% on 3 L.)


Eye exam: Present: normal appearance, PERRL, EOMI.  Absent: scleral icterus, 

conjunctival injection, periorbital swelling


ENT exam: Present: normal exam, normal oropharynx, mucous membranes moist


Respiratory exam: Present: other (Crackles at the bilateral lung bases).  

Absent: normal lung sounds bilaterally, respiratory distress, wheezes, rales, 

rhonchi, stridor


Cardiovascular Exam: Present: normal rhythm, tachycardia, normal heart sounds.  

Absent: systolic murmur, diastolic murmur, rubs, gallop, clicks


GI/Abdominal exam: Present: soft, tenderness (There is tenderness over the upper

part of the midline abdominal incision), normal bowel sounds, other (There is a 

well-healed midline abdominal incision with swelling, erythema, and tenderness 

over the superior portion.  No drainage.).  Absent: distended, guarding, 

rebound, rigid


Neurological exam: Present: alert, oriented X3, CN II-XII intact


Psychiatric exam: Present: normal affect, normal mood


Skin exam: Present: warm, dry, intact, normal color.  Absent: rash





<Krissy Santana M - Last Filed: 19 11:08>





Course





<London Kiser - Last Filed: 19 10:10>


                                   Vital Signs











  19





  07:10 07:46 09:04


 


Temperature 97.5 F L  


 


Pulse Rate 121 H 108 H 102 H


 


Respiratory 20 22 20





Rate   


 


Blood Pressure 103/64 95/72 97/70


 


O2 Sat by Pulse 90 L 96 97





Oximetry   














  19





  09:56


 


Temperature 


 


Pulse Rate 99


 


Respiratory 18





Rate 


 


Blood Pressure 91/63


 


O2 Sat by Pulse 97





Oximetry 














- Reevaluation(s)


Reevaluation #1: 





19 10:11


NP supervision: I proceeded face-to-face evaluation patient did reevaluate him 

on several occasions patient does demonstrate evidence of abdominal pain at this

superior portion of the incision on his abdomen increased swelling and some 

localized erythema and increased temperature.  CAT scan does show evidence of a 

abdominal wall abscess.  Patient was seen by Dr. Gomez in emergency department 

will be admitted for IV antibiotics and likely incision and drainage.  I do 

agree with the assessment and plan (London Kiser)





Medical Decision Making





- Lab Data


Result diagrams: 


                                 19 07:44





                                 19 07:44





<London Kiser - Last Filed: 19 10:10>





- Lab Data


Result diagrams: 


                                 19 07:44





                                 19 07:44





- Radiology Data


Radiology results: report reviewed, image reviewed





<Krissy Santana - Last Filed: 19 11:08>





- Medical Decision Making


69-year-old male patient who is status post open laparotomy for perforated 

duodenal ulcer and incarcerated umbilical hernia repair on 2019 presented 

to the emergency department for evaluation of increased pain and swelling at the

upper part of his incision.  Physical examination did reveal swelling, 

tenderness, and warmth to the superior aspect of the incision.  Labs reviewed 

and did reveal elevated white blood cell count at 25.  Remainder of labs were 

unremarkable.  We did give IV fluids.  CT abdomen and pelvis did show multiple 

developing abscesses.  Dr. Gomez patient surgeon was in to see and evaluate him,

he'll be admitted to the hospital with IV antibiotics.  Patient initially be 

provided.  Patient is agreeable with this plan.


 (Krissy Santana)





- Lab Data


                                   Lab Results











  19 Range/Units





  07:44 07:44 07:44 


 


WBC   25.0 H   (3.8-10.6)  k/uL


 


RBC   4.23 L   (4.30-5.90)  m/uL


 


Hgb   12.6 L   (13.0-17.5)  gm/dL


 


Hct   39.4   (39.0-53.0)  %


 


MCV   93.1  D   (80.0-100.0)  fL


 


MCH   29.7   (25.0-35.0)  pg


 


MCHC   31.9   (31.0-37.0)  g/dL


 


RDW   16.1 H   (11.5-15.5)  %


 


Plt Count   595 H   (150-450)  k/uL


 


Neutrophils %   89   %


 


Lymphocytes %   6   %


 


Monocytes %   4   %


 


Eosinophils %   0   %


 


Basophils %   0   %


 


Neutrophils #   22.2 H   (1.3-7.7)  k/uL


 


Lymphocytes #   1.4   (1.0-4.8)  k/uL


 


Monocytes #   0.9   (0-1.0)  k/uL


 


Eosinophils #   0.1   (0-0.7)  k/uL


 


Basophils #   0.1   (0-0.2)  k/uL


 


Anisocytosis   Slight   


 


Sodium  134 L    (137-145)  mmol/L


 


Potassium  4.5    (3.5-5.1)  mmol/L


 


Chloride  97 L    ()  mmol/L


 


Carbon Dioxide  26    (22-30)  mmol/L


 


Anion Gap  11    mmol/L


 


BUN  21 H    (9-20)  mg/dL


 


Creatinine  0.67    (0.66-1.25)  mg/dL


 


Est GFR (CKD-EPI)AfAm  >90    (>60 ml/min/1.73 sqM)  


 


Est GFR (CKD-EPI)NonAf  >90    (>60 ml/min/1.73 sqM)  


 


Glucose  117 H    (74-99)  mg/dL


 


Plasma Lactic Acid José Miguel    1.1  (0.7-2.0)  mmol/L


 


Calcium  9.2    (8.4-10.2)  mg/dL


 


Total Bilirubin  0.6    (0.2-1.3)  mg/dL


 


AST  21    (17-59)  U/L


 


ALT  42    (21-72)  U/L


 


Alkaline Phosphatase  100    ()  U/L


 


Total Protein  6.4    (6.3-8.2)  g/dL


 


Albumin  3.2 L    (3.5-5.0)  g/dL


 


Amylase  38    ()  U/L


 


Lipase  92    ()  U/L














- Radiology Data


CT abdomen and pelvis is obtained with contrast. Report was reviewed in its 

entirety. Impression by Dr. Welsh shows Enlarging and more developed fluid 

collections in the epigastric region below the stomach margin.  Developing 

intraabdominal abscesses are suspected. Due to the loculated irregular 

appearance percutaneous drainage would be extremely difficult. 





2 view xray of the chest is obtained. Report was reviewed in its entirety. 

Impression by Dr. Welsh shows chronic parenchymal fibrotic and emphysematous 

changes without acute pulmonary process. (Krissy Santana)





Disposition





<London Kiser - Last Filed: 19 10:10>


Decision to Admit Reason: Admit from EC


Decision Date: 19


Decision Time: 10:41





<Krissy Santana - Last Filed: 19 11:08>


Clinical Impression: 


 Abdominal abscess





Disposition: ADMITTED AS IP TO THIS [facility]


Condition: Serious


Referrals: 


Amalia Bazan MD [Primary Care Provider] - 1-2 days

## 2019-07-13 NOTE — CT
EXAMINATION TYPE: CT abdomen pelvis w con

 

DATE OF EXAM: 7/13/2019

 

COMPARISON: CT abdomen and pelvis May 31, 2019

 

HISTORY: Epigastric pain, swelling, and heat along incision post duodenal repair.

 

CT DLP: 670 mGycm, Automated Exposure Control for Dose Reduction was Utilized.

 

CONTRAST: 

CT scan of the abdomen and pelvis is performed without oral but with IV Contrast, patient injected wi
th 100 mL of Isovue 300.

 

FINDINGS:

 

LUNG BASES: Bibasilar linear scarring and/or atelectasis.

 

LIVER/GB:   No significant abnormality is appreciated.

 

PANCREAS:  No significant abnormality is seen.

 

SPLEEN: Few calcifications throughout the spleen are redemonstrated.

 

ADRENALS: Stable small left adrenal nodule or mass nonspecific axial image 24.

 

KIDNEYS: Symmetric cartilage oriented uptake and excretion without hydronephrosis. Simple appearing 2
.2 cm cyst lower pole of the right kidney redemonstrated. Bladder shows mild wall thickening superior
 and left lateral aspect. Correlate clinically for cystitis.

 

BOWEL: Evaluation bowel slightly suboptimal secondary to lack of enteric contrast. Stomach is poorly 
distended and suboptimally evaluated. No suspicious small or large bowel dilatation. Diverticula in t
he left and sigmoid colon are present without CT evidence for acute diverticulitis.

 

PROSTATE/SEMINAL VESICLES:  No gross abnormality seen.

 

LYMPH NODES:  No greater than 1cm abdominal or pelvic lymph nodes are appreciated.

 

OSSEOUS STRUCTURES: Mild to moderate height loss superior L3 endplate and mild height loss superior L
4 endplates are present. Effacement anterior spinal canal superior L3 endplate due to compression fra
cture stable.

 

OTHER: There is vertical scar overlying the anterior abdominal wall in the midline. There is some sub
cutaneous air and fluid along scar. There is more prominent focal fluid collection deep to rectus lev
el measuring 3.2 x 2.0 cm axial image 24 there is a larger thin-walled fluid collection seen deeper t
o this extending to the midline measuring approximately 6.3 x 3.2 cm with smaller adjacent thin-karthikeyan
d irregular fluid collections identified. They are just inferior to the stomach. They are larger and 
more defined from prior study. Interval removal of percutaneous drainage catheter noted.

 

IMPRESSION: Given patient's history with enlarging more developed fluid collections in the epigastric
 region below stomach margin. Developing intra-abdominal abscesses are suspected. Due to loculated ir
regular appearance percutaneous drainage would be extremely difficult.

## 2019-07-13 NOTE — XR
EXAMINATION TYPE: XR chest 2V

 

DATE OF EXAM: 7/13/2019

 

COMPARISON: Chest x-ray Zoe 10, 2019

 

HISTORY: History of COPD with stomach and chest pain

 

TECHNIQUE:  Frontal and lateral views of the chest are obtained.

 

FINDINGS:  There is chronic emphysematous and parenchymal changes bilaterally without suspicious new 
focal air space opacity, pleural effusion, or pneumothorax seen. Mass or gross lobe/fissure is redemo
nstrated.  The cardiac silhouette size is stable at upper limits of normal.   The osseous structures 
are intact.

 

IMPRESSION:  Chronic parenchymal fibrotic and emphysematous changes without acute pulmonary process.

## 2019-07-14 LAB
BASOPHILS # BLD AUTO: 0.1 K/UL (ref 0–0.2)
BASOPHILS NFR BLD AUTO: 1 %
EOSINOPHIL # BLD AUTO: 0.4 K/UL (ref 0–0.7)
EOSINOPHIL NFR BLD AUTO: 2 %
ERYTHROCYTE [DISTWIDTH] IN BLOOD BY AUTOMATED COUNT: 3.64 M/UL (ref 4.3–5.9)
ERYTHROCYTE [DISTWIDTH] IN BLOOD: 16.1 % (ref 11.5–15.5)
HCT VFR BLD AUTO: 33.5 % (ref 39–53)
HGB BLD-MCNC: 10.9 GM/DL (ref 13–17.5)
LYMPHOCYTES # SPEC AUTO: 2.1 K/UL (ref 1–4.8)
LYMPHOCYTES NFR SPEC AUTO: 11 %
MCH RBC QN AUTO: 30 PG (ref 25–35)
MCHC RBC AUTO-ENTMCNC: 32.6 G/DL (ref 31–37)
MCV RBC AUTO: 92.1 FL (ref 80–100)
MONOCYTES # BLD AUTO: 0.6 K/UL (ref 0–1)
MONOCYTES NFR BLD AUTO: 3 %
NEUTROPHILS # BLD AUTO: 16.2 K/UL (ref 1.3–7.7)
NEUTROPHILS NFR BLD AUTO: 82 %
PLATELET # BLD AUTO: 583 K/UL (ref 150–450)
WBC # BLD AUTO: 19.8 K/UL (ref 3.8–10.6)

## 2019-07-14 RX ADMIN — MEROPENEM SCH MLS/HR: 1 INJECTION, POWDER, FOR SOLUTION INTRAVENOUS at 16:52

## 2019-07-14 RX ADMIN — MEROPENEM SCH MLS/HR: 1 INJECTION, POWDER, FOR SOLUTION INTRAVENOUS at 23:48

## 2019-07-14 RX ADMIN — ANIDULAFUNGIN SCH MLS/HR: 100 INJECTION, POWDER, LYOPHILIZED, FOR SOLUTION INTRAVENOUS at 19:10

## 2019-07-14 RX ADMIN — CEFAZOLIN SCH: 330 INJECTION, POWDER, FOR SOLUTION INTRAMUSCULAR; INTRAVENOUS at 06:17

## 2019-07-14 RX ADMIN — IPRATROPIUM BROMIDE AND ALBUTEROL SULFATE SCH ML: .5; 3 SOLUTION RESPIRATORY (INHALATION) at 19:57

## 2019-07-14 RX ADMIN — MORPHINE SULFATE PRN MG: 4 INJECTION, SOLUTION INTRAMUSCULAR; INTRAVENOUS at 10:53

## 2019-07-14 RX ADMIN — MORPHINE SULFATE PRN MG: 4 INJECTION, SOLUTION INTRAMUSCULAR; INTRAVENOUS at 21:19

## 2019-07-14 RX ADMIN — MIRTAZAPINE SCH MG: 15 TABLET, FILM COATED ORAL at 21:18

## 2019-07-14 RX ADMIN — MEROPENEM SCH MLS/HR: 1 INJECTION, POWDER, FOR SOLUTION INTRAVENOUS at 00:49

## 2019-07-14 RX ADMIN — BUDESONIDE AND FORMOTEROL FUMARATE DIHYDRATE SCH PUFF: 160; 4.5 AEROSOL RESPIRATORY (INHALATION) at 19:57

## 2019-07-14 RX ADMIN — MORPHINE SULFATE PRN MG: 4 INJECTION, SOLUTION INTRAMUSCULAR; INTRAVENOUS at 06:17

## 2019-07-14 RX ADMIN — IPRATROPIUM BROMIDE AND ALBUTEROL SULFATE SCH ML: .5; 3 SOLUTION RESPIRATORY (INHALATION) at 12:08

## 2019-07-14 RX ADMIN — IPRATROPIUM BROMIDE AND ALBUTEROL SULFATE SCH ML: .5; 3 SOLUTION RESPIRATORY (INHALATION) at 23:43

## 2019-07-14 RX ADMIN — MEROPENEM SCH MLS/HR: 1 INJECTION, POWDER, FOR SOLUTION INTRAVENOUS at 10:52

## 2019-07-14 RX ADMIN — Medication SCH MG: at 21:18

## 2019-07-14 RX ADMIN — SERTRALINE HYDROCHLORIDE SCH MG: 100 TABLET ORAL at 10:53

## 2019-07-14 RX ADMIN — MORPHINE SULFATE PRN MG: 4 INJECTION, SOLUTION INTRAMUSCULAR; INTRAVENOUS at 16:52

## 2019-07-14 RX ADMIN — MORPHINE SULFATE PRN MG: 4 INJECTION, SOLUTION INTRAMUSCULAR; INTRAVENOUS at 01:51

## 2019-07-14 NOTE — P.CONS
History of Present Illness





- Reason for Consult


Consult date: 19


medical management for RA, HTN


Requesting physician: Chivo Gomez





- Chief Complaint


Abdominal pain





- History of Present Illness





This is 69 years old male with recent gastric ulcer rupture status post 

abdominal surgery who presented to the hospital with abdominal pain and found to

have abdominal abscess that was drained by general surgery yesterday and medical

consult was obtained for follow-up on his complicated past medical history.  

Patient currently is denying chest pain, shortness breath, nausea, vomiting or 

dizziness stated that his abdominal pain improved at least by 75% since 

yesterday.  Patient denied tobacco alcohol or drug abuse stated that he's 

compliment with his medication and doctors follow-up and his wife at the bedside

who is the primary care provider for the patient as patient has rheumatoid 

arthritis and suffered from debility at home





Review of Systems





All 14 systems reviewed and negative except as above





Past Medical History


Past Medical History: COPD, Hyperlipidemia, Osteoarthritis (OA), Pneumonia, 

Rheumatoid Arthritis (RA), Sleep Apnea/CPAP/BIPAP


Additional Past Medical History / Comment(s): Pneumonia with sepsis twice-2018

and in . 3.5L home o2, POLO with no device (cannot tolerate), recently 

injured low back-saw Dr. Morse. Rheumatoid arthritis in bilateral hips,knees 

hands and back.


History of Any Multi-Drug Resistant Organisms: None Reported


Past Surgical History: Orthopedic Surgery


Additional Past Surgical History / Comment(s): bilateral cataract removals with 

lens implants, left knee arthroscopy, colonoscopy, GI perforated ulcer.


Past Anesthesia/Blood Transfusion Reactions: No Reported Reaction


Past Psychological History: Anxiety, Depression


Additional Psychological History / Comment(s): Pt resides with his spouse of 38 

yrs.  He has home oxygen which he wears prn at 3.5L/NC.  He has a nebulizer.


Smoking Status: Former smoker


Past Alcohol Use History: Daily


Additional Past Alcohol Use History / Comment(s): Patient smoked 2 packs per day

for 40+ years.  He quit 6 years ago.  He denies any illicit drug use or 

marijuana use.  He drinks 3-4 beers per day but none since previous admission 

and May.  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 

involved


Past Drug Use History: None Reported





- Past Family History


  ** Father


Family Medical History: Cancer


Additional Family Medical History / Comment(s): Father  of leukemia.





  ** Mother


Family Medical History: Cancer, CVA/TIA, Dementia, Diabetes Mellitus


Additional Family Medical History / Comment(s): Mother is 88yrs old with history

of dementia and colon cancer.





  ** Sister(s)


Additional Family Medical History / Comment(s): Patient has 1 full sister with 

history of lung cancer.  Patient has one half-sister with adrenal problems.  

Patient does not have any brothers.  Patient 3 sons with no major medical 

problems.





Medications and Allergies


                                Home Medications











 Medication  Instructions  Recorded  Confirmed  Type


 


predniSONE 10 mg PO DAILY #30 tab 18 Rx


 


Furosemide [Lasix] 40 mg PO DAILY #30 tab 19 Rx


 


Ipratropium-Albuterol Nebulize 3 ml INHALATION RT-Q6H #120 19 Rx





[Duoneb 0.5 mg-3 mg/3 ml Soln] ampul.neb   


 


Pantoprazole [Protonix] 40 mg PO AC-BRKFST #30 tablet. 19 Rx


 


Budesonide/Formoterol Fumarate 1 puff INHALATION RT-BID 06/10/19 07/13/19 

History





[Symbicort 160-4.5 Mcg Inhaler]    


 


Potassium Chloride [Klor-Con 20] 20 meq PO BID 06/10/19 07/13/19 History


 


Sertraline [Zoloft] 100 mg PO DAILY 06/10/19 07/13/19 History


 


HYDROcodone/APAP 10-325MG [Norco 1 tab PO Q8H PRN #9 tab 19 Rx





]    


 


Melatonin 10 mg PO HS  tablet 19 Rx


 


Acetaminophen Tab [Tylenol Tab] 650 mg PO Q4H PRN 19 History


 


Mirtazapine [Remeron] 7.5 mg PO HS@1800 19 History








                                    Allergies











Allergy/AdvReac Type Severity Reaction Status Date / Time


 


amoxicillin AdvReac  Nausea & Verified 19 08:59





   Vomiting  














Physical Exam


Vitals: 


                                   Vital Signs











  Temp Pulse Pulse Resp BP BP BP


 


 19 07:54  98.2 F   96     110/68


 


 19 04:00    101 H  20   


 


 19 00:25  98.1 F   101 H  20    101/64


 


 19 00:00    98  18   


 


 19 20:00  98.4 F   97  15    102/65


 


 19 18:15    97     103/62


 


 19 18:01    96     110/68


 


 19 17:45    94     109/65


 


 19 17:32    101 H     101/40


 


 19 17:15  98.3 F   91  15    107/69


 


 19 16:45    91  16    109/65


 


 19 16:30    96  16    108/65


 


 19 16:20  99.0 F   101 H  20    109/69


 


 19 13:18  97.8 F   105 H    98/60 


 


 19 11:58  98.4 F  89   18  92/58  














  Pulse Ox


 


 19 07:54  94 L


 


 19 04:00 


 


 19 00:25  91 L


 


 19 00:00 


 


 19 20:00  93 L


 


 19 18:15 


 


 19 18:01 


 


 19 17:45 


 


 19 17:32 


 


 19 17:15  94 L


 


 19 16:45  95


 


 19 16:30  92 L


 


 19 16:20  99


 


 19 13:18  92 L


 


 19 11:58  99








                                Intake and Output











 19





 22:59 06:59 14:59


 


Intake Total 1610 810 


 


Output Total 330 150 


 


Balance 1280 660 


 


Intake:   


 


  IV 1050  


 


  Intake, IV Titration 200 600 





  Amount   


 


    Meropenem 2 gm In Sodium 100  





    Chloride 0.9% 100 ml @   





    200 mls/hr IVPB Q8HR IMTIAZ   





    Rx#:[id number]   


 


    Sodium Chloride 0.9% 1, 100 600 





    000 ml @ 50 mls/hr IV .   





    Q20H IMTIAZ Rx#:[id number]   


 


  Oral 360 210 


 


Output:   


 


  Urine 325 150 


 


  Estimated Blood Loss 5  


 


Other:   


 


  Voiding Method Urinal Urinal 


 


  # Voids  1 














Gen.:  in stated age, no acute distress


Heart: Normal S1-S2


Lungs: Clear to auscultation bilaterally


Abdomen: Soft, positive for abdominal wall dressing with bloody drainage, 

positive bowel sounds in all 4 quadrant no guarding or rebound


Skin: No new rash


Psych: Alert and oriented 3


Neuro: No focal deficit


Joints Posta for deformities in upper and lower extremities





Results


CBC & Chem 7: 


                                 19 06:27





                                 19 07:44


Labs: 


                  Abnormal Lab Results - Last 24 Hours (Table)











  19 Range/Units





  06:27 


 


WBC  19.8 H  (3.8-10.6)  k/uL


 


RBC  3.64 L  (4.30-5.90)  m/uL


 


Hgb  10.9 L  (13.0-17.5)  gm/dL


 


Hct  33.5 L  (39.0-53.0)  %


 


RDW  16.1 H  (11.5-15.5)  %


 


Plt Count  583 H  (150-450)  k/uL


 


Neutrophils #  16.2 H  (1.3-7.7)  k/uL








                      Microbiology - Last 24 Hours (Table)











 19 07:44 Blood Culture - Preliminary





 Blood    No Growth after 24 hours


 


 19 16:12 Gram Stain - Preliminary





 Abdomen Wound Culture - Preliminary


 


 19 16:12 Anaerobic Culture - Preliminary





 Abdomen 














Assessment and Plan


Assessment: 





1.  Status post incision and drainage of abdominal wall abscess and 

intraperitoneal abscess.


2.  Recent exploratory laparotomy with repair of perforated duodenal ulcer and 

incarcerated umbilical hernia 2019. 


3.  Severe leukocytosis.


4.  Abdominal pain.


5.  Rheumatoid arthritis with immune suppressant status due to daily use of 

steroids.


6.  COPD.  


7.  Chronic respiratory failure with hypoxia home oxygen dependent.


8.  Debility and deconditioning


9.  Hypertension.


10.  Insomnia.


11.  Electrolyte imbalance with hyponatremia.


12.  Hyperlipidemia.


13.  Generalized osteoarthritis.


14.  Obstructive sleep apnea.


Patient stated that his pain has improved significantly since surgery yesterday.

  Plan discussed with general surgery with aggressive wound care IV antibiotics 

and introduce diet as tolerated.  We'll monitor the patient during this hospital

 stay and consider further imaging regarding the nature of the abscess formation

 to be determined by general surgery during this hospital stay based on clinical

 progress.  I would like to resume his home medication including prednisone 

follow-up with infectious disease recommendation.  Continue with current IV pain

 medication but I offered patient also to go back on his oral medication similar

 to what he takes at home.  Continue aggressive bowel regimen.  Patient will 

require DVT and GI prophylaxis during this hospital stay to be determined by 

general surgery.  Prognosis remained guarded

## 2019-07-14 NOTE — P.PN
Subjective


Progress Note Date: 07/14/19


Principal diagnosis: 





Abdominal abscess





Patient states he is feeling somewhat better today compared to preoperatively.  

He is hungry.  White blood cell count 19.8 today.  Hemoglobin stable.  Cultures 

pending.  No nausea or vomiting.





Objective





- Vital Signs


Vital signs: 


                                   Vital Signs











Temp  98.2 F   07/14/19 07:54


 


Pulse  104 H  07/14/19 12:19


 


Resp  20   07/14/19 04:00


 


BP  110/68   07/14/19 07:54


 


Pulse Ox  94 L  07/14/19 07:54








                                 Intake & Output











 07/13/19 07/14/19 07/14/19





 18:59 06:59 18:59


 


Intake Total 1150 1270 


 


Output Total 5 475 


 


Balance 1145 795 


 


Weight 72.575 kg  


 


Intake:   


 


  IV 1050  


 


  Intake, IV Titration 100 700 





  Amount   


 


    Meropenem 2 gm In Sodium  100 





    Chloride 0.9% 100 ml @   





    200 mls/hr IVPB Q8HR [facility]   





    Rx#:[id number]   


 


    Sodium Chloride 0.9% 1, 100 600 





    000 ml @ 50 mls/hr IV .   





    Q20H [facility] Rx#:[id number]   


 


  Oral  570 


 


Output:   


 


  Urine  475 


 


  Estimated Blood Loss 5  


 


Other:   


 


  Voiding Method  Urinal 


 


  # Voids  1 














- Exam





Abdomen: Soft, nondistended, mild upper abdominal tenderness, dressing changed 

with the help of the nursing staff, tan-colored slightly sanguinous drainage on 

the dressings.  The outer Gwyn gauze was removed.  The Aquacel silver was then 

removed from the abdominal cavity.  Some purulence at the base of the wound in 

the intra-abdominal cavity was noted.  No enteric contents identified.  Area was

irrigated thoroughly with saline.  No additional purulence was seen.  2 separate

Aquacel silver portions of rope were readvanced into the wound.  Small area of 

bleeding noted inferiorly.  Despite pressure this did not stop.  This was right 

at the skin edge.  This was controlled using a figure-of-eight 3-0 Vicryl 

stitch.  Skin was prepped and lidocaine was used on the skin prior to placement 

of the stitch.  Sterile outer dressings applied.





- Labs


CBC & Chem 7: 


                                 07/14/19 06:27





                                 07/13/19 07:44


Labs: 


                  Abnormal Lab Results - Last 24 Hours (Table)











  07/14/19 Range/Units





  06:27 


 


WBC  19.8 H  (3.8-10.6)  k/uL


 


RBC  3.64 L  (4.30-5.90)  m/uL


 


Hgb  10.9 L  (13.0-17.5)  gm/dL


 


Hct  33.5 L  (39.0-53.0)  %


 


RDW  16.1 H  (11.5-15.5)  %


 


Plt Count  583 H  (150-450)  k/uL


 


Neutrophils #  16.2 H  (1.3-7.7)  k/uL








                      Microbiology - Last 24 Hours (Table)











 07/13/19 07:44 Blood Culture - Preliminary





 Blood    No Growth after 24 hours


 


 07/13/19 16:12 Gram Stain - Preliminary





 Abdomen Wound Culture - Preliminary


 


 07/13/19 16:12 Anaerobic Culture - Preliminary





 Abdomen 














Assessment and Plan


(1) Abdominal abscess


Narrative/Plan: 


Continue antibiotics per infectious disease.  Begin clear liquids.  May advance 

to full liquids if tolerates.  Recheck labs tomorrow.  Follow cultures.  Continu

e local wound care.


Current Visit: Yes   Status: Acute   Code(s): QVE2032 -    SNOMED Code(s): 36165

008

## 2019-07-15 LAB
ANION GAP SERPL CALC-SCNC: 4 MMOL/L
BASOPHILS # BLD AUTO: 0.1 K/UL (ref 0–0.2)
BASOPHILS NFR BLD AUTO: 0 %
BUN SERPL-SCNC: 8 MG/DL (ref 9–20)
CALCIUM SPEC-MCNC: 8.6 MG/DL (ref 8.4–10.2)
CHLORIDE SERPL-SCNC: 102 MMOL/L (ref 98–107)
CO2 SERPL-SCNC: 33 MMOL/L (ref 22–30)
EOSINOPHIL # BLD AUTO: 0.3 K/UL (ref 0–0.7)
EOSINOPHIL NFR BLD AUTO: 2 %
ERYTHROCYTE [DISTWIDTH] IN BLOOD BY AUTOMATED COUNT: 3.68 M/UL (ref 4.3–5.9)
ERYTHROCYTE [DISTWIDTH] IN BLOOD: 16.1 % (ref 11.5–15.5)
GLUCOSE SERPL-MCNC: 95 MG/DL (ref 74–99)
HCT VFR BLD AUTO: 33.7 % (ref 39–53)
HGB BLD-MCNC: 10.7 GM/DL (ref 13–17.5)
LYMPHOCYTES # SPEC AUTO: 2 K/UL (ref 1–4.8)
LYMPHOCYTES NFR SPEC AUTO: 13 %
MCH RBC QN AUTO: 29 PG (ref 25–35)
MCHC RBC AUTO-ENTMCNC: 31.7 G/DL (ref 31–37)
MCV RBC AUTO: 91.6 FL (ref 80–100)
MONOCYTES # BLD AUTO: 0.5 K/UL (ref 0–1)
MONOCYTES NFR BLD AUTO: 3 %
NEUTROPHILS # BLD AUTO: 12.1 K/UL (ref 1.3–7.7)
NEUTROPHILS NFR BLD AUTO: 80 %
PLATELET # BLD AUTO: 655 K/UL (ref 150–450)
POTASSIUM SERPL-SCNC: 3.8 MMOL/L (ref 3.5–5.1)
SODIUM SERPL-SCNC: 139 MMOL/L (ref 137–145)
WBC # BLD AUTO: 15.2 K/UL (ref 3.8–10.6)

## 2019-07-15 RX ADMIN — HEPARIN SODIUM SCH UNIT: 5000 INJECTION, SOLUTION INTRAVENOUS; SUBCUTANEOUS at 20:42

## 2019-07-15 RX ADMIN — IPRATROPIUM BROMIDE AND ALBUTEROL SULFATE SCH ML: .5; 3 SOLUTION RESPIRATORY (INHALATION) at 20:44

## 2019-07-15 RX ADMIN — IPRATROPIUM BROMIDE AND ALBUTEROL SULFATE SCH ML: .5; 3 SOLUTION RESPIRATORY (INHALATION) at 13:10

## 2019-07-15 RX ADMIN — MEROPENEM SCH MLS/HR: 1 INJECTION, POWDER, FOR SOLUTION INTRAVENOUS at 16:56

## 2019-07-15 RX ADMIN — MEROPENEM SCH MLS/HR: 1 INJECTION, POWDER, FOR SOLUTION INTRAVENOUS at 10:30

## 2019-07-15 RX ADMIN — MIRTAZAPINE SCH MG: 15 TABLET, FILM COATED ORAL at 20:42

## 2019-07-15 RX ADMIN — MORPHINE SULFATE PRN MG: 4 INJECTION, SOLUTION INTRAMUSCULAR; INTRAVENOUS at 10:16

## 2019-07-15 RX ADMIN — Medication SCH MG: at 20:43

## 2019-07-15 RX ADMIN — CEFAZOLIN SCH: 330 INJECTION, POWDER, FOR SOLUTION INTRAMUSCULAR; INTRAVENOUS at 03:01

## 2019-07-15 RX ADMIN — THERA TABS SCH EACH: TAB at 10:30

## 2019-07-15 RX ADMIN — HYDROCODONE BITARTRATE AND ACETAMINOPHEN PRN EACH: 10; 325 TABLET ORAL at 17:09

## 2019-07-15 RX ADMIN — CEFAZOLIN SCH MLS/HR: 330 INJECTION, POWDER, FOR SOLUTION INTRAMUSCULAR; INTRAVENOUS at 04:53

## 2019-07-15 RX ADMIN — ANIDULAFUNGIN SCH MLS/HR: 100 INJECTION, POWDER, LYOPHILIZED, FOR SOLUTION INTRAVENOUS at 19:08

## 2019-07-15 RX ADMIN — SERTRALINE HYDROCHLORIDE SCH MG: 100 TABLET ORAL at 10:30

## 2019-07-15 RX ADMIN — IPRATROPIUM BROMIDE AND ALBUTEROL SULFATE SCH: .5; 3 SOLUTION RESPIRATORY (INHALATION) at 07:46

## 2019-07-15 RX ADMIN — PANTOPRAZOLE SODIUM SCH MG: 40 TABLET, DELAYED RELEASE ORAL at 10:30

## 2019-07-15 RX ADMIN — CEFAZOLIN SCH: 330 INJECTION, POWDER, FOR SOLUTION INTRAMUSCULAR; INTRAVENOUS at 23:08

## 2019-07-15 RX ADMIN — BUDESONIDE AND FORMOTEROL FUMARATE DIHYDRATE SCH: 160; 4.5 AEROSOL RESPIRATORY (INHALATION) at 07:46

## 2019-07-15 RX ADMIN — BUDESONIDE AND FORMOTEROL FUMARATE DIHYDRATE SCH PUFF: 160; 4.5 AEROSOL RESPIRATORY (INHALATION) at 20:44

## 2019-07-15 NOTE — P.PN
<Dilcia Clark FIGUEROA - Last Filed: 07/15/19 11:30>





Subjective


Progress Note Date: 07/15/19





CHIEF COMPLAINT: Abdominal pain





HISTORY OF PRESENT ILLNESS: Patient examined at the bedside this morning. status

post incision and drainage of abdominal wall abscess in intraperitoneal abscess 

performed on 07/13/2019He reports his abdominal pain is tolerable. Denies nausea

or vomiting. Tolerating diet. Passing flatus. Abdominal binder intact with juice

spilled on it. WBC 15.2 Hemoglobin 10.7. Wound culture is positive for gram 

negative bacilli. 





PHYSICAL EXAM: 


VITAL SIGNS: Reviewed.


GENERAL: Well-developed in no acute distress. 


HEENT:  No sclera icterus. Extraocular movements grossly intact.  Moist buccal 

mucosa. Head is atraumatic, normocephalic. 


ABDOMEN:  Soft.  Nondistended. Dressing to abdomen clean dry intact-wound 

assessment deferred. Abdominal binder noted.


NEUROLOGIC: Alert and oriented. Cranial nerves II through XII grossly intact.





ASSESSMENT: 


1. Abdominal abscess


2. History of exploratory laparotomy with repair of duodenal ulcer, May 2019





PLAN: 


1. Continue full liquid diet


2. Await final wound culture results. Continue antibiotics per infectious 

disease.


3. Wound care. Wound to be examined this afternoon by Dr. Gomez. Dressing change

to be performed at that time. 


4. New abdominal binder ordered secondary to grape juice spilled on current 

binder





Nurse practitioner note has been reviewed by physician. Signing provider agrees 

with the documented findings, assessment, and plan of care. 








Objective





- Vital Signs


Vital signs: 


                                   Vital Signs











Temp  98.5 F   07/15/19 07:00


 


Pulse  83   07/15/19 07:00


 


Resp  16   07/15/19 07:00


 


BP  109/70   07/15/19 07:00


 


Pulse Ox  93 L  07/15/19 07:00








                                 Intake & Output











 07/14/19 07/15/19 07/15/19





 18:59 06:59 18:59


 


Intake Total 240 1130 240


 


Output Total  850 


 


Balance 240 280 240


 


Intake:   


 


  Intake, IV Titration  600 





  Amount   


 


    Sodium Chloride 0.9% 1,  600 





    000 ml @ 50 mls/hr IV .   





    Q20H [facility] Rx#:[id number]   


 


  Oral 240 530 240


 


Output:   


 


  Urine  850 


 


Other:   


 


  Voiding Method  Urinal 


 


  # Voids 2  














- Labs


CBC & Chem 7: 


                                 07/15/19 06:55





                                 07/15/19 06:55


Labs: 


                  Abnormal Lab Results - Last 24 Hours (Table)











  07/15/19 07/15/19 Range/Units





  06:55 06:55 


 


WBC  15.2 H   (3.8-10.6)  k/uL


 


RBC  3.68 L   (4.30-5.90)  m/uL


 


Hgb  10.7 L   (13.0-17.5)  gm/dL


 


Hct  33.7 L   (39.0-53.0)  %


 


RDW  16.1 H   (11.5-15.5)  %


 


Plt Count  655 H   (150-450)  k/uL


 


Neutrophils #  12.1 H   (1.3-7.7)  k/uL


 


Carbon Dioxide   33 H  (22-30)  mmol/L


 


BUN   8 L  (9-20)  mg/dL


 


Creatinine   0.49 L  (0.66-1.25)  mg/dL








                      Microbiology - Last 24 Hours (Table)











 07/13/19 07:44 Blood Culture - Preliminary





 Blood    No Growth after 48 hours


 


 07/13/19 16:12 Gram Stain - Preliminary





 Abdomen Wound Culture - Preliminary





    Gram Neg Bacilli














<Chivo Gomez - Last Filed: 07/15/19 15:59>





Subjective


As above.  Patient doing better today.  He states his pain is improved.  No 

significant drainage on the dressing at this time.  White blood cell count 

decreased from yesterday.  Wound cultures showing gram-negative bacilli.  Wound 

dressing was changed by myself and the nursing staff.  Both Aquacel silver 

portions appropriate were free of enteric contents.  Minimal to no purulent 

fluid at this time at the base of the wound.  Tenderness seems less as well.  

Wound repacked with a single portion of Aquacel silver rope.  This will likely 

be changed again tomorrow.  Await infectious disease opinion regarding home 

antibiotics.  May need PICC line.  Gradually advance diet.





Objective





- Vital Signs


Vital signs: 


                                   Vital Signs











Temp  97.5 F L  07/15/19 15:00


 


Pulse  83   07/15/19 15:00


 


Resp  15   07/15/19 15:00


 


BP  127/69   07/15/19 15:00


 


Pulse Ox  92 L  07/15/19 15:00








                                 Intake & Output











 07/14/19 07/15/19 07/15/19





 18:59 06:59 18:59


 


Intake Total 240 1130 700


 


Output Total  850 


 


Balance 240 280 700


 


Weight   72.575 kg


 


Intake:   


 


  Intake, IV Titration  600 





  Amount   


 


    Sodium Chloride 0.9% 1,  600 





    000 ml @ 50 mls/hr IV .   





    Q20H [facility] Rx#:[id number]   


 


  Oral 240 530 700


 


Output:   


 


  Urine  850 


 


Other:   


 


  Voiding Method  Urinal 


 


  # Voids 2  














- Labs


CBC & Chem 7: 


                                 07/15/19 06:55





                                 07/15/19 06:55


Labs: 


                  Abnormal Lab Results - Last 24 Hours (Table)











  07/15/19 07/15/19 Range/Units





  06:55 06:55 


 


WBC  15.2 H   (3.8-10.6)  k/uL


 


RBC  3.68 L   (4.30-5.90)  m/uL


 


Hgb  10.7 L   (13.0-17.5)  gm/dL


 


Hct  33.7 L   (39.0-53.0)  %


 


RDW  16.1 H   (11.5-15.5)  %


 


Plt Count  655 H   (150-450)  k/uL


 


Neutrophils #  12.1 H   (1.3-7.7)  k/uL


 


Carbon Dioxide   33 H  (22-30)  mmol/L


 


BUN   8 L  (9-20)  mg/dL


 


Creatinine   0.49 L  (0.66-1.25)  mg/dL








                      Microbiology - Last 24 Hours (Table)











 07/13/19 07:44 Blood Culture - Preliminary





 Blood    No Growth after 48 hours


 


 07/13/19 16:12 Gram Stain - Preliminary





 Abdomen Wound Culture - Preliminary





    Gram Neg Bacilli














Assessment and Plan


(1) Abdominal abscess


Current Visit: Yes   Status: Acute   Code(s): SJH3920 -    SNOMED Code(s): 

[id number]

## 2019-07-15 NOTE — P.PN
Subjective


Progress Note Date: 07/15/19





This is 69 years old male with recent gastric ulcer rupture status post 

abdominal surgery who presented to the hospital with abdominal pain and found to

have abdominal abscess that was drained by general surgery yesterday and medical

consult was obtained for follow-up on his complicated past medical history.  

Patient currently is denying chest pain, shortness breath, nausea, vomiting or 

dizziness stated that his abdominal pain improved at least by 75% since 

yesterday.  Patient denied tobacco alcohol or drug abuse stated that he's 

compliment with his medication and doctors follow-up and his wife at the bedside

who is the primary care provider for the patient as patient has rheumatoid 

arthritis and suffered from debility at home





7/15: Patient complains of soreness in his abdomen.  Dressing is in place with 

packing.  He is currently on full liquid diet and tolerating this.  He denies 

any nausea or vomiting.


Patient has been afebrile, heart rate 92, blood pressure 109/70, pulse ox 93% on

room air.  Repeat lab work reveals WBC of 15.2, hemoglobin 10.2, platelet count 

655.  BUN 18 creatinine 0.49.  Wound culture is gram-negative bacilli.  Patient 

is followed by Dr. Omer maintained on Eraxis and meropenem.





Objective





- Vital Signs


Vital signs: 


                                   Vital Signs











Temp  98.5 F   07/15/19 07:00


 


Pulse  92   07/15/19 13:11


 


Resp  16   07/15/19 07:00


 


BP  109/70   07/15/19 07:00


 


Pulse Ox  93 L  07/15/19 07:00








                                 Intake & Output











 07/14/19 07/15/19 07/15/19





 18:59 06:59 18:59


 


Intake Total 240 1130 240


 


Output Total  850 


 


Balance 240 280 240


 


Intake:   


 


  Intake, IV Titration  600 





  Amount   


 


    Sodium Chloride 0.9% 1,  600 





    000 ml @ 50 mls/hr IV .   





    Q20H [facility] Rx#:[id number]   


 


  Oral 240 530 240


 


Output:   


 


  Urine  850 


 


Other:   


 


  Voiding Method  Urinal 


 


  # Voids 2  














- Exam





Review Of Systems:


Constitutional: No fever, no chills, no night sweats.  No weight change.  No 

weakness, fatigue or lethargy. 


EENT: No headache.  No nasal drainage or congestion.  No epistaxis.  No sore 

throat.


Lungs: No shortness of breath, cough, no sputum production.  No wheezing.


Cardiovascular: No chest pain, no lower extremity edema.  No palpitations.  No 

paroxysmal nocturnal dyspnea.  No orthopnea.  No lightheadedness or dizziness.  

No syncopal episodes.


Abdominal: Reports abdominal pain.  Denies nausea, vomiting.  No diarrhea.  


Genitourinary: No dysuria, increased frequency, urgency.  No urinary retention.


Musculoskeletal: No myalgias.  No muscle weakness, no gait dysfunction, no 

frequent falls.  No back pain.  No neck pain.


Integumentary: Reports wounds, no lesions.  No rash or pruritus.  No unusual 

bruising.  No change in hair or nails.


Neurologic: No aphasia. No facial droop. No change in mentation. No head injury.

No headache. No paralysis. No paresthesia.


Psychiatric: No depression.  No anxiety.  No mood swings.


Endocrine: No abnormal blood sugars.  No weight change.  No excessive sweating 

or thirst.  No cold intolerance.  











Gen: This is a 69-year-old  male.  Patient is resting in bed appears to

be comfortable.


HEENT: Head is atraumatic, normocephalic. Pupils equal, round. Sclerae is 

anicteric. 


NECK: Supple. No JVD. No lymphadenopathy. No thyromegaly. 


LUNGS: Clear to auscultation. No wheezes or rhonchi.  No intercostal re

tractions.


HEART: Regular rate and rhythm. No murmur. 


ABDOMEN: Soft. Bowel sounds are present. No masses.  No tenderness.  Dressing in

place to the mid abdomen.


EXTREMITIES: No pedal edema.  No calf tenderness.


NEUROLOGICAL: Patient is awake, alert and oriented x3. Cranial nerves 2 through 

12 are grossly intact. 











- Labs


CBC & Chem 7: 


                                 07/15/19 06:55





                                 07/15/19 06:55


Labs: 


                  Abnormal Lab Results - Last 24 Hours (Table)











  07/15/19 07/15/19 Range/Units





  06:55 06:55 


 


WBC  15.2 H   (3.8-10.6)  k/uL


 


RBC  3.68 L   (4.30-5.90)  m/uL


 


Hgb  10.7 L   (13.0-17.5)  gm/dL


 


Hct  33.7 L   (39.0-53.0)  %


 


RDW  16.1 H   (11.5-15.5)  %


 


Plt Count  655 H   (150-450)  k/uL


 


Neutrophils #  12.1 H   (1.3-7.7)  k/uL


 


Carbon Dioxide   33 H  (22-30)  mmol/L


 


BUN   8 L  (9-20)  mg/dL


 


Creatinine   0.49 L  (0.66-1.25)  mg/dL








                      Microbiology - Last 24 Hours (Table)











 07/13/19 07:44 Blood Culture - Preliminary





 Blood    No Growth after 48 hours


 


 07/13/19 16:12 Gram Stain - Preliminary





 Abdomen Wound Culture - Preliminary





    Gram Neg Bacilli














Assessment and Plan


Plan: 





1.  Status post incision and drainage of abdominal wall abscess and 

intraperitoneal abscess.


2.  Recent exploratory laparotomy with repair of perforated duodenal ulcer and 

incarcerated umbilical hernia 05/23/2019. 


3.  Severe leukocytosis.


4.  Abdominal pain.


5.  Rheumatoid arthritis with immune suppressant status due to daily use of 

steroids.


6.  COPD.  


7.  Chronic respiratory failure with hypoxia home oxygen dependent.


8.  Debility and deconditioning


9.  Hypertension.


10.  Insomnia.


11.  Electrolyte imbalance with hyponatremia.


12.  Hyperlipidemia.


13.  Generalized osteoarthritis.


14.  Obstructive sleep apnea.


Patient is tolerating clear liquid diet.  Continue Eraxis and Zosyn.  Continue 

aggressive bowel regimen.  Patient will require DVT and GI prophylaxis with 

heparin subcu and Protonix. 





Impression and plan of care have been directed as dictated by the signing phys

michael.  Chloé Maldonado nurse practitioner acting as scribe for signing physician.

## 2019-07-15 NOTE — P.PN
Subjective


Progress Note Date: 07/14/19





69-year-old  male who had significant abdominal pain in May.  In on 

05/25/2019 was taken to the operating room and exploratory laparotomy revealed 

evidence of the perforated gastric ulcer.  He underwent the surgical repair and 

was doing relatively well.  Given the difficulties at the time a retention 

suture was utilized.  The patient did have office follow-up some was having some

improvement.  The patient then relates that he recently started developed some 

bulging to the abdominal wall at the incision site and because of this he sought

care.  Imaging studies were performed proving evidence of a significant fluid 

collection and constantly he has been taken to the operating room and the site 

has been explored.  The surgeon relates that with very minimal manipulation the 

site recently opened and a large amount of purulent material extruded.  He 

patient is now postoperative in the site is packed.  He is with complaints of 

pain postoperative but is denying nausea or emesis.  He or he has an appetite.  

He did have a fever but denies chills or rigors at this time.  Does have some 

weakness related to the some long-term recovery that he has been suffering from.


07/14/2019 the patient is definitely feeling better today.  He's had some 

morphine and has a narcotic affect.  His son and daughter-in-law are present and

are pleased with how well he is doing.  He is eating Jell-O, sitting air.  In 

the chair and does not truly have many complaints.





Objective





- Vital Signs


Vital signs: 


                                   Vital Signs











Temp  98.2 F   07/14/19 15:07


 


Pulse  102 H  07/14/19 23:54


 


Resp  20   07/14/19 04:00


 


BP  106/70   07/14/19 15:07


 


Pulse Ox  92 L  07/14/19 15:07








                                 Intake & Output











 07/14/19 07/14/19 07/15/19





 06:59 18:59 06:59


 


Intake Total 1270 240 


 


Output Total 475  


 


Balance 795 240 


 


Intake:   


 


  Intake, IV Titration 700  





  Amount   


 


    Meropenem 2 gm In Sodium 100  





    Chloride 0.9% 100 ml @   





    200 mls/hr IVPB Q8HR IMTIAZ   





    Rx#:[id number]   


 


    Sodium Chloride 0.9% 1, 600  





    000 ml @ 50 mls/hr IV .   





    Q20H IMTIAZ Rx#:[id number]   


 


  Oral 570 240 


 


Output:   


 


  Urine 475  


 


Other:   


 


  Voiding Method Urinal  


 


  # Voids 1 2 














- Exam





HEENT: Anicteric conjunctiva are pink and moist nasal mucosa grossly intact 

without significant lesions, there is no thrush.


Neck: The neck is supple without significant lymphadenopathy or thyromegaly.


Lungs: Good bilateral air entry without significant crackles or wheezing.  There

is no significant bronchial sounds.  There is no egophony or dullness.


Heart: Regular rate and rhythm with an audible S1-S2, no S3 no S4.  There is no 

significant murmur click or rub, PMI was nondisplaced.


Abdomen: Abdomen is postoperative, it is tender, dressing is intact, few bowel 

sounds are heard.


Extremities: The upper extremities have excellent pulses they are symmetric, no 

significant petechiae or telangiectasia.  No splinter hemorrhages were noted.  

The lower extremities are free from significant edema.  The peripheral pulses 

were 2+ and symmetric.


Neuro: Awake alert oriented to person place and time.  There are no acute new 

gross focal sensory motor deficits.





- Labs


CBC & Chem 7: 


                                 07/14/19 06:27





                                 07/13/19 07:44


Labs: 


                  Abnormal Lab Results - Last 24 Hours (Table)











  07/14/19 Range/Units





  06:27 


 


WBC  19.8 H  (3.8-10.6)  k/uL


 


RBC  3.64 L  (4.30-5.90)  m/uL


 


Hgb  10.9 L  (13.0-17.5)  gm/dL


 


Hct  33.5 L  (39.0-53.0)  %


 


RDW  16.1 H  (11.5-15.5)  %


 


Plt Count  583 H  (150-450)  k/uL


 


Neutrophils #  16.2 H  (1.3-7.7)  k/uL








                      Microbiology - Last 24 Hours (Table)











 07/13/19 16:12 Gram Stain - Preliminary





 Abdomen Wound Culture - Preliminary





    Gram Neg Bacilli


 


 07/13/19 07:44 Blood Culture - Preliminary





 Blood    No Growth after 24 hours


 


 07/13/19 16:12 Anaerobic Culture - Preliminary





 Abdomen 








                               Laboratory Results











WBC  19.8 k/uL (3.8-10.6)  H  07/14/19  06:27    


 


RBC  3.64 m/uL (4.30-5.90)  L  07/14/19  06:27    


 


Hgb  10.9 gm/dL (13.0-17.5)  L  07/14/19  06:27    


 


Hct  33.5 % (39.0-53.0)  L  07/14/19  06:27    


 


MCV  92.1 fL (80.0-100.0)   07/14/19  06:27    


 


MCH  30.0 pg (25.0-35.0)   07/14/19  06:27    


 


MCHC  32.6 g/dL (31.0-37.0)   07/14/19  06:27    


 


RDW  16.1 % (11.5-15.5)  H  07/14/19  06:27    


 


Plt Count  583 k/uL (150-450)  H  07/14/19  06:27    


 


Neutrophils %  82 %  07/14/19  06:27    


 


Lymphocytes %  11 %  07/14/19  06:27    


 


Monocytes %  3 %  07/14/19  06:27    


 


Eosinophils %  2 %  07/14/19  06:27    


 


Basophils %  1 %  07/14/19  06:27    


 


Neutrophils #  16.2 k/uL (1.3-7.7)  H  07/14/19  06:27    


 


Lymphocytes #  2.1 k/uL (1.0-4.8)   07/14/19  06:27    


 


Monocytes #  0.6 k/uL (0-1.0)   07/14/19  06:27    


 


Eosinophils #  0.4 k/uL (0-0.7)   07/14/19  06:27    


 


Basophils #  0.1 k/uL (0-0.2)   07/14/19  06:27    


 


Hypochromasia  Slight   07/14/19  06:27    


 


Anisocytosis  Slight   07/14/19  06:27    


 


Sodium  134 mmol/L (137-145)  L  07/13/19  07:44    


 


Potassium  4.5 mmol/L (3.5-5.1)   07/13/19  07:44    


 


Chloride  97 mmol/L ()  L  07/13/19  07:44    


 


Carbon Dioxide  26 mmol/L (22-30)   07/13/19  07:44    


 


Anion Gap  11 mmol/L  07/13/19  07:44    


 


BUN  21 mg/dL (9-20)  H  07/13/19  07:44    


 


Creatinine  0.67 mg/dL (0.66-1.25)   07/13/19  07:44    


 


Est GFR (CKD-EPI)AfAm  >90  (>60 ml/min/1.73 sqM)   07/13/19  07:44    


 


Est GFR (CKD-EPI)NonAf  >90  (>60 ml/min/1.73 sqM)   07/13/19  07:44    


 


Glucose  117 mg/dL (74-99)  H  07/13/19  07:44    


 


Plasma Lactic Acid José Miguel  1.1 mmol/L (0.7-2.0)   07/13/19  07:44    


 


Calcium  9.2 mg/dL (8.4-10.2)   07/13/19  07:44    


 


Total Bilirubin  0.6 mg/dL (0.2-1.3)   07/13/19  07:44    


 


AST  21 U/L (17-59)   07/13/19  07:44    


 


ALT  42 U/L (21-72)   07/13/19  07:44    


 


Alkaline Phosphatase  100 U/L ()   07/13/19  07:44    


 


Total Protein  6.4 g/dL (6.3-8.2)   07/13/19  07:44    


 


Albumin  3.2 g/dL (3.5-5.0)  L  07/13/19  07:44    


 


Amylase  38 U/L ()   07/13/19  07:44    


 


Lipase  92 U/L ()   07/13/19  07:44    


 


Urine Color  Light Yellow   07/13/19  10:19    


 


Urine Appearance  Clear  (Clear)   07/13/19  10:19    


 


Urine pH  7.0  (5.0-8.0)   07/13/19  10:19    


 


Ur Specific Gravity  1.018  (1.001-1.035)   07/13/19  10:19    


 


Urine Protein  Negative  (Negative)   07/13/19  10:19    


 


Urine Glucose (UA)  Negative  (Negative)   07/13/19  10:19    


 


Urine Ketones  Negative  (Negative)   07/13/19  10:19    


 


Urine Blood  Negative  (Negative)   07/13/19  10:19    


 


Urine Nitrite  Negative  (Negative)   07/13/19  10:19    


 


Urine Bilirubin  Negative  (Negative)   07/13/19  10:19    


 


Urine Urobilinogen  <2.0 mg/dL (<2.0)   07/13/19  10:19    


 


Ur Leukocyte Esterase  Negative  (Negative)   07/13/19  10:19    








                                  Microbiology





07/13/19 16:12   Abdomen   Gram Stain - Preliminary


07/13/19 16:12   Abdomen   Wound Culture - Preliminary


                            Gram Neg Bacilli


07/13/19 07:44   Blood   Blood Culture - Preliminary


                            No Growth after 24 hours


07/13/19 16:12   Abdomen   Anaerobic Culture - Preliminary











Assessment and Plan


(1) Abdominal abscess


Narrative/Plan: 


Pleasant 69-year-old male who has a many week history of difficulties with his 

abdomen.  On 05/25/2019 he was taken to the operating room and exporter 

laparotomy reveal evidence of the perforated gastric ulcer he underwent repair. 

Patient was showing some improvement but now presents to Hospital with 

significant increasing amounts of abdominal pain associated with some lack of 

appetite and fever.  He noticed significant bulging to the midline incision and 

constantly was seen by the surgeon.  He was taken to the operating room where 

there is evidence of abscess at the site.  The area has been packed and antibi

otic therapy was requested.  Zosyn has been initiated and will add Eraxis for 

now until we have further culture.  Concern to intra-abdominal pathogens 

including gram-positive gram-negative anaerobes and fungus.  The patient may 

require outpatient intravenous antibiotic therapy depending on the findings.  

His pain control is being adjusted this point in time.  Nutritional 

supplementation with Shane or other protein supplement will be helpful and 

potential multivitamin also to help his healing as he improves.


07/14/2019 the patient is started feels somewhat better.  Wound cultures are in 

process with her further help direct her course of outpatient antibiotic 

therapy.  It is still likely that he will require outpatient intravenous 

antibiotic therapy but hopefully would not need outpatient intravenous 

antifungal therapy.  He is doing relatively well.  As his nutrition improves 

we'll add in Shane and multivitamin.


Current Visit: Yes   Status: Acute   Code(s): AWA2463 -    SNOMED Code(s): 7

8566995


   





(2) Duodenal ulcer, perforated


Current Visit: No   Status: Acute   Code(s): K26.5 - CHRONIC OR UNSPECIFIED 

DUODENAL ULCER WITH PERFORATION   SNOMED Code(s): 70484180


   





(3) Abdominal pain


Current Visit: Yes   Status: Acute   Code(s): R10.9 - UNSPECIFIED ABDOMINAL PAIN

  SNOMED Code(s): 98169734

## 2019-07-16 LAB
BASOPHILS # BLD AUTO: 0.1 K/UL (ref 0–0.2)
BASOPHILS NFR BLD AUTO: 0 %
EOSINOPHIL # BLD AUTO: 0.3 K/UL (ref 0–0.7)
EOSINOPHIL NFR BLD AUTO: 2 %
ERYTHROCYTE [DISTWIDTH] IN BLOOD BY AUTOMATED COUNT: 3.54 M/UL (ref 4.3–5.9)
ERYTHROCYTE [DISTWIDTH] IN BLOOD: 16.4 % (ref 11.5–15.5)
HCT VFR BLD AUTO: 33.4 % (ref 39–53)
HGB BLD-MCNC: 10.6 GM/DL (ref 13–17.5)
LYMPHOCYTES # SPEC AUTO: 2 K/UL (ref 1–4.8)
LYMPHOCYTES NFR SPEC AUTO: 12 %
MCH RBC QN AUTO: 30 PG (ref 25–35)
MCHC RBC AUTO-ENTMCNC: 31.9 G/DL (ref 31–37)
MCV RBC AUTO: 94.2 FL (ref 80–100)
MONOCYTES # BLD AUTO: 0.5 K/UL (ref 0–1)
MONOCYTES NFR BLD AUTO: 3 %
NEUTROPHILS # BLD AUTO: 13.5 K/UL (ref 1.3–7.7)
NEUTROPHILS NFR BLD AUTO: 81 %
PLATELET # BLD AUTO: 607 K/UL (ref 150–450)
WBC # BLD AUTO: 16.7 K/UL (ref 3.8–10.6)

## 2019-07-16 RX ADMIN — ANIDULAFUNGIN SCH MLS/HR: 100 INJECTION, POWDER, LYOPHILIZED, FOR SOLUTION INTRAVENOUS at 19:18

## 2019-07-16 RX ADMIN — MEROPENEM SCH MLS/HR: 1 INJECTION, POWDER, FOR SOLUTION INTRAVENOUS at 00:09

## 2019-07-16 RX ADMIN — IPRATROPIUM BROMIDE AND ALBUTEROL SULFATE SCH: .5; 3 SOLUTION RESPIRATORY (INHALATION) at 19:33

## 2019-07-16 RX ADMIN — MIRTAZAPINE SCH MG: 15 TABLET, FILM COATED ORAL at 20:43

## 2019-07-16 RX ADMIN — IPRATROPIUM BROMIDE AND ALBUTEROL SULFATE SCH ML: .5; 3 SOLUTION RESPIRATORY (INHALATION) at 07:22

## 2019-07-16 RX ADMIN — IPRATROPIUM BROMIDE AND ALBUTEROL SULFATE SCH ML: .5; 3 SOLUTION RESPIRATORY (INHALATION) at 15:22

## 2019-07-16 RX ADMIN — HEPARIN SODIUM SCH UNIT: 5000 INJECTION, SOLUTION INTRAVENOUS; SUBCUTANEOUS at 20:42

## 2019-07-16 RX ADMIN — HYDROCODONE BITARTRATE AND ACETAMINOPHEN PRN EACH: 10; 325 TABLET ORAL at 10:13

## 2019-07-16 RX ADMIN — THERA TABS SCH EACH: TAB at 10:12

## 2019-07-16 RX ADMIN — CEFAZOLIN SCH: 330 INJECTION, POWDER, FOR SOLUTION INTRAMUSCULAR; INTRAVENOUS at 19:29

## 2019-07-16 RX ADMIN — IPRATROPIUM BROMIDE AND ALBUTEROL SULFATE SCH ML: .5; 3 SOLUTION RESPIRATORY (INHALATION) at 03:23

## 2019-07-16 RX ADMIN — BUDESONIDE AND FORMOTEROL FUMARATE DIHYDRATE SCH PUFF: 160; 4.5 AEROSOL RESPIRATORY (INHALATION) at 19:13

## 2019-07-16 RX ADMIN — SERTRALINE HYDROCHLORIDE SCH MG: 100 TABLET ORAL at 10:12

## 2019-07-16 RX ADMIN — HYDROCODONE BITARTRATE AND ACETAMINOPHEN PRN EACH: 10; 325 TABLET ORAL at 19:26

## 2019-07-16 RX ADMIN — IPRATROPIUM BROMIDE AND ALBUTEROL SULFATE SCH ML: .5; 3 SOLUTION RESPIRATORY (INHALATION) at 19:12

## 2019-07-16 RX ADMIN — HEPARIN SODIUM SCH UNIT: 5000 INJECTION, SOLUTION INTRAVENOUS; SUBCUTANEOUS at 10:11

## 2019-07-16 RX ADMIN — PANTOPRAZOLE SODIUM SCH MG: 40 TABLET, DELAYED RELEASE ORAL at 10:12

## 2019-07-16 RX ADMIN — MEROPENEM SCH MLS/HR: 1 INJECTION, POWDER, FOR SOLUTION INTRAVENOUS at 11:29

## 2019-07-16 RX ADMIN — HYDROCODONE BITARTRATE AND ACETAMINOPHEN PRN EACH: 10; 325 TABLET ORAL at 00:11

## 2019-07-16 RX ADMIN — BUDESONIDE AND FORMOTEROL FUMARATE DIHYDRATE SCH PUFF: 160; 4.5 AEROSOL RESPIRATORY (INHALATION) at 07:22

## 2019-07-16 RX ADMIN — Medication SCH MG: at 20:42

## 2019-07-16 RX ADMIN — MEROPENEM SCH MLS/HR: 1 INJECTION, POWDER, FOR SOLUTION INTRAVENOUS at 18:13

## 2019-07-16 NOTE — PN
PROGRESS NOTE



DATE OF SERVICE:

07/16/2019



REASON FOR FOLLOWUP:

Abdominal abscess.



INTERVAL HISTORY:

The patient is a 69-year-old  male who presented to the [facility] ER

on 7/13/2019 with the chief complaint of abdominal pain in this patient who recently

did have a perforated duodenal ulcer with an incarcerated umbilical hernia on

05/23/2019. The patient's pain had been getting worse before he presented to the

hospital, more in the epigastric area, dull aching to sharp, 5 to 6 out of 10; no

radiation.  The patient had a CT scan of the abdomen and pelvis completed which showed

evidence of a focal fluid collection deep to the rectus level and also a larger thin-

walled fluid collection deeper to the initial collection of 6.3 x 3.2 cm, for which the

patient was evaluated by General Surgery.  He was taken to the OR and is status post

incision and drainage of the abdominal wall as well as intraperitoneal abscess.

Cultures have been obtained which are currently showing Gram-negative bacilli. The

patient is being treated with broad-spectrum antibiotics in the form of Meropenem and

Eraxis. As the patient has been known to my service from previous admission, care has

been transitioned to me for outpatient IV antibiotic recommendation.  Patient is

currently afebrile. As of this morning, the patient has been breathing comfortably.

Denies having any chest pain.  Some shortness of breath.  Occasional cough.  Abdominal

pain is currently well controlled with pain medication. Pain is more of a dull aching,

2 to 3 out of 10, and no radiation. Local wound care to the abdominal wound has been

Aquacel Silver dressing.



REVIEW OF SYSTEMS:

Positive points have been mentioned in the HPI. Rest of the systems negative.



Past medical and surgical history reviewed. _____medication reviewed.



PHYSICAL EXAMINATION:

Blood pressure 132/69 with a pulse of 80, temperature 98.2. He is 95% on 3 L nasal

cannula.

General description is an elderly male up in the bed in no distress.

HEENT EXAMINATION: Pallor. There is no scleral icterus. Oral mucosa membrane is moist.

NECK: Trachea is central. No megaly.

LUNGS: Unlabored breathing with decreased breath sounds. No wheeze.

HEART: S1, S2.  Regular rate and rhythm.

ABDOMEN:  Soft.  Did have midline wound with minimal purulent drainage.  No surrounding

redness or any foul-smelling.

EXTREMITIES: No edema of the feet.



LABS:

Hemoglobin is 10.6, white count 16.7, BUN of 8, creatinine 0.49. Abdominal cultures

currently showing proteus and Gram-negative bacilli. ID and sensitivities currently

pending.



DIAGNOSTIC IMPRESSION AND PLAN:

Patient with intraabdominal wall and intraperitoneal abscess, status post surgical

_____ in this patient who recently did have a perforated duodenal ulcer and an

incarcerated hernia, status post repair of the same.  Will need to cover for enteric

Gram-negative, especially resistant in view of recent surgery and antibiotic exposure.

The patient is currently covered with Meropenem and Eraxis; to continue, waiting for

the ID and to treat the second Gram-negative before deciding on discharge antibiotics;

more likely IV, for which the midline has been placed.  Family at the bedside.  Their

questions and concerns were answered.





ALEXANDER / BLAS: [id number] / Job#: [id number]

## 2019-07-16 NOTE — P.PN
Subjective


Progress Note Date: 07/16/19





This is 69 years old male with recent gastric ulcer rupture status post 

abdominal surgery who presented to the hospital with abdominal pain and found to

have abdominal abscess that was drained by general surgery yesterday and medical

consult was obtained for follow-up on his complicated past medical history.  

Patient currently is denying chest pain, shortness breath, nausea, vomiting or 

dizziness stated that his abdominal pain improved at least by 75% since 

yesterday.  Patient denied tobacco alcohol or drug abuse stated that he's 

compliment with his medication and doctors follow-up and his wife at the bedside

who is the primary care provider for the patient as patient has rheumatoid 

arthritis and suffered from debility at home





7/15: Patient complains of soreness in his abdomen.  Dressing is in place with 

packing.  He is currently on full liquid diet and tolerating this.  He denies 

any nausea or vomiting.


Patient has been afebrile, heart rate 92, blood pressure 109/70, pulse ox 93% on

room air.  Repeat lab work reveals WBC of 15.2, hemoglobin 10.2, platelet count 

655.  BUN 18 creatinine 0.49.  Wound culture is gram-negative bacilli.  Patient 

is followed by Dr. Omer maintained on Eraxis and meropenem.





7/16: Patient has been afebrile, heart rate 92, blood pressure 107/58, pulse ox 

94% on 3 L nasal cannula.  Repeat CBC WBC 16.7, hemoglobin 10.6, platelet count 

607.  Abdominal wound culture is positive for gram-negative bacilli and Proteus 

vulgaris.  Patient is continued on Eraxis and meropenem.  He was started on a 

regular diet last evening and tolerating.  Dr. Rogers is now handling this case. 

Midline has been placed.  











Objective





- Vital Signs


Vital signs: 


                                   Vital Signs











Temp  97.8 F   07/16/19 07:00


 


Pulse  92   07/16/19 07:32


 


Resp  18   07/16/19 07:00


 


BP  107/58   07/16/19 07:00


 


Pulse Ox  94 L  07/16/19 07:00








                                 Intake & Output











 07/15/19 07/16/19 07/16/19





 18:59 06:59 18:59


 


Intake Total 936  


 


Output Total  350 


 


Balance 936 -350 


 


Weight 72.575 kg  


 


Intake:   


 


  Oral 936  


 


Output:   


 


  Urine  350 


 


Other:   


 


  Voiding Method  Urinal 


 


  # Voids 2  














- Exam





Review Of Systems:


Constitutional: No fever, no chills, no night sweats.  


EENT: No headache.  No nasal drainage or congestion.  No epistaxis.  No sore 

throat.


Lungs: No shortness of breath, cough, no sputum production.  No wheezing.


Cardiovascular: No chest pain, no lower extremity edema.  No palpitations.  No 

paroxysmal nocturnal dyspnea.  No orthopnea.  No lightheadedness or dizziness.  

No syncopal episodes.


Abdominal: Reports abdominal pain.  Denies nausea, vomiting.  No diarrhea.  


Genitourinary: No dysuria, increased frequency, urgency.  No urinary retention.


Musculoskeletal: No myalgias.  No muscle weakness, no gait dysfunction, no 

frequent falls.  No back pain.  No neck pain.


Integumentary: Reports wounds, no lesions.  No rash or pruritus.  No unusual 

bruising.  No change in hair or nails.


Neurologic: No aphasia. No facial droop. No change in mentation. No head injury.

No headache. No paralysis. No paresthesia.


Psychiatric: No depression.  No anxiety.  No mood swings.


Endocrine: No abnormal blood sugars.  No weight change.  No excessive sweating 

or thirst.  No cold intolerance.  











Gen: This is a 69-year-old  male.  Patient is resting in bed appears to

be comfortable.


HEENT: Head is atraumatic, normocephalic. Pupils equal, round. Sclerae is ani

cteric. 


NECK: Supple. No JVD. No lymphadenopathy. No thyromegaly. 


LUNGS: Clear to auscultation. No wheezes or rhonchi.  No intercostal 

retractions.


HEART: Regular rate and rhythm. No murmur. 


ABDOMEN: Soft. Bowel sounds are present. No masses.  No tenderness.  Dressing in

place to the mid abdomen.


EXTREMITIES: No pedal edema.  No calf tenderness.


NEUROLOGICAL: Patient is awake, alert and oriented x3. Cranial nerves 2 through 

12 are grossly intact. 











- Labs


CBC & Chem 7: 


                                 07/16/19 07:26





                                 07/15/19 06:55


Labs: 


                  Abnormal Lab Results - Last 24 Hours (Table)











  07/16/19 Range/Units





  07:26 


 


WBC  16.7 H  (3.8-10.6)  k/uL


 


RBC  3.54 L  (4.30-5.90)  m/uL


 


Hgb  10.6 L  (13.0-17.5)  gm/dL


 


Hct  33.4 L  (39.0-53.0)  %


 


RDW  16.4 H  (11.5-15.5)  %


 


Plt Count  607 H  (150-450)  k/uL


 


Neutrophils #  13.5 H  (1.3-7.7)  k/uL








                      Microbiology - Last 24 Hours (Table)











 07/13/19 16:12 Gram Stain - Preliminary





 Abdomen Wound Culture - Preliminary





    Gram Neg Bacilli





    Proteus vulgaris


 


 07/13/19 07:44 Blood Culture - Preliminary





 Blood    No Growth after 48 hours














Assessment and Plan


Plan: 





1.  Status post incision and drainage of abdominal wall abscess and 

intraperitoneal abscess.


2.  Recent exploratory laparotomy with repair of perforated duodenal ulcer and 

incarcerated umbilical hernia 05/23/2019. 


3.  Severe leukocytosis.


4.  Abdominal pain.


5.  Rheumatoid arthritis with immune suppressant status due to daily use of 

steroids.


6.  COPD.  


7.  Chronic respiratory failure with hypoxia home oxygen dependent.


8.  Debility and deconditioning


9.  Hypertension.


10.  Insomnia.


11.  Electrolyte imbalance with hyponatremia.


12.  Hyperlipidemia.


13.  Generalized osteoarthritis.


14.  Obstructive sleep apnea.


Patient is tolerating regular diet.  Continue Eraxis and Zosyn.  Continue 

aggressive bowel regimen.  Patient will require DVT and GI prophylaxis with 

heparin subcu and Protonix. 





Impression and plan of care have been directed as dictated by the signing 

physician.  Chloé Maldonado nurse practitioner acting as scribe for signing physici

an.

## 2019-07-16 NOTE — P.PN
<Dilcia Clark FIGUEROA - Last Filed: 07/16/19 11:21>





Subjective


Progress Note Date: 07/16/19





CHIEF COMPLAINT: Abdominal pain





HISTORY OF PRESENT ILLNESS: Patient examined at the bedside this morning. status

post incision and drainage of abdominal wall abscess in intraperitoneal abscess 

performed on 07/13/2019. He reports his abdominal pain is tolerable. Denies 

nausea or vomiting. Tolerating diet. Passing flatus. Bowel movement this 

morning. WBC increased slightly to 16.7. Wound culture positive for gram 

negative bacilli/proteus vulgaris. Hemoglobin 10.6. Vital signs stable. Afeb

rile.





PHYSICAL EXAM: 


VITAL SIGNS: Reviewed.


GENERAL: Well-developed in no acute distress. 


HEENT:  No sclera icterus. Extraocular movements grossly intact.  Moist buccal 

mucosa. Head is atraumatic, normocephalic. 


ABDOMEN:  Soft.  Nondistended. Dressing to abdomen clean dry intact-wound 

assessment deferred. Abdominal binder noted.


NEUROLOGIC: Alert and oriented. Cranial nerves II through XII grossly intact.





ASSESSMENT: 


1. Abdominal abscess


2. History of exploratory laparotomy with repair of duodenal ulcer, May 2019





PLAN: 


1. Continue current diet


2. Patient to receive midline IV catheter today


3. Outpatient IV antibiotics per Dr. Rogers


4. Dr. Gomez to re-examine wound this afternoon





Nurse practitioner note has been reviewed by physician. Signing provider agrees 

with the documented findings, assessment, and plan of care. 








Objective





- Vital Signs


Vital signs: 


                                   Vital Signs











Temp  97.8 F   07/16/19 07:00


 


Pulse  92   07/16/19 07:32


 


Resp  18   07/16/19 07:00


 


BP  107/58   07/16/19 07:00


 


Pulse Ox  94 L  07/16/19 07:00








                                 Intake & Output











 07/15/19 07/16/19 07/16/19





 18:59 06:59 18:59


 


Intake Total 936  180


 


Output Total  350 


 


Balance 936 -350 180


 


Weight 72.575 kg  


 


Intake:   


 


  Oral 936  180


 


Output:   


 


  Urine  350 


 


Other:   


 


  Voiding Method  Urinal 


 


  # Voids 2  














- Labs


CBC & Chem 7: 


                                 07/16/19 07:26





                                 07/15/19 06:55


Labs: 


                  Abnormal Lab Results - Last 24 Hours (Table)











  07/16/19 Range/Units





  07:26 


 


WBC  16.7 H  (3.8-10.6)  k/uL


 


RBC  3.54 L  (4.30-5.90)  m/uL


 


Hgb  10.6 L  (13.0-17.5)  gm/dL


 


Hct  33.4 L  (39.0-53.0)  %


 


RDW  16.4 H  (11.5-15.5)  %


 


Plt Count  607 H  (150-450)  k/uL


 


Neutrophils #  13.5 H  (1.3-7.7)  k/uL








                      Microbiology - Last 24 Hours (Table)











 07/13/19 07:44 Blood Culture - Preliminary





 Blood    No Growth after 72 hours


 


 07/13/19 16:12 Gram Stain - Preliminary





 Abdomen Wound Culture - Preliminary





    Gram Neg Bacilli





    Proteus vulgaris














<Chivo Gomez - Last Filed: 07/16/19 17:26>





Subjective


As above.  Patient doing well.  Wound is .  Less drainage.  White blood 

cell count did increase slightly.  Cultures noted.  Will discuss further 

regarding wound VAC therapy with Dr. Rogers.  Possible discharge tomorrow on IV 

antibiotics.








Objective





- Vital Signs


Vital signs: 


                                   Vital Signs











Temp  98.2 F   07/16/19 15:15


 


Pulse  96   07/16/19 15:33


 


Resp  24   07/16/19 15:19


 


BP  132/69   07/16/19 15:15


 


Pulse Ox  95   07/16/19 15:15








                                 Intake & Output











 07/15/19 07/16/19 07/16/19





 18:59 06:59 18:59


 


Intake Total 936  530


 


Output Total  350 


 


Balance 936 -350 530


 


Weight 72.575 kg  


 


Intake:   


 


  Oral 936  530


 


Output:   


 


  Urine  350 


 


Other:   


 


  Voiding Method  Urinal Toilet


 


  # Voids 2  3














- Labs


CBC & Chem 7: 


                                 07/16/19 07:26





                                 07/15/19 06:55


Labs: 


                  Abnormal Lab Results - Last 24 Hours (Table)











  07/16/19 Range/Units





  07:26 


 


WBC  16.7 H  (3.8-10.6)  k/uL


 


RBC  3.54 L  (4.30-5.90)  m/uL


 


Hgb  10.6 L  (13.0-17.5)  gm/dL


 


Hct  33.4 L  (39.0-53.0)  %


 


RDW  16.4 H  (11.5-15.5)  %


 


Plt Count  607 H  (150-450)  k/uL


 


Neutrophils #  13.5 H  (1.3-7.7)  k/uL








                      Microbiology - Last 24 Hours (Table)











 07/13/19 07:44 Blood Culture - Preliminary





 Blood    No Growth after 72 hours


 


 07/13/19 16:12 Gram Stain - Preliminary





 Abdomen Wound Culture - Preliminary





    Gram Neg Bacilli





    Proteus vulgaris














Assessment and Plan


(1) Abdominal abscess


Current Visit: Yes   Status: Acute   Code(s): IZC0270 -    SNOMED Code(s): 

64844610

## 2019-07-17 LAB
BASOPHILS # BLD AUTO: 0.1 K/UL (ref 0–0.2)
BASOPHILS NFR BLD AUTO: 1 %
EOSINOPHIL # BLD AUTO: 0.6 K/UL (ref 0–0.7)
EOSINOPHIL NFR BLD AUTO: 4 %
ERYTHROCYTE [DISTWIDTH] IN BLOOD BY AUTOMATED COUNT: 3.84 M/UL (ref 4.3–5.9)
ERYTHROCYTE [DISTWIDTH] IN BLOOD: 16.4 % (ref 11.5–15.5)
GLUCOSE BLD-MCNC: 103 MG/DL (ref 75–99)
HCT VFR BLD AUTO: 36.7 % (ref 39–53)
HGB BLD-MCNC: 11.1 GM/DL (ref 13–17.5)
LYMPHOCYTES # SPEC AUTO: 2.4 K/UL (ref 1–4.8)
LYMPHOCYTES NFR SPEC AUTO: 16 %
MCH RBC QN AUTO: 29 PG (ref 25–35)
MCHC RBC AUTO-ENTMCNC: 30.4 G/DL (ref 31–37)
MCV RBC AUTO: 95.6 FL (ref 80–100)
MONOCYTES # BLD AUTO: 0.6 K/UL (ref 0–1)
MONOCYTES NFR BLD AUTO: 4 %
NEUTROPHILS # BLD AUTO: 11.5 K/UL (ref 1.3–7.7)
NEUTROPHILS NFR BLD AUTO: 75 %
PLATELET # BLD AUTO: 729 K/UL (ref 150–450)
WBC # BLD AUTO: 15.4 K/UL (ref 3.8–10.6)

## 2019-07-17 RX ADMIN — CEFAZOLIN SCH MLS/HR: 330 INJECTION, POWDER, FOR SOLUTION INTRAMUSCULAR; INTRAVENOUS at 16:11

## 2019-07-17 RX ADMIN — IPRATROPIUM BROMIDE AND ALBUTEROL SULFATE SCH ML: .5; 3 SOLUTION RESPIRATORY (INHALATION) at 13:01

## 2019-07-17 RX ADMIN — HEPARIN SODIUM SCH UNIT: 5000 INJECTION, SOLUTION INTRAVENOUS; SUBCUTANEOUS at 08:15

## 2019-07-17 RX ADMIN — PANTOPRAZOLE SODIUM SCH MG: 40 TABLET, DELAYED RELEASE ORAL at 08:14

## 2019-07-17 RX ADMIN — BUDESONIDE AND FORMOTEROL FUMARATE DIHYDRATE SCH PUFF: 160; 4.5 AEROSOL RESPIRATORY (INHALATION) at 20:08

## 2019-07-17 RX ADMIN — CEFAZOLIN SCH MLS/HR: 330 INJECTION, POWDER, FOR SOLUTION INTRAMUSCULAR; INTRAVENOUS at 05:59

## 2019-07-17 RX ADMIN — Medication SCH MG: at 20:41

## 2019-07-17 RX ADMIN — SERTRALINE HYDROCHLORIDE SCH MG: 100 TABLET ORAL at 08:14

## 2019-07-17 RX ADMIN — IPRATROPIUM BROMIDE AND ALBUTEROL SULFATE SCH ML: .5; 3 SOLUTION RESPIRATORY (INHALATION) at 20:08

## 2019-07-17 RX ADMIN — BUDESONIDE AND FORMOTEROL FUMARATE DIHYDRATE SCH PUFF: 160; 4.5 AEROSOL RESPIRATORY (INHALATION) at 08:52

## 2019-07-17 RX ADMIN — MEROPENEM SCH MLS/HR: 1 INJECTION, POWDER, FOR SOLUTION INTRAVENOUS at 00:51

## 2019-07-17 RX ADMIN — MIRTAZAPINE SCH MG: 15 TABLET, FILM COATED ORAL at 20:41

## 2019-07-17 RX ADMIN — HYDROCODONE BITARTRATE AND ACETAMINOPHEN PRN EACH: 10; 325 TABLET ORAL at 05:59

## 2019-07-17 RX ADMIN — IPRATROPIUM BROMIDE AND ALBUTEROL SULFATE SCH ML: .5; 3 SOLUTION RESPIRATORY (INHALATION) at 08:52

## 2019-07-17 RX ADMIN — THERA TABS SCH EACH: TAB at 08:14

## 2019-07-17 RX ADMIN — MEROPENEM SCH MLS/HR: 1 INJECTION, POWDER, FOR SOLUTION INTRAVENOUS at 08:54

## 2019-07-17 RX ADMIN — HEPARIN SODIUM SCH UNIT: 5000 INJECTION, SOLUTION INTRAVENOUS; SUBCUTANEOUS at 20:41

## 2019-07-17 RX ADMIN — HYDROCODONE BITARTRATE AND ACETAMINOPHEN PRN EACH: 10; 325 TABLET ORAL at 19:31

## 2019-07-17 NOTE — P.PN
Subjective


Progress Note Date: 07/17/19





This is 69 years old male with recent gastric ulcer rupture status post 

abdominal surgery who presented to the hospital with abdominal pain and found to

have abdominal abscess that was drained by general surgery yesterday and medical

consult was obtained for follow-up on his complicated past medical history.  

Patient currently is denying chest pain, shortness breath, nausea, vomiting or 

dizziness stated that his abdominal pain improved at least by 75% since 

yesterday.  Patient denied tobacco alcohol or drug abuse stated that he's 

compliment with his medication and doctors follow-up and his wife at the bedside

who is the primary care provider for the patient as patient has rheumatoid 

arthritis and suffered from debility at home





7/15: Patient complains of soreness in his abdomen.  Dressing is in place with 

packing.  He is currently on full liquid diet and tolerating this.  He denies 

any nausea or vomiting.


Patient has been afebrile, heart rate 92, blood pressure 109/70, pulse ox 93% on

room air.  Repeat lab work reveals WBC of 15.2, hemoglobin 10.2, platelet count 

655.  BUN 18 creatinine 0.49.  Wound culture is gram-negative bacilli.  Patient 

is followed by Dr. Omer maintained on Eraxis and meropenem.





7/16: Patient has been afebrile, heart rate 92, blood pressure 107/58, pulse ox 

94% on 3 L nasal cannula.  Repeat CBC WBC 16.7, hemoglobin 10.6, platelet count 

607.  Abdominal wound culture is positive for gram-negative bacilli and Proteus 

vulgaris.  Patient is continued on Eraxis and meropenem.  He was started on a 

regular diet last evening and tolerating.  Dr. Rogers is now handling this case. 

Midline has been placed.  





7/17: Patient remains afebrile, heart rate 88, blood pressure 122/74, pulse ox 

95% on 3 L nasal cannula.  WBC 15.4, hemoglobin 11.1, platelet count 729.  Sg waldron denies any new complaints.  Wound culture is positive for Proteus vulgaris

and Citrobacter braakii.  Anaerobic culture finalized with 3 gram-negative 

bacilli organisms.  Patient is tolerating diet.  He denies any nausea or 

vomiting.  He has had bowel movement.  Anticipate possible discharge in the next

24 hours.





Objective





- Vital Signs


Vital signs: 


                                   Vital Signs











Temp  98.0 F   07/17/19 07:07


 


Pulse  88   07/17/19 08:52


 


Resp  16   07/17/19 07:07


 


BP  122/74   07/17/19 07:07


 


Pulse Ox  95   07/17/19 07:07








                                 Intake & Output











 07/16/19 07/17/19 07/17/19





 18:59 06:59 18:59


 


Intake Total 830 250 


 


Output Total  400 


 


Balance 830 -150 


 


Intake:   


 


  Intake, IV Titration  250 





  Amount   


 


    Anidulafungin 100 mg In  100 





    Sodium Chloride 0.9% 100   





    ml @ 84 mls/hr IVPB Q24H   





    [facility] Rx#:[id number]   


 


    Sodium Chloride 0.9% 1,  150 





    000 ml @ 50 mls/hr IV .   





    Q20H [facility] Rx#:[id number]   


 


  Oral 830  


 


Output:   


 


  Urine  400 


 


Other:   


 


  Voiding Method Toilet Toilet 





  Urinal 


 


  # Voids 3 1 


 


  # Bowel Movements  2 














- Exam





Review Of Systems:


Constitutional: No fever, no chills, no night sweats.  


EENT: No headache.  No nasal drainage or congestion.  No epistaxis.  No sore 

throat.


Lungs: No shortness of breath, cough, no sputum production.  No wheezing.


Cardiovascular: No chest pain, no lower extremity edema.  No palpitations.  No 

paroxysmal nocturnal dyspnea.  No orthopnea.  No lightheadedness or dizziness.  


Abdominal: Reports abdominal pain.  Denies nausea, vomiting.  No diarrhea.  


Genitourinary: No dysuria, increased frequency, urgency.  No urinary retention.


Musculoskeletal: No myalgias.  No muscle weakness, no gait dysfunction, no 

frequent falls.  No back pain.  No neck pain.


Integumentary: Reports wounds, no lesions.  No rash or pruritus.  No unusual 

bruising.  No change in hair or nails.


Neurologic: No aphasia. No facial droop. No change in mentation. No head injury.

No headache. No paralysis. No paresthesia.


Psychiatric: No depression.  No anxiety.  No mood swings.


Endocrine: No abnormal blood sugars.  No weight change.  No excessive sweating 

or thirst.  No cold intolerance.  











Gen: This is a 69-year-old  male.  Patient is resting in bed appears to

be comfortable.


HEENT: Head is atraumatic, normocephalic. Pupils equal, round. Sclerae is 

anicteric. 


NECK: Supple. No JVD. No lymphadenopathy. No thyromegaly. 


LUNGS: Clear to auscultation. No wheezes or rhonchi.  No intercostal 

retractions.


HEART: Regular rate and rhythm. No murmur. 


ABDOMEN: Soft. Bowel sounds are present. No masses.  No tenderness.  Binder and 

Dressing in place to the mid abdomen.


EXTREMITIES: No pedal edema.  No calf tenderness.


NEUROLOGICAL: Patient is awake, alert and oriented x3. Cranial nerves 2 through 

12 are grossly intact. 











- Labs


CBC & Chem 7: 


                                 07/17/19 07:30





                                 07/15/19 06:55


Labs: 


                  Abnormal Lab Results - Last 24 Hours (Table)











  07/17/19 Range/Units





  07:30 


 


WBC  15.4 H  (3.8-10.6)  k/uL


 


RBC  3.84 L  (4.30-5.90)  m/uL


 


Hgb  11.1 L  (13.0-17.5)  gm/dL


 


Hct  36.7 L  (39.0-53.0)  %


 


MCHC  30.4 L  (31.0-37.0)  g/dL


 


RDW  16.4 H  (11.5-15.5)  %


 


Plt Count  729 H  (150-450)  k/uL


 


Neutrophils #  11.5 H  (1.3-7.7)  k/uL








                      Microbiology - Last 24 Hours (Table)











 07/13/19 16:12 Anaerobic Culture - Final





 Abdomen    Anaerobic Gm Negative Bacilli





    Anaerobic Gm Negative Bacilli#2





    Anaerobic Gm Negative Bacilli#3


 


 07/13/19 16:12 Gram Stain - Final





 Abdomen Wound Culture - Final





    Citrobacter braakii





    Proteus vulgaris


 


 07/13/19 07:44 Blood Culture - Preliminary





 Blood    No Growth after 72 hours














Assessment and Plan


Plan: 





1.  Status post incision and drainage of abdominal wall abscess and 

intraperitoneal abscess.


2.  Recent exploratory laparotomy with repair of perforated duodenal ulcer and 

incarcerated umbilical hernia 05/23/2019. 


3.  Severe leukocytosis.


4.  Abdominal pain.


5.  Rheumatoid arthritis with immune suppressant status due to daily use of 

steroids.


6.  COPD.  


7.  Chronic respiratory failure with hypoxia home oxygen dependent.


8.  Debility and deconditioning


9.  Hypertension.


10.  Insomnia.


11.  Electrolyte imbalance with hyponatremia.


12.  Hyperlipidemia.


13.  Generalized osteoarthritis.


14.  Obstructive sleep apnea.


Patient is tolerating regular diet.  Antibiotics transitioned to Rocephin and 

Flagyl.  Continue DVT and GI prophylaxis with heparin subcu and Protonix.  

Anticipate possible discharge tomorrow. 





Impression and plan of care have been directed as dictated by the signing 

physician.  Chloé Maldonado nurse practitioner acting as scribe for signing 

physician.

## 2019-07-17 NOTE — P.PN
<Dilcia Clark FIGUEROA - Last Filed: 07/17/19 13:36>





Subjective


Progress Note Date: 07/17/19





CHIEF COMPLAINT: Abdominal pain





HISTORY OF PRESENT ILLNESS: Patient examined at the bedside this morning. status

post incision and drainage of abdominal wall abscess in intraperitoneal abscess 

performed on 07/13/2019. He reports his abdominal pain is tolerable. Denies 

nausea or vomiting. Tolerating diet. Passing flatus and having BMs. WBC 15.4 

today.  Patient is afebrile. Wound culture positive for citrobacter braakii and 

proteus vulgaris





PHYSICAL EXAM: 


VITAL SIGNS: Reviewed.


GENERAL: Well-developed in no acute distress. 


HEENT:  No sclera icterus. Extraocular movements grossly intact.  Moist buccal 

mucosa. Head is atraumatic, normocephalic. 


ABDOMEN:  Soft.  Nondistended. Dressing to abdomen clean dry intact. Abdominal 

binder noted.


NEUROLOGIC: Alert and oriented. Cranial nerves II through XII grossly intact.





ASSESSMENT: 


1. Abdominal abscess


2. History of exploratory laparotomy with repair of duodenal ulcer, May 2019





PLAN: 


1. Continue current diet


2. No wound vac at this time


3. Antibiotics per Dr. Rogers. currently receiving ceftriaxone and Flagyl.


4. Repeat CBC in a.m.  If WBC continues to trend down, anticipate discharge home

tomorrow on IV antibiotics








Nurse practitioner note has been reviewed by physician. Signing provider agrees 

with the documented findings, assessment, and plan of care. 








Objective





- Vital Signs


Vital signs: 


                                   Vital Signs











Temp  98.0 F   07/17/19 07:07


 


Pulse  101 H  07/17/19 13:13


 


Resp  16   07/17/19 10:26


 


BP  122/74   07/17/19 07:07


 


Pulse Ox  95   07/17/19 07:07








                                 Intake & Output











 07/16/19 07/17/19 07/17/19





 18:59 06:59 18:59


 


Intake Total 830 250 200


 


Output Total  400 500


 


Balance 830 -150 -300


 


Intake:   


 


  Intake, IV Titration  250 





  Amount   


 


    Anidulafungin 100 mg In  100 





    Sodium Chloride 0.9% 100   





    ml @ 84 mls/hr IVPB Q24H   





    [facility] Rx#:[id number]   


 


    Sodium Chloride 0.9% 1,  150 





    000 ml @ 50 mls/hr IV .   





    Q20H [facility] Rx#:[id number]   


 


  Oral 830  200


 


Output:   


 


  Urine  400 500


 


Other:   


 


  Voiding Method Toilet Toilet Toilet





  Urinal Bedside Commode





   Urinal


 


  # Voids 3 1 1


 


  # Bowel Movements  2 1














- Labs


CBC & Chem 7: 


                                 07/17/19 07:30





                                 07/15/19 06:55


Labs: 


                  Abnormal Lab Results - Last 24 Hours (Table)











  07/17/19 Range/Units





  07:30 


 


WBC  15.4 H  (3.8-10.6)  k/uL


 


RBC  3.84 L  (4.30-5.90)  m/uL


 


Hgb  11.1 L  (13.0-17.5)  gm/dL


 


Hct  36.7 L  (39.0-53.0)  %


 


MCHC  30.4 L  (31.0-37.0)  g/dL


 


RDW  16.4 H  (11.5-15.5)  %


 


Plt Count  729 H  (150-450)  k/uL


 


Neutrophils #  11.5 H  (1.3-7.7)  k/uL








                      Microbiology - Last 24 Hours (Table)











 07/13/19 07:44 Blood Culture - Preliminary





 Blood    No Growth after 96 hours


 


 07/13/19 16:12 Anaerobic Culture - Final





 Abdomen    Anaerobic Gm Negative Bacilli





    Anaerobic Gm Negative Bacilli#2





    Anaerobic Gm Negative Bacilli#3


 


 07/13/19 16:12 Gram Stain - Final





 Abdomen Wound Culture - Final





    Citrobacter braakii





    Proteus vulgaris














<Chivo Gomez - Last Filed: 07/17/19 18:29>





Subjective


Patient doing well.  Pain is mild.  White blood cell count improved.  No fevers.

 Tolerating diet.  Cultures are multi-microbial.  Continue antibiotics per 

infectious disease.  Possible discharge tomorrow.  Will plan follow-up CAT scan 

1-2 weeks.








Objective





- Vital Signs


Vital signs: 


                                   Vital Signs











Temp  98.2 F   07/17/19 14:55


 


Pulse  113 H  07/17/19 16:12


 


Resp  22   07/17/19 16:12


 


BP  98/62   07/17/19 14:55


 


Pulse Ox  93 L  07/17/19 14:55








                                 Intake & Output











 07/16/19 07/17/19 07/17/19





 18:59 06:59 18:59


 


Intake Total 


 


Output Total  400 500


 


Balance 830 -150 950


 


Intake:   


 


  Intake, IV Titration  250 1250





  Amount   


 


    Anidulafungin 100 mg In  100 





    Sodium Chloride 0.9% 100   





    ml @ 84 mls/hr IVPB Q24H   





    [facility] Rx#:[id number]   


 


    Meropenem 2 gm In Sodium   100





    Chloride 0.9% 100 ml @   





    200 mls/hr IVPB Q8HR [facility]   





    Rx#:[id number]   


 


    Sodium Chloride 0.9% 1,  150 600





    000 ml @ 75 mls/hr IV .   





    [id number] [facility] Rx#:[id number]   


 


    Sodium Chloride 0.9% 500   500





    ml 500 ml @ 999 mls/hr IV   





    .Q31M [facility] Rx#:[id number]   


 


    cefTRIAXone 2 gm In   50





    Sodium Chloride 0.9% 50   





    ml @ 100 mls/hr IVPB   





    Q24HR [facility] Rx#:[id number]   


 


  Oral 830  200


 


Output:   


 


  Urine  400 500


 


Other:   


 


  Voiding Method Toilet Toilet Toilet





  Urinal 


 


  # Voids 3 1 1


 


  # Bowel Movements  2 1














- Labs


CBC & Chem 7: 


                                 07/17/19 07:30





                                 07/15/19 06:55


Labs: 


                  Abnormal Lab Results - Last 24 Hours (Table)











  07/17/19 Range/Units





  07:30 


 


WBC  15.4 H  (3.8-10.6)  k/uL


 


RBC  3.84 L  (4.30-5.90)  m/uL


 


Hgb  11.1 L  (13.0-17.5)  gm/dL


 


Hct  36.7 L  (39.0-53.0)  %


 


MCHC  30.4 L  (31.0-37.0)  g/dL


 


RDW  16.4 H  (11.5-15.5)  %


 


Plt Count  729 H  (150-450)  k/uL


 


Neutrophils #  11.5 H  (1.3-7.7)  k/uL








                      Microbiology - Last 24 Hours (Table)











 07/13/19 07:44 Blood Culture - Preliminary





 Blood    No Growth after 96 hours


 


 07/13/19 16:12 Anaerobic Culture - Final





 Abdomen    Anaerobic Gm Negative Bacilli





    Anaerobic Gm Negative Bacilli#2





    Anaerobic Gm Negative Bacilli#3


 


 07/13/19 16:12 Gram Stain - Final





 Abdomen Wound Culture - Final





    Citrobacter braakii





    Proteus vulgaris














Assessment and Plan


(1) Abdominal abscess


Current Visit: Yes   Status: Acute   Code(s): QWN9455 -    SNOMED Code(s): 

15015448

## 2019-07-17 NOTE — PN
PROGRESS NOTE



DATE OF SERVICE:

07/17/2019



REASON FOR FOLLOWUP:

Abdominal abscess gram-negative.



INTERVAL HISTORY:

The patient is currently afebrile.  Patient has been breathing comfortably.  Denies

having any chest pain.  Occasional cough.  No nausea, no vomiting.  No abdominal pain

or any diarrhea.



PHYSICAL EXAMINATION:

On examination, blood pressure is 122/74 with a pulse of 87, temperature of 98.  He is

95% on 3 L nasal cannula.

General description is an elderly male lying in bed in no distress.

RESPIRATORY SYSTEM: Unlabored breathing, clear to auscultation anteriorly.

HEART: S1, S2.  Regular rate and rhythm.

ABDOMEN:  Soft, no tenderness.



LABS:

Hemoglobin is 11.1, white count 15.4, creatinine 0.49. Abdominal culture finalized with

Citrobacter and Proteus vulgaris and anaerobic gram-negative bacilli.



DIAGNOSTIC IMPRESSION AND PLAN:

Patient with abdominal wall intraabdominal abscess status post drainage.  Cultures

remain positive for Citrobacter, Proteus and anaerobic gram-negative bacilli.

Antibiotic will be adjusted to Rocephin 2 grams daily in addition to oral Flagyl.

Outpatient IV antibiotic currently in the process of being arranged. Hopefully

discharge home in the a.m. We are waiting for the white count to improve as well.

Local wound care with Aquacel Silver packing.  Continue supportive care.





MMODL / IJN: [id number] / Job#: [id number]

## 2019-07-18 LAB
BASOPHILS # BLD AUTO: 0.1 K/UL (ref 0–0.2)
BASOPHILS NFR BLD AUTO: 1 %
EOSINOPHIL # BLD AUTO: 0.7 K/UL (ref 0–0.7)
EOSINOPHIL NFR BLD AUTO: 4 %
ERYTHROCYTE [DISTWIDTH] IN BLOOD BY AUTOMATED COUNT: 3.98 M/UL (ref 4.3–5.9)
ERYTHROCYTE [DISTWIDTH] IN BLOOD: 17 % (ref 11.5–15.5)
HCT VFR BLD AUTO: 37.3 % (ref 39–53)
HGB BLD-MCNC: 11.6 GM/DL (ref 13–17.5)
LYMPHOCYTES # SPEC AUTO: 2.6 K/UL (ref 1–4.8)
LYMPHOCYTES NFR SPEC AUTO: 14 %
MCH RBC QN AUTO: 29 PG (ref 25–35)
MCHC RBC AUTO-ENTMCNC: 31 G/DL (ref 31–37)
MCV RBC AUTO: 93.6 FL (ref 80–100)
MONOCYTES # BLD AUTO: 0.5 K/UL (ref 0–1)
MONOCYTES NFR BLD AUTO: 3 %
NEUTROPHILS # BLD AUTO: 14 K/UL (ref 1.3–7.7)
NEUTROPHILS NFR BLD AUTO: 77 %
PLATELET # BLD AUTO: 850 K/UL (ref 150–450)
WBC # BLD AUTO: 18.2 K/UL (ref 3.8–10.6)

## 2019-07-18 RX ADMIN — IPRATROPIUM BROMIDE AND ALBUTEROL SULFATE SCH ML: .5; 3 SOLUTION RESPIRATORY (INHALATION) at 13:29

## 2019-07-18 RX ADMIN — IPRATROPIUM BROMIDE AND ALBUTEROL SULFATE SCH ML: .5; 3 SOLUTION RESPIRATORY (INHALATION) at 07:02

## 2019-07-18 RX ADMIN — HYDROCODONE BITARTRATE AND ACETAMINOPHEN PRN EACH: 10; 325 TABLET ORAL at 07:28

## 2019-07-18 RX ADMIN — HEPARIN SODIUM SCH UNIT: 5000 INJECTION, SOLUTION INTRAVENOUS; SUBCUTANEOUS at 09:04

## 2019-07-18 RX ADMIN — HYDROCODONE BITARTRATE AND ACETAMINOPHEN PRN EACH: 10; 325 TABLET ORAL at 16:24

## 2019-07-18 RX ADMIN — SERTRALINE HYDROCHLORIDE SCH MG: 100 TABLET ORAL at 09:04

## 2019-07-18 RX ADMIN — THERA TABS SCH EACH: TAB at 09:04

## 2019-07-18 RX ADMIN — Medication SCH MG: at 20:02

## 2019-07-18 RX ADMIN — BUDESONIDE AND FORMOTEROL FUMARATE DIHYDRATE SCH PUFF: 160; 4.5 AEROSOL RESPIRATORY (INHALATION) at 07:02

## 2019-07-18 RX ADMIN — BUDESONIDE AND FORMOTEROL FUMARATE DIHYDRATE SCH PUFF: 160; 4.5 AEROSOL RESPIRATORY (INHALATION) at 20:24

## 2019-07-18 RX ADMIN — IPRATROPIUM BROMIDE AND ALBUTEROL SULFATE SCH ML: .5; 3 SOLUTION RESPIRATORY (INHALATION) at 20:24

## 2019-07-18 RX ADMIN — HEPARIN SODIUM SCH UNIT: 5000 INJECTION, SOLUTION INTRAVENOUS; SUBCUTANEOUS at 20:02

## 2019-07-18 RX ADMIN — MIRTAZAPINE SCH MG: 15 TABLET, FILM COATED ORAL at 20:02

## 2019-07-18 RX ADMIN — IOPAMIDOL PRN ML: 612 INJECTION, SOLUTION INTRAVENOUS at 12:54

## 2019-07-18 RX ADMIN — IOPAMIDOL PRN ML: 612 INJECTION, SOLUTION INTRAVENOUS at 14:13

## 2019-07-18 RX ADMIN — PANTOPRAZOLE SODIUM SCH MG: 40 TABLET, DELAYED RELEASE ORAL at 07:27

## 2019-07-18 NOTE — P.PN
Subjective


Progress Note Date: 07/18/19





This is 69 years old male with recent gastric ulcer rupture status post 

abdominal surgery who presented to the hospital with abdominal pain and found to

have abdominal abscess that was drained by general surgery yesterday and medical

consult was obtained for follow-up on his complicated past medical history.  

Patient currently is denying chest pain, shortness breath, nausea, vomiting or 

dizziness stated that his abdominal pain improved at least by 75% since 

yesterday.  Patient denied tobacco alcohol or drug abuse stated that he's 

compliment with his medication and doctors follow-up and his wife at the bedside

who is the primary care provider for the patient as patient has rheumatoid 

arthritis and suffered from debility at home





7/15: Patient complains of soreness in his abdomen.  Dressing is in place with 

packing.  He is currently on full liquid diet and tolerating this.  He denies 

any nausea or vomiting.


Patient has been afebrile, heart rate 92, blood pressure 109/70, pulse ox 93% on

room air.  Repeat lab work reveals WBC of 15.2, hemoglobin 10.2, platelet count 

655.  BUN 18 creatinine 0.49.  Wound culture is gram-negative bacilli.  Patient 

is followed by Dr. Omer maintained on Eraxis and meropenem.





7/16: Patient has been afebrile, heart rate 92, blood pressure 107/58, pulse ox 

94% on 3 L nasal cannula.  Repeat CBC WBC 16.7, hemoglobin 10.6, platelet count 

607.  Abdominal wound culture is positive for gram-negative bacilli and Proteus 

vulgaris.  Patient is continued on Eraxis and meropenem.  He was started on a 

regular diet last evening and tolerating.  Dr. Rogers is now handling this case. 

Midline has been placed.  





7/17: Patient remains afebrile, heart rate 88, blood pressure 122/74, pulse ox 

95% on 3 L nasal cannula.  WBC 15.4, hemoglobin 11.1, platelet count 729.  Sg waldron denies any new complaints.  Wound culture is positive for Proteus vulgaris

and Citrobacter braakii.  Anaerobic culture finalized with 3 gram-negative 

bacilli organisms.  Patient is tolerating diet.  He denies any nausea or 

vomiting.  He has had bowel movement.  Anticipate possible discharge in the next

24 hours.





7/18: Patient denies any new complaints.  Breathing status is stable, he denies 

any nausea or vomiting.  Repeat lab work reveals increased white count of 18.2, 

hemoglobin 11.6, platelet count 850.  Awaiting decision from Dr. Rogers regarding

antibiotics the patient could be discharged home today.





Objective





- Vital Signs


Vital signs: 


                                   Vital Signs











Temp  97.8 F   07/18/19 07:00


 


Pulse  88   07/18/19 07:13


 


Resp  16   07/18/19 07:00


 


BP  112/70   07/18/19 07:00


 


Pulse Ox  95   07/18/19 07:00








                                 Intake & Output











 07/17/19 07/18/19 07/18/19





 18:59 06:59 18:59


 


Intake Total 1800 240 


 


Output Total 500 500 


 


Balance 1300 -260 


 


Intake:   


 


  Intake, IV Titration 1250  





  Amount   


 


    Meropenem 2 gm In Sodium 100  





    Chloride 0.9% 100 ml @   





    200 mls/hr IVPB Q8HR [facility]   





    Rx#:[id number]   


 


    Sodium Chloride 0.9% 1, 600  





    000 ml @ 75 mls/hr IV .   





    [id number] [facility] Rx#:[id number]   


 


    Sodium Chloride 0.9% 500 500  





    ml 500 ml @ 999 mls/hr IV   





    .Q31M [facility] Rx#:[id number]   


 


    cefTRIAXone 2 gm In 50  





    Sodium Chloride 0.9% 50   





    ml @ 100 mls/hr IVPB   





    Q24HR [facility] Rx#:[id number]   


 


  Oral 550 240 


 


Output:   


 


  Urine 500 500 


 


Other:   


 


  Voiding Method Toilet Toilet Toilet





  Bedside Commode Bedside Commode





  Urinal Urinal


 


  # Voids 1 1 


 


  # Bowel Movements 1  














- Exam





Review Of Systems:


Constitutional: No fever, no chills, no night sweats.  


EENT: No headache.  No nasal drainage or congestion.  No epistaxis.  No sore 

throat.


Lungs: No shortness of breath, cough, no sputum production.  No wheezing.


Cardiovascular: No chest pain, no lower extremity edema.  No palpitations.  No 

paroxysmal nocturnal dyspnea.  No orthopnea.  No lightheadedness or dizziness.  


Abdominal: denies abdominal pain.  Denies nausea, vomiting.  No diarrhea.  


Genitourinary: No dysuria, increased frequency, urgency.  No urinary retention.


Musculoskeletal: No myalgias.  No muscle weakness, no gait dysfunction, no freq

uent falls.  No back pain.  No neck pain.


Integumentary: Reports wounds, no lesions.  No rash or pruritus.  No unusual 

bruising.  No change in hair or nails.


Neurologic: No aphasia. No facial droop. No change in mentation. No head injury.

No headache. No paralysis. No paresthesia.


Psychiatric: No depression.  No anxiety.  No mood swings.


Endocrine: No abnormal blood sugars.  No weight change.  No excessive sweating 

or thirst.  No cold intolerance.  











Gen: This is a 69-year-old  male.  Patient is resting in bed appears to

be comfortable. wife is at bedside


HEENT: Head is atraumatic, normocephalic. Pupils equal, round. Sclerae is 

anicteric. 


NECK: Supple. No JVD. No lymphadenopathy. No thyromegaly. 


LUNGS: Clear to auscultation. No wheezes or rhonchi.  No intercostal 

retractions.


HEART: Regular rate and rhythm. No murmur. 


ABDOMEN: Soft. Bowel sounds are present. No masses.  No tenderness.  Binder and 

Dressing in place to the mid abdomen.


EXTREMITIES: No pedal edema.  No calf tenderness.


NEUROLOGICAL: Patient is awake, alert and oriented x3. Cranial nerves 2 through 

12 are grossly intact. 











- Labs


CBC & Chem 7: 


                                 07/18/19 06:44





                                 07/15/19 06:55


Labs: 


                  Abnormal Lab Results - Last 24 Hours (Table)











  07/17/19 07/18/19 Range/Units





  20:07 06:44 


 


WBC   18.2 H  (3.8-10.6)  k/uL


 


RBC   3.98 L  (4.30-5.90)  m/uL


 


Hgb   11.6 L  (13.0-17.5)  gm/dL


 


Hct   37.3 L  (39.0-53.0)  %


 


RDW   17.0 H  (11.5-15.5)  %


 


Plt Count   850 H  (150-450)  k/uL


 


Neutrophils #   14.0 H  (1.3-7.7)  k/uL


 


POC Glucose (mg/dL)  103 H   (75-99)  mg/dL








                      Microbiology - Last 24 Hours (Table)











 07/13/19 07:44 Blood Culture - Preliminary





 Blood    No Growth after 120 hours














Assessment and Plan


Plan: 





1.  Status post incision and drainage of abdominal wall abscess and 

intraperitoneal abscess.


2.  Recent exploratory laparotomy with repair of perforated duodenal ulcer and 

incarcerated umbilical hernia 05/23/2019. 


3.  Severe leukocytosis.


4.  Abdominal pain.


5.  Rheumatoid arthritis with immune suppressant status due to daily use of 

steroids.


6.  COPD.  


7.  Chronic respiratory failure with hypoxia home oxygen dependent.


8.  Debility and deconditioning


9.  Hypertension.


10.  Insomnia.


11.  Electrolyte imbalance with hyponatremia.


12.  Hyperlipidemia.


13.  Generalized osteoarthritis.


14.  Obstructive sleep apnea.


Patient is tolerating regular diet.  Antibiotics transitioned to Rocephin and 

Flagyl.  Continue DVT and GI prophylaxis with heparin subcu and Protonix.  

Anticipate possible discharge home today. 





Impression and plan of care have been directed as dictated by the signing 

physician.  Chloé Maldonado nurse practitioner acting as scribe for signing 

physician.

## 2019-07-18 NOTE — PN
PROGRESS NOTE



DATE OF SERVICE:

07/18/2019



REASON FOR FOLLOWUP:

Abdominal abscess.



INTERVAL HISTORY:

The patient is currently afebrile.  Patient has been breathing comfortably.  The

patient denies having any chest pain.  Occasional cough.  No nausea or vomiting.

Denies any worsening abdominal pain. Did have a loose stool this morning.



PHYSICAL EXAMINATION:

On examination, blood pressure is 112/70 with a pulse of 76 temperature 97.8.  He is

95% on 3 L nasal cannula.

General description is an elderly male lying in bed in no distress.

RESPIRATORY SYSTEM: Unlabored breathing, clear to auscultation anteriorly. HEART: S1,

S2.  Regular rate and rhythm.

ABDOMEN: Soft. Wound is currently dressed, no obvious drainage on the dressing.

EXTREMITIES:  No edema of the feet.



LABS:

Hemoglobin 11.3, white count of 18.2. Abdominal culture with Citrobacter, Proteus and

anaerobic gram-negative bacilli.



DIAGNOSTIC IMPRESSION AND PLAN:

Patient with abdominal wall and intraperitoneal abscess status post drainage. Culture

with Citrobacter, Proteus and anaerobic gram-negative bacilli.  Patient is currently on

Rocephin 2 grams daily in addition to the oral Flagyl.  He did have slight jump in

white count.  CT of abdomen and pelvis with oral contrast _____ evidence of any

collection. CT is negative.  May be able to let him go home and close outpatient

followup.  All his questions and concerns were answered.





MMODL / IJN: [id number] / Job#: [id number]

## 2019-07-18 NOTE — CT
EXAMINATION TYPE: CT abdomen pelvis wo con

 

DATE OF EXAM: 7/18/2019

 

COMPARISON: 7/13/2019

 

INDICATION: Follow up abscess.

 

DLP: 434.1 mGycm, Automated exposure control for dose reduction was used.

 

CONTRAST:  0 mL of Isovue 300. 

                        Study performed with Oral Contrast

 

TECHNIQUE: Axial images were obtained from above the diaphragm to the pubic rami in the axial plane a
t 5 mm thick sections.  Reconstructed images are reviewed on the computer in the coronal plane.  

 

FINDINGS:

 

Limited CT sections are obtained the lung bases.  There are increased lung markings in the bilateral 
lung bases. This could be on the basis of pulmonary fibrosis superimposed on COPD..

 

CT ABDOMEN: Previous upper abdominal abscess is not evident. There is an open wound along the periumb
ilical region. Tiny amount of air may be at the level of the peritoneal surface.  Series 201 image 27
 Free air is not otherwise identified within the abdomen. No suspicious abscess formation is identifi
ed. Inflammatory changes remain present. There is a rounded area contains small amount of punctate ai
r measuring 4.0 x 3.4 cm. Series 201 image 38 could be some residual abscess.

 

Liver: Normal

 

Spleen: Normal

 

Pancreas: Normal

 

Adrenal glands: The adrenal glands are normal.

 

Gallbladder: Normal  

 

Kidneys: No masses are evident. No hydronephrosis is present.   Peripelvic cysts at the inferior pole
 left kidney.

 

Aorta: Vascular calcification is within the aorta. 

 

Inferior vena cava: Normal.

 

CT PELVIS: 

Loops of bowel within the abdomen and pelvis are normal.     There are loops of bowel which are incom
pletely distended or lack oral contrast limiting their evaluation. Loops of bowel without contrast li
miting evaluation. Note is made of multiple diverticular changes within the sigmoid colon.

 

Appendix: Normal as visualized.

 

Urinary bladder: Normal. 

 

Genitourinary structures: Prostate appears unremarkable.

 

Osseous structures: No suspicious lytic or sclerotic lesions.

 

IMPRESSIONS:

1.  Previous abscess appears significantly improved. A minimal amount of air may be within the perium
bilical region from the patient's abscess.

2. An additional collection adjacent to but not directly associated with the transverse colon measure
s 4 cm in diameter and could be some residual abscess. This appears stable.

3. Open wound along the anterior abdominal wall at the periumbilical region.

## 2019-07-18 NOTE — P.PN
Subjective


Progress Note Date: 07/18/19





CHIEF COMPLAINT: Abdominal pain





HISTORY OF PRESENT ILLNESS: Patient examined at the bedside this morning. status

post incision and drainage of abdominal wall abscess in intraperitoneal abscess 

performed on 07/13/2019. He reports his abdominal pain is tolerable. Denies 

nausea or vomiting. Tolerating diet. Passing flatus and having BMs. Wound 

culture positive for citrobacter braakii and proteus vulgaris. patient currently

receiving ceftriaxone and Flagyl.  WBC increased today to 18.2. patient is 

afebrile.  Vital signs stable.





PHYSICAL EXAM: 


VITAL SIGNS: Reviewed.


GENERAL: Well-developed in no acute distress. 


HEENT:  No sclera icterus. Extraocular movements grossly intact.  Moist buccal 

mucosa. Head is atraumatic, normocephalic. 


ABDOMEN:  Soft.  Nondistended. Dressing to abdomen clean dry intact. Abdominal 

binder noted.


NEUROLOGIC: Alert and oriented. Cranial nerves II through XII grossly intact.





ASSESSMENT: 


1. Abdominal abscess


2. History of exploratory laparotomy with repair of duodenal ulcer, May 2019





PLAN: 


1. Continue current diet


2. No wound vac at this time


3. Antibiotics per Dr. Rogers. currently receiving ceftriaxone and Flagyl.


4. Patient is stable for discharge from a surgical standpoint when cleared by 

infectious disease





Nurse practitioner note has been reviewed by physician. Signing provider agrees 

with the documented findings, assessment, and plan of care. 








Objective





- Vital Signs


Vital signs: 


                                   Vital Signs











Temp  97.8 F   07/18/19 07:00


 


Pulse  88   07/18/19 07:13


 


Resp  16   07/18/19 07:00


 


BP  112/70   07/18/19 07:00


 


Pulse Ox  95   07/18/19 07:00








                                 Intake & Output











 07/17/19 07/18/19 07/18/19





 18:59 06:59 18:59


 


Intake Total 1800 240 


 


Output Total 500 500 


 


Balance 1300 -260 


 


Intake:   


 


  Intake, IV Titration 1250  





  Amount   


 


    Meropenem 2 gm In Sodium 100  





    Chloride 0.9% 100 ml @   





    200 mls/hr IVPB Q8HR [facility]   





    Rx#:[id number]   


 


    Sodium Chloride 0.9% 1, 600  





    000 ml @ 75 mls/hr IV .   





    [id number] [facility] Rx#:[id number]   


 


    Sodium Chloride 0.9% 500 500  





    ml 500 ml @ 999 mls/hr IV   





    .Q31M ONE Rx#:[id number]   


 


    cefTRIAXone 2 gm In 50  





    Sodium Chloride 0.9% 50   





    ml @ 100 mls/hr IVPB   





    Q24HR [facility] Rx#:[id number]   


 


  Oral 550 240 


 


Output:   


 


  Urine 500 500 


 


Other:   


 


  Voiding Method Toilet Toilet 





  Bedside Commode 





  Urinal 


 


  # Voids 1 1 


 


  # Bowel Movements 1  














- Labs


CBC & Chem 7: 


                                 07/18/19 06:44





                                 07/15/19 06:55


Labs: 


                  Abnormal Lab Results - Last 24 Hours (Table)











  07/17/19 07/18/19 Range/Units





  20:07 06:44 


 


WBC   18.2 H  (3.8-10.6)  k/uL


 


RBC   3.98 L  (4.30-5.90)  m/uL


 


Hgb   11.6 L  (13.0-17.5)  gm/dL


 


Hct   37.3 L  (39.0-53.0)  %


 


RDW   17.0 H  (11.5-15.5)  %


 


Plt Count   850 H  (150-450)  k/uL


 


Neutrophils #   14.0 H  (1.3-7.7)  k/uL


 


POC Glucose (mg/dL)  103 H   (75-99)  mg/dL








                      Microbiology - Last 24 Hours (Table)











 07/13/19 07:44 Blood Culture - Preliminary





 Blood    No Growth after 120 hours

## 2019-07-19 VITALS — DIASTOLIC BLOOD PRESSURE: 62 MMHG | RESPIRATION RATE: 17 BRPM | HEART RATE: 101 BPM | SYSTOLIC BLOOD PRESSURE: 100 MMHG

## 2019-07-19 VITALS — TEMPERATURE: 98.7 F

## 2019-07-19 LAB
BASOPHILS # BLD AUTO: 0.1 K/UL (ref 0–0.2)
BASOPHILS NFR BLD AUTO: 1 %
EOSINOPHIL # BLD AUTO: 0.5 K/UL (ref 0–0.7)
EOSINOPHIL NFR BLD AUTO: 4 %
ERYTHROCYTE [DISTWIDTH] IN BLOOD BY AUTOMATED COUNT: 4.35 M/UL (ref 4.3–5.9)
ERYTHROCYTE [DISTWIDTH] IN BLOOD: 16.4 % (ref 11.5–15.5)
HCT VFR BLD AUTO: 40.9 % (ref 39–53)
HGB BLD-MCNC: 12.6 GM/DL (ref 13–17.5)
INR PPP: 1 (ref ?–1.2)
LYMPHOCYTES # SPEC AUTO: 2.3 K/UL (ref 1–4.8)
LYMPHOCYTES NFR SPEC AUTO: 15 %
MCH RBC QN AUTO: 28.9 PG (ref 25–35)
MCHC RBC AUTO-ENTMCNC: 30.7 G/DL (ref 31–37)
MCV RBC AUTO: 94.1 FL (ref 80–100)
MONOCYTES # BLD AUTO: 0.5 K/UL (ref 0–1)
MONOCYTES NFR BLD AUTO: 3 %
NEUTROPHILS # BLD AUTO: 11.8 K/UL (ref 1.3–7.7)
NEUTROPHILS NFR BLD AUTO: 76 %
PLATELET # BLD AUTO: 955 K/UL (ref 150–450)
PT BLD: 10.6 SEC (ref 9–12)
WBC # BLD AUTO: 15.5 K/UL (ref 3.8–10.6)

## 2019-07-19 RX ADMIN — CEFAZOLIN SCH: 330 INJECTION, POWDER, FOR SOLUTION INTRAMUSCULAR; INTRAVENOUS at 08:20

## 2019-07-19 RX ADMIN — IPRATROPIUM BROMIDE AND ALBUTEROL SULFATE SCH ML: .5; 3 SOLUTION RESPIRATORY (INHALATION) at 11:03

## 2019-07-19 RX ADMIN — BUDESONIDE AND FORMOTEROL FUMARATE DIHYDRATE SCH: 160; 4.5 AEROSOL RESPIRATORY (INHALATION) at 07:23

## 2019-07-19 RX ADMIN — HEPARIN SODIUM SCH: 5000 INJECTION, SOLUTION INTRAVENOUS; SUBCUTANEOUS at 08:20

## 2019-07-19 RX ADMIN — HYDROCODONE BITARTRATE AND ACETAMINOPHEN PRN EACH: 10; 325 TABLET ORAL at 08:20

## 2019-07-19 RX ADMIN — PANTOPRAZOLE SODIUM SCH MG: 40 TABLET, DELAYED RELEASE ORAL at 08:20

## 2019-07-19 RX ADMIN — SERTRALINE HYDROCHLORIDE SCH MG: 100 TABLET ORAL at 08:20

## 2019-07-19 RX ADMIN — THERA TABS SCH EACH: TAB at 08:20

## 2019-07-19 RX ADMIN — IPRATROPIUM BROMIDE AND ALBUTEROL SULFATE SCH: .5; 3 SOLUTION RESPIRATORY (INHALATION) at 07:23

## 2019-07-19 NOTE — PN
PROGRESS NOTE



DATE OF SERVICE:

07/19/2019



REASON FOR FOLLOWUP:

Intraabdominal wall and intraabdominal abscess.



INTERVAL HISTORY:

The patient is currently afebrile.  The patient has been breathing comfortably.  Denies

having any chest pain.  Occasionally cough.  No nausea, no vomiting and no worsening

abdominal pain. No diarrhea.



PHYSICAL EXAMINATION:

On examination, blood pressure 121/75, pulse of 98, temperature 98.7. He is 97% on 3 L

nasal cannula.

General description is an elderly male up in the chair in no distress.

RESPIRATORY SYSTEM: Unlabored breathing, decreased breath sounds. No wheeze.

HEART: S1, S2.  Regular rate and rhythm.

ABDOMEN:  Soft, no tenderness.



LABS:

Hemoglobin is 12.6, white count 15.5.  Radiology considered the abdominal abscess too

small to be drained CT-guided.



DIAGNOSTIC IMPRESSION AND PLAN:

Patient with intraabdominal as well as abdominal wall abscess status post surgical

drainage.  Culture has been predominantly Citrobacter, Proteus and anaerobic gram

negative bacilli. The patient did have elevated white count despite being on Rocephin,

Flagyl with improvement in white count with the daptomycin that was added yesterday

with possible component gram-positive skin keeley _____ with abdominal wall abscess.

The patient to continue with daptomycin 450 mg daily along with Rocephin 2 grams daily

and oral Flagyl for a total of 2 weeks with close outpatient followup.  Local wound

care with Aquacel Silver packing of the wound. Family at the bedside.  Their questions

and concerns were answered.





MMODL / IJN: [id number] / Job#: [id number]

## 2019-07-19 NOTE — P.DS
Providers


Date of admission: 


07/13/19 10:10





Expected date of discharge: 07/19/19


Attending physician: 


Chivo Gomez





Consults: 





                                        





07/13/19 10:09


Consult Physician Routine 


   Consulting Provider: Maye Hernandez


   Consult Reason/Comments: Medical Management; Abdominal Abscess


   Do you want consulting provider notified?: Yes





07/13/19 11:17


Consult Physician Routine 


   Consulting Provider: Abisai iLno


   Consult Reason/Comments: Abdominal abscess


   Do you want consulting provider notified?: Yes











Primary care physician: 


[facility] Course: 





69-year-old  male with history of exploratory laparotomy and repair of 

duodenal ulcer performed on 05/25/2019.  Patient presented back to the hospital 

and was diagnosed with intraperitoneal abscess and abdominal wall abscess.  He 

underwent I&D by Dr. Gomez.  He was followed by infectious disease during 

hospitalization.  Patient WBC did trend up and repeat CT scan was obtained 

revealing 4 cm mass.  Interventional radiology was consulted for possible 

drainage.  Upon their review them collection was too small to drain.  Patient's 

white count has decreased.  He is afebrile.  His pain is controlled.  He is 

stable for discharge home today on IV antibiotics per ID recommendations.  

Please EMR further hospital course details.





Discharge Diagnosis


1. Abdominal abscess


2. History of exploratory laparotomy with repair of duodenal ulcer, May 2019





Nurse practitioner note has been reviewed by physician. Signing provider agrees 

with the documented findings, assessment, and plan of care. 





Patient Condition at Discharge: Stable





Plan - Discharge Summary


New Discharge Prescriptions: 


New


   Multivitamins, Thera [Multivitamin (formulary)] 1 each PO DAILY  tab


   DAPTOmycin [Cubicin Rf] 450 mg IV DAILY #14 vial


   metroNIDAZOLE [Flagyl] 500 mg PO TID #42 tab


   cefTRIAXone [Rocephin] 2,000 mg IVP Q24HR #14 vial





Continue


   predniSONE 10 mg PO DAILY #30 tab


   Ipratropium-Albuterol Nebulize [Duoneb 0.5 mg-3 mg/3 ml Soln] 3 ml INHALATION

RT-Q6H #120 ampul.neb


   Furosemide [Lasix] 40 mg PO DAILY #30 tab


   Pantoprazole [Protonix] 40 mg PO AC-BRKFST #30 tablet.


   Potassium Chloride [Klor-Con 20] 20 meq PO BID


   Budesonide/Formoterol Fumarate [Symbicort 160-4.5 Mcg Inhaler] 1 puff 

INHALATION RT-BID


   Sertraline [Zoloft] 100 mg PO DAILY


   Melatonin 10 mg PO HS  tablet


   HYDROcodone/APAP 10-325MG [Norco ] 1 tab PO Q8H PRN #9 tab


     PRN Reason: Pain


   Acetaminophen Tab [Tylenol] 650 mg PO Q4H PRN


     PRN Reason: Pain


   Mirtazapine [Remeron] 7.5 mg PO HS@1800


Discharge Medication List





predniSONE 10 mg PO DAILY #30 tab 06/06/18 [Rx]


Furosemide [Lasix] 40 mg PO DAILY #30 tab 06/07/19 [Rx]


Ipratropium-Albuterol Nebulize [Duoneb 0.5 mg-3 mg/3 ml Soln] 3 ml INHALATION 

RT-Q6H #120 ampul.neb 06/07/19 [Rx]


Pantoprazole [Protonix] 40 mg PO AC-BRKFST #30 tablet. 06/07/19 [Rx]


Budesonide/Formoterol Fumarate [Symbicort 160-4.5 Mcg Inhaler] 1 puff INHALATION

RT-BID 06/10/19 [History]


Potassium Chloride [Klor-Con 20] 20 meq PO BID 06/10/19 [History]


Sertraline [Zoloft] 100 mg PO DAILY 06/10/19 [History]


HYDROcodone/APAP 10-325MG [Norco ] 1 tab PO Q8H PRN #9 tab 06/13/19 [Rx]


Melatonin 10 mg PO HS  tablet 06/13/19 [Rx]


Acetaminophen Tab [Tylenol] 650 mg PO Q4H PRN 07/13/19 [History]


Mirtazapine [Remeron] 7.5 mg PO HS@1800 07/13/19 [History]


Multivitamins, Thera [Multivitamin (formulary)] 1 each PO DAILY  tab 07/16/19 

[Rx]


DAPTOmycin [Cubicin Rf] 450 mg IV DAILY #14 vial 07/19/19 [Rx]


cefTRIAXone [Rocephin] 2,000 mg IVP Q24HR #14 vial 07/19/19 [Rx]


metroNIDAZOLE [Flagyl] 500 mg PO TID #42 tab 07/19/19 [Rx]








Follow up Appointment(s)/Referral(s): 


Chivo Gomez MD [Medical Doctor] - 1 Week


Amalia Bazan MD [Primary Care Provider] - 1 Week


[facility] [NON-STAFF] - As Needed (Please go to office between 8:30-10:30am 

tomorrow for first administration.)


Abisai Lino MD [STAFF PHYSICIAN] - 1 Week


[facility], [NON-STAFF] - As Needed


Activity/Diet/Wound Care/Special Instructions: 


Repeat CT in 2 weeks


Aquacel silver packing of the wound q24hr


follow up in wound care center 1 week with Dr lino 


call [phone] to make an appointment

## 2019-07-19 NOTE — P.PN
Subjective


Progress Note Date: 07/19/19





This is 69 years old male with recent gastric ulcer rupture status post 

abdominal surgery who presented to the hospital with abdominal pain and found to

have abdominal abscess that was drained by general surgery yesterday and medical

consult was obtained for follow-up on his complicated past medical history.  

Patient currently is denying chest pain, shortness breath, nausea, vomiting or 

dizziness stated that his abdominal pain improved at least by 75% since 

yesterday.  Patient denied tobacco alcohol or drug abuse stated that he's 

compliment with his medication and doctors follow-up and his wife at the bedside

who is the primary care provider for the patient as patient has rheumatoid 

arthritis and suffered from debility at home





7/15: Patient complains of soreness in his abdomen.  Dressing is in place with 

packing.  He is currently on full liquid diet and tolerating this.  He denies 

any nausea or vomiting.


Patient has been afebrile, heart rate 92, blood pressure 109/70, pulse ox 93% on

room air.  Repeat lab work reveals WBC of 15.2, hemoglobin 10.2, platelet count 

655.  BUN 18 creatinine 0.49.  Wound culture is gram-negative bacilli.  Patient 

is followed by Dr. Omer maintained on Eraxis and meropenem.





7/16: Patient has been afebrile, heart rate 92, blood pressure 107/58, pulse ox 

94% on 3 L nasal cannula.  Repeat CBC WBC 16.7, hemoglobin 10.6, platelet count 

607.  Abdominal wound culture is positive for gram-negative bacilli and Proteus 

vulgaris.  Patient is continued on Eraxis and meropenem.  He was started on a 

regular diet last evening and tolerating.  Dr. Rogers is now handling this case. 

Midline has been placed.  





7/17: Patient remains afebrile, heart rate 88, blood pressure 122/74, pulse ox 

95% on 3 L nasal cannula.  WBC 15.4, hemoglobin 11.1, platelet count 729.  Sg waldron denies any new complaints.  Wound culture is positive for Proteus vulgaris

and Citrobacter braakii.  Anaerobic culture finalized with 3 gram-negative 

bacilli organisms.  Patient is tolerating diet.  He denies any nausea or 

vomiting.  He has had bowel movement.  Anticipate possible discharge in the next

24 hours.





7/18: Patient denies any new complaints.  Breathing status is stable, he denies 

any nausea or vomiting.  Repeat lab work reveals increased white count of 18.2, 

hemoglobin 11.6, platelet count 850.  Awaiting decision from Dr. Rogers regarding

antibiotics the patient could be discharged home today.





7/19: Yesterday, patient underwent CAT scan of the abdomen and pelvis which 

revealed previous abscess appears significantly improved.  A minimal amount of 

air may be within the.  Umbilical region from the patient's abscess.  An 

additional collection adjacent to but not drinking associated with the 

transverse colon measures 4 cm in diameter and could be some residual abscess.  

This appears stable.  Open wound along the anterior abdominal wall at the 

periumbilical region.  Patient remains afebrile, blood pressure 121/75, heart 

rate 98, pulse ox 97% on 3 L nasal cannula.  WBC 15.5, Hemoglobin 12.6, Platelet

Count 955, INR 1.0.  Patient Is Currently on Rocephin, Daptomycin, Flagyl per 

Dr. Rogers.  Dr. Rogers Has Ordered a CT-Guided Abscess Drainage but found abscess

was too small to drain.  Patient has been cleared for discharge is going home 

today in stable condition.  Dr. Aponte is addressed home antibiotics which will 

be daptomycin, ceftriaxone and Flagyl.





Objective





- Vital Signs


Vital signs: 


                                   Vital Signs











Temp  98.7 F   07/19/19 07:00


 


Pulse  98   07/19/19 07:00


 


Resp  15   07/19/19 07:00


 


BP  121/75   07/19/19 07:00


 


Pulse Ox  97   07/19/19 07:00








                                 Intake & Output











 07/18/19 07/19/19 07/19/19





 18:59 06:59 18:59


 


Intake Total 1635 740 


 


Balance 1635 740 


 


Weight 72.575 kg  


 


Intake:   


 


  Intake, IV Titration 375 200 





  Amount   


 


    DAPTOmycin 450 mg In  50 





    Sodium Chloride 0.9% 50   





    ml @ 100 mls/hr IVPB   





    Q24HR@1800 [facility] Rx#:   





    [id number]   


 


    Sodium Chloride 0.9% 1, 375 150 





    000 ml @ 75 mls/hr IV .   





    Q26H42N [facility] Rx#:[id number]   


 


  Oral 1260 540 


 


Other:   


 


  Voiding Method Toilet  





 Bedside Commode  





 Urinal  














- Exam





Review Of Systems:


Constitutional: No fever, no chills, no night sweats.  


EENT: No headache.  No nasal drainage or congestion.  No epistaxis.  No sore 

throat.


Lungs: No shortness of breath, cough, no sputum production.  No wheezing.


Cardiovascular: No chest pain, no lower extremity edema.  No palpitations.  No 

paroxysmal nocturnal dyspnea. No lightheadedness or dizziness.  


Abdominal: denies abdominal pain.  Denies nausea, vomiting.  No diarrhea.  


Genitourinary: No dysuria, increased frequency, urgency.  No urinary retention.


Musculoskeletal: No myalgias.  No muscle weakness, no gait dysfunction, no 

frequent falls.  No back pain.  No neck pain.


Integumentary: Reports wounds, no lesions.  No rash or pruritus.  No unusual 

bruising.  No change in hair or nails.


Neurologic: No aphasia. No facial droop. No change in mentation. No head injury.

No headache. No paralysis. No paresthesia.


Psychiatric: No depression.  No anxiety.  No mood swings.


Endocrine: No abnormal blood sugars.  No weight change.  No excessive sweating 

or thirst.  No cold intolerance.  











Gen: This is a 69-year-old  male.  Patient is resting in bed appears to

be comfortable. wife is at bedside.  No acute distress.


HEENT: Head is atraumatic, normocephalic. Pupils equal, round. Sclerae is 

anicteric. 


NECK: Supple. No JVD. No lymphadenopathy. No thyromegaly. 


LUNGS: Clear to auscultation. No wheezes or rhonchi.  No intercostal 

retractions.


HEART: Regular rate and rhythm. No murmur. 


ABDOMEN: Soft. Bowel sounds are present. No masses.  No tenderness.  Binder and 

Dressing in place to the mid abdomen.


EXTREMITIES: No pedal edema.  No calf tenderness.


NEUROLOGICAL: Patient is awake, alert and oriented x3. Cranial nerves 2 through 

12 are grossly intact. 











- Labs


CBC & Chem 7: 


                                 07/19/19 08:17





                                 07/15/19 06:55


Labs: 


                  Abnormal Lab Results - Last 24 Hours (Table)











  07/19/19 Range/Units





  08:17 


 


WBC  15.5 H  (3.8-10.6)  k/uL


 


Hgb  12.6 L  (13.0-17.5)  gm/dL


 


MCHC  30.7 L  (31.0-37.0)  g/dL


 


RDW  16.4 H  (11.5-15.5)  %


 


Plt Count  955 H  (150-450)  k/uL


 


Neutrophils #  11.8 H  (1.3-7.7)  k/uL








                      Microbiology - Last 24 Hours (Table)











 07/13/19 07:44 Blood Culture - Preliminary





 Blood    No Growth after 120 hours














Assessment and Plan


Plan: 





1.  Status post incision and drainage of abdominal wall abscess and 

intraperitoneal abscess.


2.  Recent exploratory laparotomy with repair of perforated duodenal ulcer and 

incarcerated umbilical hernia 05/23/2019. 


3.  Severe leukocytosis.


4.  Abdominal pain.


5.  Rheumatoid arthritis with immune suppressant status due to daily use of 

steroids.


6.  COPD.  


7.  Chronic respiratory failure with hypoxia home oxygen dependent.


8.  Debility and deconditioning


9.  Hypertension.


10.  Insomnia.


11.  Electrolyte imbalance with hyponatremia.


12.  Hyperlipidemia.


13.  Generalized osteoarthritis.


14.  Obstructive sleep apnea.


Patient is tolerating regular diet.  Antibiotics currently in the form of 

daptomycin, Rocephin and Flagyl.  A CT-guided abscess drainage ordered for today

but abscess was too small to drain.  Arrangements have been made to go home 

today. 





Impression and plan of care have been directed as dictated by the signing 

physician.  Chloé Maldonado nurse practitioner acting as scribe for signing 

physician.

## 2019-08-06 ENCOUNTER — HOSPITAL ENCOUNTER (OUTPATIENT)
Dept: HOSPITAL 47 - RADCTMAIN | Age: 69
Discharge: HOME | End: 2019-08-06
Attending: SURGERY
Payer: MEDICARE

## 2019-08-06 DIAGNOSIS — K65.1: Primary | ICD-10-CM

## 2019-08-06 DIAGNOSIS — L02.211: ICD-10-CM

## 2019-08-06 PROCEDURE — 74150 CT ABDOMEN W/O CONTRAST: CPT

## 2019-08-06 NOTE — CT
EXAMINATION TYPE: CT abdomen wo con

 

DATE OF EXAM: 8/6/2019

 

COMPARISON: 7/18/2019

 

INDICATION: Abdominal abscess

 

DLP: 213.10 mGycm, Automated exposure control for dose reduction was used.

 

CONTRAST:  0 mL of Isovue 300. 

                        Study performed with Oral Contrast

 

TECHNIQUE: Axial images were obtained from above the diaphragm to the iliac crests in the axial plane
 at 5 mm thick sections.  Reconstructed images are reviewed on the computer in the coronal plane.  

 

FINDINGS:

 

Limited CT sections are obtained the lung bases.  Pulmonary fibrosis and emphysematous changes are pr
esent at the lung bases..

 

CT ABDOMEN:

 

Liver: Normal

 

Spleen: Scattered small calcified granuloma within the spleen.

 

Pancreas: Normal

 

Adrenal glands: The adrenal glands are normal.

 

Gallbladder: Normal  

 

Kidneys: No masses are evident. No hydronephrosis is present.   May be a peripelvic cyst at the infer
ior pole right kidney present previously. No obstructing renal stones are identified.  Delayed images
 were obtained through the kidneys, which remain unremarkable.

 

Aorta: Vascular calcification is within the aorta.  Maximum AP diameter is 2.5 cm.

 

Inferior vena cava: Normal.

 

Within the midabdomen with the prior abscess location there is some residual soft tissue density with
 fairly smooth margins currently measuring 3.3 x 3.2 cm which is smaller than the 3.4 x 4.1 cm prior.
 No free air is evident. No central fluid is evident.

 

IMPRESSIONS:

1.  Resolving intra-abdominal abscess. Residual phlegmon may remain discussed above.

## 2020-05-12 ENCOUNTER — HOSPITAL ENCOUNTER (INPATIENT)
Dept: HOSPITAL 47 - EC | Age: 70
LOS: 3 days | Discharge: HOME | DRG: 190 | End: 2020-05-15
Attending: INTERNAL MEDICINE | Admitting: INTERNAL MEDICINE
Payer: COMMERCIAL

## 2020-05-12 DIAGNOSIS — Z82.3: ICD-10-CM

## 2020-05-12 DIAGNOSIS — M06.9: ICD-10-CM

## 2020-05-12 DIAGNOSIS — Z79.899: ICD-10-CM

## 2020-05-12 DIAGNOSIS — Z96.1: ICD-10-CM

## 2020-05-12 DIAGNOSIS — J44.1: Primary | ICD-10-CM

## 2020-05-12 DIAGNOSIS — G89.29: ICD-10-CM

## 2020-05-12 DIAGNOSIS — Z88.0: ICD-10-CM

## 2020-05-12 DIAGNOSIS — E87.1: ICD-10-CM

## 2020-05-12 DIAGNOSIS — R73.9: ICD-10-CM

## 2020-05-12 DIAGNOSIS — Z98.890: ICD-10-CM

## 2020-05-12 DIAGNOSIS — Z86.19: ICD-10-CM

## 2020-05-12 DIAGNOSIS — G47.00: ICD-10-CM

## 2020-05-12 DIAGNOSIS — Z79.51: ICD-10-CM

## 2020-05-12 DIAGNOSIS — Z83.3: ICD-10-CM

## 2020-05-12 DIAGNOSIS — J96.21: ICD-10-CM

## 2020-05-12 DIAGNOSIS — Z87.11: ICD-10-CM

## 2020-05-12 DIAGNOSIS — Z80.6: ICD-10-CM

## 2020-05-12 DIAGNOSIS — Z98.41: ICD-10-CM

## 2020-05-12 DIAGNOSIS — Z81.8: ICD-10-CM

## 2020-05-12 DIAGNOSIS — Z87.01: ICD-10-CM

## 2020-05-12 DIAGNOSIS — M16.0: ICD-10-CM

## 2020-05-12 DIAGNOSIS — Z80.0: ICD-10-CM

## 2020-05-12 DIAGNOSIS — G47.33: ICD-10-CM

## 2020-05-12 DIAGNOSIS — F41.1: ICD-10-CM

## 2020-05-12 DIAGNOSIS — E78.5: ICD-10-CM

## 2020-05-12 DIAGNOSIS — K46.9: ICD-10-CM

## 2020-05-12 DIAGNOSIS — M17.0: ICD-10-CM

## 2020-05-12 DIAGNOSIS — Z79.52: ICD-10-CM

## 2020-05-12 DIAGNOSIS — T38.0X5A: ICD-10-CM

## 2020-05-12 DIAGNOSIS — M47.9: ICD-10-CM

## 2020-05-12 DIAGNOSIS — M19.042: ICD-10-CM

## 2020-05-12 DIAGNOSIS — Z87.891: ICD-10-CM

## 2020-05-12 DIAGNOSIS — Z79.83: ICD-10-CM

## 2020-05-12 DIAGNOSIS — Z99.81: ICD-10-CM

## 2020-05-12 DIAGNOSIS — J84.10: ICD-10-CM

## 2020-05-12 DIAGNOSIS — J96.22: ICD-10-CM

## 2020-05-12 DIAGNOSIS — M19.041: ICD-10-CM

## 2020-05-12 DIAGNOSIS — Z20.828: ICD-10-CM

## 2020-05-12 DIAGNOSIS — Z80.1: ICD-10-CM

## 2020-05-12 DIAGNOSIS — Z98.42: ICD-10-CM

## 2020-05-12 DIAGNOSIS — I49.3: ICD-10-CM

## 2020-05-12 DIAGNOSIS — F33.9: ICD-10-CM

## 2020-05-12 LAB
ALBUMIN SERPL-MCNC: 3.9 G/DL (ref 3.5–5)
ALP SERPL-CCNC: 89 U/L (ref 38–126)
ALT SERPL-CCNC: 15 U/L (ref 4–49)
ANION GAP SERPL CALC-SCNC: 8 MMOL/L
APTT BLD: 23.4 SEC (ref 22–30)
AST SERPL-CCNC: 22 U/L (ref 17–59)
BASOPHILS # BLD AUTO: 0 K/UL (ref 0–0.2)
BASOPHILS NFR BLD AUTO: 0 %
BUN SERPL-SCNC: 14 MG/DL (ref 9–20)
CALCIUM SPEC-MCNC: 9.1 MG/DL (ref 8.4–10.2)
CHLORIDE SERPL-SCNC: 91 MMOL/L (ref 98–107)
CO2 SERPL-SCNC: 30 MMOL/L (ref 22–30)
D DIMER PPP FEU-MCNC: 0.51 MG/L FEU (ref ?–0.6)
EOSINOPHIL # BLD AUTO: 0 K/UL (ref 0–0.7)
EOSINOPHIL NFR BLD AUTO: 0 %
ERYTHROCYTE [DISTWIDTH] IN BLOOD BY AUTOMATED COUNT: 4.3 M/UL (ref 4.3–5.9)
ERYTHROCYTE [DISTWIDTH] IN BLOOD: 15.1 % (ref 11.5–15.5)
FERRITIN SERPL-MCNC: 329.8 NG/ML (ref 22–322)
GLUCOSE BLD-MCNC: 123 MG/DL (ref 75–99)
GLUCOSE BLD-MCNC: 162 MG/DL (ref 75–99)
GLUCOSE SERPL-MCNC: 149 MG/DL (ref 74–99)
HCT VFR BLD AUTO: 41.4 % (ref 39–53)
HGB BLD-MCNC: 13.7 GM/DL (ref 13–17.5)
INR PPP: 1 (ref ?–1.2)
LDH SPEC-CCNC: 588 U/L (ref 313–618)
LYMPHOCYTES # SPEC AUTO: 0.4 K/UL (ref 1–4.8)
LYMPHOCYTES NFR SPEC AUTO: 4 %
MAGNESIUM SPEC-SCNC: 2.1 MG/DL (ref 1.6–2.3)
MCH RBC QN AUTO: 31.8 PG (ref 25–35)
MCHC RBC AUTO-ENTMCNC: 33 G/DL (ref 31–37)
MCV RBC AUTO: 96.2 FL (ref 80–100)
MONOCYTES # BLD AUTO: 0.3 K/UL (ref 0–1)
MONOCYTES NFR BLD AUTO: 3 %
NEUTROPHILS # BLD AUTO: 8.3 K/UL (ref 1.3–7.7)
NEUTROPHILS NFR BLD AUTO: 92 %
PLATELET # BLD AUTO: 435 K/UL (ref 150–450)
POTASSIUM SERPL-SCNC: 5.2 MMOL/L (ref 3.5–5.1)
PROT SERPL-MCNC: 6.9 G/DL (ref 6.3–8.2)
PT BLD: 10.1 SEC (ref 9–12)
SODIUM SERPL-SCNC: 129 MMOL/L (ref 137–145)
WBC # BLD AUTO: 9 K/UL (ref 3.8–10.6)

## 2020-05-12 PROCEDURE — 99291 CRITICAL CARE FIRST HOUR: CPT

## 2020-05-12 PROCEDURE — 85730 THROMBOPLASTIN TIME PARTIAL: CPT

## 2020-05-12 PROCEDURE — 71045 X-RAY EXAM CHEST 1 VIEW: CPT

## 2020-05-12 PROCEDURE — 83880 ASSAY OF NATRIURETIC PEPTIDE: CPT

## 2020-05-12 PROCEDURE — 83615 LACTATE (LD) (LDH) ENZYME: CPT

## 2020-05-12 PROCEDURE — 94667 MNPJ CHEST WALL 1ST: CPT

## 2020-05-12 PROCEDURE — 87635 SARS-COV-2 COVID-19 AMP PRB: CPT

## 2020-05-12 PROCEDURE — 80053 COMPREHEN METABOLIC PANEL: CPT

## 2020-05-12 PROCEDURE — 85610 PROTHROMBIN TIME: CPT

## 2020-05-12 PROCEDURE — 87502 INFLUENZA DNA AMP PROBE: CPT

## 2020-05-12 PROCEDURE — 85379 FIBRIN DEGRADATION QUANT: CPT

## 2020-05-12 PROCEDURE — 93005 ELECTROCARDIOGRAM TRACING: CPT

## 2020-05-12 PROCEDURE — 83605 ASSAY OF LACTIC ACID: CPT

## 2020-05-12 PROCEDURE — 94640 AIRWAY INHALATION TREATMENT: CPT

## 2020-05-12 PROCEDURE — 71260 CT THORAX DX C+: CPT

## 2020-05-12 PROCEDURE — 87040 BLOOD CULTURE FOR BACTERIA: CPT

## 2020-05-12 PROCEDURE — 96365 THER/PROPH/DIAG IV INF INIT: CPT

## 2020-05-12 PROCEDURE — 82728 ASSAY OF FERRITIN: CPT

## 2020-05-12 PROCEDURE — 85025 COMPLETE CBC W/AUTO DIFF WBC: CPT

## 2020-05-12 PROCEDURE — 86140 C-REACTIVE PROTEIN: CPT

## 2020-05-12 PROCEDURE — 93306 TTE W/DOPPLER COMPLETE: CPT

## 2020-05-12 PROCEDURE — 96366 THER/PROPH/DIAG IV INF ADDON: CPT

## 2020-05-12 PROCEDURE — 96375 TX/PRO/DX INJ NEW DRUG ADDON: CPT

## 2020-05-12 PROCEDURE — 36415 COLL VENOUS BLD VENIPUNCTURE: CPT

## 2020-05-12 PROCEDURE — 84145 PROCALCITONIN (PCT): CPT

## 2020-05-12 PROCEDURE — 80048 BASIC METABOLIC PNL TOTAL CA: CPT

## 2020-05-12 PROCEDURE — 83735 ASSAY OF MAGNESIUM: CPT

## 2020-05-12 RX ADMIN — Medication PRN MG: at 23:32

## 2020-05-12 RX ADMIN — METHYLPREDNISOLONE SODIUM SUCCINATE SCH MG: 125 INJECTION, POWDER, FOR SOLUTION INTRAMUSCULAR; INTRAVENOUS at 18:43

## 2020-05-12 RX ADMIN — METHYLPREDNISOLONE SODIUM SUCCINATE SCH MG: 125 INJECTION, POWDER, FOR SOLUTION INTRAMUSCULAR; INTRAVENOUS at 23:33

## 2020-05-12 RX ADMIN — HYDROCODONE BITARTRATE AND ACETAMINOPHEN PRN EACH: 10; 325 TABLET ORAL at 18:43

## 2020-05-12 RX ADMIN — BENZONATATE SCH MG: 100 CAPSULE ORAL at 23:32

## 2020-05-12 RX ADMIN — IPRATROPIUM BROMIDE AND ALBUTEROL SULFATE SCH ML: .5; 3 SOLUTION RESPIRATORY (INHALATION) at 20:03

## 2020-05-12 RX ADMIN — BUDESONIDE SCH MG: 0.5 INHALANT ORAL at 20:03

## 2020-05-12 RX ADMIN — INSULIN ASPART SCH UNIT: 100 INJECTION, SOLUTION INTRAVENOUS; SUBCUTANEOUS at 22:19

## 2020-05-12 NOTE — XR
EXAMINATION TYPE: XR chest 1V portable

 

DATE OF EXAM: 5/12/2020

 

COMPARISON: 7/13/2019

 

HISTORY: Shortness of breath

 

TECHNIQUE: Single frontal view of the chest is obtained.

 

FINDINGS:  Coarsened interstitium similar in appearance the prior exam. Heart size normal with no pne
umothorax. Biapical pleural thickening. No sizable pleural effusion. Minimal blunting the costophreni
c angle an azygos fissure incidentally noted. These findings are stable. Pleural-based densities left
 upper lobe stable could be related to pleural plaque or previous rib trauma.

 

IMPRESSION:  

1. COPD with coarsened interstitium stable from prior exam correlate for chronic interstitial pulmona
ry fibrosis. No acute infiltrate. Underlying interstitial or viral pneumonitis not excluded.

## 2020-05-12 NOTE — ED
General Adult HPI





- General


Chief complaint: Shortness of Breath


Stated complaint: SOB, abd pain


Time Seen by Provider: 20 11:45


Source: patient, RN notes reviewed, old records reviewed


Mode of arrival: wheelchair


Limitations: no limitations





- History of Present Illness


Initial comments: 





This is a 7-year-old male who presents emergency department with past medical 

history significant for COPD.  Patient states he has been having a hard time 

breathing for 2 weeks and coughing up quite a bit for 2 weeks.  Patient states 

she's been on steroids and antibiotics and they've not helped he continues to 

cough constantly.  Patient's primary doctor Dr. Bazan wanted the patient to be 

brought in and be evaluated.  Patient denies any fever or chills.  Patient 

states she does have chest and some abdominal pain but it's worse with coughing 

he believes secondary to all the coughing he's been doing.  Patient denies any 

lightheadedness or dizziness.  Patient denies any vomiting or diarrhea.





- Related Data


                                Home Medications











 Medication  Instructions  Recorded  Confirmed


 


Budesonide/Formoterol Fumarate 1 puff INHALATION RT-BID 06/10/19 07/13/19





[Symbicort 160-4.5 Mcg Inhaler]   


 


Potassium Chloride [Klor-Con 20] 20 meq PO BID 06/10/19 07/13/19


 


Sertraline [Zoloft] 100 mg PO DAILY 06/10/19 07/13/19


 


Acetaminophen Tab [Tylenol] 650 mg PO Q4H PRN 19


 


Mirtazapine [Remeron] 7.5 mg PO HS@1800 19








                                  Previous Rx's











 Medication  Instructions  Recorded


 


predniSONE 10 mg PO DAILY #30 tab 18


 


Furosemide [Lasix] 40 mg PO DAILY #30 tab 19


 


Ipratropium-Albuterol Nebulize 3 ml INHALATION RT-Q6H #120 19





[Duoneb 0.5 mg-3 mg/3 ml Soln] ampul.neb 


 


Pantoprazole [Protonix] 40 mg PO AC-BRKFST #30 tablet. 19


 


HYDROcodone/APAP 10-325MG [Norco 1 tab PO Q8H PRN #9 tab 19





]  


 


Melatonin 10 mg PO HS  tablet 19


 


Multivitamins, Thera [Multivitamin 1 each PO DAILY  tab 19





(formulary)]  


 


DAPTOmycin [Cubicin Rf] 450 mg IV DAILY #14 vial 19


 


cefTRIAXone [Rocephin] 2,000 mg IVP Q24HR #14 vial 19


 


metroNIDAZOLE [Flagyl] 500 mg PO TID #42 tab 19











                                    Allergies











Allergy/AdvReac Type Severity Reaction Status Date / Time


 


amoxicillin AdvReac  Nausea & Verified 20 11:50





   Vomiting  














Review of Systems


ROS Statement: 


Those systems with pertinent positive or pertinent negative responses have been 

documented in the HPI.





ROS Other: All systems not noted in ROS Statement are negative.





Past Medical History


Past Medical History: COPD, Hyperlipidemia, Osteoarthritis (OA), Pneumonia, 

Rheumatoid Arthritis (RA), Sleep Apnea/CPAP/BIPAP


Additional Past Medical History / Comment(s): Pneumonia with sepsis twice-2018

 and in 2016. 3.5L home o2, POLO with no device (cannot tolerate), recently 

injured low back-saw Dr. Morse. Rheumatoid arthritis in bilateral hips,knees 

hands and back.


History of Any Multi-Drug Resistant Organisms: None Reported


Past Surgical History: Orthopedic Surgery


Additional Past Surgical History / Comment(s): bilateral cataract removals with 

lens implants, left knee arthroscopy, colonoscopy, GI perforated ulcer.


Past Anesthesia/Blood Transfusion Reactions: No Reported Reaction


Past Psychological History: Anxiety, Depression


Smoking Status: Former smoker


Past Alcohol Use History: Daily


Past Drug Use History: None Reported





- Past Family History


  ** Father


Family Medical History: Cancer


Additional Family Medical History / Comment(s): Father  of leukemia.





  ** Mother


Family Medical History: Cancer, CVA/TIA, Dementia, Diabetes Mellitus


Additional Family Medical History / Comment(s): Mother is 88yrs old with history

 of dementia and colon cancer.





  ** Sister(s)


Additional Family Medical History / Comment(s): Patient has 1 full sister with 

history of lung cancer.  Patient has one half-sister with adrenal problems.  

Patient does not have any brothers.  Patient 3 sons with no major medical 

problems.





General Exam





- General Exam Comments


Initial Comments: 





GENERAL:


Patient is well-developed and well-nourished.  Patient is nontoxic and well-

hydrated and is in mild distress.





ENT:


Neck is soft and supple.  No significant lymphadenopathy is noted.  Oropharynx 

is clear.  Moist mucous membranes.  Neck has full range of motion without 

eliciting any pain. 





EYES:


The sclera were anicteric and conjunctiva were pink and moist.  Extraocular 

movements were intact and pupils were equal round and reactive to light.  

Eyelids were unremarkable.





PULMONARY:


Patient has diminished breath sounds and has expiratory wheezing.





CARDIOVASCULAR:


Patient is tachycardic at 130 beats a minute





ABDOMEN:


Soft and nontender with normal bowel sounds.  





SKIN:


Skin is clear with no lesions or rashes and otherwise unremarkable.





NEUROLOGIC:


Patient is alert and oriented x3.  Cranial nerves II through XII are grossly 

intact.  Motor and sensory are also intact.  Normal speech, volume and content. 

 Symmetrical smile.





MUSCULOSKELETAL:


Normal extremities with adequate strength and full range of motion.  Patient has

 no edema and no calf tenderness.





LYMPHATICS:


No significant lymphadenopathy is noted





PSYCHIATRIC:


Normal psychiatric evaluation. 


Limitations: no limitations





Course


                                   Vital Signs











  20





  11:42


 


Temperature 98.9 F


 


Pulse Rate 128 H


 


Respiratory 24





Rate 


 


Blood Pressure 139/75


 


O2 Sat by Pulse 87 L





Oximetry 














Medical Decision Making





- Medical Decision Making





EKG shows sinus tachycardia with occasional PVC at 120 beats a minute [place] inter

essence 220 QRS is 84 QT interval 322 QTC is 470.





X-ray shows no signs of pneumonia.  Probable fibrosis is stable from prior exam.





I give the patient breathing treatments steroids he is feeling slightly better 

but still not back to his baseline.





I spoke with Dr. Anaya she agreed to admit the patient admitted the patient I 

continued breathing treatments as well as steroids on the floor.  Patient co

ntinued to pulse ox in the 80s.





- Lab Data


Result diagrams: 


                                 20 12:40





                                 20 12:40


                                   Lab Results











  20 Range/Units





  12:40 12:40 12:40 


 


WBC  9.0    (3.8-10.6)  k/uL


 


RBC  4.30    (4.30-5.90)  m/uL


 


Hgb  13.7    (13.0-17.5)  gm/dL


 


Hct  41.4    (39.0-53.0)  %


 


MCV  96.2    (80.0-100.0)  fL


 


MCH  31.8    (25.0-35.0)  pg


 


MCHC  33.0    (31.0-37.0)  g/dL


 


RDW  15.1    (11.5-15.5)  %


 


Plt Count  435    (150-450)  k/uL


 


Neutrophils %  92    %


 


Lymphocytes %  4    %


 


Monocytes %  3    %


 


Eosinophils %  0    %


 


Basophils %  0    %


 


Neutrophils #  8.3 H    (1.3-7.7)  k/uL


 


Lymphocytes #  0.4 L    (1.0-4.8)  k/uL


 


Monocytes #  0.3    (0-1.0)  k/uL


 


Eosinophils #  0.0    (0-0.7)  k/uL


 


Basophils #  0.0    (0-0.2)  k/uL


 


PT   10.1   (9.0-12.0)  sec


 


INR   1.0   (<1.2)  


 


APTT   23.4   (22.0-30.0)  sec


 


D-Dimer   0.51   (<0.60)  mg/L FEU


 


Sodium    129 L  (137-145)  mmol/L


 


Potassium    5.2 H  (3.5-5.1)  mmol/L


 


Chloride    91 L  ()  mmol/L


 


Carbon Dioxide    30  (22-30)  mmol/L


 


Anion Gap    8  mmol/L


 


BUN    14  (9-20)  mg/dL


 


Creatinine    0.54 L  (0.66-1.25)  mg/dL


 


Est GFR (CKD-EPI)AfAm    >90  (>60 ml/min/1.73 sqM)  


 


Est GFR (CKD-EPI)NonAf    >90  (>60 ml/min/1.73 sqM)  


 


Glucose    149 H  (74-99)  mg/dL


 


Plasma Lactic Acid José Miguel     (0.7-2.0)  mmol/L


 


Calcium    9.1  (8.4-10.2)  mg/dL


 


Magnesium    2.1  (1.6-2.3)  mg/dL


 


Total Bilirubin    0.6  (0.2-1.3)  mg/dL


 


AST    22  (17-59)  U/L


 


ALT    15  (4-49)  U/L


 


Alkaline Phosphatase    89  ()  U/L


 


Lactate Dehydrogenase    588  (313-618)  U/L


 


C-Reactive Protein    71.1 H  (<10.0)  mg/L


 


Total Protein    6.9  (6.3-8.2)  g/dL


 


Albumin    3.9  (3.5-5.0)  g/dL


 


Influenza Type A RNA     (Not Detectd)  


 


Influenza Type B (PCR)     (Not Detectd)  














  20 Range/Units





  12:40 12:40 


 


WBC    (3.8-10.6)  k/uL


 


RBC    (4.30-5.90)  m/uL


 


Hgb    (13.0-17.5)  gm/dL


 


Hct    (39.0-53.0)  %


 


MCV    (80.0-100.0)  fL


 


MCH    (25.0-35.0)  pg


 


MCHC    (31.0-37.0)  g/dL


 


RDW    (11.5-15.5)  %


 


Plt Count    (150-450)  k/uL


 


Neutrophils %    %


 


Lymphocytes %    %


 


Monocytes %    %


 


Eosinophils %    %


 


Basophils %    %


 


Neutrophils #    (1.3-7.7)  k/uL


 


Lymphocytes #    (1.0-4.8)  k/uL


 


Monocytes #    (0-1.0)  k/uL


 


Eosinophils #    (0-0.7)  k/uL


 


Basophils #    (0-0.2)  k/uL


 


PT    (9.0-12.0)  sec


 


INR    (<1.2)  


 


APTT    (22.0-30.0)  sec


 


D-Dimer    (<0.60)  mg/L FEU


 


Sodium    (137-145)  mmol/L


 


Potassium    (3.5-5.1)  mmol/L


 


Chloride    ()  mmol/L


 


Carbon Dioxide    (22-30)  mmol/L


 


Anion Gap    mmol/L


 


BUN    (9-20)  mg/dL


 


Creatinine    (0.66-1.25)  mg/dL


 


Est GFR (CKD-EPI)AfAm    (>60 ml/min/1.73 sqM)  


 


Est GFR (CKD-EPI)NonAf    (>60 ml/min/1.73 sqM)  


 


Glucose    (74-99)  mg/dL


 


Plasma Lactic Acid José Miguel  1.3   (0.7-2.0)  mmol/L


 


Calcium    (8.4-10.2)  mg/dL


 


Magnesium    (1.6-2.3)  mg/dL


 


Total Bilirubin    (0.2-1.3)  mg/dL


 


AST    (17-59)  U/L


 


ALT    (4-49)  U/L


 


Alkaline Phosphatase    ()  U/L


 


Lactate Dehydrogenase    (313-618)  U/L


 


C-Reactive Protein    (<10.0)  mg/L


 


Total Protein    (6.3-8.2)  g/dL


 


Albumin    (3.5-5.0)  g/dL


 


Influenza Type A RNA   Not Detected  (Not Detectd)  


 


Influenza Type B (PCR)   Not Detected  (Not Detectd)  














Critical Care Time


Critical Care Time: Yes


Total Critical Care Time: 35





Disposition


Clinical Impression: 


 Acute exacerbation of chronic obstructive pulmonary disease





Disposition: ADMITTED AS IP TO THIS HOSP


Referrals: 


Amalia Bazan MD [Primary Care Provider] - 1-2 days


Time of Disposition: 14:13

## 2020-05-13 LAB
GLUCOSE BLD-MCNC: 121 MG/DL (ref 75–99)
GLUCOSE BLD-MCNC: 130 MG/DL (ref 75–99)
GLUCOSE BLD-MCNC: 155 MG/DL (ref 75–99)
GLUCOSE BLD-MCNC: 171 MG/DL (ref 75–99)

## 2020-05-13 RX ADMIN — IPRATROPIUM BROMIDE AND ALBUTEROL SULFATE SCH ML: .5; 3 SOLUTION RESPIRATORY (INHALATION) at 15:53

## 2020-05-13 RX ADMIN — IPRATROPIUM BROMIDE AND ALBUTEROL SULFATE SCH: .5; 3 SOLUTION RESPIRATORY (INHALATION) at 14:58

## 2020-05-13 RX ADMIN — METOPROLOL SUCCINATE SCH MG: 25 TABLET, EXTENDED RELEASE ORAL at 09:01

## 2020-05-13 RX ADMIN — IPRATROPIUM BROMIDE AND ALBUTEROL SULFATE SCH ML: .5; 3 SOLUTION RESPIRATORY (INHALATION) at 08:07

## 2020-05-13 RX ADMIN — HYDROCODONE BITARTRATE AND ACETAMINOPHEN PRN EACH: 10; 325 TABLET ORAL at 23:58

## 2020-05-13 RX ADMIN — INSULIN ASPART SCH UNIT: 100 INJECTION, SOLUTION INTRAVENOUS; SUBCUTANEOUS at 17:24

## 2020-05-13 RX ADMIN — IPRATROPIUM BROMIDE AND ALBUTEROL SULFATE SCH ML: .5; 3 SOLUTION RESPIRATORY (INHALATION) at 11:39

## 2020-05-13 RX ADMIN — BUDESONIDE SCH MG: 1 SUSPENSION RESPIRATORY (INHALATION) at 20:08

## 2020-05-13 RX ADMIN — INSULIN ASPART SCH: 100 INJECTION, SOLUTION INTRAVENOUS; SUBCUTANEOUS at 13:04

## 2020-05-13 RX ADMIN — HYDROCODONE BITARTRATE AND ACETAMINOPHEN PRN EACH: 10; 325 TABLET ORAL at 15:44

## 2020-05-13 RX ADMIN — BUDESONIDE SCH MG: 0.5 INHALANT ORAL at 08:07

## 2020-05-13 RX ADMIN — THERA TABS SCH EACH: TAB at 08:57

## 2020-05-13 RX ADMIN — BENZONATATE SCH MG: 100 CAPSULE ORAL at 08:57

## 2020-05-13 RX ADMIN — IPRATROPIUM BROMIDE AND ALBUTEROL SULFATE SCH ML: .5; 3 SOLUTION RESPIRATORY (INHALATION) at 20:08

## 2020-05-13 RX ADMIN — INSULIN ASPART SCH UNIT: 100 INJECTION, SOLUTION INTRAVENOUS; SUBCUTANEOUS at 06:49

## 2020-05-13 RX ADMIN — PANTOPRAZOLE SODIUM SCH MG: 40 TABLET, DELAYED RELEASE ORAL at 05:19

## 2020-05-13 RX ADMIN — AZITHROMYCIN SCH MG: 500 TABLET, FILM COATED ORAL at 10:58

## 2020-05-13 RX ADMIN — BENZONATATE SCH MG: 100 CAPSULE ORAL at 15:44

## 2020-05-13 RX ADMIN — METHYLPREDNISOLONE SODIUM SUCCINATE SCH MG: 40 INJECTION, POWDER, FOR SOLUTION INTRAMUSCULAR; INTRAVENOUS at 23:30

## 2020-05-13 RX ADMIN — METHYLPREDNISOLONE SODIUM SUCCINATE SCH MG: 125 INJECTION, POWDER, FOR SOLUTION INTRAMUSCULAR; INTRAVENOUS at 05:19

## 2020-05-13 RX ADMIN — Medication SCH UNIT: at 08:57

## 2020-05-13 RX ADMIN — HYDROCODONE BITARTRATE AND ACETAMINOPHEN PRN EACH: 10; 325 TABLET ORAL at 05:18

## 2020-05-13 RX ADMIN — METHYLPREDNISOLONE SODIUM SUCCINATE SCH MG: 40 INJECTION, POWDER, FOR SOLUTION INTRAMUSCULAR; INTRAVENOUS at 17:25

## 2020-05-13 RX ADMIN — BENZONATATE SCH MG: 100 CAPSULE ORAL at 21:21

## 2020-05-13 RX ADMIN — METHYLPREDNISOLONE SODIUM SUCCINATE SCH MG: 125 INJECTION, POWDER, FOR SOLUTION INTRAMUSCULAR; INTRAVENOUS at 12:07

## 2020-05-13 RX ADMIN — MORPHINE SULFATE PRN MG: 2 INJECTION, SOLUTION INTRAMUSCULAR; INTRAVENOUS at 21:21

## 2020-05-13 RX ADMIN — Medication PRN MG: at 21:21

## 2020-05-13 RX ADMIN — INSULIN ASPART SCH: 100 INJECTION, SOLUTION INTRAVENOUS; SUBCUTANEOUS at 21:17

## 2020-05-13 RX ADMIN — SERTRALINE HYDROCHLORIDE SCH MG: 50 TABLET, FILM COATED ORAL at 08:58

## 2020-05-13 RX ADMIN — CEFAZOLIN SCH MLS/HR: 330 INJECTION, POWDER, FOR SOLUTION INTRAMUSCULAR; INTRAVENOUS at 12:09

## 2020-05-13 NOTE — CT
EXAMINATION TYPE: CT chest w con

 

DATE OF EXAM: 5/13/2020

 

COMPARISON: 6/5/2018

 

HISTORY: hypoxia

 

CT DLP: 360.1 mGycm

Automated exposure control for dose reduction was used.

 

CONTRAST: 

CT scan of the chest is performed with IV Contrast, patient injected with 100 mL of Isovue 300.

 

FINDINGS:

 

LUNGS: Moderately severe emphysematous changes are redemonstrated. There is evidence of scattered sub
pleural fibrosis. No evidence for distinct nodule or mass. Incidental azygos lobe and fissure. Calcif
ied granulomas right lower lobe. Mild lower lobe bronchiectasis.

 

MEDIASTINUM: There are no greater than 1 cm hilar or mediastinal lymph nodes.   No pericardial effusi
on is seen.  Thoracic aorta is of normal caliber. The heart is not enlarged.

 

UPPER ABDOMEN: Right renal cyst and left adrenal nodule unchanged from prior studies. Midline ventral
 hernia partially imaged.

 

OTHER:  No additional significant abnormality is seen.

 

IMPRESSION:

1. Stable features of pulmonary fibrosis and underlying emphysematous change.

## 2020-05-13 NOTE — P.PN
Subjective


Progress Note Date: 05/13/20








The patient is 70 years of age and he has long-term history of COPD and 

pulmonary fibrosis.  Based on his pulmonary function test in 2017 he has an FVC 

of 72% and FEV1 of 46% and he has been steroid dependent for the past few years 

taking prednisone a daily basis.  He has also underlying rheumatoid arthritis 

and previously he was taking immunosuppression with Humira none for now.  His 

oxygen dependent. The patient presented to the ED because of worsening shortness

of breath and her Breathing for the past 2 weeks along with increased cough.  No

reported fever or chills.  He hasn't taken antibiotics and steroids on 

outpatient basis which hasn't helped and he continued to have increased cough 

and dyspnea.  For that reason he came into the emergency department.  He has 

been maintained on 10 mg of prednisone a daily basis outpatient basis and he 

takes Symbicort as maintenance and DuoNeb nebulized treatments around the clock.

 We'll plan to continue his current white cell count is at 9.20 with a 

hemoglobin of 15.7.  His sodium is 129 with a normal renal function.  LFTs are 

within normal limits.  Lactic acid level is at 1.3.  The Procal. level is at 

0.09.  CRP 71 with a ferritin level of 329.  Covid 19 testing was done and the 

results are still pending for now.  His chest x-ray shows COPD with coarse 

interstitial consistent with underlying pulmonary fibrosis.  There is no 

evidence of any acute infiltration.  The patient was started on DuoNeb nebulized

treatments around the clock, Pulmicort Respules, IV Solu-Medrol in addition to 

IV Rocephin and he was admitted today medical floor and the pulmonary 

consultation was requested.Back in 2019, the patient had a complicated was an 

ulcer perforation with pneumoperitoneum requiring a sitter laparotomy and repair

of perforated ulcer as well as a repair of an incarcerated umbilical hernia.  

Back then, the patient was under our care.subsequent to that, the patient 

presented also with an intraperitoneal abscess and abdominal wall abscess re

quiring incision and drainage with subsequent antibiotic treatment utilizing a 

combination of daptomycin and Rocephin with good results.  Cultures back then 

showed anaerobic microorganism in addition to Citrobacter and Proteus Note that 

the patient is typically on oxygen at 3.5 L per minute nasal cannula. 





On today's evaluation of 05/13/2020, I'm seeing the patient for a follow-up.  

The patient is still short of breath.  He is having some cough and skeletal 

chest wall.  The patient is having the pain anteriorly and posteriorly specially

when he coughs.  He has increased lower extremity edema and he deserves 

diuretics.  He also needs another echocardiogram to reevaluate his 

echocardiogram.  Meanwhile, the Covid 19 analysis came back negative and the 

nasopharyngeal swab.  Antibiotic coverage includes a combination of Rocephin and

Zithromax.  The patient remains on IV Solu Medrol 60 mg every 6 hours of can be 

tapered as on today's evaluation he is less bronchospastic and wheezy.  The pat

ient also will placed on IV to KVO.





Objective





- Vital Signs


Vital signs: 


                                   Vital Signs











Temp  98 F   05/13/20 12:00


 


Pulse  82   05/13/20 12:00


 


Resp  18   05/13/20 12:00


 


BP  115/60   05/13/20 12:00


 


Pulse Ox  97   05/13/20 12:00








                                 Intake & Output











 05/12/20 05/13/20 05/13/20





 18:59 06:59 18:59


 


Intake Total 360 200 400


 


Output Total  500 250


 


Balance 360 -300 150


 


Weight 70.307 kg 64 kg 


 


Intake:   


 


  Intake, IV Titration   200





  Amount   


 


    Sodium Chloride 0.9% 1,   100





    000 ml @ 50 mls/hr IV .   





    Q20H IMTIAZ Rx#:[id number]   


 


    cefTRIAXone 1 gm In   100





    Sodium Chloride 0.9% 50   





    ml @ 100 mls/hr IVPB   





    Q24HR IMTIAZ Rx#:[id number]   


 


  Oral 360 200 200


 


Output:   


 


  Urine  500 250


 


Other:   


 


  Voiding Method  Urinal Urinal














- Exam








Appearance the patient in mild-to-moderate degree of respiratory distress, quite

uncomfortable as the patient is having abdominal pain.  He has obvious 

cushingoid features related to chronic steroid use.  He also seems to be quite 

flushed.


Head exam was generally normal. There was no scleral icterus or corneal arcus. 

Mucous membranes were moist.


Neck was supple and without jugular venous distension, thyromegaly, or carotid 

bruits. Carotids were easily palpable bilaterally. There was no adenopathy.  The

patient is a Mallampati class IV was significant crowding of posterior 

oropharynx


Lungs are diminished bilaterally and the patient has coarse crackles in lung 

bases and these are Velcro crackles typical of underlying pulmonary fibrosis


Heart sounds are tachycardic, Cardiac exam revealed the PMI to be normally 

situated and sized. The rhythm was regular and no extrasystoles were noted 

during several minutes of auscultation. The first and second heart sounds were 

normal and physiologic splitting of the second heart sound was noted. There were

no murmurs, rubs, clicks, or gallops.


Abdominal exam revealed normal bowel sounds. The abdomen was soft, non-tender, 

and without masses, organomegaly, or appreciable enlargement of the abdominal 

aorta. scars of previous abdominal surgery over the anterior abdominal wall, and

the patient also has a large anterior abdominal wall hernia which is easily red

ucible and there is no direct tenderness or rebound tensile guarding.


Extremities revealed +1 edema lower diminished bilaterally especially in the 

left lower extremity.  Pulses are diminished.  There is no cyanosis or clubbing.


Neurologic the patient is awake and alert.  Following commands and answering 

questions appropriately.  Focal neurological deficit.


Examination of the skin revealed no evidence of significant rashes, suspicious 

appearing nevi or other concerning lesions.








- Labs


CBC & Chem 7: 


                                 05/12/20 12:40





                                 05/12/20 12:40


Labs: 


                  Abnormal Lab Results - Last 24 Hours (Table)











  05/12/20 05/12/20 05/12/20 Range/Units





  12:40 16:09 20:54 


 


POC Glucose (mg/dL)   123 H  162 H  (75-99)  mg/dL


 


Ferritin  329.8 H    (22.0-322.0)  ng/mL














  05/13/20 05/13/20 Range/Units





  06:24 11:38 


 


POC Glucose (mg/dL)  171 H  130 H  (75-99)  mg/dL


 


Ferritin    (22.0-322.0)  ng/mL














Assessment and Plan


Plan: 








1 acute exacerbation of chronic COPD/pulmonary fibrosis.  The patient was 

treated with antibiotics and steroids on outpatient basis with failure to 

response and the patient was hospitalized.  Pro-calcitonin level is low.  Covid 

19 testing is negative.  There have a component of CHF and fluid overload as the

patient has increased lower extremity edema.  On today's evaluation is less 

bronchospastic and wheezy.





2 chronic hypoxic respiratory failure and the patient is demented on oxygen and 

underwent 3-1/2 L on outpatient basis





3 advanced COPD/pulmonary fibrosis, oxygen and steroid dependent





4 chronic patient notes features secondary to steroid use





5 history of duodenal ulcer perforation requiring surgical repair and repair of 

incarcerated umbilical hernia, with subsequent development of 

intraperitoneal/abdominal abscess requiring incision and drainage and prolonged 

antibiotic course





7 rheumatoid arthritis





8 obstructive sleep apnea not receiving any CPAP therapy at this point in time





9 chronic back pain





10 hyperlipidemia





11 osteoarthritis





plan





The Covid 19 evaluation came back negative.  


Start the patient on Lasix 40 mg every 12 hours 


Echocardiogram to evaluate the cardiac function 


IV fluids to KVO 


Taper the Solu Medrol to 40 mg every 8 hours 


Continue Rocephin and Zithromax 


We'll continue to follow

## 2020-05-13 NOTE — P.CNPUL
History of Present Illness


Consult date: 20


Reason for consult: dyspnea


History of present illness: 





The patient is 70 years of age and he has long-term history of COPD and 

pulmonary fibrosis.  Based on his pulmonary function test in 2017 he has an FVC 

of 72% and FEV1 of 46% and he has been steroid dependent for the past few years 

taking prednisone a daily basis.  He has also underlying rheumatoid arthritis 

and previously he was taking immunosuppression with Humira none for now.  His o

xygen dependent. The patient presented to the ED because of worsening shortness 

of breath and her Breathing for the past 2 weeks along with increased cough.  No

reported fever or chills.  He hasn't taken antibiotics and steroids on 

outpatient basis which hasn't helped and he continued to have increased cough 

and dyspnea.  For that reason he came into the emergency department.  He has 

been maintained on 10 mg of prednisone a daily basis outpatient basis and he 

takes Symbicort as maintenance and DuoNeb nebulized treatments around the clock.

 We'll plan to continue his current white cell count is at 9.20 with a 

hemoglobin of 15.7.  His sodium is 129 with a normal renal function.  LFTs are 

within normal limits.  Lactic acid level is at 1.3.  The Procal. level is at 

0.09.  CRP 71 with a ferritin level of 329.  Covid 19 testing was done and the 

results are still pending for now.  His chest x-ray shows COPD with coarse 

interstitial consistent with underlying pulmonary fibrosis.  There is no e

vidence of any acute infiltration.  The patient was started on DuoNeb nebulized 

treatments around the clock, Pulmicort Respules, IV Solu-Medrol in addition to 

IV Rocephin and he was admitted today medical floor and the pulmonary 

consultation was requested.Back in 2019, the patient had a complicated was an 

ulcer perforation with pneumoperitoneum requiring a sitter laparotomy and repair

of perforated ulcer as well as a repair of an incarcerated umbilical hernia.  

Back then, the patient was under our care.subsequent to that, the patient 

presented also with an intraperitoneal abscess and abdominal wall abscess 

requiring incision and drainage with subsequent antibiotic treatment utilizing a

combination of daptomycin and Rocephin with good results.  Cultures back then 

showed anaerobic microorganism in addition to Citrobacter and Proteus Note that 

the patient is typically on oxygen at 3.5 L per minute nasal cannula. 





Review of Systems








Constitutional: Reports fatigue, Reports fever, Reports lethargy, Reports poor 

appetite, Reports weakness


Eyes: denies blurred vision, denies bulging eye, denies decreased vision


Ears: deny: decreased hearing, ear discharge, earache, tinnitus


Ears, nose, mouth and throat: Denies headache, Denies sore throat


Cardiovascular: Reports decreased exercise tolerance, Reports dyspnea on 

exertion, Reports edema, Reports shortness of breath


Respiratory: Reports dyspnea, Reports home oxygen, Reports wheezing, reports 

chronic shortness of breath and inugh.


Gastrointestinal: Reports abdominal pain, Reports change in bowel habits, 

Reports dyspepsia, Reports indigestion, Reports loss of appetite, Reports nausea


Genitourinary: Reports as per HPI


Musculoskeletal: Reports as per HPI


Musculoskeletal: bilateral: ankle swelling, absent: ankle pain, ankle stiffness


Integumentary: Denies pruritus, Denies rash


Neurological: Reports as per HPI, Reports weakness


Psychiatric: Reports as per HPI


Endocrine: Reports fatigue


Hematologic/Lymphatic: Reports as per HPI


Allergic/Immunologic: Reports as per HPI








Past Medical History


Past Medical History: COPD, Hyperlipidemia, Osteoarthritis (OA), Pneumonia, 

Rheumatoid Arthritis (RA), Sleep Apnea/CPAP/BIPAP


Additional Past Medical History / Comment(s): COPD, pulmonary fibrosis, chronic 

hypoxic respiratory failure, previous hospitalization for pneumonia and sepsis 

back in 2018, previous history of pneumoperitoneum related to a perforated 

duodenal ulcer, repair of an umbilical hernia, obstructive sleep apnea nont

olerant to CPAP therapy, chronic back pain, rheumatoid arthritis, degenerative 

arthritis involving the hips and the knees and hands and the back, 

hyperlipidemia


History of Any Multi-Drug Resistant Organisms: None Reported


Past Surgical History: Orthopedic Surgery


Additional Past Surgical History / Comment(s): bilateral cataract removals with 

lens implants, left knee arthroscopy, colonoscopy, repair of a perforated 

duodenal ulcer and repair of an incarcerated umbilical hernia


Past Anesthesia/Blood Transfusion Reactions: No Reported Reaction


Past Psychological History: Anxiety, Depression


Additional Psychological History / Comment(s): Pt resides with his spouse of 38 

yrs.  He has home oxygen which he wears prn at 3.5L/NC.  He has a nebulizer.


Smoking Status: Former smoker


Past Alcohol Use History: Daily


Additional Past Alcohol Use History / Comment(s): Patient smoked 2 packs per day

for 40+ years.  He quit 6 years ago.  He denies any illicit drug use or 

marijuana use.  He drinks 2 beers per day but none since previous admission and 

May.  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 

involved


Past Drug Use History: None Reported





- Past Family History


  ** Father


Family Medical History: Cancer


Additional Family Medical History / Comment(s): Father  of leukemia.





  ** Mother


Family Medical History: Cancer, CVA/TIA, Dementia, Diabetes Mellitus


Additional Family Medical History / Comment(s): Mother is 88yrs old with history

of dementia and colon cancer.





  ** Sister(s)


Additional Family Medical History / Comment(s): Patient has 1 full sister with 

history of lung cancer.  Patient has one half-sister with adrenal problems.  

Patient does not have any brothers.  Patient 3 sons with no major medical 

problems.





Medications and Allergies


                                Home Medications











 Medication  Instructions  Recorded  Confirmed  Type


 


predniSONE 10 mg PO DAILY #30 tab 18 Rx


 


Albuterol Inhaler [Ventolin Hfa 2 puff INHALATION RT-QID PRN 20 

History





Inhaler]    


 


Alendronate Sodium [Fosamax] 70 mg PO GODWIN 20 History


 


Budesonide [Pulmicort] 0.5 mg INHALATION RT-BID 20 History


 


Cholecalciferol [Vitamin D3 (25 1,000 unit PO DAILY 20 History





Mcg = 1000 Iu)]    


 


Doxycycline Hyclate 100 mg PO BID 20 History


 


HYDROcodone/APAP 10-325MG [Norco 1 tab PO BID PRN 20 History





]    


 


Ipratropium-Albuterol Nebulize 3 ml INHALATION RT-BID 20 History





[Duoneb 0.5 mg-3 mg/3 ml Soln]    


 


Metoprolol Succinate [Toprol XL] 25 mg PO DAILY 20 History


 


Multivitamins, Thera [Multivitamin 1 tab PO DAILY 20 History





(formulary)]    


 


Omeprazole [PriLOSEC] 40 mg PO DAILY 20 History


 


Sertraline [Zoloft] 50 mg PO DAILY 20 History








                                    Allergies











Allergy/AdvReac Type Severity Reaction Status Date / Time


 


amoxicillin AdvReac  Nausea & Verified 20 18:00





   Vomiting  














Physical Exam


Vitals: 


                                   Vital Signs











  Temp Pulse Pulse Resp BP BP Pulse Ox


 


 20 20:18   104 H     


 


 20 20:04   100     


 


 20 16:00  97.2 F L   106 H  16   115/77  93 L


 


 20 14:58  97.8 F  90   22  119/80   96


 


 20 14:00   117 H   23  128/83   94 L


 


 20 13:30   115 H   19  122/79   94 L


 


 20 13:00   110 H   23  119/71   96


 


 20 11:42  98.9 F  128 H   24  139/75   87 L








                                Intake and Output











 20





 06:59 14:59 22:59


 


Intake Total   360


 


Balance   360


 


Intake:   


 


  Oral   360


 


Other:   


 


  Weight  70.307 kg 70.307 kg

















Appearance the patient in mild-to-moderate degree of respiratory distress, quite

 uncomfortable as the patient is having abdominal pain.  He has obvious 

cushingoid features related to chronic steroid use.  He also seems to be quite 

flushed.


Head exam was generally normal. There was no scleral icterus or corneal arcus. 

Mucous membranes were moist.


Neck was supple and without jugular venous distension, thyromegaly, or carotid 

bruits. Carotids were easily palpable bilaterally. There was no adenopathy.  The

 patient is a Mallampati class IV was significant crowding of posterior 

oropharynx


Lungs are diminished bilaterally and the patient has coarse crackles in lung 

bases and these are Velcro crackles typical of underlying pulmonary fibrosis


Heart sounds are tachycardic, Cardiac exam revealed the PMI to be normally 

situated and sized. The rhythm was regular and no extrasystoles were noted 

during several minutes of auscultation. The first and second heart sounds were 

normal and physiologic splitting of the second heart sound was noted. There were

 no murmurs, rubs, clicks, or gallops.


Abdominal exam revealed normal bowel sounds. The abdomen was soft, non-tender, 

and without masses, organomegaly, or appreciable enlargement of the abdominal 

aorta. scars of previous abdominal surgery over the anterior abdominal wall, and

 the patient also has a large anterior abdominal wall hernia which is easily 

reducible and there is no direct tenderness or rebound tensile guarding.


Extremities revealed +1 edema lower diminished bilaterally especially in the 

left lower extremity.  Pulses are diminished.  There is no cyanosis or clubbing.


Neurologic the patient is awake and alert.  Following commands and answering 

questions appropriately.  Focal neurological deficit.


Examination of the skin revealed no evidence of significant rashes, suspicious 

appearing nevi or other concerning lesions.








Results





- Laboratory Findings


CBC and BMP: 


                                 20 12:40





                                 20 12:40


PT/INR, D-dimer











PT  10.1 sec (9.0-12.0)   20  12:40    


 


INR  1.0  (<1.2)   20  12:40    


 


D-Dimer  0.51 mg/L FEU (<0.60)   20  12:40    








Abnormal lab findings: 


                                  Abnormal Labs











  20





  12:40 12:40 16:09


 


Neutrophils #  8.3 H  


 


Lymphocytes #  0.4 L  


 


Sodium   129 L 


 


Potassium   5.2 H 


 


Chloride   91 L 


 


Creatinine   0.54 L 


 


Glucose   149 H 


 


POC Glucose (mg/dL)    123 H


 


Ferritin   329.8 H 


 


C-Reactive Protein   71.1 H 














  20





  20:54


 


Neutrophils # 


 


Lymphocytes # 


 


Sodium 


 


Potassium 


 


Chloride 


 


Creatinine 


 


Glucose 


 


POC Glucose (mg/dL)  162 H


 


Ferritin 


 


C-Reactive Protein 














- Diagnostic Findings


Chest x-ray: image reviewed





Assessment and Plan


Plan: 








1 acute exacerbation of chronic COPD/pulmonary fibrosis.  The patient was tr

eated with antibiotics and steroids on outpatient basis with failure to response

 and the patient was hospitalized.  Pro-calcitonin level is low.  Covid 19 

testing is still pending for now.  The patient is being treated with a 

combination of antibiotics and systemic steroids.





2 chronic hypoxic respiratory failure and the patient is demented on oxygen and 

underwent 3-1/2 L on outpatient basis





3 advanced COPD/pulmonary fibrosis, oxygen and steroid dependent





4 chronic patient notes features secondary to steroid use





5 history of duodenal ulcer perforation requiring surgical repair and repair of 

incarcerated umbilical hernia, with subsequent development of 

intraperitoneal/abdominal abscess requiring incision and drainage and prolonged 

antibiotic course





7 rheumatoid arthritis





8 obstructive sleep apnea not receiving any CPAP therapy at this point in time





9 chronic back pain





10 hyperlipidemia





11 osteoarthritis





plan





Agree on the current treatment


Covid 19 testing is pending


we'll continue to follow

## 2020-05-14 LAB
ANION GAP SERPL CALC-SCNC: 3 MMOL/L
BUN SERPL-SCNC: 20 MG/DL (ref 9–20)
CALCIUM SPEC-MCNC: 8.7 MG/DL (ref 8.4–10.2)
CHLORIDE SERPL-SCNC: 97 MMOL/L (ref 98–107)
CO2 SERPL-SCNC: 34 MMOL/L (ref 22–30)
GLUCOSE BLD-MCNC: 114 MG/DL (ref 75–99)
GLUCOSE BLD-MCNC: 131 MG/DL (ref 75–99)
GLUCOSE BLD-MCNC: 141 MG/DL (ref 75–99)
GLUCOSE BLD-MCNC: 153 MG/DL (ref 75–99)
GLUCOSE SERPL-MCNC: 131 MG/DL (ref 74–99)
POTASSIUM SERPL-SCNC: 4.9 MMOL/L (ref 3.5–5.1)
SODIUM SERPL-SCNC: 134 MMOL/L (ref 137–145)

## 2020-05-14 RX ADMIN — BUDESONIDE SCH MG: 1 SUSPENSION RESPIRATORY (INHALATION) at 19:25

## 2020-05-14 RX ADMIN — METOPROLOL SUCCINATE SCH MG: 25 TABLET, EXTENDED RELEASE ORAL at 08:21

## 2020-05-14 RX ADMIN — CEFAZOLIN SCH: 330 INJECTION, POWDER, FOR SOLUTION INTRAMUSCULAR; INTRAVENOUS at 01:29

## 2020-05-14 RX ADMIN — Medication PRN MG: at 20:03

## 2020-05-14 RX ADMIN — MORPHINE SULFATE PRN MG: 2 INJECTION, SOLUTION INTRAMUSCULAR; INTRAVENOUS at 09:23

## 2020-05-14 RX ADMIN — IPRATROPIUM BROMIDE AND ALBUTEROL SULFATE SCH ML: .5; 3 SOLUTION RESPIRATORY (INHALATION) at 19:25

## 2020-05-14 RX ADMIN — Medication SCH UNIT: at 08:21

## 2020-05-14 RX ADMIN — HYDROCODONE BITARTRATE AND ACETAMINOPHEN PRN EACH: 10; 325 TABLET ORAL at 08:19

## 2020-05-14 RX ADMIN — METHYLPREDNISOLONE SODIUM SUCCINATE SCH MG: 40 INJECTION, POWDER, FOR SOLUTION INTRAMUSCULAR; INTRAVENOUS at 15:28

## 2020-05-14 RX ADMIN — METHYLPREDNISOLONE SODIUM SUCCINATE SCH MG: 40 INJECTION, POWDER, FOR SOLUTION INTRAMUSCULAR; INTRAVENOUS at 08:19

## 2020-05-14 RX ADMIN — INSULIN ASPART SCH UNIT: 100 INJECTION, SOLUTION INTRAVENOUS; SUBCUTANEOUS at 17:37

## 2020-05-14 RX ADMIN — IPRATROPIUM BROMIDE AND ALBUTEROL SULFATE SCH ML: .5; 3 SOLUTION RESPIRATORY (INHALATION) at 16:06

## 2020-05-14 RX ADMIN — HYDROCODONE BITARTRATE AND ACETAMINOPHEN PRN EACH: 10; 325 TABLET ORAL at 15:30

## 2020-05-14 RX ADMIN — BUDESONIDE SCH: 1 SUSPENSION RESPIRATORY (INHALATION) at 08:08

## 2020-05-14 RX ADMIN — INSULIN ASPART SCH: 100 INJECTION, SOLUTION INTRAVENOUS; SUBCUTANEOUS at 12:37

## 2020-05-14 RX ADMIN — METHYLPREDNISOLONE SODIUM SUCCINATE SCH MG: 40 INJECTION, POWDER, FOR SOLUTION INTRAMUSCULAR; INTRAVENOUS at 23:38

## 2020-05-14 RX ADMIN — MORPHINE SULFATE PRN MG: 2 INJECTION, SOLUTION INTRAMUSCULAR; INTRAVENOUS at 20:03

## 2020-05-14 RX ADMIN — PANTOPRAZOLE SODIUM SCH MG: 40 TABLET, DELAYED RELEASE ORAL at 06:31

## 2020-05-14 RX ADMIN — FUROSEMIDE SCH MG: 10 INJECTION, SOLUTION INTRAMUSCULAR; INTRAVENOUS at 20:03

## 2020-05-14 RX ADMIN — AZITHROMYCIN SCH MG: 500 TABLET, FILM COATED ORAL at 08:21

## 2020-05-14 RX ADMIN — FUROSEMIDE SCH MG: 10 INJECTION, SOLUTION INTRAMUSCULAR; INTRAVENOUS at 09:26

## 2020-05-14 RX ADMIN — IPRATROPIUM BROMIDE AND ALBUTEROL SULFATE SCH: .5; 3 SOLUTION RESPIRATORY (INHALATION) at 08:09

## 2020-05-14 RX ADMIN — HYDROCODONE BITARTRATE AND ACETAMINOPHEN PRN EACH: 10; 325 TABLET ORAL at 23:44

## 2020-05-14 RX ADMIN — BENZONATATE SCH MG: 100 CAPSULE ORAL at 08:21

## 2020-05-14 RX ADMIN — MORPHINE SULFATE PRN MG: 2 INJECTION, SOLUTION INTRAMUSCULAR; INTRAVENOUS at 05:07

## 2020-05-14 RX ADMIN — INSULIN ASPART SCH UNIT: 100 INJECTION, SOLUTION INTRAVENOUS; SUBCUTANEOUS at 06:31

## 2020-05-14 RX ADMIN — THERA TABS SCH EACH: TAB at 08:21

## 2020-05-14 RX ADMIN — IPRATROPIUM BROMIDE AND ALBUTEROL SULFATE SCH ML: .5; 3 SOLUTION RESPIRATORY (INHALATION) at 11:39

## 2020-05-14 RX ADMIN — BENZONATATE SCH MG: 100 CAPSULE ORAL at 20:03

## 2020-05-14 RX ADMIN — SERTRALINE HYDROCHLORIDE SCH MG: 50 TABLET, FILM COATED ORAL at 08:21

## 2020-05-14 RX ADMIN — BENZONATATE SCH MG: 100 CAPSULE ORAL at 15:30

## 2020-05-14 RX ADMIN — INSULIN ASPART SCH: 100 INJECTION, SOLUTION INTRAVENOUS; SUBCUTANEOUS at 23:39

## 2020-05-14 RX ADMIN — MORPHINE SULFATE PRN MG: 2 INJECTION, SOLUTION INTRAMUSCULAR; INTRAVENOUS at 13:37

## 2020-05-14 RX ADMIN — MORPHINE SULFATE PRN MG: 2 INJECTION, SOLUTION INTRAMUSCULAR; INTRAVENOUS at 01:06

## 2020-05-15 VITALS — TEMPERATURE: 97.8 F

## 2020-05-15 VITALS — DIASTOLIC BLOOD PRESSURE: 78 MMHG | HEART RATE: 100 BPM | SYSTOLIC BLOOD PRESSURE: 145 MMHG

## 2020-05-15 VITALS — RESPIRATION RATE: 20 BRPM

## 2020-05-15 LAB
ANION GAP SERPL CALC-SCNC: 8 MMOL/L
BUN SERPL-SCNC: 28 MG/DL (ref 9–20)
CALCIUM SPEC-MCNC: 8.7 MG/DL (ref 8.4–10.2)
CHLORIDE SERPL-SCNC: 89 MMOL/L (ref 98–107)
CO2 SERPL-SCNC: 36 MMOL/L (ref 22–30)
GLUCOSE BLD-MCNC: 113 MG/DL (ref 75–99)
GLUCOSE BLD-MCNC: 119 MG/DL (ref 75–99)
GLUCOSE SERPL-MCNC: 121 MG/DL (ref 74–99)
POTASSIUM SERPL-SCNC: 4.9 MMOL/L (ref 3.5–5.1)
SODIUM SERPL-SCNC: 133 MMOL/L (ref 137–145)

## 2020-05-15 RX ADMIN — FUROSEMIDE SCH MG: 10 INJECTION, SOLUTION INTRAMUSCULAR; INTRAVENOUS at 08:44

## 2020-05-15 RX ADMIN — INSULIN ASPART SCH: 100 INJECTION, SOLUTION INTRAVENOUS; SUBCUTANEOUS at 07:01

## 2020-05-15 RX ADMIN — AZITHROMYCIN SCH MG: 500 TABLET, FILM COATED ORAL at 08:45

## 2020-05-15 RX ADMIN — MORPHINE SULFATE PRN MG: 2 INJECTION, SOLUTION INTRAMUSCULAR; INTRAVENOUS at 04:57

## 2020-05-15 RX ADMIN — KETOROLAC TROMETHAMINE SCH MG: 30 INJECTION, SOLUTION INTRAMUSCULAR at 15:03

## 2020-05-15 RX ADMIN — METHYLPREDNISOLONE SODIUM SUCCINATE SCH MG: 40 INJECTION, POWDER, FOR SOLUTION INTRAMUSCULAR; INTRAVENOUS at 08:44

## 2020-05-15 RX ADMIN — KETOROLAC TROMETHAMINE SCH MG: 30 INJECTION, SOLUTION INTRAMUSCULAR at 09:50

## 2020-05-15 RX ADMIN — MORPHINE SULFATE PRN MG: 2 INJECTION, SOLUTION INTRAMUSCULAR; INTRAVENOUS at 01:05

## 2020-05-15 RX ADMIN — HYDROCODONE BITARTRATE AND ACETAMINOPHEN PRN EACH: 10; 325 TABLET ORAL at 08:45

## 2020-05-15 RX ADMIN — THERA TABS SCH EACH: TAB at 08:45

## 2020-05-15 RX ADMIN — BUDESONIDE SCH MG: 1 SUSPENSION RESPIRATORY (INHALATION) at 07:50

## 2020-05-15 RX ADMIN — PANTOPRAZOLE SODIUM SCH MG: 40 TABLET, DELAYED RELEASE ORAL at 04:56

## 2020-05-15 RX ADMIN — Medication SCH UNIT: at 08:45

## 2020-05-15 RX ADMIN — IPRATROPIUM BROMIDE AND ALBUTEROL SULFATE SCH ML: .5; 3 SOLUTION RESPIRATORY (INHALATION) at 11:38

## 2020-05-15 RX ADMIN — SERTRALINE HYDROCHLORIDE SCH MG: 50 TABLET, FILM COATED ORAL at 08:45

## 2020-05-15 RX ADMIN — INSULIN ASPART SCH: 100 INJECTION, SOLUTION INTRAVENOUS; SUBCUTANEOUS at 12:26

## 2020-05-15 RX ADMIN — METOPROLOL SUCCINATE SCH MG: 25 TABLET, EXTENDED RELEASE ORAL at 08:45

## 2020-05-15 RX ADMIN — CEFAZOLIN SCH: 330 INJECTION, POWDER, FOR SOLUTION INTRAMUSCULAR; INTRAVENOUS at 07:02

## 2020-05-15 RX ADMIN — BENZONATATE SCH MG: 100 CAPSULE ORAL at 08:45

## 2020-05-15 RX ADMIN — IPRATROPIUM BROMIDE AND ALBUTEROL SULFATE SCH ML: .5; 3 SOLUTION RESPIRATORY (INHALATION) at 07:50

## 2020-05-15 NOTE — P.PN
Subjective


Progress Note Date: 05/14/20


Principal diagnosis: 


 


The patient is 70 years of age and he has long-term history of COPD and 

pulmonary fibrosis.  Based on his pulmonary function test in 2017 he has an FVC 

of 72% and FEV1 of 46% and he has been steroid dependent for the past few years 

taking prednisone a daily basis.  He has also underlying rheumatoid arthritis 

and previously he was taking immunosuppression with Humira none for now.  His 

oxygen dependent. The patient presented to the ED because of worsening shortness

of breath and her Breathing for the past 2 weeks along with increased cough.  No

reported fever or chills.  He hasn't taken antibiotics and steroids on 

outpatient basis which hasn't helped and he continued to have increased cough 

and dyspnea.  For that reason he came into the emergency department.  He has 

been maintained on 10 mg of prednisone a daily basis outpatient basis and he 

takes Symbicort as maintenance and DuoNeb nebulized treatments around the clock.

 We'll plan to continue his current white cell count is at 9.20 with a 

hemoglobin of 15.7.  His sodium is 129 with a normal renal function.  LFTs are 

within normal limits.  Lactic acid level is at 1.3.  The Procal. level is at 

0.09.  CRP 71 with a ferritin level of 329.  Covid 19 testing was done and the 

results are still pending for now.  His chest x-ray shows COPD with coarse 

interstitial consistent with underlying pulmonary fibrosis.  There is no 

evidence of any acute infiltration.  The patient was started on DuoNeb nebulized

treatments around the clock, Pulmicort Respules, IV Solu-Medrol in addition to 

IV Rocephin and he was admitted today medical floor and the pulmonary 

consultation was requested.Back in 2019, the patient had a complicated was an 

ulcer perforation with pneumoperitoneum requiring a sitter laparotomy and repair

of perforated ulcer as well as a repair of an incarcerated umbilical hernia.  

Back then, the patient was under our care.subsequent to that, the patient 

presented also with an intraperitoneal abscess and abdominal wall abscess 

requiring incision and drainage with subsequent antibiotic treatment utilizing a

combination of daptomycin and Rocephin with good results.  Cultures back then 

showed anaerobic microorganism in addition to Citrobacter and Proteus Note that 

the patient is typically on oxygen at 3.5 L per minute nasal cannula. 





On today's evaluation of 05/13/2020, I'm seeing the patient for a follow-up.  

The patient is still short of breath.  He is having some cough and skeletal 

chest wall.  The patient is having the pain anteriorly and posteriorly specially

when he coughs.  He has increased lower extremity edema and he deserves 

diuretics.  He also needs another echocardiogram to reevaluate his 

echocardiogram.  Meanwhile, the Covid 19 analysis came back negative and the 

nasopharyngeal swab.  Antibiotic coverage includes a combination of Rocephin and

Zithromax.  The patient remains on IV Solu Medrol 60 mg every 6 hours of can be 

tapered as on today's evaluation he is less bronchospastic and wheezy.  The 

patient also will placed on IV to KVO.





On 05/14/2020 patient seen in follow-up on selective care unit.  Patient is up 

in the bathroom today, shaving, his pulse ox on 5 L is %, he was started 

on Lasix today, he is in negative for 50 fluid balance, his incentive spirometry

effort has improved, and she was pulling 1000 mL on the today.  Patient does 

have exertional dyspnea, recovers with rest, denies any chest pain, no fever or 

chills, hemodynamically stable, no significant swelling involving his lower 

extremities, lung sounds positive for dementia breath sounds bilaterally, 

today's labs have been reviewed showing sodium of 134, potassium is 4.9, 

chloride is 97, CO2 is 34, BUN of 20 and creatinine 0.55. 








Objective





- Vital Signs


Vital signs: 


                                   Vital Signs











Temp  98 F   05/14/20 08:00


 


Pulse  82   05/14/20 08:00


 


Resp  18   05/14/20 08:00


 


BP  120/73   05/14/20 08:00


 


Pulse Ox  100   05/14/20 08:00








                                 Intake & Output











 05/13/20 05/14/20 05/14/20





 18:59 06:59 18:59


 


Intake Total 400 400 240


 


Output Total 1050 200 250


 


Balance -650 200 -10


 


Weight  69.3 kg 


 


Intake:   


 


  Intake, IV Titration 200  





  Amount   


 


    Sodium Chloride 0.9% 1, 100  





    000 ml @ 5 mls/hr IV .   





    Q24H IMTIAZ Rx#:[id number]   


 


    cefTRIAXone 1 gm In 100  





    Sodium Chloride 0.9% 50   





    ml @ 100 mls/hr IVPB   





    Q24HR IMTIAZ Rx#:[id number]   


 


  Oral 200 400 240


 


Output:   


 


  Urine 1050 200 250


 


Other:   


 


  Voiding Method Urinal Urinal Urinal


 


  # Voids 1 1 2


 


  # Bowel Movements 1  














- Exam


 GENERAL EXAM: Alert, active, very pleasant, white male, on 5 L of oxygen with a

pulse ox of 92 tcomfortable in no apparent distress.


HEAD: Normocephalic/atraumatic.


EYES: Normal reaction of pupils, equal size.  Conjunctiva pink, sclera white.


NOSE: Clear with pink turbinates.


THROAT: No erythema or exudates.


NECK: No masses, no JVD, no thyroid enlargement, no adenopathy.


CHEST: No chest wall deformity.  Symmetrical expansion. 


LUNGS: Equal air entry with no crackles, wheeze, rhonchi or dullness.


CVS: Regular rate and rhythm, normal S1 and S2, no gallops, no murmurs, no rubs


ABDOMEN: Soft, nontender.  No hepatosplenomegaly, normal bowel sounds, no 

guarding or rigidity.


EXTREMITIES: No clubbing, no edema, no cyanosis, 2+ pulses and upper and lower 

extremities.


MUSCULOSKELETAL: Muscle strength and tone normal.


SPINE: No scoliosis or deformity


SKIN: No rashes


CENTRAL NERVOUS SYSTEM: Alert and oriented -3.  No focal deficits, tone is 

normal in all 4 extremities.


PSYCHIATRIC: Alert and oriented -3.  Appropriate affect.  Intact judgment and 

insight.











- Labs


CBC & Chem 7: 


                                 05/12/20 12:40





                                 05/15/20 06:06


Labs: 


                  Abnormal Lab Results - Last 24 Hours (Table)











  05/13/20 05/13/20 05/13/20 Range/Units





  11:38 16:41 20:52 


 


Sodium     (137-145)  mmol/L


 


Chloride     ()  mmol/L


 


Carbon Dioxide     (22-30)  mmol/L


 


Creatinine     (0.66-1.25)  mg/dL


 


Glucose     (74-99)  mg/dL


 


POC Glucose (mg/dL)  130 H  155 H  121 H  (75-99)  mg/dL














  05/14/20 05/14/20 05/14/20 Range/Units





  06:18 06:51 11:35 


 


Sodium   134 L   (137-145)  mmol/L


 


Chloride   97 L   ()  mmol/L


 


Carbon Dioxide   34 H   (22-30)  mmol/L


 


Creatinine   0.55 L   (0.66-1.25)  mg/dL


 


Glucose   131 H   (74-99)  mg/dL


 


POC Glucose (mg/dL)  141 H   114 H  (75-99)  mg/dL








                      Microbiology - Last 24 Hours (Table)











 05/12/20 12:40 Blood Culture - Preliminary





 Blood    No Growth after 24 hours














Assessment and Plan


Plan: 


1 acute exacerbation of chronic COPD/pulmonary fibrosis.  The patient was t

reated with antibiotics and steroids on outpatient basis with failure to 

response and the patient was hospitalized.  Pro-calcitonin level is low.  Covid 

19 testing is negative.  There have a component of CHF and fluid overload as the

patient has increased lower extremity edema.  On today's evaluation is less 

bronchospastic and wheezy.





2 chronic hypoxic respiratory failure and the patient is demented on oxygen and 

underwent 3-1/2 L on outpatient basis





3 advanced COPD/pulmonary fibrosis, oxygen and steroid dependent





4 chronic patient notes features secondary to steroid use





5 history of duodenal ulcer perforation requiring surgical repair and repair of 

incarcerated umbilical hernia, with subsequent development of 

intraperitoneal/abdominal abscess requiring incision and drainage and prolonged 

antibiotic course





7 rheumatoid arthritis





8 obstructive sleep apnea not receiving any CPAP therapy at this point in time





9 chronic back pain





10 hyperlipidemia





11 osteoarthritis





Plan:





Patient is doing well, he was started on Lasix today, he is diuresing, breathing

easier, wean FiO2, continue same dose IV steroids nebulized bronchodilators and 

antibiotics, will continue to follow.





I performed a history & physical examination of the patient and discussed their 

management with my nurse practitioner, La Louie.  I reviewed the nurse 

practitioner's note and agree with the documented findings and plan of care.  

Lung sounds are positive for clear breath sounds.  The findings and the 

impression was discussed with the patient.  I attest to the documentation by the

nurse practitioner. 





Time with Patient: Less than 30

## 2020-05-15 NOTE — P.PN
Subjective


Progress Note Date: 05/15/20


Principal diagnosis: 


 


The patient is 70 years of age and he has long-term history of COPD and 

pulmonary fibrosis.  Based on his pulmonary function test in 2017 he has an FVC 

of 72% and FEV1 of 46% and he has been steroid dependent for the past few years 

taking prednisone a daily basis.  He has also underlying rheumatoid arthritis 

and previously he was taking immunosuppression with Humira none for now.  His 

oxygen dependent. The patient presented to the ED because of worsening shortness

of breath and her Breathing for the past 2 weeks along with increased cough.  No

reported fever or chills.  He hasn't taken antibiotics and steroids on 

outpatient basis which hasn't helped and he continued to have increased cough 

and dyspnea.  For that reason he came into the emergency department.  He has 

been maintained on 10 mg of prednisone a daily basis outpatient basis and he 

takes Symbicort as maintenance and DuoNeb nebulized treatments around the clock.

 We'll plan to continue his current white cell count is at 9.20 with a 

hemoglobin of 15.7.  His sodium is 129 with a normal renal function.  LFTs are 

within normal limits.  Lactic acid level is at 1.3.  The Procal. level is at 

0.09.  CRP 71 with a ferritin level of 329.  Covid 19 testing was done and the 

results are still pending for now.  His chest x-ray shows COPD with coarse 

interstitial consistent with underlying pulmonary fibrosis.  There is no 

evidence of any acute infiltration.  The patient was started on DuoNeb nebulized

treatments around the clock, Pulmicort Respules, IV Solu-Medrol in addition to 

IV Rocephin and he was admitted today medical floor and the pulmonary 

consultation was requested.Back in 2019, the patient had a complicated was an 

ulcer perforation with pneumoperitoneum requiring a sitter laparotomy and repair

of perforated ulcer as well as a repair of an incarcerated umbilical hernia.  

Back then, the patient was under our care.subsequent to that, the patient 

presented also with an intraperitoneal abscess and abdominal wall abscess 

requiring incision and drainage with subsequent antibiotic treatment utilizing a

combination of daptomycin and Rocephin with good results.  Cultures back then 

showed anaerobic microorganism in addition to Citrobacter and Proteus Note that 

the patient is typically on oxygen at 3.5 L per minute nasal cannula. 





On today's evaluation of 05/13/2020, I'm seeing the patient for a follow-up.  

The patient is still short of breath.  He is having some cough and skeletal 

chest wall.  The patient is having the pain anteriorly and posteriorly specially

when he coughs.  He has increased lower extremity edema and he deserves 

diuretics.  He also needs another echocardiogram to reevaluate his 

echocardiogram.  Meanwhile, the Covid 19 analysis came back negative and the 

nasopharyngeal swab.  Antibiotic coverage includes a combination of Rocephin and

Zithromax.  The patient remains on IV Solu Medrol 60 mg every 6 hours of can be 

tapered as on today's evaluation he is less bronchospastic and wheezy.  The 

patient also will placed on IV to KVO.





On 05/14/2020 patient seen in follow-up on selective care unit.  Patient is up 

in the bathroom today, shaving, his pulse ox on 5 L is %, he was started 

on Lasix today, he is in negative for 50 fluid balance, his incentive spirometry

effort has improved, and she was pulling 1000 mL on the today.  Patient does 

have exertional dyspnea, recovers with rest, denies any chest pain, no fever or 

chills, hemodynamically stable, no significant swelling involving his lower 

extremities, lung sounds positive for dementia breath sounds bilaterally, 

today's labs have been reviewed showing sodium of 134, potassium is 4.9, 

chloride is 97, CO2 is 34, BUN of 20 and creatinine 0.55. 





On 05/15/2020 patient seen in follow-up on selective care unit.  He is awake and

alert, in no acute distress, he is on 3 L of oxygen his pulse ox is 90%, vital 

signs are stable, weaning is significantly improved, still has some exertional 

dyspnea, recovers with rest, patient has been ambulating about the room 

tolerating activity fairly well.  No fever or chills, he is been diuresed, he is

in -1195 mL fluid balance over the last 24 hours, lower extremity edema is 

improving.  Today's labs have been reviewed showing sodium of 133, potassium is 

4.9, chloride is 89, CO2 36, B1 of 20 creatinine 0.67.  Afebrile, blood cultures

have shown no growth, he is on empiric antibiotics in the form of azithromycin 

and ceftriaxone, nebulized bronchodilators and IV steroids








Objective





- Vital Signs


Vital signs: 


                                   Vital Signs











Temp  97.8 F   05/15/20 08:00


 


Pulse  102 H  05/15/20 08:05


 


Resp  20   05/15/20 03:53


 


BP  135/78   05/15/20 08:00


 


Pulse Ox  90 L  05/15/20 08:00








                                 Intake & Output











 05/14/20 05/15/20 05/15/20





 18:59 06:59 18:59


 


Intake Total 480  230


 


Output Total 550 1125 


 


Balance -70 -1125 230


 


Weight  68 kg 


 


Intake:   


 


  Intake, IV Titration   50





  Amount   


 


    cefTRIAXone 1 gm In   50





    Sodium Chloride 0.9% 50   





    ml @ 100 mls/hr IVPB   





    Q24HR [facility] Rx#:[id number]   


 


  Oral 480  180


 


Output:   


 


  Urine 550 1125 


 


Other:   


 


  Voiding Method Urinal Urinal 


 


  # Voids 2  














- Exam


 GENERAL EXAM: Alert, active, very pleasant, white male, on 3 L of oxygen with a

pulse ox of 90 tcomfortable in no apparent distress.


HEAD: Normocephalic/atraumatic.


EYES: Normal reaction of pupils, equal size.  Conjunctiva pink, sclera white.


NOSE: Clear with pink turbinates.


THROAT: No erythema or exudates.


NECK: No masses, no JVD, no thyroid enlargement, no adenopathy.


CHEST: No chest wall deformity.  Symmetrical expansion. 


LUNGS: Equal air entry with no crackles, wheeze, rhonchi or dullness.


CVS: Regular rate and rhythm, normal S1 and S2, no gallops, no murmurs, no rubs


ABDOMEN: Soft, nontender.  No hepatosplenomegaly, normal bowel sounds, no 

guarding or rigidity.


EXTREMITIES: No clubbing, no edema, no cyanosis, 2+ pulses and upper and lower 

extremities.


MUSCULOSKELETAL: Muscle strength and tone normal.


SPINE: No scoliosis or deformity


SKIN: No rashes


CENTRAL NERVOUS SYSTEM: Alert and oriented -3.  No focal deficits, tone is 

normal in all 4 extremities.


PSYCHIATRIC: Alert and oriented -3.  Appropriate affect.  Intact judgment and 

insight.











- Labs


CBC & Chem 7: 


                                 05/12/20 12:40





                                 05/15/20 06:06


Labs: 


                  Abnormal Lab Results - Last 24 Hours (Table)











  05/14/20 05/14/20 05/14/20 Range/Units





  11:35 16:32 20:15 


 


Sodium     (137-145)  mmol/L


 


Chloride     ()  mmol/L


 


Carbon Dioxide     (22-30)  mmol/L


 


BUN     (9-20)  mg/dL


 


Glucose     (74-99)  mg/dL


 


POC Glucose (mg/dL)  114 H  153 H  131 H  (75-99)  mg/dL














  05/15/20 05/15/20 Range/Units





  06:06 06:10 


 


Sodium  133 L   (137-145)  mmol/L


 


Chloride  89 L   ()  mmol/L


 


Carbon Dioxide  36 H   (22-30)  mmol/L


 


BUN  28 H   (9-20)  mg/dL


 


Glucose  121 H   (74-99)  mg/dL


 


POC Glucose (mg/dL)   119 H  (75-99)  mg/dL








                      Microbiology - Last 24 Hours (Table)











 05/12/20 12:40 Blood Culture - Preliminary





 Blood    No Growth after 48 hours














Assessment and Plan


Plan: 


1 acute exacerbation of chronic COPD/pulmonary fibrosis.  The patient was 

treated with antibiotics and steroids on outpatient basis with failure to 

response and the patient was hospitalized.  Pro-calcitonin level is low.  Covid 

19 testing is negative.  There have a component of CHF and fluid overload as the

patient has increased lower extremity edema.  On today's evaluation is less 

bronchospastic and wheezy.





2 chronic hypoxic respiratory failure and the patient is demented on oxygen and 

underwent 3-1/2 L on outpatient basis





3 advanced COPD/pulmonary fibrosis, oxygen and steroid dependent





4 chronic patient notes features secondary to steroid use





5 history of duodenal ulcer perforation requiring surgical repair and repair of 

incarcerated umbilical hernia, with subsequent development of intra

peritoneal/abdominal abscess requiring incision and drainage and prolonged 

antibiotic course





7 rheumatoid arthritis





8 obstructive sleep apnea not receiving any CPAP therapy at this point in time





9 chronic back pain





10 hyperlipidemia





11 osteoarthritis





Plan:





Patient is doing well, he is in negative fluid balance, he has been diuresed, 

breathing easier, FiO2 down to 3 L, tolerating ambulation, no fever or chills, 

blood cultures are negative, patient has been treated with empiric antibiotics, 

nebulized bronchodilators and steroids, improved, he would like to go home 

today, from pulmonary perspective patient can be considered for discharge home 

today, with outpatient follow-up with Dr. Pickard in the office in 10 days to 2

weeks





I performed a history & physical examination of the patient and discussed their 

management with my nurse practitioner, La Louie.  I reviewed the nurse 

practitioner's note and agree with the documented findings and plan of care.  

Lung sounds are positive for clear breath sounds.  The findings and the 

impression was discussed with the patient.  I attest to the documentation by the

nurse practitioner. 





Time with Patient: Less than 30

## 2020-05-15 NOTE — ECHOF
Referral Reason:CHF



MEASUREMENTS

--------

HEIGHT: 172.7 cm

WEIGHT: 67.6 kg

BP: 

IVSd:   1.3 cm     (0.6 - 1.1)

LVIDd:   3.8 cm     (3.9 - 5.3)

LVPWd:   1.5 cm     (0.6 - 1.1)

IVSs:   1.8 cm

LVIDs:   1.9 cm

LVPWs:   1.9 cm

MV E Jeferson:   0.98 m/s

MV DecT:   195 ms

MV A Jeferson:   0.96 m/s

MV E/A Ratio:   1.01 

RAP:   5.00 mmHg

RVSP:   10.99 mmHg







FINDINGS

--------

Sinus rhythm.

This was a technically difficult study with suboptimal views.

The left ventricular size is normal.   There is mild concentric left ventricular hypertrophy.   Overa
ll left ventricular systolic function is normal with, an EF between 55 - 60 %.

The right ventricle is normal in size.

The left atrial size is normal.

The right atrial size is normal.

Lumason used

Unable to visualize the septum.

The aortic valve is trileaflet, and appears structurally normal. No aortic stenosis or regurgitation.


The mitral valve is normal.   There is trace mitral regurgitation.

The tricuspid valve appears structurally normal.   Trace tricuspid regurgitation present.   Right kaylen
tricular systolic pressure is normal at < 35 mmHg.

There is no pulmonic regurgitation present.

The aortic root size is normal.

IVC Not well visulized.

There is no pericardial effusion.



CONCLUSIONS

--------

1. Sinus rhythm.

2. This was a technically difficult study with suboptimal views.

3. The left ventricular size is normal.

4. There is mild concentric left ventricular hypertrophy.

5. Overall left ventricular systolic function is normal with, an EF between 55 - 60 %.

6. The right ventricle is normal in size.

7. The left atrial size is normal.

8. The right atrial size is normal.

9. Lumason used

10. Unable to visualize the septum.

11. The aortic valve is trileaflet, and appears structurally normal. No aortic stenosis or regurgitat
ion.

12. The mitral valve is normal.

13. There is trace mitral regurgitation.

14. The tricuspid valve appears structurally normal.

15. Trace tricuspid regurgitation present.

16. Right ventricular systolic pressure is normal at < 35 mmHg.

17. There is no pulmonic regurgitation present.

18. The aortic root size is normal.

19. IVC Not well visulized.

20. There is no pericardial effusion.





SONOGRAPHER: Karis Crawford RDCS

## 2021-04-06 ENCOUNTER — HOSPITAL ENCOUNTER (INPATIENT)
Dept: HOSPITAL 47 - EC | Age: 71
LOS: 4 days | Discharge: HOME HEALTH | DRG: 189 | End: 2021-04-10
Attending: INTERNAL MEDICINE | Admitting: INTERNAL MEDICINE
Payer: MEDICARE

## 2021-04-06 VITALS — BODY MASS INDEX: 20.9 KG/M2

## 2021-04-06 DIAGNOSIS — G47.00: ICD-10-CM

## 2021-04-06 DIAGNOSIS — J96.22: Primary | ICD-10-CM

## 2021-04-06 DIAGNOSIS — E78.5: ICD-10-CM

## 2021-04-06 DIAGNOSIS — Z80.6: ICD-10-CM

## 2021-04-06 DIAGNOSIS — Z87.19: ICD-10-CM

## 2021-04-06 DIAGNOSIS — Z20.822: ICD-10-CM

## 2021-04-06 DIAGNOSIS — M47.819: ICD-10-CM

## 2021-04-06 DIAGNOSIS — F41.1: ICD-10-CM

## 2021-04-06 DIAGNOSIS — Z87.01: ICD-10-CM

## 2021-04-06 DIAGNOSIS — J44.0: ICD-10-CM

## 2021-04-06 DIAGNOSIS — Z81.8: ICD-10-CM

## 2021-04-06 DIAGNOSIS — Z79.899: ICD-10-CM

## 2021-04-06 DIAGNOSIS — I27.20: ICD-10-CM

## 2021-04-06 DIAGNOSIS — G47.33: ICD-10-CM

## 2021-04-06 DIAGNOSIS — I10: ICD-10-CM

## 2021-04-06 DIAGNOSIS — M54.9: ICD-10-CM

## 2021-04-06 DIAGNOSIS — F33.9: ICD-10-CM

## 2021-04-06 DIAGNOSIS — Z82.3: ICD-10-CM

## 2021-04-06 DIAGNOSIS — Z98.41: ICD-10-CM

## 2021-04-06 DIAGNOSIS — M06.9: ICD-10-CM

## 2021-04-06 DIAGNOSIS — Z88.0: ICD-10-CM

## 2021-04-06 DIAGNOSIS — G89.29: ICD-10-CM

## 2021-04-06 DIAGNOSIS — Z80.0: ICD-10-CM

## 2021-04-06 DIAGNOSIS — M16.0: ICD-10-CM

## 2021-04-06 DIAGNOSIS — Z99.81: ICD-10-CM

## 2021-04-06 DIAGNOSIS — Z80.1: ICD-10-CM

## 2021-04-06 DIAGNOSIS — I08.3: ICD-10-CM

## 2021-04-06 DIAGNOSIS — J96.21: ICD-10-CM

## 2021-04-06 DIAGNOSIS — Z96.1: ICD-10-CM

## 2021-04-06 DIAGNOSIS — Z79.1: ICD-10-CM

## 2021-04-06 DIAGNOSIS — M48.54XA: ICD-10-CM

## 2021-04-06 DIAGNOSIS — M17.0: ICD-10-CM

## 2021-04-06 DIAGNOSIS — I21.A1: ICD-10-CM

## 2021-04-06 DIAGNOSIS — Z98.42: ICD-10-CM

## 2021-04-06 DIAGNOSIS — J18.9: ICD-10-CM

## 2021-04-06 DIAGNOSIS — Z87.891: ICD-10-CM

## 2021-04-06 DIAGNOSIS — J44.1: ICD-10-CM

## 2021-04-06 DIAGNOSIS — Z83.3: ICD-10-CM

## 2021-04-06 DIAGNOSIS — Z79.52: ICD-10-CM

## 2021-04-06 DIAGNOSIS — J84.10: ICD-10-CM

## 2021-04-06 LAB
ALBUMIN SERPL-MCNC: 3.3 G/DL (ref 3.5–5)
ALP SERPL-CCNC: 72 U/L (ref 38–126)
ALT SERPL-CCNC: 17 U/L (ref 4–49)
ANION GAP SERPL CALC-SCNC: 7 MMOL/L
APTT BLD: 24 SEC (ref 22–30)
AST SERPL-CCNC: 27 U/L (ref 17–59)
BASOPHILS # BLD AUTO: 0 K/UL (ref 0–0.2)
BASOPHILS NFR BLD AUTO: 0 %
BUN SERPL-SCNC: 20 MG/DL (ref 9–20)
CALCIUM SPEC-MCNC: 8.3 MG/DL (ref 8.4–10.2)
CHLORIDE SERPL-SCNC: 97 MMOL/L (ref 98–107)
CO2 BLDA-SCNC: 30 MMOL/L (ref 19–24)
CO2 SERPL-SCNC: 28 MMOL/L (ref 22–30)
D DIMER PPP FEU-MCNC: 0.81 MG/L FEU (ref ?–0.6)
EOSINOPHIL # BLD AUTO: 0.1 K/UL (ref 0–0.7)
EOSINOPHIL NFR BLD AUTO: 0 %
ERYTHROCYTE [DISTWIDTH] IN BLOOD BY AUTOMATED COUNT: 4.34 M/UL (ref 4.3–5.9)
ERYTHROCYTE [DISTWIDTH] IN BLOOD: 14.9 % (ref 11.5–15.5)
GLUCOSE SERPL-MCNC: 110 MG/DL (ref 74–99)
HCO3 BLDA-SCNC: 28 MMOL/L (ref 21–25)
HCT VFR BLD AUTO: 39.2 % (ref 39–53)
HGB BLD-MCNC: 13.2 GM/DL (ref 13–17.5)
INR PPP: 1.1 (ref ?–1.2)
LDH SPEC-CCNC: 304 U/L (ref 313–618)
LYMPHOCYTES # SPEC AUTO: 1.8 K/UL (ref 1–4.8)
LYMPHOCYTES NFR SPEC AUTO: 9 %
MAGNESIUM SPEC-SCNC: 1.7 MG/DL (ref 1.6–2.3)
MCH RBC QN AUTO: 30.4 PG (ref 25–35)
MCHC RBC AUTO-ENTMCNC: 33.7 G/DL (ref 31–37)
MCV RBC AUTO: 90.3 FL (ref 80–100)
MONOCYTES # BLD AUTO: 1.1 K/UL (ref 0–1)
MONOCYTES NFR BLD AUTO: 6 %
NEUTROPHILS # BLD AUTO: 16.5 K/UL (ref 1.3–7.7)
NEUTROPHILS NFR BLD AUTO: 84 %
PCO2 BLDA: 56 MMHG (ref 35–45)
PH BLDA: 7.3 [PH] (ref 7.35–7.45)
PLATELET # BLD AUTO: 321 K/UL (ref 150–450)
PO2 BLDA: 91 MMHG (ref 83–108)
POTASSIUM SERPL-SCNC: 4.4 MMOL/L (ref 3.5–5.1)
PROT SERPL-MCNC: 6.5 G/DL (ref 6.3–8.2)
PT BLD: 11.2 SEC (ref 9–12)
SODIUM SERPL-SCNC: 132 MMOL/L (ref 137–145)
WBC # BLD AUTO: 19.6 K/UL (ref 3.8–10.6)

## 2021-04-06 PROCEDURE — 87635 SARS-COV-2 COVID-19 AMP PRB: CPT

## 2021-04-06 PROCEDURE — 87040 BLOOD CULTURE FOR BACTERIA: CPT

## 2021-04-06 PROCEDURE — 71045 X-RAY EXAM CHEST 1 VIEW: CPT

## 2021-04-06 PROCEDURE — 84484 ASSAY OF TROPONIN QUANT: CPT

## 2021-04-06 PROCEDURE — 71275 CT ANGIOGRAPHY CHEST: CPT

## 2021-04-06 PROCEDURE — 36415 COLL VENOUS BLD VENIPUNCTURE: CPT

## 2021-04-06 PROCEDURE — 99285 EMERGENCY DEPT VISIT HI MDM: CPT

## 2021-04-06 PROCEDURE — 85730 THROMBOPLASTIN TIME PARTIAL: CPT

## 2021-04-06 PROCEDURE — 93005 ELECTROCARDIOGRAM TRACING: CPT

## 2021-04-06 PROCEDURE — 83735 ASSAY OF MAGNESIUM: CPT

## 2021-04-06 PROCEDURE — 87077 CULTURE AEROBIC IDENTIFY: CPT

## 2021-04-06 PROCEDURE — 85610 PROTHROMBIN TIME: CPT

## 2021-04-06 PROCEDURE — 83605 ASSAY OF LACTIC ACID: CPT

## 2021-04-06 PROCEDURE — 94640 AIRWAY INHALATION TREATMENT: CPT

## 2021-04-06 PROCEDURE — 96361 HYDRATE IV INFUSION ADD-ON: CPT

## 2021-04-06 PROCEDURE — 85025 COMPLETE CBC W/AUTO DIFF WBC: CPT

## 2021-04-06 PROCEDURE — 87070 CULTURE OTHR SPECIMN AEROBIC: CPT

## 2021-04-06 PROCEDURE — 86140 C-REACTIVE PROTEIN: CPT

## 2021-04-06 PROCEDURE — 93306 TTE W/DOPPLER COMPLETE: CPT

## 2021-04-06 PROCEDURE — 96365 THER/PROPH/DIAG IV INF INIT: CPT

## 2021-04-06 PROCEDURE — 87205 SMEAR GRAM STAIN: CPT

## 2021-04-06 PROCEDURE — 82805 BLOOD GASES W/O2 SATURATION: CPT

## 2021-04-06 PROCEDURE — 83615 LACTATE (LD) (LDH) ENZYME: CPT

## 2021-04-06 PROCEDURE — 96375 TX/PRO/DX INJ NEW DRUG ADDON: CPT

## 2021-04-06 PROCEDURE — 87186 SC STD MICRODIL/AGAR DIL: CPT

## 2021-04-06 PROCEDURE — 85379 FIBRIN DEGRADATION QUANT: CPT

## 2021-04-06 PROCEDURE — 80053 COMPREHEN METABOLIC PANEL: CPT

## 2021-04-06 PROCEDURE — 36600 WITHDRAWAL OF ARTERIAL BLOOD: CPT

## 2021-04-06 PROCEDURE — 82728 ASSAY OF FERRITIN: CPT

## 2021-04-06 PROCEDURE — 83880 ASSAY OF NATRIURETIC PEPTIDE: CPT

## 2021-04-06 PROCEDURE — 84145 PROCALCITONIN (PCT): CPT

## 2021-04-06 RX ADMIN — HYDROCODONE BITARTRATE AND ACETAMINOPHEN PRN EACH: 10; 325 TABLET ORAL at 23:39

## 2021-04-06 RX ADMIN — CEFAZOLIN SCH MLS/HR: 330 INJECTION, POWDER, FOR SOLUTION INTRAMUSCULAR; INTRAVENOUS at 17:39

## 2021-04-06 RX ADMIN — IPRATROPIUM BROMIDE AND ALBUTEROL SULFATE SCH ML: .5; 3 SOLUTION RESPIRATORY (INHALATION) at 22:02

## 2021-04-06 NOTE — CT
EXAMINATION TYPE: CT angio chest

 

DATE OF EXAM: 4/6/2021 8:12 PM

 

COMPARISON: 5/13/2020

 

HISTORY: Elevated d-dimer, +covid.

 

CT DLP: 360.8 mGycm

Automated exposure control for dose reduction was used.

 

CONTRAST: 

CTA scan of the thorax is performed with IV Contrast, patient injected with 100ml mL of Isovue 370, p
ulmonary embolism protocol. .  

 

FINDINGS:

 

LUNGS: Diffuse emphysematous changes are seen with coarsened interstitial and areas of alveolar conso
lidation infiltrates. No sizable pleural effusion. No pneumothorax.

 

MEDIASTINUM: There is satisfactory enhancement of the pulmonary artery and its branches, there is no 
CT evidence for pulmonary embolism.  There are no greater than 1 cm hilar or mediastinal lymph nodes.
   No pericardial effusion is seen. Heart is enlarged. Aorta of normal caliber with atherosclerotic c
hanges. Coronary artery calcification seen.

 

 

OTHER:  Hypertrophic and degenerative changes of the spine. Left adrenal nodule stable. Suspect right
 renal hypodense lesion only partially imaged.. Multilevel degenerative disc disease with severe comp
ression fractures involving the mid thoracic spine at multiple levels suspect retropulsion and canal 
stenosis. This was also seen on prior exam but appears progressed at one level and new with regard to
 additional levels. Splenic granuloma incidentally noted.

 

 

 

IMPRESSION:

1. Diffuse COPD with diffuse bilateral infiltrates.

2. No diagnostic evidence of central pulmonary embolism. Distal branches somewhat limited in assessme
nt.

3. Multiple severe compression fractures with retropulsion involving the mid and upper thoracic spine
 correlate clinically. Thecal sac compression and suspected follow-up MRI could BE obtained as clinic
ally warranted. Findings are age indeterminate at two levels and progressed at previously described d
eformity relative to the prior exam of 5/13/2020.

## 2021-04-06 NOTE — XR
EXAMINATION TYPE: XR chest 1V portable

 

DATE OF EXAM: 4/6/2021

 

HISTORY: Shortness of breath.

 

COMPARISON: 5/12/2020

 

TECHNIQUE: Single view of the chest is submitted.

 

FINDINGS:

Demonstrated are scattered senescent parenchymal change.  

 

There is underlying pulmonary fibrosis. Increased patchy density however is noted at the left lung ba
se and superimposed pneumonia is difficult to exclude.

 

The heart is stable.

 

Hilar and mediastinal structures are within normal limits.  

 

Degenerative changes are seen of the dorsal spine. 

 

 IMPRESSION: 

 

1.  There is underlying pulmonary fibrosis. Increased patchy density however is noted at the left luisa
g base and superimposed pneumonia is difficult to exclude.

## 2021-04-06 NOTE — ED
SOB HPI





- General


Stated Complaint: SOB


Time Seen by Provider: 21 14:53


Source: EMS


Mode of arrival: EMS


Limitations: no limitations





- History of Present Illness


Initial Comments: 


This is a 71-year-old male with a history of severe COPD on 3 L O2 chronically 

who presents emergency department for worsening short of breath, cough.  He 

states that symptoms started 3 days ago.  He states he did get the code vaccine 

on for 3.  The next day started having some worsening of his shortness of 

breath, fatigue, decreased appetite.  He states that today got much worse so he 

was sent to the emergency department.  The patient was hypoxic and hypotensive 

when EMS arrived.  He got 500 mL of IV fluids and route.  He was placed on 5 L 

nasal cannula with minimal improvement in his oxygen saturation.  The patient 

states he does not feel more short of breath than normal however does admit to a

cough productive of green sputum.  He denies any nausea, vomiting, or diarrhea. 

No abdominal pain.  He states he's had decreased appetite and decreased oral 

intake.  He really denies any complaints at this time.








- Related Data


                                Home Medications











 Medication  Instructions  Recorded  Confirmed


 


Albuterol Inhaler [Ventolin Hfa 2 puff INHALATION RT-QID PRN 20





Inhaler]   


 


Cholecalciferol [Vitamin D3 (25 1,000 unit PO DAILY 20





Mcg = 1000 Iu)]   


 


HYDROcodone/APAP 10-325MG [Norco 1 tab PO BID PRN 20





]   


 


Metoprolol Succinate [Toprol XL] 25 mg PO DAILY 20


 


Multivitamins, Thera [Multivitamin 1 tab PO DAILY 20





(formulary)]   


 


Omeprazole [PriLOSEC] 40 mg PO DAILY 20


 


Sertraline [Zoloft] 150 mg PO DAILY 20


 


Amitriptyline HCl [Elavil] 10 mg PO HS 21


 


Benzonatate [Tessalon Perles] 100 - 200 mg PO TID PRN 21


 


Celecoxib [CeleBREX] 200 mg PO DAILY PRN 21


 


Gabapentin [Neurontin] 300 mg PO HS 21


 


predniSONE 5 mg PO Q48H 21











                                    Allergies











Allergy/AdvReac Type Severity Reaction Status Date / Time


 


amoxicillin AdvReac  Nausea & Verified 21 18:26





   Vomiting  














Review of Systems


ROS Statement: 


Those systems with pertinent positive or pertinent negative responses have been 

documented in the HPI.





ROS Other: All systems not noted in ROS Statement are negative.





Past Medical History


Past Medical History: COPD, Hyperlipidemia, Osteoarthritis (OA), Pneumonia, 

Rheumatoid Arthritis (RA), Sleep Apnea/CPAP/BIPAP


Additional Past Medical History / Comment(s): COPD, pulmonary fibrosis, chronic 

hypoxic respiratory failure, previous hospitalization for pneumonia and sepsis 

back in 2018, previous history of pneumoperitoneum related to a perforated 

duodenal ulcer, repair of an umbilical hernia, obstructive sleep apnea 

nontolerant to CPAP therapy, chronic back pain, rheumatoid arthritis, degenera

tive arthritis involving the hips and the knees and hands and the back, 

hyperlipidemia


History of Any Multi-Drug Resistant Organisms: None Reported


Past Surgical History: Orthopedic Surgery


Additional Past Surgical History / Comment(s): bilateral cataract removals with 

lens implants, left knee arthroscopy, colonoscopy, repair of a perforated 

duodenal ulcer and repair of an incarcerated umbilical hernia


Past Anesthesia/Blood Transfusion Reactions: No Reported Reaction


Past Psychological History: Anxiety, Depression


Smoking Status: Former smoker


Past Alcohol Use History: Daily


Past Drug Use History: None Reported





- Past Family History


  ** Father


Family Medical History: Cancer


Additional Family Medical History / Comment(s): Father  of leukemia.





  ** Mother


Family Medical History: Cancer, CVA/TIA, Dementia, Diabetes Mellitus


Additional Family Medical History / Comment(s): Mother is 88yrs old with history

 of dementia and colon cancer.





  ** Sister(s)


Additional Family Medical History / Comment(s): Patient has 1 full sister with 

history of lung cancer.  Patient has one half-sister with adrenal problems.  

Patient does not have any brothers.  Patient 3 sons with no major medical 

problems.





General Exam





- General Exam Comments


Initial Comments: 


Constitutional: Awake alert Appears comfortable


Head: Normocephalic atraumatic 


Eyes: no conjunctival injection No scleral icterus EOMI


Neck: No JVD Supple


Heart: Regular rate rhythm normal S1-S2 no murmurs


Lungs: Patient has bibasilar rales with worse on the right, tachypnea


Abdomen: Soft nondistended nontender, ventral hernia is soft and reducible


Extremities: Non edematous DP pulses intact Radial pulses intact


Neuro: A&Ox3 No focal neurologic deficits


Psych: Appropriate mood and affect








Limitations: no limitations





Course


                                   Vital Signs











  21





  14:53 14:59 16:00


 


Temperature 98.1 F  


 


Pulse Rate 110 H  101 H


 


Respiratory 19  24





Rate   


 


Blood Pressure 87/51  99/63


 


O2 Sat by Pulse 87 L 95 97





Oximetry   














  21





  17:01 17:53


 


Temperature  


 


Pulse Rate 99 96


 


Respiratory 26 H 26 H





Rate  


 


Blood Pressure 89/65 97/68


 


O2 Sat by Pulse 95 97





Oximetry  














- Reevaluation(s)


Reevaluation #1: 





21 16:13


Pt re-evaluated. O2 sats 98% on NRB. Will try Hi flow NC. BP 99/67.


Reevaluation #2: 


EKG showing sinus tachycardia with a rate of 101.  There is no abnormal ST 

segment changes or tumor.  QTC is 516.  Other intervals normal.  No ectopy.


21 17:52 Pt on 12L HFNC and tolerating it well. 





21 17:52





Reevaluation #3: 





21 18:38


Pt much improved. Sats mid 90s on HFNC. Started on abx for sepsis and pneumonia.

 Covid neg. Pts BP has been in 90s/60s with MAPS in 70s. lactic acid was normal.

 Do not feel he is in shock at this time. Will continue with fluids. Was given 

100mg Solucortef because the patient is on chronic steroids at home. Pt sleeping

 but easily arousable.





Medical Decision Making





- Medical Decision Making


This 31-year-old male who presents him in [facility] for fatigue, decreased 

appetite, and shortness of breath.  The patient was found to be hypoxic on 

arrival.  Blood pressure was also low.  Patient was given a total of 2-1/2 L of 

fluids here and by EMS.  Initially was with a nonrebreather however was able to 

be weaned down to high flow nasal cannula.  The patient felt much improved after

 albuterol treatment.  He was started on IV antibiotics for the acquired 

pneumonia pneumonia and sepsis based on lab work.  Lactic acid was normal.  

Blood pressure was on the low side however maps were continuously in the 70s.  I

 did speak to the patient about mechanical ventilation if required and he states

 that he does not want that.  He would agree to central line so if necessary.  

At this time the patient heart rate is in the 90s and I did give us a stress 

dose steroids.  We'll continue to monitor.  I spoke with Dr. Ramos who accepted 

the patient.  We'll place the patient in stepdown for close monitoring.  Dr. Fisher was placed on consult.








- Lab Data


Result diagrams: 


                                 21 14:58





                                 21 14:58


                                   Lab Results











  21 Range/Units





  14:58 14:58 14:58 


 


WBC  19.6 H    (3.8-10.6)  k/uL


 


RBC  4.34    (4.30-5.90)  m/uL


 


Hgb  13.2    (13.0-17.5)  gm/dL


 


Hct  39.2    (39.0-53.0)  %


 


MCV  90.3    (80.0-100.0)  fL


 


MCH  30.4    (25.0-35.0)  pg


 


MCHC  33.7    (31.0-37.0)  g/dL


 


RDW  14.9    (11.5-15.5)  %


 


Plt Count  321    (150-450)  k/uL


 


MPV  7.0    


 


Neutrophils %  84    %


 


Lymphocytes %  9    %


 


Monocytes %  6    %


 


Eosinophils %  0    %


 


Basophils %  0    %


 


Neutrophils #  16.5 H    (1.3-7.7)  k/uL


 


Lymphocytes #  1.8    (1.0-4.8)  k/uL


 


Monocytes #  1.1 H    (0-1.0)  k/uL


 


Eosinophils #  0.1    (0-0.7)  k/uL


 


Basophils #  0.0    (0-0.2)  k/uL


 


PT   11.2   (9.0-12.0)  sec


 


INR   1.1   (<1.2)  


 


APTT   24.0   (22.0-30.0)  sec


 


D-Dimer   0.81 H   (<0.60)  mg/L FEU


 


Sample Site     


 


ABG pH     (7.35-7.45)  


 


ABG pCO2     (35-45)  mmHg


 


ABG pO2     ()  mmHg


 


ABG HCO3     (21-25)  mmol/L


 


ABG Total CO2     (19-24)  mmol/L


 


ABG O2 Saturation     (94-97)  %


 


ABG Base Excess     mmol/L


 


Celestino Test     


 


FiO2     %


 


Sodium    132 L  (137-145)  mmol/L


 


Potassium    4.4  (3.5-5.1)  mmol/L


 


Chloride    97 L  ()  mmol/L


 


Carbon Dioxide    28  (22-30)  mmol/L


 


Anion Gap    7  mmol/L


 


BUN    20  (9-20)  mg/dL


 


Creatinine    0.95  (0.66-1.25)  mg/dL


 


Est GFR (CKD-EPI)AfAm    >90  (>60 ml/min/1.73 sqM)  


 


Est GFR (CKD-EPI)NonAf    81  (>60 ml/min/1.73 sqM)  


 


Glucose    110 H  (74-99)  mg/dL


 


Plasma Lactic Acid José Miguel     (0.7-2.0)  mmol/L


 


Calcium    8.3 L  (8.4-10.2)  mg/dL


 


Magnesium    1.7  (1.6-2.3)  mg/dL


 


Total Bilirubin    0.5  (0.2-1.3)  mg/dL


 


AST    27  (17-59)  U/L


 


ALT    17  (4-49)  U/L


 


Alkaline Phosphatase    72  ()  U/L


 


Lactate Dehydrogenase    304 L  (313-618)  U/L


 


Troponin I     (0.000-0.034)  ng/mL


 


C-Reactive Protein    89.2 H  (<10.0)  mg/L


 


NT-Pro-B Natriuret Pep     pg/mL


 


Total Protein    6.5  (6.3-8.2)  g/dL


 


Albumin    3.3 L  (3.5-5.0)  g/dL


 


Coronavirus (PCR)     (Not Detectd)  














  21 Range/Units





  14:58 14:58 14:58 


 


WBC     (3.8-10.6)  k/uL


 


RBC     (4.30-5.90)  m/uL


 


Hgb     (13.0-17.5)  gm/dL


 


Hct     (39.0-53.0)  %


 


MCV     (80.0-100.0)  fL


 


MCH     (25.0-35.0)  pg


 


MCHC     (31.0-37.0)  g/dL


 


RDW     (11.5-15.5)  %


 


Plt Count     (150-450)  k/uL


 


MPV     


 


Neutrophils %     %


 


Lymphocytes %     %


 


Monocytes %     %


 


Eosinophils %     %


 


Basophils %     %


 


Neutrophils #     (1.3-7.7)  k/uL


 


Lymphocytes #     (1.0-4.8)  k/uL


 


Monocytes #     (0-1.0)  k/uL


 


Eosinophils #     (0-0.7)  k/uL


 


Basophils #     (0-0.2)  k/uL


 


PT     (9.0-12.0)  sec


 


INR     (<1.2)  


 


APTT     (22.0-30.0)  sec


 


D-Dimer     (<0.60)  mg/L FEU


 


Sample Site     


 


ABG pH     (7.35-7.45)  


 


ABG pCO2     (35-45)  mmHg


 


ABG pO2     ()  mmHg


 


ABG HCO3     (21-25)  mmol/L


 


ABG Total CO2     (19-24)  mmol/L


 


ABG O2 Saturation     (94-97)  %


 


ABG Base Excess     mmol/L


 


Celestino Test     


 


FiO2     %


 


Sodium     (137-145)  mmol/L


 


Potassium     (3.5-5.1)  mmol/L


 


Chloride     ()  mmol/L


 


Carbon Dioxide     (22-30)  mmol/L


 


Anion Gap     mmol/L


 


BUN     (9-20)  mg/dL


 


Creatinine     (0.66-1.25)  mg/dL


 


Est GFR (CKD-EPI)AfAm     (>60 ml/min/1.73 sqM)  


 


Est GFR (CKD-EPI)NonAf     (>60 ml/min/1.73 sqM)  


 


Glucose     (74-99)  mg/dL


 


Plasma Lactic Acid José Miguel  1.4    (0.7-2.0)  mmol/L


 


Calcium     (8.4-10.2)  mg/dL


 


Magnesium     (1.6-2.3)  mg/dL


 


Total Bilirubin     (0.2-1.3)  mg/dL


 


AST     (17-59)  U/L


 


ALT     (4-49)  U/L


 


Alkaline Phosphatase     ()  U/L


 


Lactate Dehydrogenase     (313-618)  U/L


 


Troponin I   0.075 H*   (0.000-0.034)  ng/mL


 


C-Reactive Protein     (<10.0)  mg/L


 


NT-Pro-B Natriuret Pep    3170  pg/mL


 


Total Protein     (6.3-8.2)  g/dL


 


Albumin     (3.5-5.0)  g/dL


 


Coronavirus (PCR)     (Not Detectd)  














  21 Range/Units





  15:17 15:43 


 


WBC    (3.8-10.6)  k/uL


 


RBC    (4.30-5.90)  m/uL


 


Hgb    (13.0-17.5)  gm/dL


 


Hct    (39.0-53.0)  %


 


MCV    (80.0-100.0)  fL


 


MCH    (25.0-35.0)  pg


 


MCHC    (31.0-37.0)  g/dL


 


RDW    (11.5-15.5)  %


 


Plt Count    (150-450)  k/uL


 


MPV    


 


Neutrophils %    %


 


Lymphocytes %    %


 


Monocytes %    %


 


Eosinophils %    %


 


Basophils %    %


 


Neutrophils #    (1.3-7.7)  k/uL


 


Lymphocytes #    (1.0-4.8)  k/uL


 


Monocytes #    (0-1.0)  k/uL


 


Eosinophils #    (0-0.7)  k/uL


 


Basophils #    (0-0.2)  k/uL


 


PT    (9.0-12.0)  sec


 


INR    (<1.2)  


 


APTT    (22.0-30.0)  sec


 


D-Dimer    (<0.60)  mg/L FEU


 


Sample Site   R radial  


 


ABG pH   7.30 L  (7.35-7.45)  


 


ABG pCO2   56 H  (35-45)  mmHg


 


ABG pO2   91  ()  mmHg


 


ABG HCO3   28 H  (21-25)  mmol/L


 


ABG Total CO2   30 H  (19-24)  mmol/L


 


ABG O2 Saturation   95.9  (94-97)  %


 


ABG Base Excess   1.5  mmol/L


 


Celestino Test   Yes  


 


FiO2   100  %


 


Sodium    (137-145)  mmol/L


 


Potassium    (3.5-5.1)  mmol/L


 


Chloride    ()  mmol/L


 


Carbon Dioxide    (22-30)  mmol/L


 


Anion Gap    mmol/L


 


BUN    (9-20)  mg/dL


 


Creatinine    (0.66-1.25)  mg/dL


 


Est GFR (CKD-EPI)AfAm    (>60 ml/min/1.73 sqM)  


 


Est GFR (CKD-EPI)NonAf    (>60 ml/min/1.73 sqM)  


 


Glucose    (74-99)  mg/dL


 


Plasma Lactic Acid José Miguel    (0.7-2.0)  mmol/L


 


Calcium    (8.4-10.2)  mg/dL


 


Magnesium    (1.6-2.3)  mg/dL


 


Total Bilirubin    (0.2-1.3)  mg/dL


 


AST    (17-59)  U/L


 


ALT    (4-49)  U/L


 


Alkaline Phosphatase    ()  U/L


 


Lactate Dehydrogenase    (313-618)  U/L


 


Troponin I    (0.000-0.034)  ng/mL


 


C-Reactive Protein    (<10.0)  mg/L


 


NT-Pro-B Natriuret Pep    pg/mL


 


Total Protein    (6.3-8.2)  g/dL


 


Albumin    (3.5-5.0)  g/dL


 


Coronavirus (PCR)  Not Detected   (Not Detectd)  














Disposition


Clinical Impression: 


 Sepsis, CAP (community acquired pneumonia), Sepsis with acute hypoxic 

respiratory failure





Disposition: ADMITTED AS IP TO THIS [facility]


Condition: Critical


Referrals: 


Amalia Bazan MD [Primary Care Provider] - 1-2 days

## 2021-04-07 LAB
FERRITIN SERPL-MCNC: 254.9 NG/ML (ref 22–322)
GLUCOSE BLD-MCNC: 133 MG/DL (ref 75–99)
GLUCOSE BLD-MCNC: 141 MG/DL (ref 75–99)
GLUCOSE BLD-MCNC: 166 MG/DL (ref 75–99)

## 2021-04-07 RX ADMIN — METHYLPREDNISOLONE SODIUM SUCCINATE SCH MG: 125 INJECTION, POWDER, FOR SOLUTION INTRAMUSCULAR; INTRAVENOUS at 23:19

## 2021-04-07 RX ADMIN — INSULIN ASPART SCH UNIT: 100 INJECTION, SOLUTION INTRAVENOUS; SUBCUTANEOUS at 18:34

## 2021-04-07 RX ADMIN — METOPROLOL SUCCINATE SCH MG: 25 TABLET, EXTENDED RELEASE ORAL at 09:32

## 2021-04-07 RX ADMIN — METHYLPREDNISOLONE SODIUM SUCCINATE SCH MG: 125 INJECTION, POWDER, FOR SOLUTION INTRAMUSCULAR; INTRAVENOUS at 12:14

## 2021-04-07 RX ADMIN — METHYLPREDNISOLONE SODIUM SUCCINATE SCH MG: 125 INJECTION, POWDER, FOR SOLUTION INTRAMUSCULAR; INTRAVENOUS at 06:32

## 2021-04-07 RX ADMIN — THERA TABS SCH EACH: TAB at 09:32

## 2021-04-07 RX ADMIN — BENZONATATE PRN MG: 100 CAPSULE ORAL at 16:21

## 2021-04-07 RX ADMIN — PANTOPRAZOLE SODIUM SCH MG: 40 TABLET, DELAYED RELEASE ORAL at 06:32

## 2021-04-07 RX ADMIN — IPRATROPIUM BROMIDE AND ALBUTEROL SULFATE SCH ML: .5; 3 SOLUTION RESPIRATORY (INHALATION) at 07:07

## 2021-04-07 RX ADMIN — IPRATROPIUM BROMIDE AND ALBUTEROL SULFATE SCH ML: .5; 3 SOLUTION RESPIRATORY (INHALATION) at 17:08

## 2021-04-07 RX ADMIN — GABAPENTIN SCH MG: 300 CAPSULE ORAL at 20:07

## 2021-04-07 RX ADMIN — ASPIRIN 81 MG CHEWABLE TABLET SCH: 81 TABLET CHEWABLE at 16:23

## 2021-04-07 RX ADMIN — IPRATROPIUM BROMIDE AND ALBUTEROL SULFATE SCH: .5; 3 SOLUTION RESPIRATORY (INHALATION) at 00:14

## 2021-04-07 RX ADMIN — CEFAZOLIN SCH: 330 INJECTION, POWDER, FOR SOLUTION INTRAMUSCULAR; INTRAVENOUS at 04:30

## 2021-04-07 RX ADMIN — HEPARIN SODIUM SCH UNIT: 5000 INJECTION INTRAVENOUS; SUBCUTANEOUS at 16:21

## 2021-04-07 RX ADMIN — HYDROCODONE BITARTRATE AND ACETAMINOPHEN PRN EACH: 10; 325 TABLET ORAL at 18:34

## 2021-04-07 RX ADMIN — HYDROCODONE BITARTRATE AND ACETAMINOPHEN PRN EACH: 10; 325 TABLET ORAL at 09:32

## 2021-04-07 RX ADMIN — INSULIN ASPART SCH UNIT: 100 INJECTION, SOLUTION INTRAVENOUS; SUBCUTANEOUS at 12:15

## 2021-04-07 RX ADMIN — INSULIN ASPART SCH UNIT: 100 INJECTION, SOLUTION INTRAVENOUS; SUBCUTANEOUS at 21:07

## 2021-04-07 RX ADMIN — HEPARIN SODIUM SCH UNIT: 5000 INJECTION INTRAVENOUS; SUBCUTANEOUS at 23:19

## 2021-04-07 RX ADMIN — Medication SCH MCG: at 09:32

## 2021-04-07 RX ADMIN — CEFAZOLIN SCH: 330 INJECTION, POWDER, FOR SOLUTION INTRAMUSCULAR; INTRAVENOUS at 16:22

## 2021-04-07 RX ADMIN — IPRATROPIUM BROMIDE AND ALBUTEROL SULFATE SCH ML: .5; 3 SOLUTION RESPIRATORY (INHALATION) at 11:19

## 2021-04-07 RX ADMIN — HYDROCODONE BITARTRATE AND ACETAMINOPHEN PRN EACH: 10; 325 TABLET ORAL at 23:20

## 2021-04-07 RX ADMIN — SERTRALINE HYDROCHLORIDE SCH MG: 50 TABLET, FILM COATED ORAL at 09:31

## 2021-04-07 RX ADMIN — CEFAZOLIN SCH MLS/HR: 330 INJECTION, POWDER, FOR SOLUTION INTRAMUSCULAR; INTRAVENOUS at 20:08

## 2021-04-07 RX ADMIN — ATORVASTATIN CALCIUM SCH MG: 40 TABLET, FILM COATED ORAL at 20:07

## 2021-04-07 RX ADMIN — AZITHROMYCIN SCH MG: 500 TABLET, FILM COATED ORAL at 12:15

## 2021-04-07 RX ADMIN — BUDESONIDE SCH MG: 1 SUSPENSION RESPIRATORY (INHALATION) at 21:40

## 2021-04-07 RX ADMIN — METHYLPREDNISOLONE SODIUM SUCCINATE SCH MG: 125 INJECTION, POWDER, FOR SOLUTION INTRAMUSCULAR; INTRAVENOUS at 18:34

## 2021-04-07 RX ADMIN — AMITRIPTYLINE HYDROCHLORIDE SCH MG: 10 TABLET, FILM COATED ORAL at 20:07

## 2021-04-07 NOTE — P.CRDCN
History of Present Illness


History of present illness: 


HISTORY OF PRESENTING ILLNESS


This is a 71-year-old male with significant past medical history of COPD 

(steroid dependent), hyperlipidemia, rheumatoid arthritis, obstructive sleep 

apnea unable to tolerate CPAP, chronic hypoxic respiratory failure on home O2 at

3 L nasal cannula, generalized osteoarthritis, chronic back pain, perforated 

duodenal ulcer status post exploratory laparotomy with repair of perforated 

duodenal ulcer and repair of incarcerated umbilical hernia.  We are being con

sulted for elevated troponin. Patient states he received his second COVID-19 

vaccine last week, and progressively started having worsening shortness of 

breath and chills at home. Yesterday, patient found sitting at home, very 

lethargic, short of breath, difficult to arouse, EMS was called and he was 

brought to ER. Patient was found to be hypotensive and hypoxic. On exam, patient

alert and oriented x 3, states he feels much better. He denies ever having chest

pain, palpitations. He continues to have shortness of breath and cough. He did 

have some lightheadedness this week. Troponin 0.07-->0.11>0.10.  Patient denies 

history of any coronary artery disease, hypertension, MI, stroke, diabetes.  P

atient denies any family history of cardiac disease.  Patient states that he is 

not on any cardiac medication at home.  He states there had a cardiac workup.  

He denies ever having a cardiac catheterization. EKG reveals sinus tachycardia, 

no acute STT wave abnormalities, prior EKGs appear similar 





Chest x-ray shows underlying pulmonary fibrosis.  Increased patchy density 

however is noted at the left lung base and superimposed pneumonia is difficult 

to exclude.  


CTA of the chest revealed diffuse COPD with diffuse bilateral infiltrates.  No 

evidence of pulmonary embolism.  Distal branches somewhat limited.  Severe 

compression fractures involving the mid and upper thoracic spine. 





Laboratory data reviewed, WBC 19.6, hemoglobin 13.2, platelets 321, d-dimer 

0.81, sodium 132, potassium 4.4, serum creatinine 0.95, BUN 20, magnesium 1.7, 

CRP elevated 89.2, percussed on 1.11, covid-19 negative  Vital signs blood 

pressure 99/56, heart rate 65, afebrile, requiring increased oxygen requirements

to maintain sats. currently on high flow nasal cannula.  Telemetry tracings 

indicate sinus tachycardia.





REVIEW OF SYSTEMS


At the time of my exam:


CONSTITUTIONAL: Denies fever or chills.


CARDIOVASCULAR: +shortness of breath, +orthopnea. Denies chest pain, PND or 

palpitations.


RESPIRATORY: +cough. 


GASTROINTESTINAL: Denies abdominal pain, diarrhea, constipation, nausea or 

vomiting.


MUSCULOSKELETAL: Denies myalgias.


NEUROLOGIC: Denies numbness, tingling, headacbe or weakness.


ENDOCRINE: Denies fatigue, weight change,  polydipsia or polyurina.


GENITOURINARY: Denies burning, hematuria or urgency with micturation.


HEMATOLOGIC: Denies history of anemia or bleeding. 





PHYSICAL EXAMINATION


CONSTITUTIONAL: Labored breathing 


HEENT: Head is normocephalic. Pupils are equal, round. Sclerae anicteric. Mucous

membranes of the mouth are moist.  No JVD. No carotid bruit.


CHEST EXAMINATION: Lungs are clear to auscultation. No chest wall tenderness is 

noted on palpation or with deep breathing. 


HEART EXAMINATION: Regular rate and rhythm. S1, S2 heard. No murmurs, gallops or

rub.


ABDOMEN: Soft, nontender. Positive bowel sounds.


EXTREMITIES: 2+ peripheral pulses, no lower extremity edema and no calf 

tenderness.


SKIN:


NEUROLOGIC EXAMINATION: Patient is awake, alert and oriented x3. 





ASSESSMENT


Elevated Troponin-not indicative of a myocardial infarction. Troponin trend- 

0.11-->0.10. no EKG evidence of ischemia. Patient has no chest pain 


Acute on chronic hypoxic was draped failure- most likely secondary to COPD 

exacerbation and pulmonary fibrosis.


COPD


Hypertension


Hyperlipidemia


Objective sleep apnea and unable to tolerate his CPAP





PLAN 


Obtain 2D echocardiogram and doppler study to assess cardiac structure and 

function. 


Will start statin and aspirin


Continue patient's Toprol 25mg daily 


Perform stress test to assess for stress induced cardiac ischemia- can be 

evaluated as an outpatient 


Further recommendations pending clinical course 





Nurse Practitioner note has been reviewed, I agree with a documented findings 

and plan of care.  Patient was seen and examined.

















Past Medical History


Past Medical History: COPD, Hyperlipidemia, Osteoarthritis (OA), Pneumonia, 

Rheumatoid Arthritis (RA), Sleep Apnea/CPAP/BIPAP


Additional Past Medical History / Comment(s): COPD, chronic hypoxic respiratory 

failure previous history of pneumoperitoneum related to a perforated duodenal 

ulcer, repair of an umbilical hernia, chronic back pain,


History of Any Multi-Drug Resistant Organisms: None Reported


Past Surgical History: Orthopedic Surgery


Additional Past Surgical History / Comment(s): colonoscopy, repair of a 

perforated duodenal ulcer and repair of an incarcerated umbilical hernia


Past Anesthesia/Blood Transfusion Reactions: No Reported Reaction


Past Psychological History: Anxiety, Depression


Smoking Status: Former smoker


Past Alcohol Use History: Daily


Additional Past Alcohol Use History / Comment(s): Patient smoked 2 packs per day

for 40+ years.  He quit 6 years ago.  He denies any illicit drug use or 

marijuana use.  He drinks 2 beers per day but none since previous admission and 

May.  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 

involved


Past Drug Use History: None Reported





- Past Family History


  ** Father


Family Medical History: Cancer


Additional Family Medical History / Comment(s): Father  of leukemia.





  ** Mother


Family Medical History: Cancer, CVA/TIA, Dementia, Diabetes Mellitus


Additional Family Medical History / Comment(s): Mother is 88yrs old with history

of dementia and colon cancer.





  ** Sister(s)


Additional Family Medical History / Comment(s): Patient 3 sons with no major 

medical problems.





Medications and Allergies


                                Home Medications











 Medication  Instructions  Recorded  Confirmed  Type


 


Albuterol Inhaler [Ventolin Hfa 2 puff INHALATION RT-QID PRN 20 

History





Inhaler]    


 


Cholecalciferol [Vitamin D3 (25 1,000 unit PO DAILY 20 History





Mcg = 1000 Iu)]    


 


HYDROcodone/APAP 10-325MG [Norco 1 tab PO TID PRN 20 History





]    


 


Metoprolol Succinate [Toprol XL] 25 mg PO DAILY 20 History


 


Multivitamins, Thera [Multivitamin 1 tab PO DAILY 20 History





(formulary)]    


 


Omeprazole [PriLOSEC] 40 mg PO DAILY 20 History


 


Sertraline [Zoloft] 150 mg PO DAILY 20 History


 


Amitriptyline HCl [Elavil] 10 mg PO HS 21 History


 


Benzonatate [Tessalon Perles] 100 - 200 mg PO TID PRN 21 History


 


Celecoxib [CeleBREX] 200 mg PO DAILY PRN 21 History


 


Gabapentin [Neurontin] 300 mg PO HS 21 History


 


predniSONE 5 mg PO Q48H 21 History








                                    Allergies











Allergy/AdvReac Type Severity Reaction Status Date / Time


 


amoxicillin AdvReac  Nausea & Verified 21 18:26





   Vomiting  














Physical Exam


Vitals: 


                                   Vital Signs











  Temp Pulse Pulse Resp BP BP Pulse Ox


 


 21 12:00  98.2 F   65  20   99/56  93 L


 


 21 11:37   86     


 


 21 11:24   84     


 


 21 08:00  98.5 F   93  20   103/57  95


 


 21 07:20   82     


 


 21 07:11   76     


 


 21 04:00  98.1 F   80  20   87/50  94 L


 


 21 02:00    98  20   


 


 21 23:51    98  20   96/56  94 L


 


 21 22:08   101 H   18   


 


 21 22:02   94   18    92 L


 


 21 21:00  97.9 F  93   18  101/70  


 


 21 19:54  98.4 F   100  24   105/63  92 L


 


 21 19:34   92   18  94/68   97


 


 21 18:53   97   22  102/71   97


 


 21 17:53   96   26 H  97/68   97


 


 21 17:01   99   26 H  89/65   95


 


 21 16:00   101 H   24  99/63   97


 


 21 14:59        95


 


 21 14:53  98.1 F  110 H   19  87/51   87 L








                                Intake and Output











 21





 22:59 06:59 14:59


 


Intake Total   240


 


Output Total  300 


 


Balance  -300 240


 


Intake:   


 


  Oral   240


 


Output:   


 


  Urine  300 


 


Other:   


 


  Voiding Method  Urinal 


 


  Weight 70.307 kg 57.5 kg 57.5 kg














Results





                                 21 14:58





                                 21 14:58


                                 Cardiac Enzymes











  21 Range/Units





  14:58 14:58 22:52 


 


AST  27    (17-59)  U/L


 


Lactate Dehydrogenase  304 L    (313-618)  U/L


 


Troponin I   0.075 H*  0.117 H*  (0.000-0.034)  ng/mL














  21 Range/Units





  01:16 


 


AST   (17-59)  U/L


 


Lactate Dehydrogenase   (313-618)  U/L


 


Troponin I  0.103 H*  (0.000-0.034)  ng/mL








                                   Coagulation











  21 Range/Units





  14:58 


 


PT  11.2  (9.0-12.0)  sec


 


APTT  24.0  (22.0-30.0)  sec








                                       CBC











  21 Range/Units





  14:58 


 


WBC  19.6 H  (3.8-10.6)  k/uL


 


RBC  4.34  (4.30-5.90)  m/uL


 


Hgb  13.2  (13.0-17.5)  gm/dL


 


Hct  39.2  (39.0-53.0)  %


 


Plt Count  321  (150-450)  k/uL








                          Comprehensive Metabolic Panel











  21 Range/Units





  14:58 


 


Sodium  132 L  (137-145)  mmol/L


 


Potassium  4.4  (3.5-5.1)  mmol/L


 


Chloride  97 L  ()  mmol/L


 


Carbon Dioxide  28  (22-30)  mmol/L


 


BUN  20  (9-20)  mg/dL


 


Creatinine  0.95  (0.66-1.25)  mg/dL


 


Glucose  110 H  (74-99)  mg/dL


 


Calcium  8.3 L  (8.4-10.2)  mg/dL


 


AST  27  (17-59)  U/L


 


ALT  17  (4-49)  U/L


 


Alkaline Phosphatase  72  ()  U/L


 


Total Protein  6.5  (6.3-8.2)  g/dL


 


Albumin  3.3 L  (3.5-5.0)  g/dL








                               Current Medications











Generic Name Dose Route Start Last Admin





  Trade Name Freq  PRN Reason Stop Dose Admin


 


Acetaminophen  650 mg  21 14:54 





  Acetaminophen Tab 325 Mg Tab  PO  





  Q4HR PRN  





  Fever>101  


 


Hydrocodone Bitart/Acetaminophen  1 each  21 23:20  21 09:32





  Hydrocodone/Apap 10-325mg 1 Each Tab  PO   1 each





  TID PRN   Administration





  Pain  


 


Albuterol Sulfate  2.5 mg  21 05:53 





  Albuterol Nebulized 2.5 Mg/3 Ml  INHALATION  





  RT-QID PRN  





  Shortness Of Breath  


 


Albuterol/Ipratropium  3 ml  21 00:00  21 11:19





  Ipratropium-Albuterol 3 Ml Neb  INHALATION   3 ml





  Q6HR IMTIAZ   Administration


 


Albuterol/Ipratropium  3 ml  21 10:56 





  Ipratropium-Albuterol 3 Ml Neb  INHALATION  





  RT-Q2H PRN  





  Shortness Of Breath Or Wheezing  


 


Amitriptyline HCl  10 mg  21 21:00 





  Amitriptyline Hcl 10 Mg Tab  PO  





  HS IMTIAZ  


 


Azithromycin  500 mg  21 11:00 





  Azithromycin 500 Mg Tab  PO  





  DAILY IMTIAZ  


 


Benzonatate  100 mg  21 05:53 





  Benzonatate 100 Mg Cap  PO  





  TID PRN  





  Cough  


 


Budesonide  1 mg  21 20:00 





  Budesonide 1 Mg/2 Ml Nebu  INHALATION  





  RT-BID IMTIAZ  


 


Cholecalciferol  25 mcg  21 09:00  21 09:32





  Cholecalciferol 25 Mcg (1000 Iu) Tablet  PO   25 mcg





  DAILY IMTIAZ   Administration


 


Gabapentin  300 mg  21 21:00 





  Gabapentin 300 Mg Cap  PO  





  HS IMTIAZ  


 


Heparin Sodium (Porcine)  5,000 unit  21 16:00 





  Heparin Sodium,Porcine/Pf 5,000 Unit/0.5 Ml Syringe  SQ  





  Q8HR MITIAZ  


 


Sodium Chloride  1,000 mls @ 100 mls/hr  21 17:15  21 04:30





  Saline 0.9%  IV   Not Given





  .Q10H IMTIAZ  


 


Ceftriaxone Sodium 1 gm/  50 mls @ 100 mls/hr  21 11:00 





  Sodium Chloride  IVPB  





  Q24H IMTIAZ  


 


Insulin Aspart  0 unit  21 12:30 





  Insulin Aspart (Novolog) 100 Unit/Ml Vial  SQ  





  [facility]  





  Protocol  


 


Methylprednisolone Sodium Succinate  60 mg  21 06:00  21 06:32





  Methylprednisolone Sod Succi 125 Mg/2 Ml Vial  IV   60 mg





  Q6HR IMTIAZ   Administration


 


Metoprolol Succinate  25 mg  21 09:00  21 09:32





  Metoprolol Succinate (Er) 25 Mg Tab.Er.24h  PO   25 mg





  DAILY IMTIAZ   Administration


 


Multivitamins  1 each  21 09:00  21 09:32





  Multivitamins, Thera 1 Each Tab  PO   1 each





  DAILY IMTIAZ   Administration


 


Naloxone HCl  0.2 mg  21 18:35 





  Naloxone 0.4 Mg/Ml 1 Ml Vial  IV  





  Q2M PRN  





  Opioid Reversal  


 


Pantoprazole Sodium  40 mg  21 07:30  21 06:32





  Pantoprazole 40 Mg Tablet  PO   40 mg





  AC-[id number] IMTIAZ   Administration


 


Sertraline HCl  150 mg  21 09:00  21 09:31





  Sertraline 50 Mg Tab  PO   150 mg





  DAILY IMTIAZ   Administration








                                Intake and Output











 21





 22:59 06:59 14:59


 


Intake Total   240


 


Output Total  300 


 


Balance  -300 240


 


Intake:   


 


  Oral   240


 


Output:   


 


  Urine  300 


 


Other:   


 


  Voiding Method  Urinal 


 


  Weight 70.307 kg 57.5 kg 57.5 kg








                                 Patient Weight











 21





 06:59


 


Weight 57.5 kg








                                        





                                 21 14:58 





                                 21 14:58

## 2021-04-07 NOTE — P.HPIM
History of Present Illness


H&P Date: 21





HISTORY OF PRESENT ILLNESS


This is a 71-year-old male patient of Dr. Bazan and Dr. Pickard with past 

medical history of advanced steroid-dependent COPD, hyperlipidemia, rheumatoid 

arthritis, obstructive sleep apnea unable to tolerate CPAP, chronic hypoxic 

respiratory failure on home O2 at 3 L nasal cannula, generalized osteoarthritis,

chronic back pain pseudomonas in sputum on previous admission secondary to 

colonization or pseudomonal pneumonia.  Known FVC of 72% and FEV1 of 46%.  

History of perforated duodenal ulcer status post exploratory laparotomy with 

repair of perforated duodenal ulcer and repair of incarcerated umbilical hernia.

 Patient states that he has redness of breath but became significantly worse 

yesterday.  He states he recently received medication from Dr. Quiroz that 

seemed to help a little only.  He denies having any fever or chills.  No wh

eezing.  He complains of sputum production.  He denies any known sick contacts. 







Patient came into [facility] emergency center for evaluation.  

Patient was afebrile, heart rate in 110, blood pressure 87/51, pulse ox 87% on 5

L nasal cannula.  EKG sinus rhythm with occasional PAC.  Lab work revealed WBC 

19.6, hemoglobin 13.2, platelet count 321.  Sodium 132, potassium 4.4, chloride 

97, CO2 28, BUN 20, creatinine 0.95.  Blood sugar 110.  , CRP 89.2.  BNP 

3170.  Pro-calcitonin 1.11.  Troponins were 0.075, 0.117, 0.103.  Chest x-ray 

shows underlying pulmonary fibrosis.  Increased patchy density however is noted 

at the left lung base and superimposed pneumonia is difficult to exclude.  CTA 

of the chest revealed diffuse COPD with diffuse bilateral infiltrates.  No evide

nce of pulmonary embolism.  Distal branches somewhat limited.  Severe 

compression fractures involving the mid and upper thoracic spine.  Patient was 

started on antibiotics, IV steroids, status post 2 L of IV fluid, admitted to 

the cardiac stepdown unit and consult in place with pulmonary medicine and 

cardiology.





REVIEW OF SYSTEMS


Constitutional: No fever, no chills, no night sweats.  No weight change.  No 

weakness, fatigue or lethargy.  No daytime sleepiness.


EENT: No headache.  No blurred vision or double vision, no loss of vision.  No 

loss of Hearing, no ringing in the ears, no dizziness.  No nasal drainage or 

congestion.  No epistaxis.  No sore throat.


Lungs: Reports shortness of breath, Reports cough, Reports sputum production.  

No wheezing.


Cardiovascular: No chest pain, no lower extremity edema.  No palpitations.  No 

paroxysmal nocturnal dyspnea.  No orthopnea.  No lightheadedness or dizziness.  

No syncopal episodes.


Abdominal: No abdominal pain.  No nausea, vomiting.  No diarrhea.  No 

constipation.  No bloody or tarry stools..  No loss of appetite.


Genitourinary: No dysuria, increased frequency, urgency.  No urinary retention.


Musculoskeletal: No myalgias.  No muscle weakness, no gait dysfunction, no 

frequent falls.  No back pain.  No neck pain.


Integumentary: No wounds, no lesions.  No rash or pruritus.  No unusual 

bruising.  No change in hair or nails.


Neurologic: No aphasia. No facial droop. No change in mentation. No head injury.

No headache. No paralysis. No paresthesia.


Psychiatric: No depression.  No anxiety.  No mood swings.


Endocrine: No abnormal blood sugars.  No weight change.   





SOCIAL HISTORY


Patient smoked 2 packs per day for 40+ years.  He quit 7 years ago.  He denies 

any illicit drug use or marijuana use.  He drinks 2 beers per day.  Patient was 

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 

construction.  patient is  has adult children wo are involved.





FAMILY HISTORY


Father  of leukemia. Mother is 89 yrs old with history of dementia and colon

cancer. Patient has 1 full sister with history of lung cancer.  Patient has one 

half-sister with adrenal problems.  Patient does not have any brothers.  Patient

3 sons with no major medical problems.





PHYSICAL EXAMINATION


Gen: This is 71-year-old  male.  Patient is resting in bed appears to 

be comfortable and in no acute distress.


HEENT: Head is atraumatic, normocephalic. Pupils equal, round. Sclerae is 

anicteric. 


NECK: Supple. No JVD. No lymphadenopathy. No thyromegaly. 


LUNGS: Coarse crackles bilaterally.  Mild expiratory wheezing.  No intercostal 

retractions.


HEART: Regular rate and rhythm. No murmur.  Sinus tachycardia.


ABDOMEN: Soft. Bowel sounds are present. No masses.  No tenderness.


EXTREMITIES: No pedal edema.  No calf tenderness.


NEUROLOGICAL: Patient is awake, alert and oriented x3. Cranial nerves 2 through 

12 are grossly intact. 





ASSESSMENT AND PLAN


1.  Acute on chronic hypoxic respiratory failure secondary to COPD exacerbation 

and underlying pulmonary fibrosis with possible pneumonia bilaterally.  Patient 

admitted to the hospital, consult with Dr. Salazar, continue oxygen therapy, 

continue DuoNeb treatments 4 times daily and as needed, azithromycin, 

ceftriaxone 1 g IV piggyback daily, Tessalon Perles, Pulmicort increased to 1 mg

twice daily, ceftriaxone, Solu-Medrol at 60 mg IV every 6 hours.  





2.  Advanced COPD with pulmonary fibrosis, steroid dependent and chronic hypoxic

and hypercapnic respiratory failure on home O2 at 3 L. Continue as in #1.





3.  Elevated troponins without chest pain.  Cardiology consult.  Echocardiogram 

ordered.





4.  Hypertension.  Continue Toprol-XL 25 mg daily.





5.  Rheumatoid arthritis, stable.





6.  Hyperlipidemia.





7.  Obstructive sleep apnea unable to tolerate CPAP.





8.  Chronic back pain and generalized osteoarthritis.  Continue Norco as needed,

gabapentin 300 mg at bedtime





9.  History of perforated duodenal ulcer status post exploratory laparotomy with

repair of perforated duodenal ulcer and repair of incarcerated umbilical hernia.





10.  Generalized anxiety disorder and recurrent depression.  Continue Zoloft 50 

mg daily, Elavil 10 mg at bedtime. 





11.  DVT prophylaxis.  Heparin subcu.





10.  GI prophylaxis.  Continue Protonix daily.





12.  Insomnia.  Continue Elavil 10 mg at bedtime..





13.  Chronic compression fracture.  Continue current pain management with Norco.





Patient will be admitted to the hospital for a minimum of 2 night stay.





DISCHARGE PLAN


To be determined.  Patient has been at [facility] in the past.  Consult PT and OT.





Impression and plan of care have been directed as dictated by the signing herbert sun.  Chloé Maldonado nurse practitioner acting as scribe for signing 

physician.





Past Medical History


Past Medical History: COPD, Hyperlipidemia, Osteoarthritis (OA), Pneumonia, 

Rheumatoid Arthritis (RA), Sleep Apnea/CPAP/BIPAP


Additional Past Medical History / Comment(s): COPD, chronic hypoxic respiratory 

failure previous history of pneumoperitoneum related to a perforated duodenal 

ulcer, repair of an umbilical hernia, chronic back pain,


History of Any Multi-Drug Resistant Organisms: None Reported


Past Surgical History: Orthopedic Surgery


Additional Past Surgical History / Comment(s): colonoscopy, repair of a 

perforated duodenal ulcer and repair of an incarcerated umbilical hernia


Past Anesthesia/Blood Transfusion Reactions: No Reported Reaction


Past Psychological History: Anxiety, Depression


Smoking Status: Former smoker


Past Alcohol Use History: Daily


Additional Past Alcohol Use History / Comment(s): Patient smoked 2 packs per day

for 40+ years.  He quit 6 years ago.  He denies any illicit drug use or 

marijuana use.  He drinks 2 beers per day but none since previous admission and 

May.  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 

involved


Past Drug Use History: None Reported





- Past Family History


  ** Father


Family Medical History: Cancer


Additional Family Medical History / Comment(s): Father  of leukemia.





  ** Mother


Family Medical History: Cancer, CVA/TIA, Dementia, Diabetes Mellitus


Additional Family Medical History / Comment(s): Mother is 88yrs old with history

of dementia and colon cancer.





  ** Sister(s)


Additional Family Medical History / Comment(s): Patient 3 sons with no major 

medical problems.





Medications and Allergies


                                Home Medications











 Medication  Instructions  Recorded  Confirmed  Type


 


Albuterol Inhaler [Ventolin Hfa 2 puff INHALATION RT-QID PRN 20 

History





Inhaler]    


 


Cholecalciferol [Vitamin D3 (25 1,000 unit PO DAILY 20 History





Mcg = 1000 Iu)]    


 


HYDROcodone/APAP 10-325MG [Norco 1 tab PO TID PRN 20 History





]    


 


Metoprolol Succinate [Toprol XL] 25 mg PO DAILY 20 History


 


Multivitamins, Thera [Multivitamin 1 tab PO DAILY 20 History





(formulary)]    


 


Omeprazole [PriLOSEC] 40 mg PO DAILY 20 History


 


Sertraline [Zoloft] 150 mg PO DAILY 20 History


 


Amitriptyline HCl [Elavil] 10 mg PO HS 21 History


 


Benzonatate [Tessalon Perles] 100 - 200 mg PO TID PRN 21 History


 


Celecoxib [CeleBREX] 200 mg PO DAILY PRN 21 History


 


Gabapentin [Neurontin] 300 mg PO HS 21 History


 


predniSONE 5 mg PO Q48H 21 History








                                    Allergies











Allergy/AdvReac Type Severity Reaction Status Date / Time


 


amoxicillin AdvReac  Nausea & Verified 21 18:26





   Vomiting  














Physical Exam


Vitals: 


                                   Vital Signs











  Temp Pulse Pulse Resp BP BP Pulse Ox


 


 21 07:11   76     


 


 21 04:00  98.1 F   80  20   87/50  94 L


 


 21 02:00    98  20   


 


 21 23:51    98  20   96/56  94 L


 


 21 22:08   101 H   18   


 


 21 22:02   94   18    92 L


 


 21 21:00  97.9 F  93   18  101/70  


 


 21 19:54  98.4 F   100  24   105/63  92 L


 


 21 19:34   92   18  94/68   97


 


 21 18:53   97   22  102/71   97


 


 21 17:53   96   26 H  97/68   97


 


 21 17:01   99   26 H  89/65   95


 


 21 16:00   101 H   24  99/63   97


 


 21 14:59        95


 


 21 14:53  98.1 F  110 H   19  87/51   87 L








                                Intake and Output











 21





 22:59 06:59 14:59


 


Intake Total   240


 


Output Total  300 


 


Balance  -300 240


 


Intake:   


 


  Oral   240


 


Output:   


 


  Urine  300 


 


Other:   


 


  Voiding Method  Urinal 


 


  Weight 70.307 kg 57.5 kg 














Results


CBC & Chem 7: 


                                 21 14:58





                                 21 14:58


Labs: 


                  Abnormal Lab Results - Last 24 Hours (Table)











  21 Range/Units





  14:58 14:58 14:58 


 


WBC  19.6 H    (3.8-10.6)  k/uL


 


Neutrophils #  16.5 H    (1.3-7.7)  k/uL


 


Monocytes #  1.1 H    (0-1.0)  k/uL


 


D-Dimer   0.81 H   (<0.60)  mg/L FEU


 


ABG pH     (7.35-7.45)  


 


ABG pCO2     (35-45)  mmHg


 


ABG HCO3     (21-25)  mmol/L


 


ABG Total CO2     (19-24)  mmol/L


 


Sodium    132 L  (137-145)  mmol/L


 


Chloride    97 L  ()  mmol/L


 


Glucose    110 H  (74-99)  mg/dL


 


Calcium    8.3 L  (8.4-10.2)  mg/dL


 


Lactate Dehydrogenase    304 L  (313-618)  U/L


 


Troponin I     (0.000-0.034)  ng/mL


 


C-Reactive Protein    89.2 H  (<10.0)  mg/L


 


Albumin    3.3 L  (3.5-5.0)  g/dL


 


Procalcitonin     (0.02-0.09)  ng/mL














  21 Range/Units





  14:58 14:58 15:43 


 


WBC     (3.8-10.6)  k/uL


 


Neutrophils #     (1.3-7.7)  k/uL


 


Monocytes #     (0-1.0)  k/uL


 


D-Dimer     (<0.60)  mg/L FEU


 


ABG pH    7.30 L  (7.35-7.45)  


 


ABG pCO2    56 H  (35-45)  mmHg


 


ABG HCO3    28 H  (21-25)  mmol/L


 


ABG Total CO2    30 H  (19-24)  mmol/L


 


Sodium     (137-145)  mmol/L


 


Chloride     ()  mmol/L


 


Glucose     (74-99)  mg/dL


 


Calcium     (8.4-10.2)  mg/dL


 


Lactate Dehydrogenase     (313-618)  U/L


 


Troponin I   0.075 H*   (0.000-0.034)  ng/mL


 


C-Reactive Protein     (<10.0)  mg/L


 


Albumin     (3.5-5.0)  g/dL


 


Procalcitonin  1.11 H    (0.02-0.09)  ng/mL














  21 Range/Units





  22:52 01:16 


 


WBC    (3.8-10.6)  k/uL


 


Neutrophils #    (1.3-7.7)  k/uL


 


Monocytes #    (0-1.0)  k/uL


 


D-Dimer    (<0.60)  mg/L FEU


 


ABG pH    (7.35-7.45)  


 


ABG pCO2    (35-45)  mmHg


 


ABG HCO3    (21-25)  mmol/L


 


ABG Total CO2    (19-24)  mmol/L


 


Sodium    (137-145)  mmol/L


 


Chloride    ()  mmol/L


 


Glucose    (74-99)  mg/dL


 


Calcium    (8.4-10.2)  mg/dL


 


Lactate Dehydrogenase    (313-618)  U/L


 


Troponin I  0.117 H*  0.103 H*  (0.000-0.034)  ng/mL


 


C-Reactive Protein    (<10.0)  mg/L


 


Albumin    (3.5-5.0)  g/dL


 


Procalcitonin    (0.02-0.09)  ng/mL














Thrombosis Risk Factor Assmnt





- Choose All That Apply


Each Factor Represents 1 point: Abnormal pulmonary function (COPD)


Other Risk Factors: Yes


Each Risk Factor Represents 2 Points: Age 61-74 years


Other congenital or acquired thrombophilia - If yes, enter type in comment: No


Thrombosis Risk Factor Assessment Total Risk Factor Score: 3


Thrombosis Risk Factor Assessment Level: Moderate Risk

## 2021-04-07 NOTE — P.CNPUL
History of Present Illness


Consult date: 21


Requesting physician: Esteban Ramos


Reason for consult: dyspnea, abnormal CXR/CT


Chief complaint: Shortness of breath, fever, fatigue


History of present illness: 





The patient is a very pleasant 71-year-old gentleman with a long-term history of

COPD and pulmonary fibrosis.  Based on his pulmonary function test in 2017 he 

has an FVC of 72% and FEV1 of 46% and he has been steroid dependent for the past

few years taking prednisone 5 milligrams on a daily basis.  He has also 

underlying rheumatoid arthritis and previously he was taking immunosuppression 

with Humira none for now.  His oxygen dependent at 3 L/m per nasal cannula at 

home.  He presented to the emergency room yesterday with complaints of 

increasing fatigue, fever, worsening shortness of breath.  He received one his 

second dose of CoVID vaccine recently. Chest x-ray reveals underlying pulmonary 

fibrosis.  Increase passage density in the left lung base.  CT angiogram 

revealed diffuse COPD with diffuse bilateral infiltrates.  No evidence of 

central pulmonary embolism.  Distal branches difficult to assess.  There is 

multiple severe compression fractures with retropulsion involving the mid and 

thoracic spine.  White count 19.6.  Hemoglobin 13.2.  D-dimer 0.81.  Sodium 132.

 Potassium 4.4.  Creatinine 0.95.  C-reactive protein 89.  .  Troponin 

0.075, 0.117, 0.103.  Corona virus not detected.  Arterial blood gas 700% FiO2 

revealed a P O2 of 91, pCO2 56, pH 7.30.  He is seen today in consultation on 

the selective care unit.  Currently awake and alert in no acute distress.  

Requiring 9 L high flow nasal cannula to maintain O2 saturations in the 90s.  He

is afebrile.  Hemodynamically stable.  He's been initiated on DuoNeb 

inhalations, Pulmicort inhalations, IV Solu-Medrol.  Antibiotics in the form of 

vancomycin and ceftriaxone.  Heparin for DVT prophylaxis.





Review of Systems





REVIEW OF SYSTEMS:


CONSTITUTIONAL: Positive for fatigue, fever.  Denies any recent significant 

weight loss or weight gain.


EYES: Denies change in vision.


EARS, NOSE, MOUTH, THROAT: Denies headaches, denies sore throat.


CARDIOVASCULAR: Denies chest pain, palpitations or syncopal episodes.


RESPIRATORY: Positive for shortness of breath, cough, congestion no hemoptysis.


GASTROINTESTINAL: Denies change in appetite, denies abdominal pain


GENITOURINARY: Denies hematuria, denies infections.


MUSKULOSKELETAL: Denies pain, denies swelling.


INTEGUMENTARY: Denies rash, denies eczema.


NEUROLOGICAL: Denies recent memory loss, no recent seizure activity. 


PSYCHIATRIC: Denies anxiety, denies depression.


HEMATOLOGIC/LYMPHATIC: Denies anemia, denies enlarged lymph nodes.








Past Medical History


Past Medical History: COPD, Hyperlipidemia, Osteoarthritis (OA), Pneumonia, 

Rheumatoid Arthritis (RA), Sleep Apnea/CPAP/BIPAP


Additional Past Medical History / Comment(s): COPD, chronic hypoxic respiratory 

failure previous history of pneumoperitoneum related to a perforated duodenal 

ulcer, repair of an umbilical hernia, chronic back pain,


History of Any Multi-Drug Resistant Organisms: None Reported


Past Surgical History: Orthopedic Surgery


Additional Past Surgical History / Comment(s): colonoscopy, repair of a 

perforated duodenal ulcer and repair of an incarcerated umbilical hernia


Past Anesthesia/Blood Transfusion Reactions: No Reported Reaction


Past Psychological History: Anxiety, Depression


Smoking Status: Former smoker


Past Alcohol Use History: Daily


Additional Past Alcohol Use History / Comment(s): Patient smoked 2 packs per day

for 40+ years.  He quit 6 years ago.  He denies any illicit drug use or 

marijuana use.  He drinks 2 beers per day but none since previous admission and 

May.  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 

involved


Past Drug Use History: None Reported





- Past Family History


  ** Father


Family Medical History: Cancer


Additional Family Medical History / Comment(s): Father  of leukemia.





  ** Mother


Family Medical History: Cancer, CVA/TIA, Dementia, Diabetes Mellitus


Additional Family Medical History / Comment(s): Mother is 88yrs old with history

of dementia and colon cancer.





  ** Sister(s)


Additional Family Medical History / Comment(s): Patient 3 sons with no major 

medical problems.





Medications and Allergies


                                Home Medications











 Medication  Instructions  Recorded  Confirmed  Type


 


Albuterol Inhaler [Ventolin Hfa 2 puff INHALATION RT-QID PRN 20 

History





Inhaler]    


 


Cholecalciferol [Vitamin D3 (25 1,000 unit PO DAILY 20 History





Mcg = 1000 Iu)]    


 


HYDROcodone/APAP 10-325MG [Norco 1 tab PO TID PRN 20 History





]    


 


Metoprolol Succinate [Toprol XL] 25 mg PO DAILY 20 History


 


Multivitamins, Thera [Multivitamin 1 tab PO DAILY 20 History





(formulary)]    


 


Omeprazole [PriLOSEC] 40 mg PO DAILY 20 History


 


Sertraline [Zoloft] 150 mg PO DAILY 20 History


 


Amitriptyline HCl [Elavil] 10 mg PO HS 21 History


 


Benzonatate [Tessalon Perles] 100 - 200 mg PO TID PRN 21 History


 


Celecoxib [CeleBREX] 200 mg PO DAILY PRN 21 History


 


Gabapentin [Neurontin] 300 mg PO HS 21 History


 


predniSONE 5 mg PO Q48H 21 History








                                    Allergies











Allergy/AdvReac Type Severity Reaction Status Date / Time


 


amoxicillin AdvReac  Nausea & Verified 21 18:26





   Vomiting  














Physical Exam


Vitals: 


                                   Vital Signs











  Temp Pulse Pulse Resp BP BP Pulse Ox


 


 21 16:00  98.2 F   88  22   106/60  91 L


 


 21 12:00  98.2 F   65  20   99/56  93 L


 


 21 11:37   86     


 


 21 11:24   84     


 


 21 08:00  98.5 F   93  20   103/57  95


 


 21 07:20   82     


 


 21 07:11   76     


 


 21 04:00  98.1 F   80  20   87/50  94 L


 


 21 02:00    98  20   


 


 21 23:51    98  20   96/56  94 L


 


 21 22:08   101 H   18   


 


 21 22:02   94   18    92 L


 


 21 21:00  97.9 F  93   18  101/70  


 


 21 19:54  98.4 F   100  24   105/63  92 L


 


 21 19:34   92   18  94/68   97


 


 21 18:53   97   22  102/71   97


 


 21 17:53   96   26 H  97/68   97


 


 21 17:01   99   26 H  89/65   95








                                Intake and Output











 21





 06:59 14:59 22:59


 


Intake Total  480 


 


Output Total 300  


 


Balance -300 480 


 


Intake:   


 


  Oral  480 


 


Output:   


 


  Urine 300  


 


Other:   


 


  Voiding Method Urinal  


 


  Weight 57.5 kg 57.5 kg 














GENERAL EXAM: Alert, pleasant 71-year-old gentleman, 9 L nasal cannula, 

comfortable in no apparent distress.


HEAD: Normocephalic.


EYES: Normal reaction of pupils, equal size.


NOSE: Clear with pink turbinates.


THROAT: No erythema or exudates.


NECK: No masses, no JVD.


CHEST: No chest wall deformity.


LUNGS: Equal air entry with coarse crackles in the posterior bases.


CVS: S1 and S2 normal with no audible murmur, regular rhythm.


ABDOMEN: No hepatosplenomegaly, normal bowel sounds, no guarding or rigidity.


SPINE: No scoliosis or deformity


SKIN: No rashes


CENTRAL NERVOUS SYSTEM: No focal deficits, tone is normal in all 4 extremities.


EXTREMITIES: There is no peripheral edema.  No clubbing, no cyanosis.  

Peripheral pulses are intact.





Results





- Laboratory Findings


CBC and BMP: 


                                 21 14:58





                                 21 14:58


ABG











ABG pH  7.30  (7.35-7.45)  L  21  15:43    


 


ABG pCO2  56 mmHg (35-45)  H  21  15:43    


 


ABG pO2  91 mmHg ()   21  15:43    


 


ABG O2 Saturation  95.9 % (94-97)   21  15:43    





PT/INR, D-dimer











PT  11.2 sec (9.0-12.0)   21  14:58    


 


INR  1.1  (<1.2)   21  14:58    


 


D-Dimer  0.81 mg/L FEU (<0.60)  H  21  14:58    








Abnormal lab findings: 


                                  Abnormal Labs











  21





  14:58 14:58 14:58


 


WBC  19.6 H  


 


Neutrophils #  16.5 H  


 


Monocytes #  1.1 H  


 


D-Dimer   0.81 H 


 


ABG pH   


 


ABG pCO2   


 


ABG HCO3   


 


ABG Total CO2   


 


Sodium    132 L


 


Chloride    97 L


 


Glucose    110 H


 


POC Glucose (mg/dL)   


 


Calcium    8.3 L


 


Lactate Dehydrogenase    304 L


 


Troponin I   


 


C-Reactive Protein    89.2 H


 


Albumin    3.3 L


 


Procalcitonin   














  21





  14:58 14:58 15:43


 


WBC   


 


Neutrophils #   


 


Monocytes #   


 


D-Dimer   


 


ABG pH    7.30 L


 


ABG pCO2    56 H


 


ABG HCO3    28 H


 


ABG Total CO2    30 H


 


Sodium   


 


Chloride   


 


Glucose   


 


POC Glucose (mg/dL)   


 


Calcium   


 


Lactate Dehydrogenase   


 


Troponin I   0.075 H* 


 


C-Reactive Protein   


 


Albumin   


 


Procalcitonin  1.11 H  














  21





  22:52 01:16 12:00


 


WBC   


 


Neutrophils #   


 


Monocytes #   


 


D-Dimer   


 


ABG pH   


 


ABG pCO2   


 


ABG HCO3   


 


ABG Total CO2   


 


Sodium   


 


Chloride   


 


Glucose   


 


POC Glucose (mg/dL)    141 H


 


Calcium   


 


Lactate Dehydrogenase   


 


Troponin I  0.117 H*  0.103 H* 


 


C-Reactive Protein   


 


Albumin   


 


Procalcitonin   














- Diagnostic Findings


Chest x-ray: image reviewed


CT scan - chest: image reviewed





Assessment and Plan


Assessment: 





1 Acute on chronic hypoxic respiratory failure secondary to acute exacerbation 

of COPD, suspect underlying community acquired pneumonia, pulmonary fibrosis.  

Corona virus not detected, received second dose of vaccine





2 Chronic hypoxic respiratory failure on home oxygen at 3 L/m, steroid-dependent

 5 mg daily





3 Advanced COPD/pulmonary fibrosis





4 Troponin leak, suspect oxygen supply and demand mismatch





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





6 History of rheumatoid arthritis





7 Obstructive sleep apnea, not on CPAP





8 Chronic back pain





9 Hyperlipidemia





10 Osteoarthritis





Plan:





The patient was seen and evaluated by Dr. Salazar


Discontinue vancomycin 


Continue ceftriaxone, add azithromycin


Continue DuoNeb inhalations, Pulmicort inhalations, IV Solu-Medrol


We will continue to follow and make further recommendations based on his 

clinical status





I, the cosigning physician, performed a history & physical examination of the 

patient. Lungs sounds with coarse crackles in the bilateral bases.  Maintaining 

O2 saturations in the 90s on 9 L/m per nasal cannula.  I discussed the 

assessment and plan of care with my nurse practitioner, Sylwia Flaherty. I attest to 

the above consultation as dictated by her.

## 2021-04-08 LAB
GLUCOSE BLD-MCNC: 128 MG/DL (ref 75–99)
GLUCOSE BLD-MCNC: 129 MG/DL (ref 75–99)
GLUCOSE BLD-MCNC: 134 MG/DL (ref 75–99)
GLUCOSE BLD-MCNC: 168 MG/DL (ref 75–99)

## 2021-04-08 RX ADMIN — IPRATROPIUM BROMIDE AND ALBUTEROL SULFATE SCH ML: .5; 3 SOLUTION RESPIRATORY (INHALATION) at 16:21

## 2021-04-08 RX ADMIN — INSULIN ASPART SCH UNIT: 100 INJECTION, SOLUTION INTRAVENOUS; SUBCUTANEOUS at 12:01

## 2021-04-08 RX ADMIN — METOPROLOL SUCCINATE SCH MG: 25 TABLET, EXTENDED RELEASE ORAL at 08:55

## 2021-04-08 RX ADMIN — Medication SCH MCG: at 08:55

## 2021-04-08 RX ADMIN — BUDESONIDE SCH MG: 1 SUSPENSION RESPIRATORY (INHALATION) at 08:35

## 2021-04-08 RX ADMIN — SERTRALINE HYDROCHLORIDE SCH MG: 50 TABLET, FILM COATED ORAL at 08:55

## 2021-04-08 RX ADMIN — METHYLPREDNISOLONE SODIUM SUCCINATE SCH MG: 125 INJECTION, POWDER, FOR SOLUTION INTRAMUSCULAR; INTRAVENOUS at 12:01

## 2021-04-08 RX ADMIN — CEFAZOLIN SCH: 330 INJECTION, POWDER, FOR SOLUTION INTRAMUSCULAR; INTRAVENOUS at 09:28

## 2021-04-08 RX ADMIN — INSULIN ASPART SCH: 100 INJECTION, SOLUTION INTRAVENOUS; SUBCUTANEOUS at 22:11

## 2021-04-08 RX ADMIN — CLOPIDOGREL BISULFATE SCH MG: 75 TABLET ORAL at 18:01

## 2021-04-08 RX ADMIN — IPRATROPIUM BROMIDE AND ALBUTEROL SULFATE SCH: .5; 3 SOLUTION RESPIRATORY (INHALATION) at 01:26

## 2021-04-08 RX ADMIN — HEPARIN SODIUM SCH UNIT: 5000 INJECTION INTRAVENOUS; SUBCUTANEOUS at 22:54

## 2021-04-08 RX ADMIN — HYDROCODONE BITARTRATE AND ACETAMINOPHEN PRN EACH: 10; 325 TABLET ORAL at 17:50

## 2021-04-08 RX ADMIN — ASPIRIN 81 MG CHEWABLE TABLET SCH: 81 TABLET CHEWABLE at 09:28

## 2021-04-08 RX ADMIN — BUDESONIDE SCH MG: 1 SUSPENSION RESPIRATORY (INHALATION) at 20:17

## 2021-04-08 RX ADMIN — HYDROCODONE BITARTRATE AND ACETAMINOPHEN PRN EACH: 10; 325 TABLET ORAL at 08:55

## 2021-04-08 RX ADMIN — IPRATROPIUM BROMIDE AND ALBUTEROL SULFATE SCH ML: .5; 3 SOLUTION RESPIRATORY (INHALATION) at 08:35

## 2021-04-08 RX ADMIN — PANTOPRAZOLE SODIUM SCH MG: 40 TABLET, DELAYED RELEASE ORAL at 06:30

## 2021-04-08 RX ADMIN — BENZONATATE PRN MG: 100 CAPSULE ORAL at 17:52

## 2021-04-08 RX ADMIN — INSULIN ASPART SCH: 100 INJECTION, SOLUTION INTRAVENOUS; SUBCUTANEOUS at 18:05

## 2021-04-08 RX ADMIN — AZITHROMYCIN SCH MG: 500 TABLET, FILM COATED ORAL at 09:28

## 2021-04-08 RX ADMIN — IPRATROPIUM BROMIDE AND ALBUTEROL SULFATE SCH ML: .5; 3 SOLUTION RESPIRATORY (INHALATION) at 11:23

## 2021-04-08 RX ADMIN — AMITRIPTYLINE HYDROCHLORIDE SCH MG: 10 TABLET, FILM COATED ORAL at 21:37

## 2021-04-08 RX ADMIN — METHYLPREDNISOLONE SODIUM SUCCINATE SCH MG: 40 INJECTION, POWDER, FOR SOLUTION INTRAMUSCULAR; INTRAVENOUS at 17:52

## 2021-04-08 RX ADMIN — GABAPENTIN SCH MG: 300 CAPSULE ORAL at 21:37

## 2021-04-08 RX ADMIN — THERA TABS SCH EACH: TAB at 08:55

## 2021-04-08 RX ADMIN — IPRATROPIUM BROMIDE AND ALBUTEROL SULFATE SCH ML: .5; 3 SOLUTION RESPIRATORY (INHALATION) at 20:17

## 2021-04-08 RX ADMIN — METHYLPREDNISOLONE SODIUM SUCCINATE SCH MG: 40 INJECTION, POWDER, FOR SOLUTION INTRAMUSCULAR; INTRAVENOUS at 22:54

## 2021-04-08 RX ADMIN — HEPARIN SODIUM SCH UNIT: 5000 INJECTION INTRAVENOUS; SUBCUTANEOUS at 08:54

## 2021-04-08 RX ADMIN — HEPARIN SODIUM SCH UNIT: 5000 INJECTION INTRAVENOUS; SUBCUTANEOUS at 17:50

## 2021-04-08 RX ADMIN — BENZONATATE PRN MG: 100 CAPSULE ORAL at 08:54

## 2021-04-08 RX ADMIN — METHYLPREDNISOLONE SODIUM SUCCINATE SCH MG: 125 INJECTION, POWDER, FOR SOLUTION INTRAMUSCULAR; INTRAVENOUS at 06:30

## 2021-04-08 RX ADMIN — INSULIN ASPART SCH UNIT: 100 INJECTION, SOLUTION INTRAVENOUS; SUBCUTANEOUS at 06:30

## 2021-04-08 RX ADMIN — ATORVASTATIN CALCIUM SCH MG: 40 TABLET, FILM COATED ORAL at 21:37

## 2021-04-08 NOTE — P.PN
Subjective


Progress Note Date: 04/08/21


Principal diagnosis: 





Abnormal cardiac enzymes





This is a pleasant 71-year-old gentleman with known chronic hypoxic respiratory 

failure on oxygen at home continuously as well as obstructive sleep apnea as 

well as multiple comorbid conditions including hypertension and dyslipidemia 

with consulted to see for abnormal cardiac enzymes.  He presented to the 

hospital with increasing shortness of breath with exertion but no symptoms of 

chest pain or chest discomfort.  We felt that the abnormal cardiac enzymes is 

related to type II MI with oxygen demands mismatch.  We decided to treat the 

patient medically.





The patient was seen today.  He stated that the shortness of breath has improved

slightly.  He is not experiencing any symptoms of chest pain or chest discomfort

and no dizziness or lightheadedness.  He has been maintaining normal sinus 

mechanism.  Vitals are stable overall.  He underwent an echocardiogram which 

revealed normal/preserved left ventricular systolic function with evidence of 

mild aortic stenosis.  Currently he is on aspirin but he is refusing to take 

aspirin in view of the "stomach ache".  We are going to stop the aspirin and 

start the patient on Plavix as an alternative.  Beside that he is on anti-

ischemic medication was beta blocker.  Also he is on high intensity statin.





Objective





- Vital Signs


Vital signs: 


                                   Vital Signs











Temp  98.4 F   04/08/21 08:30


 


Pulse  88   04/08/21 11:29


 


Resp  24   04/08/21 08:30


 


BP  120/71   04/08/21 08:30


 


Pulse Ox  94 L  04/08/21 08:30








                                 Intake & Output











 04/07/21 04/08/21 04/08/21





 18:59 06:59 18:59


 


Intake Total 480 240 240


 


Output Total  400 600


 


Balance 480 -160 -360


 


Weight 57.5 kg 60.5 kg 


 


Intake:   


 


  Oral 480 240 240


 


Output:   


 


  Urine  400 600


 


Other:   


 


  Voiding Method  Urinal 














- Constitutional


General appearance: Present: no acute distress





- Respiratory


Respiratory: bilateral: rhonchi





- Cardiovascular


Rhythm: regular


Heart sounds: normal: S1, S2


Abnormal Heart Sounds: Present: systolic murmur





- Labs


CBC & Chem 7: 


                                 04/06/21 14:58





                                 04/06/21 14:58


Labs: 


                  Abnormal Lab Results - Last 24 Hours (Table)











  04/07/21 04/07/21 04/07/21 Range/Units





  12:00 16:59 20:49 


 


POC Glucose (mg/dL)  141 H  166 H  133 H  (75-99)  mg/dL














  04/08/21 04/08/21 Range/Units





  05:57 11:56 


 


POC Glucose (mg/dL)  134 H  168 H  (75-99)  mg/dL








                      Microbiology - Last 24 Hours (Table)











 04/07/21 17:17 Gram Stain - Preliminary





 Sputum Sputum Culture - Preliminary


 


 04/06/21 15:34 Blood Culture - Preliminary





 Blood    No Growth after 24 hours


 


 04/06/21 15:34 Blood Culture - Preliminary





 Blood    No Growth after 24 hours














Assessment and Plan


Assessment: 





Assessment


#1 acute on chronic hypoxic respiratory failure


#2 acute exacerbation of COPD


#3 pulmonary fibrosis


#4 abnormal troponin likely to be type II MI


#5 multiple comorbid conditions





Plan


#1 continue the conservative medical approach for the abnormal cardiac enzymes


Number to DC aspirin and start the patient on Plavix


#3 continue beta blocker as well as a statin


#4 the echo was reviewed and shows normal LV function with mild aortic stenosis


#5 consider noninvasive testing like myocardial perfusion imaging stress test as

an outpatient unless there is a change in the clinical situation


#6 follow-up with the patient

## 2021-04-08 NOTE — ECHOF
Referral Reason:LVF



MEASUREMENTS

--------

HEIGHT: 170.2 cm

WEIGHT: 57.2 kg

BP: 

IVSd:   1.3 cm     (0.6 - 1.1)

LVIDd:   3.0 cm     (3.9 - 5.3)

LVPWd:   1.0 cm     (0.6 - 1.1)

EDV(Teich):   35 ml

IVSs:   2.0 cm

LVIDs:   1.6 cm

LVPWs:   1.6 cm

%IVS Thck:   56 %

ESV(Teich):   7 ml

EF(Teich):   80 %

%FS:   48 %

SV(Teich):   28 ml

LVOT Diam:   1.7 cm

IVC:   18.52 mm

LALs A4C:   5.3 cm

LAAs A4C:   14.7 cm

LAESV A-L A4C:   35 ml

LAESV MOD A4C:   32 ml

LALs A2C:   5.8 cm

LAAs A2C:   19.0 cm

LAESV A-L A2C:   53 ml

LAESV MOD A2C:   50 ml

LAESV(A-L):   45 ml

LAESV Index (A-L):   27.06 ml/m

Ao Diam:   3.4 cm     (2.0 - 3.7)

LA Diam:   3.2 cm     (2.7 - 3.8)

AV Cusp:   1.1 cm     (1.5 - 2.6)

EPSS:   0.3 cm

MV E [place]:   1.14 m/s

MV DecT:   160 ms

MV Dec [place]:   7.1 m/s

MV A Jeferson:   1.23 m/s

MV E/A Ratio:   0.92 

MV PHT:   46 ms

MR Vmax:   1.48 m/s

MR maxP.79 mmHg

LVOT Vmax:   0.72 m/s

LVOT maxP.05 mmHg

AV Vmax:   1.72 m/s

AV maxP.86 mmHg

MESHA Vmax, Pt:   0.9 cm

AV Vmax:   1.81 m/s

AV Vmean:   1.20 m/s

AV maxP.10 mmHg

AV meanP.80 mmHg

AV Env.Ti:   291 ms

AV VTI:   34.8 cm

MESHA Vmax, Pt:   0.9 cm

AR Vmax:   3.16 m/s

AR maxP.99 mmHg

AR PHT:   1569 ms

AR Dec Time:   5411 ms

AR Dec [place]:   0.6 m/s

TR Vmax:   3.57 m/s

TR maxP.03 mmHg

RAP:   5.00 mmHg

RVSP:   56.03 mmHg

MV EF SLOPE:   110.63 mm/s     (70 - 150)

MV EXCURSION:   16.31 mm     (> 18.000)







FINDINGS

--------

This was a technically adequate study.

The left ventricular size is normal.   There is mild concentric left ventricular hypertrophy.   Overa
ll left ventricular systolic function is normal with, an EF between 55 - 60 %.   Increased LAP Grade 
2 Diastolic Dysfunction.

The right ventricle is normal in size.

The left atrial size is normal.    Normal LA  size by volume 22+/-6 ml/m2.

The right atrial size is normal.

Aortic valve is trileaflet and is mildly thickened.   There is mild aortic valve sclerosis.   Trace a
mount of aortic regurgitation.    Peak/mean gradient across the Aortic Valve is 13.10mmHg / 6.80mmHg.


The mitral valve is normal.   The mitral valve leaflets are mildly thickened.   Mild mitral annular c
alcification present.   Mild mitral regurgitation is present.

The tricuspid valve appears structurally normal.   Mild tricuspid regurgitation present.   There is m
oderate pulmonary hypertension.   The right ventricular systolic pressure, as measured by Doppler, is
 56.03mmHg.

There is no pulmonic regurgitation present.

The aortic root size is normal.

Normal inferior vena cava with normal inspiratory collapse consistent with estimated right atrial pre
ssure of  5 mmHg.

There is no pericardial effusion.



CONCLUSIONS

--------

1. The left ventricular size is normal.

2. There is mild concentric left ventricular hypertrophy.

3. Overall left ventricular systolic function is normal with, an EF between 55 - 60 %.

4. Increased LAP Grade 2 Diastolic Dysfunction.

5. Aortic valve is trileaflet and is mildly thickened.

6. There is mild aortic valve sclerosis.

7. Trace amount of aortic regurgitation.

8. Peak/mean gradient across the Aortic Valve is 13.10mmHg / 6.80mmHg.

9. The mitral valve leaflets are mildly thickened.

10. Mild mitral annular calcification present.

11. Mild mitral regurgitation is present.

12. Mild tricuspid regurgitation present.

13. There is moderate pulmonary hypertension.

14. The right ventricular systolic pressure, as measured by Doppler, is 56.03mmHg.

15. There is no pericardial effusion.





SONOGRAPHER: Karis Crawford RDCS

## 2021-04-08 NOTE — P.PN
Subjective


Progress Note Date: 04/08/21


Principal diagnosis: 





Acute on chronic hypoxemic respiratory failure secondary to COPD 

exacerbation/pulmonary fibrosis





The patient is a very pleasant 71-year-old gentleman with a long-term history of

COPD and pulmonary fibrosis.  Based on his pulmonary function test in 2017 he ha

s an FVC of 72% and FEV1 of 46% and he has been steroid dependent for the past 

few years taking prednisone 5 milligrams on a daily basis.  He has also 

underlying rheumatoid arthritis and previously he was taking immunosuppression 

with Humira none for now.  His oxygen dependent at 3 L/m per nasal cannula at 

home.  He presented to the emergency room yesterday with complaints of 

increasing fatigue, fever, worsening shortness of breath.  He received one his 

second dose of CoVID vaccine recently. Chest x-ray reveals underlying pulmonary 

fibrosis.  Increase passage density in the left lung base.  CT angiogram 

revealed diffuse COPD with diffuse bilateral infiltrates.  No evidence of 

central pulmonary embolism.  Distal branches difficult to assess.  There is 

multiple severe compression fractures with retropulsion involving the mid and 

thoracic spine.  White count 19.6.  Hemoglobin 13.2.  D-dimer 0.81.  Sodium 132.

 Potassium 4.4.  Creatinine 0.95.  C-reactive protein 89.  .  Troponin 0.

075, 0.117, 0.103.  Corona virus not detected.  Arterial blood gas 700% FiO2 

revealed a P O2 of 91, pCO2 56, pH 7.30.  He is seen today in consultation on 

the selective care unit.  Currently awake and alert in no acute distress.  

Requiring 9 L high flow nasal cannula to maintain O2 saturations in the 90s.  He

is afebrile.  Hemodynamically stable.  He's been initiated on DuoNeb 

inhalations, Pulmicort inhalations, IV Solu-Medrol.  Antibiotics in the form of 

vancomycin and ceftriaxone.  Heparin for DVT prophylaxis.





The patient is seen today 04/08/2021 follow-up on the selective care unit.  He 

is currently sitting up in a chair at the bedside.  Awake and alert in no acute 

distress.  He is down to 6 L high flow nasal cannula to maintain O2 saturations 

in the 90s.  Continue ferrous shortness of breath with minimal exertion, loose 

cough.  Sputum culture pending.  Blood cultures reveal no growth to date.  

Continued on DuoNeb inhalations, IV Solu-Medrol, ceftriaxone and azithromycin.





Objective





- Vital Signs


Vital signs: 


                                   Vital Signs











Temp  97.6 F   04/08/21 12:00


 


Pulse  94   04/08/21 12:00


 


Resp  22   04/08/21 12:00


 


BP  115/65   04/08/21 12:00


 


Pulse Ox  90 L  04/08/21 12:00








                                 Intake & Output











 04/07/21 04/08/21 04/08/21





 18:59 06:59 18:59


 


Intake Total 480 240 240


 


Output Total  400 600


 


Balance 480 -160 -360


 


Weight 57.5 kg 60.5 kg 


 


Intake:   


 


  Oral 480 240 240


 


Output:   


 


  Urine  400 600


 


Other:   


 


  Voiding Method  Urinal 














- Exam





GENERAL EXAM: Alert, pleasant 71-year-old gentleman, 6 L nasal cannula, 

comfortable in no apparent distress.


HEAD: Normocephalic.


EYES: Normal reaction of pupils, equal size.


NOSE: Clear with pink turbinates.


THROAT: No erythema or exudates.


NECK: No masses, no JVD.


CHEST: No chest wall deformity.


LUNGS: Equal air entry with coarse crackles in the posterior bases.


CVS: S1 and S2 normal with no audible murmur, regular rhythm.


ABDOMEN: No hepatosplenomegaly, normal bowel sounds, no guarding or rigidity.


SPINE: No scoliosis or deformity


SKIN: No rashes


CENTRAL NERVOUS SYSTEM: No focal deficits, tone is normal in all 4 extremities.


EXTREMITIES: There is no peripheral edema.  No clubbing, no cyanosis.  

Peripheral pulses are intact.





- Labs


CBC & Chem 7: 


                                 04/06/21 14:58





                                 04/06/21 14:58


Labs: 


                  Abnormal Lab Results - Last 24 Hours (Table)











  04/07/21 04/07/21 04/08/21 Range/Units





  16:59 20:49 05:57 


 


POC Glucose (mg/dL)  166 H  133 H  134 H  (75-99)  mg/dL














  04/08/21 Range/Units





  11:56 


 


POC Glucose (mg/dL)  168 H  (75-99)  mg/dL








                      Microbiology - Last 24 Hours (Table)











 04/07/21 17:17 Gram Stain - Preliminary





 Sputum Sputum Culture - Preliminary


 


 04/06/21 15:34 Blood Culture - Preliminary





 Blood    No Growth after 24 hours


 


 04/06/21 15:34 Blood Culture - Preliminary





 Blood    No Growth after 24 hours














Assessment and Plan


Assessment: 





1 Acute on chronic hypoxic respiratory failure secondary to acute exacerbation 

of COPD, suspect underlying community acquired pneumonia, pulmonary fibrosis.  

Corona virus not detected, received second dose of vaccine





2 Chronic hypoxic respiratory failure on home oxygen at 3 L/m, steroid-dependent

5 mg daily





3 Advanced COPD/pulmonary fibrosis





4 Troponin leak, suspect oxygen supply and demand mismatch





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





6 History of rheumatoid arthritis





7 Obstructive sleep apnea, not on CPAP





8 Chronic back pain





9 Hyperlipidemia





10 Osteoarthritis





Plan:





The patient was seen and evaluated by Dr. Salazar


Continue ceftriaxone, add azithromycin


Continue DuoNeb inhalations, Pulmicort inhalations, IV Solu-Medrol


Titrate down the FiO2 as tolerated


Possibly home in the a.m.


We will continue to follow and make further recommendations based on his 

clinical status





I, the cosigning physician, performed a history & physical examination of the 

patient. Lungs sounds with coarse crackles in the bilateral bases.  Maintaining 

O2 saturations in the 90s on 6 L/m per nasal cannula.  I discussed the 

assessment and plan of care with my nurse practitioner, Sylwia Flaherty. I attest to 

the above note as dictated by her.

## 2021-04-08 NOTE — P.PN
Subjective


Progress Note Date: 04/08/21





HISTORY OF PRESENT ILLNESS


This is a 71-year-old male patient of Dr. Bazan and Dr. Pickard with past 

medical history of advanced steroid-dependent COPD, hyperlipidemia, rheumatoid 

arthritis, obstructive sleep apnea unable to tolerate CPAP, chronic hypoxic 

respiratory failure on home O2 at 3 L nasal cannula, generalized osteoarthritis,

chronic back pain pseudomonas in sputum on previous admission secondary to 

colonization or pseudomonal pneumonia.  Known FVC of 72% and FEV1 of 46%.  

History of perforated duodenal ulcer status post exploratory laparotomy with 

repair of perforated duodenal ulcer and repair of incarcerated umbilical hernia.

 Patient states that he has redness of breath but became significantly worse 

yesterday.  He states he recently received medication from Dr. Quiroz that 

seemed to help a little only.  He denies having any fever or chills.  No 

wheezing.  He complains of sputum production.  He denies any known sick 

contacts.  





Patient came into [facility] emergency center for evaluation.  

Patient was afebrile, heart rate in 110, blood pressure 87/51, pulse ox 87% on 5

L nasal cannula.  EKG sinus rhythm with occasional PAC.  Lab work revealed WBC 

19.6, hemoglobin 13.2, platelet count 321.  Sodium 132, potassium 4.4, chloride 

97, CO2 28, BUN 20, creatinine 0.95.  Blood sugar 110.  , CRP 89.2.  BNP 

3170.  Pro-calcitonin 1.11.  Troponins were 0.075, 0.117, 0.103.  Chest x-ray 

shows underlying pulmonary fibrosis.  Increased patchy density however is noted 

at the left lung base and superimposed pneumonia is difficult to exclude.  CTA 

of the chest revealed diffuse COPD with diffuse bilateral infiltrates.  No 

evidence of pulmonary embolism.  Distal branches somewhat limited.  Severe 

compression fractures involving the mid and upper thoracic spine.  Patient was 

started on antibiotics, IV steroids, status post 2 L of IV fluid, admitted to 

the cardiac stepdown unit and consult in place with pulmonary medicine and card

iology.





4/8: His been afebrile, heart rate 82, respiratory rate 24, blood pressure 

99/61, pulse ox 94% on 9 L high flow nasal cannula.  Blood sugars are running 

between 133 and 166.  Blood culture showing no growth at 24 hours however 

patient states received a call yesterday afternoon that there was gram-positive 

cocci in blood culture.  She was given 1 dose of IV vancomycin.  Blood cultures 

are currently showing no growth.  Sputum culture is in progress.  Patient has 

been seen by pulmonary medicine with recommendations for ceftriaxone and 

azithromycin. Patient has been seen by cardiology and started on statin and 

aspirin, plan for stress test as an outpatient.  Echocardiogram is pending.  

Patient's breathing status is stable and improving.  Transition Solu-Medrol to 

40 mg every 8 hours.  Anticipate probable discharge home tomorrow.





REVIEW OF SYSTEMS


Constitutional: No fever, no chills, no night sweats.  No weight change.  No 

weakness, fatigue or lethargy.  No daytime sleepiness.


EENT: No headache.  No blurred vision or double vision, no loss of vision.  No 

loss of Hearing, no ringing in the ears, no dizziness.  No nasal drainage or 

congestion.  No epistaxis.  No sore throat.


Lungs: Reports shortness of breath, Reports cough, Reports sputum production.  

No wheezing.


Cardiovascular: No chest pain, no lower extremity edema.  No palpitations.  No 

paroxysmal nocturnal dyspnea.  No orthopnea.  No lightheadedness or dizziness.  

No syncopal episodes.


Abdominal: No abdominal pain.  No nausea, vomiting.  No diarrhea.  No 

constipation.  No bloody or tarry stools..  No loss of appetite.


Genitourinary: No dysuria, increased frequency, urgency.  No urinary retention.


Musculoskeletal: No myalgias.  No muscle weakness, no gait dysfunction, no 

frequent falls.  No back pain.  No neck pain.


Integumentary: No wounds, no lesions.  No rash or pruritus.  No unusual 

bruising.  No change in hair or nails.


Neurologic: No aphasia. No facial droop. No change in mentation. No head injury.

No headache. No paralysis. No paresthesia.


Psychiatric: No depression.  No anxiety.  No mood swings.


Endocrine: Mildly abnormal blood sugars.  No weight change.   





PHYSICAL EXAMINATION


Gen: This is 71-year-old  male.  Patient is resting in bed appears to 

be comfortable and in no acute distress.


HEENT: Head is atraumatic, normocephalic. Pupils equal, round. Sclerae is 

anicteric. 


NECK: Supple. No JVD. No lymphadenopathy. No thyromegaly. 


LUNGS: Coarse crackles bilaterally.  Mild expiratory wheezing.  No intercostal 

retractions.


HEART: Regular rate and rhythm. No murmur.  Sinus tachycardia.


ABDOMEN: Soft. Bowel sounds are present. No masses.  No tenderness.


EXTREMITIES: No pedal edema.  No calf tenderness.


NEUROLOGICAL: Patient is awake, alert and oriented x3. Cranial nerves 2 through 

12 are grossly intact. 





ASSESSMENT AND PLAN


1.  Acute on chronic hypoxic respiratory failure secondary to COPD exacerbation 

and underlying pulmonary fibrosis with possible pneumonia bilaterally.  Patient 

admitted to the hospital, consult with Dr. Salazar, continue oxygen therapy, 

continue DuoNeb treatments 4 times daily and as needed, azithromycin, 

ceftriaxone 1 g IV piggyback daily, Tessalon Perles, Pulmicort increased to 1 mg

twice daily, ceftriaxone, Solu-Medrol decreased to 40 mg IV every 8 hours.  





2.  Advanced COPD with pulmonary fibrosis, steroid dependent and chronic hypoxic

and hypercapnic respiratory failure on home O2 at 3 L. Continue as in #1.





3.  Elevated troponins without chest pain.  Cardiology consult.  Echocardiogram 

ordered.





4.  Hypertension.  Continue Toprol-XL 25 mg daily.





5.  Rheumatoid arthritis, stable.





6.  Hyperlipidemia.





7.  Obstructive sleep apnea unable to tolerate CPAP.





8.  Chronic back pain and generalized osteoarthritis.  Continue Norco as needed,

gabapentin 300 mg at bedtime





9.  History of perforated duodenal ulcer status post exploratory laparotomy with

repair of perforated duodenal ulcer and repair of incarcerated umbilical hernia.





10.  Generalized anxiety disorder and recurrent depression.  Continue Zoloft 50 

mg daily, Elavil 10 mg at bedtime. 





11.  DVT prophylaxis.  Heparin subcu.





10.  GI prophylaxis.  Continue Protonix daily.





12.  Insomnia.  Continue Elavil 10 mg at bedtime..





13.  Chronic compression fracture.  Continue current pain management with Norco.





14.  Reported positive blood culture with gram positive cocci.  Patient received

1 dose of IV vancomycin.  Subsequently blood cultures no growth.





DISCHARGE PLAN


Most likely home with [facility] on Friday.





Impression and plan of care have been directed as dictated by the signing 

physician.  Chloé Maldonado nurse practitioner acting as scribe for signing 

physician.





Objective





- Vital Signs


Vital signs: 


                                   Vital Signs











Temp  98.2 F   04/07/21 20:00


 


Pulse  82   04/08/21 04:00


 


Resp  24   04/08/21 04:00


 


BP  99/61   04/08/21 04:00


 


Pulse Ox  94 L  04/08/21 04:00








                                 Intake & Output











 04/07/21 04/08/21 04/08/21





 18:59 06:59 18:59


 


Intake Total 480 240 


 


Output Total  400 


 


Balance 480 -160 


 


Weight 57.5 kg 60.5 kg 


 


Intake:   


 


  Oral 480 240 


 


Output:   


 


  Urine  400 


 


Other:   


 


  Voiding Method  Urinal 














- Labs


CBC & Chem 7: 


                                 04/06/21 14:58





                                 04/06/21 14:58


Labs: 


                  Abnormal Lab Results - Last 24 Hours (Table)











  04/07/21 04/07/21 04/07/21 Range/Units





  12:00 16:59 20:49 


 


POC Glucose (mg/dL)  141 H  166 H  133 H  (75-99)  mg/dL














  04/08/21 Range/Units





  05:57 


 


POC Glucose (mg/dL)  134 H  (75-99)  mg/dL








                      Microbiology - Last 24 Hours (Table)











 04/07/21 17:17 Gram Stain - Preliminary





 Sputum Sputum Culture - Preliminary


 


 04/06/21 15:34 Blood Culture - Preliminary





 Blood    No Growth after 24 hours


 


 04/06/21 15:34 Blood Culture - Preliminary





 Blood    No Growth after 24 hours

## 2021-04-09 LAB
GLUCOSE BLD-MCNC: 106 MG/DL (ref 75–99)
GLUCOSE BLD-MCNC: 138 MG/DL (ref 75–99)
GLUCOSE BLD-MCNC: 170 MG/DL (ref 75–99)
GLUCOSE BLD-MCNC: 85 MG/DL (ref 75–99)

## 2021-04-09 RX ADMIN — METHYLPREDNISOLONE SODIUM SUCCINATE SCH MG: 40 INJECTION, POWDER, FOR SOLUTION INTRAMUSCULAR; INTRAVENOUS at 23:30

## 2021-04-09 RX ADMIN — INSULIN ASPART SCH UNIT: 100 INJECTION, SOLUTION INTRAVENOUS; SUBCUTANEOUS at 12:37

## 2021-04-09 RX ADMIN — HYDROCODONE BITARTRATE AND ACETAMINOPHEN PRN EACH: 10; 325 TABLET ORAL at 23:30

## 2021-04-09 RX ADMIN — BUDESONIDE SCH MG: 1 SUSPENSION RESPIRATORY (INHALATION) at 07:34

## 2021-04-09 RX ADMIN — METHYLPREDNISOLONE SODIUM SUCCINATE SCH MG: 40 INJECTION, POWDER, FOR SOLUTION INTRAMUSCULAR; INTRAVENOUS at 08:22

## 2021-04-09 RX ADMIN — IPRATROPIUM BROMIDE AND ALBUTEROL SULFATE SCH ML: .5; 3 SOLUTION RESPIRATORY (INHALATION) at 07:34

## 2021-04-09 RX ADMIN — ATORVASTATIN CALCIUM SCH MG: 40 TABLET, FILM COATED ORAL at 21:29

## 2021-04-09 RX ADMIN — AZITHROMYCIN SCH MG: 500 TABLET, FILM COATED ORAL at 08:22

## 2021-04-09 RX ADMIN — HEPARIN SODIUM SCH UNIT: 5000 INJECTION INTRAVENOUS; SUBCUTANEOUS at 23:30

## 2021-04-09 RX ADMIN — INSULIN ASPART SCH: 100 INJECTION, SOLUTION INTRAVENOUS; SUBCUTANEOUS at 21:31

## 2021-04-09 RX ADMIN — SENNOSIDES SCH MG: 8.6 TABLET, FILM COATED ORAL at 21:29

## 2021-04-09 RX ADMIN — HEPARIN SODIUM SCH UNIT: 5000 INJECTION INTRAVENOUS; SUBCUTANEOUS at 17:38

## 2021-04-09 RX ADMIN — METHYLPREDNISOLONE SODIUM SUCCINATE SCH MG: 40 INJECTION, POWDER, FOR SOLUTION INTRAMUSCULAR; INTRAVENOUS at 17:38

## 2021-04-09 RX ADMIN — IPRATROPIUM BROMIDE AND ALBUTEROL SULFATE SCH ML: .5; 3 SOLUTION RESPIRATORY (INHALATION) at 11:29

## 2021-04-09 RX ADMIN — INSULIN ASPART SCH: 100 INJECTION, SOLUTION INTRAVENOUS; SUBCUTANEOUS at 17:04

## 2021-04-09 RX ADMIN — BENZONATATE PRN MG: 100 CAPSULE ORAL at 17:39

## 2021-04-09 RX ADMIN — AMITRIPTYLINE HYDROCHLORIDE SCH MG: 10 TABLET, FILM COATED ORAL at 21:29

## 2021-04-09 RX ADMIN — METOPROLOL SUCCINATE SCH MG: 25 TABLET, EXTENDED RELEASE ORAL at 08:22

## 2021-04-09 RX ADMIN — FLUTICASONE PROPIONATE SCH SPRAY: 50 SPRAY, METERED NASAL at 17:39

## 2021-04-09 RX ADMIN — Medication SCH MCG: at 08:22

## 2021-04-09 RX ADMIN — HEPARIN SODIUM SCH UNIT: 5000 INJECTION INTRAVENOUS; SUBCUTANEOUS at 08:22

## 2021-04-09 RX ADMIN — IPRATROPIUM BROMIDE AND ALBUTEROL SULFATE SCH ML: .5; 3 SOLUTION RESPIRATORY (INHALATION) at 16:29

## 2021-04-09 RX ADMIN — INSULIN ASPART SCH UNIT: 100 INJECTION, SOLUTION INTRAVENOUS; SUBCUTANEOUS at 06:59

## 2021-04-09 RX ADMIN — HYDROCODONE BITARTRATE AND ACETAMINOPHEN PRN EACH: 10; 325 TABLET ORAL at 16:07

## 2021-04-09 RX ADMIN — PANTOPRAZOLE SODIUM SCH MG: 40 TABLET, DELAYED RELEASE ORAL at 06:59

## 2021-04-09 RX ADMIN — GABAPENTIN SCH MG: 300 CAPSULE ORAL at 21:29

## 2021-04-09 RX ADMIN — THERA TABS SCH EACH: TAB at 08:22

## 2021-04-09 RX ADMIN — BUDESONIDE SCH: 1 SUSPENSION RESPIRATORY (INHALATION) at 21:39

## 2021-04-09 RX ADMIN — IPRATROPIUM BROMIDE AND ALBUTEROL SULFATE SCH: .5; 3 SOLUTION RESPIRATORY (INHALATION) at 21:39

## 2021-04-09 RX ADMIN — SERTRALINE HYDROCHLORIDE SCH MG: 50 TABLET, FILM COATED ORAL at 08:22

## 2021-04-09 RX ADMIN — CLOPIDOGREL BISULFATE SCH MG: 75 TABLET ORAL at 08:22

## 2021-04-09 NOTE — P.PN
Subjective


Progress Note Date: 04/09/21





HISTORY OF PRESENT ILLNESS


This is a 71-year-old male patient of Dr. Bazan and Dr. Pickard with past 

medical history of advanced steroid-dependent COPD, hyperlipidemia, rheumatoid 

arthritis, obstructive sleep apnea unable to tolerate CPAP, chronic hypoxic 

respiratory failure on home O2 at 3 L nasal cannula, generalized osteoarthritis,

chronic back pain pseudomonas in sputum on previous admission secondary to 

colonization or pseudomonal pneumonia.  Known FVC of 72% and FEV1 of 46%.  

History of perforated duodenal ulcer status post exploratory laparotomy with 

repair of perforated duodenal ulcer and repair of incarcerated umbilical hernia.

 Patient states that he has redness of breath but became significantly worse 

yesterday.  He states he recently received medication from Dr. Quiroz that 

seemed to help a little only.  He denies having any fever or chills.  No 

wheezing.  He complains of sputum production.  He denies any known sick 

contacts.  





Patient came into [facility] emergency center for evaluation.  

Patient was afebrile, heart rate in 110, blood pressure 87/51, pulse ox 87% on 5

L nasal cannula.  EKG sinus rhythm with occasional PAC.  Lab work revealed WBC 

19.6, hemoglobin 13.2, platelet count 321.  Sodium 132, potassium 4.4, chloride 

97, CO2 28, BUN 20, creatinine 0.95.  Blood sugar 110.  , CRP 89.2.  BNP 

3170.  Pro-calcitonin 1.11.  Troponins were 0.075, 0.117, 0.103.  Chest x-ray 

shows underlying pulmonary fibrosis.  Increased patchy density however is noted 

at the left lung base and superimposed pneumonia is difficult to exclude.  CTA 

of the chest revealed diffuse COPD with diffuse bilateral infiltrates.  No 

evidence of pulmonary embolism.  Distal branches somewhat limited.  Severe 

compression fractures involving the mid and upper thoracic spine.  Patient was 

started on antibiotics, IV steroids, status post 2 L of IV fluid, admitted to 

the cardiac stepdown unit and consult in place with pulmonary medicine and card

iology.





4/8: His been afebrile, heart rate 82, respiratory rate 24, blood pressure 

99/61, pulse ox 94% on 9 L high flow nasal cannula.  Blood sugars are running 

between 133 and 166.  Blood culture showing no growth at 24 hours however 

patient states received a call yesterday afternoon that there was gram-positive 

cocci in blood culture.  She was given 1 dose of IV vancomycin.  Blood cultures 

are currently showing no growth.  Sputum culture is in progress.  Patient has 

been seen by pulmonary medicine with recommendations for ceftriaxone and 

azithromycin. Patient has been seen by cardiology and started on statin and 

aspirin, plan for stress test as an outpatient.  Echocardiogram is pending.  

Patient's breathing status is stable and improving.  Transition Solu-Medrol to 

40 mg every 8 hours.  Anticipate probable discharge home tomorrow.





4/9: Patient is still on high flow nasal cannula 9 L with pulse ox of 92-96%.  

Will decrease to 6 L and nursing to continue to wean down to his normal home 

dose of 3 L nasal cannula.  Patient denies new complaints.  His breathing status

is improved.  He has been transitioned to Solu-Medrol at 40 mg IV every 8 hours.

 Patient's been afebrile, heart rate 107, blood pressure 116/65.  Blood sugars 

are running between 128 and 170.  Plan is to continue to wean oxygen down to his

baseline and plan for discharge home tomorrow.





REVIEW OF SYSTEMS


Constitutional: No fever, no chills, no night sweats.  No weight change.  No 

weakness, fatigue or lethargy.  No daytime sleepiness.


EENT: No headache.  No blurred vision or double vision, no loss of vision.  No 

loss of Hearing, no ringing in the ears, no dizziness.  No nasal drainage or 

congestion.  No epistaxis.  No sore throat.


Lungs: Reports shortness of breath improving, Reports cough, Reports sputum 

production.  No wheezing.


Cardiovascular: No chest pain, no lower extremity edema.  No palpitations.  No 

paroxysmal nocturnal dyspnea.  No orthopnea.  No lightheadedness or dizziness.  

No syncopal episodes.


Abdominal: No abdominal pain.  No nausea, vomiting.  No diarrhea.  No 

constipation.  No bloody or tarry stools..  No loss of appetite.


Genitourinary: No dysuria, increased frequency, urgency.  No urinary retention.


Musculoskeletal: No myalgias.  No muscle weakness, no gait dysfunction, no 

frequent falls.  No back pain.  No neck pain.


Integumentary: No wounds, no lesions.  No rash or pruritus.  No unusual 

bruising.  No change in hair or nails.


Neurologic: No aphasia. No facial droop. No change in mentation. No head injury.

No headache. No paralysis. No paresthesia.


Psychiatric: No depression.  No anxiety.  No mood swings.


Endocrine: Mildly abnormal blood sugars.  No weight change.   





PHYSICAL EXAMINATION


Gen: This is 71-year-old  male.  Patient is resting in bed appears to 

be comfortable and in no acute distress.


HEENT: Head is atraumatic, normocephalic. Pupils equal, round. Sclerae is 

anicteric. 


NECK: Supple. No JVD. No lymphadenopathy. No thyromegaly. 


LUNGS: Coarse crackles bilaterally.  Mild expiratory wheezing.  No intercostal 

retractions.


HEART: Regular rate and rhythm. No murmur.  Sinus tachycardia.


ABDOMEN: Soft. Bowel sounds are present. No masses.  No tenderness.


EXTREMITIES: No pedal edema.  No calf tenderness.


NEUROLOGICAL: Patient is awake, alert and oriented x3. Cranial nerves 2 through 

12 are grossly intact. 





ASSESSMENT AND PLAN


1.  Acute on chronic hypoxic respiratory failure secondary to COPD exacerbation 

and underlying pulmonary fibrosis with possible pneumonia bilaterally.  Patient 

admitted to the hospital, consult with Dr. Salazar, continue oxygen therapy, 

continue DuoNeb treatments 4 times daily and as needed, azithromycin, 

ceftriaxone 1 g IV piggyback daily, Tessalon Perles, Pulmicort increased to 1 mg

twice daily, ceftriaxone, continue Solu-Medrol 40 mg IV every 8 hours.  





2.  Advanced COPD with pulmonary fibrosis, steroid dependent and chronic hypoxic

and hypercapnic respiratory failure on home O2 at 3 L. Continue as in #1.





3.  Elevated troponins without chest pain.  Cardiology consult.  Echocardiogram 

ordered.





4.  Hypertension.  Continue Toprol-XL 25 mg daily.





5.  Rheumatoid arthritis, stable.





6.  Hyperlipidemia.





7.  Obstructive sleep apnea unable to tolerate CPAP.





8.  Chronic back pain and generalized osteoarthritis.  Continue Norco as needed,

gabapentin 300 mg at bedtime





9.  History of perforated duodenal ulcer status post exploratory laparotomy with

repair of perforated duodenal ulcer and repair of incarcerated umbilical hernia.





10.  Generalized anxiety disorder and recurrent depression.  Continue Zoloft 50 

mg daily, Elavil 10 mg at bedtime. 





11.  DVT prophylaxis.  Heparin subcu.





10.  GI prophylaxis.  Continue Protonix daily.





12.  Insomnia.  Continue Elavil 10 mg at bedtime..





13.  Chronic compression fracture.  Continue current pain management with Norco.





14.  Reported positive blood culture with gram positive cocci.  Patient received

1 dose of IV vancomycin.  Subsequently blood cultures no growth.





DISCHARGE PLAN


Most likely home with [facility] on Saturday.





Impression and plan of care have been directed as dictated by the signing 

physician.  Chloé Maldonado nurse practitioner acting as scribe for signing 

physician.





Objective





- Vital Signs


Vital signs: 


                                   Vital Signs











Temp  97.4 F L  04/09/21 08:20


 


Pulse  77   04/09/21 08:20


 


Resp  20   04/09/21 08:20


 


BP  110/64   04/09/21 08:20


 


Pulse Ox  96   04/09/21 08:20








                                 Intake & Output











 04/08/21 04/09/21 04/09/21





 18:59 06:59 18:59


 


Intake Total 1415  


 


Output Total 1225  350


 


Balance 190  -350


 


Weight 60.5 kg 59.5 kg 


 


Intake:   


 


  Intake, IV Titration 1050  





  Amount   


 


    Sodium Chloride 0.9% 1, 1000  





    000 ml @ 100 mls/hr IV .   





    Q10H IMTIAZ Rx#:[id number]   


 


    cefTRIAXone 1 gm In 50  





    Sodium Chloride 0.9% 50   





    ml @ 100 mls/hr IVPB Q24H   





    IMTIAZ Rx#:[id number]   


 


  Oral 365  


 


Output:   


 


  Urine 1225  350


 


Other:   


 


  Voiding Method Urinal Urinal 


 


  # Voids  1 














- Labs


CBC & Chem 7: 


                                 04/06/21 14:58





                                 04/06/21 14:58


Labs: 


                  Abnormal Lab Results - Last 24 Hours (Table)











  04/08/21 04/08/21 04/08/21 Range/Units





  11:56 16:58 20:50 


 


POC Glucose (mg/dL)  168 H  128 H  129 H  (75-99)  mg/dL














  04/09/21 Range/Units





  06:57 


 


POC Glucose (mg/dL)  138 H  (75-99)  mg/dL








                      Microbiology - Last 24 Hours (Table)











 04/07/21 21:16 Blood Culture - Preliminary





 Blood    No Growth after 24 hours


 


 04/06/21 15:34 Blood Culture - Preliminary





 Blood    No Growth after 48 hours


 


 04/06/21 15:34 Blood Culture - Preliminary





 Blood    No Growth after 48 hours


 


 04/07/21 17:17 Gram Stain - Preliminary





 Sputum Sputum Culture - Preliminary

## 2021-04-09 NOTE — P.PN
Subjective


Progress Note Date: 04/09/21


Principal diagnosis: 





Abnormal cardiac enzymes





This is a pleasant 71-year-old gentleman with known chronic hypoxic respiratory 

failure on oxygen at home continuously as well as obstructive sleep apnea as 

well as multiple comorbid conditions including hypertension and dyslipidemia 

with consulted to see for abnormal cardiac enzymes.  He presented to the 

hospital with increasing shortness of breath with exertion but no symptoms of 

chest pain or chest discomfort.  We felt that the abnormal cardiac enzymes is 

related to type II MI with oxygen demands mismatch.  We decided to treat the 

patient medically.





The patient was seen today reminds 2021.  He is asymptomatic in terms of chest 

pain or chest discomfort and he stated that the shortness of breath has improved

significantly.  Hemodynamically he is stable.  He is on maximize medical 

treatment included antiplatelet as well as high intensity statin as well as beta

blocker.  The echo showed normal LV function was mild aortic stenosis.  From a 

cardiac vascular standpoint of view, potentially can be discharged home in the n

ext 12-24 hours.











Objective





- Vital Signs


Vital signs: 


                                   Vital Signs











Temp  97.4 F L  04/09/21 08:20


 


Pulse  76   04/09/21 11:41


 


Resp  20   04/09/21 08:20


 


BP  110/64   04/09/21 08:20


 


Pulse Ox  96   04/09/21 08:20








                                 Intake & Output











 04/08/21 04/09/21 04/09/21





 18:59 06:59 18:59


 


Intake Total 1415  


 


Output Total 1225  350


 


Balance 190  -350


 


Weight 60.5 kg 59.5 kg 


 


Intake:   


 


  Intake, IV Titration 1050  





  Amount   


 


    Sodium Chloride 0.9% 1, 1000  





    000 ml @ 100 mls/hr IV .   





    Q10H IMTIAZ Rx#:[id number]   


 


    cefTRIAXone 1 gm In 50  





    Sodium Chloride 0.9% 50   





    ml @ 100 mls/hr IVPB Q24H   





    IMTIAZ Rx#:[id number]   


 


  Oral 365  


 


Output:   


 


  Urine 1225  350


 


Other:   


 


  Voiding Method Urinal Urinal Urinal


 


  # Voids  1 














- Constitutional


General appearance: Present: no acute distress





- Respiratory


Respiratory: bilateral: CTA





- Cardiovascular


Rhythm: regular


Heart sounds: normal: S1, S2


Abnormal Heart Sounds: Present: systolic murmur





- Labs


CBC & Chem 7: 


                                 04/06/21 14:58





                                 04/06/21 14:58


Labs: 


                  Abnormal Lab Results - Last 24 Hours (Table)











  04/08/21 04/08/21 04/09/21 Range/Units





  16:58 20:50 06:57 


 


POC Glucose (mg/dL)  128 H  129 H  138 H  (75-99)  mg/dL














  04/09/21 Range/Units





  11:33 


 


POC Glucose (mg/dL)  170 H  (75-99)  mg/dL








                      Microbiology - Last 24 Hours (Table)











 04/07/21 21:16 Blood Culture - Preliminary





 Blood    No Growth after 24 hours


 


 04/06/21 15:34 Blood Culture - Preliminary





 Blood    No Growth after 48 hours


 


 04/06/21 15:34 Blood Culture - Preliminary





 Blood    No Growth after 48 hours














Assessment and Plan


Assessment: 





Assessment


#1 acute on chronic hypoxic respiratory failure


#2 acute exacerbation of COPD


#3 pulmonary fibrosis


#4 abnormal troponin likely to be type II MI


#5 multiple comorbid conditions





Plan


#1 continue the conservative medical approach for the abnormal cardiac enzymes


#2 continue the Plavix as well as a statin as well as metoprolol 


#3 the echo showed normal LV function was mild aortic stenosis


#4 follow-up with the patient and the patient potentially can be discharged 12-

24 hours

## 2021-04-09 NOTE — P.PN
Subjective


Progress Note Date: 04/09/21


Principal diagnosis: 





Acute on chronic hypoxemic respiratory failure secondary to COPD 

exacerbation/pulmonary fibrosis





The patient is a very pleasant 71-year-old gentleman with a long-term history of

COPD and pulmonary fibrosis.  Based on his pulmonary function test in 2017 he ha

s an FVC of 72% and FEV1 of 46% and he has been steroid dependent for the past 

few years taking prednisone 5 milligrams on a daily basis.  He has also 

underlying rheumatoid arthritis and previously he was taking immunosuppression 

with Humira none for now.  His oxygen dependent at 3 L/m per nasal cannula at 

home.  He presented to the emergency room yesterday with complaints of 

increasing fatigue, fever, worsening shortness of breath.  He received one his 

second dose of CoVID vaccine recently. Chest x-ray reveals underlying pulmonary 

fibrosis.  Increase passage density in the left lung base.  CT angiogram 

revealed diffuse COPD with diffuse bilateral infiltrates.  No evidence of 

central pulmonary embolism.  Distal branches difficult to assess.  There is 

multiple severe compression fractures with retropulsion involving the mid and 

thoracic spine.  White count 19.6.  Hemoglobin 13.2.  D-dimer 0.81.  Sodium 132.

 Potassium 4.4.  Creatinine 0.95.  C-reactive protein 89.  .  Troponin 0.

075, 0.117, 0.103.  Corona virus not detected.  Arterial blood gas 700% FiO2 

revealed a P O2 of 91, pCO2 56, pH 7.30.  He is seen today in consultation on 

the selective care unit.  Currently awake and alert in no acute distress.  

Requiring 9 L high flow nasal cannula to maintain O2 saturations in the 90s.  He

is afebrile.  Hemodynamically stable.  He's been initiated on DuoNeb 

inhalations, Pulmicort inhalations, IV Solu-Medrol.  Antibiotics in the form of 

vancomycin and ceftriaxone.  Heparin for DVT prophylaxis.





The patient is seen today 04/08/2021 follow-up on the selective care unit.  He 

is currently sitting up in a chair at the bedside.  Awake and alert in no acute 

distress.  He is down to 6 L high flow nasal cannula to maintain O2 saturations 

in the 90s.  Continue ferrous shortness of breath with minimal exertion, loose 

cough.  Sputum culture pending.  Blood cultures reveal no growth to date.  

Continued on DuoNeb inhalations, IV Solu-Medrol, ceftriaxone and azithromycin.





The patient is seen today 04/09/2021 in follow-up on selective care unit.  He is

currently sitting up in a chair at the bedside.  Awake and alert in no acute 

distress.  He continues to require 9 L high flow nasal cannula to maintain O2 

saturation in the 90s.  Blood and sputum cultures reveal no growth.  Blood 

glucose 170.  He remains on DuoNeb inhalations, Tessalon Perles, IV Solu-Medrol,

ceftriaxone and azithromycin.





Objective





- Vital Signs


Vital signs: 


                                   Vital Signs











Temp  98.3 F   04/09/21 12:45


 


Pulse  92   04/09/21 16:30


 


Resp  22   04/09/21 12:45


 


BP  116/65   04/09/21 12:45


 


Pulse Ox  92 L  04/09/21 12:45








                                 Intake & Output











 04/08/21 04/09/21 04/09/21





 18:59 06:59 18:59


 


Intake Total 1415  240


 


Output Total 1225  350


 


Balance 190  -110


 


Weight 60.5 kg 59.5 kg 


 


Intake:   


 


  Intake, IV Titration 1050  





  Amount   


 


    Sodium Chloride 0.9% 1, 1000  





    000 ml @ 100 mls/hr IV .   





    Q10H IMTIAZ Rx#:[id number]   


 


    cefTRIAXone 1 gm In 50  





    Sodium Chloride 0.9% 50   





    ml @ 100 mls/hr IVPB Q24H   





    IMTIAZ Rx#:[id number]   


 


  Oral 365  240


 


Output:   


 


  Urine 1225  350


 


Other:   


 


  Voiding Method Urinal Urinal Urinal


 


  # Voids  1 














- Exam





GENERAL EXAM: Alert, pleasant 71-year-old gentleman, 9 L nasal cannula, 

comfortable in no apparent distress.


HEAD: Normocephalic.


EYES: Normal reaction of pupils, equal size.


NOSE: Clear with pink turbinates.


THROAT: No erythema or exudates.


NECK: No masses, no JVD.


CHEST: No chest wall deformity.


LUNGS: Equal air entry with coarse crackles in the posterior bases.


CVS: S1 and S2 normal with no audible murmur, regular rhythm.


ABDOMEN: No hepatosplenomegaly, normal bowel sounds, no guarding or rigidity.


SPINE: No scoliosis or deformity


SKIN: No rashes


CENTRAL NERVOUS SYSTEM: No focal deficits, tone is normal in all 4 extremities.


EXTREMITIES: There is no peripheral edema.  No clubbing, no cyanosis.  Perip

heral pulses are intact.





- Labs


CBC & Chem 7: 


                                 04/06/21 14:58





                                 04/06/21 14:58


Labs: 


                  Abnormal Lab Results - Last 24 Hours (Table)











  04/08/21 04/08/21 04/09/21 Range/Units





  16:58 20:50 06:57 


 


POC Glucose (mg/dL)  128 H  129 H  138 H  (75-99)  mg/dL














  04/09/21 Range/Units





  11:33 


 


POC Glucose (mg/dL)  170 H  (75-99)  mg/dL








                      Microbiology - Last 24 Hours (Table)











 04/07/21 21:16 Blood Culture - Preliminary





 Blood    No Growth after 24 hours


 


 04/06/21 15:34 Blood Culture - Preliminary





 Blood    No Growth after 48 hours


 


 04/06/21 15:34 Blood Culture - Preliminary





 Blood    No Growth after 48 hours














Assessment and Plan


Assessment: 





1 Acute on chronic hypoxic respiratory failure secondary to acute exacerbation 

of COPD, suspect underlying community acquired pneumonia, pulmonary fibrosis.  

Corona virus not detected, received second dose of vaccine





2 Chronic hypoxic respiratory failure on home oxygen at 3 L/m, steroid-dependent

5 mg daily





3 Advanced COPD/pulmonary fibrosis





4 Troponin leak, suspect oxygen supply and demand mismatch





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





6 History of rheumatoid arthritis





7 Obstructive sleep apnea, not on CPAP





8 Chronic back pain





9 Hyperlipidemia





10 Osteoarthritis





Plan:





The patient was seen and evaluated by Dr. Salazar


Continue DuoNeb inhalations, Pulmicort inhalations, IV Solu-Medrol


Continue antibiotics


Titrate down the FiO2 as tolerated


We will continue to follow





I, the cosigning physician, performed a history & physical examination of the 

patient. Lungs sounds with coarse crackles in the bilateral bases.  Maintaining 

O2 saturations in the 90s on 9 L/m per nasal cannula.  I discussed the 

assessment and plan of care with my nurse practitioner, Sylwia Flaherty. I attest to 

the above note as dictated by her.

## 2021-04-10 ENCOUNTER — HOSPITAL ENCOUNTER (INPATIENT)
Dept: HOSPITAL 47 - EC | Age: 71
LOS: 3 days | Discharge: HOME HEALTH | DRG: 280 | End: 2021-04-13
Attending: INTERNAL MEDICINE | Admitting: INTERNAL MEDICINE
Payer: MEDICARE

## 2021-04-10 VITALS — DIASTOLIC BLOOD PRESSURE: 71 MMHG | TEMPERATURE: 98.3 F | SYSTOLIC BLOOD PRESSURE: 131 MMHG

## 2021-04-10 VITALS — BODY MASS INDEX: 22.8 KG/M2

## 2021-04-10 VITALS — RESPIRATION RATE: 16 BRPM

## 2021-04-10 VITALS — HEART RATE: 89 BPM

## 2021-04-10 DIAGNOSIS — J44.1: ICD-10-CM

## 2021-04-10 DIAGNOSIS — Z79.899: ICD-10-CM

## 2021-04-10 DIAGNOSIS — I50.33: ICD-10-CM

## 2021-04-10 DIAGNOSIS — R33.9: ICD-10-CM

## 2021-04-10 DIAGNOSIS — Z83.3: ICD-10-CM

## 2021-04-10 DIAGNOSIS — E87.3: ICD-10-CM

## 2021-04-10 DIAGNOSIS — G47.33: ICD-10-CM

## 2021-04-10 DIAGNOSIS — Z20.822: ICD-10-CM

## 2021-04-10 DIAGNOSIS — I11.0: Primary | ICD-10-CM

## 2021-04-10 DIAGNOSIS — Z79.02: ICD-10-CM

## 2021-04-10 DIAGNOSIS — Z80.0: ICD-10-CM

## 2021-04-10 DIAGNOSIS — I27.20: ICD-10-CM

## 2021-04-10 DIAGNOSIS — Z87.11: ICD-10-CM

## 2021-04-10 DIAGNOSIS — J96.21: ICD-10-CM

## 2021-04-10 DIAGNOSIS — I21.A1: ICD-10-CM

## 2021-04-10 DIAGNOSIS — F33.9: ICD-10-CM

## 2021-04-10 DIAGNOSIS — M06.9: ICD-10-CM

## 2021-04-10 DIAGNOSIS — Z79.52: ICD-10-CM

## 2021-04-10 DIAGNOSIS — Z99.81: ICD-10-CM

## 2021-04-10 DIAGNOSIS — J96.22: ICD-10-CM

## 2021-04-10 DIAGNOSIS — E78.5: ICD-10-CM

## 2021-04-10 DIAGNOSIS — Z87.891: ICD-10-CM

## 2021-04-10 DIAGNOSIS — Z79.1: ICD-10-CM

## 2021-04-10 DIAGNOSIS — F41.1: ICD-10-CM

## 2021-04-10 DIAGNOSIS — M19.90: ICD-10-CM

## 2021-04-10 DIAGNOSIS — G89.29: ICD-10-CM

## 2021-04-10 DIAGNOSIS — G47.00: ICD-10-CM

## 2021-04-10 DIAGNOSIS — Z80.6: ICD-10-CM

## 2021-04-10 DIAGNOSIS — J84.10: ICD-10-CM

## 2021-04-10 LAB
ALBUMIN SERPL-MCNC: 3 G/DL (ref 3.5–5)
ALP SERPL-CCNC: 71 U/L (ref 38–126)
ALT SERPL-CCNC: 66 U/L (ref 4–49)
ANION GAP SERPL CALC-SCNC: 4 MMOL/L
APTT BLD: 21.7 SEC (ref 22–30)
AST SERPL-CCNC: 35 U/L (ref 17–59)
BASOPHILS # BLD AUTO: 0 K/UL (ref 0–0.2)
BASOPHILS NFR BLD AUTO: 0 %
BUN SERPL-SCNC: 19 MG/DL (ref 9–20)
CALCIUM SPEC-MCNC: 8.6 MG/DL (ref 8.4–10.2)
CHLORIDE SERPL-SCNC: 95 MMOL/L (ref 98–107)
CO2 SERPL-SCNC: 33 MMOL/L (ref 22–30)
EOSINOPHIL # BLD AUTO: 0 K/UL (ref 0–0.7)
EOSINOPHIL NFR BLD AUTO: 0 %
ERYTHROCYTE [DISTWIDTH] IN BLOOD BY AUTOMATED COUNT: 4.09 M/UL (ref 4.3–5.9)
ERYTHROCYTE [DISTWIDTH] IN BLOOD: 15.2 % (ref 11.5–15.5)
GLUCOSE BLD-MCNC: 122 MG/DL (ref 75–99)
GLUCOSE BLD-MCNC: 140 MG/DL (ref 75–99)
GLUCOSE SERPL-MCNC: 115 MG/DL (ref 74–99)
HCT VFR BLD AUTO: 37.2 % (ref 39–53)
HGB BLD-MCNC: 12.3 GM/DL (ref 13–17.5)
INR PPP: 1 (ref ?–1.2)
LYMPHOCYTES # SPEC AUTO: 1.6 K/UL (ref 1–4.8)
LYMPHOCYTES NFR SPEC AUTO: 20 %
MCH RBC QN AUTO: 30 PG (ref 25–35)
MCHC RBC AUTO-ENTMCNC: 33 G/DL (ref 31–37)
MCV RBC AUTO: 90.8 FL (ref 80–100)
MONOCYTES # BLD AUTO: 0.9 K/UL (ref 0–1)
MONOCYTES NFR BLD AUTO: 11 %
NEUTROPHILS # BLD AUTO: 5 K/UL (ref 1.3–7.7)
NEUTROPHILS NFR BLD AUTO: 65 %
PLATELET # BLD AUTO: 313 K/UL (ref 150–450)
POTASSIUM SERPL-SCNC: 4.2 MMOL/L (ref 3.5–5.1)
PROT SERPL-MCNC: 5.8 G/DL (ref 6.3–8.2)
PT BLD: 10.9 SEC (ref 9–12)
SODIUM SERPL-SCNC: 132 MMOL/L (ref 137–145)
WBC # BLD AUTO: 7.8 K/UL (ref 3.8–10.6)

## 2021-04-10 PROCEDURE — 99285 EMERGENCY DEPT VISIT HI MDM: CPT

## 2021-04-10 PROCEDURE — 94640 AIRWAY INHALATION TREATMENT: CPT

## 2021-04-10 PROCEDURE — 96374 THER/PROPH/DIAG INJ IV PUSH: CPT

## 2021-04-10 PROCEDURE — 85730 THROMBOPLASTIN TIME PARTIAL: CPT

## 2021-04-10 PROCEDURE — 83880 ASSAY OF NATRIURETIC PEPTIDE: CPT

## 2021-04-10 PROCEDURE — 94760 N-INVAS EAR/PLS OXIMETRY 1: CPT

## 2021-04-10 PROCEDURE — 93005 ELECTROCARDIOGRAM TRACING: CPT

## 2021-04-10 PROCEDURE — 84484 ASSAY OF TROPONIN QUANT: CPT

## 2021-04-10 PROCEDURE — 36415 COLL VENOUS BLD VENIPUNCTURE: CPT

## 2021-04-10 PROCEDURE — 85025 COMPLETE CBC W/AUTO DIFF WBC: CPT

## 2021-04-10 PROCEDURE — 87635 SARS-COV-2 COVID-19 AMP PRB: CPT

## 2021-04-10 PROCEDURE — 71046 X-RAY EXAM CHEST 2 VIEWS: CPT

## 2021-04-10 PROCEDURE — 85610 PROTHROMBIN TIME: CPT

## 2021-04-10 PROCEDURE — 80048 BASIC METABOLIC PNL TOTAL CA: CPT

## 2021-04-10 PROCEDURE — 80053 COMPREHEN METABOLIC PANEL: CPT

## 2021-04-10 PROCEDURE — 83605 ASSAY OF LACTIC ACID: CPT

## 2021-04-10 RX ADMIN — AZITHROMYCIN SCH MG: 500 TABLET, FILM COATED ORAL at 08:55

## 2021-04-10 RX ADMIN — HYDROCODONE BITARTRATE AND ACETAMINOPHEN PRN EACH: 10; 325 TABLET ORAL at 09:01

## 2021-04-10 RX ADMIN — THERA TABS SCH EACH: TAB at 08:55

## 2021-04-10 RX ADMIN — IPRATROPIUM BROMIDE AND ALBUTEROL SULFATE SCH ML: .5; 3 SOLUTION RESPIRATORY (INHALATION) at 15:27

## 2021-04-10 RX ADMIN — PANTOPRAZOLE SODIUM SCH MG: 40 TABLET, DELAYED RELEASE ORAL at 06:28

## 2021-04-10 RX ADMIN — METHYLPREDNISOLONE SODIUM SUCCINATE SCH MG: 40 INJECTION, POWDER, FOR SOLUTION INTRAMUSCULAR; INTRAVENOUS at 08:55

## 2021-04-10 RX ADMIN — INSULIN ASPART SCH: 100 INJECTION, SOLUTION INTRAVENOUS; SUBCUTANEOUS at 13:13

## 2021-04-10 RX ADMIN — IPRATROPIUM BROMIDE AND ALBUTEROL SULFATE SCH ML: .5; 3 SOLUTION RESPIRATORY (INHALATION) at 08:59

## 2021-04-10 RX ADMIN — INSULIN ASPART SCH UNIT: 100 INJECTION, SOLUTION INTRAVENOUS; SUBCUTANEOUS at 06:28

## 2021-04-10 RX ADMIN — SERTRALINE HYDROCHLORIDE SCH MG: 50 TABLET, FILM COATED ORAL at 08:54

## 2021-04-10 RX ADMIN — IPRATROPIUM BROMIDE AND ALBUTEROL SULFATE SCH: .5; 3 SOLUTION RESPIRATORY (INHALATION) at 12:33

## 2021-04-10 RX ADMIN — SENNOSIDES SCH MG: 8.6 TABLET, FILM COATED ORAL at 08:55

## 2021-04-10 RX ADMIN — HEPARIN SODIUM SCH UNIT: 5000 INJECTION INTRAVENOUS; SUBCUTANEOUS at 08:55

## 2021-04-10 RX ADMIN — FLUTICASONE PROPIONATE SCH SPRAY: 50 SPRAY, METERED NASAL at 08:55

## 2021-04-10 RX ADMIN — BUDESONIDE SCH MG: 1 SUSPENSION RESPIRATORY (INHALATION) at 08:59

## 2021-04-10 RX ADMIN — CLOPIDOGREL BISULFATE SCH MG: 75 TABLET ORAL at 08:55

## 2021-04-10 RX ADMIN — METOPROLOL SUCCINATE SCH MG: 25 TABLET, EXTENDED RELEASE ORAL at 08:54

## 2021-04-10 RX ADMIN — Medication SCH MCG: at 08:55

## 2021-04-10 NOTE — ED
General Adult HPI





- General


Chief complaint: Shortness of Breath


Stated complaint: has  low  oxygen


Time Seen by Provider: 04/10/21 20:34


Source: patient, family


Mode of arrival: wheelchair


Limitations: physical limitation





- History of Present Illness


Initial comments: 





Patient presents the ED with his son and daughter-in-law for evaluation.  

Patient states that he was discharged home from the hospital a few hours ago 

after being admitted for pneumonia and COPD exacerbation.  Patient states that 

he was on 6 L of oxygen when he was discharged home, and he states that he is 

normally on 3 L of home O2.  Per daughter-in-law, the patient's home oxygen tank

only goes up to 5 L, and she states that his oxygen saturations have been in the

70s to low 90s at best on his home O2.  Patient also admits to feeling more 

dyspneic since being discharged home.  Patient denies having any pain, fever or 

chills, headache, focal neuro deficit, chest pain, palpitations, dizziness, 

abdominal pain, nausea or vomiting, dysuria or urinary symptoms, decreased urine

output, leg or calf swelling or pain, or any other symptoms or complaints.





- Related Data


                                Home Medications











 Medication  Instructions  Recorded  Confirmed


 


Albuterol Inhaler [Ventolin Hfa 2 puff INHALATION RT-QID PRN 05/12/20 04/10/21





Inhaler]   


 


Cholecalciferol [Vitamin D3 (25 50 mcg PO DAILY 05/12/20 04/10/21





Mcg = 1000 Iu)]   


 


HYDROcodone/APAP 10-325MG [Norco 1 tab PO TID PRN 05/12/20 04/10/21





]   


 


Metoprolol Succinate [Toprol XL] 25 mg PO DAILY 05/12/20 04/10/21


 


Multivitamins, Thera [Multivitamin 1 tab PO DAILY 05/12/20 04/10/21





(formulary)]   


 


Omeprazole [PriLOSEC] 40 mg PO DAILY 05/12/20 04/10/21


 


Sertraline [Zoloft] 150 mg PO DAILY 05/12/20 04/10/21


 


Amitriptyline HCl [Elavil] 10 mg PO HS 04/06/21 04/10/21


 


Benzonatate [Tessalon Perles] 200 mg PO TID PRN 04/06/21 04/10/21


 


Celecoxib [CeleBREX] 200 mg PO DAILY 04/06/21 04/10/21


 


Gabapentin [Neurontin] 300 mg PO HS 04/06/21 04/10/21


 


predniSONE 5 mg PO Q48H 04/06/21 04/10/21


 


Alendronate Sodium 70 mg PO MO 04/10/21 04/10/21


 


Ascorbic Acid [Vitamin C] 1,000 mg PO DAILY 04/10/21 04/10/21


 


Fluticasone/Umeclidin/Vilanter 1 puff INHALATION RT-DAILY 04/10/21 04/10/21





[Trelegy Ellipta 100-62.5-25]   


 


Ipratropium-Albuterol Nebulize 3 ml INHALATION RT-QID 04/10/21 04/10/21





[Duoneb 0.5 mg-3 mg/3 ml Soln]   


 


Melatonin 3 mg PO HS PRN 04/10/21 04/10/21


 


predniSONE [Deltasone] See Taper PO AS DIRECTED 04/10/21 04/10/21








                                  Previous Rx's











 Medication  Instructions  Recorded


 


Atorvastatin [Lipitor] 40 mg PO HS #30 tab 04/10/21


 


Azithromycin [Zithromax] 500 mg PO DAILY #3 tab 04/10/21


 


Budesonide [Pulmicort] 1 mg INHALATION RT-BID #60 04/10/21





 inhalation 


 


Clopidogrel [Plavix] 75 mg PO DAILY #30 tab 04/10/21


 


Fluticasone Nasal Spray [Flonase 2 spray EA NOSTRIL DAILY #1 spr 04/10/21





Nasal Spray]  











                                    Allergies











Allergy/AdvReac Type Severity Reaction Status Date / Time


 


amoxicillin AdvReac  Nausea & Verified 04/10/21 22:43





   Vomiting  














Review of Systems


ROS Statement: 


Those systems with pertinent positive or pertinent negative responses have been 

documented in the HPI.





ROS Other: All systems not noted in ROS Statement are negative.





Past Medical History


Past Medical History: COPD, Hyperlipidemia, Osteoarthritis (OA), Pneumonia, 

Rheumatoid Arthritis (RA), Sleep Apnea/CPAP/BIPAP


Additional Past Medical History / Comment(s): COPD, chronic hypoxic respiratory 

failure previous history of pneumoperitoneum related to a perforated duodenal 

ulcer, repair of an umbilical hernia, chronic back pain,


History of Any Multi-Drug Resistant Organisms: None Reported


Past Surgical History: Orthopedic Surgery


Additional Past Surgical History / Comment(s): colonoscopy, repair of a 

perforated duodenal ulcer and repair of an incarcerated umbilical hernia


Past Anesthesia/Blood Transfusion Reactions: No Reported Reaction


Past Psychological History: Anxiety, Depression


Smoking Status: Former smoker


Past Alcohol Use History: Daily


Past Drug Use History: None Reported





- Past Family History


  ** Father


Family Medical History: Cancer


Additional Family Medical History / Comment(s): Father  of leukemia.





  ** Mother


Family Medical History: Cancer, CVA/TIA, Dementia, Diabetes Mellitus


Additional Family Medical History / Comment(s): Mother is 88yrs old with history

of dementia and colon cancer.





  ** Sister(s)


Additional Family Medical History / Comment(s): Patient 3 sons with no major 

medical problems.





General Exam


Limitations: physical limitation


General appearance: alert


Head exam: Present: atraumatic, normocephalic


Eye exam: Present: normal appearance, EOMI


ENT exam: Present: mucous membranes moist


Neck exam: Present: other (Trachea is in midline)


Respiratory exam: Present: respiratory distress, rhonchi, other (Equal breath 

sounds bilaterally).  Absent: stridor


Cardiovascular Exam: Present: regular rate, normal rhythm, normal heart sounds, 

other (Normal radial pulses bilaterally)


GI/Abdominal exam: Present: soft.  Absent: distended, tenderness, guarding


Extremities exam: Present: other (Negative Homans sign bilaterally).  Absent: 

tenderness, pedal edema, calf tenderness


Neurological exam: Present: alert, oriented X3.  Absent: motor sensory deficit


Psychiatric exam: Present: normal affect, normal mood


Skin exam: Present: warm, dry, intact, normal color





Course


                                   Vital Signs











  04/10/21





  20:26


 


Temperature 98.9 F


 


Pulse Rate 58 L


 


Respiratory 26 H





Rate 


 


Blood Pressure 124/74


 


O2 Sat by Pulse 96





Oximetry 














- Reevaluation(s)


Reevaluation #1: 


04/10/21 23:14


Patient denies development of any new symptoms while in the ED.  Patient is 

aware of his test results, and he agrees with hospital admission at this time.


04/10/21 23:35


Case, H&P, test results and ED management were discussed with Dr. Bazan.  She 

accepts hospital admission.  She requests obtaining a postvoid residual.  She 

agrees with cardiology and pulmonology consultation.  She has no further 

recommendations at this time.





EKG Findings





- EKG Comments:


EKG Findings:: Normal sinus rhythm, ventricular rate of 89 bpm, occasional 

premature atrial complexes, normal UT and QRS intervals, normal QT interval, 

normal axis, no ST or T-wave abnormality





Medical Decision Making





- Medical Decision Making





Patient was discharged home earlier today with an oxygen requirement of 6 L, and

patient's family reports that his O2 tank at home only goes up to 5 L.  Patient 

also reports having increasing dyspnea since being discharged home.  I suspect 

this is likely secondary to his underlying lung disease, as well as CHF given 

his laboratory and chest x-ray findings.  Patient is afebrile and without 

leukocytosis.  Patient was given a dose of IV Lasix in the ED.  Dr. Bazan has 

accepted hospital admission.





- Lab Data


Result diagrams: 


                                 04/10/21 21:10





                                 04/10/21 21:18


                                   Lab Results











  04/10/21 04/10/21 04/10/21 Range/Units





  21:10 21:10 21:18 


 


WBC  7.8    (3.8-10.6)  k/uL


 


RBC  4.09 L    (4.30-5.90)  m/uL


 


Hgb  12.3 L    (13.0-17.5)  gm/dL


 


Hct  37.2 L    (39.0-53.0)  %


 


MCV  90.8    (80.0-100.0)  fL


 


MCH  30.0    (25.0-35.0)  pg


 


MCHC  33.0    (31.0-37.0)  g/dL


 


RDW  15.2    (11.5-15.5)  %


 


Plt Count  313    (150-450)  k/uL


 


MPV  7.0    


 


Neutrophils %  65    %


 


Lymphocytes %  20    %


 


Monocytes %  11    %


 


Eosinophils %  0    %


 


Basophils %  0    %


 


Neutrophils #  5.0    (1.3-7.7)  k/uL


 


Lymphocytes #  1.6    (1.0-4.8)  k/uL


 


Monocytes #  0.9    (0-1.0)  k/uL


 


Eosinophils #  0.0    (0-0.7)  k/uL


 


Basophils #  0.0    (0-0.2)  k/uL


 


PT     (9.0-12.0)  sec


 


INR     (<1.2)  


 


APTT     (22.0-30.0)  sec


 


Sodium    132 L  (137-145)  mmol/L


 


Potassium    4.2  (3.5-5.1)  mmol/L


 


Chloride    95 L  ()  mmol/L


 


Carbon Dioxide    33 H  (22-30)  mmol/L


 


Anion Gap    4  mmol/L


 


BUN    19  (9-20)  mg/dL


 


Creatinine    0.56 L  (0.66-1.25)  mg/dL


 


Est GFR (CKD-EPI)AfAm    >90  (>60 ml/min/1.73 sqM)  


 


Est GFR (CKD-EPI)NonAf    >90  (>60 ml/min/1.73 sqM)  


 


Glucose    115 H  (74-99)  mg/dL


 


Plasma Lactic Acid José Miguel     (0.7-2.0)  mmol/L


 


Calcium    8.6  (8.4-10.2)  mg/dL


 


Total Bilirubin    0.5  (0.2-1.3)  mg/dL


 


AST    35  (17-59)  U/L


 


ALT    66 H  (4-49)  U/L


 


Alkaline Phosphatase    71  ()  U/L


 


Troponin I     (0.000-0.034)  ng/mL


 


NT-Pro-B Natriuret Pep   50260   pg/mL


 


Total Protein    5.8 L  (6.3-8.2)  g/dL


 


Albumin    3.0 L  (3.5-5.0)  g/dL














  04/10/21 04/10/21 04/10/21 Range/Units





  21:18 21:18 21:34 


 


WBC     (3.8-10.6)  k/uL


 


RBC     (4.30-5.90)  m/uL


 


Hgb     (13.0-17.5)  gm/dL


 


Hct     (39.0-53.0)  %


 


MCV     (80.0-100.0)  fL


 


MCH     (25.0-35.0)  pg


 


MCHC     (31.0-37.0)  g/dL


 


RDW     (11.5-15.5)  %


 


Plt Count     (150-450)  k/uL


 


MPV     


 


Neutrophils %     %


 


Lymphocytes %     %


 


Monocytes %     %


 


Eosinophils %     %


 


Basophils %     %


 


Neutrophils #     (1.3-7.7)  k/uL


 


Lymphocytes #     (1.0-4.8)  k/uL


 


Monocytes #     (0-1.0)  k/uL


 


Eosinophils #     (0-0.7)  k/uL


 


Basophils #     (0-0.2)  k/uL


 


PT    10.9  (9.0-12.0)  sec


 


INR    1.0  (<1.2)  


 


APTT    21.7 L  (22.0-30.0)  sec


 


Sodium     (137-145)  mmol/L


 


Potassium     (3.5-5.1)  mmol/L


 


Chloride     ()  mmol/L


 


Carbon Dioxide     (22-30)  mmol/L


 


Anion Gap     mmol/L


 


BUN     (9-20)  mg/dL


 


Creatinine     (0.66-1.25)  mg/dL


 


Est GFR (CKD-EPI)AfAm     (>60 ml/min/1.73 sqM)  


 


Est GFR (CKD-EPI)NonAf     (>60 ml/min/1.73 sqM)  


 


Glucose     (74-99)  mg/dL


 


Plasma Lactic Acid José Miguel  1.0    (0.7-2.0)  mmol/L


 


Calcium     (8.4-10.2)  mg/dL


 


Total Bilirubin     (0.2-1.3)  mg/dL


 


AST     (17-59)  U/L


 


ALT     (4-49)  U/L


 


Alkaline Phosphatase     ()  U/L


 


Troponin I   0.023   (0.000-0.034)  ng/mL


 


NT-Pro-B Natriuret Pep     pg/mL


 


Total Protein     (6.3-8.2)  g/dL


 


Albumin     (3.5-5.0)  g/dL














- Radiology Data


Radiology results: report reviewed (Chest x-ray: Extensive pulmonary 

interstitial fibrosis, there is increasing infiltrates at the bases with right 

pleural effusion compared to old exam, congestive heart failure is possible )





Disposition


Clinical Impression: 


 Dyspnea, Hypoxia, CHF (congestive heart failure), Pulmonary fibrosis





Disposition: ADMITTED AS IP TO THIS HOSP


Condition: Stable


Is patient prescribed a controlled substance at d/c from ED?: No


Time of Disposition: 23:35

## 2021-04-10 NOTE — P.PN
Subjective


Progress Note Date: 04/10/21


Principal diagnosis: 





Acute on chronic hypoxemic respiratory failure secondary to COPD 

exacerbation/pulmonary fibrosis





The patient is a very pleasant 71-year-old gentleman with a long-term history of

COPD and pulmonary fibrosis.  Based on his pulmonary function test in 2017 he ha

s an FVC of 72% and FEV1 of 46% and he has been steroid dependent for the past 

few years taking prednisone 5 milligrams on a daily basis.  He has also 

underlying rheumatoid arthritis and previously he was taking immunosuppression 

with Humira none for now.  His oxygen dependent at 3 L/m per nasal cannula at 

home.  He presented to the emergency room yesterday with complaints of 

increasing fatigue, fever, worsening shortness of breath.  He received one his 

second dose of CoVID vaccine recently. Chest x-ray reveals underlying pulmonary 

fibrosis.  Increase passage density in the left lung base.  CT angiogram 

revealed diffuse COPD with diffuse bilateral infiltrates.  No evidence of 

central pulmonary embolism.  Distal branches difficult to assess.  There is 

multiple severe compression fractures with retropulsion involving the mid and 

thoracic spine.  White count 19.6.  Hemoglobin 13.2.  D-dimer 0.81.  Sodium 132.

 Potassium 4.4.  Creatinine 0.95.  C-reactive protein 89.  .  Troponin 0.

075, 0.117, 0.103.  Corona virus not detected.  Arterial blood gas 700% FiO2 

revealed a P O2 of 91, pCO2 56, pH 7.30.  He is seen today in consultation on 

the selective care unit.  Currently awake and alert in no acute distress.  

Requiring 9 L high flow nasal cannula to maintain O2 saturations in the 90s.  He

is afebrile.  Hemodynamically stable.  He's been initiated on DuoNeb 

inhalations, Pulmicort inhalations, IV Solu-Medrol.  Antibiotics in the form of 

vancomycin and ceftriaxone.  Heparin for DVT prophylaxis.





The patient is seen today 04/08/2021 follow-up on the selective care unit.  He 

is currently sitting up in a chair at the bedside.  Awake and alert in no acute 

distress.  He is down to 6 L high flow nasal cannula to maintain O2 saturations 

in the 90s.  Continue ferrous shortness of breath with minimal exertion, loose 

cough.  Sputum culture pending.  Blood cultures reveal no growth to date.  

Continued on DuoNeb inhalations, IV Solu-Medrol, ceftriaxone and azithromycin.





The patient is seen today 04/09/2021 in follow-up on selective care unit.  He is

currently sitting up in a chair at the bedside.  Awake and alert in no acute 

distress.  He continues to require 9 L high flow nasal cannula to maintain O2 

saturation in the 90s.  Blood and sputum cultures reveal no growth.  Blood 

glucose 170.  He remains on DuoNeb inhalations, Tessalon Perles, IV Solu-Medrol,

ceftriaxone and azithromycin.





The patient is seen today 04/10/2021 follow-up on the selective care unit.  He 

is currently sitting up at the bedside.  Awake and alert in no acute distress.  

He is down to 6 L high flow nasal cannula.  He is breathing quite a bit better. 

Nearly back to his baseline.  Anxious to go home.  Blood culture revealed no 

growth.  Sputum culture positive for gram-negative bacilli.  Blood glucose 122. 

He remains on DuoNeb inhalations, Tessalon Perles, Pulmicort inhalations, IV 

Solu-Medrol.  Antibiotics in the form of azithromycin and ceftriaxone.





Objective





- Vital Signs


Vital signs: 


                                   Vital Signs











Temp  98.3 F   04/10/21 08:00


 


Pulse  90   04/10/21 15:29


 


Resp  16   04/10/21 04:00


 


BP  131/71   04/10/21 08:00


 


Pulse Ox  96   04/10/21 08:00








                                 Intake & Output











 04/09/21 04/10/21 04/10/21





 18:59 06:59 18:59


 


Intake Total 420 868 240


 


Output Total 350 950 350


 


Balance 70 -82 -110


 


Weight  61.4 kg 


 


Intake:   


 


  Oral 420 868 240


 


Output:   


 


  Urine 350 950 350


 


  Stool 0  


 


Other:   


 


  Voiding Method Urinal Urinal 


 


  # Voids 0 1 


 


  # Bowel Movements 0 0 














- Exam





GENERAL EXAM: Alert, pleasant 71-year-old gentleman, 6 L nasal cannula, 

comfortable in no apparent distress.


HEAD: Normocephalic.


EYES: Normal reaction of pupils, equal size.


NOSE: Clear with pink turbinates.


THROAT: No erythema or exudates.


NECK: No masses, no JVD.


CHEST: No chest wall deformity.


LUNGS: Equal air entry with coarse crackles in the posterior bases.


CVS: S1 and S2 normal with no audible murmur, regular rhythm.


ABDOMEN: No hepatosplenomegaly, normal bowel sounds, no guarding or rigidity.


SPINE: No scoliosis or deformity


SKIN: No rashes


CENTRAL NERVOUS SYSTEM: No focal deficits, tone is normal in all 4 extremities.


EXTREMITIES: There is no peripheral edema.  No clubbing, no cyanosis.  

Peripheral pulses are intact.





- Labs


CBC & Chem 7: 


                                 04/06/21 14:58





                                 04/06/21 14:58


Labs: 


                  Abnormal Lab Results - Last 24 Hours (Table)











  04/09/21 04/10/21 04/10/21 Range/Units





  20:44 06:14 11:52 


 


POC Glucose (mg/dL)  106 H  140 H  122 H  (75-99)  mg/dL








                      Microbiology - Last 24 Hours (Table)











 04/07/21 17:17 Gram Stain - Preliminary





 Sputum Sputum Culture - Preliminary





    Gram Neg Bacilli


 


 04/07/21 21:16 Blood Culture - Preliminary





 Blood    No Growth after 48 hours


 


 04/06/21 15:34 Blood Culture - Preliminary





 Blood    No Growth after 72 hours


 


 04/06/21 15:34 Blood Culture - Preliminary





 Blood    No Growth after 72 hours














Assessment and Plan


Assessment: 





1 Acute on chronic hypoxic respiratory failure secondary to acute exacerbation 

of COPD, suspect underlying community acquired pneumonia, pulmonary fibrosis.  

Corona virus not detected, received second dose of vaccine





2 Chronic hypoxic respiratory failure on home oxygen at 3 L/m, steroid-dependent

5 mg daily





3 Advanced COPD/pulmonary fibrosis





4 Troponin leak, suspect oxygen supply and demand mismatch





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





6 History of rheumatoid arthritis





7 Obstructive sleep apnea, not on CPAP





8 Chronic back pain





9 Hyperlipidemia





10 Osteoarthritis





Plan:





The patient was seen and evaluated by Dr. Salazar


Cleared for discharge from the pulmonary standpoint


Complete a prednisone taper


Continue home bronchodilators


Add Symbicort


Continue home oxygen therapy


Follow-up in the office in 1-2 weeks'





I, the cosigning physician, performed a history & physical examination of the 

patient. Lungs sounds with coarse crackles in the bilateral bases.  Maintaining 

O2 saturations in the 90s on 6 L/m per nasal cannula.  I discussed the 

assessment and plan of care with my nurse practitioner, Sylwia Flaherty. I attest to 

the above note as dictated by her.

## 2021-04-10 NOTE — XR
EXAMINATION TYPE: XR chest 2V

 

DATE OF EXAM: 4/10/2021

 

COMPARISON: 4/6/2021

 

HISTORY: Pneumonia. Difficulty breathing.

 

TECHNIQUE: 2 views

 

FINDINGS: There is extensive coarse interstitial infiltrate throughout the lungs. Heart is enlarged. 
There is mild blunting of the right costophrenic angle. Thoracic aorta is atheromatous.

 

IMPRESSION: Extensive pulmonary interstitial fibrosis. There is increasing infiltrate at the lung bas
es with right pleural effusion compared to old exam and also 6/20/2019 exam. Congestive heart failure
 is possible.

## 2021-04-10 NOTE — P.DS
Providers


Date of admission: 


04/06/21 18:35





Expected date of discharge: 04/10/21


Attending physician: 


Esteban Ramos





Consults: 





                                        





04/06/21 18:35


Consult Physician Routine 


   Consulting Provider: London Salazar


   Consult Reason/Comments: COPD, Pneumonia


   Do you want consulting provider notified?: Yes





04/07/21 10:54


Consult Physician Routine 


   Consulting Provider: Trip Aguayo


   Consult Reason/Comments: elevated troponins


   Do you want consulting provider notified?: Yes











Primary care physician: 


Amalia Bazan





[facility] Course: 





HISTORY OF PRESENT ILLNESS


This is a 71-year-old male patient of Dr. Bazan and Dr. Pickard with past 

medical history of advanced steroid-dependent COPD, hyperlipidemia, rheumatoid 

arthritis, obstructive sleep apnea unable to tolerate CPAP, chronic hypoxic 

respiratory failure on home O2 at 3 L nasal cannula, generalized osteoarthritis,

chronic back pain pseudomonas in sputum on previous admission secondary to 

colonization or pseudomonal pneumonia.  Known FVC of 72% and FEV1 of 46%.  

History of perforated duodenal ulcer status post exploratory laparotomy with 

repair of perforated duodenal ulcer and repair of incarcerated umbilical hernia.

 Patient states that he has redness of breath but became significantly worse 

yesterday.  He states he recently received medication from Dr. Quiroz that 

seemed to help a little only.  He denies having any fever or chills.  No 

wheezing.  He complains of sputum production.  He denies any known sick 

contacts.  





Patient came into [facility] emergency center for evaluation.  

Patient was afebrile, heart rate in 110, blood pressure 87/51, pulse ox 87% on 5

L nasal cannula.  EKG sinus rhythm with occasional PAC.  Lab work revealed WBC 

19.6, hemoglobin 13.2, platelet count 321.  Sodium 132, potassium 4.4, chloride 

97, CO2 28, BUN 20, creatinine 0.95.  Blood sugar 110.  , CRP 89.2.  BNP 

3170.  Pro-calcitonin 1.11.  Troponins were 0.075, 0.117, 0.103.  Chest x-ray sh

ows underlying pulmonary fibrosis.  Increased patchy density however is noted at

the left lung base and superimposed pneumonia is difficult to exclude.  CTA of 

the chest revealed diffuse COPD with diffuse bilateral infiltrates.  No evidence

of pulmonary embolism.  Distal branches somewhat limited.  Severe compression 

fractures involving the mid and upper thoracic spine.  Patient was started on 

antibiotics, IV steroids, status post 2 L of IV fluid, admitted to the cardiac 

stepdown unit and consult in place with pulmonary medicine and cardiology.





4/8: His been afebrile, heart rate 82, respiratory rate 24, blood pressure 

99/61, pulse ox 94% on 9 L high flow nasal cannula.  Blood sugars are running 

between 133 and 166.  Blood culture showing no growth at 24 hours however 

patient states received a call yesterday afternoon that there was gram-positive 

cocci in blood culture.  She was given 1 dose of IV vancomycin.  Blood cultures 

are currently showing no growth.  Sputum culture is in progress.  Patient has 

been seen by pulmonary medicine with recommendations for ceftriaxone and 

azithromycin. Patient has been seen by cardiology and started on statin and 

aspirin, plan for stress test as an outpatient.  Echocardiogram is pending.  

Patient's breathing status is stable and improving.  Transition Solu-Medrol to 

40 mg every 8 hours.  Anticipate probable discharge home tomorrow.





4/9: Patient is still on high flow nasal cannula 9 L with pulse ox of 92-96%.  

Will decrease to 6 L and nursing to continue to wean down to his normal home 

dose of 3 L nasal cannula.  Patient denies new complaints.  His breathing status

is improved.  He has been transitioned to Solu-Medrol at 40 mg IV every 8 hours.

 Patient's been afebrile, heart rate 107, blood pressure 116/65.  Blood sugars 

are running between 128 and 170.  Plan is to continue to wean oxygen down to his

baseline and plan for discharge home tomorrow.





4/10: Patient is currently on 6 L nasal cannula.  Patient denies having any 

shortness of breath.  He does have home oxygen at 3 L.  He has been afebrile, 

heart rate in the 70s, blood pressure 131/71.  Patient has no respiratory 

distress at rest.  Lung sounds are improving.  Echocardiogram reveals EF of 55-

60% with mild concentric left ventricular hypertrophy, mild aortic valve 

sclerosis, trace aortic regurgitation, mild mitral regurgitation, mild tricuspid

regurgitation, moderate pulmonary hypertension.  Patient will be discharged home

today in stable condition and follow up in the office in the next 3-5 days








ASSESSMENT AND PLAN


1.  Acute on chronic hypoxic respiratory failure secondary to COPD exacerbation 

and underlying pulmonary fibrosis with possible pneumonia bilaterally. 


2.  Advanced COPD with pulmonary fibrosis, steroid dependent and chronic hypoxic

and hypercapnic respiratory failure.


3.  Elevated troponins likely type II MI.  


4.  Hypertension.  


5.  Rheumatoid arthritis, stable.


6.  Hyperlipidemia.


7.  Obstructive sleep apnea unable to tolerate CPAP.


8.  Chronic back pain and generalized osteoarthritis. 


9.  History of perforated duodenal ulcer status post exploratory laparotomy with

repair of perforated duodenal ulcer and repair of incarcerated umbilical hernia.


10.  Generalized anxiety disorder and recurrent depression. 


11.  Insomnia.  


13.  Chronic compression fracture. 


14.  Bacteremia ruled out  





DISCHARGE PLAN


Home with [facility]





Impression and plan of care have been directed as dictated by the signing 

physician.  Chloé Maldonado nurse practitioner acting as scribe for signing 

physician.





Patient Condition at Discharge: Good





Plan - Discharge Summary


Discharge Rx Participant: No


New Discharge Prescriptions: 


New


   predniSONE [Deltasone] 0 mg PO AS DIRECTED #15 tab


   Fluticasone Nasal Spray [Flonase Nasal Spray] 2 spray EA NOSTRIL DAILY #1 spr


   Azithromycin [Zithromax] 500 mg PO DAILY #3 tab


   Atorvastatin [Lipitor] 40 mg PO HS #30 tab


   Clopidogrel [Plavix] 75 mg PO DAILY #30 tab


   Budesonide [Pulmicort] 1 mg INHALATION RT-BID #60 inhalation





Continue


   Cholecalciferol [Vitamin D3 (25 Mcg = 1000 Iu)] 1,000 unit PO DAILY


   Albuterol Inhaler [Ventolin Hfa Inhaler] 2 puff INHALATION RT-QID PRN


     PRN Reason: Shortness Of Breath


   Omeprazole [PriLOSEC] 40 mg PO DAILY


   Metoprolol Succinate [Toprol XL] 25 mg PO DAILY


   Sertraline [Zoloft] 150 mg PO DAILY


   Multivitamins, Thera [Multivitamin (formulary)] 1 tab PO DAILY


   HYDROcodone/APAP 10-325MG [Norco ] 1 tab PO TID PRN


     PRN Reason: Pain


   Benzonatate [Tessalon Perles] 100 - 200 mg PO TID PRN


     PRN Reason: Cough


   predniSONE 5 mg PO Q48H


   Celecoxib [CeleBREX] 200 mg PO DAILY PRN


     PRN Reason: Pain


   Amitriptyline HCl [Elavil] 10 mg PO HS


   Gabapentin [Neurontin] 300 mg PO HS


Discharge Medication List





Albuterol Inhaler [Ventolin Hfa Inhaler] 2 puff INHALATION RT-QID PRN 05/12/20 

[History]


Cholecalciferol [Vitamin D3 (25 Mcg = 1000 Iu)] 1,000 unit PO DAILY 05/12/20 

[History]


HYDROcodone/APAP 10-325MG [Norco ] 1 tab PO TID PRN 05/12/20 [History]


Metoprolol Succinate [Toprol XL] 25 mg PO DAILY 05/12/20 [History]


Multivitamins, Thera [Multivitamin (formulary)] 1 tab PO DAILY 05/12/20 

[History]


Omeprazole [PriLOSEC] 40 mg PO DAILY 05/12/20 [History]


Sertraline [Zoloft] 150 mg PO DAILY 05/12/20 [History]


Amitriptyline HCl [Elavil] 10 mg PO HS 04/06/21 [History]


Benzonatate [Tessalon Perles] 100 - 200 mg PO TID PRN 04/06/21 [History]


Celecoxib [CeleBREX] 200 mg PO DAILY PRN 04/06/21 [History]


Gabapentin [Neurontin] 300 mg PO HS 04/06/21 [History]


predniSONE 5 mg PO Q48H 04/06/21 [History]


Atorvastatin [Lipitor] 40 mg PO HS #30 tab 04/10/21 [Rx]


Azithromycin [Zithromax] 500 mg PO DAILY #3 tab 04/10/21 [Rx]


Budesonide [Pulmicort] 1 mg INHALATION RT-BID #60 inhalation 04/10/21 [Rx]


Clopidogrel [Plavix] 75 mg PO DAILY #30 tab 04/10/21 [Rx]


Fluticasone Nasal Spray [Flonase Nasal Spray] 2 spray EA NOSTRIL DAILY #1 spr 

04/10/21 [Rx]


predniSONE [Deltasone] 0 mg PO AS DIRECTED #15 tab 04/10/21 [Rx]








Follow up Appointment(s)/Referral(s): 


Amalia Bazan MD [Primary Care Provider] - 3 Days


Kalen Crawford MD [STAFF PHYSICIAN] - 2 Weeks


[facility] Visiting Nurse, [NON-STAFF] - 


Discharge Disposition: HOME WITH HOME HEALTH SERVICES

## 2021-04-11 LAB
ALBUMIN SERPL-MCNC: 2.9 G/DL (ref 3.5–5)
ALP SERPL-CCNC: 75 U/L (ref 38–126)
ALT SERPL-CCNC: 68 U/L (ref 4–49)
ANION GAP SERPL CALC-SCNC: 3 MMOL/L
AST SERPL-CCNC: 34 U/L (ref 17–59)
BASOPHILS # BLD AUTO: 0.1 K/UL (ref 0–0.2)
BASOPHILS NFR BLD AUTO: 1 %
BUN SERPL-SCNC: 17 MG/DL (ref 9–20)
CALCIUM SPEC-MCNC: 8.3 MG/DL (ref 8.4–10.2)
CHLORIDE SERPL-SCNC: 91 MMOL/L (ref 98–107)
CO2 SERPL-SCNC: 38 MMOL/L (ref 22–30)
EOSINOPHIL # BLD AUTO: 0.1 K/UL (ref 0–0.7)
EOSINOPHIL NFR BLD AUTO: 1 %
ERYTHROCYTE [DISTWIDTH] IN BLOOD BY AUTOMATED COUNT: 4.39 M/UL (ref 4.3–5.9)
ERYTHROCYTE [DISTWIDTH] IN BLOOD: 15.6 % (ref 11.5–15.5)
GLUCOSE SERPL-MCNC: 89 MG/DL (ref 74–99)
HCT VFR BLD AUTO: 40.3 % (ref 39–53)
HGB BLD-MCNC: 12.6 GM/DL (ref 13–17.5)
LYMPHOCYTES # SPEC AUTO: 3.3 K/UL (ref 1–4.8)
LYMPHOCYTES NFR SPEC AUTO: 29 %
MCH RBC QN AUTO: 28.7 PG (ref 25–35)
MCHC RBC AUTO-ENTMCNC: 31.2 G/DL (ref 31–37)
MCV RBC AUTO: 91.8 FL (ref 80–100)
MONOCYTES # BLD AUTO: 0.7 K/UL (ref 0–1)
MONOCYTES NFR BLD AUTO: 6 %
NEUTROPHILS # BLD AUTO: 7.2 K/UL (ref 1.3–7.7)
NEUTROPHILS NFR BLD AUTO: 62 %
PLATELET # BLD AUTO: 363 K/UL (ref 150–450)
POTASSIUM SERPL-SCNC: 4 MMOL/L (ref 3.5–5.1)
PROT SERPL-MCNC: 5.6 G/DL (ref 6.3–8.2)
SODIUM SERPL-SCNC: 132 MMOL/L (ref 137–145)
WBC # BLD AUTO: 11.6 K/UL (ref 3.8–10.6)

## 2021-04-11 RX ADMIN — GABAPENTIN SCH MG: 300 CAPSULE ORAL at 20:35

## 2021-04-11 RX ADMIN — CLOPIDOGREL BISULFATE SCH MG: 75 TABLET ORAL at 09:44

## 2021-04-11 RX ADMIN — HYDROCODONE BITARTRATE AND ACETAMINOPHEN PRN EACH: 10; 325 TABLET ORAL at 20:34

## 2021-04-11 RX ADMIN — IPRATROPIUM BROMIDE AND ALBUTEROL SULFATE SCH ML: .5; 3 SOLUTION RESPIRATORY (INHALATION) at 20:10

## 2021-04-11 RX ADMIN — PANTOPRAZOLE SODIUM SCH MG: 40 TABLET, DELAYED RELEASE ORAL at 09:44

## 2021-04-11 RX ADMIN — FORMOTEROL FUMARATE DIHYDRATE SCH MCG: 20 SOLUTION RESPIRATORY (INHALATION) at 20:10

## 2021-04-11 RX ADMIN — SERTRALINE HYDROCHLORIDE SCH MG: 50 TABLET, FILM COATED ORAL at 09:43

## 2021-04-11 RX ADMIN — IPRATROPIUM BROMIDE AND ALBUTEROL SULFATE SCH ML: .5; 3 SOLUTION RESPIRATORY (INHALATION) at 15:09

## 2021-04-11 RX ADMIN — BUDESONIDE SCH MG: 1 SUSPENSION RESPIRATORY (INHALATION) at 20:10

## 2021-04-11 RX ADMIN — Medication SCH MCG: at 09:43

## 2021-04-11 RX ADMIN — METOPROLOL SUCCINATE SCH MG: 25 TABLET, EXTENDED RELEASE ORAL at 09:44

## 2021-04-11 RX ADMIN — METHYLPREDNISOLONE SODIUM SUCCINATE SCH MG: 125 INJECTION, POWDER, FOR SOLUTION INTRAMUSCULAR; INTRAVENOUS at 17:25

## 2021-04-11 RX ADMIN — FUROSEMIDE SCH MG: 10 INJECTION, SOLUTION INTRAMUSCULAR; INTRAVENOUS at 09:48

## 2021-04-11 RX ADMIN — FLUTICASONE PROPIONATE SCH: 50 SPRAY, METERED NASAL at 09:48

## 2021-04-11 RX ADMIN — IPRATROPIUM BROMIDE AND ALBUTEROL SULFATE SCH: .5; 3 SOLUTION RESPIRATORY (INHALATION) at 12:05

## 2021-04-11 RX ADMIN — AZITHROMYCIN SCH MG: 500 TABLET, FILM COATED ORAL at 09:44

## 2021-04-11 RX ADMIN — ATORVASTATIN CALCIUM SCH MG: 40 TABLET, FILM COATED ORAL at 20:35

## 2021-04-11 RX ADMIN — AMITRIPTYLINE HYDROCHLORIDE SCH MG: 10 TABLET, FILM COATED ORAL at 20:40

## 2021-04-11 RX ADMIN — FUROSEMIDE SCH MG: 10 INJECTION, SOLUTION INTRAMUSCULAR; INTRAVENOUS at 20:35

## 2021-04-11 RX ADMIN — THERA TABS SCH EACH: TAB at 09:43

## 2021-04-11 RX ADMIN — MELOXICAM SCH MG: 7.5 TABLET ORAL at 09:43

## 2021-04-11 RX ADMIN — IPRATROPIUM BROMIDE AND ALBUTEROL SULFATE SCH: .5; 3 SOLUTION RESPIRATORY (INHALATION) at 08:46

## 2021-04-11 NOTE — P.CRDCN
History of Present Illness


Consult date: 21


Chief complaint: Shortness of breath


History of present illness: 





This is a very pleasant 71-year-old gentleman who requested to severe on the obs

ervation unit for further evaluation of shortness of breath and congestive heart

failure.  The patient just was admitted to the hospital earlier this week with 

increasing shortness of breath and he was diagnosed at that point with COPD 

exacerbation with a component of heart failure.  The patient was diuresed.  He 

underwent an echo during that admission that revealed normal left ventricular 

systolic function with mild aortic stenosis but no significant valvular 

abnormalities.  For some reason the patient was discharged home without any 

diuretics.  He presented back to the hospital complaining of increasing 

shortness of breath.  He describes mainly exertional dyspnea but no orthopnea 

and no PND.  No lower extremities edema.  No symptoms of chest pain or chest 

discomfort.  No change in the weight.  In the emergency department he was given 

Lasix with improvement in the shortness of breath.  He stated that he was 

compliant with all of his medications but unfortunately he was not taking diur

etics when he was discharged home first time.  He stated that he was compliant 

with his diet and as a matter of fact he has not been having good appetite 

lately.  The chest x-ray showed findings consistent with chronic lung disease as

well as a component of heart failure.  NT proBNP came in to be elevated.  On 

examination he does have a JVD up to his earlobe.  The patient definitely has a 

component of heart failure.  I'm going to start the patient on Lasix 40 mg IV 

twice a day with continue monitoring the kidney function and electrolytes and 

continue following up with the patient.





Past Medical History


Past Medical History: COPD, Hyperlipidemia, Osteoarthritis (OA), Pneumonia, 

Rheumatoid Arthritis (RA), Sleep Apnea/CPAP/BIPAP


Additional Past Medical History / Comment(s): COPD, chronic hypoxic respiratory 

failure previous history of pneumoperitoneum related to a perforated duodenal 

ulcer, repair of an umbilical hernia, chronic back pain,


History of Any Multi-Drug Resistant Organisms: None Reported


Past Surgical History: Orthopedic Surgery


Additional Past Surgical History / Comment(s): colonoscopy, repair of a 

perforated duodenal ulcer and repair of an incarcerated umbilical hernia


Past Anesthesia/Blood Transfusion Reactions: No Reported Reaction


Past Psychological History: Anxiety, Depression


Smoking Status: Former smoker


Past Alcohol Use History: Daily


Past Drug Use History: None Reported





- Past Family History


  ** Father


Family Medical History: Cancer


Additional Family Medical History / Comment(s): Father  of leukemia.





  ** Mother


Family Medical History: Cancer, CVA/TIA, Dementia, Diabetes Mellitus


Additional Family Medical History / Comment(s): Mother is 88yrs old with history

of dementia and colon cancer.





  ** Sister(s)


Additional Family Medical History / Comment(s): Patient 3 sons with no major 

medical problems.





Medications and Allergies


                                Home Medications











 Medication  Instructions  Recorded  Confirmed  Type


 


Albuterol Inhaler [Ventolin Hfa 2 puff INHALATION RT-QID PRN 05/12/20 04/10/21 

History





Inhaler]    


 


Cholecalciferol [Vitamin D3 (25 50 mcg PO DAILY 05/12/20 04/10/21 History





Mcg = 1000 Iu)]    


 


HYDROcodone/APAP 10-325MG [Norco 1 tab PO TID PRN 05/12/20 04/10/21 History





]    


 


Metoprolol Succinate [Toprol XL] 25 mg PO DAILY 05/12/20 04/10/21 History


 


Multivitamins, Thera [Multivitamin 1 tab PO DAILY 05/12/20 04/10/21 History





(formulary)]    


 


Omeprazole [PriLOSEC] 40 mg PO DAILY 05/12/20 04/10/21 History


 


Sertraline [Zoloft] 150 mg PO DAILY 05/12/20 04/10/21 History


 


Amitriptyline HCl [Elavil] 10 mg PO HS 04/06/21 04/10/21 History


 


Benzonatate [Tessalon Perles] 200 mg PO TID PRN 04/06/21 04/10/21 History


 


Celecoxib [CeleBREX] 200 mg PO DAILY 04/06/21 04/10/21 History


 


Gabapentin [Neurontin] 300 mg PO HS 04/06/21 04/10/21 History


 


predniSONE 5 mg PO Q48H 04/06/21 04/10/21 History


 


Alendronate Sodium 70 mg PO MO 04/10/21 04/10/21 History


 


Ascorbic Acid [Vitamin C] 1,000 mg PO DAILY 04/10/21 04/10/21 History


 


Atorvastatin [Lipitor] 40 mg PO HS #30 tab 04/10/21 04/10/21 Rx


 


Azithromycin [Zithromax] 500 mg PO DAILY #3 tab 04/10/21 04/10/21 Rx


 


Budesonide [Pulmicort] 1 mg INHALATION RT-BID #60 04/10/21 04/10/21 Rx





 inhalation   


 


Clopidogrel [Plavix] 75 mg PO DAILY #30 tab 04/10/21 04/10/21 Rx


 


Fluticasone Nasal Spray [Flonase 2 spray EA NOSTRIL DAILY #1 spr 04/10/21 

04/10/21 Rx





Nasal Spray]    


 


Fluticasone/Umeclidin/Vilanter 1 puff INHALATION RT-DAILY 04/10/21 04/10/21 

History





[Trelegy Ellipta 100-62.5-25]    


 


Ipratropium-Albuterol Nebulize 3 ml INHALATION RT-QID 04/10/21 04/10/21 History





[Duoneb 0.5 mg-3 mg/3 ml Soln]    


 


Melatonin 3 mg PO HS PRN 04/10/21 04/10/21 History


 


predniSONE [Deltasone] See Taper PO AS DIRECTED 04/10/21 04/10/21 History








                                    Allergies











Allergy/AdvReac Type Severity Reaction Status Date / Time


 


amoxicillin AdvReac  Nausea & Verified 04/10/21 22:43





   Vomiting  














Physical Exam


Vitals: 


                                   Vital Signs











  Temp Pulse Pulse Resp BP BP Pulse Ox


 


 21 08:41  97.9 F   61  17   133/81  96


 


 21 05:19   82   20  137/84   93 L


 


 04/10/21 20:26  98.9 F  58 L   26 H  124/74   96








                                Intake and Output











 04/10/21 04/11/21 04/11/21





 22:59 06:59 14:59


 


Other:   


 


  Weight 68.039 kg  














- Constitutional


General appearance: no acute distress





- Respiratory


Respiratory: bilateral: rales





- Cardiovascular


Rhythm: regular


Heart sounds: normal: S1, S2


Abnormal Heart Sounds: systolic murmur





Results





                                 21 07:07





                                 21 07:07


                                 Cardiac Enzymes











  04/10/21 04/10/21 04/11/21 Range/Units





  21:18 21:18 07:07 


 


AST  35   34  (17-59)  U/L


 


Troponin I   0.023   (0.000-0.034)  ng/mL








                                   Coagulation











  04/10/21 Range/Units





  21:34 


 


PT  10.9  (9.0-12.0)  sec


 


APTT  21.7 L  (22.0-30.0)  sec








                                       CBC











  04/10/21 04/11/21 Range/Units





  21:10 07:07 


 


WBC  7.8  11.6 H  (3.8-10.6)  k/uL


 


RBC  4.09 L  4.39  (4.30-5.90)  m/uL


 


Hgb  12.3 L  12.6 L  (13.0-17.5)  gm/dL


 


Hct  37.2 L  40.3  (39.0-53.0)  %


 


Plt Count  313  363  (150-450)  k/uL








                          Comprehensive Metabolic Panel











  04/10/21 04/11/21 Range/Units





  21:18 07:07 


 


Sodium  132 L  132 L  (137-145)  mmol/L


 


Potassium  4.2  4.0  (3.5-5.1)  mmol/L


 


Chloride  95 L  91 L  ()  mmol/L


 


Carbon Dioxide  33 H  38 H  (22-30)  mmol/L


 


BUN  19  17  (9-20)  mg/dL


 


Creatinine  0.56 L  0.63 L  (0.66-1.25)  mg/dL


 


Glucose  115 H  89  (74-99)  mg/dL


 


Calcium  8.6  8.3 L  (8.4-10.2)  mg/dL


 


AST  35  34  (17-59)  U/L


 


ALT  66 H  68 H  (4-49)  U/L


 


Alkaline Phosphatase  71  75  ()  U/L


 


Total Protein  5.8 L  5.6 L  (6.3-8.2)  g/dL


 


Albumin  3.0 L  2.9 L  (3.5-5.0)  g/dL








                               Current Medications











Generic Name Dose Route Start Last Admin





  Trade Name Freq  PRN Reason Stop Dose Admin


 


Hydrocodone Bitart/Acetaminophen  1 each  04/10/21 23:25 





  Hydrocodone/Apap 10-325mg 1 Each Tab  PO  





  TID PRN  





  Pain  


 


Albuterol/Ipratropium  3 ml  21 08:00  21 08:46





  Ipratropium-Albuterol 3 Ml Neb  INHALATION   Not Given





  RT-QID IMTIAZ  


 


Albuterol/Ipratropium  3 ml  21 08:23 





  Ipratropium-Albuterol 3 Ml Neb  INHALATION  





  RT-Q2H PRN  





  Shortness Of Breath Or Wheezing  


 


Amitriptyline HCl  10 mg  21 21:00 





  Amitriptyline Hcl 10 Mg Tab  PO  





  HS ITMIAZ  


 


Atorvastatin Calcium  40 mg  21 21:00 





  Atorvastatin 40 Mg Tab  PO  





  HS IMTIAZ  


 


Azithromycin  500 mg  21 09:00 





  Azithromycin 500 Mg Tab  PO  





  DAILY [facility]  


 


Benzonatate  200 mg  21 08:18 





  Benzonatate 100 Mg Cap  PO  





  TID PRN  





  Cough  


 


Budesonide  1 mg  21 20:00 





  Budesonide 1 Mg/2 Ml Nebu  INHALATION  





  RT-BID [facility]  


 


Cholecalciferol  50 mcg  21 09:00 





  Cholecalciferol 25 Mcg (1000 Iu) Tablet  PO  





  DAILY [facility]  


 


Clopidogrel Bisulfate  75 mg  21 09:00 





  Clopidogrel 75 Mg Tab  PO  





  DAILY [facility]  


 


Fluticasone Propionate  2 spray  21 09:00 





  Fluticasone 50mcg/Spray Nasal 16gm  EA NOSTRIL  





  DAILY [facility]  


 


Furosemide  40 mg  21 09:15 





  Furosemide 10 Mg/Ml 4 Ml Vial  IV  





  Q8HR [facility]  


 


Gabapentin  300 mg  21 21:00 





  Gabapentin 300 Mg Cap  PO  





  HS [facility]  


 


Melatonin  3 mg  21 08:18 





  Melatonin 3 Mg Tablet  PO  





  HS PRN  





  Insomnia  


 


Meloxicam  7.5 mg  21 09:00 





  Meloxicam 7.5 Mg Tab  PO  





  DAILY [facility]  


 


Metoprolol Succinate  25 mg  21 09:00 





  Metoprolol Succinate (Er) 25 Mg Tab.Er.24h  PO  





  DAILY [facility]  


 


Multivitamins  1 each  21 09:00 





  Multivitamins, Thera 1 Each Tab  PO  





  DAILY [facility]  


 


Pantoprazole Sodium  40 mg  21 09:00 





  Pantoprazole 40 Mg Tablet  PO  





  DAILY [facility]  


 


Prednisone  50 mg  21 09:00 





  Prednisone 50 Mg Tab  PO  





  DAILY [facility]  


 


Sertraline HCl  150 mg  21 09:00 





  Sertraline 50 Mg Tab  PO  





  DAILY [facility]  








                                Intake and Output











 04/10/21 04/11/21 04/11/21





 22:59 06:59 14:59


 


Other:   


 


  Weight 68.039 kg  








                                        





                                 21 07:07 





                                 21 07:07 











Assessment and Plan


Assessment: 





Assessment


#1 acute exacerbation of heart failure with preserved ejection fraction.


#2 possible a component of COPD exacerbation


#3 shortness of breath likely secondary to combination of the above


#4 mild aortic stenosis by recent echocardiogram


#5 multiple comorbid conditions





Plan


#1 start the patient on Lasix at 40 mg twice a day


#2 monitor the kidney function and electrolytes


#3 no need to repeat the echocardiogram at this point in view of the recent echo

 showing normal LV function


#4 patient to be seen and evaluated by the pulmonary service


#5 follow-up with the patient

## 2021-04-11 NOTE — P.CNPUL
History of Present Illness


Consult date: 21


Requesting physician: Esteban Ramos


Reason for consult: dyspnea, cough, COPD, hypoxemia, pulmonary fibrosis, 

abnormal CXR/CT


Chief complaint: Shortness of breath, cough, wheezing.


History of present illness: 





71-year-old male who presents to the emergency department on April 10, with 

complaints of shortness of breath.  The patient was just discharged home 

yesterday, and came back in on the same day, complaining of increasing shortness

of breath.  The patient was begging to be discharged.  I told him he should not 

go home because if he did, he would be right back and here again.  He was discha

rged home on 6 L.  He normally is on 3 L at home.  Sure enough, the patient 

comes back in with increasing shortness of breath, cough, chest congestion, 

minimal phlegm production, and wheezing.  He denies any fever or chills.  He 

denies any chest pain or chest discomfort.  He was seen in the emergency room 

and readmitted to the hospital.  He is currently up on the sixth floor.  He is 

on 6 L nasal cannula.  He is not receiving any IV fluids.  On 5 L nasal cannula,

and his saturations are 88%.  He is not appear to be in any great distress at 

this time.  White count 11.6, hemoglobin 12.6, hematocrit 40.3, platelet count 

363,000.  Sodium 132, potassium 4 chloride 98, CO2 38, anion gap is 3 BUN 17, 

creatinine 0.63.  Coronavirus testing was negative.  Chest x-ray shows 

significant and extensive fibrosis.  In addition, there are small effusions.





Review of Systems





REVIEW OF SYSTEMS:  


CONSTITUTIONAL: Weakness and fatigue


NEUROLOGIC: [ Negative.]


HEENT:  [ Negative.]


CARDIAC:  [Negative.]


PULMONARY: Shortness of breath, wheezing, chest congestion, and cough.


GI:  [Negative.]


:  [Negative.]


RHEUMATOLOGIC: [ Negative.]


IMMUNOLOGIC: [ Negative.]


ENDOCRINE:  [Negative.  ] 


DERMATOLOGIC: [Negative.]








Past Medical History


Past Medical History: Heart Failure, COPD, Hyperlipidemia, Osteoarthritis (OA), 

Pneumonia, Rheumatoid Arthritis (RA), Sleep Apnea/CPAP/BIPAP


Additional Past Medical History / Comment(s): COPD, chronic hypoxic respiratory 

failure previous history of pneumoperitoneum related to a perforated duodenal 

ulcer, repair of an umbilical hernia, chronic back pain, back stenosis.


History of Any Multi-Drug Resistant Organisms: None Reported


Past Surgical History: Orthopedic Surgery


Additional Past Surgical History / Comment(s): colonoscopy, repair of a 

perforated duodenal ulcer and repair of an incarcerated umbilical hernia


Past Anesthesia/Blood Transfusion Reactions: No Reported Reaction


Past Psychological History: Anxiety, Depression


Additional Psychological History / Comment(s): Pt resides with his spouse of 38 

yrs.  He has home oxygen which he wears at 3-3.5L/NC.  He has a nebulizer.


Smoking Status: Former smoker


Past Alcohol Use History: Daily


Additional Past Alcohol Use History / Comment(s): Patient smoked 2 packs per day

 for 40+ years.  He quit 6 years ago.  He denies any illicit drug use or 

marijuana use.  He drinks 2 beers per day but none since previous admission and 

May.  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 

involved


Past Drug Use History: None Reported





- Past Family History


  ** Father


Family Medical History: Cancer


Additional Family Medical History / Comment(s): Father  of leukemia.





  ** Mother


Family Medical History: Cancer, CVA/TIA, Dementia, Diabetes Mellitus


Additional Family Medical History / Comment(s): Mother is 88yrs old with history

 of dementia and colon cancer.





  ** Sister(s)


Additional Family Medical History / Comment(s): Patient 3 sons with no major 

medical problems.





Medications and Allergies


                                Home Medications











 Medication  Instructions  Recorded  Confirmed  Type


 


Albuterol Inhaler [Ventolin Hfa 2 puff INHALATION RT-QID PRN 05/12/20 04/10/21 

History





Inhaler]    


 


Cholecalciferol [Vitamin D3 (25 50 mcg PO DAILY 05/12/20 04/10/21 History





Mcg = 1000 Iu)]    


 


HYDROcodone/APAP 10-325MG [Norco 1 tab PO TID PRN 05/12/20 04/10/21 History





]    


 


Metoprolol Succinate [Toprol XL] 25 mg PO DAILY 05/12/20 04/10/21 History


 


Multivitamins, Thera [Multivitamin 1 tab PO DAILY 05/12/20 04/10/21 History





(formulary)]    


 


Omeprazole [PriLOSEC] 40 mg PO DAILY 05/12/20 04/10/21 History


 


Sertraline [Zoloft] 150 mg PO DAILY 05/12/20 04/10/21 History


 


Amitriptyline HCl [Elavil] 10 mg PO HS 04/06/21 04/10/21 History


 


Benzonatate [Tessalon Perles] 200 mg PO TID PRN 04/06/21 04/10/21 History


 


Celecoxib [CeleBREX] 200 mg PO DAILY 04/06/21 04/10/21 History


 


Gabapentin [Neurontin] 300 mg PO HS 04/06/21 04/10/21 History


 


predniSONE 5 mg PO Q48H 04/06/21 04/10/21 History


 


Alendronate Sodium 70 mg PO MO 04/10/21 04/10/21 History


 


Ascorbic Acid [Vitamin C] 1,000 mg PO DAILY 04/10/21 04/10/21 History


 


Atorvastatin [Lipitor] 40 mg PO HS #30 tab 04/10/21 04/10/21 Rx


 


Azithromycin [Zithromax] 500 mg PO DAILY #3 tab 04/10/21 04/10/21 Rx


 


Budesonide [Pulmicort] 1 mg INHALATION RT-BID #60 04/10/21 04/10/21 Rx





 inhalation   


 


Clopidogrel [Plavix] 75 mg PO DAILY #30 tab 04/10/21 04/10/21 Rx


 


Fluticasone Nasal Spray [Flonase 2 spray EA NOSTRIL DAILY #1 spr 04/10/21 

04/10/21 Rx





Nasal Spray]    


 


Fluticasone/Umeclidin/Vilanter 1 puff INHALATION RT-DAILY 04/10/21 04/10/21 

History





[Trelegy Ellipta 100-62.5-25]    


 


Ipratropium-Albuterol Nebulize 3 ml INHALATION RT-QID 04/10/21 04/10/21 History





[Duoneb 0.5 mg-3 mg/3 ml Soln]    


 


Melatonin 3 mg PO HS PRN 04/10/21 04/10/21 History


 


predniSONE [Deltasone] See Taper PO AS DIRECTED 04/10/21 04/10/21 History








                                    Allergies











Allergy/AdvReac Type Severity Reaction Status Date / Time


 


amoxicillin AdvReac  Nausea & Verified 04/10/21 22:43





   Vomiting  














Physical Exam


Osteopathic Statement: *.  No significant issues noted on an osteopathic 

structural exam other than those noted in the History and Physical/Consult.


Vitals: 


                                   Vital Signs











  Temp Pulse Pulse Resp BP BP Pulse Ox


 


 21 14:34  97.6 F   83  18   117/68  97


 


 21 14:00    83  18   


 


 21 10:00    61  17   


 


 21 08:41  97.9 F   61  17   133/81  96


 


 21 05:19   82   20  137/84   93 L


 


 04/10/21 20:26  98.9 F  58 L   26 H  124/74   96








                                Intake and Output











 04/10/21 04/11/21 04/11/21





 22:59 06:59 14:59


 


Intake Total   300


 


Balance   300


 


Intake:   


 


  Oral   300


 


Other:   


 


  Voiding Method   Toilet





   Urinal


 


  # Voids   2


 


  Weight 68.039 kg  68.039 kg














No acute distress, oriented 3.  Currently on 6 L nasal cannula, with 

saturations 90%.  No audible wheezing, use of accessory muscles, or 

conversational dyspnea.





HEENT examination is grossly unremarkable. 





Neck supple.  Full range of motion.  No adenopathy thyromegaly or neck vein 

distention.





Cardiovascular examination reveals regular rhythm rate.  S1-S2 normal.  No S3 or

 S4.  No discernible murmur noted.  Heart rate 83 bpm.





Lungs reveal bilateral rhonchi, and basilar crackles.  No wheezes.  Breath 

sounds are diminished throughout.  There is prolongation on forced maneuver.  





Abdomen soft bowel sounds are heard.  No masses or tenderness.





Extremities are intact.  No cyanosis clubbing or edema.





Skin is without rash or lesion.





Neurologic examination is brief but nonfocal.





Results





- Laboratory Findings


CBC and BMP: 


                                 21 07:07





                                 21 07:07


PT/INR, D-dimer











PT  10.9 sec (9.0-12.0)   04/10/21  21:34    


 


INR  1.0  (<1.2)   04/10/21  21:34    








Abnormal lab findings: 


                                  Abnormal Labs











  04/10/21 04/10/21 04/10/21





  21:10 21:18 21:34


 


WBC   


 


RBC  4.09 L  


 


Hgb  12.3 L  


 


Hct  37.2 L  


 


RDW   


 


APTT    21.7 L


 


Sodium   132 L 


 


Chloride   95 L 


 


Carbon Dioxide   33 H 


 


Creatinine   0.56 L 


 


Glucose   115 H 


 


Calcium   


 


ALT   66 H 


 


Total Protein   5.8 L 


 


Albumin   3.0 L 














  21





  07:07 07:07


 


WBC  11.6 H 


 


RBC  


 


Hgb  12.6 L 


 


Hct  


 


RDW  15.6 H 


 


APTT  


 


Sodium   132 L


 


Chloride   91 L


 


Carbon Dioxide   38 H


 


Creatinine   0.63 L


 


Glucose  


 


Calcium   8.3 L


 


ALT   68 H


 


Total Protein   5.6 L


 


Albumin   2.9 L














- Diagnostic Findings


Chest x-ray: image reviewed





Assessment and Plan


Assessment: 





Acute hypoxemic respiratory failure, secondary to severe COPD, and pulmonary 

fibrosis.





Stage III/severe COPD, with an FEV1 that is 46% of predicted.





History of rheumatoid arthritis.





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

 fibrosis.





History of obstructive sleep apnea syndrome, currently not on CPAP.





Chronic back pain.





History of hyperlipidemia.





History of osteoarthritis.


Plan: 





Plan dated 2021.





I begged the patient not to go home, because I told him he would probably come 

right back into the hospital, which is what happened.  When he was discharged, 

he was still on 6 L nasal cannula.  Anyway, the patient will be started back on 

usual medications including DuoNeb, Pulmicort, Perforomist, and corticosteroids.

  I will also give him oral antibiotic.  Prognosis is guarded.  Once discharged,

 he'll see me back in the office.


Time with Patient: Greater than 30

## 2021-04-11 NOTE — P.HPIM
History of Present Illness


H&P Date: 21








HISTORY OF PRESENT ILLNESS


This is a 71-year-old male patient of Dr. Bazan and Dr. Pickard with past 

medical history of advanced steroid-dependent COPD, hyperlipidemia, rheumatoid 

arthritis, obstructive sleep apnea unable to tolerate CPAP, chronic hypoxic 

respiratory failure on home O2 at 3 L nasal cannula, generalized osteoarthritis,

chronic back pain pseudomonas in sputum on previous admission secondary to 

colonization or pseudomonal pneumonia.  Known FVC of 72% and FEV1 of 46%.  

History of perforated duodenal ulcer status post exploratory laparotomy with 

repair of perforated duodenal ulcer and repair of incarcerated umbilical hernia.

 Patient was hospitalized for COPD exacerbation and discharged home on April 10.

 According to patient's family, his oxygen tank only goes to 5 L and this all 

oxygen saturations were in the 70s to low 90s at best with his home oxygen.  

Patient was complaining of dyspnea since being discharged.  No chest pain.  No 

dizziness or lightheadedness.  Echocardiogram on last admission reveals EF of 

55-60% with mild concentric left ventricular hypertrophy, mild aortic valve 

sclerosis, trace aortic regurgitation, mild mitral regurgitation, mild tricuspid

regurgitation, moderate pulmonary hypertension.





Patient came into [facility] emergency center for evaluation.  

Patient was afebrile, heart rate 58, respiratory rate 26, blood pressure 124/74,

pulse ox 96% on 6 L nasal cannula. EKG was in normal sinus rhythm with 

occasional PACs and no acute ST changes.  WBC 7.8, hemoglobin 12.3, platelet 

count 313.  Sodium 132, potassium 4.2, chloride 95, CO2 33, BUN 19 and 

creatinine 0.56.  AST 66 otherwise liver function tests were normal.  ProBNP 

11,800.  INR 1.0.  Lactic acid 1.0.  Troponin 0.0-3.  Chest x-ray reveals 

extensive pulmonary interstitial fibrosis.  Increasing infiltrate at the lung 

bases with right pleural effusion.  Congestive heart failure possible. Patient 

was given 1 dose of IV Lasix in the emergency center and admitted to the [facility]

floor with consult in place for pulmonary medicine and cardiology.  Patient has 

been started on his home medications including oral prednisone.





REVIEW OF SYSTEMS


Constitutional: No fever, no chills, no night sweats.  No weight change.  No 

weakness, fatigue or lethargy.  No daytime sleepiness.


EENT: No headache.  No blurred vision or double vision, no loss of vision.  No 

loss of Hearing, no ringing in the ears, no dizziness.  No nasal drainage or 

congestion.  No epistaxis.  No sore throat.


Lungs: Reports shortness of breath, Reports cough, Reports sputum production.  

No wheezing.


Cardiovascular: No chest pain, no lower extremity edema.  No palpitations.  No 

paroxysmal nocturnal dyspnea.  No orthopnea.  No lightheadedness or dizziness.  

No syncopal episodes.


Abdominal: No abdominal pain.  No nausea, vomiting.  No diarrhea.  No 

constipation.  No bloody or tarry stools..  No loss of appetite.


Genitourinary: No dysuria, increased frequency, urgency.  No urinary retention.


Musculoskeletal: No myalgias.  No muscle weakness, no gait dysfunction, no 

frequent falls.  No back pain.  No neck pain.


Integumentary: No wounds, no lesions.  No rash or pruritus.  No unusual 

bruising.  No change in hair or nails.


Neurologic: No aphasia. No facial droop. No change in mentation. No head injury.

No headache. No paralysis. No paresthesia.


Psychiatric: No depression.  No anxiety.  No mood swings.


Endocrine: No abnormal blood sugars.  No weight change.   





SOCIAL HISTORY


Patient smoked 2 packs per day for 40+ years.  He quit 7 years ago.  He denies 

any illicit drug use or marijuana use.  He drinks 2 beers per day.  Patient was 

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 

construction.  patient is  has adult children wo are involved.





FAMILY HISTORY


Father  of leukemia. Mother is 89 yrs old with history of dementia and colon

cancer. Patient has 1 full sister with history of lung cancer.  Patient has one 

half-sister with adrenal problems.  Patient does not have any brothers.  Patient

3 sons with no major medical problems.





PHYSICAL EXAMINATION


Gen: This is 71-year-old  male.  Patient is resting in bed appears to 

be comfortable and in no acute distress.


HEENT: Head is atraumatic, normocephalic. Pupils equal, round. Sclerae is 

anicteric. 


NECK: Supple. No JVD. No lymphadenopathy. No thyromegaly. 


LUNGS: No wheezing.  Diminished at bases.  Crackles bilaterally.  No intercostal

retractions.


HEART: Regular rate and rhythm.  Systolic murmur.  Sinus tachycardia.


ABDOMEN: Soft. Bowel sounds are present. No masses.  No tenderness.  Bladder 

distention.


EXTREMITIES: No pedal edema.  No calf tenderness.  Dorsalis pedis palpable 

bilaterally.


NEUROLOGICAL: Patient is awake, alert and oriented x3. Cranial nerves 2 through 

12 are grossly intact. 





ASSESSMENT AND PLAN


1.  Acute on chronic hypoxic respiratory failure secondary to acute diastolic 

heart failure.  Patient is status post 1 dose of IV Lasix and will be started on

Lasix 40 mg IV every 12 hours.  Cardiology consult appreciated.  Recent 

echocardiogram as above.  Continue azithromycin 500 mg daily, Tessalon Perles as

needed, Pulmicort 1 mg twice daily, DuoNeb treatments 4 times daily and as 

needed, prednisone oral 50 mg daily.  Pulmonary medicine consult.  





2.  Advanced COPD with pulmonary fibrosis, steroid dependent and chronic hypoxic

and hypercapnic respiratory failure on home O2 at 3 L. Continue as in #1.





3.  Elevated troponins on last admission secondary to possible type II MI.





4.  Moderate pulmonary hypertension.





5.  Hypertension.  Continue Toprol-XL 25 mg daily.





6.  Rheumatoid arthritis, stable.





7.  Hyperlipidemia.





8.  Obstructive sleep apnea unable to tolerate CPAP.





9.  Chronic back pain and generalized osteoarthritis.  Continue Norco as needed,

gabapentin 300 mg at bedtime





10.  History of perforated duodenal ulcer status post exploratory laparotomy 

with repair of perforated duodenal ulcer and repair of incarcerated umbilical 

hernia.





11.  Generalized anxiety disorder and recurrent depression.  Continue Zoloft 50 

mg daily, Elavil 10 mg at bedtime. 





12.  DVT prophylaxis.  Heparin subcu.





13.  GI prophylaxis.  Continue Protonix daily.





14.  Insomnia.  Continue Elavil 10 mg at bedtime..





15.  Chronic compression fracture.  Continue current pain management with Norco.





16.  Urinary retention.  PVR every shift.





Patient will be admitted to the hospital for a minimum of 2 night stay.





DISCHARGE PLAN


Most likely return home with [facility].  Patient has been at [facility] in the past.  

Consult PT and OT.





Impression and plan of care have been directed as dictated by the signing 

physician.  Chloé Maldonado nurse practitioner acting as scribe for signing phys

ician.








Past Medical History


Past Medical History: COPD, Hyperlipidemia, Osteoarthritis (OA), Pneumonia, 

Rheumatoid Arthritis (RA), Sleep Apnea/CPAP/BIPAP


Additional Past Medical History / Comment(s): COPD, chronic hypoxic respiratory 

failure previous history of pneumoperitoneum related to a perforated duodenal 

ulcer, repair of an umbilical hernia, chronic back pain,


History of Any Multi-Drug Resistant Organisms: None Reported


Past Surgical History: Orthopedic Surgery


Additional Past Surgical History / Comment(s): colonoscopy, repair of a 

perforated duodenal ulcer and repair of an incarcerated umbilical hernia


Past Anesthesia/Blood Transfusion Reactions: No Reported Reaction


Past Psychological History: Anxiety, Depression


Smoking Status: Former smoker


Past Alcohol Use History: Daily


Past Drug Use History: None Reported





- Past Family History


  ** Father


Family Medical History: Cancer


Additional Family Medical History / Comment(s): Father  of leukemia.





  ** Mother


Family Medical History: Cancer, CVA/TIA, Dementia, Diabetes Mellitus


Additional Family Medical History / Comment(s): Mother is 88yrs old with history

of dementia and colon cancer.





  ** Sister(s)


Additional Family Medical History / Comment(s): Patient 3 sons with no major 

medical problems.





Medications and Allergies


                                Home Medications











 Medication  Instructions  Recorded  Confirmed  Type


 


Albuterol Inhaler [Ventolin Hfa 2 puff INHALATION RT-QID PRN 05/12/20 04/10/21 

History





Inhaler]    


 


Cholecalciferol [Vitamin D3 (25 50 mcg PO DAILY 05/12/20 04/10/21 History





Mcg = 1000 Iu)]    


 


HYDROcodone/APAP 10-325MG [Norco 1 tab PO TID PRN 05/12/20 04/10/21 History





]    


 


Metoprolol Succinate [Toprol XL] 25 mg PO DAILY 05/12/20 04/10/21 History


 


Multivitamins, Thera [Multivitamin 1 tab PO DAILY 05/12/20 04/10/21 History





(formulary)]    


 


Omeprazole [PriLOSEC] 40 mg PO DAILY 05/12/20 04/10/21 History


 


Sertraline [Zoloft] 150 mg PO DAILY 05/12/20 04/10/21 History


 


Amitriptyline HCl [Elavil] 10 mg PO HS 04/06/21 04/10/21 History


 


Benzonatate [Tessalon Perles] 200 mg PO TID PRN 04/06/21 04/10/21 History


 


Celecoxib [CeleBREX] 200 mg PO DAILY 04/06/21 04/10/21 History


 


Gabapentin [Neurontin] 300 mg PO HS 04/06/21 04/10/21 History


 


predniSONE 5 mg PO Q48H 04/06/21 04/10/21 History


 


Alendronate Sodium 70 mg PO MO 04/10/21 04/10/21 History


 


Ascorbic Acid [Vitamin C] 1,000 mg PO DAILY 04/10/21 04/10/21 History


 


Atorvastatin [Lipitor] 40 mg PO HS #30 tab 04/10/21 04/10/21 Rx


 


Azithromycin [Zithromax] 500 mg PO DAILY #3 tab 04/10/21 04/10/21 Rx


 


Budesonide [Pulmicort] 1 mg INHALATION RT-BID #60 04/10/21 04/10/21 Rx





 inhalation   


 


Clopidogrel [Plavix] 75 mg PO DAILY #30 tab 04/10/21 04/10/21 Rx


 


Fluticasone Nasal Spray [Flonase 2 spray EA NOSTRIL DAILY #1 spr 04/10/21 

04/10/21 Rx





Nasal Spray]    


 


Fluticasone/Umeclidin/Vilanter 1 puff INHALATION RT-DAILY 04/10/21 04/10/21 

History





[Trelegy Ellipta 100-62.5-25]    


 


Ipratropium-Albuterol Nebulize 3 ml INHALATION RT-QID 04/10/21 04/10/21 History





[Duoneb 0.5 mg-3 mg/3 ml Soln]    


 


Melatonin 3 mg PO HS PRN 04/10/21 04/10/21 History


 


predniSONE [Deltasone] See Taper PO AS DIRECTED 04/10/21 04/10/21 History








                                    Allergies











Allergy/AdvReac Type Severity Reaction Status Date / Time


 


amoxicillin AdvReac  Nausea & Verified 04/10/21 22:43





   Vomiting  














Physical Exam


Vitals: 


                                   Vital Signs











  Temp Pulse Resp BP Pulse Ox


 


 21 05:19   82  20  137/84  93 L


 


 04/10/21 20:26  98.9 F  58 L  26 H  124/74  96








                                Intake and Output











 04/10/21 04/11/21 04/11/21





 22:59 06:59 14:59


 


Other:   


 


  Weight 68.039 kg  














Results


CBC & Chem 7: 


                                 21 07:07





                                 21 07:07


Labs: 


                  Abnormal Lab Results - Last 24 Hours (Table)











  04/10/21 04/10/21 04/10/21 Range/Units





  21:10 21:18 21:34 


 


WBC     (3.8-10.6)  k/uL


 


RBC  4.09 L    (4.30-5.90)  m/uL


 


Hgb  12.3 L    (13.0-17.5)  gm/dL


 


Hct  37.2 L    (39.0-53.0)  %


 


RDW     (11.5-15.5)  %


 


APTT    21.7 L  (22.0-30.0)  sec


 


Sodium   132 L   (137-145)  mmol/L


 


Chloride   95 L   ()  mmol/L


 


Carbon Dioxide   33 H   (22-30)  mmol/L


 


Creatinine   0.56 L   (0.66-1.25)  mg/dL


 


Glucose   115 H   (74-99)  mg/dL


 


Calcium     (8.4-10.2)  mg/dL


 


ALT   66 H   (4-49)  U/L


 


Total Protein   5.8 L   (6.3-8.2)  g/dL


 


Albumin   3.0 L   (3.5-5.0)  g/dL














  21 Range/Units





  07:07 07:07 


 


WBC  11.6 H   (3.8-10.6)  k/uL


 


RBC    (4.30-5.90)  m/uL


 


Hgb  12.6 L   (13.0-17.5)  gm/dL


 


Hct    (39.0-53.0)  %


 


RDW  15.6 H   (11.5-15.5)  %


 


APTT    (22.0-30.0)  sec


 


Sodium   132 L  (137-145)  mmol/L


 


Chloride   91 L  ()  mmol/L


 


Carbon Dioxide   38 H  (22-30)  mmol/L


 


Creatinine   0.63 L  (0.66-1.25)  mg/dL


 


Glucose    (74-99)  mg/dL


 


Calcium   8.3 L  (8.4-10.2)  mg/dL


 


ALT   68 H  (4-49)  U/L


 


Total Protein   5.6 L  (6.3-8.2)  g/dL


 


Albumin   2.9 L  (3.5-5.0)  g/dL

## 2021-04-12 LAB
ANION GAP SERPL CALC-SCNC: 10.7 MMOL/L (ref 4–12)
BUN SERPL-SCNC: 23 MG/DL (ref 9–27)
BUN/CREAT SERPL: 32.86 RATIO (ref 12–20)
CALCIUM SPEC-MCNC: 9.1 MG/DL (ref 8.7–10.3)
CHLORIDE SERPL-SCNC: 88 MMOL/L (ref 96–109)
CO2 SERPL-SCNC: 37.3 MMOL/L (ref 21.6–31.8)
GLUCOSE SERPL-MCNC: 172 MG/DL (ref 70–110)
POTASSIUM SERPL-SCNC: 4.6 MMOL/L (ref 3.5–5.5)
SODIUM SERPL-SCNC: 136 MMOL/L (ref 135–145)

## 2021-04-12 RX ADMIN — METOPROLOL SUCCINATE SCH MG: 25 TABLET, EXTENDED RELEASE ORAL at 08:02

## 2021-04-12 RX ADMIN — METHYLPREDNISOLONE SODIUM SUCCINATE SCH MG: 125 INJECTION, POWDER, FOR SOLUTION INTRAMUSCULAR; INTRAVENOUS at 12:06

## 2021-04-12 RX ADMIN — CLOPIDOGREL BISULFATE SCH MG: 75 TABLET ORAL at 08:02

## 2021-04-12 RX ADMIN — MELOXICAM SCH MG: 7.5 TABLET ORAL at 08:00

## 2021-04-12 RX ADMIN — AZITHROMYCIN SCH MG: 500 TABLET, FILM COATED ORAL at 08:00

## 2021-04-12 RX ADMIN — FORMOTEROL FUMARATE DIHYDRATE SCH MCG: 20 SOLUTION RESPIRATORY (INHALATION) at 08:10

## 2021-04-12 RX ADMIN — FORMOTEROL FUMARATE DIHYDRATE SCH MCG: 20 SOLUTION RESPIRATORY (INHALATION) at 19:40

## 2021-04-12 RX ADMIN — METHYLPREDNISOLONE SODIUM SUCCINATE SCH MG: 125 INJECTION, POWDER, FOR SOLUTION INTRAMUSCULAR; INTRAVENOUS at 23:57

## 2021-04-12 RX ADMIN — HYDROCODONE BITARTRATE AND ACETAMINOPHEN PRN EACH: 10; 325 TABLET ORAL at 12:05

## 2021-04-12 RX ADMIN — IPRATROPIUM BROMIDE AND ALBUTEROL SULFATE SCH ML: .5; 3 SOLUTION RESPIRATORY (INHALATION) at 19:40

## 2021-04-12 RX ADMIN — FUROSEMIDE SCH MG: 10 INJECTION, SOLUTION INTRAMUSCULAR; INTRAVENOUS at 08:02

## 2021-04-12 RX ADMIN — ATORVASTATIN CALCIUM SCH MG: 40 TABLET, FILM COATED ORAL at 21:45

## 2021-04-12 RX ADMIN — FLUTICASONE PROPIONATE SCH SPRAY: 50 SPRAY, METERED NASAL at 07:59

## 2021-04-12 RX ADMIN — METHYLPREDNISOLONE SODIUM SUCCINATE SCH MG: 125 INJECTION, POWDER, FOR SOLUTION INTRAMUSCULAR; INTRAVENOUS at 17:55

## 2021-04-12 RX ADMIN — IPRATROPIUM BROMIDE AND ALBUTEROL SULFATE SCH ML: .5; 3 SOLUTION RESPIRATORY (INHALATION) at 08:10

## 2021-04-12 RX ADMIN — Medication SCH: at 09:57

## 2021-04-12 RX ADMIN — PANTOPRAZOLE SODIUM SCH MG: 40 TABLET, DELAYED RELEASE ORAL at 08:01

## 2021-04-12 RX ADMIN — IPRATROPIUM BROMIDE AND ALBUTEROL SULFATE SCH ML: .5; 3 SOLUTION RESPIRATORY (INHALATION) at 17:08

## 2021-04-12 RX ADMIN — METHYLPREDNISOLONE SODIUM SUCCINATE SCH MG: 125 INJECTION, POWDER, FOR SOLUTION INTRAMUSCULAR; INTRAVENOUS at 00:49

## 2021-04-12 RX ADMIN — BUDESONIDE SCH MG: 1 SUSPENSION RESPIRATORY (INHALATION) at 19:40

## 2021-04-12 RX ADMIN — IPRATROPIUM BROMIDE AND ALBUTEROL SULFATE SCH ML: .5; 3 SOLUTION RESPIRATORY (INHALATION) at 12:25

## 2021-04-12 RX ADMIN — GABAPENTIN SCH MG: 300 CAPSULE ORAL at 21:44

## 2021-04-12 RX ADMIN — AMITRIPTYLINE HYDROCHLORIDE SCH MG: 10 TABLET, FILM COATED ORAL at 22:10

## 2021-04-12 RX ADMIN — SERTRALINE HYDROCHLORIDE SCH MG: 50 TABLET, FILM COATED ORAL at 08:02

## 2021-04-12 RX ADMIN — THERA TABS SCH EACH: TAB at 08:02

## 2021-04-12 RX ADMIN — BUDESONIDE SCH MG: 1 SUSPENSION RESPIRATORY (INHALATION) at 08:10

## 2021-04-12 RX ADMIN — METHYLPREDNISOLONE SODIUM SUCCINATE SCH MG: 125 INJECTION, POWDER, FOR SOLUTION INTRAMUSCULAR; INTRAVENOUS at 05:30

## 2021-04-12 RX ADMIN — FUROSEMIDE SCH MG: 10 INJECTION, SOLUTION INTRAMUSCULAR; INTRAVENOUS at 21:44

## 2021-04-12 NOTE — P.PN
Subjective


Progress Note Date: 04/12/21





HISTORY OF PRESENT ILLNESS


This is a 71-year-old male patient of Dr. Bazan and Dr. Pickard with past 

medical history of advanced steroid-dependent COPD, hyperlipidemia, rheumatoid 

arthritis, obstructive sleep apnea unable to tolerate CPAP, chronic hypoxic 

respiratory failure on home O2 at 3 L nasal cannula, generalized osteoarthritis,

chronic back pain pseudomonas in sputum on previous admission secondary to 

colonization or pseudomonal pneumonia.  Known FVC of 72% and FEV1 of 46%.  

History of perforated duodenal ulcer status post exploratory laparotomy with 

repair of perforated duodenal ulcer and repair of incarcerated umbilical hernia.

 Patient was hospitalized for COPD exacerbation and discharged home on April 10.

 According to patient's family, his oxygen tank only goes to 5 L and this all 

oxygen saturations were in the 70s to low 90s at best with his home oxygen.  

Patient was complaining of dyspnea since being discharged.  No chest pain.  No 

dizziness or lightheadedness.  Echocardiogram on last admission reveals EF of 

55-60% with mild concentric left ventricular hypertrophy, mild aortic valve 

sclerosis, trace aortic regurgitation, mild mitral regurgitation, mild tricuspid

regurgitation, moderate pulmonary hypertension.





Patient came into [facility] emergency center for evaluation.  

Patient was afebrile, heart rate 58, respiratory rate 26, blood pressure 124/74,

pulse ox 96% on 6 L nasal cannula. EKG was in normal sinus rhythm with occa

sional PACs and no acute ST changes.  WBC 7.8, hemoglobin 12.3, platelet count 

313.  Sodium 132, potassium 4.2, chloride 95, CO2 33, BUN 19 and creatinine 

0.56.  AST 66 otherwise liver function tests were normal.  ProBNP 11,800.  INR 

1.0.  Lactic acid 1.0.  Troponin 0.0-3.  Chest x-ray reveals extensive pulmonary

interstitial fibrosis.  Increasing infiltrate at the lung bases with right 

pleural effusion.  Congestive heart failure possible. Patient was given 1 dose 

of IV Lasix in the emergency center and admitted to the [facility] floor with 

consult in place for pulmonary medicine and cardiology.  Patient has been 

started on his home medications including oral prednisone.





4/12: She has been seen and followed by cardiology with recommendations to 

continue IV Lasix 40 mg every 12 hours.  She has also been seen and followed by 

pulmonary medicine.   will make arrangements for patient to have an 

oxygen concentrator at home which will reach 6 L nasal cannula which is needed 

for patient to manage his COPD and CHF.  Patient is currently on 5 L nasal 

cannula with pulse ox at 96%.  His been afebrile, heart rate 82, heart rate 74, 

blood pressure 113/69.  INR was not accurately documented.





REVIEW OF SYSTEMS


Constitutional: No fever, no chills, no night sweats.  No weight change.  No 

weakness, fatigue or lethargy.  No daytime sleepiness.


EENT: No headache.  No blurred vision or double vision, no loss of vision.  No 

loss of Hearing, no ringing in the ears, no dizziness.  No nasal drainage or 

congestion.  No epistaxis.  No sore throat.


Lungs: Reports shortness of breath, Reports cough, Reports sputum production.  

No wheezing.


Cardiovascular: No chest pain, no lower extremity edema.  No palpitations.  No 

paroxysmal nocturnal dyspnea.  No orthopnea.  No lightheadedness or dizziness.  

No syncopal episodes.


Abdominal: No abdominal pain.  No nausea, vomiting.  No diarrhea.  No 

constipation.  No bloody or tarry stools..  No loss of appetite.


Genitourinary: No dysuria, increased frequency, urgency.  No urinary retention.


Musculoskeletal: No myalgias.  No muscle weakness, no gait dysfunction, no 

frequent falls.  No back pain.  No neck pain.


Integumentary: No wounds, no lesions.  No rash or pruritus.  No unusual 

bruising.  No change in hair or nails.


Neurologic: No aphasia. No facial droop. No change in mentation. No head injury.

No headache. No paralysis. No paresthesia.


Psychiatric: No depression.  No anxiety.  No mood swings.


Endocrine: No abnormal blood sugars.  No weight change.   





PHYSICAL EXAMINATION


Gen: This is 71-year-old  male.  Patient is resting in bed appears to 

be comfortable and in no acute distress.


HEENT: Head is atraumatic, normocephalic. Pupils equal, round. Sclerae is 

anicteric. 


NECK: Supple. No JVD. No lymphadenopathy. No thyromegaly. 


LUNGS: No wheezing.  Diminished at bases.  Crackles bilaterally.  No intercostal

retractions.


HEART: Regular rate and rhythm.  Systolic murmur.  Sinus tachycardia.


ABDOMEN: Soft. Bowel sounds are present. No masses.  No tenderness.  Bladder 

distention.


EXTREMITIES: No pedal edema.  No calf tenderness.  Dorsalis pedis palpable 

bilaterally.


NEUROLOGICAL: Patient is awake, alert and oriented x3. Cranial nerves 2 through 

12 are grossly intact. 





ASSESSMENT AND PLAN


1.  Acute on chronic hypoxic respiratory failure secondary to acute diastolic 

heart failure.  Patient is status post 1 dose of IV Lasix and will be started on

Lasix 40 mg IV every 12 hours.  Cardiology consult appreciated.  Recent 

echocardiogram as above.  Continue azithromycin 500 mg daily, Tessalon Perles as

needed, Pulmicort 1 mg twice daily, DuoNeb treatments 4 times daily and as 

needed, prednisone oral 50 mg daily.  Pulmonary medicine consult appreciated.  





2.  Advanced COPD with pulmonary fibrosis, steroid dependent and chronic hypoxic

and hypercapnic respiratory failure on home O2 at 3 L. Continue as in #1.





3.  Elevated troponins on last admission secondary to possible type II MI.





4.  Moderate pulmonary hypertension.





5.  Hypertension.  Continue Toprol-XL 25 mg daily.





6.  Rheumatoid arthritis, stable.





7.  Hyperlipidemia.





8.  Obstructive sleep apnea unable to tolerate CPAP.





9.  Chronic back pain and generalized osteoarthritis.  Continue Norco as needed,

gabapentin 300 mg at bedtime





10.  History of perforated duodenal ulcer status post exploratory laparotomy 

with repair of perforated duodenal ulcer and repair of incarcerated umbilical 

hernia.





11.  Generalized anxiety disorder and recurrent depression.  Continue Zoloft 50 

mg daily, Elavil 10 mg at bedtime. 





12.  DVT prophylaxis.  Heparin subcu.





13.  GI prophylaxis.  Continue Protonix daily.





14.  Insomnia.  Continue Elavil 10 mg at bedtime..





15.  Chronic compression fracture.  Continue current pain management with Norco.





16.  Urinary retention.  PVR every shift.





DISCHARGE PLAN


Most likely return home with [facility].  





Impression and plan of care have been directed as dictated by the signing 

physician.  Chloé Maldonado nurse practitioner acting as scribe for signing 

physician.








Objective





- Vital Signs


Vital signs: 


                                   Vital Signs











Temp  98.0 F   04/12/21 07:09


 


Pulse  82   04/12/21 08:33


 


Resp  16   04/12/21 07:09


 


BP  113/69   04/12/21 07:09


 


Pulse Ox  100   04/12/21 07:09








                                 Intake & Output











 04/11/21 04/12/21 04/12/21





 18:59 06:59 18:59


 


Intake Total 300  


 


Output Total  400 675


 


Balance 300 -400 -675


 


Weight 68.039 kg  


 


Intake:   


 


  Oral 300  


 


Output:   


 


  Urine   675


 


  Post Void Residual  400 


 


Other:   


 


  Voiding Method Toilet Urinal 





 Urinal  


 


  # Voids 1 2 


 


  # Bowel Movements 1 0 0














- Labs


CBC & Chem 7: 


                                 04/11/21 07:07





                                 04/11/21 07:07

## 2021-04-12 NOTE — P.PN
Subjective


Progress Note Date: 04/12/21





HISTORY OF PRESENT ILLNESS: 





This is a 71-year-old male who is currently admitted to the hospital secondary 

to shortness of breath and congestive heart failure. The patient is unsure who 

his cardiologist is, but he was evaluated by Dr. Crawford yesterday and also during 

a recent hospitalization last week. Patient is receiving 40mg lasix IV q 12 

hours.  Patient is also receiving IV steroids.  He states he is still short of 

breath but states it is improved since coming to the hospital.  He is currently 

on 6 L nasal cannula with oxygen saturations greater than 92%. Intake and output

has not been accurately documented.  Echocardiogram completed on 04/08/2021 

revealed ejection fraction 55-60%, trace aortic regurgitation, mild mitral 

regurgitation, mild tricuspid regurgitation, and moderate pulmonary 

hypertension.





PHYSICAL EXAM: 


VITAL SIGNS: Reviewed.


GENERAL: Well-developed in no acute distress. 


NECK: Supple. No JVD or thyromegaly


LUNGS: Respirations even and unlabored. Lungs diminished with bibasilar rales.


HEART: Regular rate and rhythm.  S1 and S2 heard. 


EXTREMITIES: Normal range of motion.  No clubbing or cyanosis.  Peripheral 

pulses intact.  Trace bilateral lower extremity edema





ASSESSMENT: 


Acute exacerbation of chronic diastolic heart failure


Acute exacerbation of COPD


Acute on chronic hypoxic respiratory failure, patient wears home O2


Hypertension


Hyperlipidemia


History of pulmonary fibrosis


History of obstructive sleep apnea, currently not utilizing CPAP





PLAN: 


Pulmonary following.  Continue IV steroids per pulmonary


Continue IV Lasix 40 mg every 12 hours


Daily weights


Accurate I&O


Monitor kidney function


Further recommendations pending patient's course





Nurse practitioner note has been reviewed by physician. Signing provider agrees 

with the documented findings, assessment, and plan of care. 








Objective





- Vital Signs


Vital signs: 


                                   Vital Signs











Temp  98.0 F   04/12/21 07:09


 


Pulse  82   04/12/21 08:33


 


Resp  16   04/12/21 07:09


 


BP  113/69   04/12/21 07:09


 


Pulse Ox  100   04/12/21 07:09








                                 Intake & Output











 04/11/21 04/12/21 04/12/21





 18:59 06:59 18:59


 


Intake Total 300  240


 


Output Total  400 675


 


Balance 300 -400 -435


 


Weight 68.039 kg  


 


Intake:   


 


  Oral 300  240


 


Output:   


 


  Urine   675


 


  Post Void Residual  400 


 


Other:   


 


  Voiding Method Toilet Urinal 





 Urinal  


 


  # Voids 1 2 


 


  # Bowel Movements 1 0 0














- Labs


CBC & Chem 7: 


                                 04/11/21 07:07





                                 04/11/21 07:07

## 2021-04-12 NOTE — P.PN
Subjective


Progress Note Date: 04/12/21








71-year-old male who presents to the emergency department on April 10, with 

complaints of shortness of breath.  The patient was just discharged home 

yesterday, and came back in on the same day, complaining of increasing shortness

of breath.  The patient was begging to be discharged.  I told him he should not 

go home because if he did, he would be right back and here again.  He was 

discharged home on 6 L.  He normally is on 3 L at home.  Sure enough, the 

patient comes back in with increasing shortness of breath, cough, chest 

congestion, minimal phlegm production, and wheezing.  He denies any fever or 

chills.  He denies any chest pain or chest discomfort.  He was seen in the 

emergency room and readmitted to the hospital.  He is currently up on the sixth 

floor.  He is on 6 L nasal cannula.  He is not receiving any IV fluids.  On 5 L 

nasal cannula, and his saturations are 88%.  He is not appear to be in any great

distress at this time.  White count 11.6, hemoglobin 12.6, hematocrit 40.3, 

platelet count 363,000.  Sodium 132, potassium 4 chloride 98, CO2 38, anion gap 

is 3 BUN 17, creatinine 0.63.  Coronavirus testing was negative.  Chest x-ray 

shows significant and extensive fibrosis.  In addition, there are small 

effusions.








Precious evaluation of 04/12/2021, the patient is being seen for follow-up of.  

The patient is currently on 6 L of oxygen by nasal cannula with a pulse ox of 

99%.  Chest x-ray showed chronic pulmonary fibrosis and scarring a smaller lung 

volumes.  The patient also has COPD.  The patient's COVID 19  came back 

negative.  The patient has had no new blood work today.  Renal function stable. 

The patient has chronic metabolic alkalosis secondary to chronic hypercapnic 

respiratory failure.  ProBNP level was also elevated at 11,800.  The patient is 

currently on Lasix 40 mg IV 12 hours.  He is also on a combination of 

Perforomist and Pulmicort nebulized treatments twice a day.  He is producing 

adequate amount of urine output.  Urine balance has been negative for now.  His 

current weight is down to 68 kg.





Objective





- Vital Signs


Vital signs: 


                                   Vital Signs











Temp  98.0 F   04/12/21 07:09


 


Pulse  88   04/12/21 12:35


 


Resp  16   04/12/21 08:00


 


BP  113/69   04/12/21 07:09


 


Pulse Ox  100   04/12/21 07:09








                                 Intake & Output











 04/11/21 04/12/21 04/12/21





 18:59 06:59 18:59


 


Intake Total 300  240


 


Output Total  400 925


 


Balance 300 -400 -685


 


Weight 68.039 kg  


 


Intake:   


 


  Oral 300  240


 


Output:   


 


  Urine   925


 


  Post Void Residual  400 


 


Other:   


 


  Voiding Method Toilet Urinal Urinal





 Urinal  


 


  # Voids 1 2 


 


  # Bowel Movements 1 0 0














- Exam








No acute distress, oriented 3.  Currently on 6 L nasal cannula, with 

saturations 90%.  No audible wheezing, use of accessory muscles, or 

conversational dyspnea.





HEENT examination is grossly unremarkable. 





Neck supple.  Full range of motion.  No adenopathy thyromegaly or neck vein 

distention.





Cardiovascular examination reveals regular rhythm rate.  S1-S2 normal.  No S3 or

S4.  No discernible murmur noted.  Heart rate 83 bpm.





Lungs reveal bilateral rhonchi, and basilar crackles.  No wheezes.  Breath 

sounds are diminished throughout.  There is prolongation on forced maneuver.  





Abdomen soft bowel sounds are heard.  No masses or tenderness.





Extremities are intact.  No cyanosis clubbing or edema.





Skin is without rash or lesion.





Neurologic examination is brief but nonfocal.





- Labs


CBC & Chem 7: 


                                 04/11/21 07:07





                                 04/11/21 07:07





Assessment and Plan


Plan: 











Acute hypoxemic respiratory failure, secondary to severe COPD, and pulmonary 

fibrosis.





Stage III/severe COPD, with an FEV1 that is 46% of predicted.





History of rheumatoid arthritis.





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

fibrosis.





History of obstructive sleep apnea syndrome, currently not on CPAP.





Chronic back pain.





History of hyperlipidemia.





History of osteoarthritis.





Plan: 





Clinically the patient is feeling better.  Continue bronchodilators.  IV 

Solu-Medrol for another 24 hours.  Continue diuretics.  I  reduce the oxygen 

flow down to 4 L.  The patient will be monitored and possible discharge within 

next 24-48 hours.  Overall condition is stable for now.  Pulmonary we'll see 

this patient on an as-needed basis.

## 2021-04-13 VITALS
SYSTOLIC BLOOD PRESSURE: 101 MMHG | TEMPERATURE: 97.9 F | DIASTOLIC BLOOD PRESSURE: 60 MMHG | HEART RATE: 79 BPM | RESPIRATION RATE: 18 BRPM

## 2021-04-13 LAB
ALBUMIN SERPL-MCNC: 3.3 G/DL (ref 3.8–4.9)
ALBUMIN/GLOB SERPL: 1.57 G/DL (ref 1.6–3.17)
ALP SERPL-CCNC: 69 U/L (ref 41–126)
ALT SERPL-CCNC: 62 U/L (ref 10–49)
ANION GAP SERPL CALC-SCNC: 11.5 MMOL/L (ref 4–12)
AST SERPL-CCNC: 28 U/L (ref 14–35)
BUN SERPL-SCNC: 34 MG/DL (ref 9–27)
BUN/CREAT SERPL: 42.5 RATIO (ref 12–20)
CALCIUM SPEC-MCNC: 9 MG/DL (ref 8.7–10.3)
CHLORIDE SERPL-SCNC: 88 MMOL/L (ref 96–109)
CO2 SERPL-SCNC: 35.5 MMOL/L (ref 21.6–31.8)
GLOBULIN SER CALC-MCNC: 2.1 G/DL (ref 1.6–3.3)
GLUCOSE SERPL-MCNC: 134 MG/DL (ref 70–110)
POTASSIUM SERPL-SCNC: 4.2 MMOL/L (ref 3.5–5.5)
PROT SERPL-MCNC: 5.4 G/DL (ref 6.2–8.2)
SODIUM SERPL-SCNC: 135 MMOL/L (ref 135–145)

## 2021-04-13 RX ADMIN — IPRATROPIUM BROMIDE AND ALBUTEROL SULFATE SCH ML: .5; 3 SOLUTION RESPIRATORY (INHALATION) at 07:57

## 2021-04-13 RX ADMIN — METHYLPREDNISOLONE SODIUM SUCCINATE SCH: 125 INJECTION, POWDER, FOR SOLUTION INTRAMUSCULAR; INTRAVENOUS at 12:01

## 2021-04-13 RX ADMIN — BUDESONIDE SCH MG: 1 SUSPENSION RESPIRATORY (INHALATION) at 07:56

## 2021-04-13 RX ADMIN — Medication SCH MCG: at 08:40

## 2021-04-13 RX ADMIN — AZITHROMYCIN SCH MG: 500 TABLET, FILM COATED ORAL at 08:40

## 2021-04-13 RX ADMIN — FLUTICASONE PROPIONATE SCH: 50 SPRAY, METERED NASAL at 08:45

## 2021-04-13 RX ADMIN — METHYLPREDNISOLONE SODIUM SUCCINATE SCH MG: 125 INJECTION, POWDER, FOR SOLUTION INTRAMUSCULAR; INTRAVENOUS at 05:57

## 2021-04-13 RX ADMIN — THERA TABS SCH EACH: TAB at 08:40

## 2021-04-13 RX ADMIN — METOPROLOL SUCCINATE SCH MG: 25 TABLET, EXTENDED RELEASE ORAL at 08:40

## 2021-04-13 RX ADMIN — IPRATROPIUM BROMIDE AND ALBUTEROL SULFATE SCH: .5; 3 SOLUTION RESPIRATORY (INHALATION) at 12:04

## 2021-04-13 RX ADMIN — SERTRALINE HYDROCHLORIDE SCH MG: 50 TABLET, FILM COATED ORAL at 08:40

## 2021-04-13 RX ADMIN — MELOXICAM SCH MG: 7.5 TABLET ORAL at 08:40

## 2021-04-13 RX ADMIN — PANTOPRAZOLE SODIUM SCH MG: 40 TABLET, DELAYED RELEASE ORAL at 08:40

## 2021-04-13 RX ADMIN — CLOPIDOGREL BISULFATE SCH MG: 75 TABLET ORAL at 08:40

## 2021-04-13 RX ADMIN — FORMOTEROL FUMARATE DIHYDRATE SCH MCG: 20 SOLUTION RESPIRATORY (INHALATION) at 07:56

## 2021-04-13 NOTE — P.DS
Providers


Date of admission: 


04/11/21 09:05





Expected date of discharge: 04/13/21


Attending physician: 


Esteban Ramos





Consults: 





                                        





04/10/21 23:24


Consult Physician Urgent 


   Consulting Provider: London Salazar


   Consult Reason/Comments: pulmonary fibrosis, COPD, hypoxia


   Do you want consulting provider notified?: Yes


Consult Physician Urgent 


   Consulting Provider: Kalen Crawford


   Consult Reason/Comments: CHF


   Do you want consulting provider notified?: Yes











Primary care physician: 


Amalia Bazan





[facility] Course: 





HISTORY OF PRESENT ILLNESS


This is a 71-year-old male patient of Dr. Bazan and Dr. Pickard with past 

medical history of advanced steroid-dependent COPD, hyperlipidemia, rheumatoid 

arthritis, obstructive sleep apnea unable to tolerate CPAP, chronic hypoxic 

respiratory failure on home O2 at 3 L nasal cannula, generalized osteoarthritis,

chronic back pain pseudomonas in sputum on previous admission secondary to 

colonization or pseudomonal pneumonia.  Known FVC of 72% and FEV1 of 46%.  

History of perforated duodenal ulcer status post exploratory laparotomy with 

repair of perforated duodenal ulcer and repair of incarcerated umbilical hernia.

 Patient was hospitalized for COPD exacerbation and discharged home on April 10.

 According to patient's family, his oxygen tank only goes to 5 L and this all 

oxygen saturations were in the 70s to low 90s at best with his home oxygen.  

Patient was complaining of dyspnea since being discharged.  No chest pain.  No 

dizziness or lightheadedness.  Echocardiogram on last admission reveals EF of 

55-60% with mild concentric left ventricular hypertrophy, mild aortic valve 

sclerosis, trace aortic regurgitation, mild mitral regurgitation, mild tricuspid

regurgitation, moderate pulmonary hypertension.





Patient came into [facility] emergency center for evaluation.  

Patient was afebrile, heart rate 58, respiratory rate 26, blood pressure 124/74,

pulse ox 96% on 6 L nasal cannula. EKG was in normal sinus rhythm with 

occasional PACs and no acute ST changes.  WBC 7.8, hemoglobin 12.3, platelet 

count 313.  Sodium 132, potassium 4.2, chloride 95, CO2 33, BUN 19 and 

creatinine 0.56.  AST 66 otherwise liver function tests were normal.  ProBNP 

11,800.  INR 1.0.  Lactic acid 1.0.  Troponin 0.0-3.  Chest x-ray reveals 

extensive pulmonary interstitial fibrosis.  Increasing infiltrate at the lung 

bases with right pleural effusion.  Congestive heart failure possible. Patient 

was given 1 dose of IV Lasix in the emergency center and [facility]

floor with consult in place for pulmonary medicine and cardiology.  Patient has 

been started on his home medications including oral prednisone.





4/12: She has been seen and followed by cardiology with recommendations to 

continue IV Lasix 40 mg every 12 hours.  She has also been seen and followed by 

pulmonary medicine.   will make arrangements for patient to have an 

oxygen concentrator at home which will reach 6 L nasal cannula which is needed 

for patient to manage his COPD and CHF.  Patient is currently on 5 L nasal 

cannula with pulse ox at 96%.  His been afebrile, heart rate 82, heart rate 74, 

blood pressure 113/69.  INR was not accurately documented.





4/13: Dr. Pickard requested to monitor patient for another 24 hours and 

continue diuretics.  He decreased oxygen to 4 L nasal cannula.  Pulmonary is 

following on an as-needed basis.  Cardiology has transitioned IV Lasix to oral 

40 mg twice daily. Cardiology cleared patient for discharge.  Repeat chest x-ray

reveals COPD and fibrosis. Patchy peripheral and basilar infiltrates persist but

slow slight improvement. Pulse ox is 93% on 4 L nasal cannula.  Heart rate in 

the 80s and 90s.  He has been afebrile, blood pressure 95/63.  ProBNP 1520.











ASSESSMENT AND PLAN


1.  Acute on chronic hypoxic respiratory failure secondary to acute on chronic 

diastolic heart failure.  


2.  Advanced COPD with pulmonary fibrosis, steroid dependent and chronic hypoxic

and hypercapnic respiratory failure on home O2 at 3 L. 


3.  Elevated troponins on last admission secondary to possible type II MI.


4.  Moderate pulmonary hypertension.


5.  Hypertension.  


6.  Rheumatoid arthritis, stable.


7.  Hyperlipidemia.


8.  Obstructive sleep apnea unable to tolerate CPAP.


9.  Chronic back pain and generalized osteoarthritis.  


10.  History of perforated duodenal ulcer status post exploratory laparotomy 

with repair of perforated duodenal ulcer and repair of incarcerated umbilical 

hernia.


11.  Generalized anxiety disorder and recurrent depression. 


12.  Insomnia.  


15.  Chronic compression fracture. 


16.  Urinary retention. 





DISCHARGE PLAN


Most likely return home with [facility]NA.  





Impression and plan of care have been directed as dictated by the signing 

physician.  Chloé Maldonado nurse practitioner acting as scribe for signing 

physician. 


Patient Condition at Discharge: Stable





Plan - Discharge Summary


Discharge Rx Participant: No


New Discharge Prescriptions: 


New


   Furosemide [Lasix] 40 mg PO BID@0900,1600 #28 tab


   predniSONE 0 mg PO AS DIRECTED #30 tab





Continue


   Cholecalciferol [Vitamin D3 (25 Mcg = 1000 Iu)] 50 mcg PO DAILY


   Albuterol Inhaler [Ventolin Hfa Inhaler] 2 puff INHALATION RT-QID PRN


     PRN Reason: Shortness Of Breath


   Omeprazole [PriLOSEC] 40 mg PO DAILY


   Metoprolol Succinate [Toprol XL] 25 mg PO DAILY


   Sertraline [Zoloft] 150 mg PO DAILY


   Multivitamins, Thera [Multivitamin (formulary)] 1 tab PO DAILY


   HYDROcodone/APAP 10-325MG [Norco ] 1 tab PO TID PRN


     PRN Reason: Pain


   Benzonatate [Tessalon Perles] 200 mg PO TID PRN


     PRN Reason: Cough


   Celecoxib [CeleBREX] 200 mg PO DAILY


   Fluticasone Nasal Spray [Flonase Nasal Spray] 2 spray EA NOSTRIL DAILY #1 spr


   Melatonin 3 mg PO HS PRN


     PRN Reason: Insomnia


   Clopidogrel [Plavix] 75 mg PO DAILY #14 tab


   Amitriptyline HCl [Elavil] 10 mg PO HS


   Gabapentin [Neurontin] 300 mg PO HS


   Alendronate Sodium 70 mg PO MO


   Ascorbic Acid [Vitamin C] 1,000 mg PO DAILY


   Ipratropium-Albuterol Nebulize [Duoneb 0.5 mg-3 mg/3 ml Soln] 3 ml INHALATION

RT-QID


   Atorvastatin [Lipitor] 40 mg PO HS #14 tab


   Budesonide [Pulmicort] 1 mg INHALATION RT-BID #28 inhalation





Discontinued


   predniSONE 5 mg PO Q48H


   Azithromycin [Zithromax] 500 mg PO DAILY #3 tab


   predniSONE [Deltasone] See Taper PO AS DIRECTED


   Fluticasone/Umeclidin/Vilanter [Trelegy Ellipta 100-62.5-25] 1 puff I

NHALATION RT-DAILY


Discharge Medication List





Albuterol Inhaler [Ventolin Hfa Inhaler] 2 puff INHALATION RT-QID PRN 05/12/20 

[History]


Cholecalciferol [Vitamin D3 (25 Mcg = 1000 Iu)] 50 mcg PO DAILY 05/12/20 

[History]


HYDROcodone/APAP 10-325MG [Norco ] 1 tab PO TID PRN 05/12/20 [History]


Metoprolol Succinate [Toprol XL] 25 mg PO DAILY 05/12/20 [History]


Multivitamins, Thera [Multivitamin (formulary)] 1 tab PO DAILY 05/12/20 

[History]


Omeprazole [PriLOSEC] 40 mg PO DAILY 05/12/20 [History]


Sertraline [Zoloft] 150 mg PO DAILY 05/12/20 [History]


Amitriptyline HCl [Elavil] 10 mg PO HS 04/06/21 [History]


Benzonatate [Tessalon Perles] 200 mg PO TID PRN 04/06/21 [History]


Celecoxib [CeleBREX] 200 mg PO DAILY 04/06/21 [History]


Gabapentin [Neurontin] 300 mg PO HS 04/06/21 [History]


Alendronate Sodium 70 mg PO MO 04/10/21 [History]


Ascorbic Acid [Vitamin C] 1,000 mg PO DAILY 04/10/21 [History]


Fluticasone Nasal Spray [Flonase Nasal Spray] 2 spray EA NOSTRIL DAILY #1 spr 

04/10/21 [Rx]


Ipratropium-Albuterol Nebulize [Duoneb 0.5 mg-3 mg/3 ml Soln] 3 ml INHALATION 

RT-QID 04/10/21 [History]


Melatonin 3 mg PO HS PRN 04/10/21 [History]


Atorvastatin [Lipitor] 40 mg PO HS #14 tab 04/13/21 [Rx]


Budesonide [Pulmicort] 1 mg INHALATION RT-BID #28 inhalation 04/13/21 [Rx]


Clopidogrel [Plavix] 75 mg PO DAILY #14 tab 04/13/21 [Rx]


Furosemide [Lasix] 40 mg PO BID@0900,1600 #28 tab 04/13/21 [Rx]


predniSONE 0 mg PO AS DIRECTED #30 tab 04/13/21 [Rx]








Follow up Appointment(s)/Referral(s): 


Amalia Bazan MD [Primary Care Provider] - 1-2 days


Kalen Crawford MD [STAFF PHYSICIAN] - 2 Weeks (Office will call pt with appointment

)


London Salazar DO [Doctor of Osteopathic Medicine] - 05/04/21 1:15 pm


[facility] Visiting Nurse, [NON-STAFF] - 1-2 Days


[facility] [REFERRING] - 1-2 Days


Patient Instructions/Handouts:  Heart Failure (DC), COPD (Chronic Obstructive 

Pulmonary Disease) (DC)


Activity/Diet/Wound Care/Special Instructions: 


***fax discharge medication list to the [facility] so new rx can be ordered***





Vivienne can be reached at phone [phone] and fax [phone].


Discharge Disposition: HOME WITH HOME HEALTH SERVICES

## 2021-04-13 NOTE — XR
EXAMINATION TYPE: XR chest 2V

 

DATE OF EXAM: 4/13/2021

 

COMPARISON: 4/10/2021

 

HISTORY: 71-year-old male follow-up COPD and fibrosis 

 

TECHNIQUE:  Frontal and lateral views

 

FINDINGS:  

Patient is rotated towards the right. Heart upper limits of normal in size. Hyperinflation. Medium in
creased interstitial opacities throughout and patchy bibasilar opacities. There may be slight improve
ment in aeration. Severe focal kyphotic deformity mid thoracic spine secondary to compression fractur
es. Normal variant azygous fissure.

 

 

IMPRESSION:  

COPD and fibrosis. Patchy peripheral and basilar infiltrates persist but show slight improvement.

## 2021-04-13 NOTE — P.PN
Subjective





This is a pleasant 71-year-old  male past medical history significant 

for COPD, hypertension, pulmonary fibrosis, obstructive sleep apnea and 

dyslipidemia.  He is seen and examined sitting up in bed eating breakfast in no 

acute distress.  He states overall his breathing has improved since admission.  

He has no further symptoms of shortness of breath.  He has no chest pain, 

dizziness or palpitations.  Blood pressure 96/62 heart rate 92 afebrile 

maintaining oxygen saturation on nasal cannula.  Laboratory data from today is 

pending.  24-hour urine output is 2200 mls maintaining a negative fluid balance.

 Echocardiogram obtained on previous admission reveals preserved LV systolic 

function with ejection fraction 55-60% with mild aortic stenosis and a mean 

gradient of 6 mmHg, mild MR, mild TR and moderate pulmonary hypertension with 

RVSP 56 mmHg.





GENERAL: Well-appearing, well-nourished and in no acute distress.


NECK: Supple without JVD or thyromegaly.


LUNGS: Breath sounds clear to auscultation bilaterally. Respiration equal and 

unlabored.  No wheezes, rales or rhonchi. Diminished bilaterally.


HEART: Regular rate and rhythm without murmurs, rubs or gallops. S1 and S2 

heard.


EXTREMITIES: Normal range of motion, no edema.  No clubbing or cyanosis. 

Peripheral pulses intact.





ASSESSMENT


Acute diastolic heart failure


Acute exacerbation of COPD


Hypertension


Dyslipidemia


Pulmonary hypertension


Pulmonary fibrosis


Obstructive sleep apnea





PLAN


Transition to oral diuretics.


Follow-up on discharge with Dr. Crawford.





Nurse Practitioner note has been reviewed, I agree with a documented findings 

and plan of care.  Patient was seen and examined.














Objective





- Vital Signs


Vital signs: 


                                   Vital Signs











Temp  98.0 F   04/13/21 07:00


 


Pulse  90   04/13/21 08:22


 


Resp  16   04/13/21 07:00


 


BP  96/62   04/13/21 07:00


 


Pulse Ox  93 L  04/13/21 07:57








                                 Intake & Output











 04/12/21 04/13/21 04/13/21





 18:59 06:59 18:59


 


Intake Total 480  


 


Output Total 1200 1000 


 


Balance -720 -1000 


 


Weight 55 kg 54.5 kg 


 


Intake:   


 


  Oral 480  


 


Output:   


 


  Urine 1200 1000 


 


  Post Void Residual  0 


 


Other:   


 


  Voiding Method Urinal Urinal 


 


  # Voids  3 


 


  # Bowel Movements 0  














- Labs


CBC & Chem 7: 


                                 04/11/21 07:07





                                 04/12/21 09:14


Labs: 


                  Abnormal Lab Results - Last 24 Hours (Table)











  04/12/21 Range/Units





  09:14 


 


Chloride  88 L  ()  mmol/L


 


Carbon Dioxide  37.3 H  (21.6-31.8)  mmol/L


 


BUN/Creatinine Ratio  32.86 H  (12.00-20.00)  Ratio


 


Glucose  172 H  ()  mg/dL

## 2021-11-08 ENCOUNTER — HOSPITAL ENCOUNTER (INPATIENT)
Dept: HOSPITAL 47 - EC | Age: 71
LOS: 2 days | Discharge: NURSING, REHAB OR LONG-TERM CARE | DRG: 536 | End: 2021-11-10
Attending: SURGERY | Admitting: SURGERY
Payer: MEDICARE

## 2021-11-08 DIAGNOSIS — Z79.52: ICD-10-CM

## 2021-11-08 DIAGNOSIS — Z79.899: ICD-10-CM

## 2021-11-08 DIAGNOSIS — Z79.02: ICD-10-CM

## 2021-11-08 DIAGNOSIS — Z99.81: ICD-10-CM

## 2021-11-08 DIAGNOSIS — Z87.11: ICD-10-CM

## 2021-11-08 DIAGNOSIS — J96.12: ICD-10-CM

## 2021-11-08 DIAGNOSIS — S32.592A: Primary | ICD-10-CM

## 2021-11-08 DIAGNOSIS — Z20.822: ICD-10-CM

## 2021-11-08 DIAGNOSIS — S42.031A: ICD-10-CM

## 2021-11-08 DIAGNOSIS — M48.00: ICD-10-CM

## 2021-11-08 DIAGNOSIS — S32.512A: ICD-10-CM

## 2021-11-08 DIAGNOSIS — I50.9: ICD-10-CM

## 2021-11-08 DIAGNOSIS — J40: ICD-10-CM

## 2021-11-08 DIAGNOSIS — Z87.891: ICD-10-CM

## 2021-11-08 DIAGNOSIS — M06.9: ICD-10-CM

## 2021-11-08 DIAGNOSIS — Z79.51: ICD-10-CM

## 2021-11-08 DIAGNOSIS — E87.1: ICD-10-CM

## 2021-11-08 DIAGNOSIS — J96.11: ICD-10-CM
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
## 2021-11-08 DIAGNOSIS — Z83.3: ICD-10-CM

## 2021-11-08 DIAGNOSIS — Z79.1: ICD-10-CM

## 2021-11-08 DIAGNOSIS — G47.00: ICD-10-CM

## 2021-11-08 DIAGNOSIS — Z91.81: ICD-10-CM

## 2021-11-08 DIAGNOSIS — G47.33: ICD-10-CM

## 2021-11-08 DIAGNOSIS — E78.5: ICD-10-CM

## 2021-11-08 DIAGNOSIS — G89.29: ICD-10-CM

## 2021-11-08 DIAGNOSIS — M81.0: ICD-10-CM

## 2021-11-08 DIAGNOSIS — F41.1: ICD-10-CM

## 2021-11-08 DIAGNOSIS — M15.9: ICD-10-CM

## 2021-11-08 DIAGNOSIS — J84.10: ICD-10-CM

## 2021-11-08 DIAGNOSIS — I27.20: ICD-10-CM

## 2021-11-08 DIAGNOSIS — J44.9: ICD-10-CM

## 2021-11-08 DIAGNOSIS — I08.3: ICD-10-CM

## 2021-11-08 DIAGNOSIS — I11.0: ICD-10-CM

## 2021-11-08 DIAGNOSIS — F33.9: ICD-10-CM

## 2021-11-08 DIAGNOSIS — Z87.01: ICD-10-CM

## 2021-11-08 DIAGNOSIS — Z88.1: ICD-10-CM

## 2021-11-08 LAB
ALBUMIN SERPL-MCNC: 3.4 G/DL (ref 3.5–5)
ALP SERPL-CCNC: 89 U/L (ref 38–126)
ALT SERPL-CCNC: 32 U/L (ref 4–49)
ANION GAP SERPL CALC-SCNC: 8 MMOL/L
APTT BLD: 22.9 SEC (ref 22–30)
AST SERPL-CCNC: 42 U/L (ref 17–59)
BASOPHILS # BLD AUTO: 0.1 K/UL (ref 0–0.2)
BASOPHILS NFR BLD AUTO: 0 %
BUN SERPL-SCNC: 16 MG/DL (ref 9–20)
CALCIUM SPEC-MCNC: 8.2 MG/DL (ref 8.4–10.2)
CHLORIDE SERPL-SCNC: 91 MMOL/L (ref 98–107)
CO2 SERPL-SCNC: 27 MMOL/L (ref 22–30)
EOSINOPHIL # BLD AUTO: 0.2 K/UL (ref 0–0.7)
EOSINOPHIL NFR BLD AUTO: 2 %
ERYTHROCYTE [DISTWIDTH] IN BLOOD BY AUTOMATED COUNT: 4.24 M/UL (ref 4.3–5.9)
ERYTHROCYTE [DISTWIDTH] IN BLOOD: 15.6 % (ref 11.5–15.5)
GLUCOSE SERPL-MCNC: 93 MG/DL (ref 74–99)
HCT VFR BLD AUTO: 38.7 % (ref 39–53)
HGB BLD-MCNC: 12.8 GM/DL (ref 13–17.5)
INR PPP: 1 (ref ?–1.2)
LYMPHOCYTES # SPEC AUTO: 2.8 K/UL (ref 1–4.8)
LYMPHOCYTES NFR SPEC AUTO: 22 %
MAGNESIUM SPEC-SCNC: 1.7 MG/DL (ref 1.6–2.3)
MCH RBC QN AUTO: 30.1 PG (ref 25–35)
MCHC RBC AUTO-ENTMCNC: 33 G/DL (ref 31–37)
MCV RBC AUTO: 91.2 FL (ref 80–100)
MONOCYTES # BLD AUTO: 0.8 K/UL (ref 0–1)
MONOCYTES NFR BLD AUTO: 6 %
NEUTROPHILS # BLD AUTO: 8.5 K/UL (ref 1.3–7.7)
NEUTROPHILS NFR BLD AUTO: 68 %
PLATELET # BLD AUTO: 323 K/UL (ref 150–450)
POTASSIUM SERPL-SCNC: 4 MMOL/L (ref 3.5–5.1)
PROT SERPL-MCNC: 6.6 G/DL (ref 6.3–8.2)
PT BLD: 10.3 SEC (ref 9–12)
SODIUM SERPL-SCNC: 126 MMOL/L (ref 137–145)
WBC # BLD AUTO: 12.5 K/UL (ref 3.8–10.6)

## 2021-11-08 PROCEDURE — 85025 COMPLETE CBC W/AUTO DIFF WBC: CPT

## 2021-11-08 PROCEDURE — 83605 ASSAY OF LACTIC ACID: CPT

## 2021-11-08 PROCEDURE — 85610 PROTHROMBIN TIME: CPT

## 2021-11-08 PROCEDURE — 80048 BASIC METABOLIC PNL TOTAL CA: CPT

## 2021-11-08 PROCEDURE — 83880 ASSAY OF NATRIURETIC PEPTIDE: CPT

## 2021-11-08 PROCEDURE — 84145 PROCALCITONIN (PCT): CPT

## 2021-11-08 PROCEDURE — 70450 CT HEAD/BRAIN W/O DYE: CPT

## 2021-11-08 PROCEDURE — 85730 THROMBOPLASTIN TIME PARTIAL: CPT

## 2021-11-08 PROCEDURE — 73502 X-RAY EXAM HIP UNI 2-3 VIEWS: CPT

## 2021-11-08 PROCEDURE — 87635 SARS-COV-2 COVID-19 AMP PRB: CPT

## 2021-11-08 PROCEDURE — 36415 COLL VENOUS BLD VENIPUNCTURE: CPT

## 2021-11-08 PROCEDURE — 93005 ELECTROCARDIOGRAM TRACING: CPT

## 2021-11-08 PROCEDURE — 96374 THER/PROPH/DIAG INJ IV PUSH: CPT

## 2021-11-08 PROCEDURE — 72125 CT NECK SPINE W/O DYE: CPT

## 2021-11-08 PROCEDURE — 84484 ASSAY OF TROPONIN QUANT: CPT

## 2021-11-08 PROCEDURE — 96376 TX/PRO/DX INJ SAME DRUG ADON: CPT

## 2021-11-08 PROCEDURE — 71046 X-RAY EXAM CHEST 2 VIEWS: CPT

## 2021-11-08 PROCEDURE — 80053 COMPREHEN METABOLIC PANEL: CPT

## 2021-11-08 PROCEDURE — 94640 AIRWAY INHALATION TREATMENT: CPT

## 2021-11-08 PROCEDURE — 83735 ASSAY OF MAGNESIUM: CPT

## 2021-11-08 PROCEDURE — 99285 EMERGENCY DEPT VISIT HI MDM: CPT

## 2021-11-08 RX ADMIN — IPRATROPIUM BROMIDE AND ALBUTEROL SULFATE SCH ML: .5; 3 SOLUTION RESPIRATORY (INHALATION) at 16:06

## 2021-11-08 RX ADMIN — HYDROCODONE BITARTRATE AND ACETAMINOPHEN PRN EACH: 10; 325 TABLET ORAL at 15:28

## 2021-11-08 RX ADMIN — METHYLPREDNISOLONE SODIUM SUCCINATE SCH MG: 125 INJECTION, POWDER, FOR SOLUTION INTRAMUSCULAR; INTRAVENOUS at 18:54

## 2021-11-08 RX ADMIN — HEPARIN SODIUM SCH UNIT: 5000 INJECTION INTRAVENOUS; SUBCUTANEOUS at 21:26

## 2021-11-08 RX ADMIN — IPRATROPIUM BROMIDE AND ALBUTEROL SULFATE SCH: .5; 3 SOLUTION RESPIRATORY (INHALATION) at 23:21

## 2021-11-08 RX ADMIN — AMITRIPTYLINE HYDROCHLORIDE SCH MG: 10 TABLET, FILM COATED ORAL at 21:26

## 2021-11-08 RX ADMIN — GABAPENTIN SCH MG: 300 CAPSULE ORAL at 21:26

## 2021-11-08 RX ADMIN — BUDESONIDE SCH: 0.5 INHALANT ORAL at 23:21

## 2021-11-08 RX ADMIN — PANTOPRAZOLE SODIUM SCH: 40 TABLET, DELAYED RELEASE ORAL at 15:10

## 2021-11-08 RX ADMIN — ATORVASTATIN CALCIUM SCH MG: 40 TABLET, FILM COATED ORAL at 21:26

## 2021-11-08 RX ADMIN — METHYLPREDNISOLONE SODIUM SUCCINATE SCH: 125 INJECTION, POWDER, FOR SOLUTION INTRAMUSCULAR; INTRAVENOUS at 11:49

## 2021-11-08 NOTE — ED
General Adult HPI





- General


Stated complaint: Hip Pain


Time Seen by Provider: 21 08:10


Source: patient, RN notes reviewed, old records reviewed





- History of Present Illness


Initial comments: 





This is a 71-year-old male who presents to the emergency department after having

rolled out of bed.  Patient states he did bump his head he denies loss of 

consciousness he denies being days he denies a headache denies numbness weakne

ss.  Patient denies any neck pain or neck tenderness.  Patient states he also 

hurt his left hip and that is the main reason he came in.  Patient denies any 

back pain patient denies any chest pain palpitations.  Patient denies any 

abdominal pain patient denies any recent fever chills or cough per patient 

denies any nausea vomiting.  Patient states he normally is on 4 L of oxygen at 

home.  Patient states he is short of breath but no more than normal.  Patient's 

only oxygen currently at 88% on 5 L.





- Related Data


                                Home Medications











 Medication  Instructions  Recorded  Confirmed


 


Cholecalciferol [Vitamin D3 (25 50 mcg PO DAILY 20





Mcg = 1000 Iu)]   


 


HYDROcodone/APAP 10-325MG [Norco 1 tab PO TID PRN 20





]   


 


Metoprolol Succinate [Toprol XL] 25 mg PO DAILY 20


 


Omeprazole [PriLOSEC] 40 mg PO DAILY 20


 


Sertraline [Zoloft] 150 mg PO DAILY 20


 


Amitriptyline HCl [Elavil] 10 mg PO HS 21


 


Benzonatate [Tessalon Perles] 200 mg PO TID PRN 21


 


Celecoxib [CeleBREX] 200 mg PO DAILY 21


 


Gabapentin [Neurontin] 300 mg PO HS 21


 


Alendronate Sodium 70 mg PO MO 04/10/21 11/08/21


 


Ascorbic Acid [Vitamin C] 1,000 mg PO DAILY 04/10/21 11/08/21


 


Ipratropium-Albuterol Nebulize 3 ml INHALATION RT-QID 04/10/21 11/08/21





[Duoneb 0.5 mg-3 mg/3 ml Soln]   


 


Melatonin 3 mg PO HS PRN 04/10/21 11/08/21


 


Atorvastatin [Lipitor] 40 mg PO HS 21


 


Budesonide [Pulmicort] 0.5 mg INHALATION RT-BID 21


 


Fluticasone Nasal Spray [Flonase 2 spr EA NOSTRIL DAILY 21





Nasal Spray]   


 


predniSONE 2.5 mg PO Q48H 21








                                  Previous Rx's











 Medication  Instructions  Recorded


 


Clopidogrel [Plavix] 75 mg PO DAILY #14 tab 21


 


Furosemide [Lasix] 40 mg PO BID@0900,1600 #28 tab 21











                                    Allergies











Allergy/AdvReac Type Severity Reaction Status Date / Time


 


amoxicillin AdvReac  Nausea & Verified 21 10:43





   Vomiting  














Review of Systems


ROS Statement: 


Those systems with pertinent positive or pertinent negative responses have been 

documented in the HPI.





ROS Other: All systems not noted in ROS Statement are negative.





Past Medical History


Past Medical History: Heart Failure, COPD, Hyperlipidemia, Osteoarthritis (OA), 

Pneumonia, Rheumatoid Arthritis (RA), Sleep Apnea/CPAP/BIPAP


Additional Past Medical History / Comment(s): COPD, chronic hypoxic respiratory 

failure previous history of pneumoperitoneum related to a perforated duodenal 

ulcer, repair of an umbilical hernia, chronic back pain, back stenosis.


History of Any Multi-Drug Resistant Organisms: None Reported


Past Surgical History: Orthopedic Surgery


Additional Past Surgical History / Comment(s): colonoscopy, repair of a 

perforated duodenal ulcer and repair of an incarcerated umbilical hernia


Past Anesthesia/Blood Transfusion Reactions: No Reported Reaction


Past Psychological History: Anxiety, Depression


Additional Psychological History / Comment(s): Pt resides with his spouse of 38 

yrs.  He has home oxygen which he wears at 3-3.5L/NC.  He has a nebulizer.


Smoking Status: Former smoker


Past Alcohol Use History: Daily


Additional Past Alcohol Use History / Comment(s): Patient smoked 2 packs per day

 for 40+ years.  He quit 6 years ago.  He denies any illicit drug use or 

marijuana use.  He drinks 2 beers per day but none since previous admission and 

May.  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 

involved


Past Drug Use History: None Reported





- Past Family History


  ** Father


Family Medical History: Cancer


Additional Family Medical History / Comment(s): Father  of leukemia.





  ** Mother


Family Medical History: Cancer, CVA/TIA, Dementia, Diabetes Mellitus


Additional Family Medical History / Comment(s): Mother is 88yrs old with history

 of dementia and colon cancer.





  ** Sister(s)


Additional Family Medical History / Comment(s): Patient 3 sons with no major 

medical problems.





General Exam





- General Exam Comments


Initial Comments: 





GENERAL:


Patient is well-developed and well-nourished.  Patient is nontoxic and 

well-hydrated and is in mild distress.





ENT:


Neck is soft and supple.  No significant lymphadenopathy is noted.  Oropharynx 

is clear.  Moist mucous membranes.  Neck has full range of motion without 

eliciting any pain.  





EYES:


The sclera were anicteric and conjunctiva were pink and moist.  Extraocular 

movements were intact and pupils were equal round and reactive to light.  

Eyelids were unremarkable.





PULMONARY:


Crackles bases with symmetric very wheezing





CARDIOVASCULAR:


There is a regular rate and rhythm without any murmurs gallops or rubs. 





ABDOMEN:


Soft and nontender with normal bowel sounds.





SKIN:


Skin is clear with no lesions or rashes and otherwise unremarkable.





NEUROLOGIC:


Patient is alert and oriented x3.  Cranial nerves II through XII are grossly 

intact.  Motor and sensory are also intact.  Normal speech, volume and content. 

 Symmetrical smile.





MUSCULOSKELETAL:


Patient's left hip is tender to palpation as well as any flexion or external 

rotation.





LYMPHATICS:


No significant lymphadenopathy is noted





PSYCHIATRIC:


Normal psychiatric evaluation.  





Course


                                   Vital Signs











  21





  08:13 09:00 09:23


 


Temperature 97.9 F  


 


Pulse Rate 95 93 106 H


 


Respiratory 18 18 





Rate   


 


Blood Pressure 107/73 101/66 


 


O2 Sat by Pulse 87 L 90 L 





Oximetry   














Medical Decision Making





- Medical Decision Making





EKG shows normal sinus rhythm at 94 bpm NC interval 122 QRS is 94 QT interval 

380 QTC is 475.  Patient's EKG shows no ST segment elevation or depression.





Wife states that his oxygenation usually is in the 80s even on 5 L





CT of the brain and C-spine showed no acute abnormality.





X-ray of the clavicle shows a right clavicle fracture





X-ray of the left hip shows a in the inferior and superior rami fracture





Patient's chest x-ray shows no acute abnormality.





I spoke with Dr. Barrera he agreed to admit the patient admitted the patient 

consult or for medicine.





- Lab Data


Result diagrams: 


                                 21 08:32





                                 21 08:32


                                   Lab Results











  21 Range/Units





  08:32 08:32 08:32 


 


WBC  12.5 H    (3.8-10.6)  k/uL


 


RBC  4.24 L    (4.30-5.90)  m/uL


 


Hgb  12.8 L    (13.0-17.5)  gm/dL


 


Hct  38.7 L    (39.0-53.0)  %


 


MCV  91.2    (80.0-100.0)  fL


 


MCH  30.1    (25.0-35.0)  pg


 


MCHC  33.0    (31.0-37.0)  g/dL


 


RDW  15.6 H    (11.5-15.5)  %


 


Plt Count  323    (150-450)  k/uL


 


MPV  7.4    


 


Neutrophils %  68    %


 


Lymphocytes %  22    %


 


Monocytes %  6    %


 


Eosinophils %  2    %


 


Basophils %  0    %


 


Neutrophils #  8.5 H    (1.3-7.7)  k/uL


 


Lymphocytes #  2.8    (1.0-4.8)  k/uL


 


Monocytes #  0.8    (0-1.0)  k/uL


 


Eosinophils #  0.2    (0-0.7)  k/uL


 


Basophils #  0.1    (0-0.2)  k/uL


 


PT   10.3   (9.0-12.0)  sec


 


INR   1.0   (<1.2)  


 


APTT   22.9   (22.0-30.0)  sec


 


Sodium    126 L  (137-145)  mmol/L


 


Potassium    4.0  (3.5-5.1)  mmol/L


 


Chloride    91 L  ()  mmol/L


 


Carbon Dioxide    27  (22-30)  mmol/L


 


Anion Gap    8  mmol/L


 


BUN    16  (9-20)  mg/dL


 


Creatinine    0.52 L  (0.66-1.25)  mg/dL


 


Est GFR (CKD-EPI)AfAm    >90  (>60 ml/min/1.73 sqM)  


 


Est GFR (CKD-EPI)NonAf    >90  (>60 ml/min/1.73 sqM)  


 


Glucose    93  (74-99)  mg/dL


 


Plasma Lactic Acid José Miguel     (0.7-2.0)  mmol/L


 


Calcium    8.2 L  (8.4-10.2)  mg/dL


 


Magnesium    1.7  (1.6-2.3)  mg/dL


 


Total Bilirubin    0.4  (0.2-1.3)  mg/dL


 


AST    42  (17-59)  U/L


 


ALT    32  (4-49)  U/L


 


Alkaline Phosphatase    89  ()  U/L


 


Troponin I     (0.000-0.034)  ng/mL


 


NT-Pro-B Natriuret Pep     pg/mL


 


Total Protein    6.6  (6.3-8.2)  g/dL


 


Albumin    3.4 L  (3.5-5.0)  g/dL


 


Coronavirus (PCR)     (Not Detectd)  














  21 Range/Units





  08:32 08:32 08:32 


 


WBC     (3.8-10.6)  k/uL


 


RBC     (4.30-5.90)  m/uL


 


Hgb     (13.0-17.5)  gm/dL


 


Hct     (39.0-53.0)  %


 


MCV     (80.0-100.0)  fL


 


MCH     (25.0-35.0)  pg


 


MCHC     (31.0-37.0)  g/dL


 


RDW     (11.5-15.5)  %


 


Plt Count     (150-450)  k/uL


 


MPV     


 


Neutrophils %     %


 


Lymphocytes %     %


 


Monocytes %     %


 


Eosinophils %     %


 


Basophils %     %


 


Neutrophils #     (1.3-7.7)  k/uL


 


Lymphocytes #     (1.0-4.8)  k/uL


 


Monocytes #     (0-1.0)  k/uL


 


Eosinophils #     (0-0.7)  k/uL


 


Basophils #     (0-0.2)  k/uL


 


PT     (9.0-12.0)  sec


 


INR     (<1.2)  


 


APTT     (22.0-30.0)  sec


 


Sodium     (137-145)  mmol/L


 


Potassium     (3.5-5.1)  mmol/L


 


Chloride     ()  mmol/L


 


Carbon Dioxide     (22-30)  mmol/L


 


Anion Gap     mmol/L


 


BUN     (9-20)  mg/dL


 


Creatinine     (0.66-1.25)  mg/dL


 


Est GFR (CKD-EPI)AfAm     (>60 ml/min/1.73 sqM)  


 


Est GFR (CKD-EPI)NonAf     (>60 ml/min/1.73 sqM)  


 


Glucose     (74-99)  mg/dL


 


Plasma Lactic Acid José Miguel  1.7    (0.7-2.0)  mmol/L


 


Calcium     (8.4-10.2)  mg/dL


 


Magnesium     (1.6-2.3)  mg/dL


 


Total Bilirubin     (0.2-1.3)  mg/dL


 


AST     (17-59)  U/L


 


ALT     (4-49)  U/L


 


Alkaline Phosphatase     ()  U/L


 


Troponin I   0.018   (0.000-0.034)  ng/mL


 


NT-Pro-B Natriuret Pep    1040  pg/mL


 


Total Protein     (6.3-8.2)  g/dL


 


Albumin     (3.5-5.0)  g/dL


 


Coronavirus (PCR)     (Not Detectd)  














  21 Range/Units





  09:15 


 


WBC   (3.8-10.6)  k/uL


 


RBC   (4.30-5.90)  m/uL


 


Hgb   (13.0-17.5)  gm/dL


 


Hct   (39.0-53.0)  %


 


MCV   (80.0-100.0)  fL


 


MCH   (25.0-35.0)  pg


 


MCHC   (31.0-37.0)  g/dL


 


RDW   (11.5-15.5)  %


 


Plt Count   (150-450)  k/uL


 


MPV   


 


Neutrophils %   %


 


Lymphocytes %   %


 


Monocytes %   %


 


Eosinophils %   %


 


Basophils %   %


 


Neutrophils #   (1.3-7.7)  k/uL


 


Lymphocytes #   (1.0-4.8)  k/uL


 


Monocytes #   (0-1.0)  k/uL


 


Eosinophils #   (0-0.7)  k/uL


 


Basophils #   (0-0.2)  k/uL


 


PT   (9.0-12.0)  sec


 


INR   (<1.2)  


 


APTT   (22.0-30.0)  sec


 


Sodium   (137-145)  mmol/L


 


Potassium   (3.5-5.1)  mmol/L


 


Chloride   ()  mmol/L


 


Carbon Dioxide   (22-30)  mmol/L


 


Anion Gap   mmol/L


 


BUN   (9-20)  mg/dL


 


Creatinine   (0.66-1.25)  mg/dL


 


Est GFR (CKD-EPI)AfAm   (>60 ml/min/1.73 sqM)  


 


Est GFR (CKD-EPI)NonAf   (>60 ml/min/1.73 sqM)  


 


Glucose   (74-99)  mg/dL


 


Plasma Lactic Acid José Miguel   (0.7-2.0)  mmol/L


 


Calcium   (8.4-10.2)  mg/dL


 


Magnesium   (1.6-2.3)  mg/dL


 


Total Bilirubin   (0.2-1.3)  mg/dL


 


AST   (17-59)  U/L


 


ALT   (4-49)  U/L


 


Alkaline Phosphatase   ()  U/L


 


Troponin I   (0.000-0.034)  ng/mL


 


NT-Pro-B Natriuret Pep   pg/mL


 


Total Protein   (6.3-8.2)  g/dL


 


Albumin   (3.5-5.0)  g/dL


 


Coronavirus (PCR)  Not Detected  (Not Detectd)  














Disposition


Clinical Impression: 


 Right clavicle fracture, Fracture of left inferior pubic ramus, Fracture of 

superior pubic ramus, COPD (chronic obstructive pulmonary disease)





Disposition: ADMITTED AS IP TO THIS HOSP


Referrals: 


Amalia Bazan MD [Primary Care Provider] - 1-2 days


Time of Disposition: 10:58

## 2021-11-08 NOTE — XR
EXAMINATION TYPE: XR Hip LT and AP Pelvis

 

DATE OF EXAM: 11/8/2021

 

COMPARISON: NONE

 

HISTORY: Pain

 

TECHNIQUE: A single AP view of the pelvis is obtained. Two views of the bilateral hip are obtained.  


 

FINDINGS:  There is no acute fracture/dislocation evident in the pelvis.  The hip and sacroiliac join
ts appear symmetric and unremarkable. Arthropathy of the hips. Vascular calcifications noted. There a
re calcifications in the region of the urethra. There is a deformity of the left pubic ramus.

 

IMPRESSION:

1. Findings suggest fracture of indeterminate age involving the superior and inferior left pubic chari
s. Correlate with point tenderness.

## 2021-11-08 NOTE — XR
EXAMINATION TYPE: XR chest 2V

 

DATE OF EXAM: 11/8/2021

 

COMPARISON: NONE

 

TECHNIQUE: PA and lateral views submitted.

 

HISTORY: Pain after falling

 

FINDINGS:

Coarsened interstitium with bilateral consolidation and pleural thickening or small effusion. There a
re rib deformities along the lower right rib cage which appear to be present on the prior chest x-ray
. No sizable pneumothorax. Subsegmental areas of consolidation are noted. Nodularity in the left uppe
r lobe is similar to the prior exam. Correlate for COPD.

 

IMPRESSION:

1. Chronic pulmonary fibrosis with bilateral infiltrate and small effusion. Underlying emphysematous 
changes are seen with vague nodularity in left upper lobe.

2. There are deformities of the lower right rib cage and lower left rib cage which most likely are ch
ronic correlate with point tenderness.

## 2021-11-08 NOTE — XR
EXAMINATION TYPE: XR clavicle RT

 

DATE OF EXAM: 11/8/2021

 

COMPARISON: NONE

 

HISTORY: Pain

 

TECHNIQUE: 2 view submitted

 

FINDINGS: Displaced fracture of the distal right clavicle. Coarsened interstitial lung changes are se
en.

 

IMPRESSION:

1. Displaced distal right clavicular fracture.

## 2021-11-08 NOTE — P.GSHP
History of Present Illness


H&P Date: 21





CHIEF COMPLAINT: Hip and right shoulder pain





HISTORY OF PRESENT ILLNESS: This is a 71-year-old male who came into the 

emergency room department after he rolled out of his bed landing on the floor.  

He reports that he did bump his head.  There is no loss of consciousness.  He 

complains of left hip pain and right shoulder pain.  He was found to have 

evidence of a right clavicle fracture and pelvic fracture.  Orthopedics have 

been consult at.  He does have a known history of COPD and is home O2 dependent 

usually 4 L of oxygen.  Patient denies any abdominal pain.  He reports that his 

pain is about 7 out of 10.  His arm is in a sling.  Orthopedics have been 

consult at.  He does take Plavix at home.  He denies any fever, chills or 

sweats.  Denies any nausea or vomiting.  Patient admitted to trauma service.





Patient seen and examined with Dr. Pina





PAST MEDICAL HISTORY: 


Heart Failure, COPD, Hyperlipidemia, osteoporosis, Osteoarthritis (OA), 

Pneumonia, Rheumatoid Arthritis (RA), Sleep Apnea/CPAP/BIPAP, COPD, chronic 

hypoxic respiratory failure, perforated duodenal ulcer, chronic back pain, back 

stenosis





PAST SURGICAL HISTORY: 


colonoscopy, repair of a perforated duodenal ulcer and repair of an incarcerated

umbilical hernia





MEDICATIONS: 


See list.





ALLERGIES: 


See list.





SOCIAL HISTORY: No illicit drug use.  Prior history of smoking.  Patient was in 

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 

construction.





REVIEW OF SYSTEMS: 


CONSTITUTIONAL: Denies fever or chills.


HEENT: Denies blurred vision, vision changes, or eye pain. Denies hemoptysis 


CARDIOVASCULAR: Denies chest pain or pressure.


RESPIRATORY: No shortness of breath. 


GASTROINTESTINAL: See HPI for pertinent findings


HEMATOLOGIC: Denies bleeding disorders.


GENITOURINARY:  Denies any blood in urine or increased urinary frequency.  


SKIN: Denies pruitis. Denies rash.





PHYSICAL EXAM: 


VITAL SIGNS: Reviewed


GENERAL: Well-developed in no acute distress. 


HEENT:  No sclera icterus. Extraocular movements grossly intact.  Moist buccal 

mucosa. Head is atraumatic, normocephalic. No nasal drainage.


ABDOMEN:  Soft.  Nondistended.  Nontender


NEUROLOGIC:  Alert and oriented.  Cranial nerves II through XII grossly intact.


Extremities right arm in sling





LABORATORY DATA:


WBC 12.5 hemoglobin 12.8 platelets 323


INR 1.0


Sodium 126 potassium 4.0 BUN 16 creatinine 0.52


Lactic acid 1.7


Magnesium 1.7


LFTs normal


BNP 1040





IMAGING:


Chest x-ray chronic pulmonary fibrosis with bilateral infiltrate and small 

effusion.  Underlying emphysematous changes are seen with vague nodularity in 

left upper lobe.  There are deformities of the lower right rib cage and lower 

left rib cage which most likely are chronic correlate with point tenderness 





Pelvic x-ray findings suggest fracture of indeterminate age involving the 

superior and inferior left pubic ramus 





Computed tomography scan of head and cervical spine.  There is no acute fracture

or dislocation evidence of cervical spine.  Acute displaced fracture distal one 

third of the right clavicle.  Displaced fracture middle one third of left cl

avicle has been present on several prior x-rays.  More prominent displacement on

today's x-ray.  No acute intracranial hemorrhage or midline shift is seen 





Right clavicle x-ray shows displaced distal right clavicular fracture 





ASSESSMENT: 


1.  Fall with trauma 


2.  Right clavicle fracture 


3.  Fracture of the superior and inferior left pubic ramus 


4.  COPD.  History of chronic respiratory failure home O2 dependent. 


5.  Hyponatremia 





PLAN: 


-Continue supportive care


-Consult orthopedics regarding right clavicle fracture and pelvic fracture


-Consult medicine service for medical management


-Continue regular diet


-Continue pain medication as needed


-GI prophylaxis Protonix and DVT prophylaxis subcu heparin





Physician Assistant note has been reviewed by physician. Signing provider agrees

with the documented findings, assessment, and plan of care. 





Past Medical History


Past Medical History: Heart Failure, COPD, Hyperlipidemia, Osteoarthritis (OA), 

Pneumonia, Rheumatoid Arthritis (RA), Sleep Apnea/CPAP/BIPAP


Additional Past Medical History / Comment(s): COPD, chronic hypoxic respiratory 

failure previous history of pneumoperitoneum related to a perforated duodenal 

ulcer, repair of an umbilical hernia, chronic back pain, back stenosis.


History of Any Multi-Drug Resistant Organisms: None Reported


Past Surgical History: Orthopedic Surgery


Additional Past Surgical History / Comment(s): colonoscopy, repair of a 

perforated duodenal ulcer and repair of an incarcerated umbilical hernia


Past Anesthesia/Blood Transfusion Reactions: No Reported Reaction


Past Psychological History: Anxiety, Depression


Additional Psychological History / Comment(s): Pt resides with his spouse of 38 

yrs.  He has home oxygen which he wears at 3-3.5L/NC.  He has a nebulizer.


Smoking Status: Former smoker


Past Alcohol Use History: Daily


Additional Past Alcohol Use History / Comment(s): Patient smoked 2 packs per day

for 40+ years.  He quit 6 years ago.  He denies any illicit drug use or 

marijuana use.  He drinks 2 beers per day but none since previous admission and 

May.  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 

involved


Past Drug Use History: None Reported





- Past Family History


  ** Father


Family Medical History: Cancer


Additional Family Medical History / Comment(s): Father  of leukemia.





  ** Mother


Family Medical History: Cancer, CVA/TIA, Dementia, Diabetes Mellitus


Additional Family Medical History / Comment(s): Mother is 88yrs old with history

of dementia and colon cancer.





  ** Sister(s)


Additional Family Medical History / Comment(s): Patient 3 sons with no major 

medical problems.





Medications and Allergies


                                Home Medications











 Medication  Instructions  Recorded  Confirmed  Type


 


Cholecalciferol [Vitamin D3 (25 50 mcg PO DAILY 20 History





Mcg = 1000 Iu)]    


 


HYDROcodone/APAP 10-325MG [Norco 1 tab PO TID PRN 20 History





]    


 


Metoprolol Succinate [Toprol XL] 25 mg PO DAILY 20 History


 


Omeprazole [PriLOSEC] 40 mg PO DAILY 20 History


 


Sertraline [Zoloft] 150 mg PO DAILY 20 History


 


Amitriptyline HCl [Elavil] 10 mg PO HS 21 History


 


Benzonatate [Tessalon Perles] 200 mg PO TID PRN 21 History


 


Celecoxib [CeleBREX] 200 mg PO DAILY 21 History


 


Gabapentin [Neurontin] 300 mg PO HS 21 History


 


Alendronate Sodium 70 mg PO MO 04/10/21 11/08/21 History


 


Ascorbic Acid [Vitamin C] 1,000 mg PO DAILY 04/10/21 11/08/21 History


 


Ipratropium-Albuterol Nebulize 3 ml INHALATION RT-QID 04/10/21 11/08/21 History





[Duoneb 0.5 mg-3 mg/3 ml Soln]    


 


Melatonin 3 mg PO HS PRN 04/10/21 11/08/21 History


 


Clopidogrel [Plavix] 75 mg PO DAILY #14 tab 21 Rx


 


Furosemide [Lasix] 40 mg PO BID@0900,1600 #28 tab 21 Rx


 


Atorvastatin [Lipitor] 40 mg PO HS 21 History


 


Budesonide [Pulmicort] 0.5 mg INHALATION RT-BID 21 History


 


Fluticasone Nasal Spray [Flonase 2 spr EA NOSTRIL DAILY 21 

History





Nasal Spray]    


 


predniSONE 2.5 mg PO Q48H 21 History








                                    Allergies











Allergy/AdvReac Type Severity Reaction Status Date / Time


 


amoxicillin AdvReac  Nausea & Verified 21 10:43





   Vomiting  














Surgical - Exam


                                   Vital Signs











Temp Pulse Resp BP Pulse Ox


 


 97.9 F   95   18   107/73   87 L


 


 21 08:13  21 08:13  21 08:13  21 08:13  21 08:13














Results





- Labs





                                 21 08:32





                                 21 08:32


                  Abnormal Lab Results - Last 24 Hours (Table)











  21 Range/Units





  08:32 08:32 


 


WBC  12.5 H   (3.8-10.6)  k/uL


 


RBC  4.24 L   (4.30-5.90)  m/uL


 


Hgb  12.8 L   (13.0-17.5)  gm/dL


 


Hct  38.7 L   (39.0-53.0)  %


 


RDW  15.6 H   (11.5-15.5)  %


 


Neutrophils #  8.5 H   (1.3-7.7)  k/uL


 


Sodium   126 L  (137-145)  mmol/L


 


Chloride   91 L  ()  mmol/L


 


Creatinine   0.52 L  (0.66-1.25)  mg/dL


 


Calcium   8.2 L  (8.4-10.2)  mg/dL


 


Albumin   3.4 L  (3.5-5.0)  g/dL








                                 Diabetes panel











  21 Range/Units





  08:32 


 


Sodium  126 L  (137-145)  mmol/L


 


Potassium  4.0  (3.5-5.1)  mmol/L


 


Chloride  91 L  ()  mmol/L


 


Carbon Dioxide  27  (22-30)  mmol/L


 


BUN  16  (9-20)  mg/dL


 


Creatinine  0.52 L  (0.66-1.25)  mg/dL


 


Glucose  93  (74-99)  mg/dL


 


Calcium  8.2 L  (8.4-10.2)  mg/dL


 


AST  42  (17-59)  U/L


 


ALT  32  (4-49)  U/L


 


Alkaline Phosphatase  89  ()  U/L


 


Total Protein  6.6  (6.3-8.2)  g/dL


 


Albumin  3.4 L  (3.5-5.0)  g/dL








                                  Calcium panel











  21 Range/Units





  08:32 


 


Calcium  8.2 L  (8.4-10.2)  mg/dL


 


Albumin  3.4 L  (3.5-5.0)  g/dL








                                 Pituitary panel











  21 Range/Units





  08:32 


 


Sodium  126 L  (137-145)  mmol/L


 


Potassium  4.0  (3.5-5.1)  mmol/L


 


Chloride  91 L  ()  mmol/L


 


Carbon Dioxide  27  (22-30)  mmol/L


 


BUN  16  (9-20)  mg/dL


 


Creatinine  0.52 L  (0.66-1.25)  mg/dL


 


Glucose  93  (74-99)  mg/dL


 


Calcium  8.2 L  (8.4-10.2)  mg/dL








                                  Adrenal panel











  21 Range/Units





  08:32 


 


Sodium  126 L  (137-145)  mmol/L


 


Potassium  4.0  (3.5-5.1)  mmol/L


 


Chloride  91 L  ()  mmol/L


 


Carbon Dioxide  27  (22-30)  mmol/L


 


BUN  16  (9-20)  mg/dL


 


Creatinine  0.52 L  (0.66-1.25)  mg/dL


 


Glucose  93  (74-99)  mg/dL


 


Calcium  8.2 L  (8.4-10.2)  mg/dL


 


Total Bilirubin  0.4  (0.2-1.3)  mg/dL


 


AST  42  (17-59)  U/L


 


ALT  32  (4-49)  U/L


 


Alkaline Phosphatase  89  ()  U/L


 


Total Protein  6.6  (6.3-8.2)  g/dL


 


Albumin  3.4 L  (3.5-5.0)  g/dL

## 2021-11-08 NOTE — CT
EXAMINATION TYPE: CT brain cspine wo con

 

DATE OF EXAM: 11/8/2021

 

COMPARISON: NONE

 

HISTORY: Trauma.  Fall from bed this am with headache and neck pain.

 

CT DLP: 1290 mGycm. Automated Exposure Control for Dose Reduction was Utilized.

 

 

TECHNIQUE: CT scan of the head and cervical spine are performed without contrast.

 

FINDINGS:   There is no acute intracranial hemorrhage or midline shift identified. Mild to moderate v
entricular and sulcal prominence.  Mild low attenuation in the periventricular white matter. The calv
arium is intact. Moderate mucosal thickening in the small caliber right maxillary sinus. Patchy opaci
fication anterior right ethmoid sinuses. Ossified lateral aspect right frontal sinus. Globes are inta
ct.

 

Cervical spine is visualized in its entirety from C1 through upper thoracic levels and demonstrates g
rade 1 anterolisthesis C4 on C5 without evidence of acute fracture or dislocation.  Prevertebral soft
 tissue appears within normal limits.  The C1-C2 articulation is within normal limits on the coronal 
images.  

 

Vertebral body heights and disc space heights are maintained. Spinal canal is preserved. Axial images
 show multilevel uncovertebral facet degenerative changes bilaterally contributing to multilevel bila
teral neural foraminal narrowing. There is acute comminuted slightly displaced fracture through the d
istal one third of the right clavicle axial image 65 partially imaged. On review of chest x-ray I jaki
pect displaced left midclavicular fracture not included in field of view on CT cervical spine. Osseou
s structures are demineralized.

 

IMPRESSION:

1. There is no acute fracture or dislocation evident in the cervical spine. Acute displaced fracture 
distal one third right clavicle. Displaced fracture middle one third left clavicle has been present o
n several prior x-rays. More prominent displacement on today's x-ray.

2. No acute intracranial hemorrhage or midline shift is seen.

## 2021-11-09 LAB
ANION GAP SERPL CALC-SCNC: 10 MMOL/L (ref 4–12)
BASOPHILS # BLD AUTO: 0.01 X 10*3/UL (ref 0–0.1)
BASOPHILS NFR BLD AUTO: 0.2 %
BUN SERPL-SCNC: 14.2 MG/DL (ref 9–27)
BUN/CREAT SERPL: 23.67 RATIO (ref 12–20)
CALCIUM SPEC-MCNC: 8.7 MG/DL (ref 8.7–10.3)
CHLORIDE SERPL-SCNC: 95 MMOL/L (ref 96–109)
CO2 SERPL-SCNC: 28 MMOL/L (ref 21.6–31.8)
EOSINOPHIL # BLD AUTO: 0 X 10*3/UL (ref 0.04–0.35)
EOSINOPHIL NFR BLD AUTO: 0 %
ERYTHROCYTE [DISTWIDTH] IN BLOOD BY AUTOMATED COUNT: 3.94 X 10*6/UL (ref 4.4–5.6)
ERYTHROCYTE [DISTWIDTH] IN BLOOD: 16.1 % (ref 11.5–14.5)
GLUCOSE SERPL-MCNC: 141 MG/DL (ref 70–110)
HCT VFR BLD AUTO: 35.5 % (ref 39.6–50)
HGB BLD-MCNC: 11.5 G/DL (ref 13–17)
LYMPHOCYTES # SPEC AUTO: 0.94 X 10*3/UL (ref 0.9–5)
LYMPHOCYTES NFR SPEC AUTO: 14.7 %
MCH RBC QN AUTO: 29.2 PG (ref 27–32)
MCHC RBC AUTO-ENTMCNC: 32.4 G/DL (ref 32–37)
MCV RBC AUTO: 90.1 FL (ref 80–97)
MONOCYTES # BLD AUTO: 0.3 X 10*3/UL (ref 0.2–1)
MONOCYTES NFR BLD AUTO: 4.7 %
NEUTROPHILS # BLD AUTO: 5.09 X 10*3/UL (ref 1.8–7.7)
NEUTROPHILS NFR BLD AUTO: 79.8 %
PLATELET # BLD AUTO: 274 X 10*3/UL (ref 140–440)
POTASSIUM SERPL-SCNC: 4.6 MMOL/L (ref 3.5–5.5)
SODIUM SERPL-SCNC: 133 MMOL/L (ref 135–145)
WBC # BLD AUTO: 6.38 X 10*3/UL (ref 4.5–10)

## 2021-11-09 RX ADMIN — IPRATROPIUM BROMIDE AND ALBUTEROL SULFATE SCH ML: .5; 3 SOLUTION RESPIRATORY (INHALATION) at 19:42

## 2021-11-09 RX ADMIN — METOPROLOL SUCCINATE SCH MG: 25 TABLET, EXTENDED RELEASE ORAL at 08:28

## 2021-11-09 RX ADMIN — IPRATROPIUM BROMIDE AND ALBUTEROL SULFATE SCH ML: .5; 3 SOLUTION RESPIRATORY (INHALATION) at 08:36

## 2021-11-09 RX ADMIN — Medication SCH MCG: at 08:20

## 2021-11-09 RX ADMIN — HYDROCODONE BITARTRATE AND ACETAMINOPHEN PRN EACH: 10; 325 TABLET ORAL at 15:31

## 2021-11-09 RX ADMIN — METHYLPREDNISOLONE SODIUM SUCCINATE SCH MG: 125 INJECTION, POWDER, FOR SOLUTION INTRAMUSCULAR; INTRAVENOUS at 08:20

## 2021-11-09 RX ADMIN — AMITRIPTYLINE HYDROCHLORIDE SCH MG: 10 TABLET, FILM COATED ORAL at 21:27

## 2021-11-09 RX ADMIN — PANTOPRAZOLE SODIUM SCH MG: 40 TABLET, DELAYED RELEASE ORAL at 08:20

## 2021-11-09 RX ADMIN — ATORVASTATIN CALCIUM SCH MG: 40 TABLET, FILM COATED ORAL at 21:27

## 2021-11-09 RX ADMIN — BUDESONIDE SCH MG: 0.5 INHALANT ORAL at 19:45

## 2021-11-09 RX ADMIN — FLUTICASONE PROPIONATE SCH: 50 SPRAY, METERED NASAL at 08:20

## 2021-11-09 RX ADMIN — HYDROCODONE BITARTRATE AND ACETAMINOPHEN PRN EACH: 10; 325 TABLET ORAL at 08:28

## 2021-11-09 RX ADMIN — OXYCODONE HYDROCHLORIDE AND ACETAMINOPHEN SCH MG: 500 TABLET ORAL at 08:20

## 2021-11-09 RX ADMIN — HYDROCODONE BITARTRATE AND ACETAMINOPHEN PRN EACH: 10; 325 TABLET ORAL at 23:31

## 2021-11-09 RX ADMIN — HYDROCODONE BITARTRATE AND ACETAMINOPHEN PRN EACH: 10; 325 TABLET ORAL at 00:25

## 2021-11-09 RX ADMIN — SERTRALINE HYDROCHLORIDE SCH MG: 50 TABLET, FILM COATED ORAL at 11:30

## 2021-11-09 RX ADMIN — METHYLPREDNISOLONE SODIUM SUCCINATE SCH MG: 125 INJECTION, POWDER, FOR SOLUTION INTRAMUSCULAR; INTRAVENOUS at 05:14

## 2021-11-09 RX ADMIN — METHYLPREDNISOLONE SODIUM SUCCINATE SCH MG: 125 INJECTION, POWDER, FOR SOLUTION INTRAMUSCULAR; INTRAVENOUS at 00:24

## 2021-11-09 RX ADMIN — Medication SCH MG: at 15:31

## 2021-11-09 RX ADMIN — IPRATROPIUM BROMIDE AND ALBUTEROL SULFATE SCH ML: .5; 3 SOLUTION RESPIRATORY (INHALATION) at 15:08

## 2021-11-09 RX ADMIN — LEVOFLOXACIN SCH MG: 750 TABLET, FILM COATED ORAL at 11:30

## 2021-11-09 RX ADMIN — GABAPENTIN SCH MG: 300 CAPSULE ORAL at 21:27

## 2021-11-09 RX ADMIN — BUDESONIDE SCH MG: 0.5 INHALANT ORAL at 08:36

## 2021-11-09 RX ADMIN — IPRATROPIUM BROMIDE AND ALBUTEROL SULFATE SCH: .5; 3 SOLUTION RESPIRATORY (INHALATION) at 12:33

## 2021-11-09 RX ADMIN — HEPARIN SODIUM SCH UNIT: 5000 INJECTION INTRAVENOUS; SUBCUTANEOUS at 21:27

## 2021-11-09 RX ADMIN — HEPARIN SODIUM SCH UNIT: 5000 INJECTION INTRAVENOUS; SUBCUTANEOUS at 08:20

## 2021-11-09 NOTE — P.CONS
History of Present Illness





- Reason for Consult


Consult date: 21





- History of Present Illness





HISTORY OF PRESENT ILLNESS


This is a 71-year-old male patient of Dr. Bazan and Dr. Pickard with past 

medical history of advanced steroid-dependent COPD, hyperlipidemia, rheumatoid 

arthritis, obstructive sleep apnea unable to tolerate CPAP, chronic hypoxic 

respiratory failure on home O2 at 4 L nasal cannula, generalized osteoarthritis,

chronic back pain pseudomonas in sputum on previous admission secondary to 

colonization or pseudomonal pneumonia.  Known FVC of 72% and FEV1 of 46%.  

History of perforated duodenal ulcer status post exploratory laparotomy with 

repair of perforated duodenal ulcer and repair of incarcerated umbilical hernia.

 Patient states he was doing well at home and suddenly had gone to his son's 

home for dinner and that afternoon and evening he was watching a football game 

and drank 3 beers, went to bed and then he ended up growing out of bed onto that

would floor.  He states he has a new bushra-size bed and he thought he had more 

room and he did.  Patient normally ambulates with a walker.  He does complain of

cough with phlegm production that yellow and green.  He denies having any 

worsening dyspnea.  


Echocardiogram on 2021 reveals EF of 55-60% with mild concentric left 

ventricular hypertrophy, trace aortic regurgitation, mild mitral regurgitation, 

mild tricuspid regurgitation, moderate pulmonary hypertension.  





Patient came into [facility] emergency center for evaluation.  

Patient was afebrile, heart rate 5, blood pressure 107/73, pulse ox 87% on 4 L. 

Patient was placed on 5 L.


WBC 12.5, hemoglobin 12.8, platelet count 323.  INR 1.0.  Sodium 126, potassium 

4.0, chloride 91, CO2 27, BUN 16 creatinine 0.52.  Blood sugar 93.  Troponin 

0.018.  Liver function tests normal.  Magnesium 1.7.  ProBNP 1040.


Chest x-ray reveals chronic pulmonary fibrosis with bilateral infiltrate and 

small effusion.  Underlying emphysematous changes are seen with vague nodularity

in the left upper lobe.  Deformities of the lower right rib cage and lower left 

rib cage which most likely her chronic correlated with point tenderness.  

Deformity of the distal clavicle correlate with point tenderness for fracture.


Right clavicle x-ray revealed displaced distal right clavicle fracture.


CAT scan of the brain and cervical spine revealed no acute fracture or 

dislocation in the cervical spine.  Acute displaced fracture distal one third 

right clavicle.  Displaced fracture middle one third left clavicle has been 

present on several prior x-rays.  No acute intracranial hemorrhage or midline 

shift.


X-ray left hip and pelvis revealed fracture of the intermediate age involving 

the superior and inferior left pubic ramus.


Patient has been admitted to the general surgery and we are following for 

medical management.





REVIEW OF SYSTEMS


Constitutional: No fever, no chills, no night sweats.  No weight change.  No 

weakness, fatigue or lethargy.  No daytime sleepiness.


EENT: No headache.  No blurred vision or double vision, no loss of vision.  No 

loss of Hearing, no ringing in the ears, no dizziness.  No nasal drainage or 

congestion.  No epistaxis.  No sore throat.


Lungs: Reports shortness of breath, Reports cough, Reports sputum production.  

No wheezing.


Cardiovascular: No chest pain, no lower extremity edema.  No palpitations.  No 

paroxysmal nocturnal dyspnea.  No orthopnea.  No lightheadedness or dizziness.  

No syncopal episodes.


Abdominal: No abdominal pain.  No nausea, vomiting.  No diarrhea.  No 

constipation.  No bloody or tarry stools..  No loss of appetite.


Genitourinary: No dysuria, increased frequency, urgency.  No urinary retention.


Musculoskeletal: No myalgias.  No muscle weakness, no gait dysfunction, no 

frequent falls.  No back pain.  No neck pain.


Integumentary: No wounds, no lesions.  No rash or pruritus.  No unusual 

bruising.  No change in hair or nails.


Neurologic: No aphasia. No facial droop. No change in mentation. No head injury.

No headache. No paralysis. No paresthesia.


Psychiatric: No depression.  No anxiety.  No mood swings.


Endocrine: No abnormal blood sugars.  No weight change.   





SOCIAL HISTORY


Patient smoked 2 packs per day for 40+ years.  He quit 7 years ago.  He denies 

any illicit drug use or marijuana use.  He drinks 2 beers per day.  Patient was 

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 

construction.  patient is  has adult children wo are involved.





FAMILY HISTORY


Father  of leukemia. Mother is 89 yrs old with history of dementia and colon

cancer. Patient has 1 full sister with history of lung cancer.  Patient has one 

half-sister with adrenal problems.  Patient does not have any brothers.  Patient

3 sons with no major medical problems.





PHYSICAL EXAMINATION


Gen: This is 71-year-old  male.  Patient is resting in bed appears to 

be comfortable and in no acute distress.


HEENT: Head is atraumatic, normocephalic. Pupils equal, round. Sclerae is 

anicteric. 


NECK: Supple. No JVD. No lymphadenopathy. No thyromegaly. 


LUNGS: No wheezing.  Diminished at bases.  Crackles bilaterally.  No intercostal

retractions.


HEART: Regular rate and rhythm.  Systolic murmur.  Sinus tachycardia.


ABDOMEN: Soft. Bowel sounds are present. No masses.  No tenderness.  Large 

ventral hernia, soft.


EXTREMITIES: No pedal edema.  No calf tenderness.  Dorsalis pedis palpable 

bilaterally.


NEUROLOGICAL: Patient is awake, alert and oriented x3. Cranial nerves 2 through 

12 are grossly intact. 





ASSESSMENT AND PLAN


1.  Fall with trauma, stable.





2.  Right clavicular fracture.





3.  Fracture of the superior and inferior left pubic ramus, pathologic.  Consult

with orthopedics.





4.  Infiltrates on chest x-ray possible pneumonia, gram-negative pneumonia.  

Patient will be started on Levaquin 750 mg daily, Mucinex 1200 mg twice daily, 

Tessalon Perles 200 mg 3 times daily as needed and pro-calcitonin ordered.





5.  Chronic hypoxic respiratory failure.  Continue oxygen therapy.





6.  Advanced COPD with pulmonary fibrosis, steroid dependent and chronic hypoxic

and hypercapnic respiratory failure on home O2 at 4 L. continue Pulmicort 0.5 mg

twice daily, prednisone 2.5 mg every 48 hours, DuoNeb treatments 4 times daily, 

discontinue IV Solu-Medrol.





7.  Moderate pulmonary hypertension.





8.  Hypertension.  Continue Toprol-XL 25 mg daily.





9.  Rheumatoid arthritis, stable.





10.  Hyperlipidemia.  Continue atorvastatin 40 mg at bedtime.





11.  Obstructive sleep apnea unable to tolerate CPAP.





12.  Chronic back pain and generalized osteoarthritis.  Continue Norco as 

needed, gabapentin 300 mg at bedtime





13.  History of perforated duodenal ulcer status post exploratory laparotomy 

with repair of perforated duodenal ulcer and repair of incarcerated umbilical 

hernia.





14.  Generalized anxiety disorder and recurrent depression.  Continue Zoloft 150

mg daily, Elavil 10 mg at bedtime. 





15.  DVT prophylaxis.  Heparin subcu.





16.  GI prophylaxis.  Continue Protonix daily.





17.  Insomnia.  Continue Elavil 10 mg at bedtime.





Patient will be admitted to the hospital for a minimum of 2 night stay.





DISCHARGE PLAN


Most likely subacute rehab.  Consult PT and OT.





Impression and plan of care have been directed as dictated by the signing 

physician.  Chloé Maldonado nurse practitioner acting as scribe for signing 

physician.








Past Medical History


Past Medical History: Heart Failure, COPD, Hyperlipidemia, Osteoarthritis (OA), 

Pneumonia, Rheumatoid Arthritis (RA), Sleep Apnea/CPAP/BIPAP


Additional Past Medical History / Comment(s): COPD, chronic hypoxic respiratory 

failure previous history of pneumoperitoneum related to a perforated duodenal 

ulcer, repair of an umbilical hernia, chronic back pain, back stenosis.


History of Any Multi-Drug Resistant Organisms: None Reported


Past Surgical History: Orthopedic Surgery


Additional Past Surgical History / Comment(s): colonoscopy, repair of a 

perforated duodenal ulcer and repair of an incarcerated umbilical hernia


Past Anesthesia/Blood Transfusion Reactions: No Reported Reaction


Past Psychological History: Anxiety, Depression


Additional Psychological History / Comment(s): Pt resides with his spouse of 38 

yrs.  He has home oxygen which he wears at 3-3.5L/NC.  He has a nebulizer.


Smoking Status: Former smoker


Past Alcohol Use History: Daily


Additional Past Alcohol Use History / Comment(s): Patient smoked 2 packs per day

for 40+ years.  He quit 6 years ago.  He denies any illicit drug use or 

marijuana use.  He drinks 2 beers per day but none since previous admission and 

May.  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 

involved


Past Drug Use History: None Reported





- Past Family History


  ** Father


Family Medical History: Cancer


Additional Family Medical History / Comment(s): Father  of leukemia.





  ** Mother


Family Medical History: Cancer, CVA/TIA, Dementia, Diabetes Mellitus


Additional Family Medical History / Comment(s): Mother is 88yrs old with history

of dementia and colon cancer.





  ** Sister(s)


Additional Family Medical History / Comment(s): Patient 3 sons with no major 

medical problems.





Medications and Allergies


                                Home Medications











 Medication  Instructions  Recorded  Confirmed  Type


 


Cholecalciferol [Vitamin D3 (25 50 mcg PO DAILY 20 History





Mcg = 1000 Iu)]    


 


HYDROcodone/APAP 10-325MG [Norco 1 tab PO TID PRN 20 History





]    


 


Metoprolol Succinate [Toprol XL] 25 mg PO DAILY 20 History


 


Omeprazole [PriLOSEC] 40 mg PO DAILY 20 History


 


Sertraline [Zoloft] 150 mg PO DAILY 20 History


 


Amitriptyline HCl [Elavil] 10 mg PO HS 21 History


 


Benzonatate [Tessalon Perles] 200 mg PO TID PRN 21 History


 


Celecoxib [CeleBREX] 200 mg PO DAILY 21 History


 


Gabapentin [Neurontin] 300 mg PO HS 21 History


 


Alendronate Sodium 70 mg PO MO 04/10/21 11/08/21 History


 


Ascorbic Acid [Vitamin C] 1,000 mg PO DAILY 04/10/21 11/08/21 History


 


Ipratropium-Albuterol Nebulize 3 ml INHALATION RT-QID 04/10/21 11/08/21 History





[Duoneb 0.5 mg-3 mg/3 ml Soln]    


 


Melatonin 3 mg PO HS PRN 04/10/21 11/08/21 History


 


Clopidogrel [Plavix] 75 mg PO DAILY #14 tab 21 Rx


 


Furosemide [Lasix] 40 mg PO BID@0900,1600 #28 tab 21 Rx


 


Atorvastatin [Lipitor] 40 mg PO HS 21 History


 


Budesonide [Pulmicort] 0.5 mg INHALATION RT-BID 21 History


 


Fluticasone Nasal Spray [Flonase 2 spr EA NOSTRIL DAILY 21 

History





Nasal Spray]    


 


predniSONE 2.5 mg PO Q48H 21 History








                                    Allergies











Allergy/AdvReac Type Severity Reaction Status Date / Time


 


amoxicillin AdvReac  Nausea & Verified 21 10:43





   Vomiting  














Physical Exam


Vitals: 


                                   Vital Signs











  Temp Pulse Pulse Resp BP BP Pulse Ox


 


 21 08:50   88     


 


 21 08:36   84     


 


 21 01:19        96


 


 21 01:18  98.6 F   94  20   110/68  81 L


 


 21 00:00   103 H   18  117/73   93 L


 


 21 21:21   110 H   22  122/78   95


 


 21 20:22   100   18  119/71   95


 


 21 16:14   104 H     


 


 21 16:06   100     


 


 21 14:27     18   110/61  92 L


 


 21 11:52   98   18  101/60   95








                                Intake and Output











 21





 22:59 06:59 14:59


 


Output Total  250 


 


Balance  -250 


 


Output:   


 


  Urine  250 


 


Other:   


 


  Weight 68.039 kg  














Results


CBC & Chem 7: 


                                 21 03:33





                                 21 03:33

## 2021-11-09 NOTE — P.PN
Subjective


Progress Note Date: 11/09/21





CHIEF COMPLAINT: Hip and right shoulder pain





HISTORY OF PRESENT ILLNESS: This is a 71-year-old male who fell off of his bed 

and had evidence of a right clavicle fracture and fracture of the superior and 

inferior left pubic ramus.  Patient seen by orthopedics.  Awaiting their final 

decision.  Patient reports that his pain is controlled.  He currently has not 

been up to ambulate because we are awaiting orthopedic recommendations about 

activity level.  Patient tolerating diet.  Denies any nausea or vomiting.  

Denies any new pain.  Denies any abdominal pain.  Patient has been seen by 

medicine service.  Afebrile.  On 4 L of oxygen which he uses home.  WBC has 

normalized at 6.38 Hgb 11.5 platelets 274 sodium is up from 126-133 creatinine 

0.6





PHYSICAL EXAM: 


VITAL SIGNS: Reviewed.


GENERAL: Well-developed in no acute distress. 


HEENT:  No sclera icterus. Extraocular movements grossly intact.  Moist buccal 

mucosa. Head is atraumatic, normocephalic. 


ABDOMEN:  Soft.  Nondistended. Nontender.  Reducible incisional hernia


NEUROLOGIC: Alert and oriented. Cranial nerves II through XII grossly intact.





ASSESSMENT: 


1.  Fall with trauma 


2.  Right clavicle fracture 


3.  Fracture of the superior and inferior left pubic ramus 


4.  COPD.  History of chronic respiratory failure home O2 dependent. 


5.  Hyponatremia 








PLAN: 


-Awaiting orthopedic recommendations


-Continue supportive care


-Continue pain medication as needed


-GI prophylaxis Protonix and DVT prophylaxis subcu heparin





Physician Assistant note has been reviewed by physician. Signing provider agrees

with the documented findings, assessment, and plan of care. 





Objective





- Vital Signs


Vital signs: 


                                   Vital Signs











Temp  97.6 F   11/09/21 08:00


 


Pulse  88   11/09/21 08:50


 


Resp  16   11/09/21 08:00


 


BP  134/78   11/09/21 08:00


 


Pulse Ox  94 L  11/09/21 08:00








                                 Intake & Output











 11/08/21 11/09/21 11/09/21





 18:59 06:59 18:59


 


Intake Total   360


 


Output Total  250 


 


Balance  -250 360


 


Weight 68.039 kg  


 


Intake:   


 


  Oral   360


 


Output:   


 


  Urine  250 














- Labs


CBC & Chem 7: 


                                 11/09/21 03:33





                                 11/09/21 03:33


Labs: 


                  Abnormal Lab Results - Last 24 Hours (Table)











  11/09/21 11/09/21 Range/Units





  03:33 03:33 


 


RBC  3.94 L   (4.40-5.60)  X 10*6/uL


 


Hgb  11.5 L   (13.0-17.0)  g/dL


 


Hct  35.5 L   (39.6-50.0)  %


 


RDW  16.1 H   (11.5-14.5)  %


 


Eosinophils #  0 L   (0.04-0.35)  X 10*3/uL


 


Sodium   133 L  (135-145)  mmol/L


 


Chloride   95 L  ()  mmol/L


 


BUN/Creatinine Ratio   23.67 H  (12.00-20.00)  Ratio


 


Glucose   141 H  ()  mg/dL

## 2021-11-09 NOTE — P.CNOR
History of Present Illness





- [facility]


Consult date: 21


History of present illness: 


This patient is a 71-year-old male with a past medical history of COPD, 

hyperlipidemia, chronic hypoxic respiratory failure on home O2, umbilical hernia

that presented to [facility] emergency department on 21 with 

complaints of left hip pain following a fall at home. Patient states he rolled 

out of bed and landed on the floor. He was unable to get up on his own, his wife

helped him. Patient presented to [facility] emergency department. X-rays

were obtained and revealed a right clavicle fracture and left superior and 

inferior pubic rami fractures. CT of the head and C-spine revealed no cervical 

spine fracture, no acute intracranial findings. Patient was admitted under the 

care of trauma with a consult placed to orthopedic surgery. 


Patient is seen and examined beside this morning. He states his is only 

experiencing pain in his pelvis. He denies pain in his right shoulder or clav

icle. He denies left shoulder, clavicle pain. Patient states he has had multiple

falls recently. He denies additional injuries or areas of pain. He has not yet 

been out of bed since being admitted to the hospital. He had no additional 

complaints at this time. 





Past Medical History


Past Medical History: Heart Failure, COPD, Hyperlipidemia, Osteoarthritis (OA), 

Pneumonia, Rheumatoid Arthritis (RA), Sleep Apnea/CPAP/BIPAP


Additional Past Medical History / Comment(s): COPD, chronic hypoxic respiratory 

failure previous history of pneumoperitoneum related to a perforated duodenal 

ulcer, repair of an umbilical hernia, chronic back pain, back stenosis.


History of Any Multi-Drug Resistant Organisms: None Reported


Past Surgical History: Orthopedic Surgery


Additional Past Surgical History / Comment(s): colonoscopy, repair of a 

perforated duodenal ulcer and repair of an incarcerated umbilical hernia


Past Anesthesia/Blood Transfusion Reactions: No Reported Reaction


Past Psychological History: Anxiety, Depression


Additional Psychological History / Comment(s): Pt resides with his spouse of 38 

yrs.  He has home oxygen which he wears at 3-3.5L/NC.  He has a nebulizer.


Smoking Status: Former smoker


Past Alcohol Use History: Daily


Additional Past Alcohol Use History / Comment(s): Patient smoked 2 packs per day

for 40+ years.  He quit 6 years ago.  He denies any illicit drug use or 

marijuana use.  He drinks 2 beers per day but none since previous admission and 

May.  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 

involved


Past Drug Use History: None Reported





- Past Family History


  ** Father


Family Medical History: Cancer


Additional Family Medical History / Comment(s): Father  of leukemia.





  ** Mother


Family Medical History: Cancer, CVA/TIA, Dementia, Diabetes Mellitus


Additional Family Medical History / Comment(s): Mother is 88yrs old with history

of dementia and colon cancer.





  ** Sister(s)


Additional Family Medical History / Comment(s): Patient 3 sons with no major 

medical problems.





Medications and Allergies


                                Home Medications











 Medication  Instructions  Recorded  Confirmed  Type


 


Cholecalciferol [Vitamin D3 (25 50 mcg PO DAILY 20 History





Mcg = 1000 Iu)]    


 


HYDROcodone/APAP 10-325MG [Norco 1 tab PO TID PRN 20 History





]    


 


Metoprolol Succinate [Toprol XL] 25 mg PO DAILY 20 History


 


Omeprazole [PriLOSEC] 40 mg PO DAILY 20 History


 


Sertraline [Zoloft] 150 mg PO DAILY 20 History


 


Amitriptyline HCl [Elavil] 10 mg PO HS 21 History


 


Benzonatate [Tessalon Perles] 200 mg PO TID PRN 21 History


 


Celecoxib [CeleBREX] 200 mg PO DAILY 21 History


 


Gabapentin [Neurontin] 300 mg PO HS 21 History


 


Alendronate Sodium 70 mg PO MO 04/10/21 11/08/21 History


 


Ascorbic Acid [Vitamin C] 1,000 mg PO DAILY 04/10/21 11/08/21 History


 


Ipratropium-Albuterol Nebulize 3 ml INHALATION RT-QID 04/10/21 11/08/21 History





[Duoneb 0.5 mg-3 mg/3 ml Soln]    


 


Melatonin 3 mg PO HS PRN 04/10/21 11/08/21 History


 


Clopidogrel [Plavix] 75 mg PO DAILY #14 tab 21 Rx


 


Furosemide [Lasix] 40 mg PO BID@0900,1600 #28 tab 21 Rx


 


Atorvastatin [Lipitor] 40 mg PO HS 21 History


 


Budesonide [Pulmicort] 0.5 mg INHALATION RT-BID 21 History


 


Fluticasone Nasal Spray [Flonase 2 spr EA NOSTRIL DAILY 21 

History





Nasal Spray]    


 


predniSONE 2.5 mg PO Q48H 21 History








                                    Allergies











Allergy/AdvReac Type Severity Reaction Status Date / Time


 


amoxicillin AdvReac  Nausea & Verified 21 10:43





   Vomiting  














Physical Examination


On examination, patient is sitting up in bed in no apparent distress. He is 

alert and orientated x3. His head appears normocephalic and atraumatic. His 

breathing appears non-labored, nasal cannula in place. On inspection of the 

right clavicle, there is no swelling, ecchymosis, skin discoloration. There are 

no open wounds or lacerations, skin is intact. There is no pain with palpation 

of the right clavicle, shoulder, humerus, elbow, forearm, wrist, hand. Motor and

sensory function intact of the right upper extremity. Right upper extremity is 

warm and well perfused with brisk capillary refill distally. 


No obvious deformities or signs of trauma of the left upper extremity. There is 

no pain with palpation of the left clavicle. Skin is intact. 


Small areas of ecchymosis overlying the bilateral upper extremities. 





There is no pain with PROM of the bilateral hips. No pain with logrolling. No 

pain with PROM of the bilateral knees, ankles. Motor and sensory function intact

of the bilateral lower extremities. Dorsalis pedis pulses easily palpable bilate

rally, the bilateral lower extremities are warm and well perfused. Calves are 

soft and non-tender. 








Results


Right clavicle x-ray 21: Mildly displaced distal third clavicle fracture. 

Age indeterminate. 


 Pelvis x-ray 21: Superior and inferior pubic rami fractures. Age 

indeterminate. 





Upon review of previous x-rays from previous visits, patient also has a chronic 

left clavicle fracture, age indeterminate. 








- Labs


Labs: 


                                      H & H











  21 Range/Units





  08:32 


 


Hgb  12.8 L  (13.0-17.5)  gm/dL


 


Hct  38.7 L  (39.0-53.0)  %








                                   Coagulation











  21 Range/Units





  08:32 


 


INR  1.0  (<1.2)  











Result Diagrams: 


                                 21 03:33





                                 21 03:33





Assessment and Plan


Assessment: 


Distal third right clavicle fracture, age indeterminate. 


 Superior and inferior left pubic rami fractures, age indeterminate. 


 History of multiple falls. 





Plan: 


- Clinical and imaging findings were discussed with the patient. The patient was

discussed in detail with Dr. Alvarado. We have no plans for surgical int

ervention at this time. 


 - Based on his clinical exam, right clavicle fracture is most likely subacute. 

He may wear the sling for comfort. No lifting with right upper extremity at this

time.


 - Patient may weight bear to tolerance with a walker. Up with assistance. 


 - Recommend evaluation by physical therapy for gait and balance training. 

Patient may also require discharge to skilled nursing facility. 


 - Pain management as needed. 


 - Medical management per internal medicine. 


 - Patient my discharge from an orthopedic standpoint. We will plan to see the 

patient in the office in 7-10 days for repeat x-rays.

## 2021-11-10 VITALS
DIASTOLIC BLOOD PRESSURE: 65 MMHG | SYSTOLIC BLOOD PRESSURE: 102 MMHG | HEART RATE: 99 BPM | TEMPERATURE: 98.1 F | RESPIRATION RATE: 18 BRPM

## 2021-11-10 RX ADMIN — LEVOFLOXACIN SCH MG: 750 TABLET, FILM COATED ORAL at 08:16

## 2021-11-10 RX ADMIN — HYDROCODONE BITARTRATE AND ACETAMINOPHEN PRN EACH: 10; 325 TABLET ORAL at 16:24

## 2021-11-10 RX ADMIN — Medication SCH MCG: at 08:16

## 2021-11-10 RX ADMIN — Medication SCH MG: at 08:16

## 2021-11-10 RX ADMIN — HYDROCODONE BITARTRATE AND ACETAMINOPHEN PRN EACH: 10; 325 TABLET ORAL at 08:29

## 2021-11-10 RX ADMIN — PANTOPRAZOLE SODIUM SCH MG: 40 TABLET, DELAYED RELEASE ORAL at 08:15

## 2021-11-10 RX ADMIN — IPRATROPIUM BROMIDE AND ALBUTEROL SULFATE SCH ML: .5; 3 SOLUTION RESPIRATORY (INHALATION) at 11:06

## 2021-11-10 RX ADMIN — OXYCODONE HYDROCHLORIDE AND ACETAMINOPHEN SCH MG: 500 TABLET ORAL at 08:16

## 2021-11-10 RX ADMIN — FLUTICASONE PROPIONATE SCH: 50 SPRAY, METERED NASAL at 11:37

## 2021-11-10 RX ADMIN — IPRATROPIUM BROMIDE AND ALBUTEROL SULFATE SCH ML: .5; 3 SOLUTION RESPIRATORY (INHALATION) at 07:38

## 2021-11-10 RX ADMIN — BUDESONIDE SCH MG: 0.5 INHALANT ORAL at 07:38

## 2021-11-10 RX ADMIN — METOPROLOL SUCCINATE SCH MG: 25 TABLET, EXTENDED RELEASE ORAL at 08:16

## 2021-11-10 RX ADMIN — SERTRALINE HYDROCHLORIDE SCH MG: 50 TABLET, FILM COATED ORAL at 08:16

## 2021-11-10 RX ADMIN — HEPARIN SODIUM SCH UNIT: 5000 INJECTION INTRAVENOUS; SUBCUTANEOUS at 08:17

## 2021-11-10 NOTE — P.PN
Subjective


Progress Note Date: 11/10/21





HISTORY OF PRESENT ILLNESS


This is a 71-year-old male patient of Dr. Bazan and Dr. Pickard with past 

medical history of advanced steroid-dependent COPD, hyperlipidemia, rheumatoid 

arthritis, obstructive sleep apnea unable to tolerate CPAP, chronic hypoxic 

respiratory failure on home O2 at 4 L nasal cannula, generalized osteoarthritis,

chronic back pain pseudomonas in sputum on previous admission secondary to 

colonization or pseudomonal pneumonia.  Known FVC of 72% and FEV1 of 46%.  

History of perforated duodenal ulcer status post exploratory laparotomy with 

repair of perforated duodenal ulcer and repair of incarcerated umbilical hernia.

 Patient states he was doing well at home and suddenly had gone to his son's 

home for dinner and that afternoon and evening he was watching a football game 

and drank 3 beers, went to bed and then he ended up growing out of bed onto that

would floor.  He states he has a new bushra-size bed and he thought he had more 

room and he did.  Patient normally ambulates with a walker.  He does complain of

cough with phlegm production that yellow and green.  He denies having any 

worsening dyspnea.  


Echocardiogram on 4/7/2021 reveals EF of 55-60% with mild concentric left 

ventricular hypertrophy, trace aortic regurgitation, mild mitral regurgitation, 

mild tricuspid regurgitation, moderate pulmonary hypertension.  





Patient came into [facility] emergency center for evaluation.  

Patient was afebrile, heart rate 5, blood pressure 107/73, pulse ox 87% on 4 L. 

Patient was placed on 5 L.


WBC 12.5, hemoglobin 12.8, platelet count 323.  INR 1.0.  Sodium 126, potassium 

4.0, chloride 91, CO2 27, BUN 16 creatinine 0.52.  Blood sugar 93.  Troponin 

0.018.  Liver function tests normal.  Magnesium 1.7.  ProBNP 1040.


Chest x-ray reveals chronic pulmonary fibrosis with bilateral infiltrate and 

small effusion.  Underlying emphysematous changes are seen with vague nodularity

in the left upper lobe.  Deformities of the lower right rib cage and lower left 

rib cage which most likely her chronic correlated with point tenderness.  

Deformity of the distal clavicle correlate with point tenderness for fracture.


Right clavicle x-ray revealed displaced distal right clavicle fracture.


CAT scan of the brain and cervical spine revealed no acute fracture or 

dislocation in the cervical spine.  Acute displaced fracture distal one third 

right clavicle.  Displaced fracture middle one third left clavicle has been 

present on several prior x-rays.  No acute intracranial hemorrhage or midline 

shift.


X-ray left hip and pelvis revealed fracture of the intermediate age involving 

the superior and inferior left pubic ramus.


Patient has been admitted to the general surgery and we are following for 

medical management.





11/10: Patient has been seen by orthopedic with weightbearing as tolerated.  

Patient complains of pain to the pelvis as a #8/10 and complains of right-sided 

chest pain with coughing.  We will add in scheduled Toradol and scheduled 

Tylenol trying to avoid sedating medications.  Patient has been afebrile, heart 

rate 99, blood pressure 118/49, pulse ox 91% on 4 L nasal cannula.  Regarding 

the right-sided clavicle fracture on imaging, patient does not have tenderness 

at this site and would expect this to be an old fracture.  Pro-calcitonin came 

back at 0.08 but we will continue Levaquin and add in prednisone for possible 

bronchitis.  Incentive spirometry will be added. Patient has been seen by 

therapies with recommendations for subacute rehab.  Social work is following for

discharge plan to rehab most likely ready by tomorrow.





REVIEW OF SYSTEMS


Constitutional: No fever, no chills, no night sweats.  No weight change.  No 

weakness, fatigue or lethargy.  No daytime sleepiness.


EENT: No headache.  No blurred vision or double vision, no loss of vision.  No 

loss of Hearing, no ringing in the ears, no dizziness.  No nasal drainage or 

congestion.  No epistaxis.  No sore throat.


Lungs: Reports shortness of breath which is chronic, Reports cough, Reports 

sputum production.  No wheezing.


Cardiovascular: No chest pain, no lower extremity edema.  No palpitations.  No 

paroxysmal nocturnal dyspnea.  No orthopnea.  No lightheadedness or dizziness.  

No syncopal episodes.


Abdominal: No abdominal pain.  No nausea, vomiting.  No diarrhea.  No 

constipation.  No bloody or tarry stools..  No loss of appetite.


Genitourinary: No dysuria, increased frequency, urgency.  No urinary retention.


Musculoskeletal: No myalgias.  No muscle weakness, no gait dysfunction, no 

frequent falls.  No back pain.  No neck pain.  Reports pelvic pain.  Reports 

right rib pain.


Integumentary: No wounds, no lesions.  No rash or pruritus.  No unusual 

bruising.  No change in hair or nails.


Neurologic: No aphasia. No facial droop. No change in mentation. No head injury.

No headache. No paralysis. No paresthesia.


Psychiatric: No depression.  No anxiety.  No mood swings.


Endocrine: No abnormal blood sugars.  No weight change.   





PHYSICAL EXAMINATION


Gen: This is 71-year-old  male.  Patient is resting in recliner to be 

comfortable and in no acute distress.


HEENT: Head is atraumatic, normocephalic. Pupils equal, round. Sclerae is 

anicteric. 


NECK: Supple. No JVD. No lymphadenopathy. No thyromegaly. 


LUNGS: No wheezing.  Diminished at bases.  Crackles bilaterally.  No intercostal

retractions.


HEART: Regular rate and rhythm.  Systolic murmur.  


ABDOMEN: Soft. Bowel sounds are present. No masses.  No tenderness.  Large 

ventral hernia, soft.


EXTREMITIES: No pedal edema.  No calf tenderness.  Dorsalis pedis palpable 

bilaterally.


NEUROLOGICAL: Patient is awake, alert and oriented x3. Cranial nerves 2 through 

12 are grossly intact. 





ASSESSMENT AND PLAN


1.  Fall with trauma, stable.





2.  Right clavicular fracture most likely is chronic in nature .





3.  Fracture of the superior and inferior left pubic ramus, pathologic.  Consult

with orthopedics appreciated.  Patient to be weightbearing as tolerated, PT and 

OT, Toradol 15 mg every 6 hours IV push, Tylenol 500 mg every 6 hours scheduled 

for pain control .





4.  Infiltrates on chest x-ray possible pneumonia not likely with normal pro-

calciton.  Will continue Levaquin 500 mg daily, prednisone 40 mg daily for 5 

days for possible bronchitis. Continue Mucinex 1200 mg twice daily, Tessalon 

Perles 200 mg 3 times daily as needed.





5.  Chronic hypoxic respiratory failure.  Continue oxygen therapy.





6.  Advanced COPD with pulmonary fibrosis, steroid dependent and chronic hypoxic

and hypercapnic respiratory failure on home O2 at 4 L. continue Pulmicort 0.5 mg

twice daily, prednisone 2.5 mg every 48 hours, DuoNeb treatments 4 times daily, 

discontinue IV Solu-Medrol.





7.  Moderate pulmonary hypertension.





8.  Hypertension.  Continue Toprol-XL 25 mg daily.





9.  Rheumatoid arthritis, stable.





10.  Hyperlipidemia.  Continue atorvastatin 40 mg at bedtime.





11.  Obstructive sleep apnea unable to tolerate CPAP.





12.  Chronic back pain and generalized osteoarthritis.  Continue Norco as 

needed, gabapentin 300 mg at bedtime





13.  History of perforated duodenal ulcer status post exploratory laparotomy 

with repair of perforated duodenal ulcer and repair of incarcerated umbilical 

hernia.





14.  Generalized anxiety disorder and recurrent depression.  Continue Zoloft 150

mg daily, Elavil 10 mg at bedtime. 





15.  DVT prophylaxis.  Heparin subcu.





16.  GI prophylaxis.  Continue Protonix daily.





17.  Insomnia.  Continue Elavil 10 mg at bedtime.








DISCHARGE PLAN


Subacute rehab at [facility] or [facility], most likely 

will be ready on Thursday.





Impression and plan of care have been directed as dictated by the signing 

physician.  Chloé Maldonado nurse practitioner acting as scribe for signing 

physician.





Objective





- Vital Signs


Vital signs: 


                                   Vital Signs











Temp  97.5 F L  11/10/21 08:00


 


Pulse  92   11/10/21 11:19


 


Resp  20   11/10/21 08:00


 


BP  118/49   11/10/21 01:26


 


Pulse Ox  91 L  11/10/21 08:00








                                 Intake & Output











 11/09/21 11/10/21 11/10/21





 18:59 06:59 18:59


 


Intake Total 480 500 90


 


Balance 480 500 90


 


Intake:   


 


  Oral 480 500 90


 


Other:   


 


  Voiding Method  Urinal 


 


  # Voids 0 2 














- Labs


CBC & Chem 7: 


                                 11/09/21 03:33





                                 11/09/21 03:33

## 2021-11-10 NOTE — P.DS
Providers


Date of admission: 


11/08/21 10:59





Expected date of discharge: 11/10/21


Attending physician: 


Han Pina





Consults: 





                                        





11/08/21 10:59


Consult Physician Routine 


   Consulting Provider: Orion Alvarado


   Consult Reason/Comments: Right clavicle fracture, left pubic rami fracture 

superior and


                                                inferior


   Do you want consulting provider notified?: Yes





11/08/21 11:01


Consult Physician Routine 


   Consulting Provider: Esteban Ramos


   Consult Reason/Comments: COPD


   Do you want consulting provider notified?: Yes











Primary care physician: 


Amalia Bazan





Hospital Course: 





Discharge diagnosis


1.  Fall with trauma 


2.  Right clavicle fracture 


3.  Fracture of the superior and inferior left pubic ramus 


4.  COPD.  History of chronic respiratory failure home O2 dependent. 


5.  Hyponatremia 


6.  Possible bronchitis





Hospital course





Hospital course


his is a 71-year-old male who came into the emergency room department after he 

rolled out of his bed landing on the floor.  He reports that he did bump his 

head.  There is no loss of consciousness.  He complains of left hip pain and 

right shoulder pain.  He was found to have evidence of a right clavicle fracture

and pelvic fracture.  Orthopedics have been consulted.  They recommended 

conservative management and no surgical intervention.  Patient He does have a 

known history of COPD and is home O2 dependent usually 4 L of oxygen.  Patient 

denies any abdominal pain.  Patient worked with PT OT.  They are recommending 

subacute rehab.  Patient is stable for discharge.  [facility].  His pain is controlled.  He is tolerating diet.  He's 

afebrile.  He has been up and ambulating.  Please refer to chart for any further

details.





Physician Assistant note has been reviewed by physician. Signing provider agrees

with the documented findings, assessment, and plan of care.  


Patient Condition at Discharge: Stable





Plan - Discharge Summary


New Discharge Prescriptions: 


New


   predniSONE [Deltasone] 40 mg PO DAILY 4 Days  tab


   guaiFENesin [Mucinex] 1,200 mg PO Q12HR  tablet


   Multivitamins, Thera [Multivitamin (formulary)] 1 each PO DAILY  tab


   Levofloxacin [Levaquin] 500 mg PO Q24H #4 tab


   Thiamine [Vitamin B-1] 100 mg PO BID-W/MEALS  tab





Continue


   Cholecalciferol [Vitamin D3 (25 Mcg = 1000 Iu)] 50 mcg PO DAILY


   Omeprazole [PriLOSEC] 40 mg PO DAILY


   Metoprolol Succinate [Toprol XL] 25 mg PO DAILY


   Sertraline [Zoloft] 150 mg PO DAILY


   Benzonatate [Tessalon Perles] 200 mg PO TID PRN


     PRN Reason: Cough


   Celecoxib [CeleBREX] 200 mg PO DAILY


   Melatonin 3 mg PO HS PRN


     PRN Reason: Insomnia


   Clopidogrel [Plavix] 75 mg PO DAILY #14 tab


   Fluticasone Nasal Spray [Flonase Nasal Spray] 2 spr EA NOSTRIL DAILY


   Gabapentin [Neurontin] 300 mg PO HS #3 cap


   HYDROcodone/APAP 10-325MG [Norco ] 1 tab PO TID PRN #9 tab


     PRN Reason: Pain


   predniSONE 2.5 mg PO Q48H #0


   Amitriptyline HCl [Elavil] 10 mg PO HS


   Alendronate Sodium 70 mg PO MO


   Ascorbic Acid [Vitamin C] 1,000 mg PO DAILY


   Ipratropium-Albuterol Nebulize [Duoneb 0.5 mg-3 mg/3 ml Soln] 3 ml INHALATION

RT-QID


   Budesonide [Pulmicort] 0.5 mg INHALATION RT-BID


   Atorvastatin [Lipitor] 40 mg PO HS





Changed


   Furosemide [Lasix] 40 mg PO DAILY #28 tab


Discharge Medication List





Cholecalciferol [Vitamin D3 (25 Mcg = 1000 Iu)] 50 mcg PO DAILY 05/12/20 

[History]


Metoprolol Succinate [Toprol XL] 25 mg PO DAILY 05/12/20 [History]


Omeprazole [PriLOSEC] 40 mg PO DAILY 05/12/20 [History]


Sertraline [Zoloft] 150 mg PO DAILY 05/12/20 [History]


Amitriptyline HCl [Elavil] 10 mg PO HS 04/06/21 [History]


Benzonatate [Tessalon Perles] 200 mg PO TID PRN 04/06/21 [History]


Celecoxib [CeleBREX] 200 mg PO DAILY 04/06/21 [History]


Alendronate Sodium 70 mg PO MO 04/10/21 [History]


Ascorbic Acid [Vitamin C] 1,000 mg PO DAILY 04/10/21 [History]


Ipratropium-Albuterol Nebulize [Duoneb 0.5 mg-3 mg/3 ml Soln] 3 ml INHALATION 

RT-QID 04/10/21 [History]


Melatonin 3 mg PO HS PRN 04/10/21 [History]


Clopidogrel [Plavix] 75 mg PO DAILY #14 tab 04/13/21 [Rx]


Atorvastatin [Lipitor] 40 mg PO HS 11/08/21 [History]


Budesonide [Pulmicort] 0.5 mg INHALATION RT-BID 11/08/21 [History]


Fluticasone Nasal Spray [Flonase Nasal Spray] 2 spr EA NOSTRIL DAILY 11/08/21 

[History]


Furosemide [Lasix] 40 mg PO DAILY #28 tab 11/10/21 [Rx]


Gabapentin [Neurontin] 300 mg PO HS #3 cap 11/10/21 [Rx]


HYDROcodone/APAP 10-325MG [Norco ] 1 tab PO TID PRN #9 tab 11/10/21 [Rx]


Levofloxacin [Levaquin] 500 mg PO Q24H #4 tab 11/10/21 [Rx]


Multivitamins, Thera [Multivitamin (formulary)] 1 each PO DAILY  tab 11/10/21 

[Rx]


Thiamine [Vitamin B-1] 100 mg PO BID-W/MEALS  tab 11/10/21 [Rx]


guaiFENesin [Mucinex] 1,200 mg PO Q12HR  tablet 11/10/21 [Rx]


predniSONE 2.5 mg PO Q48H #0 11/10/21 [Rx]


predniSONE [Deltasone] 40 mg PO DAILY 4 Days  tab 11/10/21 [Rx]








Follow up Appointment(s)/Referral(s): 


Amalia Bazan MD [Primary Care Provider] - 1 Week


Orion Alvarado MD [Medical Doctor] - 1 Week


Activity/Diet/Wound Care/Special Instructions: 


Activity as tolerated





Diet regular


Discharge Disposition: TRANSFER TO SNF/ECF

## 2021-11-21 ENCOUNTER — HOSPITAL ENCOUNTER (INPATIENT)
Dept: HOSPITAL 47 - EC | Age: 71
LOS: 4 days | Discharge: HOME HEALTH | DRG: 196 | End: 2021-11-25
Attending: FAMILY MEDICINE | Admitting: FAMILY MEDICINE
Payer: MEDICARE

## 2021-11-21 VITALS — BODY MASS INDEX: 18.6 KG/M2

## 2021-11-21 DIAGNOSIS — E78.5: ICD-10-CM

## 2021-11-21 DIAGNOSIS — Z87.01: ICD-10-CM

## 2021-11-21 DIAGNOSIS — Z77.098: ICD-10-CM

## 2021-11-21 DIAGNOSIS — J96.22: ICD-10-CM

## 2021-11-21 DIAGNOSIS — J96.21: ICD-10-CM

## 2021-11-21 DIAGNOSIS — G47.00: ICD-10-CM

## 2021-11-21 DIAGNOSIS — J84.178: ICD-10-CM

## 2021-11-21 DIAGNOSIS — I50.33: ICD-10-CM

## 2021-11-21 DIAGNOSIS — F33.9: ICD-10-CM

## 2021-11-21 DIAGNOSIS — J84.10: ICD-10-CM

## 2021-11-21 DIAGNOSIS — Z79.1: ICD-10-CM

## 2021-11-21 DIAGNOSIS — Z99.81: ICD-10-CM

## 2021-11-21 DIAGNOSIS — G31.9: ICD-10-CM

## 2021-11-21 DIAGNOSIS — M84.411S: ICD-10-CM

## 2021-11-21 DIAGNOSIS — Z82.3: ICD-10-CM

## 2021-11-21 DIAGNOSIS — Z88.0: ICD-10-CM

## 2021-11-21 DIAGNOSIS — E87.8: ICD-10-CM

## 2021-11-21 DIAGNOSIS — Z20.822: ICD-10-CM

## 2021-11-21 DIAGNOSIS — D64.9: ICD-10-CM

## 2021-11-21 DIAGNOSIS — I11.0: ICD-10-CM

## 2021-11-21 DIAGNOSIS — Z79.02: ICD-10-CM

## 2021-11-21 DIAGNOSIS — Z79.51: ICD-10-CM

## 2021-11-21 DIAGNOSIS — Z80.6: ICD-10-CM

## 2021-11-21 DIAGNOSIS — H70.93: ICD-10-CM

## 2021-11-21 DIAGNOSIS — Z87.11: ICD-10-CM

## 2021-11-21 DIAGNOSIS — M48.54XS: ICD-10-CM

## 2021-11-21 DIAGNOSIS — Z80.1: ICD-10-CM

## 2021-11-21 DIAGNOSIS — J44.0: ICD-10-CM

## 2021-11-21 DIAGNOSIS — Z98.890: ICD-10-CM

## 2021-11-21 DIAGNOSIS — E87.1: ICD-10-CM

## 2021-11-21 DIAGNOSIS — G89.29: ICD-10-CM

## 2021-11-21 DIAGNOSIS — F41.1: ICD-10-CM

## 2021-11-21 DIAGNOSIS — G47.33: ICD-10-CM

## 2021-11-21 DIAGNOSIS — Z81.8: ICD-10-CM

## 2021-11-21 DIAGNOSIS — I08.1: ICD-10-CM

## 2021-11-21 DIAGNOSIS — J44.1: ICD-10-CM

## 2021-11-21 DIAGNOSIS — M48.00: ICD-10-CM

## 2021-11-21 DIAGNOSIS — Z79.52: ICD-10-CM

## 2021-11-21 DIAGNOSIS — Z83.3: ICD-10-CM

## 2021-11-21 DIAGNOSIS — K59.00: ICD-10-CM

## 2021-11-21 DIAGNOSIS — Z87.891: ICD-10-CM

## 2021-11-21 DIAGNOSIS — Z87.19: ICD-10-CM

## 2021-11-21 DIAGNOSIS — I27.20: ICD-10-CM

## 2021-11-21 DIAGNOSIS — M05.10: Primary | ICD-10-CM

## 2021-11-21 DIAGNOSIS — Z80.0: ICD-10-CM

## 2021-11-21 DIAGNOSIS — Z83.49: ICD-10-CM

## 2021-11-21 DIAGNOSIS — Z79.899: ICD-10-CM

## 2021-11-21 DIAGNOSIS — M15.9: ICD-10-CM

## 2021-11-21 LAB
ALBUMIN SERPL-MCNC: 2.8 G/DL (ref 3.5–5)
ALP SERPL-CCNC: 109 U/L (ref 38–126)
ALT SERPL-CCNC: 22 U/L (ref 4–49)
ANION GAP SERPL CALC-SCNC: 4 MMOL/L
APTT BLD: 24.6 SEC (ref 22–30)
AST SERPL-CCNC: 28 U/L (ref 17–59)
BASOPHILS # BLD AUTO: 0.1 K/UL (ref 0–0.2)
BASOPHILS NFR BLD AUTO: 1 %
BUN SERPL-SCNC: 17 MG/DL (ref 9–20)
CALCIUM SPEC-MCNC: 8.2 MG/DL (ref 8.4–10.2)
CHLORIDE SERPL-SCNC: 87 MMOL/L (ref 98–107)
CO2 SERPL-SCNC: 34 MMOL/L (ref 22–30)
EOSINOPHIL # BLD AUTO: 0.3 K/UL (ref 0–0.7)
EOSINOPHIL NFR BLD AUTO: 2 %
ERYTHROCYTE [DISTWIDTH] IN BLOOD BY AUTOMATED COUNT: 3.89 M/UL (ref 4.3–5.9)
ERYTHROCYTE [DISTWIDTH] IN BLOOD: 15.6 % (ref 11.5–15.5)
GLUCOSE SERPL-MCNC: 104 MG/DL (ref 74–99)
HCT VFR BLD AUTO: 35.9 % (ref 39–53)
HGB BLD-MCNC: 11.8 GM/DL (ref 13–17.5)
INR PPP: 1 (ref ?–1.2)
LYMPHOCYTES # SPEC AUTO: 2.4 K/UL (ref 1–4.8)
LYMPHOCYTES NFR SPEC AUTO: 20 %
MAGNESIUM SPEC-SCNC: 2 MG/DL (ref 1.6–2.3)
MCH RBC QN AUTO: 30.3 PG (ref 25–35)
MCHC RBC AUTO-ENTMCNC: 32.8 G/DL (ref 31–37)
MCV RBC AUTO: 92.4 FL (ref 80–100)
MONOCYTES # BLD AUTO: 0.7 K/UL (ref 0–1)
MONOCYTES NFR BLD AUTO: 6 %
NEUTROPHILS # BLD AUTO: 8.5 K/UL (ref 1.3–7.7)
NEUTROPHILS NFR BLD AUTO: 70 %
PH UR: 7.5 [PH] (ref 5–8)
PLATELET # BLD AUTO: 408 K/UL (ref 150–450)
POTASSIUM SERPL-SCNC: 4.8 MMOL/L (ref 3.5–5.1)
PROT SERPL-MCNC: 6.1 G/DL (ref 6.3–8.2)
PT BLD: 10.4 SEC (ref 9–12)
SODIUM SERPL-SCNC: 125 MMOL/L (ref 137–145)
SP GR UR: 1.01 (ref 1–1.03)
UROBILINOGEN UR QL STRIP: <2 MG/DL (ref ?–2)
WBC # BLD AUTO: 12.1 K/UL (ref 3.8–10.6)

## 2021-11-21 PROCEDURE — 96365 THER/PROPH/DIAG IV INF INIT: CPT

## 2021-11-21 PROCEDURE — 93005 ELECTROCARDIOGRAM TRACING: CPT

## 2021-11-21 PROCEDURE — 99291 CRITICAL CARE FIRST HOUR: CPT

## 2021-11-21 PROCEDURE — 84484 ASSAY OF TROPONIN QUANT: CPT

## 2021-11-21 PROCEDURE — 94760 N-INVAS EAR/PLS OXIMETRY 1: CPT

## 2021-11-21 PROCEDURE — 85025 COMPLETE CBC W/AUTO DIFF WBC: CPT

## 2021-11-21 PROCEDURE — 96367 TX/PROPH/DG ADDL SEQ IV INF: CPT

## 2021-11-21 PROCEDURE — 94640 AIRWAY INHALATION TREATMENT: CPT

## 2021-11-21 PROCEDURE — 71275 CT ANGIOGRAPHY CHEST: CPT

## 2021-11-21 PROCEDURE — 83735 ASSAY OF MAGNESIUM: CPT

## 2021-11-21 PROCEDURE — 96375 TX/PRO/DX INJ NEW DRUG ADDON: CPT

## 2021-11-21 PROCEDURE — 81003 URINALYSIS AUTO W/O SCOPE: CPT

## 2021-11-21 PROCEDURE — 71046 X-RAY EXAM CHEST 2 VIEWS: CPT

## 2021-11-21 PROCEDURE — 87205 SMEAR GRAM STAIN: CPT

## 2021-11-21 PROCEDURE — 85379 FIBRIN DEGRADATION QUANT: CPT

## 2021-11-21 PROCEDURE — 85730 THROMBOPLASTIN TIME PARTIAL: CPT

## 2021-11-21 PROCEDURE — 84145 PROCALCITONIN (PCT): CPT

## 2021-11-21 PROCEDURE — 36415 COLL VENOUS BLD VENIPUNCTURE: CPT

## 2021-11-21 PROCEDURE — 96366 THER/PROPH/DIAG IV INF ADDON: CPT

## 2021-11-21 PROCEDURE — 83880 ASSAY OF NATRIURETIC PEPTIDE: CPT

## 2021-11-21 PROCEDURE — 87635 SARS-COV-2 COVID-19 AMP PRB: CPT

## 2021-11-21 PROCEDURE — 87070 CULTURE OTHR SPECIMN AEROBIC: CPT

## 2021-11-21 PROCEDURE — 85610 PROTHROMBIN TIME: CPT

## 2021-11-21 PROCEDURE — 96376 TX/PRO/DX INJ SAME DRUG ADON: CPT

## 2021-11-21 PROCEDURE — 70450 CT HEAD/BRAIN W/O DYE: CPT

## 2021-11-21 PROCEDURE — 80053 COMPREHEN METABOLIC PANEL: CPT

## 2021-11-21 PROCEDURE — 83605 ASSAY OF LACTIC ACID: CPT

## 2021-11-21 RX ADMIN — IPRATROPIUM BROMIDE AND ALBUTEROL SULFATE SCH: .5; 3 SOLUTION RESPIRATORY (INHALATION) at 22:46

## 2021-11-21 RX ADMIN — IPRATROPIUM BROMIDE AND ALBUTEROL SULFATE SCH ML: .5; 3 SOLUTION RESPIRATORY (INHALATION) at 23:13

## 2021-11-21 NOTE — XR
EXAMINATION TYPE: XR chest 2V

 

DATE OF EXAM: 11/21/2021

 

COMPARISON: 11/8/2021

 

HISTORY: Weakness

 

TECHNIQUE: 2 views

 

FINDINGS: There is coarse interstitial infiltrates throughout both lungs. There is some mild blunting
 of the costophrenic angles. There are no hilar masses. Thoracic aorta is atheromatous.

 

IMPRESSION:

Coarse pulmonary infiltrates not significantly different than last exam and could relate to acute and
 chronic interstitial pneumonia. Heart failure not excluded.

## 2021-11-21 NOTE — CT
EXAMINATION TYPE: CT brain wo con

 

DATE OF EXAM: 11/21/2021

 

COMPARISON: 11/8/2021

 

HISTORY: ams

 

CT DLP: 1247.4 mGycm

Automated exposure control for dose reduction was used.

Exam performed without contrast.

 

There is cerebral cortical atrophy. There is no mass effect nor midline shift. There is no evidence o
f intracranial hemorrhage. Calvarium is intact. There is some mucosal thickening in the maxillary sin
uses. Skull base is intact. There is some mucosal thickening in the mastoid sinuses.

 

IMPRESSION:

Cerebral atrophy. There is some mild bilateral mastoiditis. No change compared to old exam.

## 2021-11-21 NOTE — CT
EXAMINATION TYPE: CT chest angio for PE

 

DATE OF EXAM: 11/21/2021

 

COMPARISON: 4/6/2021

 

HISTORY: elevated d-dimer, concern for PE

 

CT DLP: 246.9 mGycm

Automated exposure control for dose reduction was used.

 

CONTRAST: 

Performed with IV Contrast, patient injected with 100 mL of Isovue 370.

There are 3-D post processed images.

 

There is extensive coarse infiltrate and atelectasis in both lung fields. Seen in the upper and lower
 lobes bilaterally.

 

There is normal contrast opacification of the pulmonary arteries. There are no filling defects. Thora
cic aorta shows no evidence of aneurysm or dissection. There are a few mediastinal lymph nodes that m
easure up to 1 cm. There are no hilar masses.

 

There is thoracic kyphotic deformity with anterior wedging of T7 and T6 vertebra up to 90%. There is 
20% wedging of T5 vertebra.

 

IMPRESSION:

Advanced pulmonary fibrosis. Chronic pulmonary infiltrates similar to old exam. Pleural thickening at
 the lung bases consistent with scarring and not significantly different.

 

No evidence of pulmonary embolism.

 

Stable thoracic kyphotic deformity and mid thoracic compression fractures.

## 2021-11-21 NOTE — ED
General Adult HPI





- General


Chief complaint: Shortness of Breath


Stated complaint: Low oxygen


Time Seen by Provider: 21 16:21


Source: patient, EMS, RN notes reviewed, old records reviewed


Mode of arrival: EMS


Limitations: no limitations





- History of Present Illness


Initial comments: 





Patient is a 71-year-old male with past medical history remarkable for COPD, 

chronic hypoxia on 4 L nasal cannula at home, sleep apnea, recurrent pneumonia 

who recently fractured his shoulder and hip presents from his rehab facility for

worsening shortness of breath and a short episode of altered mental status 

related today.  The shortness of breath has been getting worse over the last 3-4

days.  He is actively being treated for pneumonia on azithromycin after course 

of Levaquin.  He states he did not appear to improve and was brought to 

emergency department for evaluation.  Patient presents with family members who 

saw him earlier today.  They did place a pulse ox on his finger and it revealed 

some hypoxia at the time.  Patient also had an episode of confusion and 

therefore they brought him to emergency department for evaluation.  Patient 

denies any falls.  He does endorse some shortness of breath as well as a 

minimally productive cough.  He has no other acute complaints at this time.  

Patient presents over concern for dyspnea and episode of altered mental status 

earlier today.





- Related Data


                                Home Medications











 Medication  Instructions  Recorded  Confirmed


 


Metoprolol Succinate [Toprol XL] 25 mg PO DAILY 20


 


Sertraline [Zoloft] 150 mg PO DAILY 20


 


Amitriptyline HCl [Elavil] 10 mg PO HS 21


 


Celecoxib [CeleBREX] 200 mg PO DAILY 21


 


Ascorbic Acid [Vitamin C] 1,000 mg PO DAILY 04/10/21 11/21/21


 


Ipratropium-Albuterol Nebulize 3 ml INHALATION RT-QID 04/10/21 11/21/21





[Duoneb 0.5 mg-3 mg/3 ml Soln]   


 


Melatonin 3 mg PO HS PRN 04/10/21 11/21/21


 


Budesonide [Pulmicort] 0.5 mg INHALATION RT-BID 21


 


Fluticasone Nasal Spray [Flonase 2 spr EA NOSTRIL DAILY 21





Nasal [place]]   


 


Acetaminophen [Tylenol] 650 mg PO Q6H PRN 21


 


Atorvastatin [Lipitor] 40 mg PO HS 21


 


Azithromycin [Zithromax Z-pack (6 See Taper PO AS DIRECTED 21





tabs)]   


 


Benzonatate [Tessalon Perles] 200 mg PO TID PRN 21


 


Calcium Carbonate 1,000 mg PO Q8H PRN 21


 


HYDROcodone/APAP 10-325MG [Norco 1 tab PO Q6H PRN 21





]   


 


Multivitamins, Thera [Multivitamin 1 tab PO DAILY 21





(formulary)]   


 


Omeprazole 20 mg PO DAILY 21


 


guaiFENesin 400 mg PO Q4H 21








                                  Previous Rx's











 Medication  Instructions  Recorded


 


Clopidogrel [Plavix] 75 mg PO DAILY #14 tab 21


 


Furosemide [Lasix] 40 mg PO DAILY #28 tab 11/10/21


 


Gabapentin [Neurontin] 300 mg PO HS #3 cap 11/10/21


 


Thiamine [Vitamin B-1] 100 mg PO BID-W/MEALS  tab 11/10/21


 


predniSONE 2.5 mg PO Q48H #0 11/10/21











                                    Allergies











Allergy/AdvReac Type Severity Reaction Status Date / Time


 


amoxicillin AdvReac  Nausea & Verified 21 17:12





   Vomiting  














Review of Systems


ROS Statement: 


Those systems with pertinent positive or pertinent negative responses have been 

documented in the HPI.


Review of Systems:


CONST: Denies fever 


EYES: Denies blurry vision 


ENT: Denies nasal congestion  


C/V:  Denies Chest pain


RESP: Endorses shortness of breath, cough.


GI: Denies abdominal pain 


: Denies dysuria  


SKIN: Denies rash.


MSK: Denies joint pain.


NEURO: Denies headache 


ROS Other: All systems not noted in ROS Statement are negative.





Past Medical History


Past Medical History: Heart Failure, COPD, Hyperlipidemia, Osteoarthritis (OA), 

Pneumonia, Rheumatoid Arthritis (RA), Sleep Apnea/CPAP/BIPAP


Additional Past Medical History / Comment(s): COPD, chronic hypoxic respiratory 

failure previous history of pneumoperitoneum related to a perforated duodenal 

ulcer, repair of an umbilical hernia, chronic back pain, back stenosis.


History of Any Multi-Drug Resistant Organisms: None Reported


Past Surgical History: Orthopedic Surgery


Additional Past Surgical History / Comment(s): colonoscopy, repair of a 

perforated duodenal ulcer and repair of an incarcerated umbilical hernia


Past Anesthesia/Blood Transfusion Reactions: No Reported Reaction


Past Psychological History: Anxiety, Depression


Smoking Status: Former smoker


Past Alcohol Use History: Daily


Past Drug Use History: None Reported





- Past Family History


  ** Father


Family Medical History: Cancer


Additional Family Medical History / Comment(s): Father  of leukemia.





  ** Mother


Family Medical History: Cancer, CVA/TIA, Dementia, Diabetes Mellitus


Additional Family Medical History / Comment(s): Mother is 88yrs old with history

of dementia and colon cancer.





  ** Sister(s)


Additional Family Medical History / Comment(s): Patient 3 sons with no major 

medical problems.





General Exam





- General Exam Comments


Initial Comments: 





General: Appears in no acute distress.


HEAD:  Normal with no signs of head trauma.


EYES:  PERRLA, EOMI, conjunctiva normal, no discharge.


ENT:  Hearing grossly intact, normal oropharynx.


RESPIRATORY: Bilateral end expiratory wheezing without any obvious rhonchi.  Not

hypoxic on his home 5 L nasal cannula.  No increased work of breathing at this 

time.


C/V:  Regular rate and rhythm. S1 and S2 auscultated, no edema, peripheral 

pulses 2+ and intact throughout


ABD:  Abd is soft, nontender, nondistended


EXT: Normal range of motion, no obvious deformity


SKIN:  No rashes or lesions observed on exposed skin.


NEURO: Alert and oriented 4 at this time.  No focal sensory strength deficits. 

NIH is 0.  GCS is 15.


Limitations: no limitations





Course





                                   Vital Signs











  21





  16:15 18:15 18:31


 


Temperature 97.9 F  


 


Pulse Rate 85 83 82


 


Respiratory 20 20 





Rate   


 


Blood Pressure 111/75 109/76 


 


O2 Sat by Pulse 98 92 L 





Oximetry   














  21





  18:38


 


Temperature 


 


Pulse Rate 84


 


Respiratory 





Rate 


 


Blood Pressure 


 


O2 Sat by Pulse 





Oximetry 














Medical Decision Making





- Medical Decision Making





Based on the patient's presentation and physical exam, I'm concerned for 

worsening infectious process for the patient but cannot rule out cardiac 

etiology at this time either.  Like to obtain a CT had as he expressed an 

episode of altered mental status without any known trauma however this is 

unknown.  We also obtained a d-dimer this patient does not perc out. Well's for 

PE is low at 1.5.  Therefore screening d-dimer will be obtained.  Patient will 

be given 125 mg of Solu-Medrol as well as a breathing treatment and 1 L fluid 

bolus for his current symptoms.  He will be connected to continuous cardiac 

monitoring and he was restarted on his home nasal cannula oxygen level.  He was 

in agreement with this plan.





Patient's EKG shows new isolated T-wave inversions in leads V2 and V3 without 

any other changes at this time.  Chest x-ray reveals coarse pulmonary 

infiltrates.  CT brain revealed no acute intracranial process.  There is chronic

cerebral atrophy.  Laboratory studies are remarkable for an elevated d-dimer of 

0.94.  Covid swab is negative.  Urinalysis unremarkable.  Patient's mildly 

hyponatremic, and 25 and hypochloremic 87.  Patient does have a mild 

leukocytosis of 12.1.  Patient is a normocytic anemia with hemoglobin of 11.8 

which appears to be his baseline.  Troponin is negative.





On reevaluation come patient's wheezing was improved.  I discussed with him and 

family the results of his labs and imaging.  I would like to obtain a CT PE 

throughout the possibility of pulmonary embolism and the situation of the 

elevated d-dimer.  There were agreement this plan.  CT PE was obtained and 

revealed no signs of acute pulmonary embolism but it did reveal advanced 

pulmonary fibrosis, as well as chronic pulmonary infiltrates.  There are also 

old thoracic compression fractures and thoracic kyphotic deformity.





Throughout the patient's stay in the emergency department, I did update the 

patient's family members multiple times and multiple different family members.





I spoke with family and patient updated on the results of the CT.  I would like 

to start the patient on IV Rocephin and azithromycin to treat his chronic 

infiltrates and the case of an acute pneumonia.  He'll be continued on breathing

treatment.  He will be given an aspirin.  I would like to admit him to the 

hospital at this time.  He was in agreement this plan.  I spoke with the 

admitting team under Dr. Bazan who accepted the patient to telemetry bed.  I 

will consult pulmonology, Dr. Thakur to evaluate the patient and the morning.  

Patient will be connected to any behind every 4 hours breathing treatments and 

every 6 hours steroids.  Patient was therefore admitted in serious condition.











- Lab Data


Result diagrams: 


                                 21 17:15





                                 21 17:15





                                   Lab Results











  21 Range/Units





  17:15 17:15 17:15 


 


WBC  12.1 H    (3.8-10.6)  k/uL


 


RBC  3.89 L    (4.30-5.90)  m/uL


 


Hgb  11.8 L    (13.0-17.5)  gm/dL


 


Hct  35.9 L    (39.0-53.0)  %


 


MCV  92.4    (80.0-100.0)  fL


 


MCH  30.3    (25.0-35.0)  pg


 


MCHC  32.8    (31.0-37.0)  g/dL


 


RDW  15.6 H    (11.5-15.5)  %


 


Plt Count  408    (150-450)  k/uL


 


MPV  6.9    


 


Neutrophils %  70    %


 


Lymphocytes %  20    %


 


Monocytes %  6    %


 


Eosinophils %  2    %


 


Basophils %  1    %


 


Neutrophils #  8.5 H    (1.3-7.7)  k/uL


 


Lymphocytes #  2.4    (1.0-4.8)  k/uL


 


Monocytes #  0.7    (0-1.0)  k/uL


 


Eosinophils #  0.3    (0-0.7)  k/uL


 


Basophils #  0.1    (0-0.2)  k/uL


 


PT   10.4   (9.0-12.0)  sec


 


INR   1.0   (<1.2)  


 


APTT   24.6   (22.0-30.0)  sec


 


D-Dimer   0.94 H   (<0.60)  mg/L FEU


 


Sodium    125 L  (137-145)  mmol/L


 


Potassium    4.8  (3.5-5.1)  mmol/L


 


Chloride    87 L  ()  mmol/L


 


Carbon Dioxide    34 H  (22-30)  mmol/L


 


Anion Gap    4  mmol/L


 


BUN    17  (9-20)  mg/dL


 


Creatinine    0.49 L  (0.66-1.25)  mg/dL


 


Est GFR (CKD-EPI)AfAm    >90  (>60 ml/min/1.73 sqM)  


 


Est GFR (CKD-EPI)NonAf    >90  (>60 ml/min/1.73 sqM)  


 


Glucose    104 H  (74-99)  mg/dL


 


Plasma Lactic Acid José Miguel     (0.7-2.0)  mmol/L


 


Calcium    8.2 L  (8.4-10.2)  mg/dL


 


Magnesium    2.0  (1.6-2.3)  mg/dL


 


Total Bilirubin    0.4  (0.2-1.3)  mg/dL


 


AST    28  (17-59)  U/L


 


ALT    22  (4-49)  U/L


 


Alkaline Phosphatase    109  ()  U/L


 


Troponin I     (0.000-0.034)  ng/mL


 


Total Protein    6.1 L  (6.3-8.2)  g/dL


 


Albumin    2.8 L  (3.5-5.0)  g/dL


 


Urine Color     


 


Urine Appearance     (Clear)  


 


Urine pH     (5.0-8.0)  


 


Ur Specific Gravity     (1.001-1.035)  


 


Urine Protein     (Negative)  


 


Urine Glucose (UA)     (Negative)  


 


Urine Ketones     (Negative)  


 


Urine Blood     (Negative)  


 


Urine Nitrite     (Negative)  


 


Urine Bilirubin     (Negative)  


 


Urine Urobilinogen     (<2.0)  mg/dL


 


Ur Leukocyte Esterase     (Negative)  


 


Coronavirus (PCR)     (Not Detectd)  














  21 Range/Units





  17:15 17:15 17:15 


 


WBC     (3.8-10.6)  k/uL


 


RBC     (4.30-5.90)  m/uL


 


Hgb     (13.0-17.5)  gm/dL


 


Hct     (39.0-53.0)  %


 


MCV     (80.0-100.0)  fL


 


MCH     (25.0-35.0)  pg


 


MCHC     (31.0-37.0)  g/dL


 


RDW     (11.5-15.5)  %


 


Plt Count     (150-450)  k/uL


 


MPV     


 


Neutrophils %     %


 


Lymphocytes %     %


 


Monocytes %     %


 


Eosinophils %     %


 


Basophils %     %


 


Neutrophils #     (1.3-7.7)  k/uL


 


Lymphocytes #     (1.0-4.8)  k/uL


 


Monocytes #     (0-1.0)  k/uL


 


Eosinophils #     (0-0.7)  k/uL


 


Basophils #     (0-0.2)  k/uL


 


PT     (9.0-12.0)  sec


 


INR     (<1.2)  


 


APTT     (22.0-30.0)  sec


 


D-Dimer     (<0.60)  mg/L FEU


 


Sodium     (137-145)  mmol/L


 


Potassium     (3.5-5.1)  mmol/L


 


Chloride     ()  mmol/L


 


Carbon Dioxide     (22-30)  mmol/L


 


Anion Gap     mmol/L


 


BUN     (9-20)  mg/dL


 


Creatinine     (0.66-1.25)  mg/dL


 


Est GFR (CKD-EPI)AfAm     (>60 ml/min/1.73 sqM)  


 


Est GFR (CKD-EPI)NonAf     (>60 ml/min/1.73 sqM)  


 


Glucose     (74-99)  mg/dL


 


Plasma Lactic Acid José Miguel  0.8    (0.7-2.0)  mmol/L


 


Calcium     (8.4-10.2)  mg/dL


 


Magnesium     (1.6-2.3)  mg/dL


 


Total Bilirubin     (0.2-1.3)  mg/dL


 


AST     (17-59)  U/L


 


ALT     (4-49)  U/L


 


Alkaline Phosphatase     ()  U/L


 


Troponin I   <0.012   (0.000-0.034)  ng/mL


 


Total Protein     (6.3-8.2)  g/dL


 


Albumin     (3.5-5.0)  g/dL


 


Urine Color     


 


Urine Appearance     (Clear)  


 


Urine pH     (5.0-8.0)  


 


Ur Specific Gravity     (1.001-1.035)  


 


Urine Protein     (Negative)  


 


Urine Glucose (UA)     (Negative)  


 


Urine Ketones     (Negative)  


 


Urine Blood     (Negative)  


 


Urine Nitrite     (Negative)  


 


Urine Bilirubin     (Negative)  


 


Urine Urobilinogen     (<2.0)  mg/dL


 


Ur Leukocyte Esterase     (Negative)  


 


Coronavirus (PCR)    Not Detected  (Not Detectd)  














  21 Range/Units





  17:59 


 


WBC   (3.8-10.6)  k/uL


 


RBC   (4.30-5.90)  m/uL


 


Hgb   (13.0-17.5)  gm/dL


 


Hct   (39.0-53.0)  %


 


MCV   (80.0-100.0)  fL


 


MCH   (25.0-35.0)  pg


 


MCHC   (31.0-37.0)  g/dL


 


RDW   (11.5-15.5)  %


 


Plt Count   (150-450)  k/uL


 


MPV   


 


Neutrophils %   %


 


Lymphocytes %   %


 


Monocytes %   %


 


Eosinophils %   %


 


Basophils %   %


 


Neutrophils #   (1.3-7.7)  k/uL


 


Lymphocytes #   (1.0-4.8)  k/uL


 


Monocytes #   (0-1.0)  k/uL


 


Eosinophils #   (0-0.7)  k/uL


 


Basophils #   (0-0.2)  k/uL


 


PT   (9.0-12.0)  sec


 


INR   (<1.2)  


 


APTT   (22.0-30.0)  sec


 


D-Dimer   (<0.60)  mg/L FEU


 


Sodium   (137-145)  mmol/L


 


Potassium   (3.5-5.1)  mmol/L


 


Chloride   ()  mmol/L


 


Carbon Dioxide   (22-30)  mmol/L


 


Anion Gap   mmol/L


 


BUN   (9-20)  mg/dL


 


Creatinine   (0.66-1.25)  mg/dL


 


Est GFR (CKD-EPI)AfAm   (>60 ml/min/1.73 sqM)  


 


Est GFR (CKD-EPI)NonAf   (>60 ml/min/1.73 sqM)  


 


Glucose   (74-99)  mg/dL


 


Plasma Lactic Acid José Miguel   (0.7-2.0)  mmol/L


 


Calcium   (8.4-10.2)  mg/dL


 


Magnesium   (1.6-2.3)  mg/dL


 


Total Bilirubin   (0.2-1.3)  mg/dL


 


AST   (17-59)  U/L


 


ALT   (4-49)  U/L


 


Alkaline Phosphatase   ()  U/L


 


Troponin I   (0.000-0.034)  ng/mL


 


Total Protein   (6.3-8.2)  g/dL


 


Albumin   (3.5-5.0)  g/dL


 


Urine Color  Yellow  


 


Urine Appearance  Clear  (Clear)  


 


Urine pH  7.5  (5.0-8.0)  


 


Ur Specific Gravity  1.013  (1.001-1.035)  


 


Urine Protein  Negative  (Negative)  


 


Urine Glucose (UA)  Negative  (Negative)  


 


Urine Ketones  Negative  (Negative)  


 


Urine Blood  Negative  (Negative)  


 


Urine Nitrite  Negative  (Negative)  


 


Urine Bilirubin  Negative  (Negative)  


 


Urine Urobilinogen  <2.0  (<2.0)  mg/dL


 


Ur Leukocyte Esterase  Negative  (Negative)  


 


Coronavirus (PCR)   (Not Detectd)  














- EKG Data


-: EKG Interpreted by Me


EKG Comments: 





12-lead Electrocardiogram Interpretation Note





EKG was reviewed and interpreted by myself. 12-lead ECG performed at 1744 is 

interpreted by me as revealing normal sinus rhythm at a rate of 85 beats per 

minute.  Axis deviation.  NH interval is 130 ms, QRS duration is 84 ms, QTc is 

497 ms..  Patient does have mild T-wave inversions in lead V2 and V3 without 

presurgical changes or ST segment changes.. R wave progression across the 

precordium was satisfactory.  By my interpretation, this EKG shows mild T-wave 

inversions in V2 and V3 which could represent ischemia.  These were not present 

on prior EKG obtained earlier this month..





Critical Care Time


Critical Care Time: Yes


Total Critical Care Time: 30


Critical Care Time: 





Upon my evaluation, this patient had a high probability of imminent or life-

threatening deterioration due to chronic hypoxic respiratory failure, elevated 

d-dimer, pneumonia, which required my direct attention, intervention, and 

personal management. 


I have personally provided 30 minutes of critical care time exclusive of time 

spent on separately billable procedures. Time includes review of laboratory 

data, radiology results, discussion with consultants, and monitoring for 

potential decompensation. Interventions were performed as documented in my note.





Disposition


Clinical Impression: 


 Pneumonia, Pulmonary fibrosis, COPD exacerbation, Chronic respiratory failure 

with hypoxia, Hyponatremia, Elevated d-dimer





Disposition: ADMITTED AS IP TO THIS HOSP


Condition: Serious


Referrals: 


Amalia Bazan MD [Primary Care Provider] - 1-2 days

## 2021-11-22 PROCEDURE — 5A0945A ASSISTANCE WITH RESPIRATORY VENTILATION, 24-96 CONSECUTIVE HOURS, HIGH NASAL FLOW/VELOCITY: ICD-10-PCS

## 2021-11-22 RX ADMIN — IPRATROPIUM BROMIDE AND ALBUTEROL SULFATE SCH: .5; 3 SOLUTION RESPIRATORY (INHALATION) at 19:46

## 2021-11-22 RX ADMIN — BUDESONIDE SCH: 1 SUSPENSION RESPIRATORY (INHALATION) at 19:46

## 2021-11-22 RX ADMIN — IPRATROPIUM BROMIDE AND ALBUTEROL SULFATE SCH ML: .5; 3 SOLUTION RESPIRATORY (INHALATION) at 15:18

## 2021-11-22 RX ADMIN — Medication SCH MG: at 16:08

## 2021-11-22 RX ADMIN — IPRATROPIUM BROMIDE AND ALBUTEROL SULFATE SCH ML: .5; 3 SOLUTION RESPIRATORY (INHALATION) at 03:37

## 2021-11-22 RX ADMIN — METHYLPREDNISOLONE SODIUM SUCCINATE SCH MG: 40 INJECTION, POWDER, FOR SOLUTION INTRAMUSCULAR; INTRAVENOUS at 06:11

## 2021-11-22 RX ADMIN — ATORVASTATIN CALCIUM SCH MG: 40 TABLET, FILM COATED ORAL at 20:29

## 2021-11-22 RX ADMIN — HEPARIN SODIUM SCH UNIT: 5000 INJECTION INTRAVENOUS; SUBCUTANEOUS at 16:08

## 2021-11-22 RX ADMIN — METHYLPREDNISOLONE SODIUM SUCCINATE SCH MG: 40 INJECTION, POWDER, FOR SOLUTION INTRAMUSCULAR; INTRAVENOUS at 10:53

## 2021-11-22 RX ADMIN — IPRATROPIUM BROMIDE AND ALBUTEROL SULFATE SCH ML: .5; 3 SOLUTION RESPIRATORY (INHALATION) at 11:12

## 2021-11-22 RX ADMIN — IPRATROPIUM BROMIDE AND ALBUTEROL SULFATE SCH: .5; 3 SOLUTION RESPIRATORY (INHALATION) at 08:45

## 2021-11-22 RX ADMIN — GABAPENTIN SCH MG: 300 CAPSULE ORAL at 21:22

## 2021-11-22 RX ADMIN — HEPARIN SODIUM SCH UNIT: 5000 INJECTION INTRAVENOUS; SUBCUTANEOUS at 07:45

## 2021-11-22 RX ADMIN — AMITRIPTYLINE HYDROCHLORIDE SCH MG: 10 TABLET, FILM COATED ORAL at 21:22

## 2021-11-22 RX ADMIN — FORMOTEROL FUMARATE DIHYDRATE SCH: 20 SOLUTION RESPIRATORY (INHALATION) at 19:46

## 2021-11-22 RX ADMIN — HYDROCODONE BITARTRATE AND ACETAMINOPHEN PRN EACH: 10; 325 TABLET ORAL at 20:29

## 2021-11-22 RX ADMIN — METHYLPREDNISOLONE SODIUM SUCCINATE SCH MG: 40 INJECTION, POWDER, FOR SOLUTION INTRAMUSCULAR; INTRAVENOUS at 17:09

## 2021-11-22 RX ADMIN — HYDROCODONE BITARTRATE AND ACETAMINOPHEN PRN EACH: 10; 325 TABLET ORAL at 13:38

## 2021-11-22 RX ADMIN — HEPARIN SODIUM SCH UNIT: 5000 INJECTION INTRAVENOUS; SUBCUTANEOUS at 01:06

## 2021-11-22 RX ADMIN — METHYLPREDNISOLONE SODIUM SUCCINATE SCH MG: 40 INJECTION, POWDER, FOR SOLUTION INTRAMUSCULAR; INTRAVENOUS at 01:06

## 2021-11-22 NOTE — P.CNPUL
History of Present Illness


Consult date: 21


Requesting physician: Amalia Bazan


Reason for consult: dyspnea, COPD, hypoxemia, pneumonia, abnormal CXR/CT


Chief complaint: Respiratory failure.


History of present illness: 





Pulmonary consult dated 2021.





71-year-old male, well-known to me, with history of chronic hypoxemic 

respiratory failure, on home O2 at 4 L, secondary to both COPD, and pulmonary 

fibrosis, who presents to the emergency department, with complaints of 

increasing shortness of breath.  The patient hasn't been feeling well for at 

least 3 or 4 days prior to admission, and may be even a week or so.  The patient

complains of cough, and phlegm production.  He denies any fever or chills.  He 

denies any chest pain.  The patient was brought to the emergency room by EMS.  

Currently, the patient is on 14 L high flow nasal cannula, and not receiving any

IV fluids.  We saw him in the emergency room, room 23.  His coronavirus testing 

was negative.  He has been fully vaccinated.  The patient states that he's 

feeling only a bit better since she's been here in the emergency department.  He

did have some mild conversational dyspnea, but no audible wheezing.  There was 

no use of accessory muscles.  CT revealed no evidence of pulmonary embolism.  

There was advanced pulmonary fibrosis.  Chest x-ray was also reviewed.  White 

count 12.1, hemoglobin 11.8, hematocrit 35.9, platelet count 408,000.  PT INR 

and PTT were normal.  D-dimer was 0.94.  Sodium 125, potassium 4.8, chlorides 

87, CO2 34, anion gap 4, BUN 17, and creatinine 0.49.  Albumin was 2.8.  

Troponins were normal.  N-terminal proBNP was 3950.  Urine was negative.





Review of Systems





REVIEW OF SYSTEMS:  


CONSTITUTIONAL:  [Negative.]


NEUROLOGIC: [ Negative.]


HEENT:  [ Negative.]


CARDIAC:  [Negative.]


PULMONARY:  Shortness of breath, cough, occasional phlegm production and chest 

congestion.


GI:  [Negative.]


:  [Negative.]


RHEUMATOLOGIC: [ Negative.]


IMMUNOLOGIC: [ Negative.]


ENDOCRINE:  [Negative.  ] 


DERMATOLOGIC: [Negative.]








Past Medical History


Past Medical History: Heart Failure, COPD, Hyperlipidemia, Osteoarthritis (OA), 

Pneumonia, Rheumatoid Arthritis (RA), Sleep Apnea/CPAP/BIPAP


Additional Past Medical History / Comment(s): COPD, chronic hypoxic respiratory 

failure previous history of pneumoperitoneum related to a perforated duodenal 

ulcer, repair of an umbilical hernia, chronic back pain, back stenosis.


History of Any Multi-Drug Resistant Organisms: None Reported


Past Surgical History: Orthopedic Surgery


Additional Past Surgical History / Comment(s): colonoscopy, repair of a 

perforated duodenal ulcer and repair of an incarcerated umbilical hernia


Past Anesthesia/Blood Transfusion Reactions: No Reported Reaction


Past Psychological History: Anxiety, Depression


Smoking Status: Former smoker


Past Alcohol Use History: Daily


Past Drug Use History: None Reported





- Past Family History


  ** Father


Family Medical History: Cancer


Additional Family Medical History / Comment(s): Father  of leukemia.





  ** Mother


Family Medical History: Cancer, CVA/TIA, Dementia, Diabetes Mellitus


Additional Family Medical History / Comment(s): Mother is 88yrs old with history

 of dementia and colon cancer.





  ** Sister(s)


Additional Family Medical History / Comment(s): Patient 3 sons with no major 

medical problems.





Medications and Allergies


                                Home Medications











 Medication  Instructions  Recorded  Confirmed  Type


 


Metoprolol Succinate [Toprol XL] 25 mg PO DAILY 20 History


 


Sertraline [Zoloft] 150 mg PO DAILY 20 History


 


Amitriptyline HCl [Elavil] 10 mg PO HS 21 History


 


Celecoxib [CeleBREX] 200 mg PO DAILY 21 History


 


Ascorbic Acid [Vitamin C] 1,000 mg PO DAILY 04/10/21 11/21/21 History


 


Ipratropium-Albuterol Nebulize 3 ml INHALATION RT-QID 04/10/21 11/21/21 History





[Duoneb 0.5 mg-3 mg/3 ml Soln]    


 


Melatonin 3 mg PO HS PRN 04/10/21 11/21/21 History


 


Clopidogrel [Plavix] 75 mg PO DAILY #14 tab 21 Rx


 


Budesonide [Pulmicort] 0.5 mg INHALATION RT-BID 21 History


 


Fluticasone Nasal Spray [Flonase 2 spr EA NOSTRIL DAILY 21 

History





Nasal Spray]    


 


Furosemide [Lasix] 40 mg PO DAILY #28 tab 11/10/21 11/21/21 Rx


 


Gabapentin [Neurontin] 300 mg PO HS #3 cap 11/10/21 11/21/21 Rx


 


Thiamine [Vitamin B-1] 100 mg PO BID-W/MEALS  tab 11/10/21 11/21/21 Rx


 


predniSONE 2.5 mg PO Q48H #0 11/10/21 11/21/21 Rx


 


Acetaminophen [Tylenol] 650 mg PO Q6H PRN 21 History


 


Atorvastatin [Lipitor] 40 mg PO HS 21 History


 


Azithromycin [Zithromax Z-pack (6 See Taper PO AS DIRECTED 21 

History





tabs)]    


 


Benzonatate [Tessalon Perles] 200 mg PO TID PRN 21 History


 


Calcium Carbonate 1,000 mg PO Q8H PRN 21 History


 


HYDROcodone/APAP 10-325MG [Norco 1 tab PO Q6H PRN 21 History





]    


 


Multivitamins, Thera [Multivitamin 1 tab PO DAILY 21 History





(formulary)]    


 


Omeprazole 20 mg PO DAILY 21 History


 


guaiFENesin 400 mg PO Q4H 21 History








                                    Allergies











Allergy/AdvReac Type Severity Reaction Status Date / Time


 


amoxicillin AdvReac  Nausea & Verified 21 17:12





   Vomiting  














Physical Exam


Osteopathic Statement: *.  No significant issues noted on an osteopathic 

structural exam other than those noted in the History and Physical/Consult.


Vitals: 


                                   Vital Signs











  Temp Pulse Resp BP Pulse Ox


 


 21 07:46   91  22  96/68  93 L


 


 21 06:14   92  24  102/64  99


 


 21 05:37   86  24   95


 


 21 04:00  98.5 F  83  24  104/64  95


 


 21 03:47   99   


 


 21 03:37   95   


 


 21 03:00   90  22  96/70  95


 


 21 02:00   87  22  92/61  97


 


 21 01:00   81  24  102/78  94 L


 


 21 00:00   87  28 H  104/76  97


 


 21 23:30    28 H  


 


 21 23:28   102 H  18  115/81  93 L


 


 21 23:26   99   


 


 21 23:14   101 H   


 


 21 21:00   100  18  100/57  96


 


 21 18:38   84   


 


 21 18:31   82   


 


 21 18:15   83  20  109/76  92 L


 


 21 16:15  97.9 F  85  20  111/75  98








                                Intake and Output











 21





 22:59 06:59 14:59


 


Other:   


 


  Weight 63.503 kg  














No acute distress, oriented 3, mild conversational dyspnea.  No audible wheez

ing.





HEENT examination is grossly unremarkable.  





Neck supple.  Full range of motion.  No adenopathy thyromegaly or neck vein 

distention.





Cardiovascular examination reveals regular rhythm rate.  S1-S2 normal.  No S3 or

S4.  No discernible murmur noted.  Heart sounds are distant.  Heart rate 91 bpm.





Lungs reveal coarse bilateral expiratory rhonchi and wheezes.  Bibasilar 

crackles are also noted.  Breath sounds are equal bilaterally.  The patient does

have severe kyphosis.





Abdomen soft bowel sounds are heard.  No masses or tenderness.





Extremities are intact.  No cyanosis clubbing or edema.





Skin is without rash or lesion.





Neurologic examination is brief but nonfocal.





Results





- Laboratory Findings


CBC and BMP: 


                                 21 17:15





                                 21 17:15


PT/INR, D-dimer











PT  10.4 sec (9.0-12.0)   21  17:15    


 


INR  1.0  (<1.2)   21  17:15    


 


D-Dimer  0.94 mg/L FEU (<0.60)  H  21  17:15    








Abnormal lab findings: 


                                  Abnormal Labs











  21





  17:15 17:15 17:15


 


WBC  12.1 H  


 


RBC  3.89 L  


 


Hgb  11.8 L  


 


Hct  35.9 L  


 


RDW  15.6 H  


 


Neutrophils #  8.5 H  


 


D-Dimer   0.94 H 


 


Sodium    125 L


 


Chloride    87 L


 


Carbon Dioxide    34 H


 


Creatinine    0.49 L


 


Glucose    104 H


 


Calcium    8.2 L


 


Total Protein    6.1 L


 


Albumin    2.8 L














- Diagnostic Findings


Chest x-ray: image reviewed


CT scan - chest: image reviewed





Assessment and Plan


Assessment: 





Acute hypoxemic respiratory failure secondary to COPD exacerbation, and 

pulmonary fibrosis, and possible rheumatoid arthritis associated interstitial 

lung disease.





History of CHF.





History of rheumatoid arthritis.





History of hyperlipidemia.





History of osteoarthritis.





History of sleep apnea syndrome, currently on CPAP.





Prior history of perforated duodenal ulcer.





Chronic back pain.





Prior history of heavy tobacco use.





History of anxiety/depression.


Plan: 





Plan dated 2021.





The patient is admitted with a diagnosis of acute hypoxemic respiratory failure.

 The patient is well-known to me.  The computed tomography scan did not reveal 

any evidence of PE, but did show emphysematous changes, and diffuse interstitial

lung disease/pulmonary fibrosis.  We will check an N-terminal proBNP level, as 

well as a pro-calcitonin level.  Overall prognosis remains guarded.  The patient

is on Levaquin.  Additional recommendations and suggestions are forthcoming.  

Prognosis is guarded.  The patient's budesonide is increased to 1 mg, and 

formoterol is added.


Time with Patient: Greater than 30

## 2021-11-22 NOTE — P.HPIM
History of Present Illness


H&P Date: 21





HISTORY OF PRESENT ILLNESS


This is a 71-year-old male patient of Dr. Bazan and Dr. Pickard with past 

medical history of advanced steroid-dependent COPD, hyperlipidemia, rheumatoid 

arthritis, obstructive sleep apnea unable to tolerate CPAP, chronic hypoxic 

respiratory failure on home O2 at 4 L nasal cannula, generalized osteoarthritis,

chronic back pain pseudomonas in sputum on previous admission secondary to 

colonization or pseudomonal pneumonia.  Known FVC of 72% and FEV1 of 46%.  

History of perforated duodenal ulcer status post exploratory laparotomy with 

repair of perforated duodenal ulcer and repair of incarcerated umbilical hernia.

 Patient had a recent hospitalization for superior and inferior left pubic ramus

fracture and right clavicle fracture. Echocardiogram on 2021 reveals EF of 

55-60% with mild concentric left ventricular hypertrophy, trace aortic 

regurgitation, mild mitral regurgitation, mild tricuspid regurgitation, moderate

pulmonary hypertension.  Patient states he has had difficulty breathing for the 

past week.  He had contacted Dr. Cantu's office and a prescription for Z-Leonel was

called to his pharmacy.  Patient denies any recent falls.  He has been utilizing

a walker for ambulation.  He states he is a little constipated.  No nausea or 

vomiting.  He denies any fever or chills.





Patient came into [facility] emergency center for evaluation.  

Patient was afebrile, heart rate 85, blood pressure 111/75, pulse ox 90% on 4 L 

nasal cannula.  


WBC 12.1, hemoglobin 11.8, platelet count 408.  Sodium 125, potassium 4.8, ch

loride 87, CO2 34, BUN 17 creatinine 0.49.  Troponin is negative on 3 draws.  

ProBNP 3950.  Pro-calcitonin 0.09.  Urinalysis negative for infection.  Chronic 

virus PCR not detected.  


Chest x-ray reveals coarse pulmonary infiltrates not significantly different 

from last exam and could relate to acute on chronic interstitial pneumonia.  

Heart failure not excluded.


CAT scan of the brain shows cerebral atrophy.  Small mild bilateral mastoiditis.

 No change compared to previous exam.


CT of the chest angiogram revealed advanced pulmonary fibrosis.  Chronic 

pulmonary infiltrates similar to EXAM.  Pleural thickening at the lung bases 

consistent with scarring and not significantly different.  No evidence pulmonary

embolism.  Stable thoracic kyphotic deformity and mid thoracic compression 

fractures.


Patient is seen today in the emergency center waiting for a bed on the [facility] 

floor, consult in place with pulmonary medicine, patient started on Levaquin, IV

steroids.





REVIEW OF SYSTEMS


Constitutional: No fever, no chills, no night sweats.  No weight change.  No 

weakness, fatigue or lethargy.  No daytime sleepiness.


EENT: No headache.  No blurred vision or double vision, no loss of vision.  No 

loss of Hearing, no ringing in the ears, no dizziness.  No nasal drainage or 

congestion.  No epistaxis.  No sore throat.


Lungs: Reports shortness of breath, Reports cough, Reports sputum production.  

No wheezing.


Cardiovascular: No chest pain, no lower extremity edema.  No palpitations.  No 

paroxysmal nocturnal dyspnea.  No orthopnea.  No lightheadedness or dizziness.  

No syncopal episodes.


Abdominal: No abdominal pain.  No nausea, vomiting.  No diarrhea.  No constipat

ion.  No bloody or tarry stools..  No loss of appetite.


Genitourinary: No dysuria, increased frequency, urgency.  No urinary retention.


Musculoskeletal: No myalgias.  No muscle weakness, no gait dysfunction, no 

frequent falls.  No back pain.  No neck pain.


Integumentary: No wounds, no lesions.  No rash or pruritus.  No unusual 

bruising.  No change in hair or nails.


Neurologic: No aphasia. No facial droop. No change in mentation. No head injury.

No headache. No paralysis. No paresthesia.


Psychiatric: No depression.  No anxiety.  No mood swings.


Endocrine: No abnormal blood sugars.  No weight change.   





SOCIAL HISTORY


Patient smoked 2 packs per day for 40+ years.  He quit 7 years ago.  He denies 

any illicit drug use or marijuana use.  He drinks 2 beers per day.  Patient was 

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 

construction.  patient is  has adult children wo are involved.





FAMILY HISTORY


Father  of leukemia. Mother is 89 yrs old with history of dementia and colon

cancer. Patient has 1 full sister with history of lung cancer.  Patient has one 

half-sister with adrenal problems.  Patient does not have any brothers.  Patient

3 sons with no major medical problems.





PHYSICAL EXAMINATION


Gen: This is 71-year-old  male.  Patient is resting on ER stretcher 

appears to be comfortable and in no acute distress.


HEENT: Head is atraumatic, normocephalic. Pupils equal, round. Sclerae is 

anicteric. 


NECK: Supple. No JVD. No lymphadenopathy. No thyromegaly. 


LUNGS: Coarse crackles bilaterally.  No intercostal retractions.


HEART: Regular rate and rhythm.  Systolic murmur.  


ABDOMEN: Soft. Bowel sounds are present. No masses.  No tenderness.  Large 

ventral hernia, soft.


EXTREMITIES: No pedal edema.  No calf tenderness.  Dorsalis pedis palpable 

bilaterally.


NEUROLOGICAL: Patient is awake, alert and oriented x3. Cranial nerves 2 through 

12 are grossly intact. 





ASSESSMENT AND PLAN


1.  Acute hypoxic respiratory failure secondary to COPD exacerbation with 

underlying pulmonary fibrosis, possible rheumatoid arthritis associated i

nterstitial lung disease.  Continue DuoNeb treatments every 4 hours, Tessalon 

Perles 200 mg 3 times daily as needed, Pulmicort 1 mg twice daily, Perforomist 

20 g twice daily, Mucinex 1200 mg twice daily, Levaquin 500 milligrams daily, 

same Medrol 14 g IV every 6 hours. 





2.  Recent hospitalization for possible Right clavicular fracture and fracture 

of the superior and inferior left pubic ramus, pathologic, stable, no recent 

falls.





3.  Possible tracheobronchitis.  Patient is started on Levaquin, mucinex 1200 mg

twice daily, Tessalon Perles 200 mg 3 times daily as needed.





4.  Chronic hypoxic respiratory failure.  Continue oxygen therapy.





5.  Advanced COPD with pulmonary fibrosis, steroid dependent and chronic hypoxic

and hypercapnic respiratory failure on home O2 at 4 L.  continue oxygen therapy.

 





6.  Moderate pulmonary hypertension.





7.  Hypertension.  Continue Toprol-XL 25 mg daily.





8.  Rheumatoid arthritis, stable.





9.  Hyperlipidemia.  Continue atorvastatin 40 mg at bedtime.





10.  Obstructive sleep apnea unable to tolerate CPAP.





11.  Chronic back pain and generalized osteoarthritis.  Continue Norco as 

needed, gabapentin 300 mg at bedtime





12.  History of perforated duodenal ulcer status post exploratory laparotomy 

with repair of perforated duodenal ulcer and repair of incarcerated umbilical 

hernia.





13.  Generalized anxiety disorder and recurrent depression.  Continue Zoloft 150

mg daily, Elavil 10 mg at bedtime. 





14.  DVT prophylaxis.  Heparin subcu.





15.  GI prophylaxis.  Continue Protonix daily.





16.  Insomnia.  Continue Elavil 10 mg at bedtime.





Patient will be admitted to the hospital for a minimum of 2 night stay.





DISCHARGE PLAN


Most likely subacute rehab.  Consult PT and OT.





Impression and plan of care have been directed as dictated by the signing 

physician.  Chloé Maldonado nurse practitioner acting as scribe for signing phys

ician.








Past Medical History


Past Medical History: Heart Failure, COPD, Hyperlipidemia, Osteoarthritis (OA), 

Pneumonia, Rheumatoid Arthritis (RA), Sleep Apnea/CPAP/BIPAP


Additional Past Medical History / Comment(s): COPD, chronic hypoxic respiratory 

failure previous history of pneumoperitoneum related to a perforated duodenal 

ulcer, repair of an umbilical hernia, chronic back pain, back stenosis.


History of Any Multi-Drug Resistant Organisms: None Reported


Past Surgical History: Orthopedic Surgery


Additional Past Surgical History / Comment(s): colonoscopy, repair of a 

perforated duodenal ulcer and repair of an incarcerated umbilical hernia


Past Anesthesia/Blood Transfusion Reactions: No Reported Reaction


Past Psychological History: Anxiety, Depression


Smoking Status: Former smoker


Past Alcohol Use History: Daily


Past Drug Use History: None Reported





- Past Family History


  ** Father


Family Medical History: Cancer


Additional Family Medical History / Comment(s): Father  of leukemia.





  ** Mother


Family Medical History: Cancer, CVA/TIA, Dementia, Diabetes Mellitus


Additional Family Medical History / Comment(s): Mother is 88yrs old with history

of dementia and colon cancer.





  ** Sister(s)


Additional Family Medical History / Comment(s): Patient 3 sons with no major 

medical problems.





Medications and Allergies


                                Home Medications











 Medication  Instructions  Recorded  Confirmed  Type


 


Metoprolol Succinate [Toprol XL] 25 mg PO DAILY 20 History


 


Sertraline [Zoloft] 150 mg PO DAILY 20 History


 


Amitriptyline HCl [Elavil] 10 mg PO HS 21 History


 


Celecoxib [CeleBREX] 200 mg PO DAILY 21 History


 


Ascorbic Acid [Vitamin C] 1,000 mg PO DAILY 04/10/21 11/21/21 History


 


Ipratropium-Albuterol Nebulize 3 ml INHALATION RT-QID 04/10/21 11/21/21 History





[Duoneb 0.5 mg-3 mg/3 ml Soln]    


 


Melatonin 3 mg PO HS PRN 04/10/21 11/21/21 History


 


Clopidogrel [Plavix] 75 mg PO DAILY #14 tab 21 Rx


 


Budesonide [Pulmicort] 0.5 mg INHALATION RT-BID 21 History


 


Fluticasone Nasal Spray [Flonase 2 spr EA NOSTRIL DAILY 21 

History





Nasal Spray]    


 


Furosemide [Lasix] 40 mg PO DAILY #28 tab 11/10/21 11/21/21 Rx


 


Gabapentin [Neurontin] 300 mg PO HS #3 cap 11/10/21 11/21/21 Rx


 


Thiamine [Vitamin B-1] 100 mg PO BID-W/MEALS  tab 11/10/21 11/21/21 Rx


 


predniSONE 2.5 mg PO Q48H #0 11/10/21 11/21/21 Rx


 


Acetaminophen [Tylenol] 650 mg PO Q6H PRN 21 History


 


Atorvastatin [Lipitor] 40 mg PO HS 21 History


 


Azithromycin [Zithromax Z-pack (6 See Taper PO AS DIRECTED 21 

History





tabs)]    


 


Benzonatate [Tessalon Perles] 200 mg PO TID PRN 21 History


 


Calcium Carbonate 1,000 mg PO Q8H PRN 21 History


 


HYDROcodone/APAP 10-325MG [Norco 1 tab PO Q6H PRN 21 History





]    


 


Multivitamins, Thera [Multivitamin 1 tab PO DAILY 21 History





(formulary)]    


 


Omeprazole 20 mg PO DAILY 21 History


 


guaiFENesin 400 mg PO Q4H 21 History








                                    Allergies











Allergy/AdvReac Type Severity Reaction Status Date / Time


 


amoxicillin AdvReac  Nausea & Verified 21 17:12





   Vomiting  














Physical Exam


Vitals: 


                                   Vital Signs











  Temp Pulse Resp BP Pulse Ox


 


 21 07:46   91  22  96/68  93 L


 


 21 06:14   92  24  102/64  99


 


 21 05:37   86  24   95


 


 21 04:00  98.5 F  83  24  104/64  95


 


 21 03:47   99   


 


 21 03:37   95   


 


 21 03:00   90  22  96/70  95


 


 21 02:00   87  22  92/61  97


 


 21 01:00   81  24  102/78  94 L


 


 21 00:00   87  28 H  104/76  97


 


 21 23:30    28 H  


 


 21 23:28   102 H  18  115/81  93 L


 


 21 23:26   99   


 


 21 23:14   101 H   


 


 21 21:00   100  18  100/57  96


 


 21 18:38   84   


 


 21 18:31   82   


 


 21 18:15   83  20  109/76  92 L


 


 21 16:15  97.9 F  85  20  111/75  98








                                Intake and Output











 21





 22:59 06:59 14:59


 


Other:   


 


  Weight 63.503 kg  














Results


CBC & Chem 7: 


                                 21 17:15





                                 21 17:15


Labs: 


                  Abnormal Lab Results - Last 24 Hours (Table)











  21 Range/Units





  17:15 17:15 17:15 


 


WBC  12.1 H    (3.8-10.6)  k/uL


 


RBC  3.89 L    (4.30-5.90)  m/uL


 


Hgb  11.8 L    (13.0-17.5)  gm/dL


 


Hct  35.9 L    (39.0-53.0)  %


 


RDW  15.6 H    (11.5-15.5)  %


 


Neutrophils #  8.5 H    (1.3-7.7)  k/uL


 


D-Dimer   0.94 H   (<0.60)  mg/L FEU


 


Sodium    125 L  (137-145)  mmol/L


 


Chloride    87 L  ()  mmol/L


 


Carbon Dioxide    34 H  (22-30)  mmol/L


 


Creatinine    0.49 L  (0.66-1.25)  mg/dL


 


Glucose    104 H  (74-99)  mg/dL


 


Calcium    8.2 L  (8.4-10.2)  mg/dL


 


Total Protein    6.1 L  (6.3-8.2)  g/dL


 


Albumin    2.8 L  (3.5-5.0)  g/dL

## 2021-11-23 LAB
GLUCOSE BLD-MCNC: 131 MG/DL (ref 75–99)
GLUCOSE BLD-MCNC: 135 MG/DL (ref 75–99)

## 2021-11-23 RX ADMIN — FUROSEMIDE SCH MG: 10 INJECTION, SOLUTION INTRAMUSCULAR; INTRAVENOUS at 10:41

## 2021-11-23 RX ADMIN — BENZONATATE SCH MG: 100 CAPSULE ORAL at 17:10

## 2021-11-23 RX ADMIN — THERA TABS SCH EACH: TAB at 09:06

## 2021-11-23 RX ADMIN — Medication SCH MG: at 17:31

## 2021-11-23 RX ADMIN — FLUTICASONE PROPIONATE SCH: 50 SPRAY, METERED NASAL at 09:11

## 2021-11-23 RX ADMIN — GABAPENTIN SCH MG: 300 CAPSULE ORAL at 20:31

## 2021-11-23 RX ADMIN — IPRATROPIUM BROMIDE AND ALBUTEROL SULFATE SCH ML: .5; 3 SOLUTION RESPIRATORY (INHALATION) at 00:05

## 2021-11-23 RX ADMIN — METHYLPREDNISOLONE SODIUM SUCCINATE SCH MG: 40 INJECTION, POWDER, FOR SOLUTION INTRAMUSCULAR; INTRAVENOUS at 12:06

## 2021-11-23 RX ADMIN — ATORVASTATIN CALCIUM SCH MG: 40 TABLET, FILM COATED ORAL at 20:31

## 2021-11-23 RX ADMIN — HEPARIN SODIUM SCH UNIT: 5000 INJECTION INTRAVENOUS; SUBCUTANEOUS at 17:10

## 2021-11-23 RX ADMIN — SERTRALINE HYDROCHLORIDE SCH MG: 50 TABLET, FILM COATED ORAL at 09:09

## 2021-11-23 RX ADMIN — METHYLPREDNISOLONE SODIUM SUCCINATE SCH MG: 40 INJECTION, POWDER, FOR SOLUTION INTRAMUSCULAR; INTRAVENOUS at 17:31

## 2021-11-23 RX ADMIN — FORMOTEROL FUMARATE DIHYDRATE SCH MCG: 20 SOLUTION RESPIRATORY (INHALATION) at 09:25

## 2021-11-23 RX ADMIN — BUDESONIDE SCH MG: 1 SUSPENSION RESPIRATORY (INHALATION) at 21:09

## 2021-11-23 RX ADMIN — IPRATROPIUM BROMIDE AND ALBUTEROL SULFATE SCH ML: .5; 3 SOLUTION RESPIRATORY (INHALATION) at 16:38

## 2021-11-23 RX ADMIN — CLOPIDOGREL BISULFATE SCH MG: 75 TABLET ORAL at 09:07

## 2021-11-23 RX ADMIN — BENZONATATE SCH MG: 100 CAPSULE ORAL at 20:31

## 2021-11-23 RX ADMIN — METHYLPREDNISOLONE SODIUM SUCCINATE SCH MG: 40 INJECTION, POWDER, FOR SOLUTION INTRAMUSCULAR; INTRAVENOUS at 00:27

## 2021-11-23 RX ADMIN — HEPARIN SODIUM SCH UNIT: 5000 INJECTION INTRAVENOUS; SUBCUTANEOUS at 09:10

## 2021-11-23 RX ADMIN — INSULIN ASPART SCH UNIT: 100 INJECTION, SOLUTION INTRAVENOUS; SUBCUTANEOUS at 17:31

## 2021-11-23 RX ADMIN — IPRATROPIUM BROMIDE AND ALBUTEROL SULFATE SCH ML: .5; 3 SOLUTION RESPIRATORY (INHALATION) at 12:24

## 2021-11-23 RX ADMIN — BUDESONIDE SCH MG: 1 SUSPENSION RESPIRATORY (INHALATION) at 09:25

## 2021-11-23 RX ADMIN — AMITRIPTYLINE HYDROCHLORIDE SCH MG: 10 TABLET, FILM COATED ORAL at 22:03

## 2021-11-23 RX ADMIN — IPRATROPIUM BROMIDE AND ALBUTEROL SULFATE SCH ML: .5; 3 SOLUTION RESPIRATORY (INHALATION) at 21:10

## 2021-11-23 RX ADMIN — Medication SCH MG: at 09:07

## 2021-11-23 RX ADMIN — INSULIN ASPART SCH UNIT: 100 INJECTION, SOLUTION INTRAVENOUS; SUBCUTANEOUS at 20:31

## 2021-11-23 RX ADMIN — IPRATROPIUM BROMIDE AND ALBUTEROL SULFATE SCH ML: .5; 3 SOLUTION RESPIRATORY (INHALATION) at 09:25

## 2021-11-23 RX ADMIN — IPRATROPIUM BROMIDE AND ALBUTEROL SULFATE SCH ML: .5; 3 SOLUTION RESPIRATORY (INHALATION) at 03:27

## 2021-11-23 RX ADMIN — HYDROCODONE BITARTRATE AND ACETAMINOPHEN PRN EACH: 10; 325 TABLET ORAL at 01:49

## 2021-11-23 RX ADMIN — FORMOTEROL FUMARATE DIHYDRATE SCH MCG: 20 SOLUTION RESPIRATORY (INHALATION) at 21:10

## 2021-11-23 RX ADMIN — HEPARIN SODIUM SCH UNIT: 5000 INJECTION INTRAVENOUS; SUBCUTANEOUS at 00:27

## 2021-11-23 RX ADMIN — PANTOPRAZOLE SODIUM SCH MG: 40 TABLET, DELAYED RELEASE ORAL at 09:07

## 2021-11-23 RX ADMIN — MELOXICAM SCH MG: 7.5 TABLET ORAL at 09:07

## 2021-11-23 RX ADMIN — METHYLPREDNISOLONE SODIUM SUCCINATE SCH MG: 40 INJECTION, POWDER, FOR SOLUTION INTRAMUSCULAR; INTRAVENOUS at 05:14

## 2021-11-23 RX ADMIN — METOPROLOL SUCCINATE SCH MG: 25 TABLET, EXTENDED RELEASE ORAL at 09:06

## 2021-11-23 RX ADMIN — OXYCODONE HYDROCHLORIDE AND ACETAMINOPHEN SCH MG: 500 TABLET ORAL at 09:07

## 2021-11-23 NOTE — P.PN
Subjective


Progress Note Date: 11/23/21





HISTORY OF PRESENT ILLNESS


This is a 71-year-old male patient of Dr. Bazan and Dr. Pickard with past 

medical history of advanced steroid-dependent COPD, hyperlipidemia, rheumatoid 

arthritis, obstructive sleep apnea unable to tolerate CPAP, chronic hypoxic 

respiratory failure on home O2 at 4 L nasal cannula, generalized osteoarthritis,

chronic back pain pseudomonas in sputum on previous admission secondary to 

colonization or pseudomonal pneumonia.  Known FVC of 72% and FEV1 of 46%.  

History of perforated duodenal ulcer status post exploratory laparotomy with 

repair of perforated duodenal ulcer and repair of incarcerated umbilical hernia.

 Patient had a recent hospitalization for superior and inferior left pubic ramus

fracture and right clavicle fracture. Echocardiogram on 4/7/2021 reveals EF of 

55-60% with mild concentric left ventricular hypertrophy, trace aortic regu

rgitation, mild mitral regurgitation, mild tricuspid regurgitation, moderate 

pulmonary hypertension.  Patient states he has had difficulty breathing for the 

past week.  He had contacted Dr. Cantu's office and a prescription for Z-Leonel was

called to his pharmacy.  Patient denies any recent falls.  He has been utilizing

a walker for ambulation.  He states he is a little constipated.  No nausea or 

vomiting.  He denies any fever or chills.





Patient came into [facility] emergency center for evaluation.  

Patient was afebrile, heart rate 85, blood pressure 111/75, pulse ox 90% on 4 L 

nasal cannula.  


WBC 12.1, hemoglobin 11.8, platelet count 408.  Sodium 125, potassium 4.8, 

chloride 87, CO2 34, BUN 17 creatinine 0.49.  Troponin is negative on 3 draws.  

ProBNP 3950.  Pro-calcitonin 0.09.  Urinalysis negative for infection.  Chronic 

virus PCR not detected.  


Chest x-ray reveals coarse pulmonary infiltrates not significantly different 

from last exam and could relate to acute on chronic interstitial pneumonia.  

Heart failure not excluded.


CAT scan of the brain shows cerebral atrophy.  Small mild bilateral mastoiditis.

 No change compared to previous exam.


CT of the chest angiogram revealed advanced pulmonary fibrosis.  Chronic 

pulmonary infiltrates similar to EXAM.  Pleural thickening at the lung bases 

consistent with scarring and not significantly different.  No evidence pulmonary

embolism.  Stable thoracic kyphotic deformity and mid thoracic compression 

fractures.


Patient is seen today in the emergency center waiting for a bed on the [facility] 

floor, consult in place with pulmonary medicine, patient started on Levaquin, IV

steroids.





11/23: Patient is seen today again in the emergency center waiting for a bed on 

the [facility] floor.  He is currently on O2 at 9 L high flow nasal cannula with 

pulse ox of 96% he's been afebrile, heart rate 77, blood pressure 114/67.


He does continue to have chest congestion and cough and wheezing.


Pro-calcitonin came back at 0.09 and antibiotics were discontinued.  ProBNP 

3950.  Tessalon Perles changed to scheduled, Pulmicort increase to 1 mg twice 

daily.





REVIEW OF SYSTEMS


Constitutional: No fever, no chills, no night sweats.  No weight change.  No 

weakness, fatigue or lethargy.  No daytime sleepiness.


EENT: No headache.  No blurred vision or double vision, no loss of vision.  No 

loss of Hearing, no ringing in the ears, no dizziness.  No nasal drainage or 

congestion.  No epistaxis.  No sore throat.


Lungs: Reports continued shortness of breath, Reports cough, Reports sputum 

production.  No wheezing.


Cardiovascular: No chest pain, no lower extremity edema.  No palpitations.  No 

paroxysmal nocturnal dyspnea.  No orthopnea.  No lightheadedness or dizziness.  

No syncopal episodes.


Abdominal: No abdominal pain.  No nausea, vomiting.  No diarrhea.  No 

constipation.  No bloody or tarry stools..  No loss of appetite.


Genitourinary: No dysuria, increased frequency, urgency.  No urinary retention.


Musculoskeletal: No myalgias.  No muscle weakness, no gait dysfunction, no 

frequent falls.  No back pain.  No neck pain.


Integumentary: No wounds, no lesions.  No rash or pruritus.  No unusual bruisin

g.  


Neurologic: No aphasia. No facial droop. No change in mentation. No head injury.

No headache. No paralysis. No paresthesia.


Psychiatric: No depression.  No anxiety.  No mood swings.


Endocrine: No abnormal blood sugars.  No weight change.   





PHYSICAL EXAMINATION


Gen: This is 71-year-old  male.  Patient is resting in ER and not 

appear to be in acute respiratory failure. 


HEENT: Head is atraumatic, normocephalic. Pupils equal, round. Sclerae is 

anicteric. 


NECK: Supple. No JVD. No lymphadenopathy. No thyromegaly. 


LUNGS: Coarse crackles bilaterally.  No intercostal retractions.


HEART: Regular rate and rhythm.  Systolic murmur.  


ABDOMEN: Soft. Bowel sounds are present. No masses.  No tenderness.  Large 

ventral hernia, soft.


EXTREMITIES: No pedal edema.  No calf tenderness.  Dorsalis pedis palpable 

bilaterally.


NEUROLOGICAL: Patient is awake, alert and oriented x3. Cranial nerves 2 through 

12 are grossly intact. 





ASSESSMENT AND PLAN


1.  Acute hypoxic respiratory failure secondary to COPD exacerbation with 

underlying pulmonary fibrosis, possible rheumatoid arthritis associated 

interstitial lung disease.  Continue DuoNeb treatments every 4 hours, Tessalon 

Perles 200 mg 3 times daily scheduled, Pulmicort 1 mg twice daily, Perforomist 

20 g twice daily, Mucinex 1200 mg twice daily, Levaquin 500 milligrams daily, 

Solu-Medrol 40 mg IV every 6 hours. 





2.  Recent hospitalization for possible Right clavicular fracture and fracture 

of the superior and inferior left pubic ramus, pathologic, stable, no recent 

falls.





3.  Possible tracheobronchitis.  Patient is started on Levaquin, mucinex 1200 mg

twice daily, Tessalon Perles 200 mg 3 times daily.





4.  Chronic hypoxic respiratory failure.  Continue oxygen therapy.





5.  Advanced COPD with pulmonary fibrosis, steroid dependent and chronic hypoxic

and hypercapnic respiratory failure on home O2 at 4 L.  continue oxygen therapy.

 





6.  Moderate pulmonary hypertension.





7.  Hypertension.  Continue Toprol-XL 25 mg daily.





8.  Rheumatoid arthritis, stable.





9.  Hyperlipidemia.  Continue atorvastatin 40 mg at bedtime.





10.  Obstructive sleep apnea unable to tolerate CPAP.





11.  Chronic back pain and generalized osteoarthritis.  Continue Norco as 

needed, gabapentin 300 mg at bedtime





12.  History of perforated duodenal ulcer status post exploratory laparotomy 

with repair of perforated duodenal ulcer and repair of incarcerated umbilical 

hernia.





13.  Generalized anxiety disorder and recurrent depression.  Continue Zoloft 150

mg daily, Elavil 10 mg at bedtime. 





14.  DVT prophylaxis.  Heparin subcu.





15.  GI prophylaxis.  Continue Protonix daily.





16.  Insomnia.  Continue Elavil 10 mg at bedtime.





DISCHARGE PLAN


Home with [facility].  Consult PT and OT.





Impression and plan of care have been directed as dictated by the signing ph

ysician.  Chloé Maldonado nurse practitioner acting as scribe for signing 

physician.





Objective





- Vital Signs


Vital signs: 


                                   Vital Signs











Temp  98.7 F   11/23/21 05:12


 


Pulse  77   11/23/21 05:12


 


Resp  18   11/23/21 05:12


 


BP  114/67   11/23/21 05:12


 


Pulse Ox  96   11/23/21 05:12








                                 Intake & Output











 11/22/21 11/23/21 11/23/21





 18:59 06:59 18:59


 


Intake Total 100 1400 


 


Output Total  750 


 


Balance 100 650 


 


Weight  63.503 kg 


 


Intake:   


 


  Intake, IV Titration 100 100 





  Amount   


 


    Azithromycin 500 mg In  100 





    Sodium Chloride 0.9% 250   





    ml @ 250 mls/hr IVPB HS   





    [facility] Rx#:[id number]   


 


    Levofloxacin 500Mg-D5w 100  





    Pmx 500 mg In Dextrose/   





    Water 1 100ml.bag @ 100   





    mls/hr IVPB Q24H [facility] Rx#:   





    [id number]   


 


  Oral  1300 


 


Output:   


 


  Urine  750 


 


Other:   


 


  Voiding Method  Toilet 





  Urinal 














- Labs


CBC & Chem 7: 


                                 11/21/21 17:15





                                 11/21/21 17:15

## 2021-11-23 NOTE — P.PN
Subjective


Progress Note Date: 11/23/21


Principal diagnosis: 





Shortness of breath.





Pulmonary consult dated 11/22/2021.





71-year-old male, well-known to me, with history of chronic hypoxemic 

respiratory failure, on home O2 at 4 L, secondary to both COPD, and pulmonary 

fibrosis, who presents to the emergency department, with complaints of 

increasing shortness of breath.  The patient hasn't been feeling well for at 

least 3 or 4 days prior to admission, and may be even a week or so.  The patient

complains of cough, and phlegm production.  He denies any fever or chills.  He 

denies any chest pain.  The patient was brought to the emergency room by EMS.  

Currently, the patient is on 14 L high flow nasal cannula, and not receiving any

IV fluids.  We saw him in the emergency room, room 23.  His coronavirus testing 

was negative.  He has been fully vaccinated.  The patient states that he's 

feeling only a bit better since she's been here in the emergency department.  He

did have some mild conversational dyspnea, but no audible wheezing.  There was 

no use of accessory muscles.  CT revealed no evidence of pulmonary embolism.  

There was advanced pulmonary fibrosis.  Chest x-ray was also reviewed.  White 

count 12.1, hemoglobin 11.8, hematocrit 35.9, platelet count 408,000.  PT INR 

and PTT were normal.  D-dimer was 0.94.  Sodium 125, potassium 4.8, chlorides 

87, CO2 34, anion gap 4, BUN 17, and creatinine 0.49.  Albumin was 2.8.  

Troponins were normal.  N-terminal proBNP was 3950.  Urine was negative.





Progress note dated 11/23/2021.





71-year-old male, who presented to the emergency department, with complaints of 

shortness of breath.  The patient has a history of COPD, and pulmonary fibrosis.

 He also has a history of chronic hypoxemic respiratory failure, uses home O2, 

at 4 L/m, 24/7.  The patient is feeling better today.  He's been weaned down 

from 14 L high flow oxygen, to 9 L high flow.  He still quite short of breath.  

He also admits to chest congestion, coughing, and wheezing.  No new labs today t

o report.  No chest x-ray to report as well.  Current pulmonary medications 

include Pulmicort, formoterol, Solu-Medrol, and DuoNeb.  Antibiotics were 

discontinued because the patient's pro-calcitonin level was normal.





Objective





- Vital Signs


Vital signs: 


                                   Vital Signs











Temp  98.8 F   11/23/21 08:00


 


Pulse  99   11/23/21 09:55


 


Resp  16   11/23/21 08:00


 


BP  124/68   11/23/21 08:00


 


Pulse Ox  96   11/23/21 08:00








                                 Intake & Output











 11/22/21 11/23/21 11/23/21





 18:59 06:59 18:59


 


Intake Total 100 1400 


 


Output Total  750 


 


Balance 100 650 


 


Weight  63.503 kg 


 


Intake:   


 


  Intake, IV Titration 100 100 





  Amount   


 


    Azithromycin 500 mg In  100 





    Sodium Chloride 0.9% 250   





    ml @ 250 mls/hr IVPB HS   





    [facility] Rx#:[id number]   


 


    Levofloxacin 500Mg-D5w 100  





    Pmx 500 mg In Dextrose/   





    Water 1 100ml.bag @ 100   





    mls/hr IVPB Q24H [facility] Rx#:   





    [id number]   


 


  Oral  1300 


 


Output:   


 


  Urine  750 


 


Other:   


 


  Voiding Method  Toilet 





  Urinal 














- Exam





No acute distress, oriented 3, mild conversational dyspnea.  No audible 

wheezing.  Seems less short of breath today.





HEENT examination is grossly unremarkable.  





Neck supple.  Full range of motion.  No adenopathy thyromegaly or neck vein 

distention.





Cardiovascular examination reveals regular rhythm rate.  S1-S2 normal.  No S3 or

S4.  No discernible murmur noted.  Heart sounds are distant.  Heart rate 99 bpm.





Lungs reveal coarse bilateral expiratory rhonchi and wheezes.  Bibasilar 

crackles are also noted.  Breath sounds are equal bilaterally.  The patient does

have severe kyphosis.





Abdomen soft bowel sounds are heard.  No masses or tenderness.





Extremities are intact.  No cyanosis clubbing or edema.





Skin is without rash or lesion.





Neurologic examination is brief but nonfocal.





- Labs


CBC & Chem 7: 


                                 11/21/21 17:15





                                 11/21/21 17:15





Assessment and Plan


Assessment: 





Acute hypoxemic respiratory failure secondary to COPD exacerbation, and 

pulmonary fibrosis, and possible rheumatoid arthritis associated interstitial 

lung disease.





History of CHF.





History of rheumatoid arthritis.





History of hyperlipidemia.





History of osteoarthritis.





History of sleep apnea syndrome, currently on CPAP.





Prior history of perforated duodenal ulcer.





Chronic back pain.





Prior history of heavy tobacco use.





History of anxiety/depression.


Plan: 





Plan dated 11/22/2021.





The patient is admitted with a diagnosis of acute hypoxemic respiratory failure.

 The patient is well-known to me.  The computed tomography scan did not reveal 

any evidence of PE, but did show emphysematous changes, and diffuse interstitial

lung disease/pulmonary fibrosis.  We will check an N-terminal proBNP level, as 

well as a pro-calcitonin level.  Overall prognosis remains guarded.  The patient

is on Levaquin.  Additional recommendations and suggestions are forthcoming.  

Prognosis is guarded.  The patient's budesonide is increased to 1 mg, and 

formoterol is added.





Plan dated 11/23/2021.





The patient appears to be doing a bit better.  He remains on appropriate medicat

ions including DuoNeb, formoterol, budesonide, Solu-Medrol.  Antibiotics were 

discontinued, because the patient's pro-calcitonin level was normal.  Continue 

to follow make recommendations where appropriate.  Overall prognosis remains 

guarded.  No additional recommendations at this time.


Time with Patient: Less than 30

## 2021-11-24 LAB
GLUCOSE BLD-MCNC: 126 MG/DL (ref 75–99)
GLUCOSE BLD-MCNC: 136 MG/DL (ref 75–99)
GLUCOSE BLD-MCNC: 153 MG/DL (ref 75–99)
GLUCOSE BLD-MCNC: 161 MG/DL (ref 75–99)

## 2021-11-24 RX ADMIN — INSULIN ASPART SCH: 100 INJECTION, SOLUTION INTRAVENOUS; SUBCUTANEOUS at 21:05

## 2021-11-24 RX ADMIN — Medication SCH MG: at 17:17

## 2021-11-24 RX ADMIN — OXYCODONE HYDROCHLORIDE AND ACETAMINOPHEN SCH MG: 500 TABLET ORAL at 07:47

## 2021-11-24 RX ADMIN — METHYLPREDNISOLONE SODIUM SUCCINATE SCH MG: 40 INJECTION, POWDER, FOR SOLUTION INTRAMUSCULAR; INTRAVENOUS at 05:31

## 2021-11-24 RX ADMIN — INSULIN ASPART SCH: 100 INJECTION, SOLUTION INTRAVENOUS; SUBCUTANEOUS at 07:28

## 2021-11-24 RX ADMIN — METHYLPREDNISOLONE SODIUM SUCCINATE SCH MG: 40 INJECTION, POWDER, FOR SOLUTION INTRAMUSCULAR; INTRAVENOUS at 00:54

## 2021-11-24 RX ADMIN — BENZONATATE SCH MG: 100 CAPSULE ORAL at 15:58

## 2021-11-24 RX ADMIN — HEPARIN SODIUM SCH UNIT: 5000 INJECTION INTRAVENOUS; SUBCUTANEOUS at 07:47

## 2021-11-24 RX ADMIN — FORMOTEROL FUMARATE DIHYDRATE SCH MCG: 20 SOLUTION RESPIRATORY (INHALATION) at 21:50

## 2021-11-24 RX ADMIN — IPRATROPIUM BROMIDE AND ALBUTEROL SULFATE SCH ML: .5; 3 SOLUTION RESPIRATORY (INHALATION) at 16:43

## 2021-11-24 RX ADMIN — PANTOPRAZOLE SODIUM SCH MG: 40 TABLET, DELAYED RELEASE ORAL at 07:49

## 2021-11-24 RX ADMIN — MELOXICAM SCH MG: 7.5 TABLET ORAL at 07:47

## 2021-11-24 RX ADMIN — AMITRIPTYLINE HYDROCHLORIDE SCH MG: 10 TABLET, FILM COATED ORAL at 22:05

## 2021-11-24 RX ADMIN — FORMOTEROL FUMARATE DIHYDRATE SCH MCG: 20 SOLUTION RESPIRATORY (INHALATION) at 07:38

## 2021-11-24 RX ADMIN — HYDROCODONE BITARTRATE AND ACETAMINOPHEN PRN EACH: 10; 325 TABLET ORAL at 23:49

## 2021-11-24 RX ADMIN — SERTRALINE HYDROCHLORIDE SCH MG: 50 TABLET, FILM COATED ORAL at 07:48

## 2021-11-24 RX ADMIN — HEPARIN SODIUM SCH UNIT: 5000 INJECTION INTRAVENOUS; SUBCUTANEOUS at 15:58

## 2021-11-24 RX ADMIN — Medication SCH MG: at 07:49

## 2021-11-24 RX ADMIN — FLUTICASONE PROPIONATE SCH SPRAY: 50 SPRAY, METERED NASAL at 11:57

## 2021-11-24 RX ADMIN — HYDROCODONE BITARTRATE AND ACETAMINOPHEN PRN EACH: 10; 325 TABLET ORAL at 17:45

## 2021-11-24 RX ADMIN — IPRATROPIUM BROMIDE AND ALBUTEROL SULFATE SCH ML: .5; 3 SOLUTION RESPIRATORY (INHALATION) at 21:50

## 2021-11-24 RX ADMIN — INSULIN ASPART SCH UNIT: 100 INJECTION, SOLUTION INTRAVENOUS; SUBCUTANEOUS at 17:17

## 2021-11-24 RX ADMIN — METOPROLOL SUCCINATE SCH: 25 TABLET, EXTENDED RELEASE ORAL at 07:50

## 2021-11-24 RX ADMIN — IPRATROPIUM BROMIDE AND ALBUTEROL SULFATE SCH ML: .5; 3 SOLUTION RESPIRATORY (INHALATION) at 00:01

## 2021-11-24 RX ADMIN — CLOPIDOGREL BISULFATE SCH MG: 75 TABLET ORAL at 07:49

## 2021-11-24 RX ADMIN — HEPARIN SODIUM SCH UNIT: 5000 INJECTION INTRAVENOUS; SUBCUTANEOUS at 00:54

## 2021-11-24 RX ADMIN — THERA TABS SCH EACH: TAB at 07:47

## 2021-11-24 RX ADMIN — INSULIN ASPART SCH UNIT: 100 INJECTION, SOLUTION INTRAVENOUS; SUBCUTANEOUS at 11:57

## 2021-11-24 RX ADMIN — IPRATROPIUM BROMIDE AND ALBUTEROL SULFATE SCH ML: .5; 3 SOLUTION RESPIRATORY (INHALATION) at 11:26

## 2021-11-24 RX ADMIN — GABAPENTIN SCH MG: 300 CAPSULE ORAL at 21:06

## 2021-11-24 RX ADMIN — IPRATROPIUM BROMIDE AND ALBUTEROL SULFATE SCH ML: .5; 3 SOLUTION RESPIRATORY (INHALATION) at 07:38

## 2021-11-24 RX ADMIN — IPRATROPIUM BROMIDE AND ALBUTEROL SULFATE SCH: .5; 3 SOLUTION RESPIRATORY (INHALATION) at 03:27

## 2021-11-24 RX ADMIN — HEPARIN SODIUM SCH UNIT: 5000 INJECTION INTRAVENOUS; SUBCUTANEOUS at 23:49

## 2021-11-24 RX ADMIN — BUDESONIDE SCH MG: 1 SUSPENSION RESPIRATORY (INHALATION) at 07:38

## 2021-11-24 RX ADMIN — METHYLPREDNISOLONE SODIUM SUCCINATE SCH MG: 40 INJECTION, POWDER, FOR SOLUTION INTRAMUSCULAR; INTRAVENOUS at 23:48

## 2021-11-24 RX ADMIN — BENZONATATE SCH MG: 100 CAPSULE ORAL at 07:49

## 2021-11-24 RX ADMIN — METHYLPREDNISOLONE SODIUM SUCCINATE SCH MG: 40 INJECTION, POWDER, FOR SOLUTION INTRAMUSCULAR; INTRAVENOUS at 11:57

## 2021-11-24 RX ADMIN — ATORVASTATIN CALCIUM SCH MG: 40 TABLET, FILM COATED ORAL at 21:04

## 2021-11-24 RX ADMIN — HYDROCODONE BITARTRATE AND ACETAMINOPHEN PRN EACH: 10; 325 TABLET ORAL at 00:55

## 2021-11-24 RX ADMIN — BENZONATATE SCH MG: 100 CAPSULE ORAL at 21:03

## 2021-11-24 RX ADMIN — FUROSEMIDE SCH MG: 10 INJECTION, SOLUTION INTRAMUSCULAR; INTRAVENOUS at 07:47

## 2021-11-24 RX ADMIN — BUDESONIDE SCH MG: 1 SUSPENSION RESPIRATORY (INHALATION) at 21:50

## 2021-11-24 RX ADMIN — METHYLPREDNISOLONE SODIUM SUCCINATE SCH MG: 40 INJECTION, POWDER, FOR SOLUTION INTRAMUSCULAR; INTRAVENOUS at 17:17

## 2021-11-24 NOTE — P.PN
Subjective


Progress Note Date: 11/24/21


Principal diagnosis: 





Shortness of breath.





Pulmonary consult dated 11/22/2021.





71-year-old male, well-known to me, with history of chronic hypoxemic 

respiratory failure, on home O2 at 4 L, secondary to both COPD, and pulmonary 

fibrosis, who presents to the emergency department, with complaints of 

increasing shortness of breath.  The patient hasn't been feeling well for at 

least 3 or 4 days prior to admission, and may be even a week or so.  The patient

complains of cough, and phlegm production.  He denies any fever or chills.  He 

denies any chest pain.  The patient was brought to the emergency room by EMS.  

Currently, the patient is on 14 L high flow nasal cannula, and not receiving any

IV fluids.  We saw him in the emergency room, room 23.  His coronavirus testing 

was negative.  He has been fully vaccinated.  The patient states that he's 

feeling only a bit better since she's been here in the emergency department.  He

did have some mild conversational dyspnea, but no audible wheezing.  There was 

no use of accessory muscles.  CT revealed no evidence of pulmonary embolism.  

There was advanced pulmonary fibrosis.  Chest x-ray was also reviewed.  White 

count 12.1, hemoglobin 11.8, hematocrit 35.9, platelet count 408,000.  PT INR 

and PTT were normal.  D-dimer was 0.94.  Sodium 125, potassium 4.8, chlorides 

87, CO2 34, anion gap 4, BUN 17, and creatinine 0.49.  Albumin was 2.8.  

Troponins were normal.  N-terminal proBNP was 3950.  Urine was negative.





Progress note dated 11/23/2021.





71-year-old male, who presented to the emergency department, with complaints of 

shortness of breath.  The patient has a history of COPD, and pulmonary fibrosis.

 He also has a history of chronic hypoxemic respiratory failure, uses home O2, 

at 4 L/m, 24/7.  The patient is feeling better today.  He's been weaned down 

from 14 L high flow oxygen, to 9 L high flow.  He still quite short of breath.  

He also admits to chest congestion, coughing, and wheezing.  No new labs today t

o report.  No chest x-ray to report as well.  Current pulmonary medications 

include Pulmicort, formoterol, Solu-Medrol, and DuoNeb.  Antibiotics were 

discontinued because the patient's pro-calcitonin level was normal.





Progress note dated 11/24/2021.





71-year-old male, seen in room next to one.  The patient appears to be doing 

better.  The patient does have a history of chronic hypoxemic respiratory 

failure.  He does use home O2 at 4 L/m, 24/7.  His underlying pulmonary 

pathology is that of COPD, and pulmonary fibrosis, and possibly rheumatoid 

arthritis associated interstitial lung disease.  Currently he is on 5 L nasal 

cannula.  Saturations are 98%.  I'm hoping, the patient could be discharged home

tomorrow.  No new laboratory data to speak of.





Objective





- Vital Signs


Vital signs: 


                                   Vital Signs











Temp  97.8 F   11/24/21 13:55


 


Pulse  80   11/24/21 16:44


 


Resp  18   11/24/21 13:55


 


BP  92/48   11/24/21 13:55


 


Pulse Ox  98   11/24/21 16:47








                                 Intake & Output











 11/23/21 11/24/21 11/24/21





 18:59 06:59 18:59


 


Output Total 1150  


 


Balance -1150  


 


Weight  56.5 kg 


 


Output:   


 


  Urine 1150  


 


Other:   


 


  Voiding Method Urinal  Urinal


 


  # Voids 1 1 2


 


  # Bowel Movements   1














- Exam





No acute distress, oriented 3, mild conversational dyspnea.  No audible 

wheezing.  Seems less short of breath today.  Currently on 5 L.





HEENT examination is grossly unremarkable.  





Neck supple.  Full range of motion.  No adenopathy thyromegaly or neck vein 

distention.





Cardiovascular examination reveals regular rhythm rate.  S1-S2 normal.  No S3 or

S4.  No discernible murmur noted.  Heart sounds are distant.  Heart rate 92 bpm.





Lungs reveal coarse bilateral expiratory rhonchi and wheezes.  Bibasilar 

crackles are also noted.  Breath sounds are equal bilaterally.  The patient does

have severe kyphosis.  Breath sounds have improved.





Abdomen soft bowel sounds are heard.  No masses or tenderness.





Extremities are intact.  No cyanosis clubbing or edema.





Skin is without rash or lesion.





Neurologic examination is brief but nonfocal.





- Labs


CBC & Chem 7: 


                                 11/21/21 17:15





                                 11/21/21 17:15


Labs: 


                  Abnormal Lab Results - Last 24 Hours (Table)











  11/23/21 11/23/21 11/24/21 Range/Units





  17:26 20:25 07:26 


 


POC Glucose (mg/dL)  135 H  131 H  126 H  (75-99)  mg/dL














  11/24/21 11/24/21 Range/Units





  11:17 16:38 


 


POC Glucose (mg/dL)  161 H  153 H  (75-99)  mg/dL








                      Microbiology - Last 24 Hours (Table)











 11/23/21 21:26 Gram Stain - Preliminary





 Sputum Sputum Culture - Preliminary














Assessment and Plan


Assessment: 





Acute hypoxemic respiratory failure secondary to COPD exacerbation, and pulmon

jordyn fibrosis, and possible rheumatoid arthritis associated interstitial lung 

disease.





History of CHF.





History of rheumatoid arthritis.





History of hyperlipidemia.





History of osteoarthritis.





History of sleep apnea syndrome, currently on CPAP.





Prior history of perforated duodenal ulcer.





Chronic back pain.





Prior history of heavy tobacco use.





History of anxiety/depression.


Plan: 





Plan dated 11/22/2021.





The patient is admitted with a diagnosis of acute hypoxemic respiratory failure.

 The patient is well-known to me.  The computed tomography scan did not reveal 

any evidence of PE, but did show emphysematous changes, and diffuse interstitial

lung disease/pulmonary fibrosis.  We will check an N-terminal proBNP level, as 

well as a pro-calcitonin level.  Overall prognosis remains guarded.  The patient

is on Levaquin.  Additional recommendations and suggestions are forthcoming.  

Prognosis is guarded.  The patient's budesonide is increased to 1 mg, and 

formoterol is added.





Plan dated 11/23/2021.





The patient appears to be doing a bit better.  He remains on appropriate medi

cations including DuoNeb, formoterol, budesonide, Solu-Medrol.  Antibiotics were

discontinued, because the patient's pro-calcitonin level was normal.  Continue 

to follow make recommendations where appropriate.  Overall prognosis remains 

guarded.  No additional recommendations at this time.





Plan dated 11/24/2021.





The patient has been weaned down to 5 L nasal cannula.  Saturations are 98%.  

The patient's on appropriate medications.  The patient's pro-calcitonin level 

was normal.  He remains on Solu-Medrol, DuoNeb, formoterol, budesonide.  We are 

hoping that the patient could be discharged home tomorrow.  We'll have to wait 

and see.  The final decision will be made by the hospital service.  I will see 

the patient in the office post discharge.  No additional recommendations are 

made.


Time with Patient: Less than 30

## 2021-11-24 NOTE — P.PN
Subjective


Progress Note Date: 11/24/21





HISTORY OF PRESENT ILLNESS


This is a 71-year-old male patient of Dr. Bazan and Dr. Pickard with past 

medical history of advanced steroid-dependent COPD, hyperlipidemia, rheumatoid 

arthritis, obstructive sleep apnea unable to tolerate CPAP, chronic hypoxic 

respiratory failure on home O2 at 4 L nasal cannula, generalized osteoarthritis,

chronic back pain pseudomonas in sputum on previous admission secondary to 

colonization or pseudomonal pneumonia.  Known FVC of 72% and FEV1 of 46%.  

History of perforated duodenal ulcer status post exploratory laparotomy with 

repair of perforated duodenal ulcer and repair of incarcerated umbilical hernia.

 Patient had a recent hospitalization for superior and inferior left pubic ramus

fracture and right clavicle fracture. Echocardiogram on 4/7/2021 reveals EF of 

55-60% with mild concentric left ventricular hypertrophy, trace aortic regu

rgitation, mild mitral regurgitation, mild tricuspid regurgitation, moderate 

pulmonary hypertension.  Patient states he has had difficulty breathing for the 

past week.  He had contacted Dr. Cantu's office and a prescription for Z-Leonel was

called to his pharmacy.  Patient denies any recent falls.  He has been utilizing

a walker for ambulation.  He states he is a little constipated.  No nausea or 

vomiting.  He denies any fever or chills.





Patient came into [facility] emergency center for evaluation.  

Patient was afebrile, heart rate 85, blood pressure 111/75, pulse ox 90% on 4 L 

nasal cannula.  


WBC 12.1, hemoglobin 11.8, platelet count 408.  Sodium 125, potassium 4.8, 

chloride 87, CO2 34, BUN 17 creatinine 0.49.  Troponin is negative on 3 draws.  

ProBNP 3950.  Pro-calcitonin 0.09.  Urinalysis negative for infection.  Chronic 

virus PCR not detected.  


Chest x-ray reveals coarse pulmonary infiltrates not significantly different 

from last exam and could relate to acute on chronic interstitial pneumonia.  

Heart failure not excluded.


CAT scan of the brain shows cerebral atrophy.  Small mild bilateral mastoiditis.

 No change compared to previous exam.


CT of the chest angiogram revealed advanced pulmonary fibrosis.  Chronic 

pulmonary infiltrates similar to EXAM.  Pleural thickening at the lung bases 

consistent with scarring and not significantly different.  No evidence pulmonary

embolism.  Stable thoracic kyphotic deformity and mid thoracic compression 

fractures.


Patient is seen today in the emergency center waiting for a bed on the [facility] 

floor, consult in place with pulmonary medicine, patient started on Levaquin, IV

steroids.





11/23: Patient is seen today again in the emergency center waiting for a bed on 

the [facility] floor.  He is currently on O2 at 9 L high flow nasal cannula with 

pulse ox of 96% he's been afebrile, heart rate 77, blood pressure 114/67.


He does continue to have chest congestion and cough and wheezing.


Pro-calcitonin came back at 0.09 and antibiotics were discontinued.  ProBNP 

3950.  Tessalon Perles changed to scheduled, Pulmicort increase to 1 mg twice 

daily.





11/24 patient examined today sitting comfortably in bed.  Continues to cough and

is noted to be short of breath at rest.  Patient is currently on 8 L high flow 

nasal cannula saturating at 97% blood pressure 92/48 afebrile pulse 96.  Will 

obtain labs last labs obtained on 11/21 patient's blood sugar continues to be 

controlled ranging from 104-161.  ProBNP mildly elevated at 3950 pro-calcitonin 

was negative.  Patient's Lasix was switched from oral to IV.  Pulmonary follo

wing.  Continue to down titrate oxygen need.  We will reduce Solu-Medrol to 40 

every 8 hours.  Possible discharge in next 24-48 hours





REVIEW OF SYSTEMS


Constitutional: No fever, no chills, no night sweats.  No weight change.  No 

weakness, fatigue or lethargy.  No daytime sleepiness.


EENT: No headache.  No blurred vision or double vision, no loss of vision.  No 

loss of Hearing, no ringing in the ears, no dizziness.  No nasal drainage or 

congestion.  No epistaxis.  No sore throat.


Lungs: Reports continued shortness of breath, Reports cough, Reports sputum 

production.  No wheezing.


Cardiovascular: No chest pain, no lower extremity edema.  No palpitations.  No 

paroxysmal nocturnal dyspnea.  No orthopnea.  No lightheadedness or dizziness.  

No syncopal episodes.


Abdominal: No abdominal pain.  No nausea, vomiting.  No diarrhea.  No 

constipation.  No bloody or tarry stools..  No loss of appetite.


Genitourinary: No dysuria, increased frequency, urgency.  No urinary retention.


Musculoskeletal: No myalgias.  No muscle weakness, no gait dysfunction, no 

frequent falls.  No back pain.  No neck pain.


Integumentary: No wounds, no lesions.  No rash or pruritus.  No unusual 

bruising.  


Neurologic: No aphasia. No facial droop. No change in mentation. No head injury.

No headache. No paralysis. No paresthesia.


Psychiatric: No depression.  No anxiety.  No mood swings.


Endocrine: No abnormal blood sugars.  No weight change.   





PHYSICAL EXAMINATION


Gen: This is 71-year-old  male.  Patient is resting in ER and not 

appear to be in acute respiratory failure. 


HEENT: Head is atraumatic, normocephalic. Pupils equal, round. Sclerae is anicte

pauly. 


NECK: Supple. No JVD. No lymphadenopathy. No thyromegaly. 


LUNGS: Improvement in crackles bilaterally mild wheezing noted.  No intercostal 

retractions.


HEART: Regular rate and rhythm.  Systolic murmur.  


ABDOMEN: Soft. Bowel sounds are present. No masses.  No tenderness.  Large 

ventral hernia, soft.


EXTREMITIES: No pedal edema.  No calf tenderness.  Dorsalis pedis palpable 

bilaterally.


NEUROLOGICAL: Patient is awake, alert and oriented x3. Cranial nerves 2 through 

12 are grossly intact. 





ASSESSMENT AND PLAN


1.  Acute hypoxic respiratory failure secondary to COPD exacerbation with 

underlying pulmonary fibrosis, possible rheumatoid arthritis associated 

interstitial lung disease.  Continue DuoNeb treatments every 4 hours, Tessalon 

Perles 200 mg 3 times daily scheduled, Pulmicort 1 mg twice daily, Perforomist 

20 g twice daily, Mucinex 1200 mg twice daily, Levaquin discontinued pro-

calcitonin normal Solu-Medrol 40 mg IV reduce to every 8 hours 





2.  Recent hospitalization for possible Right clavicular fracture and fracture 

of the superior and inferior left pubic ramus, pathologic, stable, no recent 

falls.





3.  Possible tracheobronchitis.   on  mucinex 1200 mg twice daily, Tessalon 

Perles 200 mg 3 times daily.  Pro-calcitonin was negative below Levaquin 

discontinued continue Solu-Medrol, reduce Solu-Medrol from 40 IV every 6 hours 

40 IV every 8 hours





4.  Chronic hypoxic respiratory failure.  Continue oxygen therapy.





5.  Advanced COPD with pulmonary fibrosis, steroid dependent and chronic hypoxic

and hypercapnic respiratory failure on home O2 at 4 L.  continue oxygen therapy.

 





6.  Moderate pulmonary hypertension.





7.  Hypertension.  Continue Toprol-XL 25 mg daily.





8.  Rheumatoid arthritis, stable.





9.  Hyperlipidemia.  Continue atorvastatin 40 mg at bedtime.





10.  Obstructive sleep apnea unable to tolerate CPAP.





11.  Chronic back pain and generalized osteoarthritis.  Continue Norco as 

needed, gabapentin 300 mg at bedtime





12.  History of perforated duodenal ulcer status post exploratory laparotomy 

with repair of perforated duodenal ulcer and repair of incarcerated umbilical 

hernia.





13.  Generalized anxiety disorder and recurrent depression.  Continue Zoloft 150

mg daily, Elavil 10 mg at bedtime. 





14.  DVT prophylaxis.  Heparin subcu.





15.  GI prophylaxis.  Continue Protonix daily.





16.  Insomnia.  Continue Elavil 10 mg at bedtime.





DISCHARGE PLAN


Home with [facility].  Consult PT and OT.








Objective





- Vital Signs


Vital signs: 


                                   Vital Signs











Temp  98.2 F   11/24/21 02:00


 


Pulse  101 H  11/24/21 02:00


 


Resp  18   11/24/21 02:00


 


BP  95/58   11/24/21 02:00


 


Pulse Ox  99   11/24/21 02:00








                                 Intake & Output











 11/23/21 11/24/21 11/24/21





 18:59 06:59 18:59


 


Output Total 1150  


 


Balance -1150  


 


Weight  56.5 kg 


 


Output:   


 


  Urine 1150  


 


Other:   


 


  Voiding Method Urinal  


 


  # Voids 1 1 














- Labs


CBC & Chem 7: 


                                 11/21/21 17:15





                                 11/21/21 17:15


Labs: 


                  Abnormal Lab Results - Last 24 Hours (Table)











  11/23/21 11/23/21 Range/Units





  17:26 20:25 


 


POC Glucose (mg/dL)  135 H  131 H  (75-99)  mg/dL

## 2021-11-25 VITALS — TEMPERATURE: 98.5 F | RESPIRATION RATE: 17 BRPM | SYSTOLIC BLOOD PRESSURE: 93 MMHG | DIASTOLIC BLOOD PRESSURE: 52 MMHG

## 2021-11-25 VITALS — HEART RATE: 92 BPM

## 2021-11-25 LAB
GLUCOSE BLD-MCNC: 119 MG/DL (ref 75–99)
GLUCOSE BLD-MCNC: 129 MG/DL (ref 75–99)

## 2021-11-25 RX ADMIN — Medication SCH MG: at 08:10

## 2021-11-25 RX ADMIN — BUDESONIDE SCH MG: 1 SUSPENSION RESPIRATORY (INHALATION) at 08:09

## 2021-11-25 RX ADMIN — INSULIN ASPART SCH: 100 INJECTION, SOLUTION INTRAVENOUS; SUBCUTANEOUS at 11:27

## 2021-11-25 RX ADMIN — PANTOPRAZOLE SODIUM SCH MG: 40 TABLET, DELAYED RELEASE ORAL at 08:08

## 2021-11-25 RX ADMIN — FLUTICASONE PROPIONATE SCH SPRAY: 50 SPRAY, METERED NASAL at 08:10

## 2021-11-25 RX ADMIN — OXYCODONE HYDROCHLORIDE AND ACETAMINOPHEN SCH MG: 500 TABLET ORAL at 08:09

## 2021-11-25 RX ADMIN — IPRATROPIUM BROMIDE AND ALBUTEROL SULFATE SCH: .5; 3 SOLUTION RESPIRATORY (INHALATION) at 01:57

## 2021-11-25 RX ADMIN — CLOPIDOGREL BISULFATE SCH MG: 75 TABLET ORAL at 08:10

## 2021-11-25 RX ADMIN — SERTRALINE HYDROCHLORIDE SCH MG: 50 TABLET, FILM COATED ORAL at 08:09

## 2021-11-25 RX ADMIN — IPRATROPIUM BROMIDE AND ALBUTEROL SULFATE SCH ML: .5; 3 SOLUTION RESPIRATORY (INHALATION) at 08:09

## 2021-11-25 RX ADMIN — IPRATROPIUM BROMIDE AND ALBUTEROL SULFATE SCH: .5; 3 SOLUTION RESPIRATORY (INHALATION) at 11:43

## 2021-11-25 RX ADMIN — METOPROLOL SUCCINATE SCH: 25 TABLET, EXTENDED RELEASE ORAL at 08:20

## 2021-11-25 RX ADMIN — MELOXICAM SCH MG: 7.5 TABLET ORAL at 08:08

## 2021-11-25 RX ADMIN — HYDROCODONE BITARTRATE AND ACETAMINOPHEN PRN EACH: 10; 325 TABLET ORAL at 08:28

## 2021-11-25 RX ADMIN — IPRATROPIUM BROMIDE AND ALBUTEROL SULFATE SCH ML: .5; 3 SOLUTION RESPIRATORY (INHALATION) at 02:26

## 2021-11-25 RX ADMIN — HEPARIN SODIUM SCH UNIT: 5000 INJECTION INTRAVENOUS; SUBCUTANEOUS at 08:10

## 2021-11-25 RX ADMIN — FORMOTEROL FUMARATE DIHYDRATE SCH MCG: 20 SOLUTION RESPIRATORY (INHALATION) at 08:09

## 2021-11-25 RX ADMIN — FUROSEMIDE SCH MG: 10 INJECTION, SOLUTION INTRAMUSCULAR; INTRAVENOUS at 08:20

## 2021-11-25 RX ADMIN — INSULIN ASPART SCH: 100 INJECTION, SOLUTION INTRAVENOUS; SUBCUTANEOUS at 07:32

## 2021-11-25 RX ADMIN — BENZONATATE SCH MG: 100 CAPSULE ORAL at 08:09

## 2021-11-25 RX ADMIN — THERA TABS SCH EACH: TAB at 08:09

## 2021-11-25 NOTE — P.DS
Providers


Date of admission: 


11/21/21 19:41





Attending physician: 


Amalia Bazan





Consults: 





                                        





11/21/21 19:43


Consult Physician Routine 


   Consulting Provider: Merari Thakur


   Consult Reason/Comments: copd exacerbation, chronic pulmonary fibrosis and 

hypoxic


                                                respiratory fail


   Do you want consulting provider notified?: Yes











Primary care physician: 


Amalia Bazan





[facility]: 





HISTORY OF PRESENT ILLNESS


This is a 71-year-old male patient of Dr. Bazan and Dr. Pickard with past 

medical history of advanced steroid-dependent COPD, hyperlipidemia, rheumatoid 

arthritis, obstructive sleep apnea unable to tolerate CPAP, chronic hypoxic 

respiratory failure on home O2 at 4 L nasal cannula, generalized osteoarthritis,

chronic back pain pseudomonas in sputum on previous admission secondary to 

colonization or pseudomonal pneumonia.  Known FVC of 72% and FEV1 of 46%.  

History of perforated duodenal ulcer status post exploratory laparotomy with 

repair of perforated duodenal ulcer and repair of incarcerated umbilical hernia.

 Patient had a recent hospitalization for superior and inferior left pubic ramus

fracture and right clavicle fracture. Echocardiogram on 4/7/2021 reveals EF of 

55-60% with mild concentric left ventricular hypertrophy, trace aortic 

regurgitation, mild mitral regurgitation, mild tricuspid regurgitation, moderate

pulmonary hypertension.  Patient states he has had difficulty breathing for the 

past week.  He had contacted Dr. Cantu's office and a prescription for Z-Leonel was

called to his pharmacy.  Patient denies any recent falls.  He has been utilizing

a walker for ambulation.  He states he is a little constipated.  No nausea or 

vomiting.  He denies any fever or chills.





Patient came into [facility] emergency center for evaluation.  

Patient was afebrile, heart rate 85, blood pressure 111/75, pulse ox 90% on 4 L 

nasal cannula.  


WBC 12.1, hemoglobin 11.8, platelet count 408.  Sodium 125, potassium 4.8, 

chloride 87, CO2 34, BUN 17 creatinine 0.49.  Troponin is negative on 3 draws.  

ProBNP 3950.  Pro-calcitonin 0.09.  Urinalysis negative for infection.  Chronic 

virus PCR not detected.  


Chest x-ray reveals coarse pulmonary infiltrates not significantly different 

from last exam and could relate to acute on chronic interstitial pneumonia.  

Heart failure not excluded.


CAT scan of the brain shows cerebral atrophy.  Small mild bilateral mastoiditis.

 No change compared to previous exam.


CT of the chest angiogram revealed advanced pulmonary fibrosis.  Chronic 

pulmonary infiltrates similar to EXAM.  Pleural thickening at the lung bases 

consistent with scarring and not significantly different.  No evidence pulmonary

embolism.  Stable thoracic kyphotic deformity and mid thoracic compression fra

ctures.


Patient is seen today in the emergency center waiting for a bed on the [facility] 

floor, consult in place with pulmonary medicine, patient started on Levaquin, IV

steroids.





11/23: Patient is seen today again in the emergency center waiting for a bed on 

the [facility] floor.  He is currently on O2 at 9 L high flow nasal cannula with 

pulse ox of 96% he's been afebrile, heart rate 77, blood pressure 114/67.


He does continue to have chest congestion and cough and wheezing.


Pro-calcitonin came back at 0.09 and antibiotics were discontinued.  ProBNP 

3950.  Tessalon Perles changed to scheduled, Pulmicort increase to 1 mg twice 

daily.





11/24 patient examined today sitting comfortably in bed.  Continues to cough and

is noted to be short of breath at rest.  Patient is currently on 8 L high flow n

peri cannula saturating at 97% blood pressure 92/48 afebrile pulse 96.  Will 

obtain labs last labs obtained on 11/21 patient's blood sugar continues to be 

controlled ranging from 104-161.  ProBNP mildly elevated at 3950 pro-calcitonin 

was negative.  Patient's Lasix was switched from oral to IV.  Pulmonary 

following.  Continue to down titrate oxygen need.  We will reduce Solu-Medrol to

40 every 8 hours.  Possible discharge in next 24-48 hours





11/25 is a 71 years old male admitted with acute hypoxic respiratory failure.  

Patient's oxygen with him and has titrated down with current treatment plan.  

Currently patient is on 4 L of nasal cannula and is breathing comfortably at 

rest.  Patient denies any cough production.  Solu-Medrol switched to prednisone.

 Glucose is controlled.  ProBNP was mildly elevated at 3950.  Pro-calcitonin was

normal  Patient has been cleared by pulmonary.  Long prednisone taper prescribed

for patient to be followed by Dr. Salazar as  outpatient








PHYSICAL EXAMINATION


Gen: This is 71-year-old  male.  Patient is resting in ER and not 

appear to be in acute respiratory failure. 


HEENT: Head is atraumatic, normocephalic. Pupils equal, round. Sclerae is 

anicteric. 


NECK: Supple. No JVD. No lymphadenopathy. No thyromegaly. 


LUNGS: Improvement in crackles bilaterally  no wheezing   No intercostal 

retractions.


HEART: Regular rate and rhythm.  Systolic murmur.  


ABDOMEN: Soft. Bowel sounds are present. No masses.  No tenderness.  Large 

ventral hernia, soft.


EXTREMITIES: No pedal edema.  No calf tenderness.  Dorsalis pedis palpable 

bilaterally.


NEUROLOGICAL: Patient is awake, alert and oriented x3. Cranial nerves 2 through 

12 are grossly intact. 





ASSESSMENT AND PLAN


  Acute hypoxic respiratory failure secondary to COPD exacerbation with 

underlying pulmonary fibrosis, possible rheumatoid arthritis associated 

interstitial lung disease.  


  Recent hospitalization for possible Right clavicular fracture and fracture of 

the superior and inferior left pubic ramus, pathologic, stable, no recent falls.


  Acute diastolic congestive heart failure


  Chronic hypoxic respiratory failure.  Continue oxygen therapy.


 Advanced COPD with pulmonary fibrosis, steroid dependent and chronic hypoxic 

and hypercapnic respiratory failure on home O2 at 4 L.  continue oxygen therapy.

 


  Moderate pulmonary hypertension.


  Hypertension. 


  Rheumatoid arthritis, stable.


 Hyperlipidemia.  


  Obstructive sleep apnea unable to tolerate CPAP.


Chronic back pain and generalized osteoarthritis.


 History of perforated duodenal ulcer status post exploratory laparotomy with 

repair of perforated duodenal ulcer and repair of incarcerated umbilical hernia.


Generalized anxiety disorder and recurrent depression.  Continue Zoloft 150 mg 

daily, Elavil 10 mg at bedtime. 


 Insomnia.  Continue Elavil 10 mg at bedtime.





Disposition home with homecare


Patient Condition at Discharge: Good





Plan - Discharge Summary


Discharge Rx Participant: No


New Discharge Prescriptions: 


New


   predniSONE 0 mg PO AS DIRECTED #60 tab


   HYDROcodone/APAP 10-325MG [Norco ] 1 tab PO Q8HR PRN 3 Days #9 tab


     PRN Reason: Pain





Continue


   Metoprolol Succinate [Toprol XL] 25 mg PO DAILY


   Sertraline [Zoloft] 150 mg PO DAILY


   Celecoxib [CeleBREX] 200 mg PO DAILY


   Melatonin 3 mg PO HS PRN


     PRN Reason: Insomnia


   Clopidogrel [Plavix] 75 mg PO DAILY #14 tab


   Fluticasone Nasal Spray [Flonase Nasal Spray] 2 spr EA NOSTRIL DAILY


   Furosemide [Lasix] 40 mg PO DAILY #28 tab


   Gabapentin [Neurontin] 300 mg PO HS #3 cap


   predniSONE 2.5 mg PO Q48H #0


   Multivitamins, Thera [Multivitamin (formulary)] 1 tab PO DAILY


   HYDROcodone/APAP 10-325MG [Norco ] 1 tab PO Q6H PRN


     PRN Reason: Pain


   Benzonatate [Tessalon Perles] 200 mg PO TID PRN


     PRN Reason: Cough


   Atorvastatin [Lipitor] 40 mg PO HS


   Amitriptyline HCl [Elavil] 10 mg PO HS


   Ascorbic Acid [Vitamin C] 1,000 mg PO DAILY


   Ipratropium-Albuterol Nebulize [Duoneb 0.5 mg-3 mg/3 ml Soln] 3 ml INHALATION

RT-QID


   Budesonide [Pulmicort] 0.5 mg INHALATION RT-BID


   Thiamine [Vitamin B-1] 100 mg PO BID-W/MEALS  tab


   Omeprazole 20 mg PO DAILY


   guaiFENesin 400 mg PO Q4H


   Calcium Carbonate 1,000 mg PO Q8H PRN


     PRN Reason: Indigestion


   Acetaminophen [Tylenol] 650 mg PO Q6H PRN


     PRN Reason: Pain





Discontinued


   Azithromycin [Zithromax Z-pack (6 tabs)] See Taper PO AS DIRECTED


Discharge Medication List





Metoprolol Succinate [Toprol XL] 25 mg PO DAILY 05/12/20 [History]


Sertraline [Zoloft] 150 mg PO DAILY 05/12/20 [History]


Amitriptyline HCl [Elavil] 10 mg PO HS 04/06/21 [History]


Celecoxib [CeleBREX] 200 mg PO DAILY 04/06/21 [History]


Ascorbic Acid [Vitamin C] 1,000 mg PO DAILY 04/10/21 [History]


Ipratropium-Albuterol Nebulize [Duoneb 0.5 mg-3 mg/3 ml Soln] 3 ml INHALATION 

RT-QID 04/10/21 [History]


Melatonin 3 mg PO HS PRN 04/10/21 [History]


Clopidogrel [Plavix] 75 mg PO DAILY #14 tab 04/13/21 [Rx]


Budesonide [Pulmicort] 0.5 mg INHALATION RT-BID 11/08/21 [History]


Fluticasone Nasal Spray [Flonase Nasal Spray] 2 spr EA NOSTRIL DAILY 11/08/21 

[History]


Furosemide [Lasix] 40 mg PO DAILY #28 tab 11/10/21 [Rx]


Gabapentin [Neurontin] 300 mg PO HS #3 cap 11/10/21 [Rx]


Thiamine [Vitamin B-1] 100 mg PO BID-W/MEALS  tab 11/10/21 [Rx]


predniSONE 2.5 mg PO Q48H #0 11/10/21 [Rx]


Acetaminophen [Tylenol] 650 mg PO Q6H PRN 11/21/21 [History]


Atorvastatin [Lipitor] 40 mg PO HS 11/21/21 [History]


Benzonatate [Tessalon Perles] 200 mg PO TID PRN 11/21/21 [History]


Calcium Carbonate 1,000 mg PO Q8H PRN 11/21/21 [History]


HYDROcodone/APAP 10-325MG [Norco ] 1 tab PO Q6H PRN 11/21/21 [History]


Multivitamins, Thera [Multivitamin (formulary)] 1 tab PO DAILY 11/21/21 

[History]


Omeprazole 20 mg PO DAILY 11/21/21 [History]


guaiFENesin 400 mg PO Q4H 11/21/21 [History]


HYDROcodone/APAP 10-325MG [Norco ] 1 tab PO Q8HR PRN 3 Days #9 tab 

11/25/21 [Rx]


predniSONE 0 mg PO AS DIRECTED #60 tab 11/25/21 [Rx]








Follow up Appointment(s)/Referral(s): 


Amalia Bazan MD [Primary Care Provider] - 1-2 days


[facility] [NON-STAFF] - 


London Salazar DO [Doctor of Osteopathic Medicine] - 1 Week


Discharge Disposition: HOME WITH HOME HEALTH SERVICES

## 2021-11-25 NOTE — PN
PROGRESS NOTE



This is a 71-year-old male who was again seen on November 25.  Currently the patient is

doing much better.  He has been weaned down to 4 L nasal cannula.  He states that his

breathing is improved.  The patient denies any cough or phlegm production.  No chest

pain or chest discomfort.  No fever or chills.



PHYSICAL EXAMINATION:

Vital signs are reviewed. His temperature is normal, heart rate 92, respiratory rate

17, blood pressure 147/72, 4-liter saturation 96%.

GENERAL APPEARANCE: He appears in no acute distress.

HEENT examination is grossly unremarkable.

NECK:  Supple. Full range of motion.  No adenopathy.  Neck veins are flat.

Cardiovascular examination reveals regular rhythm and rate.  Heart rate 92 beats per

minute.  S1, S2 normal.  No S3, S4 or murmur.

Lungs reveal coarse rhonchi.  Breath sounds are improved.  There are some basilar

crackles.  Breath sounds equal bilaterally.

ABDOMEN:  Soft. Bowel sounds are heard.

Extremities are intact.  There is no cyanosis, clubbing or edema.

SKIN: Without rash.

Neurologic examination is nonfocal.



No new labs or x-rays to report.



Medications are reviewed.



ASSESSMENT:

1. Acute hypoxemic respiratory failure secondary to chronic obstructive pulmonary

    disease exacerbation as well as pulmonary fibrosis and possible rheumatoid

    arthritis-associated interstitial lung disease.

2. History of congestive heart failure.

3. History of rheumatoid arthritis.

4. History of hyperlipidemia.

5. History of osteoarthritis.

6. History of sleep apnea syndrome, currently maintained on CPAP.

7. Prior history of perforated duodenal ulcer.

8. Chronic back pain.

9. Prior history of heavy tobacco use.

10.History of anxiety/depression.



PLAN:

The patient is doing better.  He has been weaned down to 4 ; that is how much oxygen he

uses at home. The CT angiogram did not show any evidence of pulmonary embolism.  It did

show evidence of emphysematous changes and diffuse interstitial lung disease.  The

patient is doing better on Levaquin.  Will continue to follow.  The patient is

maintained on Pulmicort and formoterol.  He is also on corticosteroids.  Additional

recommendations and suggestions are forthcoming.  Prognosis is guarded.





MMODL / IJN: [id number] / Job#: [id number]

## 2021-12-03 ENCOUNTER — HOSPITAL ENCOUNTER (INPATIENT)
Dept: HOSPITAL 47 - EC | Age: 71
LOS: 3 days | Discharge: HOME HEALTH | DRG: 190 | End: 2021-12-06
Attending: FAMILY MEDICINE | Admitting: FAMILY MEDICINE
Payer: MEDICARE

## 2021-12-03 VITALS — BODY MASS INDEX: 20.5 KG/M2

## 2021-12-03 DIAGNOSIS — Z79.899: ICD-10-CM

## 2021-12-03 DIAGNOSIS — M15.9: ICD-10-CM

## 2021-12-03 DIAGNOSIS — Z88.0: ICD-10-CM

## 2021-12-03 DIAGNOSIS — Z99.81: ICD-10-CM

## 2021-12-03 DIAGNOSIS — E78.5: ICD-10-CM

## 2021-12-03 DIAGNOSIS — M48.00: ICD-10-CM

## 2021-12-03 DIAGNOSIS — G47.33: ICD-10-CM

## 2021-12-03 DIAGNOSIS — Z82.0: ICD-10-CM

## 2021-12-03 DIAGNOSIS — F33.9: ICD-10-CM

## 2021-12-03 DIAGNOSIS — Z80.0: ICD-10-CM

## 2021-12-03 DIAGNOSIS — I50.30: ICD-10-CM

## 2021-12-03 DIAGNOSIS — E24.2: ICD-10-CM

## 2021-12-03 DIAGNOSIS — I08.3: ICD-10-CM

## 2021-12-03 DIAGNOSIS — G47.00: ICD-10-CM

## 2021-12-03 DIAGNOSIS — Z87.11: ICD-10-CM

## 2021-12-03 DIAGNOSIS — F41.1: ICD-10-CM

## 2021-12-03 DIAGNOSIS — Z79.02: ICD-10-CM

## 2021-12-03 DIAGNOSIS — Z80.6: ICD-10-CM

## 2021-12-03 DIAGNOSIS — Z20.822: ICD-10-CM

## 2021-12-03 DIAGNOSIS — Z87.81: ICD-10-CM

## 2021-12-03 DIAGNOSIS — Z57.4: ICD-10-CM

## 2021-12-03 DIAGNOSIS — J44.1: Primary | ICD-10-CM

## 2021-12-03 DIAGNOSIS — E87.1: ICD-10-CM

## 2021-12-03 DIAGNOSIS — J84.10: ICD-10-CM

## 2021-12-03 DIAGNOSIS — M06.9: ICD-10-CM

## 2021-12-03 DIAGNOSIS — J96.21: ICD-10-CM

## 2021-12-03 DIAGNOSIS — G89.29: ICD-10-CM

## 2021-12-03 DIAGNOSIS — I11.0: ICD-10-CM

## 2021-12-03 DIAGNOSIS — F03.90: ICD-10-CM

## 2021-12-03 DIAGNOSIS — E86.0: ICD-10-CM

## 2021-12-03 DIAGNOSIS — Z87.891: ICD-10-CM

## 2021-12-03 DIAGNOSIS — Z98.890: ICD-10-CM

## 2021-12-03 DIAGNOSIS — Z79.1: ICD-10-CM

## 2021-12-03 DIAGNOSIS — I27.20: ICD-10-CM

## 2021-12-03 DIAGNOSIS — J96.22: ICD-10-CM

## 2021-12-03 DIAGNOSIS — T38.0X5A: ICD-10-CM

## 2021-12-03 DIAGNOSIS — D72.829: ICD-10-CM

## 2021-12-03 DIAGNOSIS — Z86.19: ICD-10-CM

## 2021-12-03 DIAGNOSIS — M80.08XA: ICD-10-CM

## 2021-12-03 DIAGNOSIS — Z82.3: ICD-10-CM

## 2021-12-03 DIAGNOSIS — Z83.3: ICD-10-CM

## 2021-12-03 DIAGNOSIS — Z79.891: ICD-10-CM

## 2021-12-03 DIAGNOSIS — Z79.52: ICD-10-CM

## 2021-12-03 DIAGNOSIS — J84.9: ICD-10-CM

## 2021-12-03 LAB
ALBUMIN SERPL-MCNC: 3.6 G/DL (ref 3.5–5)
ALP SERPL-CCNC: 151 U/L (ref 38–126)
ALT SERPL-CCNC: 37 U/L (ref 4–49)
ANION GAP SERPL CALC-SCNC: 4 MMOL/L
APTT BLD: 21.1 SEC (ref 22–30)
AST SERPL-CCNC: 43 U/L (ref 17–59)
BASOPHILS # BLD AUTO: 0 K/UL (ref 0–0.2)
BASOPHILS NFR BLD AUTO: 0 %
BUN SERPL-SCNC: 26 MG/DL (ref 9–20)
CALCIUM SPEC-MCNC: 8.3 MG/DL (ref 8.4–10.2)
CHLORIDE SERPL-SCNC: 96 MMOL/L (ref 98–107)
CO2 SERPL-SCNC: 29 MMOL/L (ref 22–30)
EOSINOPHIL # BLD AUTO: 0 K/UL (ref 0–0.7)
EOSINOPHIL NFR BLD AUTO: 0 %
ERYTHROCYTE [DISTWIDTH] IN BLOOD BY AUTOMATED COUNT: 4.34 M/UL (ref 4.3–5.9)
ERYTHROCYTE [DISTWIDTH] IN BLOOD: 17 % (ref 11.5–15.5)
GLUCOSE SERPL-MCNC: 118 MG/DL (ref 74–99)
HCT VFR BLD AUTO: 40.2 % (ref 39–53)
HGB BLD-MCNC: 13.4 GM/DL (ref 13–17.5)
INR PPP: 1 (ref ?–1.2)
LYMPHOCYTES # SPEC AUTO: 0.4 K/UL (ref 1–4.8)
LYMPHOCYTES NFR SPEC AUTO: 3 %
MAGNESIUM SPEC-SCNC: 2.1 MG/DL (ref 1.6–2.3)
MCH RBC QN AUTO: 30.9 PG (ref 25–35)
MCHC RBC AUTO-ENTMCNC: 33.4 G/DL (ref 31–37)
MCV RBC AUTO: 92.6 FL (ref 80–100)
MONOCYTES # BLD AUTO: 0.8 K/UL (ref 0–1)
MONOCYTES NFR BLD AUTO: 4 %
NEUTROPHILS # BLD AUTO: 16 K/UL (ref 1.3–7.7)
NEUTROPHILS NFR BLD AUTO: 93 %
PLATELET # BLD AUTO: 343 K/UL (ref 150–450)
POTASSIUM SERPL-SCNC: 4.6 MMOL/L (ref 3.5–5.1)
PROT SERPL-MCNC: 6.6 G/DL (ref 6.3–8.2)
PT BLD: 10.3 SEC (ref 9–12)
SODIUM SERPL-SCNC: 129 MMOL/L (ref 137–145)
WBC # BLD AUTO: 17.3 K/UL (ref 3.8–10.6)

## 2021-12-03 PROCEDURE — 85730 THROMBOPLASTIN TIME PARTIAL: CPT

## 2021-12-03 PROCEDURE — 36415 COLL VENOUS BLD VENIPUNCTURE: CPT

## 2021-12-03 PROCEDURE — 84484 ASSAY OF TROPONIN QUANT: CPT

## 2021-12-03 PROCEDURE — 99285 EMERGENCY DEPT VISIT HI MDM: CPT

## 2021-12-03 PROCEDURE — 87040 BLOOD CULTURE FOR BACTERIA: CPT

## 2021-12-03 PROCEDURE — 83605 ASSAY OF LACTIC ACID: CPT

## 2021-12-03 PROCEDURE — 96375 TX/PRO/DX INJ NEW DRUG ADDON: CPT

## 2021-12-03 PROCEDURE — 80048 BASIC METABOLIC PNL TOTAL CA: CPT

## 2021-12-03 PROCEDURE — 84145 PROCALCITONIN (PCT): CPT

## 2021-12-03 PROCEDURE — 94760 N-INVAS EAR/PLS OXIMETRY 1: CPT

## 2021-12-03 PROCEDURE — 80053 COMPREHEN METABOLIC PANEL: CPT

## 2021-12-03 PROCEDURE — 85610 PROTHROMBIN TIME: CPT

## 2021-12-03 PROCEDURE — 87635 SARS-COV-2 COVID-19 AMP PRB: CPT

## 2021-12-03 PROCEDURE — 85025 COMPLETE CBC W/AUTO DIFF WBC: CPT

## 2021-12-03 PROCEDURE — 71046 X-RAY EXAM CHEST 2 VIEWS: CPT

## 2021-12-03 PROCEDURE — 93005 ELECTROCARDIOGRAM TRACING: CPT

## 2021-12-03 PROCEDURE — 96365 THER/PROPH/DIAG IV INF INIT: CPT

## 2021-12-03 PROCEDURE — 83735 ASSAY OF MAGNESIUM: CPT

## 2021-12-03 PROCEDURE — 94667 MNPJ CHEST WALL 1ST: CPT

## 2021-12-03 PROCEDURE — 94640 AIRWAY INHALATION TREATMENT: CPT

## 2021-12-03 RX ADMIN — HYDROCODONE BITARTRATE AND ACETAMINOPHEN PRN EACH: 10; 325 TABLET ORAL at 21:59

## 2021-12-03 RX ADMIN — METHYLPREDNISOLONE SODIUM SUCCINATE SCH MG: 125 INJECTION, POWDER, FOR SOLUTION INTRAMUSCULAR; INTRAVENOUS at 18:22

## 2021-12-03 RX ADMIN — AMITRIPTYLINE HYDROCHLORIDE SCH MG: 10 TABLET, FILM COATED ORAL at 21:59

## 2021-12-03 RX ADMIN — ATORVASTATIN CALCIUM SCH MG: 40 TABLET, FILM COATED ORAL at 21:59

## 2021-12-03 RX ADMIN — METHYLPREDNISOLONE SODIUM SUCCINATE SCH MG: 125 INJECTION, POWDER, FOR SOLUTION INTRAMUSCULAR; INTRAVENOUS at 23:38

## 2021-12-03 SDOH — HEALTH STABILITY - PHYSICAL HEALTH: OCCUPATIONAL EXPOSURE TO TOXIC AGENTS IN AGRICULTURE: Z57.4

## 2021-12-03 NOTE — P.CNPUL
History of Present Illness


Consult date: 21


Reason for consult: dyspnea, COPD, pulmonary fibrosis


History of present illness: 





71-year-old male patient was presented to the hospital because of worsening 

shortness of breath.  Symptoms started approximately a week ago and worse over 

the past few days.  According to the patient, he has history of COPD and 

pulmonary fibrosis.  Based on the previous pulmonary function test, the 

patient's FEV1 has been order of 46% of predicted and FVC has been order of 72% 

of predicted and this is based on a spirometer this was done 2017.  The patient 

has been steroid dependent for many years.  He has history of rheumatoid 

arthritis and has been on immunosuppressive therapy in the past in the form of 

Humira injections.  He has not taken Humira for now.  The patient has chronic 

hypoxic respiratory failure and typically is on 3-1/2 L of oxygen by nasal 

cannula.  He has been hospitalized in the past for septic shock and he has a 

previous history of duodenal ulcer perforation was repaired surgically.  He has 

obstructive sleep apnea and his nontolerant to CPAP therapy.





During this current admission, the patient was found to have a white cell count 

17.3.  The patient had normal coagulation profile.  Sodium level is at 129, BUN 

was 25 with a creatinine of 0.5, the COVID 19 testing was negative.  LFTs were 

essentially within normal limits.  The chest x-ray showed advanced COPD with 

chronic pulmonary fibrotic changes without any acute process identified.  Based 

on his worsening shortness of breath, the patient was started on IV Solu-Medrol,

hospitalized for an acute COPD exacerbation.  Start on Levaquin and immersed 

department and started on DuoNeb neb regimen around-the-clock.





Review of Systems








Constitutional: Reports fatigue, Reports fever, Reports lethargy, Reports poor 

appetite, Reports weakness


Eyes: denies blurred vision, denies bulging eye, denies decreased vision


Ears: deny: decreased hearing, ear discharge, earache, tinnitus


Ears, nose, mouth and throat: Denies headache, Denies sore throat


Cardiovascular: Reports decreased exercise tolerance, Reports dyspnea on 

exertion, Reports edema, Reports shortness of breath


Respiratory: Reports dyspnea, Reports home oxygen, Reports wheezing, reports 

chronic shortness of breath and inugh.


Gastrointestinal: Reports abdominal pain, Reports change in bowel habits, R

eports dyspepsia, Reports indigestion, Reports loss of appetite, Reports nausea


Genitourinary: Reports as per HPI


Musculoskeletal: Reports as per HPI


Musculoskeletal: bilateral: ankle swelling, absent: ankle pain, ankle stiffness


Integumentary: Denies pruritus, Denies rash


Neurological: Reports as per HPI, Reports weakness


Psychiatric: Reports as per HPI


Endocrine: Reports fatigue


Hematologic/Lymphatic: Reports as per HPI


Allergic/Immunologic: Reports as per 





Past Medical History


Past Medical History: Heart Failure, COPD, Hyperlipidemia, Osteoarthritis (OA), 

Pneumonia, Rheumatoid Arthritis (RA), Sleep Apnea/CPAP/BIPAP


Additional Past Medical History / Comment(s): COPD, chronic hypoxic respiratory 

failure previous history of pneumoperitoneum related to a perforated duodenal 

ulcer, repair of an umbilical hernia, chronic back pain, back stenosis.


History of Any Multi-Drug Resistant Organisms: None Reported


Past Surgical History: Orthopedic Surgery


Additional Past Surgical History / Comment(s): colonoscopy, repair of a perf

orated duodenal ulcer and repair of an incarcerated umbilical hernia


Past Anesthesia/Blood Transfusion Reactions: No Reported Reaction


Past Psychological History: Anxiety, Depression


Additional Psychological History / Comment(s): Pt resides with his spouse of 38 

yrs.  He has home oxygen which he wears at 3-3.5L/NC.  He has a nebulizer.


Smoking Status: Former smoker


Past Alcohol Use History: Daily


Additional Past Alcohol Use History / Comment(s): Patient smoked 2 packs per day

for 40+ years.  He quit 6 years ago.  He denies any illicit drug use or 

marijuana use.  He drinks 2 beers per day but none since previous admission and 

May.  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 

involved


Past Drug Use History: None Reported





- Past Family History


  ** Father


Family Medical History: Cancer


Additional Family Medical History / Comment(s): Father  of leukemia.





  ** Mother


Family Medical History: Cancer, CVA/TIA, Dementia, Diabetes Mellitus


Additional Family Medical History / Comment(s): Mother is 88yrs old with history

of dementia and colon cancer.





  ** Sister(s)


Additional Family Medical History / Comment(s): Patient 3 sons with no major 

medical problems.





Medications and Allergies


                                Home Medications











 Medication  Instructions  Recorded  Confirmed  Type


 


Metoprolol Succinate [Toprol XL] 25 mg PO DAILY 20 History


 


Sertraline [Zoloft] 150 mg PO DAILY 20 History


 


Amitriptyline HCl [Elavil] 10 mg PO HS 21 History


 


Celecoxib [CeleBREX] 200 mg PO DAILY 21 History


 


Ascorbic Acid [Vitamin C] 1,000 mg PO DAILY 04/10/21 12/03/21 History


 


Ipratropium-Albuterol Nebulize 3 ml INHALATION RT-QID 04/10/21 12/03/21 History





[Duoneb 0.5 mg-3 mg/3 ml Soln]    


 


Clopidogrel [Plavix] 75 mg PO DAILY #14 tab 21 Rx


 


Budesonide [Pulmicort] 0.5 mg INHALATION RT-BID 21 History


 


Fluticasone Nasal Spray [Flonase 2 spr EA NOSTRIL DAILY 21 

History





Nasal Spray]    


 


Furosemide [Lasix] 40 mg PO DAILY #28 tab 11/10/21 12/03/21 Rx


 


Thiamine [Vitamin B-1] 100 mg PO BID-W/MEALS  tab 11/10/21 12/03/21 Rx


 


Acetaminophen [Tylenol] 650 mg PO Q6H PRN 21 History


 


Atorvastatin [Lipitor] 40 mg PO HS 21 History


 


Benzonatate [Tessalon Perles] 200 mg PO TID PRN 21 History


 


Calcium Carbonate 1,000 mg PO Q8H PRN 21 History


 


Multivitamins, Thera [Multivitamin 1 tab PO DAILY 21 History





(formulary)]    


 


Omeprazole 20 mg PO DAILY 21 History


 


HYDROcodone/APAP 10-325MG [Norco 1 tab PO Q8HR PRN 3 Days #9 tab 21 Rx





]    


 


predniSONE See Taper PO DAILY 21 History








                                    Allergies











Allergy/AdvReac Type Severity Reaction Status Date / Time


 


amoxicillin AdvReac  Nausea & Verified 21 15:41





   Vomiting  














Physical Exam


Vitals: 


                                   Vital Signs











  Temp Pulse Resp BP Pulse Ox


 


 21 16:10      91 L


 


 21 16:06  97.6 F  97  18  110/69  88 L


 


 21 14:24  98 F  93  18  103/74  93 L


 


 21 13:54  98.4 F  102 H  24  118/75  88 L








                                Intake and Output











 21





 06:59 14:59 22:59


 


Other:   


 


  Weight  61.235 kg 

















Appearance in mild degree of respirator distress and the patient is currently on

oxygen at 4 L to maintain saturation above 90%.


Head exam was generally normal. There was no scleral icterus or corneal arcus. 

Mucous membranes were moist.


Neck was supple and without jugular venous distension, thyromegaly, or carotid 

bruits. Carotids were easily palpable bilaterally. There was no adenopathy.  The

patient is a Mallampati class IV was significant crowding of posterior 

oropharynx


Lungs are diminished bilaterally and the patient has coarse crackles in lung 

bases and these are Velcro crackles typical of underlying pulmonary fibrosis


Heart sounds are tachycardic, Cardiac exam revealed the PMI to be normally 

situated and sized. The rhythm was regular and no extrasystoles were noted 

during several minutes of auscultation. The first and second heart sounds were 

normal and physiologic splitting of the second heart sound was noted. There were

no murmurs, rubs, clicks, or gallops.


Abdominal exam revealed normal bowel sounds. The abdomen was soft, non-tender, 

and without masses, organomegaly, or appreciable enlargement of the abdominal 

aorta. scars of previous abdominal surgery over the anterior abdominal wall, and

the patient also has a large anterior abdominal wall hernia which is easily 

reducible and there is no direct tenderness or rebound tensile guarding.


Extremities revealed +1 edema lower diminished bilaterally especially in the 

left lower extremity.  Pulses are diminished.  There is no cyanosis or clubbing.

 Chronic joint deformities related to RA


Neurologic the patient is awake and alert.  Following commands and answering 

questions appropriately.  Focal neurological deficit.


Examination of the skin revealed no evidence of significant rashes, suspicious 

appearing nevi or other concerning lesions.








Results





- Laboratory Findings


CBC and BMP: 


                                 21 14:01





                                 21 14:01


PT/INR, D-dimer











PT  10.3 sec (9.0-12.0)   21  14:01    


 


INR  1.0  (<1.2)   21  14:01    








Abnormal lab findings: 


                                  Abnormal Labs











  21





  14:01 14:01 14:01


 


WBC  17.3 H  


 


RDW  17.0 H  


 


Neutrophils #  16.0 H  


 


Lymphocytes #  0.4 L  


 


APTT   21.1 L 


 


Sodium    129 L


 


Chloride    96 L


 


BUN    26 H


 


Creatinine    0.55 L


 


Glucose    118 H


 


Calcium    8.3 L


 


Alkaline Phosphatase    151 H














Assessment and Plan


Plan: 











1 acute exacerbation of chronic COPD/pulmonary fibrosis.  The patient was 

treated with antibiotics and steroids on outpatient basis with failure to 

response and the patient was hospitalized.  Pro-calcitonin level is low.  Covid 

19 testing is negative for now and the patient is currently on 4 L about 2 by 

nasal cannula.  The patient was started on a combination of bronchodilators, 

steroids and empiric antibiotic coverage with Levaquin.





2 chronic hypoxic respiratory failure and the patient is demented on oxygen and 

underwent 3-1/2 L on outpatient basis





3 advanced COPD/pulmonary fibrosis, oxygen and steroid dependent





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





5 history of duodenal ulcer perforation requiring surgical repair and repair of 

incarcerated umbilical hernia, with subsequent development of 

intraperitoneal/abdominal abscess requiring incision and drainage and prolonged 

antibiotic course





7 rheumatoid arthritis, with secondary chronic joint deformities related to rheu

matoid arthritis





8 obstructive sleep apnea not receiving any CPAP therapy at this point in time





9 chronic back pain





10 hyperlipidemia





11 osteoarthritis





12 mild leukocytosis





plan








Continue current treatment


Continue bronchodilators and steroids


Continue empiric antibiotic coverage with Levaquin


Titrate oxygen flow to maintain saturation above 90%


Lovenox for undergone subcu for DVT prophylaxis


Reconcile home medications

## 2021-12-03 NOTE — XR
EXAMINATION TYPE: XR chest 2V

 

DATE OF EXAM: 12/3/2021

 

COMPARISON: Chest x-ray November 21, 2021 and older studies.

 

HISTORY: Difficulty in breathing.

 

TECHNIQUE:  Frontal and lateral views of the chest are obtained.

 

FINDINGS:  Advanced underlying emphysematous and pulmonary fibrotic changes bilaterally are redemonst
rated. No pleural effusion or pneumothorax seen. An azygos lobe/fissure is redemonstrated. Right-side
d volume loss redemonstrated. Cardiac silhouette size stable and within normal limits.  The osseous s
tructures remains demineralized.

 

IMPRESSION:  Advanced chronic emphysematous and pulmonary fibrotic changes without new acute pulmonar
y process clearly seen.

## 2021-12-03 NOTE — ED
General Adult HPI





- General


Stated complaint: Shortness of Breath


Time Seen by Provider: 21 13:40


Source: patient, RN notes reviewed, old records reviewed





- History of Present Illness


Initial comments: 





This is a 71-year-old male presents emergency Department complaining of 

difficulty breathing and progressively worse over the last week and last couple 

of days much worse.  According to the report patient had his oxygen increased 

from 3 and half liters to 6 L.  Patient states he is not having any chest pain 

but he is having some pain in his pelvis because recently he did break his 

pelvis.  Patient denies any fever chills or cough.  Patient states he is upset 

that his vaccinations.  Patient states he is not having any chest pain or 

palpitations.  Patient denies abdominal pain patient is not vomiting diarrhea.  

Patient has long-standing history of COPD.





- Related Data


                                Home Medications











 Medication  Instructions  Recorded  Confirmed


 


Metoprolol Succinate [Toprol XL] 25 mg PO DAILY 20


 


Sertraline [Zoloft] 150 mg PO DAILY 20


 


Amitriptyline HCl [Elavil] 10 mg PO HS 21


 


Celecoxib [CeleBREX] 200 mg PO DAILY 21


 


Ascorbic Acid [Vitamin C] 1,000 mg PO DAILY 04/10/21 11/21/21


 


Ipratropium-Albuterol Nebulize 3 ml INHALATION RT-QID 04/10/21 11/21/21





[Duoneb 0.5 mg-3 mg/3 ml Soln]   


 


Melatonin 3 mg PO HS PRN 04/10/21 11/21/21


 


Budesonide [Pulmicort] 0.5 mg INHALATION RT-BID 21


 


Fluticasone Nasal Spray [Flonase 2 spr EA NOSTRIL DAILY 21





Nasal [place]]   


 


Acetaminophen [Tylenol] 650 mg PO Q6H PRN 21


 


Atorvastatin [Lipitor] 40 mg PO HS 21


 


Benzonatate [Tessalon Perles] 200 mg PO TID PRN 21


 


Calcium Carbonate 1,000 mg PO Q8H PRN 21


 


HYDROcodone/APAP 10-325MG [Norco 1 tab PO Q6H PRN 21





]   


 


Multivitamins, Thera [Multivitamin 1 tab PO DAILY 21





(formulary)]   


 


Omeprazole 20 mg PO DAILY 21


 


guaiFENesin 400 mg PO Q4H 21








                                  Previous Rx's











 Medication  Instructions  Recorded


 


Clopidogrel [Plavix] 75 mg PO DAILY #14 tab 21


 


Furosemide [Lasix] 40 mg PO DAILY #28 tab 11/10/21


 


Gabapentin [Neurontin] 300 mg PO HS #3 cap 11/10/21


 


Thiamine [Vitamin B-1] 100 mg PO BID-W/MEALS  tab 11/10/21


 


predniSONE 2.5 mg PO Q48H #0 11/10/21


 


HYDROcodone/APAP 10-325MG [Norco 1 tab PO Q8HR PRN 3 Days #9 tab 21





]  


 


predniSONE 0 mg PO AS DIRECTED #60 tab 21











                                    Allergies











Allergy/AdvReac Type Severity Reaction Status Date / Time


 


amoxicillin AdvReac  Nausea & Verified 21 13:54





   Vomiting  














Review of Systems


ROS Statement: 


Those systems with pertinent positive or pertinent negative responses have been 

documented in the HPI.





ROS Other: All systems not noted in ROS Statement are negative.





Past Medical History


Past Medical History: Heart Failure, COPD, Hyperlipidemia, Osteoarthritis (OA), 

Pneumonia, Rheumatoid Arthritis (RA), Sleep Apnea/CPAP/BIPAP


Additional Past Medical History / Comment(s): COPD, chronic hypoxic respiratory 

failure previous history of pneumoperitoneum related to a perforated duodenal 

ulcer, repair of an umbilical hernia, chronic back pain, back stenosis.


History of Any Multi-Drug Resistant Organisms: None Reported


Past Surgical History: Orthopedic Surgery


Additional Past Surgical History / Comment(s): colonoscopy, repair of a p

erforated duodenal ulcer and repair of an incarcerated umbilical hernia


Past Anesthesia/Blood Transfusion Reactions: No Reported Reaction


Past Psychological History: Anxiety, Depression


Additional Psychological History / Comment(s): Pt resides with his spouse of 38 

yrs.  He has home oxygen which he wears at 3-3.5L/NC.  He has a nebulizer.


Smoking Status: Former smoker


Past Alcohol Use History: Daily


Additional Past Alcohol Use History / Comment(s): Patient smoked 2 packs per day

for 40+ years.  He quit 6 years ago.  He denies any illicit drug use or 

marijuana use.  He drinks 2 beers per day but none since previous admission and 

May.  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 

involved


Past Drug Use History: None Reported





- Past Family History


  ** Father


Family Medical History: Cancer


Additional Family Medical History / Comment(s): Father  of leukemia.





  ** Mother


Family Medical History: Cancer, CVA/TIA, Dementia, Diabetes Mellitus


Additional Family Medical History / Comment(s): Mother is 88yrs old with history

of dementia and colon cancer.





  ** Sister(s)


Additional Family Medical History / Comment(s): Patient 3 sons with no major 

medical problems.





General Exam





- General Exam Comments


Initial Comments: 





GENERAL:


Patient is well-developed and well-nourished.  Patient is nontoxic and well-

hydrated and is in mild distress.





ENT:


Neck is soft and supple.  No significant lymphadenopathy is noted.  Oropharynx 

is clear.  Moist mucous membranes.  Neck has full range of motion without 

eliciting any pain.  





EYES:


The sclera were anicteric and conjunctiva were pink and moist.  Extraocular 

movements were intact and pupils were equal round and reactive to light.  

Eyelids were unremarkable.





PULMONARY:


Diminished breath sounds and crackles in the right base





CARDIOVASCULAR:


There is a regular rate and rhythm without any murmurs gallops or rubs.  





ABDOMEN:


Soft and nontender with normal bowel sounds.  





SKIN:


Skin is clear with no lesions or rashes and otherwise unremarkable.





NEUROLOGIC:


Patient is alert and oriented x3.  Cranial nerves II through XII are grossly 

intact.  Motor and sensory are also intact.  Normal speech, volume and content. 

Symmetrical smile. 





MUSCULOSKELETAL:


Normal extremities with adequate strength and full range of motion.  





LYMPHATICS:


No significant lymphadenopathy is noted





PSYCHIATRIC:


Normal psychiatric evaluation.  





Course


                                   Vital Signs











  21





  13:54 14:24


 


Temperature 98.4 F 98 F


 


Pulse Rate 102 H 93


 


Respiratory 24 18





Rate  


 


Blood Pressure 118/75 103/74


 


O2 Sat by Pulse 88 L 93 L





Oximetry  














Medical Decision Making





- Medical Decision Making





EKG shows sinus rhythm at 97 bpm MO interval 104 QRS is 82 QT interval 370 QTC 

is 469.  Patient's EKG shows no ST segment elevation or depression.





Chest x-ray shows fibrosis but no acute changes noted.  Patient is prophylactic 

started on antibiotics.  Patient had a breathing treatment emergency department 

as well as steroids.





I spoke with Dr. Bazan she agreed to admit the patient admitted the patient 

wrote admitting orders.





- Lab Data


Result diagrams: 


                                 21 14:01





                                 21 14:01


                                   Lab Results











  21 Range/Units





  14:01 14: 14:01 


 


WBC  17.3 H    (3.8-10.6)  k/uL


 


RBC  4.34    (4.30-5.90)  m/uL


 


Hgb  13.4    (13.0-17.5)  gm/dL


 


Hct  40.2    (39.0-53.0)  %


 


MCV  92.6    (80.0-100.0)  fL


 


MCH  30.9    (25.0-35.0)  pg


 


MCHC  33.4    (31.0-37.0)  g/dL


 


RDW  17.0 H    (11.5-15.5)  %


 


Plt Count  343    (150-450)  k/uL


 


MPV  7.4    


 


Neutrophils %  93    %


 


Lymphocytes %  3    %


 


Monocytes %  4    %


 


Eosinophils %  0    %


 


Basophils %  0    %


 


Neutrophils #  16.0 H    (1.3-7.7)  k/uL


 


Lymphocytes #  0.4 L    (1.0-4.8)  k/uL


 


Monocytes #  0.8    (0-1.0)  k/uL


 


Eosinophils #  0.0    (0-0.7)  k/uL


 


Basophils #  0.0    (0-0.2)  k/uL


 


Anisocytosis  Slight    


 


Sodium   129 L   (137-145)  mmol/L


 


Potassium   4.6   (3.5-5.1)  mmol/L


 


Chloride   96 L   ()  mmol/L


 


Carbon Dioxide   29   (22-30)  mmol/L


 


Anion Gap   4   mmol/L


 


BUN   26 H   (9-20)  mg/dL


 


Creatinine   0.55 L   (0.66-1.25)  mg/dL


 


Est GFR (CKD-EPI)AfAm   >90   (>60 ml/min/1.73 sqM)  


 


Est GFR (CKD-EPI)NonAf   >90   (>60 ml/min/1.73 sqM)  


 


Glucose   118 H   (74-99)  mg/dL


 


Plasma Lactic Acid José Miguel    1.4  (0.7-2.0)  mmol/L


 


Calcium   8.3 L   (8.4-10.2)  mg/dL


 


Magnesium   2.1   (1.6-2.3)  mg/dL


 


Total Bilirubin   1.1   (0.2-1.3)  mg/dL


 


AST   43   (17-59)  U/L


 


ALT   37   (4-49)  U/L


 


Alkaline Phosphatase   151 H   ()  U/L


 


Troponin I     (0.000-0.034)  ng/mL


 


Total Protein   6.6   (6.3-8.2)  g/dL


 


Albumin   3.6   (3.5-5.0)  g/dL


 


Coronavirus (PCR)     (Not Detectd)  














  21 Range/Units





  14:01 14:01 


 


WBC    (3.8-10.6)  k/uL


 


RBC    (4.30-5.90)  m/uL


 


Hgb    (13.0-17.5)  gm/dL


 


Hct    (39.0-53.0)  %


 


MCV    (80.0-100.0)  fL


 


MCH    (25.0-35.0)  pg


 


MCHC    (31.0-37.0)  g/dL


 


RDW    (11.5-15.5)  %


 


Plt Count    (150-450)  k/uL


 


MPV    


 


Neutrophils %    %


 


Lymphocytes %    %


 


Monocytes %    %


 


Eosinophils %    %


 


Basophils %    %


 


Neutrophils #    (1.3-7.7)  k/uL


 


Lymphocytes #    (1.0-4.8)  k/uL


 


Monocytes #    (0-1.0)  k/uL


 


Eosinophils #    (0-0.7)  k/uL


 


Basophils #    (0-0.2)  k/uL


 


Anisocytosis    


 


Sodium    (137-145)  mmol/L


 


Potassium    (3.5-5.1)  mmol/L


 


Chloride    ()  mmol/L


 


Carbon Dioxide    (22-30)  mmol/L


 


Anion Gap    mmol/L


 


BUN    (9-20)  mg/dL


 


Creatinine    (0.66-1.25)  mg/dL


 


Est GFR (CKD-EPI)AfAm    (>60 ml/min/1.73 sqM)  


 


Est GFR (CKD-EPI)NonAf    (>60 ml/min/1.73 sqM)  


 


Glucose    (74-99)  mg/dL


 


Plasma Lactic Acid José Miguel    (0.7-2.0)  mmol/L


 


Calcium    (8.4-10.2)  mg/dL


 


Magnesium    (1.6-2.3)  mg/dL


 


Total Bilirubin    (0.2-1.3)  mg/dL


 


AST    (17-59)  U/L


 


ALT    (4-49)  U/L


 


Alkaline Phosphatase    ()  U/L


 


Troponin I  0.017   (0.000-0.034)  ng/mL


 


Total Protein    (6.3-8.2)  g/dL


 


Albumin    (3.5-5.0)  g/dL


 


Coronavirus (PCR)   Not Detected  (Not Detectd)  














Disposition


Clinical Impression: 


 Acute exacerbation of chronic obstructive pulmonary disease (COPD), H

yponatremia, Dehydration





Disposition: ADMITTED AS IP TO THIS HOSP


Referrals: 


Amalia Bazan MD [Primary Care Provider] - 1-2 days


Time of Disposition: 15:09

## 2021-12-03 NOTE — P.HPIM
History of Present Illness


H&P Date: 21


Chief Complaint: Shortness of breath, productive cough, difficulty of clearing 

up sputum





This is a pleasant 71-year-old gentleman known to my practice and pulmonary Dr. Pickard.  He has underlying history of advanced steroid-dependent COPD, with 

chronic hypoxemic respiratory failure on 4 L O2 nasal cannula at home., 

obstructive sleep apnea, unable to tolerate CPAP, rheumatid arthritis, 

hyperlipidemia, chronic bronchitis, with prior history of Pseudomonas in the 

sputum, that was previously thought to be colonization.  He also had a history 

of perforated duodenal ulcer, status post explore lap, with repair of perforated

duodenal ulcer, and incarcerated umbilical hernia.  Known FVC of 72%, an FEV1 of

46%.  Echocardiogram as of 2021, EF of 55-60%, with concentric LVH, mild 

MR, trace AR, mild TR, moderate pulmonary hypertension.  Presents to the 

emergency room secondary to shortness of breath, and difficulty of breathing, 

worse than baseline.  Patient denies any fever or chills, no melena 

hematochezia, no aspiration.  Patient has increasing difficulties with his 

walker for ambulation,.  His last admission at [facility] was 2021, 

admitted and discharged 2021, secondary to COPD exacerbation.  At that 

time he required 9L high flow O2, and was covid negative at that time, and was 

discharged to home with his baseline 4 L oral nasal cannula, with oral long-term

taper of prednisone, she did not require oral antibiotics on discharge at that 

time.  Sputum culture, normal respiratory keeley, 2021.  Computed 

tomography scan, 2021, shows advanced pulmonary fibrosis, chronic 

pulmonary infiltrates similar to old exam, pleural thickening at the lung bases 

consistent with scarring, not significantly different from previous CT, Negative

for PE, there is stable thoracic kyphotic deformities and mild thoracic 

compression fractures.  Which was thought to be old.  T7, T6, and T5 vertebral





He presents to the emergency room, presents with above shortness of breath, 

productive cough, difficulty of expectorating sputum increasing shortness of 

breath on exertion, currently with COPD exacerbation, coronary virus PCR is 

negative, serum sodium 129, creatinine of 0.55, INR of 1.0 WBC count of 17.3 

chest x-ray shows advanced COPD, with chronic pulmonary fibrosis, without any 

acute pulmonary process is seen in consult for made with Dr. Pickard, pulmonary

medicine, started on IV Levaquin, empiric treatment secondary to leukocytosis, 

and nebulized treatments around-the-clock, Pulmicort, and IV Solu-Medrol O2 

supplementation, and was admitted to the medical floor





Review of Systems


Constitutional: Reports as per HPI, Reports anorexia, Reports chronic pain, 

Reports fatigue, Denies fever, Denies lethargy, Denies malaise


Ears, nose, mouth and throat: Reports as per HPI, Denies epistaxis, Denies mouth

pain, Denies nasal congestion, Denies odynophagia, Denies sinus pain, Denies 

swelling in mouth, Denies swelling in throat, Denies voice changes


Cardiovascular: Reports as per HPI, Reports decreased exercise tolerance, 

Reports dyspnea on exertion, Reports orthopnea, Reports shortness of breath, 

Denies chest pain, Denies edema, Denies leg edema, Denies palpitations, Denies 

rapid heart beat


Respiratory: Reports as per HPI, Reports cough, Reports cough with sputum, 

Reports dyspnea, Reports home oxygen, Reports respiratory infections, Reports 

wheezing


Gastrointestinal: Reports as per HPI, Denies abdominal pain, Denies belching, 

Denies bloating, Denies BRBPR, Denies change in bowel habits, Denies coffee 

ground emesis, Denies constipation, Denies diarrhea, Denies dyspepsia, Denies 

early satiety, Denies excessive gas, Denies heartburn, Denies hematemesis, 

Denies hematochezia, Denies indigestion, Denies jaundice, Denies lactose 

intolerance, Denies loss of appetite, Denies melena, Denies nausea, Denies 

vomiting


Genitourinary: Reports as per HPI, Denies urinary hesitancy, Denies urinary 

retention


Musculoskeletal: Reports as per HPI, Denies arm numbness/tingling, Denies 

atrophy, Denies fractures, Denies frequent falls, Denies gait dysfunction, 

Denies hot joints, Denies leg numbness/tingling, Denies limitation of motion, 

Denies loss of height, Denies low back pain, Denies morning stiffness, Denies 

muscle cramps, Denies muscle weakness, Denies myalgias, Denies neck pain, Denies

neck stiffness, Denies prior amputations, Denies redness of joints, Denies 

shooting arm pain, Denies shooting leg pain


Integumentary: Reports as per HPI


Neurological: Reports as per HPI


Psychiatric: Reports as per HPI, Reports anxiety


Endocrine: Reports as per HPI, Denies cold intolerance, Denies deepening of the 

voice, Denies excessive sweating, Denies excessive thirst, Denies fatigue, 

Denies flushing, Denies heat intolerance, Denies high blood sugars, Denies 

increase in ring/shoe/hat size, Denies low blood sugars, Denies nocturia, Denies

palpitations, Denies polydipsia, Denies polyphagia, Denies polyuria, Denies 

proptosis, Denies recent glucocorticoid use, Denies thyroid mass, Denies weight 

change


Hematologic/Lymphatic: Reports as per HPI


Allergic/Immunologic: Reports as per HPI





Past Medical History


Past Medical History: Heart Failure, COPD, Hyperlipidemia, Osteoarthritis (OA), 

Pneumonia, Rheumatoid Arthritis (RA), Sleep Apnea/CPAP/BIPAP


Additional Past Medical History / Comment(s): COPD, chronic hypoxic respiratory 

failure previous history of pneumoperitoneum related to a perforated duodenal 

ulcer, repair of an umbilical hernia, chronic back pain, back stenosis.


History of Any Multi-Drug Resistant Organisms: None Reported


Past Surgical History: Orthopedic Surgery


Additional Past Surgical History / Comment(s): colonoscopy, repair of a perfora

alondra duodenal ulcer and repair of an incarcerated umbilical hernia


Past Anesthesia/Blood Transfusion Reactions: No Reported Reaction


Past Psychological History: Anxiety, Depression


Additional Psychological History / Comment(s): Pt resides with his spouse of 38 

yrs.  He has home oxygen which he wears at 3-3.5L/NC.  He has a nebulizer.


Smoking Status: Former smoker


Past Alcohol Use History: Daily


Additional Past Alcohol Use History / Comment(s): Patient smoked 2 packs per day

for 40+ years.  He quit 6 years ago.  He denies any illicit drug use or 

marijuana use.  He drinks 2 beers per day but none since previous admission and 

May.  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 

involved


Past Drug Use History: None Reported





- Past Family History


  ** Father


Family Medical History: Cancer


Additional Family Medical History / Comment(s): Father  of leukemia.





  ** Mother


Family Medical History: Cancer, CVA/TIA, Dementia, Diabetes Mellitus


Additional Family Medical History / Comment(s): Mother is 88yrs old with history

of dementia and colon cancer.





  ** Sister(s)


Additional Family Medical History / Comment(s): Patient 3 sons with no major 

medical problems.





Medications and Allergies


                                Home Medications











 Medication  Instructions  Recorded  Confirmed  Type


 


Metoprolol Succinate [Toprol XL] 25 mg PO DAILY 20 History


 


Sertraline [Zoloft] 150 mg PO DAILY 20 History


 


Amitriptyline HCl [Elavil] 10 mg PO HS 21 History


 


Celecoxib [CeleBREX] 200 mg PO DAILY 21 History


 


Ascorbic Acid [Vitamin C] 1,000 mg PO DAILY 04/10/21 12/03/21 History


 


Ipratropium-Albuterol Nebulize 3 ml INHALATION RT-QID 04/10/21 12/03/21 History





[Duoneb 0.5 mg-3 mg/3 ml Soln]    


 


Clopidogrel [Plavix] 75 mg PO DAILY #14 tab 21 Rx


 


Budesonide [Pulmicort] 0.5 mg INHALATION RT-BID 21 History


 


Fluticasone Nasal Spray [Flonase 2 spr EA NOSTRIL DAILY 21 

History





Nasal Spray]    


 


Furosemide [Lasix] 40 mg PO DAILY #28 tab 11/10/21 12/03/21 Rx


 


Thiamine [Vitamin B-1] 100 mg PO BID-W/MEALS  tab 11/10/21 12/03/21 Rx


 


Acetaminophen [Tylenol] 650 mg PO Q6H PRN 21 History


 


Atorvastatin [Lipitor] 40 mg PO HS 21 History


 


Benzonatate [Tessalon Perles] 200 mg PO TID PRN 21 History


 


Calcium Carbonate 1,000 mg PO Q8H PRN 21 History


 


Multivitamins, Thera [Multivitamin 1 tab PO DAILY 21 History





(formulary)]    


 


Omeprazole 20 mg PO DAILY 21 History


 


HYDROcodone/APAP 10-325MG [Norco 1 tab PO Q8HR PRN 3 Days #9 tab 21 Rx





]    


 


predniSONE See Taper PO DAILY 21 History








                                    Allergies











Allergy/AdvReac Type Severity Reaction Status Date / Time


 


amoxicillin AdvReac  Nausea & Verified 21 15:41





   Vomiting  














Physical Exam


Vitals: 


                                   Vital Signs











  Temp Pulse Pulse Resp BP BP Pulse Ox


 


 21 17:09  97.8 F   94  18   105/59  98


 


 21 16:10        91 L


 


 21 16:06  97.6 F  97   18  110/69   88 L


 


 21 14:24  98 F  93   18  103/74   93 L


 


 21 13:54  98.4 F  102 H   24  118/75   88 L








                                Intake and Output











 21





 06:59 14:59 22:59


 


Other:   


 


  Voiding Method   Urinal





   Diaper





   Incontinent


 


  Weight  61.235 kg 61.235 kg














- Constitutional


General appearance: cooperative, no acute distress





- EENT


Eyes: anicteric sclerae, EOMI, dentition normal, normal appearance


ENT: NA/AT, normal oropharynx





- Neck


Neck: normal ROM





- Respiratory


Respiratory: bilateral: diminished, wheezing, negative: prolonged expiration





- Cardiovascular


Rhythm: regular


Heart sounds: normal: S1, S2


Abnormal Heart Sounds: no systolic murmur, no diastolic murmur, no rub, no S3 

Gallop, no S4 Gallop, no click, no other





- Gastrointestinal


General gastrointestinal: normal bowel sounds, soft





- Integumentary


Integumentary: decreased turgor, normal





- Neurologic


Neurologic: CNII-XII intact





- Musculoskeletal


Musculoskeletal: gait normal, strength equal bilaterally





- Psychiatric


Psychiatric: A&O x's 3, appropriate affect, intact judgment & insight





Results


CBC & Chem 7: 


                                 21 14:01





                                 21 14:01


Labs: 


                  Abnormal Lab Results - Last 24 Hours (Table)











  21 Range/Units





  14:01 14:01 14:01 


 


WBC  17.3 H    (3.8-10.6)  k/uL


 


RDW  17.0 H    (11.5-15.5)  %


 


Neutrophils #  16.0 H    (1.3-7.7)  k/uL


 


Lymphocytes #  0.4 L    (1.0-4.8)  k/uL


 


APTT   21.1 L   (22.0-30.0)  sec


 


Sodium    129 L  (137-145)  mmol/L


 


Chloride    96 L  ()  mmol/L


 


BUN    26 H  (9-20)  mg/dL


 


Creatinine    0.55 L  (0.66-1.25)  mg/dL


 


Glucose    118 H  (74-99)  mg/dL


 


Calcium    8.3 L  (8.4-10.2)  mg/dL


 


Alkaline Phosphatase    151 H  ()  U/L








                               Laboratory Results











WBC  17.3 k/uL (3.8-10.6)  H  21  14:01    


 


RBC  4.34 m/uL (4.30-5.90)   21  14:01    


 


Hgb  13.4 gm/dL (13.0-17.5)   21  14:01    


 


Hct  40.2 % (39.0-53.0)   21  14:01    


 


MCV  92.6 fL (80.0-100.0)   21  14:01    


 


MCH  30.9 pg (25.0-35.0)   21  14:01    


 


MCHC  33.4 g/dL (31.0-37.0)   21  14:01    


 


RDW  17.0 % (11.5-15.5)  H  21  14:01    


 


Plt Count  343 k/uL (150-450)   21  14:01    


 


MPV  7.4   21  14:01    


 


Neutrophils %  93 %  21  14:01    


 


Lymphocytes %  3 %  21  14:01    


 


Monocytes %  4 %  21  14:01    


 


Eosinophils %  0 %  21  14:01    


 


Basophils %  0 %  21  14:01    


 


Neutrophils #  16.0 k/uL (1.3-7.7)  H  21  14:01    


 


Lymphocytes #  0.4 k/uL (1.0-4.8)  L  21  14:01    


 


Monocytes #  0.8 k/uL (0-1.0)   21  14:01    


 


Eosinophils #  0.0 k/uL (0-0.7)   21  14:01    


 


Basophils #  0.0 k/uL (0-0.2)   21  14:01    


 


Anisocytosis  Slight   21  14:01    


 


PT  10.3 sec (9.0-12.0)   21  14:01    


 


INR  1.0  (<1.2)   21  14:01    


 


APTT  21.1 sec (22.0-30.0)  L  21  14:01    


 


Sodium  129 mmol/L (137-145)  L  21  14:01    


 


Potassium  4.6 mmol/L (3.5-5.1)   21  14:01    


 


Chloride  96 mmol/L ()  L  21  14:01    


 


Carbon Dioxide  29 mmol/L (22-30)   21  14:01    


 


Anion Gap  4 mmol/L  21  14:01    


 


BUN  26 mg/dL (9-20)  H  21  14:01    


 


Creatinine  0.55 mg/dL (0.66-1.25)  L  21  14:01    


 


Est GFR (CKD-EPI)AfAm  >90  (>60 ml/min/1.73 sqM)   21  14:01    


 


Est GFR (CKD-EPI)NonAf  >90  (>60 ml/min/1.73 sqM)   21  14:01    


 


Glucose  118 mg/dL (74-99)  H  21  14:01    


 


Plasma Lactic Acid José Miguel  1.4 mmol/L (0.7-2.0)   21  14:01    


 


Calcium  8.3 mg/dL (8.4-10.2)  L  21  14:01    


 


Magnesium  2.1 mg/dL (1.6-2.3)   21  14:01    


 


Total Bilirubin  1.1 mg/dL (0.2-1.3)   21  14:01    


 


AST  43 U/L (17-59)   21  14:01    


 


ALT  37 U/L (4-49)   21  14:01    


 


Alkaline Phosphatase  151 U/L ()  H  21  14:01    


 


Troponin I  0.017 ng/mL (0.000-0.034)   21  14:01    


 


Total Protein  6.6 g/dL (6.3-8.2)   21  14:01    


 


Albumin  3.6 g/dL (3.5-5.0)   21  14:01    


 


Coronavirus (PCR)  Not Detected  (Not Detectd)   21  14:01    














Thrombosis Risk Factor Assmnt





- Choose All That Apply


Any of the Below Risk Factors Present?: Yes


Each Factor Represents 1 point: Abnormal pulmonary function (COPD), Medical pt 

on bed rest, Serious lung disease incl. pneumonia (< 1month)


Other Risk Factors: Yes


Each Risk Factor Represents 2 Points: Age 61-74 years, Patient confined to bed


Thrombosis Risk Factor Assessment Total Risk Factor Score: 7


Thrombosis Risk Factor Assessment Level: High Risk





Assessment and Plan


Plan: 





1.  Acute on chronic hypoxic respiratory failure secondary to COPD exacerbation 

with  extensive pulmonary fibrosis, possible rheumatoid arthritis associated 

interstitial lung disease.  Continue DuoNeb treatments every 4 hours, , increase

Pulmicort 1 mg twice daily, Perforomist 20 g twice daily, Mucinex 1200 mg twice

daily,  empiric IV Levaquin and IV Solu-Medrol 60 mg every 6 hours, sputum 

culture  pro-calcitonin  recent CT of the chest was reviewed, negative for PE, 

2021 last admission.  Consider discharge with either theophylline, or 

anoro, 





2.  End-stage COPD, on chronic prednisone and frequent hospitalization, secondar

y to COPD, containing 4 L of O2 at home, decompensated with current 

exacerbation, requiring IV steroids, IV antibiotic, sputum culture, known to the

pulmonary service, Dr. Pickard to see for pulmonary consult





3.  Osteoporotic thoracic fractures compression and chronic pain, on chronic 

opiates,





4.  History of duodenal ulceration and GI bleed, with surgical repair, and 

repair of incarcerated umbilical hernia, with subsequent development of 

intraperitoneal abscess requiring incision and drainage, and prolonged 

antibiotic course chronically on PPI, we will discontinue Plavix at this time, 

no history of heart disease or CVA, no history of stents at risk for bleed 

specially with ongoing chronic prednisone use





5.  Chronic prednisone, with cushingoid features, patient is unable to be 

without prednisone without decompensating pulmonary status,,


 


6.  Moderate pulmonary hypertension.





7.  Hypertension.  Continue Toprol-XL 25 mg daily.





8.  Rheumatoid arthritis, with polyarthritis, and chronic joint abnormalities 

stable.  On chronic opiates not on disease modifying agents at this time





9.  Hyperlipidemia.  Continue atorvastatin 40 mg at bedtime.





10.  Obstructive sleep apnea unable to tolerate CPAP.





11.  Chronic back pain and generalized osteoarthritis.  Continue Norco as 

needed, gabapentin 300 mg at bedtime





12.  History of diastolic CHF, ejection fraction is normal, check for NP proBNP


 


13.  Generalized anxiety disorder and recurrent depression.  Continue Zoloft 150

mg daily, Elavil 10 mg at bedtime. 





14.  DVT prophylaxis.  Heparin subcu.





15.  GI prophylaxis.  Continue Protonix daily.





16.  Insomnia.  Continue Elavil 10 mg at bedtime.





17.  Coronavirus PCR is   negative





DISCHARGE PLAN


Home with [facility].  Consult PT and OT.

## 2021-12-04 LAB
ANION GAP SERPL CALC-SCNC: 10.1 MMOL/L (ref 10–18)
BASOPHILS # BLD AUTO: 0 K/UL (ref 0–0.2)
BASOPHILS NFR BLD AUTO: 0 %
BUN SERPL-SCNC: 19.3 MG/DL (ref 9–27)
BUN/CREAT SERPL: 48.25 RATIO (ref 12–20)
CALCIUM SPEC-MCNC: 7.7 MG/DL (ref 8.7–10.3)
CHLORIDE SERPL-SCNC: 99 MMOL/L (ref 96–109)
CO2 SERPL-SCNC: 25.9 MMOL/L (ref 20–27.5)
EOSINOPHIL # BLD AUTO: 0 K/UL (ref 0–0.7)
EOSINOPHIL NFR BLD AUTO: 0 %
ERYTHROCYTE [DISTWIDTH] IN BLOOD BY AUTOMATED COUNT: 3.81 M/UL (ref 4.3–5.9)
ERYTHROCYTE [DISTWIDTH] IN BLOOD: 16.9 % (ref 11.5–15.5)
GLUCOSE SERPL-MCNC: 117 MG/DL (ref 70–110)
HCT VFR BLD AUTO: 35 % (ref 39–53)
HGB BLD-MCNC: 11.5 GM/DL (ref 13–17.5)
LYMPHOCYTES # SPEC AUTO: 0.7 K/UL (ref 1–4.8)
LYMPHOCYTES NFR SPEC AUTO: 7 %
MCH RBC QN AUTO: 30.1 PG (ref 25–35)
MCHC RBC AUTO-ENTMCNC: 32.8 G/DL (ref 31–37)
MCV RBC AUTO: 91.8 FL (ref 80–100)
MONOCYTES # BLD AUTO: 0.4 K/UL (ref 0–1)
MONOCYTES NFR BLD AUTO: 4 %
NEUTROPHILS # BLD AUTO: 10 K/UL (ref 1.3–7.7)
NEUTROPHILS NFR BLD AUTO: 90 %
PLATELET # BLD AUTO: 314 K/UL (ref 150–450)
POTASSIUM SERPL-SCNC: 4.2 MMOL/L (ref 3.5–5.5)
SODIUM SERPL-SCNC: 135 MMOL/L (ref 135–145)
WBC # BLD AUTO: 11.2 K/UL (ref 3.8–10.6)

## 2021-12-04 RX ADMIN — METHYLPREDNISOLONE SODIUM SUCCINATE SCH MG: 125 INJECTION, POWDER, FOR SOLUTION INTRAMUSCULAR; INTRAVENOUS at 17:54

## 2021-12-04 RX ADMIN — HYDROCODONE BITARTRATE AND ACETAMINOPHEN PRN EACH: 10; 325 TABLET ORAL at 05:58

## 2021-12-04 RX ADMIN — FLUTICASONE PROPIONATE SCH SPRAY: 50 SPRAY, METERED NASAL at 11:37

## 2021-12-04 RX ADMIN — METHYLPREDNISOLONE SODIUM SUCCINATE SCH MG: 125 INJECTION, POWDER, FOR SOLUTION INTRAMUSCULAR; INTRAVENOUS at 12:52

## 2021-12-04 RX ADMIN — HYDROCODONE BITARTRATE AND ACETAMINOPHEN PRN EACH: 10; 325 TABLET ORAL at 23:10

## 2021-12-04 RX ADMIN — PANTOPRAZOLE SODIUM SCH MG: 40 TABLET, DELAYED RELEASE ORAL at 08:39

## 2021-12-04 RX ADMIN — FORMOTEROL FUMARATE DIHYDRATE SCH MCG: 20 SOLUTION RESPIRATORY (INHALATION) at 18:56

## 2021-12-04 RX ADMIN — BUDESONIDE SCH MG: 0.5 INHALANT ORAL at 18:56

## 2021-12-04 RX ADMIN — IPRATROPIUM BROMIDE AND ALBUTEROL SULFATE PRN ML: .5; 3 SOLUTION RESPIRATORY (INHALATION) at 18:56

## 2021-12-04 RX ADMIN — METHYLPREDNISOLONE SODIUM SUCCINATE SCH MG: 125 INJECTION, POWDER, FOR SOLUTION INTRAMUSCULAR; INTRAVENOUS at 05:59

## 2021-12-04 RX ADMIN — LEVOFLOXACIN SCH MG: 500 TABLET, FILM COATED ORAL at 08:39

## 2021-12-04 RX ADMIN — AMITRIPTYLINE HYDROCHLORIDE SCH MG: 10 TABLET, FILM COATED ORAL at 19:34

## 2021-12-04 RX ADMIN — METOPROLOL SUCCINATE SCH MG: 25 TABLET, EXTENDED RELEASE ORAL at 08:39

## 2021-12-04 RX ADMIN — HYDROCODONE BITARTRATE AND ACETAMINOPHEN PRN EACH: 10; 325 TABLET ORAL at 15:59

## 2021-12-04 RX ADMIN — FORMOTEROL FUMARATE DIHYDRATE SCH: 20 SOLUTION RESPIRATORY (INHALATION) at 07:56

## 2021-12-04 RX ADMIN — FUROSEMIDE SCH MG: 40 TABLET ORAL at 08:39

## 2021-12-04 RX ADMIN — BUDESONIDE SCH: 0.5 INHALANT ORAL at 07:56

## 2021-12-04 RX ADMIN — ACETAMINOPHEN PRN MG: 325 TABLET, FILM COATED ORAL at 19:34

## 2021-12-04 RX ADMIN — ATORVASTATIN CALCIUM SCH MG: 40 TABLET, FILM COATED ORAL at 19:35

## 2021-12-04 RX ADMIN — SERTRALINE HYDROCHLORIDE SCH MG: 50 TABLET, FILM COATED ORAL at 08:39

## 2021-12-04 NOTE — P.PN
Subjective


Progress Note Date: 12/04/21





71-year-old male patient was presented to the hospital because of worsening 

shortness of breath.  Symptoms started approximately a week ago and worse over 

the past few days.  According to the patient, he has history of COPD and 

pulmonary fibrosis.  Based on the previous pulmonary function test, the patien

t's FEV1 has been order of 46% of predicted and FVC has been order of 72% of 

predicted and this is based on a spirometer this was done 2017.  The patient has

been steroid dependent for many years.  He has history of rheumatoid arthritis 

and has been on immunosuppressive therapy in the past in the form of Humira 

injections.  He has not taken Humira for now.  The patient has chronic hypoxic 

respiratory failure and typically is on 3-1/2 L of oxygen by nasal cannula.  He 

has been hospitalized in the past for septic shock and he has a previous history

of duodenal ulcer perforation was repaired surgically.  He has obstructive sleep

apnea and his nontolerant to CPAP therapy.





During this current admission, the patient was found to have a white cell count 

17.3.  The patient had normal coagulation profile.  Sodium level is at 129, BUN 

was 25 with a creatinine of 0.5, the COVID 19 testing was negative.  LFTs were 

essentially within normal limits.  The chest x-ray showed advanced COPD with 

chronic pulmonary fibrotic changes without any acute process identified.  Based 

on his worsening shortness of breath, the patient was started on IV Solu-Medrol,

hospitalized for an acute COPD exacerbation.  Start on Levaquin and immersed 

department and started on DuoNeb neb regimen around-the-clock.





The patient is seen today 12/04/2021 in follow-up on the regular medical floor. 

He is currently resting in bed.  Awake and alert in no acute distress.  He still

has some shortness of breath with minimal exertion.  He is maintaining O2 

saturations in the mid 90s on 4 L/m per nasal cannula.  His been afebrile.  

Hemodynamically stable.  His main complaint is generalized weakness and 

generalized aches and pains.  White count 11.2.  Hemoglobin 11.5.  Lymphocytes 

0.7.  Sodium 135.  Potassium 4.2.  Creatinine 0.4.  Pro-calcitonin 0.07.  Corona

virus not detected.  He is continued on Levaquin, IV Solu-Medrol, DuoNeb 

inhalations, Pulmicort and Perforomist inhalations, Tessalon Perles.





Objective





- Vital Signs


Vital signs: 


                                   Vital Signs











Temp  97.8 F   12/04/21 11:10


 


Pulse  86   12/04/21 11:10


 


Resp  20   12/04/21 11:10


 


BP  119/75   12/04/21 11:10


 


Pulse Ox  96   12/04/21 11:10








                                 Intake & Output











 12/03/21 12/04/21 12/04/21





 18:59 06:59 18:59


 


Intake Total  700 


 


Balance  700 


 


Weight 61.235 kg  61.235 kg


 


Intake:   


 


  Oral  700 


 


Other:   


 


  Voiding Method Urinal Urinal Urinal





 Diaper Diaper Diaper





 Incontinent Incontinent Incontinent


 


  # Voids  3 1


 


  # Bowel Movements   1














- Exam





GENERAL EXAM: Alert, frail, 71-year-old gentleman, on 4 L nasal cannula, fairly 

comfortable in no apparent distress.


HEAD: Normocephalic.


EYES: Normal reaction of pupils, equal size.


NOSE: Clear with pink turbinates.


THROAT: No erythema or exudates.


NECK: No masses, no JVD.


CHEST: No chest wall deformity.


LUNGS: Equal air entry with end expiratory wheeze, few scattered rhonchi, 

diminished


CVS: S1 and S2 normal with no audible murmur, regular rhythm.


ABDOMEN: No hepatosplenomegaly, normal bowel sounds, no guarding or rigidity.


SPINE: No scoliosis or deformity


SKIN: No rash, ecchymosis secondary to chronic steroid use.


CENTRAL NERVOUS SYSTEM: No focal deficits, tone is normal in all 4 extremities.


EXTREMITIES: There is no peripheral edema.  No clubbing, no cyanosis.  

Peripheral pulses are intact.





- Labs


CBC & Chem 7: 


                                 12/04/21 06:57





                                 12/04/21 06:57


Labs: 


                  Abnormal Lab Results - Last 24 Hours (Table)











  12/03/21 12/03/21 12/04/21 Range/Units





  14:01 14:01 06:57 


 


WBC    11.2 H  (3.8-10.6)  k/uL


 


RBC    3.81 L  (4.30-5.90)  m/uL


 


Hgb    11.5 L  (13.0-17.5)  gm/dL


 


Hct    35.0 L  (39.0-53.0)  %


 


RDW    16.9 H  (11.5-15.5)  %


 


Neutrophils #    10.0 H  (1.3-7.7)  k/uL


 


Lymphocytes #    0.7 L  (1.0-4.8)  k/uL


 


APTT  21.1 L    (22.0-30.0)  sec


 


Sodium   129 L   (137-145)  mmol/L


 


Chloride   96 L   ()  mmol/L


 


BUN   26 H   (9-20)  mg/dL


 


Creatinine   0.55 L   (0.66-1.25)  mg/dL


 


BUN/Creatinine Ratio     (12.00-20.00)  Ratio


 


Glucose   118 H   (74-99)  mg/dL


 


Calcium   8.3 L   (8.4-10.2)  mg/dL


 


Alkaline Phosphatase   151 H   ()  U/L














  12/04/21 Range/Units





  06:57 


 


WBC   (3.8-10.6)  k/uL


 


RBC   (4.30-5.90)  m/uL


 


Hgb   (13.0-17.5)  gm/dL


 


Hct   (39.0-53.0)  %


 


RDW   (11.5-15.5)  %


 


Neutrophils #   (1.3-7.7)  k/uL


 


Lymphocytes #   (1.0-4.8)  k/uL


 


APTT   (22.0-30.0)  sec


 


Sodium   (137-145)  mmol/L


 


Chloride   ()  mmol/L


 


BUN   (9-20)  mg/dL


 


Creatinine  0.4 L  (0.66-1.25)  mg/dL


 


BUN/Creatinine Ratio  48.25 H  (12.00-20.00)  Ratio


 


Glucose  117 H  (74-99)  mg/dL


 


Calcium  7.7 L  (8.4-10.2)  mg/dL


 


Alkaline Phosphatase   ()  U/L














Assessment and Plan


Assessment: 





1 acute exacerbation of chronic COPD/pulmonary fibrosis.  The patient was 

treated with antibiotics and steroids on outpatient basis with failure to 

response and the patient was hospitalized.  Pro-calcitonin level is low.  Covid 

19 testing is negative for now and the patient is currently on 4 L about 2 by 

nasal cannula.  The patient was started on a combination of bronchodilators, 

steroids and empiric antibiotic coverage with Levaquin.





2 chronic hypoxic respiratory failure and the patient is demented on oxygen and 

underwent 3-1/2 L on outpatient basis





3 advanced COPD/pulmonary fibrosis, oxygen and steroid dependent





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





5 history of duodenal ulcer perforation requiring surgical repair and repair of 

incarcerated umbilical hernia, with subsequent development of intra

peritoneal/abdominal abscess requiring incision and drainage and prolonged 

antibiotic course





7 rheumatoid arthritis, with secondary chronic joint deformities related to 

rheumatoid arthritis





8 obstructive sleep apnea not receiving any CPAP therapy at this point in time





9 chronic back pain





10 hyperlipidemia





11 osteoarthritis





12 mild leukocytosis





plan:





The patient was seen and evaluated


Not much improvement today compared to yesterday


Continue the current treatment plan


We will continue to follow

## 2021-12-04 NOTE — P.PN
Subjective


Progress Note Date: 12/04/21





HISTORY OF PRESENT ILLNESS


This is a pleasant 71-year-old gentleman known to my practice and pulmonary Dr. Pickard.  He has underlying history of advanced steroid-dependent COPD, with 

chronic hypoxemic respiratory failure on 4 L O2 nasal cannula at home., 

obstructive sleep apnea, unable to tolerate CPAP, rheumatid arthritis, 

hyperlipidemia, chronic bronchitis, with prior history of Pseudomonas in the 

sputum, that was previously thought to be colonization.  He also had a history 

of perforated duodenal ulcer, status post explore lap, with repair of perforated

duodenal ulcer, and incarcerated umbilical hernia.  Known FVC of 72%, an FEV1 of

46%.  Echocardiogram as of 04/07/2021, EF of 55-60%, with concentric LVH, mild 

MR, trace AR, mild TR, moderate pulmonary hypertension.  Presents to the 

emergency room secondary to shortness of breath, and difficulty of breathing, 

worse than baseline.  Patient denies any fever or chills, no melena 

hematochezia, no aspiration.  Patient has increasing difficulties with his 

walker for ambulation,.  His last admission at [facility] was 11/21/2021, 

admitted and discharged 11/25/2021, secondary to COPD exacerbation.  At that 

time he required 9L high flow O2, and was covid negative at that time, and was 

discharged to home with his baseline 4 L oral nasal cannula, with oral long-term

taper of prednisone, she did not require oral antibiotics on discharge at that 

time.  Sputum culture, normal respiratory keeley, 11/26/2021.  Computed t

omography scan, 11/21/2021, shows advanced pulmonary fibrosis, chronic pulmonary

infiltrates similar to old exam, pleural thickening at the lung bases consistent

with scarring, not significantly different from previous CT, Negative for PE, 

there is stable thoracic kyphotic deformities and mild thoracic compression 

fractures.  Which was thought to be old.  T7, T6, and T5 vertebral





He presents to the emergency room, presents with above shortness of breath, 

productive cough, difficulty of expectorating sputum increasing shortness of 

breath on exertion, currently with COPD exacerbation, coronary virus PCR is 

negative, serum sodium 129, creatinine of 0.55, INR of 1.0 WBC count of 17.3 

chest x-ray shows advanced COPD, with chronic pulmonary fibrosis, without any 

acute pulmonary process is seen in consult for made with Dr. Pickard, pulmonary

medicine, started on IV Levaquin, empiric treatment secondary to leukocytosis, 

and nebulized treatments around-the-clock, Pulmicort, and IV Solu-Medrol O2 

supplementation, and was admitted to the medical floor





12/4: Patient has been afebrile, heart rate 90, blood pressure 111/67, pulse ox 

92% on 4 L nasal cannula.  Repeat blood work reveals WBC 11.2, hemoglobin 11.5 

and platelet count 314.  Electrolytes normal.  Creatinine 0.4.  Blood sugar 117.

 Pro-calcitonin 0.07.  Patient has been seen and followed by pulmonary medicine.

 Patient is anxious to be discharged but instructed him that he will probably be

here until Monday.  He is currently on Solu-Medrol 60 mg IV every 6 hours.





REVIEW OF SYSTEMS


Constitutional: No fever, no chills, no night sweats.  No weight change.  No 

weakness, fatigue or lethargy.  No daytime sleepiness.


EENT: No headache.  No blurred vision or double vision, no loss of vision.  No 

loss of Hearing, no ringing in the ears, no dizziness.  No nasal drainage or 

congestion.  No epistaxis.  No sore throat.


Lungs: Reports continued shortness of breath but improving, Reports cough, 

Reports sputum production.  No wheezing.


Cardiovascular: No chest pain, no lower extremity edema.  No palpitations.  No 

paroxysmal nocturnal dyspnea.  No orthopnea.  No lightheadedness or dizziness.  

No syncopal episodes.


Abdominal: No abdominal pain.  No nausea, vomiting.  No diarrhea.  No 

constipation.  No bloody or tarry stools..  No loss of appetite.


Genitourinary: No dysuria, increased frequency, urgency.  No urinary retention.


Musculoskeletal: No myalgias.  No muscle weakness, no gait dysfunction, no 

frequent falls.  No back pain.  No neck pain.


Integumentary: No wounds, no lesions.  No rash or pruritus.  No unusual 

bruising.  


Neurologic: No aphasia. No facial droop. No change in mentation. No head injury.

No headache. No paralysis. No paresthesia.


Psychiatric: No depression.  No anxiety.  No mood swings.


Endocrine: No abnormal blood sugars.  No weight change.   





PHYSICAL EXAMINATION


Gen: This is 71-year-old  male.  Patient is resting in bed and not 

appear to be in acute respiratory failure. 


HEENT: Head is atraumatic, normocephalic. Pupils equal, round. Sclerae is 

anicteric. 


NECK: Supple. No JVD. No lymphadenopathy. No thyromegaly. 


LUNGS: Coarse crackles bilaterally.  No intercostal retractions.


HEART: Regular rate and rhythm.  Systolic murmur.  


ABDOMEN: Soft. Bowel sounds are present. No masses.  No tenderness.  Large 

ventral hernia, soft.


EXTREMITIES: No pedal edema.  No calf tenderness.  Dorsalis pedis palpable 

bilaterally.


NEUROLOGICAL: Patient is awake, alert and oriented x3. Cranial nerves 2 through 

12 are grossly intact. 





ASSESSMENT AND PLAN


1.  Acute hypoxic respiratory failure secondary to COPD exacerbation with 

underlying pulmonary fibrosis, possible rheumatoid arthritis associated 

interstitial lung disease.  Continue DuoNeb treatments every 4 hours, Tessalon 

Perles 200 mg 3 times daily scheduled, Pulmicort 0.5 mg twice daily, Perforomist

20 g twice daily,  Levaquin 500 milligrams daily, Solu-Medrol 60 mg IV every 6 

hours. 





2.  Recent hospitalization for possible Right clavicular fracture and fracture 

of the superior and inferior left pubic ramus, pathologic, stable, no recent 

falls.





3.  Possible tracheobronchitis.  Continue Levaquin, Tessalon Perles 200 mg 3 

times daily.





4.  Chronic hypoxic respiratory failure.  Continue oxygen therapy.





5.  Advanced COPD with pulmonary fibrosis, steroid dependent and chronic hypoxic

and hypercapnic respiratory failure on home O2 at 4 L.  continue oxygen therapy.

 





6.  Moderate pulmonary hypertension.





7.  Hypertension.  Continue Toprol-XL 25 mg daily.





8.  Rheumatoid arthritis, stable.





9.  Hyperlipidemia.  Continue atorvastatin 40 mg at bedtime.





10.  Obstructive sleep apnea unable to tolerate CPAP.





11.  Chronic back pain and generalized osteoarthritis.  Continue Norco as 

needed, gabapentin 300 mg at bedtime





12.  History of perforated duodenal ulcer status post exploratory laparotomy 

with repair of perforated duodenal ulcer and repair of incarcerated umbilical 

hernia.





13.  Generalized anxiety disorder and recurrent depression.  Continue Zoloft 150

mg daily, Elavil 10 mg at bedtime. 





14.  DVT prophylaxis.  Heparin subcu.





15.  GI prophylaxis.  Continue Protonix daily.





16.  Insomnia.  Continue Elavil 10 mg at bedtime.





DISCHARGE PLAN


Home with [facility].  





Impression and plan of care have been directed as dictated by the signing 

physician.  Chloé Maldonado nurse practitioner acting as scribe for signing 

physician.





Objective





- Vital Signs


Vital signs: 


                                   Vital Signs











Temp  98.3 F   12/04/21 05:11


 


Pulse  86   12/04/21 08:36


 


Resp  20   12/04/21 05:11


 


BP  104/63   12/04/21 08:36


 


Pulse Ox  92 L  12/04/21 05:11








                                 Intake & Output











 12/03/21 12/04/21 12/04/21





 18:59 06:59 18:59


 


Intake Total  700 


 


Balance  700 


 


Weight 61.235 kg  


 


Intake:   


 


  Oral  700 


 


Other:   


 


  Voiding Method Urinal Urinal Urinal





 Diaper Diaper Diaper





 Incontinent Incontinent Incontinent


 


  # Voids  3 














- Labs


CBC & Chem 7: 


                                 12/04/21 06:57





                                 12/04/21 06:57


Labs: 


                  Abnormal Lab Results - Last 24 Hours (Table)











  12/03/21 12/03/21 12/03/21 Range/Units





  14:01 14:01 14:01 


 


WBC  17.3 H    (3.8-10.6)  k/uL


 


RBC     (4.30-5.90)  m/uL


 


Hgb     (13.0-17.5)  gm/dL


 


Hct     (39.0-53.0)  %


 


RDW  17.0 H    (11.5-15.5)  %


 


Neutrophils #  16.0 H    (1.3-7.7)  k/uL


 


Lymphocytes #  0.4 L    (1.0-4.8)  k/uL


 


APTT   21.1 L   (22.0-30.0)  sec


 


Sodium    129 L  (137-145)  mmol/L


 


Chloride    96 L  ()  mmol/L


 


BUN    26 H  (9-20)  mg/dL


 


Creatinine    0.55 L  (0.66-1.25)  mg/dL


 


Glucose    118 H  (74-99)  mg/dL


 


Calcium    8.3 L  (8.4-10.2)  mg/dL


 


Alkaline Phosphatase    151 H  ()  U/L














  12/04/21 Range/Units





  06:57 


 


WBC  11.2 H  (3.8-10.6)  k/uL


 


RBC  3.81 L  (4.30-5.90)  m/uL


 


Hgb  11.5 L  (13.0-17.5)  gm/dL


 


Hct  35.0 L  (39.0-53.0)  %


 


RDW  16.9 H  (11.5-15.5)  %


 


Neutrophils #  10.0 H  (1.3-7.7)  k/uL


 


Lymphocytes #  0.7 L  (1.0-4.8)  k/uL


 


APTT   (22.0-30.0)  sec


 


Sodium   (137-145)  mmol/L


 


Chloride   ()  mmol/L


 


BUN   (9-20)  mg/dL


 


Creatinine   (0.66-1.25)  mg/dL


 


Glucose   (74-99)  mg/dL


 


Calcium   (8.4-10.2)  mg/dL


 


Alkaline Phosphatase   ()  U/L

## 2021-12-05 RX ADMIN — SERTRALINE HYDROCHLORIDE SCH MG: 50 TABLET, FILM COATED ORAL at 10:17

## 2021-12-05 RX ADMIN — ACETAMINOPHEN PRN MG: 325 TABLET, FILM COATED ORAL at 15:30

## 2021-12-05 RX ADMIN — LEVOFLOXACIN SCH MG: 500 TABLET, FILM COATED ORAL at 10:17

## 2021-12-05 RX ADMIN — HYDROCODONE BITARTRATE AND ACETAMINOPHEN PRN EACH: 10; 325 TABLET ORAL at 17:42

## 2021-12-05 RX ADMIN — HYDROCODONE BITARTRATE AND ACETAMINOPHEN PRN EACH: 10; 325 TABLET ORAL at 10:26

## 2021-12-05 RX ADMIN — PANTOPRAZOLE SODIUM SCH MG: 40 TABLET, DELAYED RELEASE ORAL at 10:17

## 2021-12-05 RX ADMIN — FUROSEMIDE SCH MG: 40 TABLET ORAL at 10:17

## 2021-12-05 RX ADMIN — BUDESONIDE SCH: 0.5 INHALANT ORAL at 09:49

## 2021-12-05 RX ADMIN — AMITRIPTYLINE HYDROCHLORIDE SCH MG: 10 TABLET, FILM COATED ORAL at 20:31

## 2021-12-05 RX ADMIN — IPRATROPIUM BROMIDE AND ALBUTEROL SULFATE PRN ML: .5; 3 SOLUTION RESPIRATORY (INHALATION) at 15:41

## 2021-12-05 RX ADMIN — FLUTICASONE PROPIONATE SCH SPRAY: 50 SPRAY, METERED NASAL at 10:16

## 2021-12-05 RX ADMIN — FORMOTEROL FUMARATE DIHYDRATE SCH: 20 SOLUTION RESPIRATORY (INHALATION) at 09:49

## 2021-12-05 RX ADMIN — METHYLPREDNISOLONE SODIUM SUCCINATE SCH MG: 125 INJECTION, POWDER, FOR SOLUTION INTRAMUSCULAR; INTRAVENOUS at 01:03

## 2021-12-05 RX ADMIN — BUDESONIDE SCH MG: 0.5 INHALANT ORAL at 19:45

## 2021-12-05 RX ADMIN — ATORVASTATIN CALCIUM SCH MG: 40 TABLET, FILM COATED ORAL at 20:31

## 2021-12-05 RX ADMIN — METHYLPREDNISOLONE SODIUM SUCCINATE SCH MG: 125 INJECTION, POWDER, FOR SOLUTION INTRAMUSCULAR; INTRAVENOUS at 06:36

## 2021-12-05 RX ADMIN — METOPROLOL SUCCINATE SCH MG: 25 TABLET, EXTENDED RELEASE ORAL at 10:17

## 2021-12-05 RX ADMIN — IPRATROPIUM BROMIDE AND ALBUTEROL SULFATE PRN ML: .5; 3 SOLUTION RESPIRATORY (INHALATION) at 19:45

## 2021-12-05 RX ADMIN — FORMOTEROL FUMARATE DIHYDRATE SCH MCG: 20 SOLUTION RESPIRATORY (INHALATION) at 19:45

## 2021-12-05 NOTE — P.PN
Subjective


Progress Note Date: 12/05/21





71-year-old male patient was presented to the hospital because of worsening 

shortness of breath.  Symptoms started approximately a week ago and worse over 

the past few days.  According to the patient, he has history of COPD and 

pulmonary fibrosis.  Based on the previous pulmonary function test, the patien

t's FEV1 has been order of 46% of predicted and FVC has been order of 72% of 

predicted and this is based on a spirometer this was done 2017.  The patient has

been steroid dependent for many years.  He has history of rheumatoid arthritis 

and has been on immunosuppressive therapy in the past in the form of Humira 

injections.  He has not taken Humira for now.  The patient has chronic hypoxic 

respiratory failure and typically is on 3-1/2 L of oxygen by nasal cannula.  He 

has been hospitalized in the past for septic shock and he has a previous history

of duodenal ulcer perforation was repaired surgically.  He has obstructive sleep

apnea and his nontolerant to CPAP therapy.





During this current admission, the patient was found to have a white cell count 

17.3.  The patient had normal coagulation profile.  Sodium level is at 129, BUN 

was 25 with a creatinine of 0.5, the COVID 19 testing was negative.  LFTs were 

essentially within normal limits.  The chest x-ray showed advanced COPD with 

chronic pulmonary fibrotic changes without any acute process identified.  Based 

on his worsening shortness of breath, the patient was started on IV Solu-Medrol,

hospitalized for an acute COPD exacerbation.  Start on Levaquin and immersed 

department and started on DuoNeb neb regimen around-the-clock.





The patient is seen today 12/04/2021 in follow-up on the regular medical floor. 

He is currently resting in bed.  Awake and alert in no acute distress.  He still

has some shortness of breath with minimal exertion.  He is maintaining O2 

saturations in the mid 90s on 4 L/m per nasal cannula.  His been afebrile.  

Hemodynamically stable.  His main complaint is generalized weakness and 

generalized aches and pains.  White count 11.2.  Hemoglobin 11.5.  Lymphocytes 

0.7.  Sodium 135.  Potassium 4.2.  Creatinine 0.4.  Pro-calcitonin 0.07.  Corona

virus not detected.  He is continued on Levaquin, IV Solu-Medrol, DuoNeb 

inhalations, Pulmicort and Perforomist inhalations, Tessalon Perles.





The patient is seen today 12/05/2021 in follow-up on the regular medical floor. 

He is currently resting comfortably in bed awake and alert in no acute distress.

 Maintaining good O2 saturations in the mid to upper 90s on 4 L/m per nasal 

cannula.  He's been afebrile.  Hemodynamically stable.  Blood culture reveals no

growth.  No new labs today.  He is continued on DuoNeb inhalations, Pulmicort 

and Perforomist inhalations, IV Solu-Medrol.  He remains on antibiotics in the 

form of Levaquin. 





Objective





- Vital Signs


Vital signs: 


                                   Vital Signs











Temp  98.7 F   12/05/21 11:14


 


Pulse  79   12/05/21 11:14


 


Resp  20   12/05/21 11:14


 


BP  126/70   12/05/21 11:14


 


Pulse Ox  96   12/05/21 11:14








                                 Intake & Output











 12/04/21 12/05/21 12/05/21





 18:59 06:59 18:59


 


Intake Total  400 


 


Balance  400 


 


Weight 61.235 kg  


 


Intake:   


 


  Oral  400 


 


Other:   


 


  Voiding Method Urinal Urinal Urinal





 Diaper Diaper Diaper





 Incontinent Incontinent Incontinent


 


  # Voids 1 2 1


 


  # Bowel Movements 1 0 














- Exam





GENERAL EXAM: Alert, frail, 71-year-old gentleman, on 4 L nasal cannula, fairly 

comfortable in no apparent distress.


HEAD: Normocephalic.


EYES: Normal reaction of pupils, equal size.


NOSE: Clear with pink turbinates.


THROAT: No erythema or exudates.


NECK: No masses, no JVD.


CHEST: No chest wall deformity.


LUNGS: Equal air entry with end expiratory wheeze, few scattered rhonchi, 

diminished


CVS: S1 and S2 normal with no audible murmur, regular rhythm.


ABDOMEN: No hepatosplenomegaly, normal bowel sounds, no guarding or rigidity.


SPINE: No scoliosis or deformity


SKIN: No rash, ecchymosis secondary to chronic steroid use.


CENTRAL NERVOUS SYSTEM: No focal deficits, tone is normal in all 4 extremities.


EXTREMITIES: There is no peripheral edema.  No clubbing, no cyanosis.  

Peripheral pulses are intact.





- Labs


CBC & Chem 7: 


                                 12/04/21 06:57





                                 12/04/21 06:57


Labs: 


                      Microbiology - Last 24 Hours (Table)











 12/03/21 14:26 Blood Culture - Preliminary





 Blood    No Growth after 24 hours














Assessment and Plan


Assessment: 





1 acute exacerbation of chronic COPD/pulmonary fibrosis.  The patient was 

treated with antibiotics and steroids on outpatient basis with failure to 

response and the patient was hospitalized.  Pro-calcitonin level is low.  Covid 

19 testing is negative for now and the patient is currently on 4 L by nasal 

cannula.  The patient was started on a combination of bronchodilators, steroids 

and empiric antibiotic coverage with Levaquin.





2 chronic hypoxic respiratory failure and the patient is demented on oxygen and 

underwent 3-1/2 L on outpatient basis





3 advanced COPD/pulmonary fibrosis, oxygen and steroid dependent





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





5 history of duodenal ulcer perforation requiring surgical repair and repair of 

incarcerated umbilical hernia, with subsequent development of 

intraperitoneal/abdominal abscess requiring incision and drainage and prolonged 

antibiotic course





7 rheumatoid arthritis, with secondary chronic joint deformities related to 

rheumatoid arthritis





8 obstructive sleep apnea not receiving any CPAP therapy at this point in time





9 chronic back pain





10 hyperlipidemia





11 osteoarthritis





12 mild leukocytosis





Plan:





The patient was seen and evaluated


Currently stable from the pulmonary standpoint


IV Solu-Medrol transitioned to prednisone


Continue the current treatment plan


We will continue to follow

## 2021-12-05 NOTE — P.PN
Subjective


Progress Note Date: 12/05/21





HISTORY OF PRESENT ILLNESS


This is a pleasant 71-year-old gentleman known to my practice and pulmonary Dr. Pickard.  He has underlying history of advanced steroid-dependent COPD, with 

chronic hypoxemic respiratory failure on 4 L O2 nasal cannula at home., 

obstructive sleep apnea, unable to tolerate CPAP, rheumatid arthritis, 

hyperlipidemia, chronic bronchitis, with prior history of Pseudomonas in the 

sputum, that was previously thought to be colonization.  He also had a history 

of perforated duodenal ulcer, status post explore lap, with repair of perforated

duodenal ulcer, and incarcerated umbilical hernia.  Known FVC of 72%, an FEV1 of

46%.  Echocardiogram as of 04/07/2021, EF of 55-60%, with concentric LVH, mild 

MR, trace AR, mild TR, moderate pulmonary hypertension.  Presents to the 

emergency room secondary to shortness of breath, and difficulty of breathing, 

worse than baseline.  Patient denies any fever or chills, no melena 

hematochezia, no aspiration.  Patient has increasing difficulties with his 

walker for ambulation,.  His last admission at [facility] was 11/21/2021, 

admitted and discharged 11/25/2021, secondary to COPD exacerbation.  At that 

time he required 9L high flow O2, and was covid negative at that time, and was 

discharged to home with his baseline 4 L oral nasal cannula, with oral long-term

taper of prednisone, she did not require oral antibiotics on discharge at that 

time.  Sputum culture, normal respiratory keeley, 11/26/2021.  Computed t

omography scan, 11/21/2021, shows advanced pulmonary fibrosis, chronic pulmonary

infiltrates similar to old exam, pleural thickening at the lung bases consistent

with scarring, not significantly different from previous CT, Negative for PE, 

there is stable thoracic kyphotic deformities and mild thoracic compression 

fractures.  Which was thought to be old.  T7, T6, and T5 vertebral





He presents to the emergency room, presents with above shortness of breath, 

productive cough, difficulty of expectorating sputum increasing shortness of 

breath on exertion, currently with COPD exacerbation, coronary virus PCR is 

negative, serum sodium 129, creatinine of 0.55, INR of 1.0 WBC count of 17.3 

chest x-ray shows advanced COPD, with chronic pulmonary fibrosis, without any 

acute pulmonary process is seen in consult for made with Dr. Pickard, pulmonary

medicine, started on IV Levaquin, empiric treatment secondary to leukocytosis, 

and nebulized treatments around-the-clock, Pulmicort, and IV Solu-Medrol O2 

supplementation, and was admitted to the medical floor





12/4: Patient has been afebrile, heart rate 90, blood pressure 111/67, pulse ox 

92% on 4 L nasal cannula.  Repeat blood work reveals WBC 11.2, hemoglobin 11.5 

and platelet count 314.  Electrolytes normal.  Creatinine 0.4.  Blood sugar 117.

 Pro-calcitonin 0.07.  Patient has been seen and followed by pulmonary medicine.

 Patient is anxious to be discharged but instructed him that he will probably be

here until Monday.  He is currently on Solu-Medrol 60 mg IV every 6 hours.





12/5: Patient is seen today on the [facility] floor.  Patient is sleeping but 

awakens easily to verbal stimuli.  He states his breathing is better today but 

still has mild wheezing.  He's been afebrile, heart rate 73, blood pressure 

106/62, pulse ox 99% on 4 L nasal cannula.  Patient is continued on IV Solu-

Medrol at 60 mg IV every 6 hours which will be decreased to 40 mg every 8 hours 

and start oral prednisone tomorrow.  Anticipate discharge home tomorrow.








REVIEW OF SYSTEMS


Constitutional: No fever, no chills, no night sweats.  No weight change.  No 

weakness, fatigue or lethargy.  No daytime sleepiness.


EENT: No headache.  No blurred vision or double vision, no loss of vision.  No 

loss of Hearing, no ringing in the ears, no dizziness.  No nasal drainage or 

congestion.  No epistaxis.  No sore throat.


Lungs: Reports continued shortness of breath , improved, Reports cough, Reports 

sputum production.  Reports wheezing.


Cardiovascular: No chest pain, no lower extremity edema.  No palpitations.  No 

paroxysmal nocturnal dyspnea.  No orthopnea.  No lightheadedness or dizziness.  

No syncopal episodes.


Abdominal: No abdominal pain.  No nausea, vomiting.  No diarrhea.  No 

constipation.  No bloody or tarry stools..  No loss of appetite.


Genitourinary: No dysuria, increased frequency, urgency.  No urinary retention.


Musculoskeletal: No myalgias.  No muscle weakness, no gait dysfunction, no 

frequent falls.  No back pain.  No neck pain.


Integumentary: No wounds, no lesions.  No rash or pruritus.  No unusual 

bruising.  


Neurologic: No aphasia. No facial droop. No change in mentation. No head injury.

No headache. No paralysis. No paresthesia.


Psychiatric: No depression.  No anxiety.  No mood swings.


Endocrine: No abnormal blood sugars.  No weight change.   





PHYSICAL EXAMINATION


Gen: This is 71-year-old  male.  Patient is resting in bed and not 

appear to be in acute respiratory failure. 


HEENT: Head is atraumatic, normocephalic. Pupils equal, round. Sclerae is 

anicteric. 


NECK: Supple. No JVD. No lymphadenopathy. No thyromegaly. 


LUNGS: Coarse crackles bilaterally.  No intercostal retractions.


HEART: Regular rate and rhythm.  Systolic murmur.  


ABDOMEN: Soft. Bowel sounds are present. No masses.  No tenderness.  Large 

ventral hernia, soft.


EXTREMITIES: No pedal edema.  No calf tenderness.  Dorsalis pedis palpable 

bilaterally.


NEUROLOGICAL: Patient is awake, alert and oriented x3. Cranial nerves 2 through 

12 are grossly intact. 





ASSESSMENT AND PLAN


1.  Acute hypoxic respiratory failure secondary to COPD exacerbation with 

underlying pulmonary fibrosis, possible rheumatoid arthritis associated 

interstitial lung disease.  Continue DuoNeb treatments every 4 hours, Tessalon 

Perles 200 mg 3 times daily scheduled, Pulmicort 0.5 mg twice daily, Perforomist

20 g twice daily,  Levaquin 500 milligrams daily, Solu-Medrol decreased to 40 

mg IV every 8 hours and start prednisone tomorrow. 





2.  Recent hospitalization for possible Right clavicular fracture and fracture 

of the superior and inferior left pubic ramus, pathologic, stable, no recent 

falls.





3.  Possible tracheobronchitis.  Continue Levaquin, Tessalon Perles 200 mg 3 

times daily.





4.  Chronic hypoxic respiratory failure.  Continue oxygen therapy.





5.  Advanced COPD with pulmonary fibrosis, steroid dependent and chronic hypoxic

and hypercapnic respiratory failure on home O2 at 4 L.  continue oxygen therapy.

 





6.  Moderate pulmonary hypertension.





7.  Hypertension.  Continue Toprol-XL 25 mg daily.





8.  Rheumatoid arthritis, stable.





9.  Hyperlipidemia.  Continue atorvastatin 40 mg at bedtime.





10.  Obstructive sleep apnea unable to tolerate CPAP.





11.  Chronic back pain and generalized osteoarthritis.  Continue Norco as 

needed, gabapentin 300 mg at bedtime





12.  History of perforated duodenal ulcer status post exploratory laparotomy 

with repair of perforated duodenal ulcer and repair of incarcerated umbilical 

hernia.





13.  Generalized anxiety disorder and recurrent depression.  Continue Zoloft 150

mg daily, Elavil 10 mg at bedtime. 





14.  DVT prophylaxis.  Heparin subcu.





15.  GI prophylaxis.  Continue Protonix daily.





16.  Insomnia.  Continue Elavil 10 mg at bedtime.





DISCHARGE PLAN


Home with [facility].  





Impression and plan of care have been directed as dictated by the signing 

physician.  Chloé Maldonado nurse practitioner acting as scribe for signing 

physician.





Objective





- Vital Signs


Vital signs: 


                                   Vital Signs











Temp  97.9 F   12/05/21 04:07


 


Pulse  73   12/05/21 04:07


 


Resp  20   12/05/21 04:07


 


BP  106/62   12/05/21 04:07


 


Pulse Ox  99   12/05/21 04:07








                                 Intake & Output











 12/04/21 12/05/21 12/05/21





 18:59 06:59 18:59


 


Intake Total  400 


 


Balance  400 


 


Weight 61.235 kg  


 


Intake:   


 


  Oral  400 


 


Other:   


 


  Voiding Method Urinal Urinal 





 Diaper Diaper 





 Incontinent Incontinent 


 


  # Voids 1 2 


 


  # Bowel Movements 1 0 














- Labs


CBC & Chem 7: 


                                 12/04/21 06:57





                                 12/04/21 06:57


Labs: 


                  Abnormal Lab Results - Last 24 Hours (Table)











  12/04/21 Range/Units





  06:57 


 


Creatinine  0.4 L  (0.6-1.5)  mg/dL


 


BUN/Creatinine Ratio  48.25 H  (12.00-20.00)  Ratio


 


Glucose  117 H  ()  mg/dL


 


Calcium  7.7 L  (8.7-10.3)  mg/dL








                      Microbiology - Last 24 Hours (Table)











 12/03/21 14:26 Blood Culture - Preliminary





 Blood    No Growth after 24 hours

## 2021-12-06 VITALS — SYSTOLIC BLOOD PRESSURE: 120 MMHG | TEMPERATURE: 98.3 F | RESPIRATION RATE: 18 BRPM | DIASTOLIC BLOOD PRESSURE: 71 MMHG

## 2021-12-06 VITALS — HEART RATE: 80 BPM

## 2021-12-06 RX ADMIN — FUROSEMIDE SCH MG: 40 TABLET ORAL at 08:17

## 2021-12-06 RX ADMIN — HYDROCODONE BITARTRATE AND ACETAMINOPHEN PRN EACH: 10; 325 TABLET ORAL at 00:46

## 2021-12-06 RX ADMIN — METOPROLOL SUCCINATE SCH MG: 25 TABLET, EXTENDED RELEASE ORAL at 08:17

## 2021-12-06 RX ADMIN — FORMOTEROL FUMARATE DIHYDRATE SCH MCG: 20 SOLUTION RESPIRATORY (INHALATION) at 11:05

## 2021-12-06 RX ADMIN — HYDROCODONE BITARTRATE AND ACETAMINOPHEN PRN EACH: 10; 325 TABLET ORAL at 08:21

## 2021-12-06 RX ADMIN — LEVOFLOXACIN SCH MG: 500 TABLET, FILM COATED ORAL at 08:17

## 2021-12-06 RX ADMIN — IPRATROPIUM BROMIDE AND ALBUTEROL SULFATE PRN ML: .5; 3 SOLUTION RESPIRATORY (INHALATION) at 11:05

## 2021-12-06 RX ADMIN — FLUTICASONE PROPIONATE SCH SPRAY: 50 SPRAY, METERED NASAL at 08:16

## 2021-12-06 RX ADMIN — PANTOPRAZOLE SODIUM SCH MG: 40 TABLET, DELAYED RELEASE ORAL at 08:17

## 2021-12-06 RX ADMIN — BUDESONIDE SCH MG: 0.5 INHALANT ORAL at 11:05

## 2021-12-06 RX ADMIN — SERTRALINE HYDROCHLORIDE SCH MG: 50 TABLET, FILM COATED ORAL at 08:18

## 2021-12-06 NOTE — P.PN
Subjective


Progress Note Date: 12/06/21


Principal diagnosis: 


 Dyspnea, wheezing, coughing





 On 12/06/2021 patient seen in follow-up on medical surgical floor.  He is awake

and alert, in no acute distress, he is resting comfortably in bed, he is 

currently on 4 L of oxygen.  His pulse ox is 96%, no fever or chills, but a 

signs have been stable, breathing is nonlabored, lung sounds are much improved, 

patient is less dyspneic and bronchospastic, coughing less.  He's had no acute 

events overnight, no complaint of chest discomfort, no worsening dyspnea or 

cough.  No new labs today, patient continues on nebulized bronchodilators, he is

on daily dose of Lasix, he has been transitioned to oral prednisone.  Continues 

on Levaquin.  no Fever or chills, blood cultures have been negative, overall 

patient states he is significantly improved since admission, and would like to 

go home today.








Objective





- Vital Signs


Vital signs: 


                                   Vital Signs











Temp  98.3 F   12/06/21 04:38


 


Pulse  80   12/06/21 11:08


 


Resp  18   12/06/21 04:38


 


BP  120/71   12/06/21 04:38


 


Pulse Ox  96   12/06/21 11:08








                                 Intake & Output











 12/05/21 12/06/21 12/06/21





 18:59 06:59 18:59


 


Intake Total 600  


 


Balance 600  


 


Intake:   


 


  Oral 600  


 


Other:   


 


  Voiding Method Urinal Bedside Commode 





 Diaper Urinal 





 Incontinent Diaper 





  Incontinent 


 


  # Voids 1 2 


 


  # Bowel Movements 1  














- Exam


 GENERAL EXAM: Alert, very pleasant, 71-year-old white male, on 4 L of oxygen 

and the pulse ox of 96%, resting comfortably in bed comfortable in no apparent 

distress.


HEAD: Normocephalic/atraumatic.


EYES: Normal reaction of pupils, equal size.  Conjunctiva pink, sclera white.


NOSE: Clear with pink turbinates.


THROAT: No erythema or exudates.


NECK: No masses, no JVD, no thyroid enlargement, no adenopathy.


CHEST: No chest wall deformity.  Symmetrical expansion. 


LUNGS: Equal air entry with no crackles, wheeze, rhonchi or dullness.


CVS: Regular rate and rhythm, normal S1 and S2, no gallops, no murmurs, no rubs


ABDOMEN: Soft, nontender.  No hepatosplenomegaly, normal bowel sounds, no 

guarding or rigidity.


EXTREMITIES: No clubbing, no edema, no cyanosis, 2+ pulses and upper and lower 

extremities.


MUSCULOSKELETAL: Muscle strength and tone normal.


SPINE: No scoliosis or deformity


SKIN: No rashes


CENTRAL NERVOUS SYSTEM: Alert and oriented -3.  No focal deficits, tone is 

normal in all 4 extremities.


PSYCHIATRIC: Alert and oriented -3.  Appropriate affect.  Intact judgment and 

insight.











- Labs


CBC & Chem 7: 


                                 12/04/21 06:57





                                 12/04/21 06:57


Labs: 


                      Microbiology - Last 24 Hours (Table)











 12/03/21 14:26 Blood Culture - Preliminary





 Blood    No Growth after 48 hours














Assessment and Plan


Plan: 


 Assessment:





#1.  Acute exacerbation of chronic COPD/pulmonary fibrosis.  The patient has 

been treated with antibiotics and steroids with failure of outpatient treatment.

 COVID-19 PCR was negative





#2.  Chronic hypoxic respiratory failure patient wears 3 have liters on an 

outpatient basis





#3.  Advanced COPD/pulmonary fibrosis, oxygen and steroid dependent





#4.  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





#5.  History of duodenal ulcer with perforation requiring surgical repair and 

repair of incarcerated umbilical hernia with subsequent development of 

intraperitoneal/abdominal abscess requiring incision and drainage and prolonged 

antibiotic course





#7.  Rheumatoid arthritis with secondary chronic joint deformities





#8.  Obstructive sleep apnea not receiving any CPAP therapy at this point in carolyn

e





#9.  Chronic back pain





#10.  Hyperlipidemia





#11.  Osteoporosis





#12.  Mild leukocytosis





Plan:





Patient continues to improve


Breathing comfortably


Close to his baseline


Increase activity as tolerated


Has been transitioned to oral prednisone


Clear for discharge from pulmonary perspective on prednisone taper and she can 

finish outpatient course of Levaquin


he can resume his home medications and inhalers


Outpatient follow-up with Dr. Fisher in the office in 2 weeks





I performed a history & physical examination of the patient and discussed their 

management with my nurse practitioner, La Louie.  I reviewed the nurse 

practitioner's note and agree with the documented findings and plan of care.  

Lung sounds are positive for diminished breath sounds throughout the lung 

fields.  The findings and the impression was discussed with the patient.  I 

attest to the documentation by the nurse practitioner. 





Time with Patient: Less than 30

## 2021-12-06 NOTE — P.DS
Providers


Date of admission: 


12/03/21 15:13





Expected date of discharge: 12/06/21


Attending physician: 


Amalia Bazan





Consults: 





                                        





12/03/21 15:12


Consult Physician Routine 


   Consulting Provider: Corina Pickard


   Consult Reason/Comments: COPD


   Do you want consulting provider notified?: Yes











Primary care physician: 


[facility] Course: 





HISTORY OF PRESENT ILLNESS


This is a pleasant 71-year-old gentleman known to my practice and pulmonary Dr. Pickard.  He has underlying history of advanced steroid-dependent COPD, with 

chronic hypoxemic respiratory failure on 4 L O2 nasal cannula at home., 

obstructive sleep apnea, unable to tolerate CPAP, rheumatid arthritis, 

hyperlipidemia, chronic bronchitis, with prior history of Pseudomonas in the 

sputum, that was previously thought to be colonization.  He also had a history 

of perforated duodenal ulcer, status post explore lap, with repair of perforated

duodenal ulcer, and incarcerated umbilical hernia.  Known FVC of 72%, an FEV1 of

46%.  Echocardiogram as of 04/07/2021, EF of 55-60%, with concentric LVH, mild 

MR, trace AR, mild TR, moderate pulmonary hypertension.  Presents to the 

emergency room secondary to shortness of breath, and difficulty of breathing, 

worse than baseline.  Patient denies any fever or chills, no melena 

hematochezia, no aspiration.  Patient has increasing difficulties with his 

walker for ambulation,.  His last admission at [facility] was 11/21/2021, 

admitted and discharged 11/25/2021, secondary to COPD exacerbation.  At that 

time he required 9L high flow O2, and was covid negative at that time, and was 

discharged to home with his baseline 4 L oral nasal cannula, with oral long-term

taper of prednisone, she did not require oral antibiotics on discharge at that 

time.  Sputum culture, normal respiratory keeley, 11/26/2021.  Computed 

tomography scan, 11/21/2021, shows advanced pulmonary fibrosis, chronic 

pulmonary infiltrates similar to old exam, pleural thickening at the lung bases 

consistent with scarring, not significantly different from previous CT, Negative

for PE, there is stable thoracic kyphotic deformities and mild thoracic 

compression fractures.  Which was thought to be old.  T7, T6, and T5 vertebral





He presents to the emergency room, presents with above shortness of breath, 

productive cough, difficulty of expectorating sputum increasing shortness of 

breath on exertion, currently with COPD exacerbation, coronary virus PCR is 

negative, serum sodium 129, creatinine of 0.55, INR of 1.0 WBC count of 17.3 

chest x-ray shows advanced COPD, with chronic pulmonary fibrosis, without any 

acute pulmonary process is seen in consult for made with Dr. Pickard, pulmonary

medicine, started on IV Levaquin, empiric treatment secondary to leukocytosis, 

and nebulized treatments around-the-clock, Pulmicort, and IV Solu-Medrol O2 

supplementation, and was admitted to the medical floor





12/4: Patient has been afebrile, heart rate 90, blood pressure 111/67, pulse ox 

92% on 4 L nasal cannula.  Repeat blood work reveals WBC 11.2, hemoglobin 11.5 

and platelet count 314.  Electrolytes normal.  Creatinine 0.4.  Blood sugar 117.

 Pro-calcitonin 0.07.  Patient has been seen and followed by pulmonary medicine.

 Patient is anxious to be discharged but instructed him that he will probably be

here until Monday.  He is currently on Solu-Medrol 60 mg IV every 6 hours.





12/5: [facility].  Patient is sleeping but 

awakens easily to verbal stimuli.  He states his breathing is better today but 

still has mild wheezing.  He's been afebrile, heart rate 73, blood pressure 

106/62, pulse ox 99% on 4 L nasal cannula.  Patient is continued on IV Solu-

Medrol at 60 mg IV every 6 hours which will be decreased to 40 mg every 8 hours 

and start oral prednisone tomorrow.  Anticipate discharge home tomorrow.





12/6: Patient's breathing status is stable and close to baseline.  He still has 

a cough.  Patient will be continued on Levaquin and prednisone as new 

medications for home. Has been seen by pulmonary medicine this morning clear for

discharge home.  Patient is requesting prescription for Norco but he has been 

advised to follow-up with Dr. Bazan regarding this as he has requested a 

prescription for Norco for 3 days on every hospitalization and discharge.  

Patient will be discharged today in stable condition.








DISCHARGE DIAGNOSES


1.  Acute hypoxic respiratory failure secondary to COPD exacerbation with 

underlying pulmonary fibrosis, possible rheumatoid arthritis associated 

interstitial lung disease. 


2.  Recent hospitalization for possible Right clavicular fracture and fracture 

of the superior and inferior left pubic ramus, pathologic, stable, no recent 

falls.


3.  Possible tracheobronchitis.  


4.  Chronic hypoxic respiratory failure.  


5.  Advanced COPD with pulmonary fibrosis, steroid dependent and chronic hypoxic

and hypercapnic respiratory failure on home O2 at 4 L.  


6.  Moderate pulmonary hypertension.


7.  Hypertension.  


8.  Rheumatoid arthritis, stable.


9.  Hyperlipidemia.  


10.  Obstructive sleep apnea unable to tolerate CPAP.


11.  Chronic back pain and generalized osteoarthritis.  


12.  History of perforated duodenal ulcer status post exploratory laparotomy 

with repair of perforated duodenal ulcer and repair of incarcerated umbilical 

hernia.


13.  Generalized anxiety disorder and recurrent depression. 


14.  Insomnia.  Continue Elavil 10 mg at bedtime.





DISCHARGE PLAN


Home with [facility].  


Greater than 35 minutes was utilized and coordinating patient's discharge.


Impression and plan of care have been directed as dictated by the signing 

physician.  Chloé Maldonado nurse practitioner acting as scribe for signing 

physician.





Patient Condition at Discharge: Good





Plan - Discharge Summary


Discharge Rx Participant: No


New Discharge Prescriptions: 


New


   Levofloxacin [Levaquin] 500 mg PO DAILY #5 tab


   predniSONE 0 mg PO AS DIRECTED #30 tab





Continue


   Metoprolol Succinate [Toprol XL] 25 mg PO DAILY


   Sertraline [Zoloft] 150 mg PO DAILY


   Celecoxib [CeleBREX] 200 mg PO DAILY


   Clopidogrel [Plavix] 75 mg PO DAILY #14 tab


   Fluticasone Nasal Spray [Flonase Nasal Spray] 2 spr EA NOSTRIL DAILY


   Furosemide [Lasix] 40 mg PO DAILY #28 tab


   Multivitamins, Thera [Multivitamin (formulary)] 1 tab PO DAILY


   Benzonatate [Tessalon Perles] 200 mg PO TID PRN


     PRN Reason: Cough


   Atorvastatin [Lipitor] 40 mg PO HS


   Amitriptyline HCl [Elavil] 10 mg PO HS


   Ascorbic Acid [Vitamin C] 1,000 mg PO DAILY


   Ipratropium-Albuterol Nebulize [Duoneb 0.5 mg-3 mg/3 ml Soln] 3 ml INHALATION

RT-QID


   Budesonide [Pulmicort] 0.5 mg INHALATION RT-BID


   Thiamine [Vitamin B-1] 100 mg PO BID-W/MEALS  tab


   Omeprazole 20 mg PO DAILY


   Calcium Carbonate 1,000 mg PO Q8H PRN


     PRN Reason: Indigestion


   Acetaminophen [Tylenol] 650 mg PO Q6H PRN


     PRN Reason: Pain


   HYDROcodone/APAP 10-325MG [Norco ] 1 tab PO Q8HR PRN 3 Days #9 tab


     PRN Reason: Pain





Discontinued


   predniSONE See Taper PO DAILY


Discharge Medication List





Metoprolol Succinate [Toprol XL] 25 mg PO DAILY 05/12/20 [History]


Sertraline [Zoloft] 150 mg PO DAILY 05/12/20 [History]


Amitriptyline HCl [Elavil] 10 mg PO HS 04/06/21 [History]


Celecoxib [CeleBREX] 200 mg PO DAILY 04/06/21 [History]


Ascorbic Acid [Vitamin C] 1,000 mg PO DAILY 04/10/21 [History]


Ipratropium-Albuterol Nebulize [Duoneb 0.5 mg-3 mg/3 ml Soln] 3 ml INHALATION 

RT-QID 04/10/21 [History]


Clopidogrel [Plavix] 75 mg PO DAILY #14 tab 04/13/21 [Rx]


Budesonide [Pulmicort] 0.5 mg INHALATION RT-BID 11/08/21 [History]


Fluticasone Nasal Spray [Flonase Nasal Spray] 2 spr EA NOSTRIL DAILY 11/08/21 

[History]


Furosemide [Lasix] 40 mg PO DAILY #28 tab 11/10/21 [Rx]


Thiamine [Vitamin B-1] 100 mg PO BID-W/MEALS  tab 11/10/21 [Rx]


Acetaminophen [Tylenol] 650 mg PO Q6H PRN 11/21/21 [History]


Atorvastatin [Lipitor] 40 mg PO HS 11/21/21 [History]


Benzonatate [Tessalon Perles] 200 mg PO TID PRN 11/21/21 [History]


Calcium Carbonate 1,000 mg PO Q8H PRN 11/21/21 [History]


Multivitamins, Thera [Multivitamin (formulary)] 1 tab PO DAILY 11/21/21 

[History]


Omeprazole 20 mg PO DAILY 11/21/21 [History]


HYDROcodone/APAP 10-325MG [Norco ] 1 tab PO Q8HR PRN 3 Days #9 tab 

11/25/21 [Rx]


Levofloxacin [Levaquin] 500 mg PO DAILY #5 tab 12/06/21 [Rx]


predniSONE 0 mg PO AS DIRECTED #30 tab 12/06/21 [Rx]








Follow up Appointment(s)/Referral(s): 


Amalia Bazan MD [Primary Care Provider] - 12/07/21 4:45 pm (Virtual Visit)


Corina Pickard MD [STAFF PHYSICIAN] - 01/18/22 9:45 am (The Office put you on 

the cancellation list.  Will call if an appointment opens up.  )


Patient Instructions/Handouts:  Prednisone (By mouth), Levofloxacin (By mouth), 

Hyponatremia (DC), COPD (Chronic Obstructive Pulmonary Disease) (DC)


Discharge Disposition: HOME WITH HOME HEALTH SERVICES

## 2022-02-11 NOTE — P.PN
Subjective


Progress Note Date: 05/14/20





This is a 70-year-old male patient of Dr. Bazan and Dr. Pickard with past 

medical history of advanced steroid-dependent COPD, hyperlipidemia, rheumatoid 

arthritis, obstructive sleep apnea unable to tolerate CPAP, chronic hypoxic 

respiratory failure on home O2 at 3 L nasal cannula, generalized osteoarthritis,

chronic back pain pseudomonas in sputum on previous admission secondary to 

colonization or pseudomonal pneumonia.  Known FVC of 72% and FEV1 of 46%.  

History of perforated duodenal ulcer status post exploratory laparotomy with 

repair of perforated duodenal ulcer and repair of incarcerated umbilical hernia.

 Patient states that he has had difficulty with breathing for the past 2 weeks. 

He has been seeing Dr. Quiroz and has been on a tapering steroid dose as well 

as doxycycline without any improvement.  He is also using nebulizers inhalers at

home.  Patient was advised by Dr. Bazan to come in the hospital for further 

evaluation and treatment.





Patient came into [facility] emergency center for evaluation.  

Patient was afebrile, heart rate 128, blood pressure 139/75, pulse ox 87%.  EKG 

was a sinus tachycardia with occasional PVC.  Chest x-ray revealed COPD with 

coarsened interstitium stable from prior exam correlate for chronic interstitial

pulmonary fibrosis.  No acute infiltrate.  Underlying interstitial or viral 

pneumonitis not excluded. CBC was unremarkable, sodium 129, potassium 5.2, 

chloride 91, CO2 30, BUN 14 and creatinine 0.54, blood sugar 149.  Pro-ca

lcitonin 0.09, pro .  Patient does not have history of diabetes.  Liver 

function tests within normal limits, C reactive protein 71.1, d-dimer 0.51.  

Lactic acid 1.3.  Influenza testing negative.  Rotavirus testing in progress.  

CT of the chest is stable pulmonary fibrosis and underlying emphysematous 

change.  Patient was started on IV steroids, nebulizer treatments, admitted to 

the cardiac stepdown unit and consult with Dr. Pickard.





5/14: Patient has been seen and followed by Dr. Pickard.  He discussed Lasix 40

mg every 12 hours which we will place.  Echocardiogram has been ordered and 

pending.  Patient is continued on Rocephin and Zithromax, Solu-Medrol is 

currently at 40 mg every 8 hours.  He is currently on oxygen at 5 L pulse oxing 

86-92%.  He is normally on 2-1/2-3 L at home.  Patient is been afebrile, heart 

rate 84, blood pressure 127/73.  Blood sugars 131 255.  Sodium 134, potassium 

4.9, chloride 97, CO2 34, BUN 20, creatinine 0.55.  Patient states he continues 

to have a cough and significant pain in his chest with coughing.  He continues 

to have some shortness of breath.  Medicine received call during the night 

regarding pain control and MS was added and patient has Norco or MS to take for 

pain.  No pedal edema noted.  He states he has been up and urinating adequately.

 He states he does have significant shortness of breath when he gets the 

bathroom.  Incentive spirometry is 500 ML's.




















Objective





- Vital Signs


Vital signs: 


                                   Vital Signs











Temp  97 F L  05/14/20 04:00


 


Pulse  84   05/14/20 04:00


 


Resp  18   05/14/20 04:00


 


BP  127/73   05/14/20 04:00


 


Pulse Ox  92 L  05/14/20 04:08








                                 Intake & Output











 05/13/20 05/14/20 05/14/20





 18:59 06:59 18:59


 


Intake Total 400 400 240


 


Output Total 1050 200 250


 


Balance -650 200 -10


 


Weight  69.3 kg 


 


Intake:   


 


  Intake, IV Titration 200  





  Amount   


 


    Sodium Chloride 0.9% 1, 100  





    000 ml @ 5 mls/hr IV .   





    Q24H IMTIAZ Rx#:[id number]   


 


    cefTRIAXone 1 gm In 100  





    Sodium Chloride 0.9% 50   





    ml @ 100 mls/hr IVPB   





    Q24HR IMTIAZ Rx#:[id number]   


 


  Oral 200 400 240


 


Output:   


 


  Urine 1050 200 250


 


Other:   


 


  Voiding Method Urinal Urinal 


 


  # Voids 1 1 


 


  # Bowel Movements 1  














- Exam








 Review of Systems 


Constitutional: Reports fatigue, Denies anorexia, Denies chills, Denies fever, 

Denies poor appetite, Denies weight gain


Eyes: denies blurred vision, denies pain


Ears, nose, mouth and throat: Denies dysphagia, Denies headache, Denies nasal 

congestion, Denies nasal discharge, Denies sore throat, Denies vertigo


Cardiovascular: Reports shortness of breath, Denies chest pain, Denies edema, 

Denies leg edema, Denies lightheadedness, Denies syncope, reports shortness of 

breath with activity


Respiratory: Reports cough, Reports cough with sputum, Reports dyspnea, Reports 

home oxygen, Reports wheezing, Denies excessive sputum, Denies hemoptysis


Gastrointestinal: Denies abdominal pain, Denies diarrhea, Denies nausea, Denies 

vomiting


Genitourinary: Denies dysuria, Denies urinary frequency, Denies urinary 

retention


Musculoskeletal: Denies frequent falls, Denies gait dysfunction, Denies muscle 

weakness, Denies myalgias


Integumentary: Denies pruritus, Denies rash, Denies wounds


Neurological: Denies change in mentation, Denies change in speech, Denies 

numbness, Denies seizures, Denies weakness


Psychiatric: Denies anxiety, Denies depression, Denies hopelessness


Endocrine: Denies fatigue, Denies weight change








Physical examination


Gen: This is a 70-year-old  male.  Patient is resting in bed appears to

be comfortable and in no acute distress.


HEENT: Head is atraumatic, normocephalic. Pupils equal, round. Sclerae is 

anicteric. 


NECK: Supple. No JVD. No lymphadenopathy. No thyromegaly. 


LUNGS: Coarse crackles bilaterally.  Mild expiratory wheezing No intercostal 

retractions.


HEART: Regular rate and rhythm. No murmur.  Sinus tachycardia.


ABDOMEN: Soft. Bowel sounds are present. No masses.  No tenderness.


EXTREMITIES: No pedal edema.  No calf tenderness.  Dorsalis pedis +2 

bilaterally.


NEUROLOGICAL: Patient is awake, alert and oriented x3. Cranial nerves 2 through 

12 are grossly intact. 





- Labs


CBC & Chem 7: 


                                 05/12/20 12:40





                                 05/14/20 06:51


Labs: 


                  Abnormal Lab Results - Last 24 Hours (Table)











  05/13/20 05/13/20 05/13/20 Range/Units





  11:38 16:41 20:52 


 


Sodium     (137-145)  mmol/L


 


Chloride     ()  mmol/L


 


Carbon Dioxide     (22-30)  mmol/L


 


Creatinine     (0.66-1.25)  mg/dL


 


Glucose     (74-99)  mg/dL


 


POC Glucose (mg/dL)  130 H  155 H  121 H  (75-99)  mg/dL














  05/14/20 05/14/20 Range/Units





  06:18 06:51 


 


Sodium   134 L  (137-145)  mmol/L


 


Chloride   97 L  ()  mmol/L


 


Carbon Dioxide   34 H  (22-30)  mmol/L


 


Creatinine   0.55 L  (0.66-1.25)  mg/dL


 


Glucose   131 H  (74-99)  mg/dL


 


POC Glucose (mg/dL)  141 H   (75-99)  mg/dL








                      Microbiology - Last 24 Hours (Table)











 05/12/20 12:40 Blood Culture - Preliminary





 Blood    No Growth after 24 hours














Assessment and Plan


Plan: 





1.  Acute on chronic hypoxic respiratory failure secondary to COPD exacerbation 

and underlying pulmonary fibrosis, possible acute heart failure.  Patient 

admitted to the hospital, consult with Dr. Pickard, continue oxygen therapy, co

ntinue DuoNeb treatments 4 times daily and as needed, azithromycin, Tessalon 

Perles, Pulmicort increased to 1 mg twice daily, ceftriaxone, Solu-Medrol 

decreased to 40 mg every 8 hours.  Lasix 40 mg every 12 hours IV added as well 

as echocardiogram.





2.  Advanced COPD with pulmonary fibrosis, steroid dependent and chronic hypoxic

and hypercapnic respiratory failure on home O2 at 2-1/2 - 3 L. Continue as in 

#1.





3.  Hyponatremia.  Discontinue IV fluids.  Recheck lab work in the morning.





4.  Hyperglycemia most likely secondary to steroids.  Check A1c.  NovoLog scale 

before meals and at bed time.





5.  Rheumatoid arthritis, stable.





6.  Hyperlipidemia.





7.  Obstructive sleep apnea unable to tolerate CPAP.





8.  Chronic back pain and generalized osteoarthritis.  Continue Norco as needed 

and morphine has been added.





9.  History of perforated duodenal ulcer status post exploratory laparotomy with

repair of perforated duodenal ulcer and repair of incarcerated umbilical hernia.





10.  Generalized anxiety disorder and recurrent depression.  Continue Zoloft 50 

mg daily and Xanax 0.25 mg at bedtime. 





11.  DVT prophylaxis.  Heparin subcu.





10.  GI prophylaxis.  Continue Protonix daily.





12.  Insomnia.  Melatonin added.





13.  COVID-19 infection not present.











Discharge plan: To be determined.  Patient has been at [facility] in the past.  

Consult PT.





Impression and plan of care have been directed as dictated by the signing 

physician.  Chloé Maldonado nurse practitioner acting as scribe for signing 

physician. spontaneous

## 2022-07-02 ENCOUNTER — HOSPITAL ENCOUNTER (INPATIENT)
Dept: HOSPITAL 47 - EC | Age: 72
LOS: 3 days | Discharge: HOME | DRG: 190 | End: 2022-07-05
Attending: FAMILY MEDICINE | Admitting: FAMILY MEDICINE
Payer: MEDICARE

## 2022-07-02 DIAGNOSIS — M06.9: ICD-10-CM

## 2022-07-02 DIAGNOSIS — G89.29: ICD-10-CM

## 2022-07-02 DIAGNOSIS — I27.20: ICD-10-CM

## 2022-07-02 DIAGNOSIS — G47.00: ICD-10-CM

## 2022-07-02 DIAGNOSIS — J44.1: Primary | ICD-10-CM

## 2022-07-02 DIAGNOSIS — Z57.4: ICD-10-CM

## 2022-07-02 DIAGNOSIS — F33.9: ICD-10-CM

## 2022-07-02 DIAGNOSIS — Z80.0: ICD-10-CM

## 2022-07-02 DIAGNOSIS — F41.1: ICD-10-CM

## 2022-07-02 DIAGNOSIS — Z87.11: ICD-10-CM

## 2022-07-02 DIAGNOSIS — J96.21: ICD-10-CM

## 2022-07-02 DIAGNOSIS — Z80.6: ICD-10-CM

## 2022-07-02 DIAGNOSIS — Z82.3: ICD-10-CM

## 2022-07-02 DIAGNOSIS — Z79.52: ICD-10-CM

## 2022-07-02 DIAGNOSIS — Z88.0: ICD-10-CM

## 2022-07-02 DIAGNOSIS — M80.08XA: ICD-10-CM

## 2022-07-02 DIAGNOSIS — Z99.81: ICD-10-CM

## 2022-07-02 DIAGNOSIS — Z87.891: ICD-10-CM

## 2022-07-02 DIAGNOSIS — Z79.83: ICD-10-CM

## 2022-07-02 DIAGNOSIS — Z71.3: ICD-10-CM

## 2022-07-02 DIAGNOSIS — N39.0: ICD-10-CM

## 2022-07-02 DIAGNOSIS — G47.33: ICD-10-CM

## 2022-07-02 DIAGNOSIS — Z79.02: ICD-10-CM

## 2022-07-02 DIAGNOSIS — I11.0: ICD-10-CM

## 2022-07-02 DIAGNOSIS — Z79.891: ICD-10-CM

## 2022-07-02 DIAGNOSIS — Z20.822: ICD-10-CM

## 2022-07-02 DIAGNOSIS — E78.5: ICD-10-CM

## 2022-07-02 DIAGNOSIS — I08.1: ICD-10-CM

## 2022-07-02 DIAGNOSIS — I50.30: ICD-10-CM

## 2022-07-02 DIAGNOSIS — J84.10: ICD-10-CM

## 2022-07-02 DIAGNOSIS — Z79.899: ICD-10-CM

## 2022-07-02 DIAGNOSIS — Z83.3: ICD-10-CM

## 2022-07-02 DIAGNOSIS — Z82.5: ICD-10-CM

## 2022-07-02 DIAGNOSIS — M15.9: ICD-10-CM

## 2022-07-02 DIAGNOSIS — Z79.1: ICD-10-CM

## 2022-07-02 DIAGNOSIS — M48.00: ICD-10-CM

## 2022-07-02 LAB
ALBUMIN SERPL-MCNC: 3.5 G/DL (ref 3.5–5)
ALP SERPL-CCNC: 94 U/L (ref 38–126)
ALT SERPL-CCNC: 11 U/L (ref 4–49)
ANION GAP SERPL CALC-SCNC: 8 MMOL/L
APTT BLD: 25.5 SEC (ref 22–30)
AST SERPL-CCNC: 17 U/L (ref 17–59)
BASOPHILS # BLD AUTO: 0.1 K/UL (ref 0–0.2)
BASOPHILS NFR BLD AUTO: 1 %
BUN SERPL-SCNC: 19 MG/DL (ref 9–20)
CALCIUM SPEC-MCNC: 8.3 MG/DL (ref 8.4–10.2)
CHLORIDE SERPL-SCNC: 99 MMOL/L (ref 98–107)
CO2 SERPL-SCNC: 26 MMOL/L (ref 22–30)
EOSINOPHIL # BLD AUTO: 0.1 K/UL (ref 0–0.7)
EOSINOPHIL NFR BLD AUTO: 1 %
ERYTHROCYTE [DISTWIDTH] IN BLOOD BY AUTOMATED COUNT: 4.33 M/UL (ref 4.3–5.9)
ERYTHROCYTE [DISTWIDTH] IN BLOOD: 17 % (ref 11.5–15.5)
GLUCOSE SERPL-MCNC: 105 MG/DL (ref 74–99)
HCT VFR BLD AUTO: 40.6 % (ref 39–53)
HGB BLD-MCNC: 13.3 GM/DL (ref 13–17.5)
HYALINE CASTS UR QL AUTO: 1 /LPF (ref 0–2)
INR PPP: 1 (ref ?–1.2)
LYMPHOCYTES # SPEC AUTO: 1.1 K/UL (ref 1–4.8)
LYMPHOCYTES NFR SPEC AUTO: 8 %
MAGNESIUM SPEC-SCNC: 1.7 MG/DL (ref 1.6–2.3)
MCH RBC QN AUTO: 30.7 PG (ref 25–35)
MCHC RBC AUTO-ENTMCNC: 32.7 G/DL (ref 31–37)
MCV RBC AUTO: 93.7 FL (ref 80–100)
MONOCYTES # BLD AUTO: 0.9 K/UL (ref 0–1)
MONOCYTES NFR BLD AUTO: 7 %
NEUTROPHILS # BLD AUTO: 10.6 K/UL (ref 1.3–7.7)
NEUTROPHILS NFR BLD AUTO: 81 %
PH UR: 5 [PH] (ref 5–8)
PLATELET # BLD AUTO: 304 K/UL (ref 150–450)
POTASSIUM SERPL-SCNC: 4.3 MMOL/L (ref 3.5–5.1)
PROT SERPL-MCNC: 6.5 G/DL (ref 6.3–8.2)
PT BLD: 10.9 SEC (ref 9–12)
RBC UR QL: 1 /HPF (ref 0–5)
SODIUM SERPL-SCNC: 133 MMOL/L (ref 137–145)
SP GR UR: 1.01 (ref 1–1.03)
UROBILINOGEN UR QL STRIP: <2 MG/DL (ref ?–2)
WBC # BLD AUTO: 13.1 K/UL (ref 3.8–10.6)
WBC # UR AUTO: 98 /HPF (ref 0–5)

## 2022-07-02 PROCEDURE — 96374 THER/PROPH/DIAG INJ IV PUSH: CPT

## 2022-07-02 PROCEDURE — 85730 THROMBOPLASTIN TIME PARTIAL: CPT

## 2022-07-02 PROCEDURE — 84145 PROCALCITONIN (PCT): CPT

## 2022-07-02 PROCEDURE — 81001 URINALYSIS AUTO W/SCOPE: CPT

## 2022-07-02 PROCEDURE — 94640 AIRWAY INHALATION TREATMENT: CPT

## 2022-07-02 PROCEDURE — 85025 COMPLETE CBC W/AUTO DIFF WBC: CPT

## 2022-07-02 PROCEDURE — 85610 PROTHROMBIN TIME: CPT

## 2022-07-02 PROCEDURE — 99291 CRITICAL CARE FIRST HOUR: CPT

## 2022-07-02 PROCEDURE — 96365 THER/PROPH/DIAG IV INF INIT: CPT

## 2022-07-02 PROCEDURE — 83605 ASSAY OF LACTIC ACID: CPT

## 2022-07-02 PROCEDURE — 96375 TX/PRO/DX INJ NEW DRUG ADDON: CPT

## 2022-07-02 PROCEDURE — 83880 ASSAY OF NATRIURETIC PEPTIDE: CPT

## 2022-07-02 PROCEDURE — 87086 URINE CULTURE/COLONY COUNT: CPT

## 2022-07-02 PROCEDURE — 87635 SARS-COV-2 COVID-19 AMP PRB: CPT

## 2022-07-02 PROCEDURE — 87040 BLOOD CULTURE FOR BACTERIA: CPT

## 2022-07-02 PROCEDURE — 80053 COMPREHEN METABOLIC PANEL: CPT

## 2022-07-02 PROCEDURE — 96366 THER/PROPH/DIAG IV INF ADDON: CPT

## 2022-07-02 PROCEDURE — 83735 ASSAY OF MAGNESIUM: CPT

## 2022-07-02 PROCEDURE — 36415 COLL VENOUS BLD VENIPUNCTURE: CPT

## 2022-07-02 PROCEDURE — 96367 TX/PROPH/DG ADDL SEQ IV INF: CPT

## 2022-07-02 PROCEDURE — 87502 INFLUENZA DNA AMP PROBE: CPT

## 2022-07-02 PROCEDURE — 84484 ASSAY OF TROPONIN QUANT: CPT

## 2022-07-02 PROCEDURE — 71046 X-RAY EXAM CHEST 2 VIEWS: CPT

## 2022-07-02 RX ADMIN — IPRATROPIUM BROMIDE AND ALBUTEROL SULFATE PRN ML: .5; 3 SOLUTION RESPIRATORY (INHALATION) at 20:49

## 2022-07-02 RX ADMIN — IPRATROPIUM BROMIDE AND ALBUTEROL SULFATE PRN ML: .5; 3 SOLUTION RESPIRATORY (INHALATION) at 14:55

## 2022-07-02 RX ADMIN — FUROSEMIDE SCH MG: 10 INJECTION, SOLUTION INTRAMUSCULAR; INTRAVENOUS at 20:40

## 2022-07-02 RX ADMIN — AZITHROMYCIN MONOHYDRATE SCH MLS/HR: 500 INJECTION, POWDER, LYOPHILIZED, FOR SOLUTION INTRAVENOUS at 17:58

## 2022-07-02 RX ADMIN — METHYLPREDNISOLONE SODIUM SUCCINATE SCH MG: 125 INJECTION, POWDER, FOR SOLUTION INTRAMUSCULAR; INTRAVENOUS at 17:58

## 2022-07-02 SDOH — HEALTH STABILITY - PHYSICAL HEALTH: OCCUPATIONAL EXPOSURE TO TOXIC AGENTS IN AGRICULTURE: Z57.4

## 2022-07-02 NOTE — XR
EXAMINATION TYPE: XR chest 2V

 

DATE OF EXAM: 7/2/2022 12:40 PM

 

COMPARISON: Chest radiographs from 12/3/2021

 

TECHNIQUE: XR chest 2V Frontal and lateral views of the chest.

 

CLINICAL INDICATION:Male, 72 years old with history of difficulty breathing; 

 

FINDINGS: 

Lungs/Pleura: Redemonstration of scattered . There is flattening of the diaphragm with increased luce
ncy of the lung apices. There is no evidence of pleural effusion, focal consolidation, or pneumothora
x.  

Pulmonary vascularity: Pulmonary vascular congestion suggested superimposed on COPD changes..

Heart/mediastinum: Cardiomediastinal silhouette is enlarged and stable.

Musculoskeletal: No acute osseous pathology.

 

IMPRESSION: 

Findings suspicious for combination of COPD with superimposed pulmonary vascular congestion. Correlat
e with serum BNP.

## 2022-07-02 NOTE — ED
General Adult HPI





- General


Chief complaint: Shortness of Breath


Stated complaint: possible pneumonia


Time Seen by Provider: 22 10:40


Source: patient, family, RN notes reviewed, old records reviewed


Mode of arrival: wheelchair


Limitations: no limitations





- History of Present Illness


Initial comments: 





This is a 72-year-old male who presents to the emergency department complaining 

of shortness of breath.  Patient has past medical history significant for COPD 

and pulmonary fibrosis according to the wife.  Patient states he gets pneumonia 

about once a year.  Patient states over the last day or so has difficulty 

breathing is gotten worse.  Patient states he is coughing more than normal.  

Wife states this morning had a temperature 100.0 and pulse ox is 86 on 3 L which

is the normal amount of oxygen for him.  Patient denies chest pain or palpit

ations.  Patient denies any abdominal pain patient denies nausea vomiting 

diarrhea.  Patient denies any lightheadedness or dizziness.





- Related Data


                                Home Medications











 Medication  Instructions  Recorded  Confirmed


 


Metoprolol Succinate [Toprol XL] 25 mg PO DAILY 20


 


Sertraline [Zoloft] 150 mg PO DAILY 20


 


Amitriptyline HCl [Elavil] 10 mg PO HS 21


 


Celecoxib [CeleBREX] 200 mg PO DAILY 21


 


Ascorbic Acid [Vitamin C] 1,000 mg PO DAILY 04/10/21 12/03/21


 


Ipratropium-Albuterol Nebulize 3 ml INHALATION RT-QID 04/10/21 12/03/21





[Duoneb 0.5 mg-3 mg/3 ml Soln]   


 


Budesonide [Pulmicort] 0.5 mg INHALATION RT-BID 21


 


Fluticasone Nasal Spray [Flonase 2 spr EA NOSTRIL DAILY 21





Nasal Olar]   


 


Acetaminophen [Tylenol] 650 mg PO Q6H PRN 21


 


Atorvastatin [Lipitor] 40 mg PO HS 21


 


Benzonatate [Tessalon Perles] 200 mg PO TID PRN 21


 


Calcium Carbonate 1,000 mg PO Q8H PRN 21


 


Multivitamins, Thera [Multivitamin 1 tab PO DAILY 21





(formulary)]   


 


Omeprazole 20 mg PO DAILY 21








                                  Previous Rx's











 Medication  Instructions  Recorded


 


Clopidogrel [Plavix] 75 mg PO DAILY #14 tab 21


 


Furosemide [Lasix] 40 mg PO DAILY #28 tab 11/10/21


 


Thiamine [Vitamin B-1] 100 mg PO BID-W/MEALS  tab 11/10/21


 


HYDROcodone/APAP 10-325MG [Norco 1 tab PO Q8HR PRN 3 Days #9 tab 21





]  


 


Levofloxacin [Levaquin] 500 mg PO DAILY #5 tab 21


 


predniSONE 0 mg PO AS DIRECTED #30 tab 21











                                    Allergies











Allergy/AdvReac Type Severity Reaction Status Date / Time


 


amoxicillin AdvReac  Nausea & Verified 22 10:44





   Vomiting  














Review of Systems


ROS Statement: 


Those systems with pertinent positive or pertinent negative responses have been 

documented in the HPI.





ROS Other: All systems not noted in ROS Statement are negative.





Past Medical History


Past Medical History: Heart Failure, COPD, Hyperlipidemia, Osteoarthritis (OA), 

Pneumonia, Rheumatoid Arthritis (RA), Sleep Apnea/CPAP/BIPAP


Additional Past Medical History / Comment(s): COPD, chronic hypoxic respiratory 

failure previous history of pneumoperitoneum related to a perforated duodenal 

ulcer, repair of an umbilical hernia, chronic back pain, back stenosis.


History of Any Multi-Drug Resistant Organisms: None Reported


Past Surgical History: Orthopedic Surgery


Additional Past Surgical History / Comment(s): colonoscopy, repair of a 

perforated duodenal ulcer and repair of an incarcerated umbilical hernia


Past Anesthesia/Blood Transfusion Reactions: No Reported Reaction


Past Psychological History: Anxiety, Depression


Smoking Status: Former smoker


Past Alcohol Use History: Daily


Past Drug Use History: None Reported





- Past Family History


  ** Father


Family Medical History: Cancer


Additional Family Medical History / Comment(s): Father  of leukemia.





  ** Mother


Family Medical History: Cancer, CVA/TIA, Dementia, Diabetes Mellitus


Additional Family Medical History / Comment(s): Mother is 88yrs old with history

 of dementia and colon cancer.





  ** Sister(s)


Additional Family Medical History / Comment(s): Patient 3 sons with no major 

medical problems.





General Exam





- General Exam Comments


Initial Comments: 





GENERAL:


Patient is well-developed and well-nourished.  Patient is nontoxic and well-hy

drated and is in mild distress.





ENT:


Neck is soft and supple.  No significant lymphadenopathy is noted.  Oropharynx 

is clear.  Moist mucous membranes.  Neck has full range of motion without 

eliciting any pain. 





EYES:


The sclera were anicteric and conjunctiva were pink and moist.  Extraocular 

movements were intact and pupils were equal round and reactive to light.  

Eyelids were unremarkable.





PULMONARY:


Patient has crackles bilateral bases





CARDIOVASCULAR:


There is a regular rate and rhythm without any murmurs gallops or rubs. 





ABDOMEN:


Soft and nontender with normal bowel sounds.  





SKIN:


Skin is clear with no lesions or rashes and otherwise unremarkable.





NEUROLOGIC:


Patient is alert and oriented x3.  Cranial nerves II through XII are grossly 

intact.  Motor and sensory are also intact.  Normal speech, volume and content. 

 Symmetrical smile.  





MUSCULOSKELETAL:


Normal extremities with adequate strength and full range of motion.  





LYMPHATICS:


No significant lymphadenopathy is noted





PSYCHIATRIC:


Normal psychiatric evaluation. 


Limitations: no limitations





Course


                                   Vital Signs











  22





  10:40 10:47 11:23


 


Temperature 97.8 F  


 


Pulse Rate 108 H  102 H


 


Respiratory 20 20 18





Rate   


 


Blood Pressure 141/96  


 


O2 Sat by Pulse 81 L  





Oximetry   














  22





  11:38


 


Temperature 


 


Pulse Rate 102 H


 


Respiratory 20





Rate 


 


Blood Pressure 


 


O2 Sat by Pulse 





Oximetry 














Medical Decision Making





- Medical Decision Making





Chest x-ray signs of congestion and COPD.





Patient received antibiotics because he had low-grade fever 100.0.





Patient received Lasix because of the congestion and the elevated BNP.  Patient 

also received breathing treatments and steroids for the COPD.





I spoke with Dr. Bazan she agreed to admit the patient admitted the patient 

wrote admitting orders.





- Lab Data


Result diagrams: 


                                 22 11:18





                                 22 11:18


                                   Lab Results











  22 Range/Units





  11:18 11:18 11:18 


 


WBC  13.1 H    (3.8-10.6)  k/uL


 


RBC  4.33    (4.30-5.90)  m/uL


 


Hgb  13.3    (13.0-17.5)  gm/dL


 


Hct  40.6    (39.0-53.0)  %


 


MCV  93.7    (80.0-100.0)  fL


 


MCH  30.7    (25.0-35.0)  pg


 


MCHC  32.7    (31.0-37.0)  g/dL


 


RDW  17.0 H    (11.5-15.5)  %


 


Plt Count  304    (150-450)  k/uL


 


MPV  7.5    


 


Neutrophils %  81    %


 


Lymphocytes %  8    %


 


Monocytes %  7    %


 


Eosinophils %  1    %


 


Basophils %  1    %


 


Neutrophils #  10.6 H    (1.3-7.7)  k/uL


 


Lymphocytes #  1.1    (1.0-4.8)  k/uL


 


Monocytes #  0.9    (0-1.0)  k/uL


 


Eosinophils #  0.1    (0-0.7)  k/uL


 


Basophils #  0.1    (0-0.2)  k/uL


 


Anisocytosis  Slight    


 


PT   10.9   (9.0-12.0)  sec


 


INR   1.0   (<1.2)  


 


APTT   25.5   (22.0-30.0)  sec


 


Sodium    133 L  (137-145)  mmol/L


 


Potassium    4.3  (3.5-5.1)  mmol/L


 


Chloride    99  ()  mmol/L


 


Carbon Dioxide    26  (22-30)  mmol/L


 


Anion Gap    8  mmol/L


 


BUN    19  (9-20)  mg/dL


 


Creatinine    0.79  (0.66-1.25)  mg/dL


 


Est GFR (CKD-EPI)AfAm    >90  (>60 ml/min/1.73 sqM)  


 


Est GFR (CKD-EPI)NonAf    >90  (>60 ml/min/1.73 sqM)  


 


Glucose    105 H  (74-99)  mg/dL


 


Plasma Lactic Acid José Miguel     (0.7-2.0)  mmol/L


 


Calcium    8.3 L  (8.4-10.2)  mg/dL


 


Magnesium    1.7  (1.6-2.3)  mg/dL


 


Total Bilirubin    0.8  (0.2-1.3)  mg/dL


 


AST    17  (17-59)  U/L


 


ALT    11  (4-49)  U/L


 


Alkaline Phosphatase    94  ()  U/L


 


Troponin I     (0.000-0.034)  ng/mL


 


NT-Pro-B Natriuret Pep     pg/mL


 


Total Protein    6.5  (6.3-8.2)  g/dL


 


Albumin    3.5  (3.5-5.0)  g/dL


 


Coronavirus (PCR)     (Not Detectd)  


 


Influenza Type A RNA     (Not Detectd)  


 


Influenza Type B (PCR)     (Not Detectd)  














  22 Range/Units





  11:18 11:18 11:18 


 


WBC     (3.8-10.6)  k/uL


 


RBC     (4.30-5.90)  m/uL


 


Hgb     (13.0-17.5)  gm/dL


 


Hct     (39.0-53.0)  %


 


MCV     (80.0-100.0)  fL


 


MCH     (25.0-35.0)  pg


 


MCHC     (31.0-37.0)  g/dL


 


RDW     (11.5-15.5)  %


 


Plt Count     (150-450)  k/uL


 


MPV     


 


Neutrophils %     %


 


Lymphocytes %     %


 


Monocytes %     %


 


Eosinophils %     %


 


Basophils %     %


 


Neutrophils #     (1.3-7.7)  k/uL


 


Lymphocytes #     (1.0-4.8)  k/uL


 


Monocytes #     (0-1.0)  k/uL


 


Eosinophils #     (0-0.7)  k/uL


 


Basophils #     (0-0.2)  k/uL


 


Anisocytosis     


 


PT     (9.0-12.0)  sec


 


INR     (<1.2)  


 


APTT     (22.0-30.0)  sec


 


Sodium     (137-145)  mmol/L


 


Potassium     (3.5-5.1)  mmol/L


 


Chloride     ()  mmol/L


 


Carbon Dioxide     (22-30)  mmol/L


 


Anion Gap     mmol/L


 


BUN     (9-20)  mg/dL


 


Creatinine     (0.66-1.25)  mg/dL


 


Est GFR (CKD-EPI)AfAm     (>60 ml/min/1.73 sqM)  


 


Est GFR (CKD-EPI)NonAf     (>60 ml/min/1.73 sqM)  


 


Glucose     (74-99)  mg/dL


 


Plasma Lactic Acid José Miguel  0.9    (0.7-2.0)  mmol/L


 


Calcium     (8.4-10.2)  mg/dL


 


Magnesium     (1.6-2.3)  mg/dL


 


Total Bilirubin     (0.2-1.3)  mg/dL


 


AST     (17-59)  U/L


 


ALT     (4-49)  U/L


 


Alkaline Phosphatase     ()  U/L


 


Troponin I   0.013   (0.000-0.034)  ng/mL


 


NT-Pro-B Natriuret Pep     pg/mL


 


Total Protein     (6.3-8.2)  g/dL


 


Albumin     (3.5-5.0)  g/dL


 


Coronavirus (PCR)     (Not Detectd)  


 


Influenza Type A RNA    Not Detected  (Not Detectd)  


 


Influenza Type B (PCR)    Not Detected  (Not Detectd)  














  22 Range/Units





  11:18 11:18 


 


WBC    (3.8-10.6)  k/uL


 


RBC    (4.30-5.90)  m/uL


 


Hgb    (13.0-17.5)  gm/dL


 


Hct    (39.0-53.0)  %


 


MCV    (80.0-100.0)  fL


 


MCH    (25.0-35.0)  pg


 


MCHC    (31.0-37.0)  g/dL


 


RDW    (11.5-15.5)  %


 


Plt Count    (150-450)  k/uL


 


MPV    


 


Neutrophils %    %


 


Lymphocytes %    %


 


Monocytes %    %


 


Eosinophils %    %


 


Basophils %    %


 


Neutrophils #    (1.3-7.7)  k/uL


 


Lymphocytes #    (1.0-4.8)  k/uL


 


Monocytes #    (0-1.0)  k/uL


 


Eosinophils #    (0-0.7)  k/uL


 


Basophils #    (0-0.2)  k/uL


 


Anisocytosis    


 


PT    (9.0-12.0)  sec


 


INR    (<1.2)  


 


APTT    (22.0-30.0)  sec


 


Sodium    (137-145)  mmol/L


 


Potassium    (3.5-5.1)  mmol/L


 


Chloride    ()  mmol/L


 


Carbon Dioxide    (22-30)  mmol/L


 


Anion Gap    mmol/L


 


BUN    (9-20)  mg/dL


 


Creatinine    (0.66-1.25)  mg/dL


 


Est GFR (CKD-EPI)AfAm    (>60 ml/min/1.73 sqM)  


 


Est GFR (CKD-EPI)NonAf    (>60 ml/min/1.73 sqM)  


 


Glucose    (74-99)  mg/dL


 


Plasma Lactic Acid José Miguel    (0.7-2.0)  mmol/L


 


Calcium    (8.4-10.2)  mg/dL


 


Magnesium    (1.6-2.3)  mg/dL


 


Total Bilirubin    (0.2-1.3)  mg/dL


 


AST    (17-59)  U/L


 


ALT    (4-49)  U/L


 


Alkaline Phosphatase    ()  U/L


 


Troponin I    (0.000-0.034)  ng/mL


 


NT-Pro-B Natriuret Pep   4260  pg/mL


 


Total Protein    (6.3-8.2)  g/dL


 


Albumin    (3.5-5.0)  g/dL


 


Coronavirus (PCR)  Not Detected   (Not Detectd)  


 


Influenza Type A RNA    (Not Detectd)  


 


Influenza Type B (PCR)    (Not Detectd)  














Critical Care Time


Critical Care Time: Yes


Total Critical Care Time: 35





Disposition


Clinical Impression: 


 COPD exacerbation, Pulmonary edema, Bronchitis





Disposition: ADMITTED AS IP TO THIS HOSP


Referrals: 


Amalia Bazan MD [Primary Care Provider] - 1-2 days


Time of Disposition: 14:36

## 2022-07-02 NOTE — P.HPIM
History of Present Illness


H&P Date: 22





This is a 73-year-old pleasant gentleman, well-known to my practice, as well as 

Dr. Pickard.  Patient has advanced ceroid dependent COPD, chronic hypoxemic 

respiratory failure, on 40% to cannula at home, lives with the wife, who has 

called me today as the patient is having some worsening shortness of breath, no 

edema, no hemoptysis, however there is some brown sputum expectoration, and 

fever.  100.8.  Catapres was not done at home,





 Other significant history includes prior perforated duodenal ulcer, with 

exploring up to pay off perforated blood blister, incarcerated umbilical hernia,

prior FEV1 of 46%, prior echocardiogram  215-60% with concentric LVH, 

no AAS, moderate pulmonary hypertension, mild MR and mild TR.  Chest x-ray, 

findings of COPD with superimposed pulmonary vascular congestion, no acute 

infiltrates, there is flattening of the diaphragm, with increased lucency of 

lung apices WC 13.1, INR 1.0, sodium 133, creatinine 0.79, glucose 105 no pro-

calcitonin level obtained, NP proBNP is 4260, and a virus PCR negative influenza

A and B is negative admitted for COPD CHF exacerbation, with febrile illness, no

urinalysis obtained, we'll check the urine for pyuria, blood cultures, consult. 

Dr. Pickard pulmonary medicine IV Solu-Medrol, IV Lasix, empiric antibiotics, 

Rocephin and Zithromax








Review of Systems


Constitutional: Reports as per HPI, Reports anorexia, Reports chronic pain, 

Reports fatigue, Denies fever, Denies lethargy, Denies malaise new fever


Ears, nose, mouth and throat: Reports as per HPI, Denies epistaxis, Denies mouth

pain, Denies nasal congestion, Denies odynophagia, Denies sinus pain, Denies 

swelling in mouth, Denies swelling in throat, Denies voice changes


Cardiovascular: Reports as per HPI, Reports decreased exercise tolerance, 

Reports dyspnea on exertion, Reports orthopnea, Reports shortness of breath, 

Denies chest pain, Denies edema, Denies leg edema, Denies palpitations, Denies 

rapid heart beat


Respiratory: Reports as per HPI, Reports cough, Reports cough with sputum, 

Reports dyspnea, Reports home oxygen, Reports respiratory infections, Reports 

wheezing 4 L O2 at home


Gastrointestinal: Reports as per HPI, Denies abdominal pain, Denies belching, 

Denies bloating, Denies BRBPR, Denies change in bowel habits, Denies coffee 

ground emesis, Denies constipation, Denies diarrhea, Denies dyspepsia, Denies 

early satiety, Denies excessive gas, Denies heartburn, Denies hematemesis, 

Denies hematochezia, Denies indigestion, Denies jaundice, Denies lactose 

intolerance, Denies loss of appetite, Denies melena, Denies nausea, Denies 

vomiting


Genitourinary: Reports as per HPI, Denies urinary hesitancy, Denies urinary 

retention


Musculoskeletal: Reports as per HPI, Denies arm numbness/tingling, Denies 

atrophy, Denies fractures, Denies frequent falls, Denies gait dysfunction, 

Denies hot joints, Denies leg numbness/tingling, Denies limitation of motion, 

Denies loss of height, Denies low back pain, Denies morning stiffness, Denies 

muscle cramps, Denies muscle weakness, Denies myalgias, Denies neck pain, Denies

neck stiffness, Denies prior amputations, Denies redness of joints, Denies 

shooting arm pain, Denies shooting leg pain


Integumentary: Reports as per HPI


Neurological: Reports as per HPI


Psychiatric: Reports as per HPI, Reports anxiety


Endocrine: Reports as per HPI, Denies cold intolerance, Denies deepening of the 

voice, Denies excessive sweating, Denies excessive thirst, Denies fatigue, 

Denies flushing, Denies heat intolerance, Denies high blood sugars, Denies 

increase in ring/shoe/hat size, Denies low blood sugars, Denies nocturia, Denies

palpitations, Denies polydipsia, Denies polyphagia, Denies polyuria, Denies 

proptosis, Denies recent glucocorticoid use, Denies thyroid mass, Denies weight 

change


Hematologic/Lymphatic: Reports as per HPI


Allergic/Immunologic: Reports as per HPI





Past Medical History


Past Medical History: Heart Failure, COPD, Hyperlipidemia, Osteoarthritis (OA), 

Pneumonia, Rheumatoid Arthritis (RA), Sleep Apnea/CPAP/BIPAP


Additional Past Medical History / Comment(s): COPD, chronic hypoxic respiratory 

failure previous history of pneumoperitoneum related to a perforated duodenal 

ulcer, repair of an umbilical hernia, chronic back pain, back stenosis.


History of Any Multi-Drug Resistant Organisms: None Reported


Past Surgical History: Orthopedic Surgery


Additional Past Surgical History / Comment(s): colonoscopy, repair of a 

perforated duodenal ulcer and repair of an incarcerated umbilical hernia


Past Anesthesia/Blood Transfusion Reactions: No Reported Reaction


Past Psychological History: Anxiety, Depression


Additional Psychological History / Comment(s): Pt resides with his spouse of 38 

yrs.  He has home oxygen which he wears at 3-3.5L/NC.  He has a nebulizer.


Smoking Status: Former smoker


Past Alcohol Use History: Daily


Additional Past Alcohol Use History / Comment(s): Patient smoked 2 packs per day

for 40+ years.  He quit 6 years ago.  He denies any illicit drug use or 

marijuana use.  He drinks 2 beers per day but none since previous admission and 

May.  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 

involved


Past Drug Use History: None Reported





- Past Family History


  ** Father


Family Medical History: Cancer


Additional Family Medical History / Comment(s): Father  of leukemia.





  ** Mother


Family Medical History: Cancer, CVA/TIA, Dementia, Diabetes Mellitus


Additional Family Medical History / Comment(s): Mother is 88yrs old with history

of dementia and colon cancer.





  ** Sister(s)


Additional Family Medical History / Comment(s): Patient 3 sons with no major 

medical problems.











Thrombosis Risk Factor Assmnt





- Choose All That Apply


Any of the Below Risk Factors Present?: Yes


Each Factor Represents 1 point: Abnormal pulmonary function (COPD), Medical pt 

on bed rest, Serious lung disease incl. pneumonia (< 1month)


Other Risk Factors: Yes


Each Risk Factor Represents 2 Points: Age 61-74 years, Patient confined to bed


Thrombosis Risk Factor Assessment Total Risk Factor Score: 7


Thrombosis Risk Factor Assessment Level: High Risk





Assessment and Plan


Plan: 





1.  Acute on chronic hypoxic respiratory failure secondary to COPD exacerbation 

with  extensive pulmonary fibrosis,   associated interstitial lung disease.  

Continue DuoNeb treatments every 4 hours, ,  Pulmicort 1 mg twice daily, was on 

pathology, Mucinex 1200 mg twice daily,   Zithromax and Rocephin IV Solu-Medrol 

60 mg every 6 hours, sputum culture  pro-calcitonin  





2.  End-stage COPD, chronic hypoxemic respiratory failure acute on chronic 

present prior to admission on chronic prednisone and frequent hospitalization, 

secondary to COPD, containing 4 L of O2 at home, decompensated with current 

exacerbation, requiring IV steroids, IV antibiotic, sputum culture, known to the

pulmonary service, Dr. Pickard to see for pulmonary consult





3.  Osteoporotic thoracic fractures compression and chronic pain, on chronic 

opiates,





4.  History of duodenal ulceration and GI bleed, with surgical repair, and 

repair of incarcerated umbilical hernia, with subsequent development of 

intraperitoneal abscess requiring incision and drainage, and prolonged 

antibiotic course chronically on PPI, we will discontinue Plavix at this time, 

no history of heart disease or CVA, no history of stents at risk for bleed 

specially with ongoing chronic prednisone use





5.  Chronic prednisone, with cushingoid features, patient is unable to be 

without prednisone without decompensating pulmonary status,,


 


6.  Moderate pulmonary hypertension.





7.  Hypertension.  Continue Toprol-XL 25 mg daily.





8.  Rheumatoid arthritis, with polyarthritis, and chronic joint abnormalities 

stable.  On chronic opiates not on disease modifying agents at this time





9.  Hyperlipidemia.  Continue atorvastatin 40 mg at bedtime.





10.  Obstructive sleep apnea unable to tolerate CPAP.





11.  Chronic back pain and generalized osteoarthritis.  Continue Norco as 

needed, gabapentin 300 mg at bedtime





12.  History of diastolic CHF, ejection fraction is normal, check for NP proBNP


 


13.  Generalized anxiety disorder and recurrent depression.  Continue Zoloft 150

mg daily, Elavil 10 mg at bedtime. 





14.  DVT prophylaxis.  Heparin subcu.





15.  GI prophylaxis.  Continue Protonix daily.





16.  Insomnia.  Continue Elavil 10 mg at bedtime.





17.  Coronavirus PCR is   negative





DISCHARGE PLAN


Home with [facility].  Consult PT and OT.





                       Laboratory Results - Last 24 Hours











  22





  11:18 11:18 11:18


 


WBC  13.1 H  


 


RBC  4.33  


 


Hgb  13.3  


 


Hct  40.6  


 


MCV  93.7  


 


MCH  30.7  


 


MCHC  32.7  


 


RDW  17.0 H  


 


Plt Count  304  


 


MPV  7.5  


 


Neutrophils %  81  


 


Lymphocytes %  8  


 


Monocytes %  7  


 


Eosinophils %  1  


 


Basophils %  1  


 


Neutrophils #  10.6 H  


 


Lymphocytes #  1.1  


 


Monocytes #  0.9  


 


Eosinophils #  0.1  


 


Basophils #  0.1  


 


Anisocytosis  Slight  


 


PT   10.9 


 


INR   1.0 


 


APTT   25.5 


 


Sodium    133 L


 


Potassium    4.3


 


Chloride    99


 


Carbon Dioxide    26


 


Anion Gap    8


 


BUN    19


 


Creatinine    0.79


 


Est GFR (CKD-EPI)AfAm    >90


 


Est GFR (CKD-EPI)NonAf    >90


 


Glucose    105 H


 


Plasma Lactic Acid José Miguel   


 


Calcium    8.3 L


 


Magnesium    1.7


 


Total Bilirubin    0.8


 


AST    17


 


ALT    11


 


Alkaline Phosphatase    94


 


Troponin I   


 


NT-Pro-B Natriuret Pep   


 


Total Protein    6.5


 


Albumin    3.5


 


Coronavirus (PCR)   


 


Influenza Type A RNA   


 


Influenza Type B (PCR)   














  22





  11:18 11:18 11:18


 


WBC   


 


RBC   


 


Hgb   


 


Hct   


 


MCV   


 


MCH   


 


MCHC   


 


RDW   


 


Plt Count   


 


MPV   


 


Neutrophils %   


 


Lymphocytes %   


 


Monocytes %   


 


Eosinophils %   


 


Basophils %   


 


Neutrophils #   


 


Lymphocytes #   


 


Monocytes #   


 


Eosinophils #   


 


Basophils #   


 


Anisocytosis   


 


PT   


 


INR   


 


APTT   


 


Sodium   


 


Potassium   


 


Chloride   


 


Carbon Dioxide   


 


Anion Gap   


 


BUN   


 


Creatinine   


 


Est GFR (CKD-EPI)AfAm   


 


Est GFR (CKD-EPI)NonAf   


 


Glucose   


 


Plasma Lactic Acid José Miguel  0.9  


 


Calcium   


 


Magnesium   


 


Total Bilirubin   


 


AST   


 


ALT   


 


Alkaline Phosphatase   


 


Troponin I   0.013 


 


NT-Pro-B Natriuret Pep   


 


Total Protein   


 


Albumin   


 


Coronavirus (PCR)   


 


Influenza Type A RNA    Not Detected


 


Influenza Type B (PCR)    Not Detected














  22





  11:18 11:18


 


WBC  


 


RBC  


 


Hgb  


 


Hct  


 


MCV  


 


MCH  


 


MCHC  


 


RDW  


 


Plt Count  


 


MPV  


 


Neutrophils %  


 


Lymphocytes %  


 


Monocytes %  


 


Eosinophils %  


 


Basophils %  


 


Neutrophils #  


 


Lymphocytes #  


 


Monocytes #  


 


Eosinophils #  


 


Basophils #  


 


Anisocytosis  


 


PT  


 


INR  


 


APTT  


 


Sodium  


 


Potassium  


 


Chloride  


 


Carbon Dioxide  


 


Anion Gap  


 


BUN  


 


Creatinine  


 


Est GFR (CKD-EPI)AfAm  


 


Est GFR (CKD-EPI)NonAf  


 


Glucose  


 


Plasma Lactic Acid José Miguel  


 


Calcium  


 


Magnesium  


 


Total Bilirubin  


 


AST  


 


ALT  


 


Alkaline Phosphatase  


 


Troponin I  


 


NT-Pro-B Natriuret Pep   4260


 


Total Protein  


 


Albumin  


 


Coronavirus (PCR)  Not Detected 


 


Influenza Type A RNA  


 


Influenza Type B (PCR)  





                                Home Medications











 Medication  Instructions  Recorded  Confirmed


 


Metoprolol Succinate [Toprol XL] 25 mg PO DAILY 20


 


Sertraline [Zoloft] 150 mg PO DAILY 20


 


Amitriptyline HCl [Elavil] 10 mg PO HS 21


 


Celecoxib [CeleBREX] 200 mg PO DAILY 21


 


Ascorbic Acid [Vitamin C] 1,000 mg PO DAILY 04/10/21 12/03/21


 


Ipratropium-Albuterol Nebulize 3 ml INHALATION RT-QID 04/10/21 12/03/21





[Duoneb 0.5 mg-3 mg/3 ml Soln]   


 


Budesonide [Pulmicort] 0.5 mg INHALATION RT-BID 21


 


Fluticasone Nasal Spray [Flonase 2 spr EA NOSTRIL DAILY 21





Nasal Coleman]   


 


Acetaminophen [Tylenol] 650 mg PO Q6H PRN 21


 


Calcium Carbonate 1,000 mg PO Q8H PRN 21


 


Multivitamins, Thera [Multivitamin 1 tab PO DAILY 21





(formulary)]   


 


Alendronate Sodium [Fosamax] 70 mg PO Q7D 22


 


Atorvastatin [Lipitor] 40 mg PO HS 22


 


Benzonatate [Tessalon Perles] 100 - 200 mg PO Q8H 22


 


Fluticasone/Umeclidin/Vilanter 1 puff INHALATION RT-DAILY 22





[Trelealex Ellipta 100-62.5-25]   


 


Furosemide [Lasix] 40 mg PO BID 22


 


Omeprazole 40 mg PO DAILY 22


 


predniSONE 2.5 mg PO DAILY 22








                                  Previous Rx's











 Medication  Instructions  Recorded


 


Clopidogrel [Plavix] 75 mg PO DAILY #14 tab 21


 


Thiamine [Vitamin B-1] 100 mg PO BID-W/MEALS  tab 11/10/21


 


HYDROcodone/APAP 10-325MG [Norco 1 tab PO Q8HR PRN 3 Days #9 tab 21





]  








                               Vital Signs - 24 hr











  22





  10:40 10:47 11:23


 


Temperature 97.8 F  


 


Pulse Rate 108 H  102 H


 


Pulse Rate [   





Pulse Oximetery   





]   


 


Respiratory 20 20 18





Rate   


 


Blood Pressure 141/96  


 


Blood Pressure   





[Left Arm]   


 


O2 Sat by Pulse 81 L  





Oximetry   














  22





  11:38 14:54 14:55


 


Temperature  98.2 F 


 


Pulse Rate 102 H 84 83


 


Pulse Rate [   





Pulse Oximetery   





]   


 


Respiratory 20 16 18





Rate   


 


Blood Pressure  92/53 


 


Blood Pressure   





[Left Arm]   


 


O2 Sat by Pulse  94 L 





Oximetry   














  22





  15:05 15:45


 


Temperature  97.7 F


 


Pulse Rate 81 


 


Pulse Rate [  91





Pulse Oximetery  





]  


 


Respiratory 18 19





Rate  


 


Blood Pressure  


 


Blood Pressure  94/61





[Left Arm]  


 


O2 Sat by Pulse  93 L





Oximetry  




















Past Medical History


Past Medical History: Heart Failure, COPD, Hyperlipidemia, Osteoarthritis (OA), 

Pneumonia, Rheumatoid Arthritis (RA), Sleep Apnea/CPAP/BIPAP


Additional Past Medical History / Comment(s): COPD, chronic hypoxic respiratory 

failure previous history of pneumoperitoneum related to a perforated duodenal 

ulcer, repair of an umbilical hernia, chronic back pain, back stenosis,


History of Any Multi-Drug Resistant Organisms: None Reported


Past Surgical History: Orthopedic Surgery


Additional Past Surgical History / Comment(s): colonoscopy, repair of a 

perforated duodenal ulcer and repair of an incarcerated umbilical hernia


Past Anesthesia/Blood Transfusion Reactions: No Reported Reaction


Past Psychological History: Anxiety, Depression


Additional Psychological History / Comment(s): Pt resides with his spouse of 38 

yrs.  He has home oxygen which he wears at 3-3.5L/NC.  He has a nebulizer.


Smoking Status: Former smoker


Past Alcohol Use History: Daily


Additional Past Alcohol Use History / Comment(s): Patient smoked 2 packs per day

for 40+ years.  He quit 6 years ago.  He denies any illicit drug use or marijuan

a use.  He drinks 2 beers per day but none since previous admission and May.  

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 

involved


Past Drug Use History: None Reported





- Past Family History


  ** Father


Family Medical History: Cancer


Additional Family Medical History / Comment(s): Father  of leukemia.





  ** Mother


Family Medical History: Cancer, CVA/TIA, Dementia, Diabetes Mellitus


Additional Family Medical History / Comment(s): Mother is 88yrs old with history

of dementia and colon cancer.





  ** Sister(s)


Additional Family Medical History / Comment(s): Patient 3 sons with no major 

medical problems.





Medications and Allergies


                                Home Medications











 Medication  Instructions  Recorded  Confirmed  Type


 


Metoprolol Succinate [Toprol XL] 25 mg PO DAILY 20 History


 


Sertraline [Zoloft] 150 mg PO DAILY 20 History


 


Amitriptyline HCl [Elavil] 10 mg PO HS 21 History


 


Celecoxib [CeleBREX] 200 mg PO DAILY 21 History


 


Ascorbic Acid [Vitamin C] 1,000 mg PO DAILY 04/10/21 07/02/22 History


 


Ipratropium-Albuterol Nebulize 3 ml INHALATION RT-QID 04/10/21 07/02/22 History





[Duoneb 0.5 mg-3 mg/3 ml Soln]    


 


Clopidogrel [Plavix] 75 mg PO DAILY #14 tab 21 Rx


 


Fluticasone Nasal Spray [Flonase 1 spr EA NOSTRIL BID 21 History





Nasal Spray]    


 


Thiamine [Vitamin B-1] 100 mg PO BID-W/MEALS  tab 11/10/21 07/02/22 Rx


 


Acetaminophen [Tylenol] 650 mg PO Q6H PRN 21 History


 


Calcium Carbonate 1,000 mg PO Q8H PRN 21 History


 


Multivitamins, Thera [Multivitamin 1 tab PO DAILY 21 History





(formulary)]    


 


HYDROcodone/APAP 10-325MG [Norco 1 tab PO Q8HR PRN 3 Days #9 tab 21 Rx





]    


 


Alendronate Sodium [Fosamax] 70 mg PO MO 22 History


 


Atorvastatin [Lipitor] 40 mg PO HS 22 History


 


Benzonatate [Tessalon Perles] 100 - 200 mg PO Q8H 22 History


 


Fluticasone/Umeclidin/Vilanter 1 puff INHALATION RT-DAILY 22 

History





[Trelegy Ellipta 100-62.5-25]    


 


Furosemide [Lasix] 40 mg PO DAILY 22 History


 


Omeprazole 40 mg PO DAILY 22 History


 


predniSONE 2.5 mg PO DAILY 22 History








                                    Allergies











Allergy/AdvReac Type Severity Reaction Status Date / Time


 


amoxicillin AdvReac  Nausea & Verified 22 16:09





   Vomiting  














Physical Exam


Vitals: 


                                   Vital Signs











  Temp Pulse Pulse Resp BP BP Pulse Ox


 


 22 15:45  97.7 F   91  19   94/61  93 L


 


 22 15:05   81   18   


 


 22 14:55   83   18   


 


 22 14:54  98.2 F  84   16  92/53   94 L


 


 22 11:38   102 H   20   


 


 22 11:23   102 H   18   


 


 22 10:47     20   


 


 22 10:40  97.8 F  108 H   20  141/96   81 L








                                Intake and Output











 22





 06:59 14:59 22:59


 


Other:   


 


  Weight  56.699 kg 














- Constitutional


General appearance: average body habitus, cooperative, no acute distress





- EENT


Eyes: EOMI, PERRLA, normal appearance





- Respiratory


Respiratory: bilateral: diminished, wheezing





- Cardiovascular


Rhythm: regular


Heart sounds: normal: S1, S2





- Gastrointestinal


General gastrointestinal: normal bowel sounds, soft





- Integumentary


Integumentary: decreased turgor, normal





- Neurologic


Neurologic: CNII-XII intact





- Musculoskeletal


Musculoskeletal: generalized weakness, strength equal bilaterally





- Psychiatric


Psychiatric: A&O x's 3, appropriate affect, intact judgment & insight





Results


CBC & Chem 7: 


                                 22 11:18





                                 22 11:18


Labs: 


                  Abnormal Lab Results - Last 24 Hours (Table)











  22 Range/Units





  11:18 11:18 


 


WBC  13.1 H   (3.8-10.6)  k/uL


 


RDW  17.0 H   (11.5-15.5)  %


 


Neutrophils #  10.6 H   (1.3-7.7)  k/uL


 


Sodium   133 L  (137-145)  mmol/L


 


Glucose   105 H  (74-99)  mg/dL


 


Calcium   8.3 L  (8.4-10.2)  mg/dL














Thrombosis Risk Factor Assmnt





- Choose All That Apply


Any of the Below Risk Factors Present?: Yes


Each Factor Represents 1 point: Abnormal pulmonary function (COPD), Serious lung

 disease incl. pneumonia (< 1month)


Other Risk Factors: Yes


Each Risk Factor Represents 2 Points: Age 61-74 years


Other congenital or acquired thrombophilia - If yes, enter type in comment: No


Thrombosis Risk Factor Assessment Total Risk Factor Score: 4


Thrombosis Risk Factor Assessment Level: Moderate Risk

## 2022-07-03 LAB
BASOPHILS # BLD AUTO: 0.03 X 10*3/UL (ref 0–0.1)
BASOPHILS NFR BLD AUTO: 0.3 %
EOSINOPHIL # BLD AUTO: 0 X 10*3/UL (ref 0.04–0.35)
EOSINOPHIL NFR BLD AUTO: 0 %
ERYTHROCYTE [DISTWIDTH] IN BLOOD BY AUTOMATED COUNT: 4 X 10*6/UL (ref 4.4–5.6)
ERYTHROCYTE [DISTWIDTH] IN BLOOD: 17.5 % (ref 11.5–14.5)
HCT VFR BLD AUTO: 36.3 % (ref 39.6–50)
HGB BLD-MCNC: 11.6 G/DL (ref 13–17)
IMM GRANULOCYTES BLD QL AUTO: 0.4 %
LYMPHOCYTES # SPEC AUTO: 0.74 X 10*3/UL (ref 0.9–5)
LYMPHOCYTES NFR SPEC AUTO: 6.5 %
MCH RBC QN AUTO: 29 PG (ref 27–32)
MCHC RBC AUTO-ENTMCNC: 32 G/DL (ref 32–37)
MCV RBC AUTO: 90.8 FL (ref 80–97)
MONOCYTES # BLD AUTO: 0.24 X 10*3/UL (ref 0.2–1)
MONOCYTES NFR BLD AUTO: 2.1 %
NEUTROPHILS # BLD AUTO: 10.29 X 10*3/UL (ref 1.8–7.7)
NEUTROPHILS NFR BLD AUTO: 90.7 %
NRBC BLD AUTO-RTO: 0 /100 WBCS (ref 0–0)
PLATELET # BLD AUTO: 304 X 10*3/UL (ref 140–440)
WBC # BLD AUTO: 11.35 X 10*3/UL (ref 4.5–10)

## 2022-07-03 RX ADMIN — FUROSEMIDE SCH MG: 10 INJECTION, SOLUTION INTRAMUSCULAR; INTRAVENOUS at 08:29

## 2022-07-03 RX ADMIN — SERTRALINE HYDROCHLORIDE SCH MG: 50 TABLET, FILM COATED ORAL at 11:29

## 2022-07-03 RX ADMIN — IPRATROPIUM BROMIDE AND ALBUTEROL SULFATE PRN ML: .5; 3 SOLUTION RESPIRATORY (INHALATION) at 15:36

## 2022-07-03 RX ADMIN — CLOPIDOGREL BISULFATE SCH MG: 75 TABLET ORAL at 11:29

## 2022-07-03 RX ADMIN — AZITHROMYCIN MONOHYDRATE SCH MLS/HR: 500 INJECTION, POWDER, LYOPHILIZED, FOR SOLUTION INTRAVENOUS at 17:01

## 2022-07-03 RX ADMIN — ATORVASTATIN CALCIUM SCH MG: 40 TABLET, FILM COATED ORAL at 20:28

## 2022-07-03 RX ADMIN — METHYLPREDNISOLONE SODIUM SUCCINATE SCH MG: 125 INJECTION, POWDER, FOR SOLUTION INTRAMUSCULAR; INTRAVENOUS at 18:04

## 2022-07-03 RX ADMIN — AMITRIPTYLINE HYDROCHLORIDE SCH MG: 10 TABLET, FILM COATED ORAL at 20:28

## 2022-07-03 RX ADMIN — Medication SCH MG: at 17:01

## 2022-07-03 RX ADMIN — METHYLPREDNISOLONE SODIUM SUCCINATE SCH MG: 125 INJECTION, POWDER, FOR SOLUTION INTRAMUSCULAR; INTRAVENOUS at 12:28

## 2022-07-03 RX ADMIN — METHYLPREDNISOLONE SODIUM SUCCINATE SCH MG: 125 INJECTION, POWDER, FOR SOLUTION INTRAMUSCULAR; INTRAVENOUS at 06:19

## 2022-07-03 RX ADMIN — METHYLPREDNISOLONE SODIUM SUCCINATE SCH MG: 125 INJECTION, POWDER, FOR SOLUTION INTRAMUSCULAR; INTRAVENOUS at 00:16

## 2022-07-03 RX ADMIN — METOPROLOL SUCCINATE SCH MG: 25 TABLET, EXTENDED RELEASE ORAL at 11:29

## 2022-07-03 RX ADMIN — FUROSEMIDE SCH MG: 10 INJECTION, SOLUTION INTRAMUSCULAR; INTRAVENOUS at 20:28

## 2022-07-03 NOTE — P.CNPUL
History of Present Illness


Consult date: 22


Reason for consult: dyspnea


History of present illness: 





71-year-old male patient was presented to the hospital or symptoms of worsening 

shortness of breath, over the past several days.  The patient has compromised 

lung including pulmonary fibrosis and the patient has been oxygen dependent.  

The patient also has a component of COPD.  The patient apparently had a 

temperature of 100.0.  Pulse ox is 86% on 3 L of oxygen by nasal cannula.  No 

nausea.  No vomiting.  The patient no chest pain.  He decided to come in to the 

emergency and the patient was found to have a white cell count of 15 with a 

normal coagulation profile, normal electrolytes, UA was abnormal with positive 

white cells and the proBNP level of 4260.  COVID 19 testing was negative.  

Influenza screen was negative.   The patient is currently on a combination of 

Rocephin and Zithromax.  The patient is also on IV Solu-Medrol.  The patient is 

being diuresis with Lasix 20 mg IV every 12 hours.    Symptoms started 

approximately a week ago and worse over the past few days.  According to the 

patient, he has history of COPD and pulmonary fibrosis.  Based on the previous 

pulmonary function test, the patient's FEV1 has been order of 46% of predicted 

and FVC has been order of 72% of predicted and this is based on a spirometer 

this was done 2017.  The patient has been steroid dependent for many years.  He 

has history of rheumatoid arthritis and has been on immunosuppressive therapy in

the past in the form of Humira injections.  He has not taken Humira for now.  

The patient has chronic hypoxic respiratory failure and typically is on 3-1/2 L 

of oxygen by nasal cannula.  He has been hospitalized in the past for septic 

shock and he has a previous history of duodenal ulcer perforation was repaired 

surgically.  He has obstructive sleep apnea and his nontolerant to CPAP therapy.








Review of Systems








Constitutional: Reports fatigue, Reports fever, Reports lethargy, Reports poor 

appetite, Reports weakness


Eyes: denies blurred vision, denies bulging eye, denies decreased vision


Ears: deny: decreased hearing, ear discharge, earache, tinnitus


Ears, nose, mouth and throat: Denies headache, Denies sore throat


Cardiovascular: Reports decreased exercise tolerance, Reports dyspnea on 

exertion, Reports edema, Reports shortness of breath


Respiratory: Reports dyspnea, Reports home oxygen, Reports wheezing, reports 

chronic shortness of breath and inugh.


Gastrointestinal: Reports abdominal pain, Reports change in bowel habits, 

Reports dyspepsia, Reports indigestion, Reports loss of appetite, Reports nausea


Genitourinary: Reports as per HPI


Musculoskeletal: Reports as per HPI


Musculoskeletal: bilateral: ankle swelling, absent: ankle pain, ankle stiffness


Integumentary: Denies pruritus, Denies rash


Neurological: Reports as per HPI, Reports weakness


Psychiatric: Reports as per HPI


Endocrine: Reports fatigue


Hematologic/Lymphatic: Reports as per HPI


Allergic/Immunologic: Reports as per





Past Medical History


Past Medical History: Heart Failure, COPD, Hyperlipidemia, Osteoarthritis (OA), 

Pneumonia, Rheumatoid Arthritis (RA), Sleep Apnea/CPAP/BIPAP


Additional Past Medical History / Comment(s): COPD, chronic hypoxic respiratory 

failure previous history of pneumoperitoneum related to a perforated duodenal 

ulcer, repair of an umbilical hernia, chronic back pain, back stenosis,


History of Any Multi-Drug Resistant Organisms: None Reported


Past Surgical History: Orthopedic Surgery


Additional Past Surgical History / Comment(s): colonoscopy, repair of a 

perforated duodenal ulcer and repair of an incarcerated umbilical hernia


Past Anesthesia/Blood Transfusion Reactions: No Reported Reaction


Past Psychological History: Anxiety, Depression


Additional Psychological History / Comment(s): Pt resides with his spouse of 38 

yrs.  He has home oxygen which he wears at 3-3.5L/NC.  He has a nebulizer.


Smoking Status: Former smoker


Past Alcohol Use History: Daily


Additional Past Alcohol Use History / Comment(s): Patient smoked 2 packs per day

for 40+ years.  He quit 6 years ago.  He denies any illicit drug use or 

marijuana use.  He drinks 2 beers per day but none since previous admission and 

May.  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 

involved


Past Drug Use History: None Reported





- Past Family History


  ** Father


Family Medical History: Cancer


Additional Family Medical History / Comment(s): Father  of leukemia.





  ** Mother


Family Medical History: Cancer, CVA/TIA, Dementia, Diabetes Mellitus


Additional Family Medical History / Comment(s): Mother is 88yrs old with history

of dementia and colon cancer.





  ** Sister(s)


Additional Family Medical History / Comment(s): Patient 3 sons with no major 

medical problems.





Medications and Allergies


                                Home Medications











 Medication  Instructions  Recorded  Confirmed  Type


 


Metoprolol Succinate [Toprol XL] 25 mg PO DAILY 20 History


 


Sertraline [Zoloft] 150 mg PO DAILY 20 History


 


Amitriptyline HCl [Elavil] 10 mg PO HS 21 History


 


Celecoxib [CeleBREX] 200 mg PO DAILY 21 History


 


Ascorbic Acid [Vitamin C] 1,000 mg PO DAILY 04/10/21 07/02/22 History


 


Ipratropium-Albuterol Nebulize 3 ml INHALATION RT-QID 04/10/21 07/02/22 History





[Duoneb 0.5 mg-3 mg/3 ml Soln]    


 


Clopidogrel [Plavix] 75 mg PO DAILY #14 tab 21 Rx


 


Fluticasone Nasal Spray [Flonase 1 spr EA NOSTRIL BID 21 History





Nasal Spray]    


 


Thiamine [Vitamin B-1] 100 mg PO BID-W/MEALS  tab 11/10/21 07/02/22 Rx


 


Acetaminophen [Tylenol] 650 mg PO Q6H PRN 21 History


 


Calcium Carbonate 1,000 mg PO Q8H PRN 21 History


 


Multivitamins, Thera [Multivitamin 1 tab PO DAILY 21 History





(formulary)]    


 


HYDROcodone/APAP 10-325MG [Norco 1 tab PO Q8HR PRN 3 Days #9 tab 21 Rx





]    


 


Alendronate Sodium [Fosamax] 70 mg PO MO 22 History


 


Atorvastatin [Lipitor] 40 mg PO HS 22 History


 


Benzonatate [Tessalon Perles] 100 - 200 mg PO Q8H 22 History


 


Fluticasone/Umeclidin/Vilanter 1 puff INHALATION RT-DAILY 22 

History





[Trelegy Ellipta 100-62.5-25]    


 


Furosemide [Lasix] 40 mg PO DAILY 22 History


 


Omeprazole 40 mg PO DAILY 22 History


 


predniSONE 2.5 mg PO DAILY 22 History








                                    Allergies











Allergy/AdvReac Type Severity Reaction Status Date / Time


 


amoxicillin AdvReac  Nausea & Verified 22 16:09





   Vomiting  














Physical Exam


Vitals: 


                                   Vital Signs











  Temp Pulse Pulse Resp BP BP Pulse Ox


 


 22 07:49  97.4 F L   73  18   96/56  95


 


 22 01:10  97.7 F   72  16   100/61  98


 


 22 20:58   83     


 


 22 20:51   86     


 


 22 20:00    96  20   


 


 22 19:26  97.4 F L   96  20   97/59  94 L


 


 22 15:45  97.7 F   91  19   94/61  93 L


 


 22 15:05   81   18   


 


 22 14:55   83   18   


 


 22 14:54  98.2 F  84   16  92/53   94 L


 


 22 11:38   102 H   20   


 


 22 11:23   102 H   18   


 


 22 10:47     20   


 


 22 10:40  97.8 F  108 H   20  141/96   81 L








                                Intake and Output











 22





 22:59 06:59 14:59


 


Other:   


 


  Voiding Method Toilet  





 Urinal  


 


  # Voids 3 4 




















Appearance in mild degree of respirator distress and the patient is currently on

 oxygen at 3 L to maintain saturation above 90%.


Head exam was generally normal. There was no scleral icterus or corneal arcus. 

Mucous membranes were moist.


Neck was supple and without jugular venous distension, thyromegaly, or carotid 

bruits. Carotids were easily palpable bilaterally. There was no adenopathy.  The

 patient is a Mallampati class IV was significant crowding of posterior 

oropharynx


Lungs are diminished bilaterally and the patient has coarse crackles in lung 

bases and these are Velcro crackles typical of underlying pulmonary fibrosis


Heart sounds are tachycardic, Cardiac exam revealed the PMI to be normally 

situated and sized. The rhythm was regular and no extrasystoles were noted 

during several minutes of auscultation. The first and second heart sounds were 

normal and physiologic splitting of the second heart sound was noted. There were

 no murmurs, rubs, clicks, or gallops.


Abdominal exam revealed normal bowel sounds. The abdomen was soft, non-tender, 

and without masses, organomegaly, or appreciable enlargement of the abdominal 

aorta. scars of previous abdominal surgery over the anterior abdominal wall, and

 the patient also has a large anterior abdominal wall hernia which is easily 

reducible and there is no direct tenderness or rebound tensile guarding.


Extremities revealed +1 edema lower diminished bilaterally especially in the le

ft lower extremity.  Pulses are diminished.  There is no cyanosis or clubbing.  

Chronic joint deformities related to RA


Neurologic the patient is awake and alert.  Following commands and answering que

stions appropriately.  Focal neurological deficit.


Examination of the skin revealed no evidence of significant rashes, suspicious 

appearing nevi or other concerning lesions.








Results





- Laboratory Findings


CBC and BMP: 


                                 22 11:18





                                 22 11:18


PT/INR, D-dimer











PT  10.9 sec (9.0-12.0)   22  11:18    


 


INR  1.0  (<1.2)   22  11:18    








Abnormal lab findings: 


                                  Abnormal Labs











  22





  11:18 11:18 20:46


 


WBC  13.1 H  


 


RDW  17.0 H  


 


Neutrophils #  10.6 H  


 


Sodium   133 L 


 


Glucose   105 H 


 


Calcium   8.3 L 


 


Ur Leukocyte Esterase    Large H


 


Urine WBC    98 H


 


Urine Mucus    Rare H














- Diagnostic Findings


Chest x-ray: image reviewed





Assessment and Plan


Plan: 








1 acute exacerbation of chronic COPD/pulmonary fibrosis, with a possible compo

nent of CHF as the patient's proBNP level is elevated.  No major changes in the 

chest x-ray findings.  Covid 19 testing was negative.  Influenza screen was 

negative.  





2 chronic hypoxic respiratory failure and the patient is demented on oxygen and 

underwent 3-1/2 L on outpatient basis





3 advanced COPD/pulmonary fibrosis, oxygen and steroid dependent, maintain on 

Trilogy and the patient management and also on low-dose prednisone outpatient 

basis





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





5 history of duodenal ulcer perforation requiring surgical repair and repair of 

incarcerated umbilical hernia, with subsequent development of 

intraperitoneal/abdominal abscess requiring incision and drainage and prolonged 

antibiotic course





7 rheumatoid arthritis, with secondary chronic joint deformities related to 

rheumatoid arthritis





8 obstructive sleep apnea not receiving any CPAP therapy at this point in time





9 chronic back pain





10 hyperlipidemia





11 osteoarthritis





12 previous history of pseudomonal infection of the lungs/colonization with 

Pseudomonas back in 





13 suspected UTI





14 moderate degree of pulmonary hypertension related to chronic lung disease.  

PA pressure was 56 with a preserved LV function and mild LVH





Plan





Continue current antibiotic coverage


Continue bronchodilators


Continue Lasix, IV, 20 mg every 12 hours.


Resume all medications


We'll continue to follow

## 2022-07-03 NOTE — P.PN
Subjective


Progress Note Date: 07/03/22





H&P Date: 07/02/22





This is a 73-year-old pleasant gentleman, well-known to my practice, as well as 

Dr. Pickard.  Patient has advanced ceroid dependent COPD, chronic hypoxemic 

respiratory failure, on 40% to cannula at home, lives with the wife, who has 

called me today as the patient is having some worsening shortness of breath, no 

edema, no hemoptysis, however there is some brown sputum expectoration, and 

fever.  100.8.  Catapres was not done at home,





 Other significant history includes prior perforated duodenal ulcer, with 

exploring up to pay off perforated blood blister, incarcerated umbilical hernia,

prior FEV1 of 46%, prior echocardiogram April 20 2155-60% with concentric LVH, 

no AAS, moderate pulmonary hypertension, mild MR and mild TR.  Chest x-ray, 

findings of COPD with superimposed pulmonary vascular congestion, no acute 

infiltrates, there is flattening of the diaphragm, with increased lucency of 

lung apices WC 13.1, INR 1.0, sodium 133, creatinine 0.79, glucose 105 no pro-

calcitonin level obtained, NP proBNP is 4260, and a virus PCR negative influenza

A and B is negative admitted for COPD CHF exacerbation, with febrile illness, no

urinalysis obtained, we'll check the urine for pyuria, blood cultures, consult. 

Dr. Pickard pulmonary medicine IV Solu-Medrol, IV Lasix, empiric antibiotics, 

Rocephin and Zithromax





7/3: Patient has less shortness of breath, no fevers, pyuria noted, some 

productive purulent sputum, clean, no difficulty breathing, pulse ox at 91-98% 

on 3-4 L nasal cannula, blood pressure is between , systolic patient is on

Rocephin and Zithromax, to cover both purulent thick bronchitis, and UTI, 

cultures are sent, pending currently.  Sputum requested for cultures and Gram 

stain patient is on Solu-Medrol, 60 mg every 6 hours, patient is on chronic oral

prednisone maintenance at home 2.5 mg daily, pulmonary would follow, Dr. Pickard








 Review of Systems 


Constitutional: Reports as per HPI, Reports anorexia, Reports chronic pain, 

Reports fatigue, Denies fever, Denies lethargy, Denies malaise new fever


Ears, nose, mouth and throat: Reports as per HPI, Denies epistaxis, Denies mouth

pain, Denies nasal congestion, Denies odynophagia, Denies sinus pain, Denies 

swelling in mouth, Denies swelling in throat, Denies voice changes


Cardiovascular: Reports as per HPI, Reports decreased exercise tolerance, 

Reports dyspnea on exertion, Reports orthopnea, Reports shortness of breath, 

Denies chest pain, Denies edema, Denies leg edema, Denies palpitations, Denies 

rapid heart beat


Respiratory: Reports as per HPI, Reports cough, Reports cough with sputum, 

Reports dyspnea, Reports home oxygen, Reports respiratory infections, Reports 

wheezing 4 L O2 at home


Gastrointestinal: Reports as per HPI, Denies abdominal pain, Denies belching, 

Denies bloating, Denies BRBPR, Denies change in bowel habits, Denies coffee 

ground emesis, Denies constipation, Denies diarrhea, Denies dyspepsia, Denies 

early satiety, Denies excessive gas, Denies heartburn, Denies hematemesis, 

Denies hematochezia, Denies indigestion, Denies jaundice, Denies lactose 

intolerance, Denies loss of appetite, Denies melena, Denies nausea, Denies 

vomiting


Genitourinary: Reports as per HPI, Denies urinary hesitancy, Denies urinary 

retention


Musculoskeletal: Reports as per HPI, Denies arm numbness/tingling, Denies 

atrophy, Denies fractures, Denies frequent falls, Denies gait dysfunction, 

Denies hot joints, Denies leg numbness/tingling, Denies limitation of motion, 

Denies loss of height, Denies low back pain, Denies morning stiffness, Denies 

muscle cramps, Denies muscle weakness, Denies myalgias, Denies neck pain, Denies

neck stiffness, Denies prior amputations, Denies redness of joints, Denies 

shooting arm pain, Denies shooting leg pain


Integumentary: Reports as per HPI


Neurological: Reports as per HPI


Psychiatric: Reports as per HPI, Reports anxiety


Endocrine: Reports as per HPI, Denies cold intolerance, Denies deepening of the 

voice, Denies excessive sweating, Denies excessive thirst, Denies fatigue, 

Denies flushing, Denies heat intolerance, Denies high blood sugars, Denies 

increase in ring/shoe/hat size, Denies low blood sugars, Denies nocturia, Denies

palpitations, Denies polydipsia, Denies polyphagia, Denies polyuria, Denies 

proptosis, Denies recent glucocorticoid use, Denies thyroid mass, Denies weight 

change


Hematologic/Lymphatic: Reports as per HPI


Allergic/Immunologic: Reports as per HPI





Physical examination 





- Constitutional


General appearance: average body habitus, cooperative, no acute distress





- EENT


Eyes: EOMI, PERRLA, normal appearance





- Respiratory


Respiratory: bilateral: diminished, wheezing





- Cardiovascular


Rhythm: regular


Heart sounds: normal: S1, S2





- Gastrointestinal


General gastrointestinal: normal bowel sounds, soft





- Integumentary


Integumentary: decreased turgor, normal





- Neurologic


Neurologic: CNII-XII intact





- Musculoskeletal


Musculoskeletal: generalized weakness, strength equal bilaterally





- Psychiatric


Psychiatric: A&O x's 3, appropriate affect, intact judgment & insight





                               Vital Signs - 24 hr











  07/02/22 07/02/22 07/02/22





  11:23 11:38 14:54


 


Temperature   98.2 F


 


Pulse Rate 102 H 102 H 84


 


Pulse Rate [   





Pulse Oximetery   





]   


 


Respiratory 18 20 16





Rate   


 


Blood Pressure   92/53


 


Blood Pressure   





[Left Arm]   


 


O2 Sat by Pulse   94 L





Oximetry   














  07/02/22 07/02/22 07/02/22





  14:55 15:05 15:45


 


Temperature   97.7 F


 


Pulse Rate 83 81 


 


Pulse Rate [   91





Pulse Oximetery   





]   


 


Respiratory 18 18 19





Rate   


 


Blood Pressure   


 


Blood Pressure   94/61





[Left Arm]   


 


O2 Sat by Pulse   93 L





Oximetry   














  07/02/22 07/02/22 07/02/22





  19:26 20:00 20:51


 


Temperature 97.4 F L  


 


Pulse Rate   86


 


Pulse Rate [ 96 96 





Pulse Oximetery   





]   


 


Respiratory 20 20 





Rate   


 


Blood Pressure   


 


Blood Pressure 97/59  





[Left Arm]   


 


O2 Sat by Pulse 94 L  





Oximetry   














  07/02/22 07/03/22 07/03/22





  20:58 01:10 07:49


 


Temperature  97.7 F 97.4 F L


 


Pulse Rate 83  


 


Pulse Rate [  72 73





Pulse Oximetery   





]   


 


Respiratory  16 18





Rate   


 


Blood Pressure   


 


Blood Pressure  100/61 96/56





[Left Arm]   


 


O2 Sat by Pulse  98 95





Oximetry   








                       Laboratory Results - Last 24 Hours











  07/02/22 07/02/22 07/02/22





  11:18 11:18 11:18


 


WBC  13.1 H  


 


RBC  4.33  


 


Hgb  13.3  


 


Hct  40.6  


 


MCV  93.7  


 


MCH  30.7  


 


MCHC  32.7  


 


RDW  17.0 H  


 


Plt Count  304  


 


MPV  7.5  


 


Neutrophils %  81  


 


Lymphocytes %  8  


 


Monocytes %  7  


 


Eosinophils %  1  


 


Basophils %  1  


 


Neutrophils #  10.6 H  


 


Lymphocytes #  1.1  


 


Monocytes #  0.9  


 


Eosinophils #  0.1  


 


Basophils #  0.1  


 


Anisocytosis  Slight  


 


PT   10.9 


 


INR   1.0 


 


APTT   25.5 


 


Sodium    133 L


 


Potassium    4.3


 


Chloride    99


 


Carbon Dioxide    26


 


Anion Gap    8


 


BUN    19


 


Creatinine    0.79


 


Est GFR (CKD-EPI)AfAm    >90


 


Est GFR (CKD-EPI)NonAf    >90


 


Glucose    105 H


 


Plasma Lactic Acid José Miguel   


 


Calcium    8.3 L


 


Magnesium    1.7


 


Total Bilirubin    0.8


 


AST    17


 


ALT    11


 


Alkaline Phosphatase    94


 


Troponin I   


 


NT-Pro-B Natriuret Pep   


 


Total Protein    6.5


 


Albumin    3.5


 


Urine Color   


 


Urine Appearance   


 


Urine pH   


 


Ur Specific Gravity   


 


Urine Protein   


 


Urine Glucose (UA)   


 


Urine Ketones   


 


Urine Blood   


 


Urine Nitrite   


 


Urine Bilirubin   


 


Urine Urobilinogen   


 


Ur Leukocyte Esterase   


 


Urine RBC   


 


Urine WBC   


 


Hyaline Casts   


 


Urine Mucus   


 


Coronavirus (PCR)   


 


Influenza Type A RNA   


 


Influenza Type B (PCR)   














  07/02/22 07/02/22 07/02/22





  11:18 11:18 11:18


 


WBC   


 


RBC   


 


Hgb   


 


Hct   


 


MCV   


 


MCH   


 


MCHC   


 


RDW   


 


Plt Count   


 


MPV   


 


Neutrophils %   


 


Lymphocytes %   


 


Monocytes %   


 


Eosinophils %   


 


Basophils %   


 


Neutrophils #   


 


Lymphocytes #   


 


Monocytes #   


 


Eosinophils #   


 


Basophils #   


 


Anisocytosis   


 


PT   


 


INR   


 


APTT   


 


Sodium   


 


Potassium   


 


Chloride   


 


Carbon Dioxide   


 


Anion Gap   


 


BUN   


 


Creatinine   


 


Est GFR (CKD-EPI)AfAm   


 


Est GFR (CKD-EPI)NonAf   


 


Glucose   


 


Plasma Lactic Acid José Miguel  0.9  


 


Calcium   


 


Magnesium   


 


Total Bilirubin   


 


AST   


 


ALT   


 


Alkaline Phosphatase   


 


Troponin I   0.013 


 


NT-Pro-B Natriuret Pep   


 


Total Protein   


 


Albumin   


 


Urine Color   


 


Urine Appearance   


 


Urine pH   


 


Ur Specific Gravity   


 


Urine Protein   


 


Urine Glucose (UA)   


 


Urine Ketones   


 


Urine Blood   


 


Urine Nitrite   


 


Urine Bilirubin   


 


Urine Urobilinogen   


 


Ur Leukocyte Esterase   


 


Urine RBC   


 


Urine WBC   


 


Hyaline Casts   


 


Urine Mucus   


 


Coronavirus (PCR)   


 


Influenza Type A RNA    Not Detected


 


Influenza Type B (PCR)    Not Detected














  07/02/22 07/02/22 07/02/22





  11:18 11:18 20:46


 


WBC   


 


RBC   


 


Hgb   


 


Hct   


 


MCV   


 


MCH   


 


MCHC   


 


RDW   


 


Plt Count   


 


MPV   


 


Neutrophils %   


 


Lymphocytes %   


 


Monocytes %   


 


Eosinophils %   


 


Basophils %   


 


Neutrophils #   


 


Lymphocytes #   


 


Monocytes #   


 


Eosinophils #   


 


Basophils #   


 


Anisocytosis   


 


PT   


 


INR   


 


APTT   


 


Sodium   


 


Potassium   


 


Chloride   


 


Carbon Dioxide   


 


Anion Gap   


 


BUN   


 


Creatinine   


 


Est GFR (CKD-EPI)AfAm   


 


Est GFR (CKD-EPI)NonAf   


 


Glucose   


 


Plasma Lactic Acid José Miguel   


 


Calcium   


 


Magnesium   


 


Total Bilirubin   


 


AST   


 


ALT   


 


Alkaline Phosphatase   


 


Troponin I   


 


NT-Pro-B Natriuret Pep   4260 


 


Total Protein   


 


Albumin   


 


Urine Color    Light Yellow


 


Urine Appearance    Cloudy


 


Urine pH    5.0


 


Ur Specific Gravity    1.007


 


Urine Protein    Negative


 


Urine Glucose (UA)    Negative


 


Urine Ketones    Negative


 


Urine Blood    Negative


 


Urine Nitrite    Negative


 


Urine Bilirubin    Negative


 


Urine Urobilinogen    <2.0


 


Ur Leukocyte Esterase    Large H


 


Urine RBC    1


 


Urine WBC    98 H


 


Hyaline Casts    1


 


Urine Mucus    Rare H


 


Coronavirus (PCR)  Not Detected  


 


Influenza Type A RNA   


 


Influenza Type B (PCR)   





                               Active Medications











Generic Name Dose Route Start Last Admin





  Trade Name Freq  PRN Reason Stop Dose Admin


 


Albuterol/Ipratropium  3 ml  07/02/22 14:36  07/02/22 20:49





  Ipratropium-Albuterol 3 Ml Neb  INHALATION   3 ml





  RT-Q4H PRN   Administration





  Shortness Of Breath Or Wheezing  


 


Atorvastatin Calcium  40 mg  07/03/22 21:00 





  Atorvastatin 40 Mg Tab  PO  





  HS IMTIAZ  


 


Clopidogrel Bisulfate  75 mg  07/03/22 10:45 





  Clopidogrel 75 Mg Tab  PO  





  DAILY IMTIAZ  


 


Furosemide  20 mg  07/02/22 21:00  07/03/22 08:29





  Furosemide 10 Mg/Ml 2 Ml Vial  IV   20 mg





  Q12HR IMTIAZ   Administration


 


Azithromycin 500 mg/ Sodium  250 mls @ 250 mls/hr  07/02/22 16:30  07/02/22 

17:58





  Chloride  IVPB  07/04/22 17:29  250 mls/hr





  Q24H IMTIAZ   Administration





  Protocol  


 


Ceftriaxone Sodium 1 gm/  50 mls @ 100 mls/hr  07/03/22 12:00 





  Sodium Chloride  IVPB  





  Q24H IMTIAZ  





  Protocol  


 


Methylprednisolone Sodium Succinate  60 mg  07/02/22 18:00  07/03/22 06:19





  Methylprednisolone Sod Succi 125 Mg/2 Ml Vial  IV   60 mg





  Q6HR IMTIAZ   Administration


 


Metoprolol Succinate  25 mg  07/03/22 10:45 





  Metoprolol Succinate (Er) 25 Mg Tab.Er.24h  PO  





  DAILY IMTIAZ  


 


Sertraline HCl  150 mg  07/03/22 10:45 





  Sertraline 50 Mg Tab  PO  





  DAILY IMTIAZ  














Objective





- Vital Signs


Vital signs: 


                                   Vital Signs











Temp  97.4 F L  07/03/22 07:49


 


Pulse  73   07/03/22 07:49


 


Resp  18   07/03/22 07:49


 


BP  96/56   07/03/22 07:49


 


Pulse Ox  95   07/03/22 07:49


 


FiO2      








                                 Intake & Output











 07/02/22 07/03/22 07/03/22





 18:59 06:59 18:59


 


Weight 56.699 kg  


 


Other:   


 


  Voiding Method  Toilet 





  Urinal 


 


  # Voids 3 4 














- Labs


CBC & Chem 7: 


                                 07/03/22 06:57





                                 07/02/22 11:18


Labs: 


                  Abnormal Lab Results - Last 24 Hours (Table)











  07/02/22 07/02/22 07/02/22 Range/Units





  11:18 11:18 20:46 


 


WBC  13.1 H    (3.8-10.6)  k/uL


 


RDW  17.0 H    (11.5-15.5)  %


 


Neutrophils #  10.6 H    (1.3-7.7)  k/uL


 


Sodium   133 L   (137-145)  mmol/L


 


Glucose   105 H   (74-99)  mg/dL


 


Calcium   8.3 L   (8.4-10.2)  mg/dL


 


Ur Leukocyte Esterase    Large H  (Negative)  


 


Urine WBC    98 H  (0-5)  /hpf


 


Urine Mucus    Rare H  (None)  /hpf

## 2022-07-04 RX ADMIN — PANTOPRAZOLE SODIUM SCH MG: 40 TABLET, DELAYED RELEASE ORAL at 08:25

## 2022-07-04 RX ADMIN — SERTRALINE HYDROCHLORIDE SCH MG: 50 TABLET, FILM COATED ORAL at 08:25

## 2022-07-04 RX ADMIN — METHYLPREDNISOLONE SODIUM SUCCINATE SCH MG: 125 INJECTION, POWDER, FOR SOLUTION INTRAMUSCULAR; INTRAVENOUS at 00:12

## 2022-07-04 RX ADMIN — CLOPIDOGREL BISULFATE SCH MG: 75 TABLET ORAL at 08:25

## 2022-07-04 RX ADMIN — FUROSEMIDE SCH MG: 10 INJECTION, SOLUTION INTRAMUSCULAR; INTRAVENOUS at 09:27

## 2022-07-04 RX ADMIN — METHYLPREDNISOLONE SODIUM SUCCINATE SCH MG: 125 INJECTION, POWDER, FOR SOLUTION INTRAMUSCULAR; INTRAVENOUS at 05:22

## 2022-07-04 RX ADMIN — Medication SCH MG: at 16:58

## 2022-07-04 RX ADMIN — FUROSEMIDE SCH MG: 10 INJECTION, SOLUTION INTRAMUSCULAR; INTRAVENOUS at 20:40

## 2022-07-04 RX ADMIN — THERA TABS SCH EACH: TAB at 08:25

## 2022-07-04 RX ADMIN — OXYCODONE HYDROCHLORIDE AND ACETAMINOPHEN SCH MG: 500 TABLET ORAL at 08:25

## 2022-07-04 RX ADMIN — METHYLPREDNISOLONE SODIUM SUCCINATE SCH MG: 40 INJECTION, POWDER, FOR SOLUTION INTRAMUSCULAR; INTRAVENOUS at 18:29

## 2022-07-04 RX ADMIN — IPRATROPIUM BROMIDE SCH MG: 0.5 SOLUTION RESPIRATORY (INHALATION) at 16:10

## 2022-07-04 RX ADMIN — IPRATROPIUM BROMIDE SCH MG: 0.5 SOLUTION RESPIRATORY (INHALATION) at 11:48

## 2022-07-04 RX ADMIN — MELOXICAM SCH MG: 7.5 TABLET ORAL at 08:25

## 2022-07-04 RX ADMIN — BUDESONIDE AND FORMOTEROL FUMARATE DIHYDRATE SCH PUFF: 80; 4.5 AEROSOL RESPIRATORY (INHALATION) at 11:48

## 2022-07-04 RX ADMIN — ATORVASTATIN CALCIUM SCH MG: 40 TABLET, FILM COATED ORAL at 20:14

## 2022-07-04 RX ADMIN — BUDESONIDE AND FORMOTEROL FUMARATE DIHYDRATE SCH PUFF: 80; 4.5 AEROSOL RESPIRATORY (INHALATION) at 20:08

## 2022-07-04 RX ADMIN — Medication SCH MG: at 08:25

## 2022-07-04 RX ADMIN — IPRATROPIUM BROMIDE SCH MG: 0.5 SOLUTION RESPIRATORY (INHALATION) at 20:08

## 2022-07-04 RX ADMIN — AMITRIPTYLINE HYDROCHLORIDE SCH MG: 10 TABLET, FILM COATED ORAL at 20:14

## 2022-07-04 RX ADMIN — METOPROLOL SUCCINATE SCH MG: 25 TABLET, EXTENDED RELEASE ORAL at 08:25

## 2022-07-04 RX ADMIN — IPRATROPIUM BROMIDE SCH MG: 0.5 SOLUTION RESPIRATORY (INHALATION) at 08:29

## 2022-07-04 NOTE — P.PN
Subjective


Progress Note Date: 07/04/22





H&P Date: 07/02/22





This is a 73-year-old pleasant gentleman, well-known to my practice, as well as 

Dr. Pickard.  Patient has advanced ceroid dependent COPD, chronic hypoxemic 

respiratory failure, on 40% to cannula at home, lives with the wife, who has 

called me today as the patient is having some worsening shortness of breath, no 

edema, no hemoptysis, however there is some brown sputum expectoration, and 

fever.  100.8.  Catapres was not done at home,





 Other significant history includes prior perforated duodenal ulcer, with 

exploring up to pay off perforated blood blister, incarcerated umbilical hernia,

prior FEV1 of 46%, prior echocardiogram April 20 2155-60% with concentric LVH, 

no AAS, moderate pulmonary hypertension, mild MR and mild TR.  Chest x-ray, 

findings of COPD with superimposed pulmonary vascular congestion, no acute 

infiltrates, there is flattening of the diaphragm, with increased lucency of 

lung apices WC 13.1, INR 1.0, sodium 133, creatinine 0.79, glucose 105 no pro-

calcitonin level obtained, NP proBNP is 4260, and a virus PCR negative influenza

A and B is negative admitted for COPD CHF exacerbation, with febrile illness, no

urinalysis obtained, we'll check the urine for pyuria, blood cultures, consult. 

Dr. Pickard pulmonary medicine IV Solu-Medrol, IV Lasix, empiric antibiotics, 

Rocephin and Zithromax





7/3: Patient has less shortness of breath, no fevers, pyuria noted, some 

productive purulent sputum, clean, no difficulty breathing, pulse ox at 91-98% 

on 3-4 L nasal cannula, blood pressure is between , systolic patient is on

Rocephin and Zithromax, to cover both purulent thick bronchitis, and UTI, 

cultures are sent, pending currently.  Sputum requested for cultures and Gram 

stain patient is on Solu-Medrol, 60 mg every 6 hours, patient is on chronic oral

prednisone maintenance at home 2.5 mg daily, pulmonary would follow, Dr. Pickard





7/4, shortness breath is less, he is on 4 L nasal cannula, pulse ox 92%, patient

was hypotensive last night, systolic in 80s, required a bolus only 500 mL, 

worrisome regarding CHF exacerbation, again was diuresed this morning, using 

low-dose Lasix 20 mg, has been started on midodrine, counseled regarding 

nutritional supplementation, urine cultures currently pending,  Solu-Medrol l 

will be decreased to 40 mg every 8 hours awaiting finalized sputum cultures and 

urine cultures prior to discharge on IV Rocephin and Zithromax and a PT /OT 

ongoing, patient's debilitated prior to his admission








 Review of Systems 


Constitutional: Reports as per HPI, Reports anorexia, Reports chronic pain, 

Reports fatigue, Denies fever, Denies lethargy, Denies malaise new fever


Ears, nose, mouth and throat: Reports as per HPI, Denies epistaxis, Denies mouth

pain, Denies nasal congestion, Denies odynophagia, Denies sinus pain, Denies s

welling in mouth, Denies swelling in throat, Denies voice changes


Cardiovascular: Reports as per HPI, Reports decreased exercise tolerance, 

Reports dyspnea on exertion, Reports orthopnea, Reports shortness of breath, 

Denies chest pain, Denies edema, Denies leg edema, Denies palpitations, Denies 

rapid heart beat


Respiratory: Reports as per HPI, Reports cough, Reports cough with sputum, 

Reports dyspnea, Reports home oxygen, Reports respiratory infections, Reports 

wheezing 4 L O2 at home


Gastrointestinal: Reports as per HPI, Denies abdominal pain, Denies belching, 

Denies bloating, Denies BRBPR, Denies change in bowel habits, Denies coffee 

ground emesis, Denies constipation, Denies diarrhea, Denies dyspepsia, Denies 

early satiety, Denies excessive gas, Denies heartburn, Denies hematemesis, 

Denies hematochezia, Denies indigestion, Denies jaundice, Denies lactose 

intolerance, Denies loss of appetite, Denies melena, Denies nausea, Denies 

vomiting


Genitourinary: Reports as per HPI, Denies urinary hesitancy, Denies urinary 

retention


Musculoskeletal: Reports as per HPI, Denies arm numbness/tingling, Denies 

atrophy, Denies fractures, Denies frequent falls, Denies gait dysfunction, 

Denies hot joints, Denies leg numbness/tingling, Denies limitation of motion, 

Denies loss of height, Denies low back pain, Denies morning stiffness, Denies 

muscle cramps, Denies muscle weakness, Denies myalgias, Denies neck pain, Denies

neck stiffness, Denies prior amputations, Denies redness of joints, Denies 

shooting arm pain, Denies shooting leg pain


Integumentary: Reports as per HPI


Neurological: Reports as per HPI


Psychiatric: Reports as per HPI, Reports anxiety


Endocrine: Reports as per HPI, Denies cold intolerance, Denies deepening of the 

voice, Denies excessive sweating, Denies excessive thirst, Denies fatigue, 

Denies flushing, Denies heat intolerance, Denies high blood sugars, Denies 

increase in ring/shoe/hat size, Denies low blood sugars, Denies nocturia, Denies

palpitations, Denies polydipsia, Denies polyphagia, Denies polyuria, Denies 

proptosis, Denies recent glucocorticoid use, Denies thyroid mass, Denies weight 

change


Hematologic/Lymphatic: Reports as per HPI


Allergic/Immunologic: Reports as per HPI





Physical examination 





- Constitutional


General appearance: average body habitus, cooperative, no acute distress





- EENT


Eyes: EOMI, PERRLA, normal appearance





- Respiratory


Respiratory: bilateral: diminished, wheezing





- Cardiovascular


Rhythm: regular


Heart sounds: normal: S1, S2





- Gastrointestinal


General gastrointestinal: normal bowel sounds, soft





- Integumentary


Integumentary: decreased turgor, normal





- Neurologic


Neurologic: CNII-XII intact





- Musculoskeletal


Musculoskeletal: generalized weakness, strength equal bilaterally





- Psychiatric


Psychiatric: A&O x's 3, appropriate affect, intact judgment & insight





                               Vital Signs - 24 hr


                               Vital Signs - 24 hr











  07/03/22 07/03/22 07/03/22





  11:41 14:00 15:36


 


Temperature  97.9 F 


 


Pulse Rate   76


 


Pulse Rate [  95 





Pulse Oximetery   





]   


 


Respiratory  20 





Rate   


 


Blood Pressure  101/66 





[Left Arm]   


 


Blood Pressure   





[Right Arm   





Sitting]   


 


O2 Sat by Pulse 91 L 92 L 





Oximetry   














  07/03/22 07/03/22 07/03/22





  15:47 19:36 20:00


 


Temperature  98.3 F 


 


Pulse Rate 78  


 


Pulse Rate [  66 66





Pulse Oximetery   





]   


 


Respiratory  18 18





Rate   


 


Blood Pressure  96/55 





[Left Arm]   


 


Blood Pressure   





[Right Arm   





Sitting]   


 


O2 Sat by Pulse  94 L 





Oximetry   














  07/04/22 07/04/22 07/04/22





  02:00 04:26 07:39


 


Temperature 97.6 F  98.1 F


 


Pulse Rate   


 


Pulse Rate [ 75  82





Pulse Oximetery   





]   


 


Respiratory 18  18





Rate   


 


Blood Pressure 86/55 101/52 95/47





[Left Arm]   


 


Blood Pressure   





[Right Arm   





Sitting]   


 


O2 Sat by Pulse 92 L  94 L





Oximetry   














  07/04/22 07/04/22 07/04/22





  08:30 08:43 09:29


 


Temperature   


 


Pulse Rate 80 80 


 


Pulse Rate [   





Pulse Oximetery   





]   


 


Respiratory   20





Rate   


 


Blood Pressure   100/59





[Left Arm]   


 


Blood Pressure   104/62





[Right Arm   





Sitting]   


 


O2 Sat by Pulse   92 L





Oximetry   











                       Laboratory Results - Last 24 Hours











  07/02/22 07/02/22 07/02/22





  11:18 11:18 11:18


 


WBC  13.1 H  


 


RBC  4.33  


 


Hgb  13.3  


 


Hct  40.6  


 


MCV  93.7  


 


MCH  30.7  


 


MCHC  32.7  


 


RDW  17.0 H  


 


Plt Count  304  


 


MPV  7.5  


 


Neutrophils %  81  


 


Lymphocytes %  8  


 


Monocytes %  7  


 


Eosinophils %  1  


 


Basophils %  1  


 


Neutrophils #  10.6 H  


 


Lymphocytes #  1.1  


 


Monocytes #  0.9  


 


Eosinophils #  0.1  


 


Basophils #  0.1  


 


Anisocytosis  Slight  


 


PT   10.9 


 


INR   1.0 


 


APTT   25.5 


 


Sodium    133 L


 


Potassium    4.3


 


Chloride    99


 


Carbon Dioxide    26


 


Anion Gap    8


 


BUN    19


 


Creatinine    0.79


 


Est GFR (CKD-EPI)AfAm    >90


 


Est GFR (CKD-EPI)NonAf    >90


 


Glucose    105 H


 


Plasma Lactic Acid José Miguel   


 


Calcium    8.3 L


 


Magnesium    1.7


 


Total Bilirubin    0.8


 


AST    17


 


ALT    11


 


Alkaline Phosphatase    94


 


Troponin I   


 


NT-Pro-B Natriuret Pep   


 


Total Protein    6.5


 


Albumin    3.5


 


Urine Color   


 


Urine Appearance   


 


Urine pH   


 


Ur Specific Gravity   


 


Urine Protein   


 


Urine Glucose (UA)   


 


Urine Ketones   


 


Urine Blood   


 


Urine Nitrite   


 


Urine Bilirubin   


 


Urine Urobilinogen   


 


Ur Leukocyte Esterase   


 


Urine RBC   


 


Urine WBC   


 


Hyaline Casts   


 


Urine Mucus   


 


Coronavirus (PCR)   


 


Influenza Type A RNA   


 


Influenza Type B (PCR)   














  07/02/22 07/02/22 07/02/22





  11:18 11:18 11:18


 


WBC   


 


RBC   


 


Hgb   


 


Hct   


 


MCV   


 


MCH   


 


MCHC   


 


RDW   


 


Plt Count   


 


MPV   


 


Neutrophils %   


 


Lymphocytes %   


 


Monocytes %   


 


Eosinophils %   


 


Basophils %   


 


Neutrophils #   


 


Lymphocytes #   


 


Monocytes #   


 


Eosinophils #   


 


Basophils #   


 


Anisocytosis   


 


PT   


 


INR   


 


APTT   


 


Sodium   


 


Potassium   


 


Chloride   


 


Carbon Dioxide   


 


Anion Gap   


 


BUN   


 


Creatinine   


 


Est GFR (CKD-EPI)AfAm   


 


Est GFR (CKD-EPI)NonAf   


 


Glucose   


 


Plasma Lactic Acid José Miguel  0.9  


 


Calcium   


 


Magnesium   


 


Total Bilirubin   


 


AST   


 


ALT   


 


Alkaline Phosphatase   


 


Troponin I   0.013 


 


NT-Pro-B Natriuret Pep   


 


Total Protein   


 


Albumin   


 


Urine Color   


 


Urine Appearance   


 


Urine pH   


 


Ur Specific Gravity   


 


Urine Protein   


 


Urine Glucose (UA)   


 


Urine Ketones   


 


Urine Blood   


 


Urine Nitrite   


 


Urine Bilirubin   


 


Urine Urobilinogen   


 


Ur Leukocyte Esterase   


 


Urine RBC   


 


Urine WBC   


 


Hyaline Casts   


 


Urine Mucus   


 


Coronavirus (PCR)   


 


Influenza Type A RNA    Not Detected


 


Influenza Type B (PCR)    Not Detected














  07/02/22 07/02/22 07/02/22





  11:18 11:18 20:46


 


WBC   


 


RBC   


 


Hgb   


 


Hct   


 


MCV   


 


MCH   


 


MCHC   


 


RDW   


 


Plt Count   


 


MPV   


 


Neutrophils %   


 


Lymphocytes %   


 


Monocytes %   


 


Eosinophils %   


 


Basophils %   


 


Neutrophils #   


 


Lymphocytes #   


 


Monocytes #   


 


Eosinophils #   


 


Basophils #   


 


Anisocytosis   


 


PT   


 


INR   


 


APTT   


 


Sodium   


 


Potassium   


 


Chloride   


 


Carbon Dioxide   


 


Anion Gap   


 


BUN   


 


Creatinine   


 


Est GFR (CKD-EPI)AfAm   


 


Est GFR (CKD-EPI)NonAf   


 


Glucose   


 


Plasma Lactic Acid José Miguel   


 


Calcium   


 


Magnesium   


 


Total Bilirubin   


 


AST   


 


ALT   


 


Alkaline Phosphatase   


 


Troponin I   


 


NT-Pro-B Natriuret Pep   4260 


 


Total Protein   


 


Albumin   


 


Urine Color    Light Yellow


 


Urine Appearance    Cloudy


 


Urine pH    5.0


 


Ur Specific Gravity    1.007


 


Urine Protein    Negative


 


Urine Glucose (UA)    Negative


 


Urine Ketones    Negative


 


Urine Blood    Negative


 


Urine Nitrite    Negative


 


Urine Bilirubin    Negative


 


Urine Urobilinogen    <2.0


 


Ur Leukocyte Esterase    Large H


 


Urine RBC    1


 


Urine WBC    98 H


 


Hyaline Casts    1


 


Urine Mucus    Rare H


 


Coronavirus (PCR)  Not Detected  


 


Influenza Type A RNA   


 


Influenza Type B (PCR)   





                               Active Medications











Generic Name Dose Route Start Last Admin





  Trade Name Freq  PRN Reason Stop Dose Admin


 


Albuterol/Ipratropium  3 ml  07/02/22 14:36  07/02/22 20:49





  Ipratropium-Albuterol 3 Ml Neb  INHALATION   3 ml





  RT-Q4H PRN   Administration





  Shortness Of Breath Or Wheezing  


 


Atorvastatin Calcium  40 mg  07/03/22 21:00 





  Atorvastatin 40 Mg Tab  PO  





  [facility] IMTIAZ  


 


Clopidogrel Bisulfate  75 mg  07/03/22 10:45 





  Clopidogrel 75 Mg Tab  PO  





  DAILY IMTIAZ  


 


Furosemide  20 mg  07/02/22 21:00  07/03/22 08:29





  Furosemide 10 Mg/Ml 2 Ml Vial  IV   20 mg





  Q12HR IMTIAZ   Administration


 


Azithromycin 500 mg/ Sodium  250 mls @ 250 mls/hr  07/02/22 16:30  07/02/22 

17:58





  Chloride  IVPB  07/04/22 17:29  250 mls/hr





  Q24H IMTIAZ   Administration





  Protocol  


 


Ceftriaxone Sodium 1 gm/  50 mls @ 100 mls/hr  07/03/22 12:00 





  Sodium Chloride  IVPB  





  [facility]H [facility]  





  Protocol  


 


Methylprednisolone Sodium Succinate  60 mg  07/02/22 18:00  07/03/22 06:19





  Methylprednisolone Sod Succi 125 Mg/2 Ml Vial  IV   60 mg





  Q6HR IMTIAZ   Administration


 


Metoprolol Succinate  25 mg  07/03/22 10:45 





  Metoprolol Succinate (Er) 25 Mg Tab.Er.24h  PO  





  [facility]  


 


Sertraline HCl  150 mg  07/03/22 10:45 





  Sertraline 50 Mg Tab  PO  





  [facility]  














Objective





- Vital Signs


Vital signs: 


                                   Vital Signs











Temp  98.1 F   07/04/22 07:39


 


Pulse  80   07/04/22 08:43


 


Resp  20   07/04/22 09:29


 


BP  100/59   07/04/22 09:29


 


Pulse Ox  92 L  07/04/22 09:29


 


FiO2      








                                 Intake & Output











 07/03/22 07/04/22 07/04/22





 18:59 06:59 18:59


 


Intake Total   120


 


Output Total 650  


 


Balance -650  120


 


Intake:   


 


  Oral   120


 


Output:   


 


  Urine 650  


 


Other:   


 


  Voiding Method  Toilet 





  Urinal 


 


  # Voids  2 














- Labs


CBC & Chem 7: 


                                 07/03/22 06:57





                                 07/02/22 11:18


Labs: 


                  Abnormal Lab Results - Last 24 Hours (Table)











  07/03/22 07/03/22 Range/Units





  06:57 06:57 


 


WBC  11.35 H   (4.50-10.00)  X 10*3/uL


 


RBC  4.00 L   (4.40-5.60)  X 10*6/uL


 


Hgb  11.6 L   (13.0-17.0)  g/dL


 


Hct  36.3 L   (39.6-50.0)  %


 


RDW  17.5 H   (11.5-14.5)  %


 


Immature Gran #  0.05 H   (0.00-0.04)  X 10*3/uL


 


Neutrophils #  10.29 H   (1.80-7.70)  X 10*3/uL


 


Lymphocytes #  0.74 L   (0.90-5.00)  X 10*3/uL


 


Eosinophils #  0 L   (0.04-0.35)  X 10*3/uL


 


Procalcitonin   0.27 H  (0.02-0.09)  ng/mL








                      Microbiology - Last 24 Hours (Table)











 07/02/22 20:46 Urine Culture - Preliminary





 Urine,Voided 


 


 07/02/22 11:20 Blood Culture - Preliminary





 Blood    No Growth after 24 hours


 


 07/02/22 11:05 Blood Culture - Preliminary





 Blood    No Growth after 24 hours

## 2022-07-04 NOTE — P.PN
Subjective


Progress Note Date: 07/04/22








71-year-old male patient was presented to the hospital or symptoms of worsening 

shortness of breath, over the past several days.  The patient has compromised 

lung including pulmonary fibrosis and the patient has been oxygen dependent.  

The patient also has a component of COPD.  The patient apparently had a 

temperature of 100.0.  Pulse ox is 86% on 3 L of oxygen by nasal cannula.  No 

nausea.  No vomiting.  The patient no chest pain.  He decided to come in to the 

emergency and the patient was found to have a white cell count of 15 with a 

normal coagulation profile, normal electrolytes, UA was abnormal with positive 

white cells and the proBNP level of 4260.  COVID 19 testing was negative.  

Influenza screen was negative.   The patient is currently on a combination of 

Rocephin and Zithromax.  The patient is also on IV Solu-Medrol.  The patient is 

being diuresis with Lasix 20 mg IV every 12 hours.    Symptoms started 

approximately a week ago and worse over the past few days.  According to the pat

ient, he has history of COPD and pulmonary fibrosis.  Based on the previous 

pulmonary function test, the patient's FEV1 has been order of 46% of predicted 

and FVC has been order of 72% of predicted and this is based on a spirometer 

this was done 2017.  The patient has been steroid dependent for many years.  He 

has history of rheumatoid arthritis and has been on immunosuppressive therapy in

the past in the form of Humira injections.  He has not taken Humira for now.  

The patient has chronic hypoxic respiratory failure and typically is on 3-1/2 L 

of oxygen by nasal cannula.  He has been hospitalized in the past for septic 

shock and he has a previous history of duodenal ulcer perforation was repaired 

surgically.  He has obstructive sleep apnea and his nontolerant to CPAP therapy.





07/04/2022, the patient is feeling slightly better.  The patient is being there 

is IV Lasix.  The fluid balance has been negative and the patient remains on 

Lasix 20 mg IV every 12 hours.  The patient is also on IV Solu Medrol.  The 

patient on DuoNeb neb treatments around the clock.  No fever.  No chills.  No 

altered mentation.  The white cell count 11.3 from yesterday and the Pronestyl 

level is at 0.27.





Objective





- Vital Signs


Vital signs: 


                                   Vital Signs











Temp  98.1 F   07/04/22 07:39


 


Pulse  76   07/04/22 12:02


 


Resp  20   07/04/22 09:29


 


BP  100/59   07/04/22 09:29


 


Pulse Ox  92 L  07/04/22 09:29


 


FiO2      








                                 Intake & Output











 07/03/22 07/04/22 07/04/22





 18:59 06:59 18:59


 


Intake Total   120


 


Output Total 650  


 


Balance -650  120


 


Intake:   


 


  Oral   120


 


Output:   


 


  Urine 650  


 


Other:   


 


  Voiding Method  Toilet 





  Urinal 


 


  # Voids  2 














- Exam








Appearance in mild degree of respirator distress and the patient is currently on

oxygen at 3 L to maintain saturation above 90%.


Head exam was generally normal. There was no scleral icterus or corneal arcus. 

Mucous membranes were moist.


Neck was supple and without jugular venous distension, thyromegaly, or carotid 

bruits. Carotids were easily palpable bilaterally. There was no adenopathy.  The

patient is a Mallampati class IV was significant crowding of posterior 

oropharynx


Lungs are diminished bilaterally and the patient has coarse crackles in lung 

bases and these are Velcro crackles typical of underlying pulmonary fibrosis


Heart sounds are tachycardic, Cardiac exam revealed the PMI to be normally 

situated and sized. The rhythm was regular and no extrasystoles were noted 

during several minutes of auscultation. The first and second heart sounds were 

normal and physiologic splitting of the second heart sound was noted. There were

no murmurs, rubs, clicks, or gallops.


Abdominal exam revealed normal bowel sounds. The abdomen was soft, non-tender, 

and without masses, organomegaly, or appreciable enlargement of the abdominal 

aorta. scars of previous abdominal surgery over the anterior abdominal wall, and

the patient also has a large anterior abdominal wall hernia which is easily 

reducible and there is no direct tenderness or rebound tensile guarding.


Extremities revealed +1 edema lower diminished bilaterally especially in the 

left lower extremity.  Pulses are diminished.  There is no cyanosis or clubbing.

 Chronic joint deformities related to RA


Neurologic the patient is awake and alert.  Following commands and answering 

questions appropriately.  Focal neurological deficit.


Examination of the skin revealed no evidence of significant rashes, suspicious 

appearing nevi or other concerning lesions.





- Labs


CBC & Chem 7: 


                                 07/03/22 06:57





                                 07/02/22 11:18


Labs: 


                  Abnormal Lab Results - Last 24 Hours (Table)











  07/03/22 Range/Units





  06:57 


 


Procalcitonin  0.27 H  (0.02-0.09)  ng/mL








                      Microbiology - Last 24 Hours (Table)











 07/02/22 11:20 Blood Culture - Preliminary





 Blood    No Growth after 48 hours


 


 07/02/22 11:05 Blood Culture - Preliminary





 Blood    No Growth after 48 hours


 


 07/02/22 20:46 Urine Culture - Final





 Urine,Voided 














Assessment and Plan


Plan: 








1 acute exacerbation of chronic COPD/pulmonary fibrosis, with a possible 

component of CHF as the patient's proBNP level is elevated.  No major changes in

the chest x-ray findings.  Covid 19 testing was negative.  Influenza screen was 

negative.  





2 chronic hypoxic respiratory failure and the patient is demented on oxygen and 

underwent 3-1/2 L on outpatient basis





3 advanced COPD/pulmonary fibrosis, oxygen and steroid dependent, maintain on 

Trilogy and the patient management and also on low-dose prednisone outpatient 

basis





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





5 history of duodenal ulcer perforation requiring surgical repair and repair of 

incarcerated umbilical hernia, with subsequent development of 

intraperitoneal/abdominal abscess requiring incision and drainage and prolonged 

antibiotic course





7 rheumatoid arthritis, with secondary chronic joint deformities related to 

rheumatoid arthritis





8 obstructive sleep apnea not receiving any CPAP therapy at this point in time





9 chronic back pain





10 hyperlipidemia





11 osteoarthritis





12 previous history of pseudomonal infection of the lungs/colonization with 

Pseudomonas back in 2021





13 suspected UTI





14 moderate degree of pulmonary hypertension related to chronic lung disease.  

PA pressure was 56 with a preserved LV function and mild LVH





Plan





Clinically improving and will continue the same treatment


Continue bronchodilators


Continued IV Solu-Medrol


Continue Lasix, IV, 20 mg every 12 hours.


Resume all medications


We'll continue to follow

## 2022-07-05 VITALS — TEMPERATURE: 98.1 F

## 2022-07-05 VITALS — SYSTOLIC BLOOD PRESSURE: 114 MMHG | DIASTOLIC BLOOD PRESSURE: 68 MMHG

## 2022-07-05 VITALS — HEART RATE: 72 BPM

## 2022-07-05 VITALS — RESPIRATION RATE: 16 BRPM

## 2022-07-05 RX ADMIN — Medication SCH MG: at 08:03

## 2022-07-05 RX ADMIN — PANTOPRAZOLE SODIUM SCH MG: 40 TABLET, DELAYED RELEASE ORAL at 10:41

## 2022-07-05 RX ADMIN — METHYLPREDNISOLONE SODIUM SUCCINATE SCH MG: 40 INJECTION, POWDER, FOR SOLUTION INTRAMUSCULAR; INTRAVENOUS at 00:34

## 2022-07-05 RX ADMIN — THERA TABS SCH EACH: TAB at 10:41

## 2022-07-05 RX ADMIN — FUROSEMIDE SCH MG: 10 INJECTION, SOLUTION INTRAMUSCULAR; INTRAVENOUS at 08:18

## 2022-07-05 RX ADMIN — OXYCODONE HYDROCHLORIDE AND ACETAMINOPHEN SCH MG: 500 TABLET ORAL at 10:48

## 2022-07-05 RX ADMIN — IPRATROPIUM BROMIDE SCH MG: 0.5 SOLUTION RESPIRATORY (INHALATION) at 11:56

## 2022-07-05 RX ADMIN — METOPROLOL SUCCINATE SCH MG: 25 TABLET, EXTENDED RELEASE ORAL at 10:48

## 2022-07-05 RX ADMIN — SERTRALINE HYDROCHLORIDE SCH MG: 50 TABLET, FILM COATED ORAL at 10:44

## 2022-07-05 RX ADMIN — METHYLPREDNISOLONE SODIUM SUCCINATE SCH MG: 40 INJECTION, POWDER, FOR SOLUTION INTRAMUSCULAR; INTRAVENOUS at 08:18

## 2022-07-05 RX ADMIN — IPRATROPIUM BROMIDE SCH MG: 0.5 SOLUTION RESPIRATORY (INHALATION) at 08:29

## 2022-07-05 RX ADMIN — MELOXICAM SCH MG: 7.5 TABLET ORAL at 10:48

## 2022-07-05 RX ADMIN — CLOPIDOGREL BISULFATE SCH MG: 75 TABLET ORAL at 10:41

## 2022-07-05 RX ADMIN — BUDESONIDE AND FORMOTEROL FUMARATE DIHYDRATE SCH PUFF: 80; 4.5 AEROSOL RESPIRATORY (INHALATION) at 08:29

## 2022-07-05 NOTE — P.DS
Providers


Date of admission: 


07/02/22 14:36





Expected date of discharge: 07/05/22


Attending physician: 


Amalia Bazan





Consults: 





                                        





07/03/22 11:42


Consult Physician Routine 


   Consulting Provider: Corina Pickard


   Consult Reason/Comments: CHF,Copd exacerbation,possible pna


   Do you want consulting provider notified?: Already Contacted











Primary care physician: 


[facility] Course: 





This is a 73-year-old pleasant gentleman, well-known to my practice, as well as 

Dr. Pickard.  Patient has advanced ceroid dependent COPD, chronic hypoxemic 

respiratory failure, on 40% to cannula at home, lives with the wife, who has 

called me today as the patient is having some worsening shortness of breath, no 

edema, no hemoptysis, however there is some brown sputum expectoration, and 

fever.  100.8.  Catapres was not done at home,





 Other significant history includes prior perforated duodenal ulcer, with 

exploring up to pay off perforated blood blister, incarcerated umbilical hernia,

prior FEV1 of 46%, prior echocardiogram April 20 2155-60% with concentric LVH, 

no AAS, moderate pulmonary hypertension, mild MR and mild TR.  Chest x-ray, 

findings of COPD with superimposed pulmonary vascular congestion, no acute 

infiltrates, there is flattening of the diaphragm, with increased lucency of 

lung apices WC 13.1, INR 1.0, sodium 133, creatinine 0.79, glucose 105 no pro-

calcitonin level obtained, NP proBNP is 4260, and a virus PCR negative influenza

A and B is negative admitted for COPD CHF exacerbation, with febrile illness, no

urinalysis obtained, we'll check the urine for pyuria, blood cultures, consult. 

Dr. Pickard pulmonary medicine IV Solu-Medrol, IV Lasix, empiric antibiotics, 

Rocephin and Zithromax





7/3: Patient has less shortness of breath, no fevers, pyuria noted, some pr

oductive purulent sputum, clean, no difficulty breathing, pulse ox at 91-98% on 

3-4 L nasal cannula, blood pressure is between , systolic patient is on 

Rocephin and Zithromax, to cover both purulent thick bronchitis, and UTI, 

cultures are sent, pending currently.  Sputum requested for cultures and Gram 

stain patient is on Solu-Medrol, 60 mg every 6 hours, patient is on chronic oral

prednisone maintenance at home 2.5 mg daily, pulmonary would follow, Dr. Pickard





7/4, shortness breath is less, he is on 4 L nasal cannula, pulse ox 92%, patient

was hypotensive last night, systolic in 80s, required a bolus only 500 mL, 

worrisome regarding CHF exacerbation, again was diuresed this morning, using 

low-dose Lasix 20 mg, has been started on midodrine, counseled regarding 

nutritional supplementation, urine cultures currently pending,  Solu-Medrol l 

will be decreased to 40 mg every 8 hours awaiting finalized sputum cultures and 

urine cultures prior to discharge on IV Rocephin and Zithromax and a PT /OT 

ongoing, patient's debilitated prior to his admission





7/5: Patient states that his breathing is just about at baseline and he is 

normally on 3 L of oxygen at home.  He is on 4 L with a pulse ox of 99%.  He's 

been afebrile, heart rate 67, blood pressure 114/68.  He has been transitioned 

to oral prednisone.  Patient will be discharged home today in stable condition.








DISCHARGE DIAGNOSES


1.  Acute on chronic hypoxic respiratory failure secondary to COPD exacerbation 

with extensive pulmonary fibrosis, associated interstitial lung disease. 


2.  End-stage COPD, chronic hypoxemic respiratory failure acute on chronic with 

chronic prednisone use.


3.  Osteoporotic thoracic fractures compression and chronic pain, on chronic 

opiates,


4.  History of duodenal ulceration and GI bleed, with surgical repair, and 

repair of incarcerated umbilical hernia, with subsequent development of 

intraperitoneal abscess requiring incision and drainage, and prolonged 

antibiotic course chronically on PPI.


5.  Chronic prednisone, with cushingoid features.


6.  Moderate pulmonary hypertension.


7.  Hypertension.  


8.  Rheumatoid arthritis, with polyarthritis, and chronic joint abnormalities 

stable. 


9.  Hyperlipidemia.  


10.  Obstructive sleep apnea unable to tolerate CPAP.


11.  Chronic back pain and generalized osteoarthritis. 


12.  History of diastolic CHF, ejection fraction is normal


13.  Generalized anxiety disorder and recurrent depression.  


14.  Insomnia.  





DISCHARGE PLAN


Home


Greater than 35 minutes was utilized and coordinating patient's discharge.


Impression and plan of care have been directed as dictated by the signing 

physician.  Chloé Maldonado nurse practitioner acting as scribe for signing 

physician.


Patient Condition at Discharge: Good





Plan - Discharge Summary


Discharge Rx Participant: Yes


New Discharge Prescriptions: 


New


   predniSONE 0 mg PO AS DIRECTED #32 tab





Continue


   Metoprolol Succinate [Toprol XL] 25 mg PO DAILY


   Sertraline [Zoloft] 150 mg PO DAILY


   Celecoxib [CeleBREX] 200 mg PO DAILY


   Clopidogrel [Plavix] 75 mg PO DAILY #14 tab


   Fluticasone Nasal Spray [Flonase Nasal Spray] 1 spr EA NOSTRIL BID


   Multivitamins, Thera [Multivitamin (formulary)] 1 tab PO DAILY


   Alendronate Sodium [Fosamax] 70 mg PO MO


   Atorvastatin [Lipitor] 40 mg PO HS


   predniSONE 2.5 mg PO DAILY


   Amitriptyline HCl [Elavil] 10 mg PO HS


   Ascorbic Acid [Vitamin C] 1,000 mg PO DAILY


   Ipratropium-Albuterol Nebulize [Duoneb 0.5 mg-3 mg/3 ml Soln] 3 ml INHALATION

RT-QID


   Thiamine [Vitamin B-1] 100 mg PO BID-W/MEALS  tab


   Calcium Carbonate 1,000 mg PO Q8H PRN


     PRN Reason: Indigestion


   Acetaminophen [Tylenol] 650 mg PO Q6H PRN


     PRN Reason: Pain


   HYDROcodone/APAP 10-325MG [Norco ] 1 tab PO Q8HR PRN 3 Days #9 tab


     PRN Reason: Pain


   Benzonatate [Tessalon Perles] 100 - 200 mg PO Q8H


   Fluticasone/Umeclidin/Vilanter [Trelegy Ellipta 100-62.5-25] 1 puff 

INHALATION RT-DAILY


   Furosemide [Lasix] 40 mg PO DAILY


   Omeprazole 40 mg PO DAILY


Discharge Medication List





Metoprolol Succinate [Toprol XL] 25 mg PO DAILY 05/12/20 [History]


Sertraline [Zoloft] 150 mg PO DAILY 05/12/20 [History]


Amitriptyline HCl [Elavil] 10 mg PO HS 04/06/21 [History]


Celecoxib [CeleBREX] 200 mg PO DAILY 04/06/21 [History]


Ascorbic Acid [Vitamin C] 1,000 mg PO DAILY 04/10/21 [History]


Ipratropium-Albuterol Nebulize [Duoneb 0.5 mg-3 mg/3 ml Soln] 3 ml INHALATION 

RT-QID 04/10/21 [History]


Clopidogrel [Plavix] 75 mg PO DAILY #14 tab 04/13/21 [Rx]


Fluticasone Nasal Spray [Flonase Nasal Spray] 1 spr EA NOSTRIL BID 11/08/21 

[History]


Thiamine [Vitamin B-1] 100 mg PO BID-W/MEALS  tab 11/10/21 [Rx]


Acetaminophen [Tylenol] 650 mg PO Q6H PRN 11/21/21 [History]


Calcium Carbonate 1,000 mg PO Q8H PRN 11/21/21 [History]


Multivitamins, Thera [Multivitamin (formulary)] 1 tab PO DAILY 11/21/21 

[History]


HYDROcodone/APAP 10-325MG [Norco ] 1 tab PO Q8HR PRN 3 Days #9 tab 

11/25/21 [Rx]


Alendronate Sodium [Fosamax] 70 mg PO MO 07/02/22 [History]


Atorvastatin [Lipitor] 40 mg PO HS 07/02/22 [History]


Benzonatate [Tessalon Perles] 100 - 200 mg PO Q8H 07/02/22 [History]


Fluticasone/Umeclidin/Vilanter [Trelegy Ellipta 100-62.5-25] 1 puff INHALATION 

RT-DAILY 07/02/22 [History]


Furosemide [Lasix] 40 mg PO DAILY 07/02/22 [History]


Omeprazole 40 mg PO DAILY 07/02/22 [History]


predniSONE 2.5 mg PO DAILY 07/02/22 [History]


predniSONE 0 mg PO AS DIRECTED #32 tab 07/05/22 [Rx]








Follow up Appointment(s)/Referral(s): 


Amalia Bazan MD [Primary Care Provider] - 1 Week


Corina Pickard MD [STAFF PHYSICIAN] - 1 Week


Patient Instructions/Handouts:  COPD (Chronic Obstructive Pulmonary Disease) 

(DC), Chronic Lung Disease and Infection Prevention (DC)


Discharge Disposition: HOME SELF-CARE

## 2022-07-05 NOTE — P.PN
Subjective


Progress Note Date: 07/05/22


Principal diagnosis: 


 Shortness of breath





71-year-old male patient was presented to the hospital or symptoms of worsening 

shortness of breath, over the past several days.  The patient has compromised 

lung including pulmonary fibrosis and the patient has been oxygen dependent.  Th

e patient also has a component of COPD.  The patient apparently had a 

temperature of 100.0.  Pulse ox is 86% on 3 L of oxygen by nasal cannula.  No 

nausea.  No vomiting.  The patient no chest pain.  He decided to come in to the 

emergency and the patient was found to have a white cell count of 15 with a 

normal coagulation profile, normal electrolytes, UA was abnormal with positive 

white cells and the proBNP level of 4260.  COVID 19 testing was negative.  

Influenza screen was negative.   The patient is currently on a combination of 

Rocephin and Zithromax.  The patient is also on IV Solu-Medrol.  The patient is 

being diuresis with Lasix 20 mg IV every 12 hours.    Symptoms started 

approximately a week ago and worse over the past few days.  According to the 

patient, he has history of COPD and pulmonary fibrosis.  Based on the previous 

pulmonary function test, the patient's FEV1 has been order of 46% of predicted 

and FVC has been order of 72% of predicted and this is based on a spirometer t

his was done 2017.  The patient has been steroid dependent for many years.  He 

has history of rheumatoid arthritis and has been on immunosuppressive therapy in

the past in the form of Humira injections.  He has not taken Humira for now.  

The patient has chronic hypoxic respiratory failure and typically is on 3-1/2 L 

of oxygen by nasal cannula.  He has been hospitalized in the past for septic 

shock and he has a previous history of duodenal ulcer perforation was repaired 

surgically.  He has obstructive sleep apnea and his nontolerant to CPAP therapy.





07/04/2022, the patient is feeling slightly better.  The patient is being there 

is IV Lasix.  The fluid balance has been negative and the patient remains on 

Lasix 20 mg IV every 12 hours.  The patient is also on IV Solu Medrol.  The 

patient on DuoNeb neb treatments around the clock.  No fever.  No chills.  No 

altered mentation.  The white cell count 11.3 from yesterday and the Pronestyl 

level is at 0.27.





On 07/05/2022 patient seen in follow-up on medical surgical floor, he is awake 

and oriented 3, basic up in the chair, states his breathing is improving.  On 4

L of oxygen his pulse ox is 96-99%, his been afebrile, vital signs have been 

stable.  He had no acute events overnight.  His chest x-ray showed COPD with 

superimposed pulmonary vascular congestion, proBNP was elevated at 4260, pro-

calcitonin level was 0.27, he is a urinalysis showed possibility of a urinary 

tract infection, he tested negative for COVID-19 influenza A and B.  He is being

treated with a combination of antibiotics, steroids, and IV diuretics.  He 

responded favorably to inpatient treatments, and he is requesting to go home 

today.








Objective





- Vital Signs


Vital signs: 


                                   Vital Signs











Temp  98.1 F   07/05/22 07:22


 


Pulse  72   07/05/22 12:05


 


Resp  16   07/05/22 11:07


 


BP  114/68   07/05/22 08:12


 


Pulse Ox  99   07/05/22 07:22


 


FiO2      








                                 Intake & Output











 07/04/22 07/05/22 07/05/22





 18:59 06:59 18:59


 


Intake Total 360  296


 


Output Total  900 100


 


Balance 360 -900 196


 


Intake:   


 


  Oral 360  296


 


Output:   


 


  Urine  900 100


 


Other:   


 


  Voiding Method  Toilet 





  Urinal 


 


  # Voids 3 2 














- Exam


 GENERAL EXAM: Alert, very pleasant, 72-year-old white male, 4 L of oxygen pulse

ox of 96-99%, comfortable in no apparent distress.


HEAD: Normocephalic/atraumatic.


EYES: Normal reaction of pupils, equal size.  Conjunctiva pink, sclera white.


NOSE: Clear with pink turbinates.


THROAT: No erythema or exudates.


NECK: No masses, no JVD, no thyroid enlargement, no adenopathy.


CHEST: No chest wall deformity.  Symmetrical expansion. 


LUNGS: Diminished air entry with no crackles, wheeze, rhonchi or dullness.


CVS: Regular rate and rhythm, normal S1 and S2, no gallops, no murmurs, no rubs


ABDOMEN: Soft, nontender.  No hepatosplenomegaly, normal bowel sounds, no 

guarding or rigidity.


EXTREMITIES: No clubbing, no edema, no cyanosis, 2+ pulses and upper and lower 

extremities.


MUSCULOSKELETAL: Muscle strength and tone normal.


SPINE: No scoliosis or deformity


SKIN: No rashes


CENTRAL NERVOUS SYSTEM: Alert and oriented -3.  No focal deficits, tone is 

normal in all 4 extremities.


PSYCHIATRIC: Alert and oriented -3.  Appropriate affect.  Intact judgment and 

insight.











- Labs


CBC & Chem 7: 


                                 07/03/22 06:57





                                 07/02/22 11:18


Labs: 


                      Microbiology - Last 24 Hours (Table)











 07/02/22 11:20 Blood Culture - Preliminary





 Blood    No Growth after 48 hours


 


 07/02/22 11:05 Blood Culture - Preliminary





 Blood    No Growth after 48 hours


 


 07/02/22 20:46 Urine Culture - Final





 Urine,Voided 














Assessment and Plan


Plan: 


 1 acute exacerbation of chronic COPD/pulmonary fibrosis, with a possible 

component of CHF as the patient's proBNP level is elevated.  No major changes in

the chest x-ray findings.  Covid 19 testing was negative.  Influenza screen was 

negative.  





2 chronic hypoxic respiratory failure and the patient is demented on oxygen and 

underwent 3-1/2 L on outpatient basis





3 advanced COPD/pulmonary fibrosis, oxygen and steroid dependent, maintain on 

Trilogy and the patient management and also on low-dose prednisone outpatient 

basis





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





5 history of duodenal ulcer perforation requiring surgical repair and repair of 

incarcerated umbilical hernia, with subsequent development of 

intraperitoneal/abdominal abscess requiring incision and drainage and prolonged 

antibiotic course





7 rheumatoid arthritis, with secondary chronic joint deformities related to 

rheumatoid arthritis





8 obstructive sleep apnea not receiving any CPAP therapy at this point in time





9 chronic back pain





10 hyperlipidemia





11 osteoarthritis





12 previous history of pseudomonal infection of the lungs/colonization with 

Pseudomonas back in 2021





13 suspected UTI





14 moderate degree of pulmonary hypertension related to chronic lung disease.  

PA pressure was 56 with a preserved LV function and mild LVH





Plan:





Patient is improving, breathing easier


Patient has been diuresed, remains on antibiotics, IV steroids and breathing 

treatments


Vital signs have been stable,


From pulmonary perspective patient is stable for discharge home today


He can complete outpatient prednisone taper, he can resume his home Trelegy 

inhaler, DuoNeb, oral Lasix,


Outpatient follow-up with Dr. Pickard in the office in one week





I have personally seen and examined the patient, performed the documentation and

the assessment and  plan as written.  Number of minutes spent on the visit: [10]

 





Time with Patient: Less than 30 No

## 2022-10-26 ENCOUNTER — HOSPITAL ENCOUNTER (INPATIENT)
Dept: HOSPITAL 47 - EC | Age: 72
LOS: 3 days | Discharge: HOME HEALTH | DRG: 193 | End: 2022-10-29
Attending: HOSPITALIST | Admitting: HOSPITALIST
Payer: MEDICARE

## 2022-10-26 DIAGNOSIS — F41.9: ICD-10-CM

## 2022-10-26 DIAGNOSIS — Z88.0: ICD-10-CM

## 2022-10-26 DIAGNOSIS — M54.9: ICD-10-CM

## 2022-10-26 DIAGNOSIS — I50.32: ICD-10-CM

## 2022-10-26 DIAGNOSIS — J43.9: ICD-10-CM

## 2022-10-26 DIAGNOSIS — Z79.899: ICD-10-CM

## 2022-10-26 DIAGNOSIS — J18.9: Primary | ICD-10-CM

## 2022-10-26 DIAGNOSIS — Z99.81: ICD-10-CM

## 2022-10-26 DIAGNOSIS — M19.90: ICD-10-CM

## 2022-10-26 DIAGNOSIS — Z79.1: ICD-10-CM

## 2022-10-26 DIAGNOSIS — G47.33: ICD-10-CM

## 2022-10-26 DIAGNOSIS — Z79.52: ICD-10-CM

## 2022-10-26 DIAGNOSIS — Z79.02: ICD-10-CM

## 2022-10-26 DIAGNOSIS — M06.9: ICD-10-CM

## 2022-10-26 DIAGNOSIS — Z79.83: ICD-10-CM

## 2022-10-26 DIAGNOSIS — G89.29: ICD-10-CM

## 2022-10-26 DIAGNOSIS — Z87.01: ICD-10-CM

## 2022-10-26 DIAGNOSIS — E78.5: ICD-10-CM

## 2022-10-26 DIAGNOSIS — I49.1: ICD-10-CM

## 2022-10-26 DIAGNOSIS — J96.21: ICD-10-CM

## 2022-10-26 DIAGNOSIS — F32.A: ICD-10-CM

## 2022-10-26 DIAGNOSIS — Z79.51: ICD-10-CM
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
## 2022-10-26 DIAGNOSIS — G31.9: ICD-10-CM

## 2022-10-26 DIAGNOSIS — Z87.891: ICD-10-CM

## 2022-10-26 DIAGNOSIS — Z20.822: ICD-10-CM

## 2022-10-26 LAB
ALBUMIN SERPL-MCNC: 3.1 G/DL (ref 3.5–5)
ALP SERPL-CCNC: 101 U/L (ref 38–126)
ALT SERPL-CCNC: 18 U/L (ref 4–49)
ANION GAP SERPL CALC-SCNC: 9 MMOL/L
APTT BLD: 23.7 SEC (ref 22–30)
AST SERPL-CCNC: 20 U/L (ref 17–59)
BASOPHILS # BLD AUTO: 0.1 K/UL (ref 0–0.2)
BASOPHILS NFR BLD AUTO: 1 %
BUN SERPL-SCNC: 19 MG/DL (ref 9–20)
CALCIUM SPEC-MCNC: 8.3 MG/DL (ref 8.4–10.2)
CHLORIDE SERPL-SCNC: 92 MMOL/L (ref 98–107)
CO2 SERPL-SCNC: 34 MMOL/L (ref 22–30)
EOSINOPHIL # BLD AUTO: 0.2 K/UL (ref 0–0.7)
EOSINOPHIL NFR BLD AUTO: 1 %
ERYTHROCYTE [DISTWIDTH] IN BLOOD BY AUTOMATED COUNT: 4.39 M/UL (ref 4.3–5.9)
ERYTHROCYTE [DISTWIDTH] IN BLOOD: 15.2 % (ref 11.5–15.5)
GLUCOSE SERPL-MCNC: 135 MG/DL (ref 74–99)
HCT VFR BLD AUTO: 40.3 % (ref 39–53)
HGB BLD-MCNC: 13.2 GM/DL (ref 13–17.5)
INR PPP: 1 (ref ?–1.2)
LYMPHOCYTES # SPEC AUTO: 1.5 K/UL (ref 1–4.8)
LYMPHOCYTES NFR SPEC AUTO: 12 %
MCH RBC QN AUTO: 30.1 PG (ref 25–35)
MCHC RBC AUTO-ENTMCNC: 32.9 G/DL (ref 31–37)
MCV RBC AUTO: 91.6 FL (ref 80–100)
MONOCYTES # BLD AUTO: 0.6 K/UL (ref 0–1)
MONOCYTES NFR BLD AUTO: 5 %
NEUTROPHILS # BLD AUTO: 9.9 K/UL (ref 1.3–7.7)
NEUTROPHILS NFR BLD AUTO: 80 %
PLATELET # BLD AUTO: 471 K/UL (ref 150–450)
POTASSIUM SERPL-SCNC: 4.4 MMOL/L (ref 3.5–5.1)
PROT SERPL-MCNC: 6.7 G/DL (ref 6.3–8.2)
PT BLD: 10.7 SEC (ref 9–12)
SODIUM SERPL-SCNC: 135 MMOL/L (ref 137–145)
WBC # BLD AUTO: 12.5 K/UL (ref 3.8–10.6)

## 2022-10-26 PROCEDURE — 96365 THER/PROPH/DIAG IV INF INIT: CPT

## 2022-10-26 PROCEDURE — 93005 ELECTROCARDIOGRAM TRACING: CPT

## 2022-10-26 PROCEDURE — 87635 SARS-COV-2 COVID-19 AMP PRB: CPT

## 2022-10-26 PROCEDURE — 70450 CT HEAD/BRAIN W/O DYE: CPT

## 2022-10-26 PROCEDURE — 36415 COLL VENOUS BLD VENIPUNCTURE: CPT

## 2022-10-26 PROCEDURE — 85730 THROMBOPLASTIN TIME PARTIAL: CPT

## 2022-10-26 PROCEDURE — 80048 BASIC METABOLIC PNL TOTAL CA: CPT

## 2022-10-26 PROCEDURE — 94760 N-INVAS EAR/PLS OXIMETRY 1: CPT

## 2022-10-26 PROCEDURE — 85610 PROTHROMBIN TIME: CPT

## 2022-10-26 PROCEDURE — 96375 TX/PRO/DX INJ NEW DRUG ADDON: CPT

## 2022-10-26 PROCEDURE — 83880 ASSAY OF NATRIURETIC PEPTIDE: CPT

## 2022-10-26 PROCEDURE — 84145 PROCALCITONIN (PCT): CPT

## 2022-10-26 PROCEDURE — 80053 COMPREHEN METABOLIC PANEL: CPT

## 2022-10-26 PROCEDURE — 84484 ASSAY OF TROPONIN QUANT: CPT

## 2022-10-26 PROCEDURE — 71046 X-RAY EXAM CHEST 2 VIEWS: CPT

## 2022-10-26 PROCEDURE — 83605 ASSAY OF LACTIC ACID: CPT

## 2022-10-26 PROCEDURE — 94640 AIRWAY INHALATION TREATMENT: CPT

## 2022-10-26 PROCEDURE — 85025 COMPLETE CBC W/AUTO DIFF WBC: CPT

## 2022-10-26 PROCEDURE — 71275 CT ANGIOGRAPHY CHEST: CPT

## 2022-10-26 PROCEDURE — 87040 BLOOD CULTURE FOR BACTERIA: CPT

## 2022-10-26 PROCEDURE — 72125 CT NECK SPINE W/O DYE: CPT

## 2022-10-26 PROCEDURE — 87502 INFLUENZA DNA AMP PROBE: CPT

## 2022-10-26 PROCEDURE — 71045 X-RAY EXAM CHEST 1 VIEW: CPT

## 2022-10-26 PROCEDURE — 99285 EMERGENCY DEPT VISIT HI MDM: CPT

## 2022-10-26 PROCEDURE — 96366 THER/PROPH/DIAG IV INF ADDON: CPT

## 2022-10-26 PROCEDURE — 85379 FIBRIN DEGRADATION QUANT: CPT

## 2022-10-26 RX ADMIN — METRONIDAZOLE SCH MLS/HR: 500 INJECTION, SOLUTION INTRAVENOUS at 20:59

## 2022-10-26 RX ADMIN — AZITHROMYCIN MONOHYDRATE SCH MLS/HR: 500 INJECTION, POWDER, LYOPHILIZED, FOR SOLUTION INTRAVENOUS at 20:02

## 2022-10-26 NOTE — CT
EXAMINATION TYPE: CT brain cspine wo con

 

DATE OF EXAM: 10/26/2022

 

COMPARISON: 11/8/2021

 

HISTORY: fall, ams

 

CT DLP: 1412 mGycm

Automated exposure control for dose reduction was used.

 

Images of the brain and cervical spine obtained with no contrast.

 

There is cerebral cortical atrophy. There is enlargement of the ventricles. There is no mass effect o
r midline shift. No sign of intracranial hemorrhage. Calvarium is intact. There is normal aeration of
 the mastoid sinuses.

 

The cervical vertebra show fairly normal alignment. There is no significant disc space narrowing. Pos
terior elements are intact. There is minimal hypertrophic facet arthropathy. No compression fracture.
 There is some pleural thickening and infiltrate at the posterior lung apices.

 

IMPRESSION:

Cerebral atrophy. No acute intracranial abnormality.

 

Minor degenerative changes in the cervical spine. No fracture.

 

No significant change compared to old exam.

## 2022-10-26 NOTE — CT
EXAMINATION TYPE: CT chest angio for PE

 

DATE OF EXAM: 10/26/2022

 

COMPARISON: 11/21/2021

 

HISTORY: SOB, elevated d-dimer

 

CT DLP: 263.8 mGycm

 

CONTRAST: 

CT chest with contrast and 3D reconstruction with MIP imaging is performed with IV Contrast, patient 
injected with 100 mL of Isovue 370.

 

Contrast-enhanced CT of the chest was performed through the course of the pulmonary arteries with luisa
g and mediastinal window settings submitted.  3D reconstruction with MIP imaging was also performed.

 

PULMONARY ARTERIES:  The pulmonary arteries and their major tributaries are patent.  I do not see angie
dence for sizable filling defect to suggest pulmonary embolic process. 

 

LUNGS: Severe emphysematous changes underlying fibrosis. Airspace consolidation left upper lobe. Tien
elate for pneumonia or aspiration pneumonia. Bronchial wall thickening. Interstitial basilar prominen
ce.

 

MEDIASTINUM:  Thoracic aorta is of normal caliber,however, evaluation is limited given timing of the 
contrast bolus.  If there is concern for thoracic aortic pathology consider BAL.  Correlate clinicall
y . The heart is not enlarged.  No evidence for mediastinal mass.  No mediastinal lymph nodes greater
 than 1cm.

 

HILAR STRUCTURES: No evidence for mass.  No hilar lymph nodes greater than 1 cm.

 

UPPER ABDOMEN:  No significant abnormality is seen.

 

IMPRESSION:

1.  No evidence for Pulmonary embolism at this time. 

2 correlate for underlying pneumonia. Underlying fibrotic change noted.

## 2022-10-26 NOTE — ED
SOB HPI





- General


Chief Complaint: Shortness of Breath


Stated Complaint: SOB


Time Seen by Provider: 10/26/22 16:41


Source: patient, EMS, RN notes reviewed


Mode of arrival: EMS





- History of Present Illness


Initial Comments: 


This is a 72-year-old male who presents to the emergency department for 

shortness of breath and weakness.  His wife states that over the last 3 days, he

has not been eating and has been very lethargic.  He is also struggling more to 

breathe and has a cough.  He has severe COPD and is on 6 L of oxygen at home.  

His wife states that earlier today, his oxygen had dropped into the high 80s.  

Additionally, yesterday he had a fall from standing and hit the front of his 

head.  He did not have any loss of consciousness.  Patient is on blood thinners.

 He does have a history of multiple admissions for pneumonia and COPD 

exacerbations.





Denies any fevers, chills, sore throat, chest pain, palpitations, abdominal 

pain, nausea, vomiting, diarrhea, back pain, or headaches.





MD Complaint: shortness of breath, cough


Onset/Timing: 3


-: days(s)


Known History Of: COPD





- Related Data


Home Oxygen Therapy: Yes


Home Oxygen Amount: other (6 Liters)


                                Home Medications











 Medication  Instructions  Recorded  Confirmed


 


Metoprolol Succinate [Toprol XL] 25 mg PO DAILY 20


 


Sertraline [Zoloft] 150 mg PO DAILY 20


 


Amitriptyline HCl [Elavil] 10 mg PO HS 21


 


Celecoxib [CeleBREX] 200 mg PO DAILY 21


 


Ascorbic Acid [Vitamin C] 1,000 mg PO DAILY 04/10/21 07/02/22


 


Ipratropium-Albuterol Nebulize 3 ml INHALATION RT-QID 04/10/21 07/02/22





[Duoneb 0.5 mg-3 mg/3 ml Soln]   


 


Fluticasone Nasal Spray [Flonase 1 spr EA NOSTRIL BID 21





Nasal [place]]   


 


Acetaminophen [Tylenol] 650 mg PO Q6H PRN 21


 


Calcium Carbonate 1,000 mg PO Q8H PRN 21


 


Multivitamins, Thera [Multivitamin 1 tab PO DAILY 21





(formulary)]   


 


Alendronate Sodium [Fosamax] 70 mg PO MO 22


 


Atorvastatin [Lipitor] 40 mg PO HS 22


 


Benzonatate [Tessalon Perles] 100 - 200 mg PO Q8H 22


 


Fluticasone/Umeclidin/Vilanter 1 puff INHALATION RT-DAILY 22





[Trelegy Ellipta 100-62.5-25]   


 


Furosemide [Lasix] 40 mg PO DAILY 22


 


Omeprazole 40 mg PO DAILY 22


 


predniSONE 2.5 mg PO DAILY 22








                                  Previous Rx's











 Medication  Instructions  Recorded


 


Clopidogrel [Plavix] 75 mg PO DAILY #14 tab 21


 


Thiamine [Vitamin B-1] 100 mg PO BID-W/MEALS  tab 11/10/21


 


HYDROcodone/APAP 10-325MG [Norco 1 tab PO Q8HR PRN 3 Days #9 tab 21





]  


 


predniSONE 0 mg PO AS DIRECTED #32 tab 22











                                    Allergies











Allergy/AdvReac Type Severity Reaction Status Date / Time


 


amoxicillin AdvReac  Nausea & Verified 22 16:09





   Vomiting  














Review of Systems


ROS Statement: 


Those systems with pertinent positive or pertinent negative responses have been 

documented in the HPI.





ROS Other: All systems not noted in ROS Statement are negative.





Past Medical History


Past Medical History: Heart Failure, COPD, Hyperlipidemia, Osteoarthritis (OA), 

Pneumonia, Rheumatoid Arthritis (RA), Sleep Apnea/CPAP/BIPAP


Additional Past Medical History / Comment(s): COPD, chronic hypoxic respiratory 

failure previous history of pneumoperitoneum related to a perforated duodenal 

ulcer, repair of an umbilical hernia, chronic back pain, back stenosis,


History of Any Multi-Drug Resistant Organisms: None Reported


Past Surgical History: Orthopedic Surgery


Additional Past Surgical History / Comment(s): colonoscopy, repair of a 

perforated duodenal ulcer and repair of an incarcerated umbilical hernia


Past Anesthesia/Blood Transfusion Reactions: No Reported Reaction


Past Psychological History: Anxiety, Depression


Smoking Status: Former smoker


Past Alcohol Use History: Daily


Past Drug Use History: None Reported





- Past Family History


  ** Father


Family Medical History: Cancer


Additional Family Medical History / Comment(s): Father  of leukemia.





  ** Mother


Family Medical History: Cancer, CVA/TIA, Dementia, Diabetes Mellitus


Additional Family Medical History / Comment(s): Mother is 88yrs old with history

of dementia and colon cancer.





  ** Sister(s)


Additional Family Medical History / Comment(s): Patient 3 sons with no major 

medical problems.





General Exam


General appearance: alert, in no apparent distress


Head exam: Present: atraumatic, normocephalic, normal inspection


Neck exam: Present: normal inspection.  Absent: tenderness, meningismus, 

lymphadenopathy


Respiratory exam: Present: decreased breath sounds, prolonged expiratory


Cardiovascular Exam: Present: regular rate, normal rhythm, normal heart sounds. 

Absent: systolic murmur, diastolic murmur, rubs, gallop, clicks


Neurological exam: Present: alert, oriented X3, CN II-XII intact


Psychiatric exam: Present: normal affect, normal mood


Skin exam: Present: warm, dry, intact, normal color.  Absent: rash





Course


                                   Vital Signs











  10/26/22 10/26/22 10/26/22





  16:35 17:39 18:48


 


Temperature 97.5 F L  


 


Pulse Rate 98  89


 


Respiratory 18 22 18





Rate   


 


Blood Pressure 111/73  95/61


 


O2 Sat by Pulse 95  95





Oximetry   














Medical Decision Making





- Medical Decision Making


This is a 72-year-old male who presents to the emergency department for coughing

and shortness of breath.  Lab work reveals leukocytosis.  Chest x-ray reveals a 

new masslike consolidation in the left upper lobe suggestive of a pneumonia.  He

did have a high d-dimer and a CTA of the chest was subsequently obtained.  The 

CTA did not identify any signs of a pulmonary embolus.  BNP is mildly elevated, 

however not nearly as high as it has been in the past.  Troponin is also 

slightly bumped at 0.033.  Given the patient's chronic emphysema history and 

worsening shortness of breath, will admit patient for pneumonia for management 

with IV antibiotics.  He was started on the pneumonia protocol with Rocephin and

Azithromycin.  I did add Flagyl onto this regimen as well due to the radiologist

mentioning a possible aspiration pneumonia.  Will also continue to trend the 

troponins. 





This case was discussed in detail with the attending ED physician. Presentation,

findings, and treatment plan discussed in detail as well. 








- Lab Data


Result diagrams: 


                                 10/26/22 16:44





                                 10/26/22 16:44


                                   Lab Results











  10/26/22 10/26/22 10/26/22 Range/Units





  16:44 16:44 16:44 


 


WBC  12.5 H    (3.8-10.6)  k/uL


 


RBC  4.39    (4.30-5.90)  m/uL


 


Hgb  13.2    (13.0-17.5)  gm/dL


 


Hct  40.3    (39.0-53.0)  %


 


MCV  91.6    (80.0-100.0)  fL


 


MCH  30.1    (25.0-35.0)  pg


 


MCHC  32.9    (31.0-37.0)  g/dL


 


RDW  15.2    (11.5-15.5)  %


 


Plt Count  471 H    (150-450)  k/uL


 


MPV  7.4    


 


Neutrophils %  80    %


 


Lymphocytes %  12    %


 


Monocytes %  5    %


 


Eosinophils %  1    %


 


Basophils %  1    %


 


Neutrophils #  9.9 H    (1.3-7.7)  k/uL


 


Lymphocytes #  1.5    (1.0-4.8)  k/uL


 


Monocytes #  0.6    (0-1.0)  k/uL


 


Eosinophils #  0.2    (0-0.7)  k/uL


 


Basophils #  0.1    (0-0.2)  k/uL


 


Hypochromasia  Slight    


 


PT   10.7   (9.0-12.0)  sec


 


INR   1.0   (<1.2)  


 


APTT   23.7   (22.0-30.0)  sec


 


D-Dimer   2.05 H   (<0.60)  mg/L FEU


 


Sodium    135 L  (137-145)  mmol/L


 


Potassium    4.4  (3.5-5.1)  mmol/L


 


Chloride    92 L  ()  mmol/L


 


Carbon Dioxide    34 H  (22-30)  mmol/L


 


Anion Gap    9  mmol/L


 


BUN    19  (9-20)  mg/dL


 


Creatinine    0.61 L  (0.66-1.25)  mg/dL


 


Est GFR (CKD-EPI)AfAm    >90  (>60 ml/min/1.73 sqM)  


 


Est GFR (CKD-EPI)NonAf    >90  (>60 ml/min/1.73 sqM)  


 


Glucose    135 H  (74-99)  mg/dL


 


Plasma Lactic Acid José Miguel     (0.7-2.0)  mmol/L


 


Calcium    8.3 L  (8.4-10.2)  mg/dL


 


Total Bilirubin    0.5  (0.2-1.3)  mg/dL


 


AST    20  (17-59)  U/L


 


ALT    18  (4-49)  U/L


 


Alkaline Phosphatase    101  ()  U/L


 


Troponin I     (0.000-0.034)  ng/mL


 


NT-Pro-B Natriuret Pep     pg/mL


 


Total Protein    6.7  (6.3-8.2)  g/dL


 


Albumin    3.1 L  (3.5-5.0)  g/dL


 


Coronavirus (PCR)     (Not Detectd)  


 


Influenza Type A RNA     (Not Detectd)  


 


Influenza Type B (PCR)     (Not Detectd)  














  10/26/22 10/26/22 10/26/22 Range/Units





  16:44 16:44 16:44 


 


WBC     (3.8-10.6)  k/uL


 


RBC     (4.30-5.90)  m/uL


 


Hgb     (13.0-17.5)  gm/dL


 


Hct     (39.0-53.0)  %


 


MCV     (80.0-100.0)  fL


 


MCH     (25.0-35.0)  pg


 


MCHC     (31.0-37.0)  g/dL


 


RDW     (11.5-15.5)  %


 


Plt Count     (150-450)  k/uL


 


MPV     


 


Neutrophils %     %


 


Lymphocytes %     %


 


Monocytes %     %


 


Eosinophils %     %


 


Basophils %     %


 


Neutrophils #     (1.3-7.7)  k/uL


 


Lymphocytes #     (1.0-4.8)  k/uL


 


Monocytes #     (0-1.0)  k/uL


 


Eosinophils #     (0-0.7)  k/uL


 


Basophils #     (0-0.2)  k/uL


 


Hypochromasia     


 


PT     (9.0-12.0)  sec


 


INR     (<1.2)  


 


APTT     (22.0-30.0)  sec


 


D-Dimer     (<0.60)  mg/L FEU


 


Sodium     (137-145)  mmol/L


 


Potassium     (3.5-5.1)  mmol/L


 


Chloride     ()  mmol/L


 


Carbon Dioxide     (22-30)  mmol/L


 


Anion Gap     mmol/L


 


BUN     (9-20)  mg/dL


 


Creatinine     (0.66-1.25)  mg/dL


 


Est GFR (CKD-EPI)AfAm     (>60 ml/min/1.73 sqM)  


 


Est GFR (CKD-EPI)NonAf     (>60 ml/min/1.73 sqM)  


 


Glucose     (74-99)  mg/dL


 


Plasma Lactic Acid José Miguel  0.8    (0.7-2.0)  mmol/L


 


Calcium     (8.4-10.2)  mg/dL


 


Total Bilirubin     (0.2-1.3)  mg/dL


 


AST     (17-59)  U/L


 


ALT     (4-49)  U/L


 


Alkaline Phosphatase     ()  U/L


 


Troponin I   0.033   (0.000-0.034)  ng/mL


 


NT-Pro-B Natriuret Pep    1050  pg/mL


 


Total Protein     (6.3-8.2)  g/dL


 


Albumin     (3.5-5.0)  g/dL


 


Coronavirus (PCR)     (Not Detectd)  


 


Influenza Type A RNA     (Not Detectd)  


 


Influenza Type B (PCR)     (Not Detectd)  














  10/26/22 10/26/22 Range/Units





  16:50 16:50 


 


WBC    (3.8-10.6)  k/uL


 


RBC    (4.30-5.90)  m/uL


 


Hgb    (13.0-17.5)  gm/dL


 


Hct    (39.0-53.0)  %


 


MCV    (80.0-100.0)  fL


 


MCH    (25.0-35.0)  pg


 


MCHC    (31.0-37.0)  g/dL


 


RDW    (11.5-15.5)  %


 


Plt Count    (150-450)  k/uL


 


MPV    


 


Neutrophils %    %


 


Lymphocytes %    %


 


Monocytes %    %


 


Eosinophils %    %


 


Basophils %    %


 


Neutrophils #    (1.3-7.7)  k/uL


 


Lymphocytes #    (1.0-4.8)  k/uL


 


Monocytes #    (0-1.0)  k/uL


 


Eosinophils #    (0-0.7)  k/uL


 


Basophils #    (0-0.2)  k/uL


 


Hypochromasia    


 


PT    (9.0-12.0)  sec


 


INR    (<1.2)  


 


APTT    (22.0-30.0)  sec


 


D-Dimer    (<0.60)  mg/L FEU


 


Sodium    (137-145)  mmol/L


 


Potassium    (3.5-5.1)  mmol/L


 


Chloride    ()  mmol/L


 


Carbon Dioxide    (22-30)  mmol/L


 


Anion Gap    mmol/L


 


BUN    (9-20)  mg/dL


 


Creatinine    (0.66-1.25)  mg/dL


 


Est GFR (CKD-EPI)AfAm    (>60 ml/min/1.73 sqM)  


 


Est GFR (CKD-EPI)NonAf    (>60 ml/min/1.73 sqM)  


 


Glucose    (74-99)  mg/dL


 


Plasma Lactic Acid José Miguel    (0.7-2.0)  mmol/L


 


Calcium    (8.4-10.2)  mg/dL


 


Total Bilirubin    (0.2-1.3)  mg/dL


 


AST    (17-59)  U/L


 


ALT    (4-49)  U/L


 


Alkaline Phosphatase    ()  U/L


 


Troponin I    (0.000-0.034)  ng/mL


 


NT-Pro-B Natriuret Pep    pg/mL


 


Total Protein    (6.3-8.2)  g/dL


 


Albumin    (3.5-5.0)  g/dL


 


Coronavirus (PCR)   Not Detected  (Not Detectd)  


 


Influenza Type A RNA  Not Detected   (Not Detectd)  


 


Influenza Type B (PCR)  Not Detected   (Not Detectd)  














- EKG Data


EKG Comments: 


Sinus rhythm with occasional PVCs.  Ventricular rate 99 bpm, HI interval 124 ms,

QRS duration 87 ms,  ms.








- Radiology Data


Radiology results: report reviewed, image reviewed





Disposition


Clinical Impression: 


 Pneumonia, COPD exacerbation





Disposition: ADMITTED AS IP TO THIS HOSP


Referrals: 


Amalia Bazan MD [Primary Care Provider] - 1-2 days

## 2022-10-26 NOTE — XR
EXAMINATION TYPE: XR chest 2V

 

DATE OF EXAM: 10/26/2022

 

COMPARISON: 7/2/2022

 

HISTORY: Short of breath

 

TECHNIQUE: 2 views

 

FINDINGS: There is irregular masslike consolidation in the anterior left upper lobe that measures 6 x
 8 cm. There is extensive interstitial coarse densities throughout the lungs. There is thoracic mild 
kyphosis with anterior wedging of mid thoracic vertebra up to 80%. No definite pleural effusion. Hear
t size is normal.

 

IMPRESSION: Advanced pulmonary interstitial fibrosis. There is a new masslike consolidation left uppe
r lobe compared to old exams that is likely pneumonia. Follow-up is recommended to show clearing.

## 2022-10-27 RX ADMIN — ATORVASTATIN CALCIUM SCH MG: 40 TABLET, FILM COATED ORAL at 21:00

## 2022-10-27 RX ADMIN — IPRATROPIUM BROMIDE AND ALBUTEROL SULFATE SCH ML: .5; 3 SOLUTION RESPIRATORY (INHALATION) at 19:28

## 2022-10-27 RX ADMIN — FORMOTEROL FUMARATE DIHYDRATE SCH: 20 SOLUTION RESPIRATORY (INHALATION) at 08:13

## 2022-10-27 RX ADMIN — BUDESONIDE SCH: 1 SUSPENSION RESPIRATORY (INHALATION) at 08:13

## 2022-10-27 RX ADMIN — PANTOPRAZOLE SODIUM SCH MG: 40 TABLET, DELAYED RELEASE ORAL at 11:43

## 2022-10-27 RX ADMIN — IPRATROPIUM BROMIDE AND ALBUTEROL SULFATE SCH: .5; 3 SOLUTION RESPIRATORY (INHALATION) at 08:13

## 2022-10-27 RX ADMIN — IPRATROPIUM BROMIDE AND ALBUTEROL SULFATE SCH ML: .5; 3 SOLUTION RESPIRATORY (INHALATION) at 15:22

## 2022-10-27 RX ADMIN — AZITHROMYCIN MONOHYDRATE SCH MLS/HR: 500 INJECTION, POWDER, LYOPHILIZED, FOR SOLUTION INTRAVENOUS at 19:40

## 2022-10-27 RX ADMIN — AMITRIPTYLINE HYDROCHLORIDE SCH MG: 10 TABLET, FILM COATED ORAL at 21:00

## 2022-10-27 RX ADMIN — IPRATROPIUM BROMIDE AND ALBUTEROL SULFATE SCH ML: .5; 3 SOLUTION RESPIRATORY (INHALATION) at 11:16

## 2022-10-27 RX ADMIN — BUDESONIDE SCH MG: 1 SUSPENSION RESPIRATORY (INHALATION) at 19:28

## 2022-10-27 RX ADMIN — METHYLPREDNISOLONE SODIUM SUCCINATE SCH MG: 125 INJECTION, POWDER, FOR SOLUTION INTRAMUSCULAR; INTRAVENOUS at 18:57

## 2022-10-27 RX ADMIN — FORMOTEROL FUMARATE DIHYDRATE SCH MCG: 20 SOLUTION RESPIRATORY (INHALATION) at 19:28

## 2022-10-27 RX ADMIN — HEPARIN SODIUM SCH UNIT: 5000 INJECTION INTRAVENOUS; SUBCUTANEOUS at 21:00

## 2022-10-27 RX ADMIN — METHYLPREDNISOLONE SODIUM SUCCINATE SCH MG: 125 INJECTION, POWDER, FOR SOLUTION INTRAMUSCULAR; INTRAVENOUS at 11:43

## 2022-10-27 RX ADMIN — METOPROLOL SUCCINATE SCH MG: 25 TABLET, EXTENDED RELEASE ORAL at 11:43

## 2022-10-27 RX ADMIN — METRONIDAZOLE SCH MLS/HR: 500 INJECTION, SOLUTION INTRAVENOUS at 06:00

## 2022-10-27 NOTE — P.HPIM
History of Present Illness


H&P Date: 10/27/22


Chief Complaint: Weakness





Patient is a 72-year-old male with a known history of COPD, hyperlipidemia, 

obstructive sleep apnea, and history of perforated duodenal ulcer and chronic 

back pain, anxiety/depression.  History of smoking presents to ER with 

complaints of generalized weakness and lethargy and cough with shortness of 

breath for the past 2 to 3 days.  Patient also had fall and hit his head.  

Denies any complaints of fever.  No nausea vomiting abdominal pain or diarrhea. 

No complaints of chest pain.


CT head and cervical spine showed cerebral atrophy.  No acute infection 

abnormality.  Minor degenerative changes in the cervical spine.  No fracture.  

No significant change compared to old exam.


CTA chest showed no evidence for PE.  Correlate for underlying pneumonia.  

Underlying fibrotic changes noted.


EKG showed sinus rhythm with occasional supraventricular premature complexes.


Laboratory data showed WBC 12.4 hemoglobin 13.1 platelets 471


D-dimer 2.05


Sodium 134 potassium 4.4 chloride 92 bicarb 34 BUN 19 and creatinine 0.61 and 

blood sugar is 135 and calcium 8.3 and troponin x3 negative proBNP 1050 and 

procalcitonin level is 0.13 and coronavirus PCR not detected.








Review of Systems





Constitutional: Patient denies any fever or chills .  Patient does have 

generalized weakness and fatigue.


Abdomen: Patient denied any nausea or vomiting or abd. pain


Cardiovascular: Patient denies any chest pain positive for short of breath no 

palpitations.


Respiratory: Patient does have cough without sputum production.  Shortness of 

breath.  Neurologic: Patient denied any numbness or tingling headache.


Musculoskeletal: Patient denies any complaints of joint swelling or deformity.


Skin: Negative


Psychiatric: Negative


Endocrine: No heat or cold intolerance.  No recent weight gain.


Genitourinary: No dysuria or hematuria.


All other 14 point ROS negative except the above





Past Medical History


Past Medical History: Heart Failure, COPD, Hyperlipidemia, Osteoarthritis (OA), 

Pneumonia, Rheumatoid Arthritis (RA), Sleep Apnea/CPAP/BIPAP


Additional Past Medical History / Comment(s): COPD, chronic hypoxic respiratory 

failure previous history of pneumoperitoneum related to a perforated duodenal 

ulcer, repair of an umbilical hernia, chronic back pain, back stenosis,


History of Any Multi-Drug Resistant Organisms: None Reported


Past Surgical History: Orthopedic Surgery


Additional Past Surgical History / Comment(s): colonoscopy, repair of a 

perforated duodenal ulcer and repair of an incarcerated umbilical hernia, left 

knee arthroscopy


Past Anesthesia/Blood Transfusion Reactions: No Reported Reaction


Past Psychological History: Anxiety, Depression


Additional Psychological History / Comment(s): Pt resides with his spouse of 38 

yrs.  He has home oxygen which he wears at 6L/NC.  He has a nebulizer.


Smoking Status: Former smoker


Past Alcohol Use History: Daily


Additional Past Alcohol Use History / Comment(s): Patient smoked 2 packs per day

for 40+ years.  He quit 6 years ago.  He denies any illicit drug use or 

marijuana use.  He drinks 2 beers per day but none since previous admission and 

May.  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 

involved


Past Drug Use History: None Reported





- Past Family History


  ** Father


Family Medical History: Cancer


Additional Family Medical History / Comment(s): Father  of leukemia.





  ** Mother


Family Medical History: Cancer, CVA/TIA, Dementia, Diabetes Mellitus


Additional Family Medical History / Comment(s): Mother is 88yrs old with history

of dementia and colon cancer.





  ** Sister(s)


Additional Family Medical History / Comment(s): Patient 3 sons with no major 

medical problems.





Medications and Allergies


                                Home Medications











 Medication  Instructions  Recorded  Confirmed  Type


 


Metoprolol Succinate [Toprol XL] 25 mg PO DAILY 05/12/20 10/26/22 History


 


Sertraline [Zoloft] 150 mg PO DAILY 05/12/20 10/26/22 History


 


Amitriptyline HCl [Elavil] 10 mg PO HS 04/06/21 10/26/22 History


 


Celecoxib [CeleBREX] 200 mg PO DAILY 04/06/21 10/26/22 History


 


Clopidogrel [Plavix] 75 mg PO DAILY #14 tab 04/13/21 10/26/22 Rx


 


Fluticasone Nasal Spray [Flonase 1 spr EA NOSTRIL BID 11/08/21 10/26/22 History





Nasal Spray]    


 


Alendronate Sodium [Fosamax] 70 mg PO MO 07/02/22 10/26/22 History


 


Atorvastatin [Lipitor] 40 mg PO HS 07/02/22 10/26/22 History


 


Fluticasone/Umeclidin/Vilanter 1 puff INHALATION RT-DAILY 07/02/22 10/26/22 

History





[Trelegy Ellipta 100-62.5-25]    


 


Omeprazole 40 mg PO DAILY 07/02/22 10/26/22 History


 


predniSONE 2.5 mg PO DAILY 07/02/22 10/26/22 History


 


Budesonide [Pulmicort] 0.5 mg INHALATION RT-BID 10/26/22 10/26/22 History


 


Leflunomide [Arava] 20 mg PO DAILY 10/26/22 10/26/22 History








                                    Allergies











Allergy/AdvReac Type Severity Reaction Status Date / Time


 


amoxicillin AdvReac  Nausea & Verified 10/26/22 22:00





   Vomiting  














Physical Exam


Vitals: 


                                   Vital Signs











  Temp Pulse Pulse Resp BP BP Pulse Ox


 


 10/27/22 08:00  97.3 F L   83  17   94/55  89 L


 


 10/27/22 00:30  98.4 F   81  17   93/44  92 L


 


 10/27/22 00:00   71   15  108/72   98


 


 10/26/22 18:48   89   18  95/61   95


 


 10/26/22 17:39     22   


 


 10/26/22 16:35  97.5 F L  98   18  111/73   95








                                Intake and Output











 10/26/22 10/27/22 10/27/22





 22:59 06:59 14:59


 


Other:   


 


  # Voids  1 


 


  Weight 58.967 kg 58.967 kg 














PHYSICAL EXAMINATION: 


Patient is lying in the bed comfortably, no acute distress, awake alert and 

oriented.. 


HEENT: Normocephalic. Neck is supple. Pupils reactive. Nostrils clear. Oral 

cavity is moist. 


Neck reveals no JVD, carotid bruits, or thyromegaly. 


CHEST EXAMINATION: Trachea is central. Symmetrical expansion. Bibasilar 

diminished sounds and minimal crackles.  Nonlabored breathing.  Mild expiratory 

wheezing.. 


CARDIAC: Normal S1, S2 with no gallops. No murmurs 


ABDOMEN: Soft. Bowel sounds present.  Nontender.  No organomegaly. No abdominal 

bruits. 


Extremities: reveal no edema.  No clubbing or cyanosis


Neurologically awake, alert, oriented x3 with well-coordinated movements.  No 

focal deficits noted


Skin: No rash or skin lesions. 


Psychiatric: Coperative.  Nonsuicidal,


Musculoskeletal: No joint swelling or deformity.  Normal range of motion.





Results


CBC & Chem 7: 


                                 10/26/22 16:44





                                 10/26/22 16:44


Labs: 


                  Abnormal Lab Results - Last 24 Hours (Table)











  10/26/22 10/26/22 10/26/22 Range/Units





  16:44 16:44 16:44 


 


WBC  12.5 H    (3.8-10.6)  k/uL


 


Plt Count  471 H    (150-450)  k/uL


 


Neutrophils #  9.9 H    (1.3-7.7)  k/uL


 


D-Dimer   2.05 H   (<0.60)  mg/L FEU


 


Sodium    135 L  (137-145)  mmol/L


 


Chloride    92 L  ()  mmol/L


 


Carbon Dioxide    34 H  (22-30)  mmol/L


 


Creatinine    0.61 L  (0.66-1.25)  mg/dL


 


Glucose    135 H  (74-99)  mg/dL


 


Calcium    8.3 L  (8.4-10.2)  mg/dL


 


Albumin    3.1 L  (3.5-5.0)  g/dL














Thrombosis Risk Factor Assmnt





- DVT/VTE Prophylaxis


DVT/VTE Prophylaxis: Pharmacologic Prophylaxis ordered





- Choose All That Apply


Any of the Below Risk Factors Present?: Yes


Each Factor Represents 1 point: Abnormal pulmonary function (COPD)


Other Risk Factors: Yes


Each Risk Factor Represents 2 Points: Age 61-74 years


Other congenital or acquired thrombophilia - If yes, enter type in comment: No


Thrombosis Risk Factor Assessment Total Risk Factor Score: 3


Thrombosis Risk Factor Assessment Level: Moderate Risk





Assessment and Plan


Assessment: 








Acute hypoxic respiratory failure secondary to pneumonia and COPD exacerbation


Left lower lobe pneumonia.


Chronic hypoxic respiratory failure secondary to COPD on home oxygen via nasal 

cannula.


Hyperlipidemia


Osteoarthritis


History of rheumatoid arthritis


Obstructive sleep apnea on CPAP at home


History of perforated duodenal ulcer


History of depression


Chronic CHF with preserved ejection fraction


Place history of smoking


DVT prophylaxis with heparin subcu





plan:


Patient is being current oxygen supplementation.


Continue with duo nebs and IV Solu-Medrol and antibiotics ceftriaxone and 

azithromycin.  Continue with home medications. Follow-up sputum cultures and bl

ood cultures.  Pulmonary is on board.  Follow-up CBC and BMP tomorrow.  

Prognosis is guarded at this time.





Time with Patient: Greater than 30

## 2022-10-27 NOTE — P.CNPUL
History of Present Illness


Consult date: 10/27/22


Requesting physician: Carlita Byrnes


Reason for consult: dyspnea, cough, COPD, hypoxemia, pneumonia, abnormal CXR/CT


Chief complaint: Shortness of breath and cough.


History of present illness: 





Pulmonary consult dated 10/27/2022.





72-year-old male well-known to me, who has a history of COPD.  He does use 

oxygen at home.  Apparently for the last 2 or 3 days, he has been more 

lethargic, not eating, or drinking.  He also has been complaining of shortness 

of breath, and cough.  The patient was eventually seen in the emergency room, 

brought in by EMS, on , and admitted with a diagnosis of COPD 

exacerbation, and left-sided pneumonia.  He apparently had a fall prior to being

admitted, and he hit the front of his head.  His saturations apparently were 

also quite low, in the mid 80s.  Currently, he is on 4 L nasal cannula.  He is 

getting saline at 20 mL an hour.  Chest x-ray and CAT scan are reviewed and show

an infiltrate/pneumonia in the left lung.  White count 12.5, hemoglobin 13.2, 

hematocrit 3, and platelet count 471,000.  D-dimer is 2.05.  Coagulation studies

are normal.  Sodium 135, potassium 4.4, chlorides 92, CO2 34, BUN 19, and 

creatinine 0.61.  Anion gap is normal.  Albumin 3.1.  Troponins were 0.033, 

0.032, and 0.028.  Testing for coronavirus was negative.





Review of Systems





REVIEW OF SYSTEMS:  


CONSTITUTIONAL: Weakness.


NEUROLOGIC: [ Negative.]


HEENT:  [ Negative.]


CARDIAC:  [Negative.]


PULMONARY: Shortness of breath and cough.


GI:  [Negative.]


:  [Negative.]


RHEUMATOLOGIC: [ Negative.]


IMMUNOLOGIC: [ Negative.]


ENDOCRINE:  [Negative.  ] 


DERMATOLOGIC: [Negative.]








Past Medical History


Past Medical History: Heart Failure, COPD, Hyperlipidemia, Osteoarthritis (OA), 

Pneumonia, Rheumatoid Arthritis (RA), Sleep Apnea/CPAP/BIPAP


Additional Past Medical History / Comment(s): COPD, chronic hypoxic respiratory 

failure previous history of pneumoperitoneum related to a perforated duodenal 

ulcer, repair of an umbilical hernia, chronic back pain, back stenosis,


History of Any Multi-Drug Resistant Organisms: None Reported


Past Surgical History: Orthopedic Surgery


Additional Past Surgical History / Comment(s): colonoscopy, repair of a 

perforated duodenal ulcer and repair of an incarcerated umbilical hernia, left 

knee arthroscopy


Past Anesthesia/Blood Transfusion Reactions: No Reported Reaction


Past Psychological History: Anxiety, Depression


Additional Psychological History / Comment(s): Pt resides with his spouse of 38 

yrs.  He has home oxygen which he wears at 6L/NC.  He has a nebulizer.


Smoking Status: Former smoker


Past Alcohol Use History: Daily


Additional Past Alcohol Use History / Comment(s): Patient smoked 2 packs per day

 for 40+ years.  He quit 6 years ago.  He denies any illicit drug use or 

marijuana use.  He drinks 2 beers per day but none since previous admission and 

May.  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 

involved


Past Drug Use History: None Reported





- Past Family History


  ** Father


Family Medical History: Cancer


Additional Family Medical History / Comment(s): Father  of leukemia.





  ** Mother


Family Medical History: Cancer, CVA/TIA, Dementia, Diabetes Mellitus


Additional Family Medical History / Comment(s): Mother is 88yrs old with history

 of dementia and colon cancer.





  ** Sister(s)


Additional Family Medical History / Comment(s): Patient 3 sons with no major 

medical problems.





Medications and Allergies


                                Home Medications











 Medication  Instructions  Recorded  Confirmed  Type


 


Metoprolol Succinate [Toprol XL] 25 mg PO DAILY 05/12/20 10/26/22 History


 


Sertraline [Zoloft] 150 mg PO DAILY 05/12/20 10/26/22 History


 


Amitriptyline HCl [Elavil] 10 mg PO HS 04/06/21 10/26/22 History


 


Celecoxib [CeleBREX] 200 mg PO DAILY 04/06/21 10/26/22 History


 


Clopidogrel [Plavix] 75 mg PO DAILY #14 tab 04/13/21 10/26/22 Rx


 


Fluticasone Nasal Spray [Flonase 1 spr EA NOSTRIL BID 11/08/21 10/26/22 History





Nasal Spray]    


 


Alendronate Sodium [Fosamax] 70 mg PO MO 07/02/22 10/26/22 History


 


Atorvastatin [Lipitor] 40 mg PO HS 07/02/22 10/26/22 History


 


Fluticasone/Umeclidin/Vilanter 1 puff INHALATION RT-DAILY 07/02/22 10/26/22 

History





[Trelegy Ellipta 100-62.5-25]    


 


Omeprazole 40 mg PO DAILY 07/02/22 10/26/22 History


 


predniSONE 2.5 mg PO DAILY 07/02/22 10/26/22 History


 


Budesonide [Pulmicort] 0.5 mg INHALATION RT-BID 10/26/22 10/26/22 History


 


Leflunomide [Arava] 20 mg PO DAILY 10/26/22 10/26/22 History








                                    Allergies











Allergy/AdvReac Type Severity Reaction Status Date / Time


 


amoxicillin AdvReac  Nausea & Verified 10/26/22 22:00





   Vomiting  














Physical Exam


Osteopathic Statement: *.  No significant issues noted on an osteopathic 

structural exam other than those noted in the History and Physical/Consult.


Vitals: 


                                   Vital Signs











  Temp Pulse Pulse Resp BP BP Pulse Ox


 


 10/27/22 08:00  97.3 F L   83  21   94/55  89 L


 


 10/27/22 00:30  98.4 F   81  17   93/44  92 L


 


 10/27/22 00:00   71   15  108/72   98


 


 10/26/22 18:48   89   18  95/61   95


 


 10/26/22 17:39     22   


 


 10/26/22 16:35  97.5 F L  98   18  111/73   95








                                Intake and Output











 10/26/22 10/27/22 10/27/22





 22:59 06:59 14:59


 


Other:   


 


  # Voids  1 


 


  Weight 58.967 kg 58.967 kg 














No acute distress, oriented 3.  Laying in bed.  On 4 L of oxygen.  No 

conversational dyspnea or use of accessory muscles.





HEENT examination is grossly unremarkable.  





Neck supple.  Full range of motion.  No adenopathy thyromegaly or neck vein 

distention.





Cardiovascular examination reveals regular rhythm rate.  S1-S2 normal.  No S3 or

 S4.  No discernible murmur noted.  Heart rate 83 bpm.





Lungs reveal scattered rhonchi throughout.  Breath sounds are equal bilaterally 

but diminished throughout.  No crackles or wheezes.  





Abdomen soft bowel sounds are heard.  No masses or tenderness.





Extremities are intact.  No cyanosis clubbing or edema.





Skin is without rash or lesion.





Neurologic examination is brief but nonfocal.





Results





- Laboratory Findings


CBC and BMP: 


                                 10/26/22 16:44





                                 10/26/22 16:44


PT/INR, D-dimer











PT  10.7 sec (9.0-12.0)   10/26/22  16:44    


 


INR  1.0  (<1.2)   10/26/22  16:44    


 


D-Dimer  2.05 mg/L FEU (<0.60)  H  10/26/22  16:44    








Abnormal lab findings: 


                                  Abnormal Labs











  10/26/22 10/26/22 10/26/22





  16:44 16:44 16:44


 


WBC  12.5 H  


 


Plt Count  471 H  


 


Neutrophils #  9.9 H  


 


D-Dimer   2.05 H 


 


Sodium    135 L


 


Chloride    92 L


 


Carbon Dioxide    34 H


 


Creatinine    0.61 L


 


Glucose    135 H


 


Calcium    8.3 L


 


Albumin    3.1 L














- Diagnostic Findings


Chest x-ray: image reviewed


CT scan - chest: image reviewed





Assessment and Plan


Assessment: 





Acute hypoxemic respiratory failure, secondary to COPD exacerbation, and 

complicated by left-sided pneumonia.





History of severe oxygen-dependent COPD.





History of CHF.





History of hyperlipidemia.





History of osteoarthritis.





History of rheumatoid arthritis.





History of sleep apnea syndrome, maintained on CPAP.





History of perforated duodenal ulcer.  





History of anxiety/depression.





Previous history of heavy tobacco use.


Plan: 





Plan dated 10/27/2022.





Patient is continued on azithromycin and Rocephin.  We had albuterol sulfate and

 ipratropium bromide updraft treatments.  In addition, we had Pulmicort, and 

formoterol, usual doses, and Solu-Medrol, 60 mg every 6 hours.  Additional 

recommendations and suggestions are forthcoming.  We will continue to follow the

 patient and make recommendations along the way.  Prognosis is certainly 

guarded.  Labs, x-rays, and medications are reviewed.


Time with Patient: Greater than 30

## 2022-10-28 LAB
ANION GAP SERPL CALC-SCNC: 7.2 MMOL/L (ref 10–18)
BASOPHILS # BLD AUTO: 0.02 X 10*3/UL (ref 0–0.1)
BASOPHILS NFR BLD AUTO: 0.2 %
BUN SERPL-SCNC: 15.1 MG/DL (ref 9–27)
BUN/CREAT SERPL: 27.26 RATIO (ref 12–20)
CALCIUM SPEC-MCNC: 8.3 MG/DL (ref 8.7–10.3)
CHLORIDE SERPL-SCNC: 99 MMOL/L (ref 96–109)
CO2 SERPL-SCNC: 31.3 MMOL/L (ref 20–27.5)
EOSINOPHIL # BLD AUTO: 0 X 10*3/UL (ref 0.04–0.35)
EOSINOPHIL NFR BLD AUTO: 0 %
ERYTHROCYTE [DISTWIDTH] IN BLOOD BY AUTOMATED COUNT: 3.58 X 10*6/UL (ref 4.4–5.6)
ERYTHROCYTE [DISTWIDTH] IN BLOOD: 15 % (ref 11.5–14.5)
GLUCOSE SERPL-MCNC: 150 MG/DL (ref 70–110)
HCT VFR BLD AUTO: 33.3 % (ref 39.6–50)
HGB BLD-MCNC: 10.6 G/DL (ref 13–17)
IMM GRANULOCYTES BLD QL AUTO: 0.7 %
LYMPHOCYTES # SPEC AUTO: 0.96 X 10*3/UL (ref 0.9–5)
LYMPHOCYTES NFR SPEC AUTO: 9 %
MCH RBC QN AUTO: 29.6 PG (ref 27–32)
MCHC RBC AUTO-ENTMCNC: 31.8 G/DL (ref 32–37)
MCV RBC AUTO: 93 FL (ref 80–97)
MONOCYTES # BLD AUTO: 0.34 X 10*3/UL (ref 0.2–1)
MONOCYTES NFR BLD AUTO: 3.2 %
NEUTROPHILS # BLD AUTO: 9.29 X 10*3/UL (ref 1.8–7.7)
NEUTROPHILS NFR BLD AUTO: 86.9 %
NRBC BLD AUTO-RTO: 0 /100 WBCS (ref 0–0)
PLATELET # BLD AUTO: 496 X 10*3/UL (ref 140–440)
POTASSIUM SERPL-SCNC: 4.8 MMOL/L (ref 3.5–5.5)
SODIUM SERPL-SCNC: 137 MMOL/L (ref 135–145)
WBC # BLD AUTO: 10.69 X 10*3/UL (ref 4.5–10)

## 2022-10-28 RX ADMIN — METHYLPREDNISOLONE SODIUM SUCCINATE SCH MG: 125 INJECTION, POWDER, FOR SOLUTION INTRAMUSCULAR; INTRAVENOUS at 00:12

## 2022-10-28 RX ADMIN — SERTRALINE HYDROCHLORIDE SCH MG: 50 TABLET, FILM COATED ORAL at 09:50

## 2022-10-28 RX ADMIN — METHYLPREDNISOLONE SODIUM SUCCINATE SCH MG: 125 INJECTION, POWDER, FOR SOLUTION INTRAMUSCULAR; INTRAVENOUS at 18:10

## 2022-10-28 RX ADMIN — FORMOTEROL FUMARATE DIHYDRATE SCH MCG: 20 SOLUTION RESPIRATORY (INHALATION) at 19:43

## 2022-10-28 RX ADMIN — IPRATROPIUM BROMIDE AND ALBUTEROL SULFATE SCH ML: .5; 3 SOLUTION RESPIRATORY (INHALATION) at 11:54

## 2022-10-28 RX ADMIN — ATORVASTATIN CALCIUM SCH MG: 40 TABLET, FILM COATED ORAL at 20:40

## 2022-10-28 RX ADMIN — FORMOTEROL FUMARATE DIHYDRATE SCH MCG: 20 SOLUTION RESPIRATORY (INHALATION) at 07:25

## 2022-10-28 RX ADMIN — IPRATROPIUM BROMIDE AND ALBUTEROL SULFATE SCH ML: .5; 3 SOLUTION RESPIRATORY (INHALATION) at 07:25

## 2022-10-28 RX ADMIN — HEPARIN SODIUM SCH UNIT: 5000 INJECTION INTRAVENOUS; SUBCUTANEOUS at 09:54

## 2022-10-28 RX ADMIN — IPRATROPIUM BROMIDE AND ALBUTEROL SULFATE SCH ML: .5; 3 SOLUTION RESPIRATORY (INHALATION) at 19:43

## 2022-10-28 RX ADMIN — AMITRIPTYLINE HYDROCHLORIDE SCH MG: 10 TABLET, FILM COATED ORAL at 20:40

## 2022-10-28 RX ADMIN — METHYLPREDNISOLONE SODIUM SUCCINATE SCH MG: 125 INJECTION, POWDER, FOR SOLUTION INTRAMUSCULAR; INTRAVENOUS at 12:15

## 2022-10-28 RX ADMIN — CLOPIDOGREL BISULFATE SCH MG: 75 TABLET ORAL at 09:47

## 2022-10-28 RX ADMIN — METHYLPREDNISOLONE SODIUM SUCCINATE SCH MG: 125 INJECTION, POWDER, FOR SOLUTION INTRAMUSCULAR; INTRAVENOUS at 06:41

## 2022-10-28 RX ADMIN — IPRATROPIUM BROMIDE AND ALBUTEROL SULFATE SCH ML: .5; 3 SOLUTION RESPIRATORY (INHALATION) at 15:10

## 2022-10-28 RX ADMIN — PANTOPRAZOLE SODIUM SCH MG: 40 TABLET, DELAYED RELEASE ORAL at 06:41

## 2022-10-28 RX ADMIN — METOPROLOL SUCCINATE SCH MG: 25 TABLET, EXTENDED RELEASE ORAL at 09:50

## 2022-10-28 RX ADMIN — BUDESONIDE SCH MG: 1 SUSPENSION RESPIRATORY (INHALATION) at 07:25

## 2022-10-28 RX ADMIN — HEPARIN SODIUM SCH UNIT: 5000 INJECTION INTRAVENOUS; SUBCUTANEOUS at 20:41

## 2022-10-28 RX ADMIN — BUDESONIDE SCH MG: 1 SUSPENSION RESPIRATORY (INHALATION) at 19:43

## 2022-10-28 RX ADMIN — AZITHROMYCIN MONOHYDRATE SCH MLS/HR: 500 INJECTION, POWDER, LYOPHILIZED, FOR SOLUTION INTRAVENOUS at 18:12

## 2022-10-28 NOTE — P.PN
Subjective


Progress Note Date: 10/28/22


Principal diagnosis: 





Pneumonia.





Pulmonary consult dated 10/27/2022.





72-year-old male well-known to me, who has a history of COPD.  He does use 

oxygen at home.  Apparently for the last 2 or 3 days, he has been more 

lethargic, not eating, or drinking.  He also has been complaining of shortness 

of breath, and cough.  The patient was eventually seen in the emergency room, 

brought in by EMS, on October 26, and admitted with a diagnosis of COPD 

exacerbation, and left-sided pneumonia.  He apparently had a fall prior to being

admitted, and he hit the front of his head.  His saturations apparently were 

also quite low, in the mid 80s.  Currently, he is on 4 L nasal cannula.  He is 

getting saline at 20 mL an hour.  Chest x-ray and CAT scan are reviewed and show

an infiltrate/pneumonia in the left lung.  White count 12.5, hemoglobin 13.2, 

hematocrit 3, and platelet count 471,000.  D-dimer is 2.05.  Coagulation studies

are normal.  Sodium 135, potassium 4.4, chlorides 92, CO2 34, BUN 19, and 

creatinine 0.61.  Anion gap is normal.  Albumin 3.1.  Troponins were 0.033, 

0.032, and 0.028.  Testing for coronavirus was negative.





Progress note dated 10/28/2022.





72-year-old male admitted with a diagnosis of COPD exacerbation, and left-sided 

pneumonia.  Currently, the patient's resting comfortably in bed.  He is on 3 L 

of oxygen.  No IV fluids.  He is receiving both ceftriaxone and azithromycin for

his pneumonia.  Clinically he looks well.  His pneumonia quite extensive, both 

on chest x-ray and on computed tomography scan.  No new labs today.





Objective





- Vital Signs


Vital signs: 


                                   Vital Signs











Temp  97.3 F L  10/28/22 07:05


 


Pulse  78   10/28/22 07:47


 


Resp  17   10/28/22 07:05


 


BP  85/47   10/28/22 07:05


 


Pulse Ox  94 L  10/28/22 07:26


 


FiO2      








                                 Intake & Output











 10/27/22 10/28/22 10/28/22





 18:59 06:59 18:59


 


Output Total 50  


 


Balance -50  


 


Output:   


 


  Urine 50  


 


Other:   


 


  Voiding Method  Urinal 


 


  # Voids  1 














- Exam





No acute distress, oriented 3.  Laying in bed.  On 3.5 L of oxygen.  No 

conversational dyspnea or use of accessory muscles.





HEENT examination is grossly unremarkable.  





Neck supple.  Full range of motion.  No adenopathy thyromegaly or neck vein 

distention.





Cardiovascular examination reveals regular rhythm rate.  S1-S2 normal.  No S3 or

S4.  No discernible murmur noted.  Heart rate 78 bpm.





Lungs reveal scattered rhonchi throughout.  Breath sounds are equal bilaterally 

but diminished throughout.  No crackles or wheezes.   Saturations are 94%.





Abdomen soft bowel sounds are heard.  No masses or tenderness.





Extremities are intact.  No cyanosis clubbing or edema.





Skin is without rash or lesion.





Neurologic examination is brief but nonfocal.





- Labs


CBC & Chem 7: 


                                 10/26/22 16:44





                                 10/26/22 16:44


Labs: 


                  Abnormal Lab Results - Last 24 Hours (Table)











  10/26/22 Range/Units





  16:44 


 


Procalcitonin  0.13 H  (0.02-0.09)  ng/mL








                      Microbiology - Last 24 Hours (Table)











 10/26/22 20:20 Blood Culture - Preliminary





 Blood    No Growth after 24 hours


 


 10/26/22 20:10 Blood Culture - Preliminary





 Blood    No Growth after 24 hours














Assessment and Plan


Assessment: 





Acute hypoxemic respiratory failure, secondary to COPD exacerbation, and 

complicated by left-sided pneumonia.





History of severe oxygen-dependent COPD.





History of CHF.





History of hyperlipidemia.





History of osteoarthritis.





History of rheumatoid arthritis.





History of sleep apnea syndrome, maintained on CPAP.





History of perforated duodenal ulcer.  





History of anxiety/depression.





Previous history of heavy tobacco use.


Plan: 





Plan dated 10/27/2022.





Patient is continued on azithromycin and Rocephin.  We had albuterol sulfate and

ipratropium bromide updraft treatments.  In addition, we had Pulmicort, and 

formoterol, usual doses, and Solu-Medrol, 60 mg every 6 hours.  Additional 

recommendations and suggestions are forthcoming.  We will continue to follow the

patient and make recommendations along the way.  Prognosis is certainly guarded.

 Labs, x-rays, and medications are reviewed.





Plan dated 10/28/2022.





The patient continues on azithromycin and Rocephin.  The patient's also 

receiving breathing treatments with albuterol sulfate and ipratropium bromide.  

In addition, the patient's receiving Solu-Medrol, as well as a inhaled cortico

steroid and long-acting beta agonist.  The patient will have a chest x-ray done 

in the morning.  No additional recommendations are made.  Labs, x-rays, and 

medications are reviewed


Time with Patient: Less than 30

## 2022-10-28 NOTE — P.PN
Subjective


Progress Note Date: 10/28/22





72-year-old male with a known history of COPD, hyperlipidemia, obstructive sleep

apnea, and history of perforated duodenal ulcer and chronic back pain, 

anxiety/depression.  History of smoking presents to ER with complaints of 

generalized weakness and lethargy and cough with shortness of breath for the pas

t 2 to 3 days.  Patient also had fall and hit his head.  Denies any complaints 

of fever.  No nausea vomiting abdominal pain or diarrhea.  No complaints of 

chest pain.


CT head and cervical spine showed cerebral atrophy.  No acute infection 

abnormality.  Minor degenerative changes in the cervical spine.  No fracture.  

No significant change compared to old exam.


CTA chest showed no evidence for PE.  Correlate for underlying pneumonia.  

Underlying fibrotic changes noted.


EKG showed sinus rhythm with occasional supraventricular premature complexes.


Laboratory data showed WBC 12.4 hemoglobin 13.1 platelets 471


D-dimer 2.05


Sodium 134 potassium 4.4 chloride 92 bicarb 34 BUN 19 and creatinine 0.61 and 

blood sugar is 135 and calcium 8.3 and troponin x3 negative proBNP 1050 and 

procalcitonin level is 0.13 and coronavirus PCR not detected.





Objective





- Vital Signs


Vital signs: 


                                   Vital Signs











Temp  97.8 F   10/28/22 14:11


 


Pulse  98   10/28/22 14:11


 


Resp  24   10/28/22 14:11


 


BP  91/50   10/28/22 14:11


 


Pulse Ox  93 L  10/28/22 14:11


 


FiO2      








                                 Intake & Output











 10/27/22 10/28/22 10/28/22





 18:59 06:59 18:59


 


Intake Total   500


 


Output Total 50  


 


Balance -50  500


 


Intake:   


 


  Oral   500


 


Output:   


 


  Urine 50  


 


Other:   


 


  Voiding Method  Urinal 


 


  # Voids  1 2














- Exam





HEENT: Normocephalic. Neck is supple. Pupils reactive. Nostrils clear. Oral 

cavity is moist. 


Neck reveals no JVD, carotid bruits, or thyromegaly. 


CHEST EXAMINATION: Trachea is central. Symmetrical expansion. Bibasilar 

diminished sounds and minimal crackles.  Nonlabored breathing.  Mild expiratory 

wheezing.. 


CARDIAC: Normal S1, S2 with no gallops. No murmurs 


ABDOMEN: Soft. Bowel sounds present.  Nontender.  No organomegaly. No abdominal 

bruits. 


Extremities: reveal no edema.  No clubbing or cyanosis


Neurologically awake, alert, oriented x3 with well-coordinated movements.  No 

focal deficits noted


Skin: No rash or skin lesions. 


Psychiatric: Coperative.  Nonsuicidal,


Musculoskeletal: No joint swelling or deformity.  Normal range of motion.








- Labs


CBC & Chem 7: 


                                 10/28/22 05:41





                                 10/28/22 05:41


Labs: 


                  Abnormal Lab Results - Last 24 Hours (Table)











  10/28/22 10/28/22 Range/Units





  05:41 05:41 


 


WBC  10.69 H   (4.50-10.00)  X 10*3/uL


 


RBC  3.58 L   (4.40-5.60)  X 10*6/uL


 


Hgb  10.6 L   (13.0-17.0)  g/dL


 


Hct  33.3 L   (39.6-50.0)  %


 


MCHC  31.8 L   (32.0-37.0)  g/dL


 


RDW  15.0 H   (11.5-14.5)  %


 


Plt Count  496 H   (140-440)  X 10*3/uL


 


Immature Gran #  0.08 H   (0.00-0.04)  X 10*3/uL


 


Neutrophils #  9.29 H   (1.80-7.70)  X 10*3/uL


 


Eosinophils #  0 L   (0.04-0.35)  X 10*3/uL


 


Carbon Dioxide   31.3 H  (20.0-27.5)  mmol/L


 


Anion Gap   7.20 L  (10.00-18.00)  mmol/L


 


BUN/Creatinine Ratio   27.26 H  (12.00-20.00)  Ratio


 


Glucose   150 H  ()  mg/dL


 


Calcium   8.3 L  (8.7-10.3)  mg/dL








                      Microbiology - Last 24 Hours (Table)











 10/26/22 20:20 Blood Culture - Preliminary





 Blood    No Growth after 24 hours


 


 10/26/22 20:10 Blood Culture - Preliminary





 Blood    No Growth after 24 hours














Assessment and Plan


Assessment: 








Acute hypoxic respiratory failure secondary to pneumonia and COPD exacerbation


Left lower lobe pneumonia.


Chronic hypoxic respiratory failure secondary to COPD on home oxygen via nasal 

cannula.


Hyperlipidemia


Osteoarthritis


History of rheumatoid arthritis


Obstructive sleep apnea on CPAP at home


History of perforated duodenal ulcer


History of depression


Chronic CHF with preserved ejection fraction


Place history of smoking


DVT prophylaxis with heparin subcu





plan:


Patient is being current oxygen supplementation.


Continue with duo nebs and IV Solu-Medrol and antibiotics ceftriaxone and azithr

omycin.  Continue with home medications. Follow-up sputum cultures and blood 

cultures.  Pulmonary is on board.  Follow-up CBC and BMP tomorrow.  Prognosis is

guarded at this time.

## 2022-10-29 VITALS — DIASTOLIC BLOOD PRESSURE: 56 MMHG | RESPIRATION RATE: 16 BRPM | SYSTOLIC BLOOD PRESSURE: 108 MMHG | TEMPERATURE: 97.4 F

## 2022-10-29 VITALS — HEART RATE: 96 BPM

## 2022-10-29 LAB
ANION GAP SERPL CALC-SCNC: 6 MMOL/L
BASOPHILS # BLD AUTO: 0 K/UL (ref 0–0.2)
BASOPHILS NFR BLD AUTO: 0 %
BUN SERPL-SCNC: 16 MG/DL (ref 9–20)
CALCIUM SPEC-MCNC: 7.9 MG/DL (ref 8.4–10.2)
CHLORIDE SERPL-SCNC: 96 MMOL/L (ref 98–107)
CO2 SERPL-SCNC: 34 MMOL/L (ref 22–30)
EOSINOPHIL # BLD AUTO: 0 K/UL (ref 0–0.7)
EOSINOPHIL NFR BLD AUTO: 0 %
ERYTHROCYTE [DISTWIDTH] IN BLOOD BY AUTOMATED COUNT: 3.89 M/UL (ref 4.3–5.9)
ERYTHROCYTE [DISTWIDTH] IN BLOOD: 15.1 % (ref 11.5–15.5)
GLUCOSE SERPL-MCNC: 116 MG/DL (ref 74–99)
HCT VFR BLD AUTO: 36.7 % (ref 39–53)
HGB BLD-MCNC: 11.8 GM/DL (ref 13–17.5)
LYMPHOCYTES # SPEC AUTO: 0.8 K/UL (ref 1–4.8)
LYMPHOCYTES NFR SPEC AUTO: 7 %
MCH RBC QN AUTO: 30.3 PG (ref 25–35)
MCHC RBC AUTO-ENTMCNC: 32.1 G/DL (ref 31–37)
MCV RBC AUTO: 94.3 FL (ref 80–100)
MONOCYTES # BLD AUTO: 0.5 K/UL (ref 0–1)
MONOCYTES NFR BLD AUTO: 4 %
NEUTROPHILS # BLD AUTO: 9.9 K/UL (ref 1.3–7.7)
NEUTROPHILS NFR BLD AUTO: 88 %
PLATELET # BLD AUTO: 478 K/UL (ref 150–450)
POTASSIUM SERPL-SCNC: 4.8 MMOL/L (ref 3.5–5.1)
SODIUM SERPL-SCNC: 136 MMOL/L (ref 137–145)
WBC # BLD AUTO: 11.3 K/UL (ref 3.8–10.6)

## 2022-10-29 RX ADMIN — METHYLPREDNISOLONE SODIUM SUCCINATE SCH MG: 125 INJECTION, POWDER, FOR SOLUTION INTRAMUSCULAR; INTRAVENOUS at 12:41

## 2022-10-29 RX ADMIN — FORMOTEROL FUMARATE DIHYDRATE SCH MCG: 20 SOLUTION RESPIRATORY (INHALATION) at 08:06

## 2022-10-29 RX ADMIN — IPRATROPIUM BROMIDE AND ALBUTEROL SULFATE SCH: .5; 3 SOLUTION RESPIRATORY (INHALATION) at 11:43

## 2022-10-29 RX ADMIN — PANTOPRAZOLE SODIUM SCH MG: 40 TABLET, DELAYED RELEASE ORAL at 06:17

## 2022-10-29 RX ADMIN — BUDESONIDE SCH MG: 1 SUSPENSION RESPIRATORY (INHALATION) at 08:06

## 2022-10-29 RX ADMIN — IPRATROPIUM BROMIDE AND ALBUTEROL SULFATE SCH ML: .5; 3 SOLUTION RESPIRATORY (INHALATION) at 08:06

## 2022-10-29 RX ADMIN — METHYLPREDNISOLONE SODIUM SUCCINATE SCH MG: 125 INJECTION, POWDER, FOR SOLUTION INTRAMUSCULAR; INTRAVENOUS at 00:49

## 2022-10-29 RX ADMIN — METHYLPREDNISOLONE SODIUM SUCCINATE SCH MG: 125 INJECTION, POWDER, FOR SOLUTION INTRAMUSCULAR; INTRAVENOUS at 06:16

## 2022-10-29 RX ADMIN — HEPARIN SODIUM SCH UNIT: 5000 INJECTION INTRAVENOUS; SUBCUTANEOUS at 07:53

## 2022-10-29 RX ADMIN — CLOPIDOGREL BISULFATE SCH MG: 75 TABLET ORAL at 07:54

## 2022-10-29 RX ADMIN — SERTRALINE HYDROCHLORIDE SCH MG: 50 TABLET, FILM COATED ORAL at 07:54

## 2022-10-29 RX ADMIN — METOPROLOL SUCCINATE SCH MG: 25 TABLET, EXTENDED RELEASE ORAL at 07:54

## 2022-10-29 NOTE — P.PN
Subjective


Progress Note Date: 10/29/22





72-year-old male well-known to me, who has a history of COPD.  He does use 

oxygen at home.  Apparently for the last 2 or 3 days, he has been more 

lethargic, not eating, or drinking.  He also has been complaining of shortness 

of breath, and cough.  The patient was eventually seen in the emergency room, 

brought in by EMS, on October 26, and admitted with a diagnosis of COPD 

exacerbation, and left-sided pneumonia.  He apparently had a fall prior to being

admitted, and he hit the front of his head.  His saturations apparently were 

also quite low, in the mid 80s.  Currently, he is on 4 L nasal cannula.  He is 

getting saline at 20 mL an hour.  Chest x-ray and CAT scan are reviewed and show

an infiltrate/pneumonia in the left lung.  White count 12.5, hemoglobin 13.2, 

hematocrit 3, and platelet count 471,000.  D-dimer is 2.05.  Coagulation studies

are normal.  Sodium 135, potassium 4.4, chlorides 92, CO2 34, BUN 19, and 

creatinine 0.61.  Anion gap is normal.  Albumin 3.1.  Troponins were 0.033, 0.03

2, and 0.028.  Testing for coronavirus was negative.





Progress note dated 10/28/2022.





72-year-old male admitted with a diagnosis of COPD exacerbation, and left-sided 

pneumonia.  Currently, the patient's resting comfortably in bed.  He is on 3 L 

of oxygen.  No IV fluids.  He is receiving both ceftriaxone and azithromycin for

his pneumonia.  Clinically he looks well.  His pneumonia quite extensive, both 

on chest x-ray and on computed tomography scan.  No new labs today.





The patient was seen today 10/29/2022 in follow-up on the regular medical floor.

 He is currently sitting up in a chair at the bedside.  Awake and alert in no 

acute distress.  He is maintaining good O2 saturations in the 90s on 4 L/m per 

nasal cannula.  Blood cultures revealed no growth.  No new labs today.  He is 

continued on DuoNeb inhalations, Pulmicort and Perforomist inhalations, IV Solu-

Medrol.  Heparin for DVT prophylaxis.  Antibiotics in the form of ceftriaxone 

and azithromycin.  Procalcitonin 0.13.  Chest x-ray shows slightly improved left

lung pneumonia.





Objective





- Vital Signs


Vital signs: 


                                   Vital Signs











Temp  97.4 F L  10/29/22 08:00


 


Pulse  96   10/29/22 08:34


 


Resp  16   10/29/22 08:00


 


BP  108/56   10/29/22 08:00


 


Pulse Ox  97   10/29/22 08:00


 


FiO2      








                                 Intake & Output











 10/28/22 10/29/22 10/29/22





 18:59 06:59 18:59


 


Intake Total 750  120


 


Output Total  50 


 


Balance 750 -50 120


 


Intake:   


 


  Intake, IV Titration 250  





  Amount   


 


    Azithromycin 500 mg In 250  





    Sodium Chloride 0.9% 250   





    ml @ 250 mls/hr IVPB Q24H   





    [facility] Rx#:[id number]   


 


  Oral 500  120


 


Output:   


 


  Urine  50 


 


Other:   


 


  Voiding Method  Urinal 


 


  # Voids 2 1 1














- Exam





GENERAL EXAM: Alert, very pleasant 72-year-old male, up in a chair at the 

bedside, and 4 L nasal cannula, comfortable in no apparent distress.


HEAD: Normocephalic.


EYES: Normal reaction of pupils, equal size.


NOSE: Clear with pink turbinates.


THROAT: No erythema or exudates.


NECK: No masses, no JVD.


CHEST: No chest wall deformity.


LUNGS: Equal air entry with end expiratory wheeze, diminished.


CVS: S1 and S2 normal with no audible murmur, regular rhythm.


ABDOMEN: No hepatosplenomegaly, normal bowel sounds, no guarding or rigidity.


SPINE: No scoliosis or deformity


SKIN: No rashes


CENTRAL NERVOUS SYSTEM: No focal deficits, tone is normal in all 4 extremities.


EXTREMITIES: There is no peripheral edema.  No clubbing, no cyanosis.  

Peripheral pulses are intact.





- Labs


CBC & Chem 7: 


                                 10/28/22 05:41





                                 10/28/22 05:41


Labs: 


                      Microbiology - Last 24 Hours (Table)











 10/26/22 20:20 Blood Culture - Preliminary





 Blood    No Growth after 48 hours


 


 10/26/22 20:10 Blood Culture - Preliminary





 Blood    No Growth after 48 hours














Assessment and Plan


Assessment: 





Acute hypoxemic respiratory failure, secondary to COPD exacerbation, and 

complicated by left-sided pneumonia.





History of severe oxygen-dependent COPD.





History of CHF.





History of hyperlipidemia.





History of osteoarthritis.





History of rheumatoid arthritis.





History of sleep apnea syndrome, maintained on CPAP.





History of perforated duodenal ulcer.  





History of anxiety/depression.





Previous history of heavy tobacco use.





Plan: 





The patient was seen and evaluated


Chest x-ray medications reviewed


Cleared for discharge from the pulmonary standpoint


Complete prednisone taper starting at 40 mg daily for 4 days


Complete a seven-day course of antibiotics


Continue his home pulmonary medications and oxygen


Follow-up in our office in 1 week





I have personally seen and examined the patient, performed the documentation and

the assessment and plan as written.  Number of minutes spent on the visit: 10.

## 2022-10-29 NOTE — XR
EXAMINATION TYPE: XR chest 1V portable

 

DATE OF EXAM: 10/29/2022

 

COMPARISON: 10/26/2022

 

INDICATION: Left lung pneumonia

 

TECHNIQUE: Single frontal view of the chest is obtained.

 

FINDINGS:  

The heart size is normal.  

The pulmonary vasculature is normal.  

Patchy bilateral lung infiltrates are present. This is more focal in the left perihilar region. Ingrid
nued follow-up is recommended.  

 

 

IMPRESSION:  

1. Left perihilar infiltrate with mild diffuse increased lung markings present bilaterally. Correlate
 for pneumonia and atypical pneumonia. Continued follow-up is recommended.

## 2022-10-29 NOTE — P.DS
Providers


Date of admission: 


10/26/22 20:08





Expected date of discharge: 10/29/22


Attending physician: 


Carlita Byrnes





Primary care physician: 


[facility] Course: 





72-year-old male with a known history of COPD, hyperlipidemia, obstructive sleep

apnea, and history of perforated duodenal ulcer and chronic back pain, 

anxiety/depression.  History of smoking presents to ER with complaints of 

generalized weakness and lethargy and cough with shortness of breath for the 

past 2 to 3 days.  Patient also had fall and hit his head.  Denies any 

complaints of fever.  No nausea vomiting abdominal pain or diarrhea.  No 

complaints of chest pain.


CT head and cervical spine showed cerebral atrophy.  No acute infection 

abnormality.  Minor degenerative changes in the cervical spine.  No fracture.  

No significant change compared to old exam.


CTA chest showed no evidence for PE.  Correlate for underlying pneumonia.  

Underlying fibrotic changes noted.


EKG showed sinus rhythm with occasional supraventricular premature complexes.


Laboratory data showed WBC 12.4 hemoglobin 13.1 platelets 471


D-dimer 2.05


Sodium 134 potassium 4.4 chloride 92 bicarb 34 BUN 19 and creatinine 0.61 and 

blood sugar is 135 and calcium 8.3 and troponin x3 negative proBNP 1050 and 

procalcitonin level is 0.13 and coronavirus PCR not detected.


Acute hypoxic respiratory failure secondary to pneumonia and COPD exacerbation


Left lower lobe pneumonia.


Chronic hypoxic respiratory failure secondary to COPD on home oxygen via nasal 

cannula.


Hyperlipidemia


Osteoarthritis


History of rheumatoid arthritis


Obstructive sleep apnea on CPAP at home


History of perforated duodenal ulcer


History of depression


Chronic CHF with preserved ejection fraction


Place history of smoking


DVT prophylaxis with heparin subcu





plan:


Patient is being current oxygen supplementation.


Continue with duo nebs and IV Solu-Medrol and antibiotics ceftriaxone and 

azithromycin.  Continue with home medications. Follow-up sputum cultures and 

blood cultures.





Patient responded well to treatment and has been cleared for discharge by 

pulmonary service on oral antibiotic and steroids





Plan - Discharge Summary


Discharge Rx Participant: Yes


New Discharge Prescriptions: 


New


   predniSONE [Deltasone] 40 mg PO DAILY 5 Days #5 tab


   Cefdinir 300 mg PO Q12HR 5 Days #10 cap


   Azithromycin [Zithromax] 500 mg PO DAILY 3 Days #3 tab





Continue


   Metoprolol Succinate [Toprol XL] 25 mg PO DAILY


   Sertraline [Zoloft] 150 mg PO DAILY


   Celecoxib [CeleBREX] 200 mg PO DAILY


   Clopidogrel [Plavix] 75 mg PO DAILY #14 tab


   Fluticasone Nasal Spray [Flonase Nasal Spray] 1 spr EA NOSTRIL BID


   Alendronate Sodium [Fosamax] 70 mg PO MO


   Atorvastatin [Lipitor] 40 mg PO HS


   predniSONE 2.5 mg PO DAILY


   Amitriptyline HCl [Elavil] 10 mg PO HS


   Fluticasone/Umeclidin/Vilanter [Trelegy Ellipta 100-62.5-25] 1 puff 

INHALATION RT-DAILY


   Omeprazole 40 mg PO DAILY


   Leflunomide [Arava] 20 mg PO DAILY


   Budesonide [Pulmicort] 0.5 mg INHALATION RT-BID


Discharge Medication List





Metoprolol Succinate [Toprol XL] 25 mg PO DAILY 05/12/20 [History]


Sertraline [Zoloft] 150 mg PO DAILY 05/12/20 [History]


Amitriptyline HCl [Elavil] 10 mg PO HS 04/06/21 [History]


Celecoxib [CeleBREX] 200 mg PO DAILY 04/06/21 [History]


Clopidogrel [Plavix] 75 mg PO DAILY #14 tab 04/13/21 [Rx]


Fluticasone Nasal Spray [Flonase Nasal Spray] 1 spr EA NOSTRIL BID 11/08/21 

[History]


Alendronate Sodium [Fosamax] 70 mg PO MO 07/02/22 [History]


Atorvastatin [Lipitor] 40 mg PO HS 07/02/22 [History]


Fluticasone/Umeclidin/Vilanter [Trelegy Ellipta 100-62.5-25] 1 puff INHALATION 

RT-DAILY 07/02/22 [History]


Omeprazole 40 mg PO DAILY 07/02/22 [History]


predniSONE 2.5 mg PO DAILY 07/02/22 [History]


Budesonide [Pulmicort] 0.5 mg INHALATION RT-BID 10/26/22 [History]


Leflunomide [Arava] 20 mg PO DAILY 10/26/22 [History]


Azithromycin [Zithromax] 500 mg PO DAILY 3 Days #3 tab 10/29/22 [Rx]


Cefdinir 300 mg PO Q12HR 5 Days #10 cap 10/29/22 [Rx]


predniSONE [Deltasone] 40 mg PO DAILY 5 Days #5 tab 10/29/22 [Rx]








Follow up Appointment(s)/Referral(s): 


Amalia Bazan MD [Primary Care Provider] - 1-2 days


[facility], [NON-STAFF] - 1-2 Days

## 2022-11-03 NOTE — CDI
Documentation Clarification Form



Date: 11/3/22

From: Jacquelyn Farr 

MRN: [id number]

Admit Date: 10/26/2022 08:08:00 PM

Patient Name: London West

Visit Number: [id number]

Discharge Date:  10/29/2022 03:35:00 PM





ATTENTION: The Clinical Documentation Specialists (CDI) and [facility] Coding Staff 
appreciate your assistance in clarifying documentation. Please respond to the 
clarification below the line at the bottom and electronically sign. The CDI & 
[facility] Coding staff will review the response and follow-up if needed. Please note: 
Queries are made part of the Legal Health Record. If you have any questions, 
please contact the author of this message via ITS.



Dr. London Salazar,



Acute on chronic hypoxic respiratory failure  is documented in your consult on 
10/27 which may lack sufficient clinical evidence/support in the medical record.




Additional clarification is requested.



History/Risk Factors:  pneumonia, chronic diastolic CHF, emphysema, RA, POLO, 
cerebral atrophy



Clinical Indicators:  CO2 (10/26-10/29) 34, 31.3, 34  Patient bumped up 6L NC at
home, unknown regular oxygen liters at home.  His wife states that earlier 
today, his oxygen had dropped into the high 80'S. Patient has pneumonia. No 
ABGs.  EMS run sheets document application of 3LNC and the O2 sats did not 
improve. Chronic hypoxic respiratory failure secondary to severe COPD with 
previous history of heavy tobacco use. At time of your consult patient on 4L NC.




Treatment: O2 NC was up and down, nebulizer 



Please clarify if acute on chronic hypoxic respiratory failure is a valid 
diagnosis?



[ x ]  Yes,  acute on chronic hypoxic respiratory failure is present as evidence
by (additional clinical support): ____________



[  ]  No, acute hypoxic respiratory failure is ruled out



[  ]  Other (please specify diagnosis) ______________________



[  ]  Unable to determine

__________________________________________________________________________

MTDD

## 2022-11-11 ENCOUNTER — HOSPITAL ENCOUNTER (INPATIENT)
Dept: HOSPITAL 47 - EC | Age: 72
LOS: 10 days | Discharge: HOSPICE | DRG: 193 | End: 2022-11-21
Payer: MEDICARE

## 2022-11-11 VITALS — BODY MASS INDEX: 19.9 KG/M2

## 2022-11-11 DIAGNOSIS — I50.32: ICD-10-CM

## 2022-11-11 DIAGNOSIS — R64: ICD-10-CM

## 2022-11-11 DIAGNOSIS — Z79.899: ICD-10-CM

## 2022-11-11 DIAGNOSIS — Z87.891: ICD-10-CM

## 2022-11-11 DIAGNOSIS — M06.9: ICD-10-CM

## 2022-11-11 DIAGNOSIS — J44.0: ICD-10-CM

## 2022-11-11 DIAGNOSIS — Z87.01: ICD-10-CM

## 2022-11-11 DIAGNOSIS — Z79.1: ICD-10-CM

## 2022-11-11 DIAGNOSIS — N39.0: ICD-10-CM

## 2022-11-11 DIAGNOSIS — Z88.0: ICD-10-CM

## 2022-11-11 DIAGNOSIS — Z99.81: ICD-10-CM

## 2022-11-11 DIAGNOSIS — Z79.02: ICD-10-CM
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
## 2022-11-11 DIAGNOSIS — Z79.83: ICD-10-CM
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
## 2022-11-11 DIAGNOSIS — Z79.52: ICD-10-CM

## 2022-11-11 DIAGNOSIS — I95.9: ICD-10-CM

## 2022-11-11 DIAGNOSIS — Z20.822: ICD-10-CM

## 2022-11-11 DIAGNOSIS — Z16.21: ICD-10-CM

## 2022-11-11 DIAGNOSIS — E78.5: ICD-10-CM

## 2022-11-11 DIAGNOSIS — J18.9: Primary | ICD-10-CM

## 2022-11-11 DIAGNOSIS — M19.90: ICD-10-CM

## 2022-11-11 DIAGNOSIS — Z51.5: ICD-10-CM

## 2022-11-11 DIAGNOSIS — J84.10: ICD-10-CM

## 2022-11-11 DIAGNOSIS — J96.21: ICD-10-CM

## 2022-11-11 DIAGNOSIS — Z79.51: ICD-10-CM

## 2022-11-11 DIAGNOSIS — B95.2: ICD-10-CM

## 2022-11-11 DIAGNOSIS — F41.9: ICD-10-CM

## 2022-11-11 DIAGNOSIS — J44.1: ICD-10-CM

## 2022-11-11 DIAGNOSIS — S42.201D: ICD-10-CM

## 2022-11-11 DIAGNOSIS — Z66: ICD-10-CM

## 2022-11-11 DIAGNOSIS — G89.29: ICD-10-CM

## 2022-11-11 DIAGNOSIS — M48.00: ICD-10-CM

## 2022-11-11 DIAGNOSIS — G47.33: ICD-10-CM

## 2022-11-11 DIAGNOSIS — K42.9: ICD-10-CM

## 2022-11-11 DIAGNOSIS — F32.A: ICD-10-CM

## 2022-11-11 LAB
ALBUMIN SERPL-MCNC: 3.2 G/DL (ref 3.5–5)
ALP SERPL-CCNC: 91 U/L (ref 38–126)
ALT SERPL-CCNC: 25 U/L (ref 4–49)
ANION GAP SERPL CALC-SCNC: 5 MMOL/L
APTT BLD: 24.4 SEC (ref 22–30)
AST SERPL-CCNC: 17 U/L (ref 17–59)
BASOPHILS # BLD AUTO: 0.1 K/UL (ref 0–0.2)
BASOPHILS NFR BLD AUTO: 0 %
BUN SERPL-SCNC: 19 MG/DL (ref 9–20)
CALCIUM SPEC-MCNC: 7.8 MG/DL (ref 8.4–10.2)
CHLORIDE SERPL-SCNC: 98 MMOL/L (ref 98–107)
CO2 SERPL-SCNC: 30 MMOL/L (ref 22–30)
EOSINOPHIL # BLD AUTO: 0.1 K/UL (ref 0–0.7)
EOSINOPHIL NFR BLD AUTO: 1 %
ERYTHROCYTE [DISTWIDTH] IN BLOOD BY AUTOMATED COUNT: 3.86 M/UL (ref 4.3–5.9)
ERYTHROCYTE [DISTWIDTH] IN BLOOD: 16.2 % (ref 11.5–15.5)
GLUCOSE SERPL-MCNC: 125 MG/DL (ref 74–99)
HCT VFR BLD AUTO: 36.2 % (ref 39–53)
HGB BLD-MCNC: 11.6 GM/DL (ref 13–17.5)
INR PPP: 1 (ref ?–1.2)
LYMPHOCYTES # SPEC AUTO: 0.7 K/UL (ref 1–4.8)
LYMPHOCYTES NFR SPEC AUTO: 5 %
MCH RBC QN AUTO: 30 PG (ref 25–35)
MCHC RBC AUTO-ENTMCNC: 32 G/DL (ref 31–37)
MCV RBC AUTO: 93.7 FL (ref 80–100)
MONOCYTES # BLD AUTO: 0.5 K/UL (ref 0–1)
MONOCYTES NFR BLD AUTO: 4 %
NEUTROPHILS # BLD AUTO: 12.2 K/UL (ref 1.3–7.7)
NEUTROPHILS NFR BLD AUTO: 89 %
PLATELET # BLD AUTO: 252 K/UL (ref 150–450)
POTASSIUM SERPL-SCNC: 4.2 MMOL/L (ref 3.5–5.1)
PROT SERPL-MCNC: 5.9 G/DL (ref 6.3–8.2)
PT BLD: 10.6 SEC (ref 9–12)
SODIUM SERPL-SCNC: 133 MMOL/L (ref 137–145)
WBC # BLD AUTO: 13.8 K/UL (ref 3.8–10.6)

## 2022-11-11 PROCEDURE — 96361 HYDRATE IV INFUSION ADD-ON: CPT

## 2022-11-11 PROCEDURE — 36415 COLL VENOUS BLD VENIPUNCTURE: CPT

## 2022-11-11 PROCEDURE — 87635 SARS-COV-2 COVID-19 AMP PRB: CPT

## 2022-11-11 PROCEDURE — 96368 THER/DIAG CONCURRENT INF: CPT

## 2022-11-11 PROCEDURE — 71046 X-RAY EXAM CHEST 2 VIEWS: CPT

## 2022-11-11 PROCEDURE — 87634 RSV DNA/RNA AMP PROBE: CPT

## 2022-11-11 PROCEDURE — 71045 X-RAY EXAM CHEST 1 VIEW: CPT

## 2022-11-11 PROCEDURE — 83605 ASSAY OF LACTIC ACID: CPT

## 2022-11-11 PROCEDURE — 80053 COMPREHEN METABOLIC PANEL: CPT

## 2022-11-11 PROCEDURE — 87040 BLOOD CULTURE FOR BACTERIA: CPT

## 2022-11-11 PROCEDURE — 93005 ELECTROCARDIOGRAM TRACING: CPT

## 2022-11-11 PROCEDURE — 99291 CRITICAL CARE FIRST HOUR: CPT

## 2022-11-11 PROCEDURE — 87077 CULTURE AEROBIC IDENTIFY: CPT

## 2022-11-11 PROCEDURE — 87502 INFLUENZA DNA AMP PROBE: CPT

## 2022-11-11 PROCEDURE — 80048 BASIC METABOLIC PNL TOTAL CA: CPT

## 2022-11-11 PROCEDURE — 87186 SC STD MICRODIL/AGAR DIL: CPT

## 2022-11-11 PROCEDURE — 85730 THROMBOPLASTIN TIME PARTIAL: CPT

## 2022-11-11 PROCEDURE — 84145 PROCALCITONIN (PCT): CPT

## 2022-11-11 PROCEDURE — 85610 PROTHROMBIN TIME: CPT

## 2022-11-11 PROCEDURE — 85025 COMPLETE CBC W/AUTO DIFF WBC: CPT

## 2022-11-11 PROCEDURE — 94640 AIRWAY INHALATION TREATMENT: CPT

## 2022-11-11 PROCEDURE — 96365 THER/PROPH/DIAG IV INF INIT: CPT

## 2022-11-11 PROCEDURE — 87086 URINE CULTURE/COLONY COUNT: CPT

## 2022-11-11 PROCEDURE — 81001 URINALYSIS AUTO W/SCOPE: CPT

## 2022-11-11 PROCEDURE — 94760 N-INVAS EAR/PLS OXIMETRY 1: CPT

## 2022-11-11 RX ADMIN — MORPHINE SULFATE PRN MG: 4 INJECTION, SOLUTION INTRAMUSCULAR; INTRAVENOUS at 23:53

## 2022-11-11 RX ADMIN — AMITRIPTYLINE HYDROCHLORIDE SCH MG: 10 TABLET, FILM COATED ORAL at 20:58

## 2022-11-11 RX ADMIN — IPRATROPIUM BROMIDE AND ALBUTEROL SULFATE SCH ML: .5; 3 SOLUTION RESPIRATORY (INHALATION) at 15:28

## 2022-11-11 RX ADMIN — HYDROCODONE BITARTRATE AND ACETAMINOPHEN PRN EACH: 7.5; 325 TABLET ORAL at 22:17

## 2022-11-11 RX ADMIN — SERTRALINE HYDROCHLORIDE SCH MG: 50 TABLET, FILM COATED ORAL at 20:58

## 2022-11-11 RX ADMIN — MORPHINE SULFATE PRN MG: 4 INJECTION, SOLUTION INTRAMUSCULAR; INTRAVENOUS at 20:04

## 2022-11-11 RX ADMIN — ATORVASTATIN CALCIUM SCH MG: 40 TABLET, FILM COATED ORAL at 20:58

## 2022-11-11 RX ADMIN — IPRATROPIUM BROMIDE AND ALBUTEROL SULFATE SCH ML: .5; 3 SOLUTION RESPIRATORY (INHALATION) at 18:58

## 2022-11-11 NOTE — XR
EXAMINATION TYPE: XR chest 2V

 

DATE OF EXAM: 11/11/2022

 

COMPARISON: 10/29/2022

 

HISTORY: Shortness of breath

 

TECHNIQUE:  Frontal and lateral views of the chest are obtained.

 

FINDINGS:

 

Scattered senescent parenchymal changes noted. Hyperinflation compatible with COPD. 

 

Left perihilar density persists. Consider infiltrate. Underlying mass is not excluded.

 

Heart size is stable.

 

Mediastinal structures are stable and grossly unremarkable.

 

No evidence for hilar prominence.

 

Degenerative changes dorsal spine. 

 

IMPRESSION:

1. Left perihilar density persists. Consider infiltrate. Underlying mass is not excluded.

## 2022-11-11 NOTE — ED
General Adult HPI





- General


Chief complaint: Upper Respiratory Infection


Stated complaint: pneumonia


Time Seen by Provider: 22 13:22


Source: patient, RN notes reviewed, old records reviewed (Review previous visits

and x-ray)


Mode of arrival: EMS


Limitations: no limitations





- History of Present Illness


Initial comments: 





Patient is a pleasant 72-year-old male presenting to the emergency department 

with concern with difficulty in breathing.  Patient does have COPD.  Patient was

here 3 days ago and diagnosed with pneumonia.  Patient feels his symptoms have 

worsened since that time.  Patient does have cough with productive green sputum.

 No fever.  No leg pain or leg swelling.





- Related Data


                                Home Medications











 Medication  Instructions  Recorded  Confirmed


 


Metoprolol Succinate [Toprol XL] 25 mg PO DAILY 05/12/20 10/26/22


 


Sertraline [Zoloft] 150 mg PO DAILY 05/12/20 10/26/22


 


Amitriptyline HCl [Elavil] 10 mg PO HS 04/06/21 10/26/22


 


Celecoxib [CeleBREX] 200 mg PO DAILY 04/06/21 10/26/22


 


Fluticasone Nasal Spray [Flonase 1 spr EA NOSTRIL BID 11/08/21 10/26/22





Nasal Spray]   


 


Alendronate Sodium [Fosamax] 70 mg PO MO 07/02/22 10/26/22


 


Atorvastatin [Lipitor] 40 mg PO HS 07/02/22 10/26/22


 


Fluticasone/Umeclidin/Vilanter 1 puff INHALATION RT-DAILY 07/02/22 10/26/22





[Trelegy Ellipta 100-62.5-25]   


 


Omeprazole 40 mg PO DAILY 07/02/22 10/26/22


 


predniSONE 2.5 mg PO DAILY 07/02/22 10/26/22


 


Budesonide [Pulmicort] 0.5 mg INHALATION RT-BID 10/26/22 10/26/22


 


Leflunomide [Arava] 20 mg PO DAILY 10/26/22 10/26/22








                                  Previous Rx's











 Medication  Instructions  Recorded


 


Clopidogrel [Plavix] 75 mg PO DAILY #14 tab 21


 


Azithromycin [Zithromax] 500 mg PO DAILY 3 Days #3 tab 10/29/22


 


Cefdinir 300 mg PO Q12HR 5 Days #10 cap 10/29/22


 


predniSONE [Deltasone] 40 mg PO DAILY 5 Days #5 tab 10/29/22











                                    Allergies











Allergy/AdvReac Type Severity Reaction Status Date / Time


 


amoxicillin AdvReac  Nausea & Verified 10/26/22 22:00





   Vomiting  














Review of Systems


ROS Statement: 


Those systems with pertinent positive or pertinent negative responses have been 

documented in the HPI.





ROS Other: All systems not noted in ROS Statement are negative.


Constitutional: Denies: fever


Eyes: Denies: eye pain


ENT: Denies: ear pain


Respiratory: Reports: as per HPI, cough, dyspnea


Cardiovascular: Denies: chest pain


Endocrine: Reports: fatigue


Gastrointestinal: Denies: abdominal pain


Genitourinary: Denies: dysuria


Musculoskeletal: Denies: back pain


Skin: Denies: rash


Neurological: Denies: weakness





Past Medical History


Past Medical History: Heart Failure, COPD, Hyperlipidemia, Osteoarthritis (OA), 

Pneumonia, Rheumatoid Arthritis (RA), Sleep Apnea/CPAP/BIPAP


Additional Past Medical History / Comment(s): COPD, chronic hypoxic respiratory 

failure previous history of pneumoperitoneum related to a perforated duodenal 

ulcer, repair of an umbilical hernia, chronic back pain, back stenosis,


History of Any Multi-Drug Resistant Organisms: None Reported


Past Surgical History: Orthopedic Surgery


Additional Past Surgical History / Comment(s): colonoscopy, repair of a p

erforated duodenal ulcer and repair of an incarcerated umbilical hernia, left 

knee arthroscopy


Past Anesthesia/Blood Transfusion Reactions: No Reported Reaction


Past Psychological History: Anxiety, Depression


Smoking Status: Former smoker


Past Alcohol Use History: Daily


Past Drug Use History: None Reported





- Past Family History


  ** Father


Family Medical History: Cancer


Additional Family Medical History / Comment(s): Father  of leukemia.





  ** Mother


Family Medical History: Cancer, CVA/TIA, Dementia, Diabetes Mellitus


Additional Family Medical History / Comment(s): Mother is 88yrs old with history

 of dementia and colon cancer.





  ** Sister(s)


Additional Family Medical History / Comment(s): Patient 3 sons with no major 

medical problems.





General Exam


Limitations: no limitations


General appearance: alert, in no apparent distress


Head exam: Present: normocephalic


Eye exam: Present: normal appearance


Neck exam: Present: normal inspection


Respiratory exam: Present: decreased breath sounds


Cardiovascular Exam: Present: regular rate, normal rhythm


GI/Abdominal exam: Present: soft.  Absent: tenderness


Extremities exam: Present: normal inspection.  Absent: pedal edema, calf 

tenderness


Neurological exam: Present: alert


Psychiatric exam: Present: normal affect, normal mood


Skin exam: Present: normal color





Course


                                   Vital Signs











  22





  13:22


 


Temperature 98.4 F


 


Pulse Rate 99


 


Respiratory 20





Rate 


 


Blood Pressure 105/64


 


O2 Sat by Pulse 90 L





Oximetry 














- Reevaluation(s)


Reevaluation #1: 





22 14:35


Patient does meet sepsis criteria diagnosed at 1430.  Blood culture and lactic 

acid and IV antibiotics will all be ordered.





EKG Findings





- EKG Comments:


EKG Findings:: Sinus rhythm 93.  .  QRS 94.  .  .  Right 

axis.  RVH.  No acute ST change.  Interpreted by myself.





Medical Decision Making





- Medical Decision Making





Patient reevaluated and updated.  Dr. Chan has been paged for admission, 

covering Dr. Bazan





- Lab Data


Result diagrams: 


                                 22 13:35





                                 22 13:35


                                   Lab Results











  22 Range/Units





  13:35 13:35 13:35 


 


WBC  13.8 H    (3.8-10.6)  k/uL


 


RBC  3.86 L    (4.30-5.90)  m/uL


 


Hgb  11.6 L    (13.0-17.5)  gm/dL


 


Hct  36.2 L    (39.0-53.0)  %


 


MCV  93.7    (80.0-100.0)  fL


 


MCH  30.0    (25.0-35.0)  pg


 


MCHC  32.0    (31.0-37.0)  g/dL


 


RDW  16.2 H    (11.5-15.5)  %


 


Plt Count  252    (150-450)  k/uL


 


MPV  8.4    


 


Neutrophils %  89    %


 


Lymphocytes %  5    %


 


Monocytes %  4    %


 


Eosinophils %  1    %


 


Basophils %  0    %


 


Neutrophils #  12.2 H    (1.3-7.7)  k/uL


 


Lymphocytes #  0.7 L    (1.0-4.8)  k/uL


 


Monocytes #  0.5    (0-1.0)  k/uL


 


Eosinophils #  0.1    (0-0.7)  k/uL


 


Basophils #  0.1    (0-0.2)  k/uL


 


Hypochromasia  Slight    


 


Anisocytosis  Slight    


 


PT   10.6   (9.0-12.0)  sec


 


INR   1.0   (<1.2)  


 


APTT   24.4   (22.0-30.0)  sec


 


Sodium    133 L  (137-145)  mmol/L


 


Potassium    4.2  (3.5-5.1)  mmol/L


 


Chloride    98  ()  mmol/L


 


Carbon Dioxide    30  (22-30)  mmol/L


 


Anion Gap    5  mmol/L


 


BUN    19  (9-20)  mg/dL


 


Creatinine    0.57 L  (0.66-1.25)  mg/dL


 


Est GFR (CKD-EPI)AfAm    >90  (>60 ml/min/1.73 sqM)  


 


Est GFR (CKD-EPI)NonAf    >90  (>60 ml/min/1.73 sqM)  


 


Glucose    125 H  (74-99)  mg/dL


 


Plasma Lactic Acid José Miguel     (0.7-2.0)  mmol/L


 


Calcium    7.8 L  (8.4-10.2)  mg/dL


 


Total Bilirubin    0.6  (0.2-1.3)  mg/dL


 


AST    17  (17-59)  U/L


 


ALT    25  (4-49)  U/L


 


Alkaline Phosphatase    91  ()  U/L


 


Total Protein    5.9 L  (6.3-8.2)  g/dL


 


Albumin    3.2 L  (3.5-5.0)  g/dL


 


Coronavirus (PCR)     (Not Detectd)  


 


Influenza Type A RNA     (Not Detectd)  


 


Influenza Type B (PCR)     (Not Detectd)  














  22 Range/Units





  13:35 13:35 13:35 


 


WBC     (3.8-10.6)  k/uL


 


RBC     (4.30-5.90)  m/uL


 


Hgb     (13.0-17.5)  gm/dL


 


Hct     (39.0-53.0)  %


 


MCV     (80.0-100.0)  fL


 


MCH     (25.0-35.0)  pg


 


MCHC     (31.0-37.0)  g/dL


 


RDW     (11.5-15.5)  %


 


Plt Count     (150-450)  k/uL


 


MPV     


 


Neutrophils %     %


 


Lymphocytes %     %


 


Monocytes %     %


 


Eosinophils %     %


 


Basophils %     %


 


Neutrophils #     (1.3-7.7)  k/uL


 


Lymphocytes #     (1.0-4.8)  k/uL


 


Monocytes #     (0-1.0)  k/uL


 


Eosinophils #     (0-0.7)  k/uL


 


Basophils #     (0-0.2)  k/uL


 


Hypochromasia     


 


Anisocytosis     


 


PT     (9.0-12.0)  sec


 


INR     (<1.2)  


 


APTT     (22.0-30.0)  sec


 


Sodium     (137-145)  mmol/L


 


Potassium     (3.5-5.1)  mmol/L


 


Chloride     ()  mmol/L


 


Carbon Dioxide     (22-30)  mmol/L


 


Anion Gap     mmol/L


 


BUN     (9-20)  mg/dL


 


Creatinine     (0.66-1.25)  mg/dL


 


Est GFR (CKD-EPI)AfAm     (>60 ml/min/1.73 sqM)  


 


Est GFR (CKD-EPI)NonAf     (>60 ml/min/1.73 sqM)  


 


Glucose     (74-99)  mg/dL


 


Plasma Lactic Acid José Miguel  1.0    (0.7-2.0)  mmol/L


 


Calcium     (8.4-10.2)  mg/dL


 


Total Bilirubin     (0.2-1.3)  mg/dL


 


AST     (17-59)  U/L


 


ALT     (4-49)  U/L


 


Alkaline Phosphatase     ()  U/L


 


Total Protein     (6.3-8.2)  g/dL


 


Albumin     (3.5-5.0)  g/dL


 


Coronavirus (PCR)    Not Detected  (Not Detectd)  


 


Influenza Type A RNA   Not Detected   (Not Detectd)  


 


Influenza Type B (PCR)   Not Detected   (Not Detectd)  














- Radiology Data


Radiology results: image reviewed (X-ray shows persistent left perihilar 

infiltrate, consider mass.  X-ray reviewed and interpreted by me.)





Critical Care Time


Critical Care Time: Yes


Total Critical Care Time: 33





Disposition


Clinical Impression: 


 Pneumonia, Sepsis, COPD (chronic obstructive pulmonary disease)





Disposition: ADMITTED AS IP TO THIS HOSP


Is patient prescribed a controlled substance at d/c from ED?: No


Referrals: 


Amalia Bazan MD [Primary Care Provider] - 1-2 days


Time of Disposition: 14:36

## 2022-11-12 RX ADMIN — HYDROCODONE BITARTRATE AND ACETAMINOPHEN PRN EACH: 7.5; 325 TABLET ORAL at 15:22

## 2022-11-12 RX ADMIN — IPRATROPIUM BROMIDE AND ALBUTEROL SULFATE SCH ML: .5; 3 SOLUTION RESPIRATORY (INHALATION) at 07:25

## 2022-11-12 RX ADMIN — BUDESONIDE SCH: 0.5 INHALANT ORAL at 06:08

## 2022-11-12 RX ADMIN — IPRATROPIUM BROMIDE AND ALBUTEROL SULFATE SCH ML: .5; 3 SOLUTION RESPIRATORY (INHALATION) at 11:15

## 2022-11-12 RX ADMIN — METHYLPREDNISOLONE SODIUM SUCCINATE SCH MG: 40 INJECTION, POWDER, FOR SOLUTION INTRAMUSCULAR; INTRAVENOUS at 23:49

## 2022-11-12 RX ADMIN — Medication SCH MCG: at 07:50

## 2022-11-12 RX ADMIN — BUDESONIDE SCH MG: 0.5 INHALANT ORAL at 07:25

## 2022-11-12 RX ADMIN — MORPHINE SULFATE PRN MG: 4 INJECTION, SOLUTION INTRAMUSCULAR; INTRAVENOUS at 16:51

## 2022-11-12 RX ADMIN — SERTRALINE HYDROCHLORIDE SCH MG: 50 TABLET, FILM COATED ORAL at 07:50

## 2022-11-12 RX ADMIN — PANTOPRAZOLE SODIUM SCH MG: 40 TABLET, DELAYED RELEASE ORAL at 06:09

## 2022-11-12 RX ADMIN — CLOPIDOGREL BISULFATE SCH MG: 75 TABLET ORAL at 07:50

## 2022-11-12 RX ADMIN — METHYLPREDNISOLONE SODIUM SUCCINATE SCH MG: 40 INJECTION, POWDER, FOR SOLUTION INTRAMUSCULAR; INTRAVENOUS at 16:47

## 2022-11-12 RX ADMIN — HYDROCODONE BITARTRATE AND ACETAMINOPHEN PRN EACH: 7.5; 325 TABLET ORAL at 04:12

## 2022-11-12 RX ADMIN — AZITHROMYCIN SCH MG: 500 TABLET, FILM COATED ORAL at 09:43

## 2022-11-12 RX ADMIN — ATORVASTATIN CALCIUM SCH MG: 40 TABLET, FILM COATED ORAL at 20:57

## 2022-11-12 RX ADMIN — IPRATROPIUM BROMIDE AND ALBUTEROL SULFATE SCH ML: .5; 3 SOLUTION RESPIRATORY (INHALATION) at 20:43

## 2022-11-12 RX ADMIN — METOPROLOL SUCCINATE SCH MG: 25 TABLET, EXTENDED RELEASE ORAL at 07:50

## 2022-11-12 RX ADMIN — IPRATROPIUM BROMIDE AND ALBUTEROL SULFATE SCH ML: .5; 3 SOLUTION RESPIRATORY (INHALATION) at 14:58

## 2022-11-12 RX ADMIN — BUDESONIDE SCH MG: 1 SUSPENSION RESPIRATORY (INHALATION) at 20:43

## 2022-11-12 RX ADMIN — METHYLPREDNISOLONE SODIUM SUCCINATE SCH MG: 40 INJECTION, POWDER, FOR SOLUTION INTRAMUSCULAR; INTRAVENOUS at 09:41

## 2022-11-12 RX ADMIN — FORMOTEROL FUMARATE DIHYDRATE SCH MCG: 20 SOLUTION RESPIRATORY (INHALATION) at 20:43

## 2022-11-12 RX ADMIN — AMITRIPTYLINE HYDROCHLORIDE SCH MG: 10 TABLET, FILM COATED ORAL at 20:57

## 2022-11-12 RX ADMIN — HYDROCODONE BITARTRATE AND ACETAMINOPHEN PRN EACH: 7.5; 325 TABLET ORAL at 20:58

## 2022-11-12 RX ADMIN — MORPHINE SULFATE PRN MG: 4 INJECTION, SOLUTION INTRAMUSCULAR; INTRAVENOUS at 06:09

## 2022-11-12 NOTE — P.CNPUL
History of Present Illness


Consult date: 22


Requesting physician: Crissy Chan


Reason for consult: dyspnea, COPD, pulmonary fibrosis, abnormal CXR/CT


Chief complaint: Shortness of breath, cough, congestion


History of present illness: 





This is a very pleasant 72-year-old male patient with a known history of 

hyperlipidemia, anxiety, rheumatoid arthritis, pulmonary fibrosis suspect 

secondary to the rheumatoid arthritis, chronic obstructive pulmonary disease 

from chronic tobacco dependence however he quit approximately 6 years ago.  His 

FEV1 value is 46% of predicted.  He's been maintained on Trelegy, albuterol 

nebulized treatments in the outpatient setting.  He presented here to the 

emergency room earlier this week and wastreated for pneumonia.  He presented 

here again yesterday with worsening shortness of breath, cough and congestion. 

He had also been discharged home from here on 10/29/2022 following a COPD 

exacerbation. Since that time he had fallen at home and broken his right 

shoulder.  It is currently in a sling. Chest x-ray shows left perihilar density.

 Underlying mass not excluded. today's chest x-ray shows persistent interstitial

prominence throughout both lung fields.  Left perihilar infiltrate/mass persist 

and is unchanged.  He is seen today in consultation on the regular medical 

floor.  He is currently sitting up in bed.  Awake and alert.  He is dyspneic 

with conversation.  Dyspneic with minimal exertion.  He is quite frail.  He has 

a large protruding umbilical hernia noted. She had been turned up to 10 L high 

flow nasal cannula with O2 saturations at 95%.  Currently down to 8 L at 92%.  

We are accepting of 88% or higher based on his poor lung function.  He is 

currently afebrile.  Hemodynamically stable. He's been initiated on DuoNeb 

inhalations, Pulmicort and Perforomist inhalations, home prednisone dose at 2.5 

mg daily antibiotics in the form of ceftriaxone and azithromycin. White count 

13.8.  Hemoglobin 11.6.  Sodium 133.  Potassium 4.2.  BUN 19.  Creatinine 0.57. 

Glucose 125.  Corona virus by PCR not detected.  Influenza screen negative.  

Pro-calcitonin pending.





Review of Systems





REVIEW OF SYSTEMS:


CONSTITUTIONAL: Generalized weakness.Denies any recent significant weight loss 

or weight gain.


EYES: Denies change in vision.


EARS, NOSE, MOUTH, THROAT: Denies headaches, denies sore throat.


CARDIOVASCULAR: Denies chest pain, palpitations or syncopal episodes.


RESPIRATORY: Positive for shortness of breath, cough, congestion no hemoptysis.


GASTROINTESTINAL: Denies change in appetite, denies abdominal pain


GENITOURINARY: Denies hematuria, denies infections.


MUSKULOSKELETAL: Right shoulder fracture, currently in a slingDenies pain, 

denies swelling.


INTEGUMENTARY: Denies rash, denies eczema.


NEUROLOGICAL: Denies recent memory loss, no recent seizure activity. 


PSYCHIATRIC: Denies anxiety, denies depression.


HEMATOLOGIC/LYMPHATIC: Denies anemia, denies enlarged lymph nodes.








Past Medical History


Past Medical History: Heart Failure, COPD, Hyperlipidemia, Osteoarthritis (OA), 

Pneumonia, Rheumatoid Arthritis (RA), Sleep Apnea/CPAP/BIPAP


Additional Past Medical History / Comment(s): COPD, chronic hypoxic respiratory 

failure previous history of pneumoperitoneum related to a perforated duodenal 

ulcer, repair of an umbilical hernia, chronic back pain, back stenosis,


History of Any Multi-Drug Resistant Organisms: None Reported


Past Surgical History: Orthopedic Surgery


Additional Past Surgical History / Comment(s): colonoscopy, repair of a 

perforated duodenal ulcer and repair of an incarcerated umbilical hernia, left 

knee arthroscopy


Past Anesthesia/Blood Transfusion Reactions: No Reported Reaction


Past Psychological History: Anxiety, Depression


Smoking Status: Former smoker


Past Alcohol Use History: Daily


Past Drug Use History: None Reported





- Past Family History


  ** Father


Family Medical History: Cancer


Additional Family Medical History / Comment(s): Father  of leukemia.





  ** Mother


Family Medical History: Cancer, CVA/TIA, Dementia, Diabetes Mellitus


Additional Family Medical History / Comment(s): Mother is 88yrs old with history

of dementia and colon cancer.





  ** Sister(s)


Additional Family Medical History / Comment(s): Patient 3 sons with no major 

medical problems.





Medications and Allergies


                                Home Medications











 Medication  Instructions  Recorded  Confirmed  Type


 


Metoprolol Succinate [Toprol XL] 25 mg PO DAILY 20 History


 


Sertraline [Zoloft] 150 mg PO DAILY 20 History


 


Amitriptyline HCl [Elavil] 10 mg PO HS 21 History


 


Clopidogrel [Plavix] 75 mg PO DAILY #14 tab 21 Rx


 


Fluticasone Nasal Spray [Flonase 1 spr EA NOSTRIL BID PRN 21 

History





Nasal Spray]    


 


Alendronate Sodium [Fosamax] 70 mg PO MO 22 History


 


Atorvastatin [Lipitor] 40 mg PO HS 22 History


 


Omeprazole 40 mg PO DAILY 22 History


 


predniSONE 2.5 mg PO DAILY 22 History


 


Budesonide [Pulmicort] 0.5 mg INHALATION RT-BID 10/26/22 11/11/22 History


 


Cholecalciferol (Vitamin D3) 75 mcg PO DAILY 22 History





[Vitamin D3 (3000 Iu)]    


 


Docusate [Colace] 100 mg PO DAILY PRN 22 History


 


L.acidoph,Paracasei, B.lactis 1 cap PO DAILY 22 History





[Probiotic]    








                                    Allergies











Allergy/AdvReac Type Severity Reaction Status Date / Time


 


amoxicillin AdvReac  Nausea & Verified 22 16:08





   Vomiting  














Physical Exam


Vitals: 


                                   Vital Signs











  Temp Pulse Pulse Resp BP BP Pulse Ox


 


 22 11:26   104 H     


 


 22 11:18        92 L


 


 22 11:15   104 H     


 


 22 09:18        95


 


 22 08:00  97.6 F   67  18   102/62 


 


 22 07:43   108 H     


 


 22 07:36        85 L


 


 22 07:25   107 H     


 


 22 02:00  97.8 F    19   117/69  90 L


 


 22 20:00  97.9 F   100  19   96/57  90 L


 


 22 19:06   94   18   


 


 22 18:58   95   20   


 


 22 17:20  98.1 F   92  17   91/55  90 L


 


 22 16:55  98.2 F  80   20  112/69   90 L


 


 22 15:46   90   18   


 


 22 15:38   87   18   


 


 22 15:32        94 L


 


 22 15:28   90   20   


 


 22 14:39    94    


 


 22 13:22  98.4 F  99   20  105/64   90 L


 


 22 13:20     20   














  FiO2


 


 22 11:26 


 


 22 11:18 


 


 22 11:15 


 


 22 09:18 


 


 22 08:00 


 


 22 07:43 


 


 22 07:36  5


 


 22 07:25 


 


 22 02:00 


 


 22 20:00 


 


 22 19:06 


 


 22 18:58 


 


 22 17:20 


 


 22 16:55 


 


 22 15:46 


 


 22 15:38 


 


 22 15:32 


 


 22 15:28 


 


 22 14:39 


 


 22 13:22 


 


 22 13:20 








                                Intake and Output











 22





 22:59 06:59 14:59


 


Other:   


 


  Voiding Method Toilet  


 


  # Voids  3 














GENERAL EXAM: Alert, very pleasant 72-year-old male, frail, cachectic, on 8 L 

high flow nasal cannula, fairly comfortable in no apparent distress.


HEAD: Normocephalic.


EYES: Normal reaction of pupils, equal size.


NOSE: Clear with pink turbinates.


THROAT: No erythema or exudates.


NECK: No masses, no JVD.


CHEST: No chest wall deformity.


LUNGS: Equal air entry with coarse crackles in the posterior bases.


CVS: S1 and S2 normal with no audible murmur, regular rhythm.


ABDOMEN: Large protruding umbilical hernia.No hepatosplenomegaly, normal bowel s

ounds, no guarding or rigidity.


SPINE: No scoliosis or deformity


SKIN: No rashes


CENTRAL NERVOUS SYSTEM: No focal deficits, tone is normal in all 4 extremities.


EXTREMITIES: Fracture right shoulder currently in a sling. Changes of rheumatoid

 arthritis. There is no peripheral edema. 





Results





- Laboratory Findings


CBC and BMP: 


                                 22 13:35





                                 22 13:35


PT/INR, D-dimer











PT  10.6 sec (9.0-12.0)   22  13:35    


 


INR  1.0  (<1.2)   22  13:35    








Abnormal lab findings: 


                                  Abnormal Labs











  22





  13:35 13:35


 


WBC  13.8 H 


 


RBC  3.86 L 


 


Hgb  11.6 L 


 


Hct  36.2 L 


 


RDW  16.2 H 


 


Neutrophils #  12.2 H 


 


Lymphocytes #  0.7 L 


 


Sodium   133 L


 


Creatinine   0.57 L


 


Glucose   125 H


 


Calcium   7.8 L


 


Total Protein   5.9 L


 


Albumin   3.2 L














- Diagnostic Findings


Chest x-ray: image reviewed





Assessment and Plan


Assessment: 





Acute on chronic hypoxemic respiratory failure, secondary to COPD exacerbation, 

and complicated by left-sided pneumonia.





Chronic left upper lobe infiltrate/mass.  Could not rule out neoplasm however 

the patient is to frail for any intervention or biopsies





History of severe oxygen-dependent, steroid dependent COPD.





Pulmonary fibrosis, suspect secondary to rheumatoid arthritis.





Recent fall with fracture to the right upper extremity.





History of CHF.





History of hyperlipidemia.





History of osteoarthritis.





History of rheumatoid arthritis.





History of sleep apnea syndrome, maintained on CPAP.





History of perforated duodenal ulcer.  





History of anxiety/depression.





Previous history of heavy tobacco use.





Plan:





The patient was seen and evaluated


Chest x-ray, labs and medications reviewed


Can not rule out neoplasm in the left upper lobe however he is too frail for any

 intervention


Could possibly be worked up with a PET scan in the outpatient setting


Continue antibiotics for now


Discontinue prednisone, add IV Solu-Medrol


Continue bronchodilators


The patient is a DO NOT RESUSCITATE/DO NOT INTUBATE CODE STATUS per his request


We will continue to follow and make further recommendations based on his 

clinical status





I have personally seen and examined the patient, performed the documentation and

 the assessment and plan as written.  Number of minutes spent on the visit: 20.

## 2022-11-12 NOTE — XR
EXAMINATION TYPE: XR chest 1V portable

 

DATE OF EXAM: 11/12/2022

 

HISTORY: Shortness of breath.

 

COMPARISON: 11/11/2022

 

TECHNIQUE: Single view of the chest is submitted.

 

FINDINGS:

Demonstrated are scattered senescent parenchymal change.  

 

Persistent interstitial prominence throughout both lung fields. Left perihilar infiltrate or mass per
sists unchanged. Correlate clinically and progress studies are advised.

 

The heart is stable.

 

Hilar and mediastinal structures are within normal limits.  

 

Degenerative changes are seen of the dorsal spine. 

 

 IMPRESSION: 

 

1.  Persistent interstitial prominence throughout both lung fields. Left perihilar infiltrate or mass
 persists unchanged. Correlate clinically and progress studies are advised.

## 2022-11-12 NOTE — P.HPIM
History of Present Illness


H&P Date: 22


Chief Complaint: Shortness of breath





Patient is a 72-year-old male with a known history of COPD on home oxygen, 

osteoarthritis, hyperlipidemia, rheumatoid arthritis, obstructive sleep apnea, 

chronic back pain and anxiety/depression prior history of smoking presents to ER

with complaints of worsening shortness of breath.  Patient was desaturating at 

home and oxygen on turn up to 10 L at home.  Patient has been having cough but 

unable to bring out any sputum.


Patient has Poor functional status.


Patient was recently discharged from the hospital on 10/29/2022.  Patient was 

treated for acute COPD exacerbation and pneumonia and was discharged home on 

Omnicef and azithromycin.  Patient states that he fell on Wednesday and 

fractured his right proximal humerus and was placed on sling while in the ER.  

Patient has been having worsening shortness of breath and pain and came to ER.  

Chest x-ray showed persistent interstitial Prominence throughout both lungs.  

Left perihilar infiltrate or mass persists unchanged.  Correlate clinically and 

progress studies recommended.


EKG showed sinus rhythm.


Laboratory pressure WBC 13.8 hemoglobin 11.6 and platelets 252


Sodium 133 potassium 4.2 chloride 98 bicarb is 30 BUN 19 and creatinine 0.57 and

albumin 3.2


Coronavirus PCR not detected.








Review of Systems





Constitutional: Patient denies any fever or chills .  Patient does have 

generalized weakness.


Abdomen: Patient denied any nausea or vomiting or abd. pain


Cardiovascular: Patient denies any chest pain or short of breath no 

palpitations.


Respiratory: Patient does have cough with whitish sputum production and 

shortness of breath


Neurologic: Patient denied any numbness or tingling headache.


Musculoskeletal: Patient denies any complaints of joint swelling or deformity.


Skin: Negative


Psychiatric: Negative


Endocrine: No heat or cold intolerance.  No recent weight gain.


Genitourinary: No dysuria or hematuria.


All other 14 point ROS negative except the above








Past Medical History


Past Medical History: Heart Failure, COPD, Hyperlipidemia, Osteoarthritis (OA), 

Pneumonia, Rheumatoid Arthritis (RA), Sleep Apnea/CPAP/BIPAP


Additional Past Medical History / Comment(s): COPD, chronic hypoxic respiratory 

failure previous history of pneumoperitoneum related to a perforated duodenal 

ulcer, repair of an umbilical hernia, chronic back pain, back stenosis,


History of Any Multi-Drug Resistant Organisms: None Reported


Past Surgical History: Orthopedic Surgery


Additional Past Surgical History / Comment(s): colonoscopy, repair of a 

perforated duodenal ulcer and repair of an incarcerated umbilical hernia, left 

knee arthroscopy


Past Anesthesia/Blood Transfusion Reactions: No Reported Reaction


Past Psychological History: Anxiety, Depression


Smoking Status: Former smoker


Past Alcohol Use History: Daily


Past Drug Use History: None Reported





- Past Family History


  ** Father


Family Medical History: Cancer


Additional Family Medical History / Comment(s): Father  of leukemia.





  ** Mother


Family Medical History: Cancer, CVA/TIA, Dementia, Diabetes Mellitus


Additional Family Medical History / Comment(s): Mother is 88yrs old with history

of dementia and colon cancer.





  ** Sister(s)


Additional Family Medical History / Comment(s): Patient 3 sons with no major 

medical problems.





Medications and Allergies


                                Home Medications











 Medication  Instructions  Recorded  Confirmed  Type


 


Metoprolol Succinate [Toprol XL] 25 mg PO DAILY 20 History


 


Sertraline [Zoloft] 150 mg PO DAILY 20 History


 


Amitriptyline HCl [Elavil] 10 mg PO HS 21 History


 


Clopidogrel [Plavix] 75 mg PO DAILY #14 tab 21 Rx


 


Fluticasone Nasal Spray [Flonase 1 spr EA NOSTRIL BID PRN 21 

History





Nasal Spray]    


 


Alendronate Sodium [Fosamax] 70 mg PO MO 22 History


 


Atorvastatin [Lipitor] 40 mg PO HS 22 History


 


Omeprazole 40 mg PO DAILY 22 History


 


predniSONE 2.5 mg PO DAILY 22 History


 


Budesonide [Pulmicort] 0.5 mg INHALATION RT-BID 10/26/22 11/11/22 History


 


Cholecalciferol (Vitamin D3) 75 mcg PO DAILY 22 History





[Vitamin D3 (3000 Iu)]    


 


Docusate [Colace] 100 mg PO DAILY PRN 22 History


 


L.acidoph,Paracasei, B.lactis 1 cap PO DAILY 22 History





[Probiotic]    








                                    Allergies











Allergy/AdvReac Type Severity Reaction Status Date / Time


 


amoxicillin AdvReac  Nausea & Verified 22 16:08





   Vomiting  














Physical Exam


Vitals: 


                                   Vital Signs











  Temp Pulse Pulse Resp BP Pulse Ox


 


 22 14:39    94   


 


 22 13:22  98.4 F  99   20  105/64  90 L


 


 22 13:20     20  








                                Intake and Output











 22





 06:59 14:59 22:59


 


Other:   


 


  Weight  56.109 kg 

















PHYSICAL EXAMINATION: 


Patient is lying in the bed comfortably, no acute distress, awake alert and 

oriented..  Frail looking.


HEENT: Normocephalic. Neck is supple. Pupils reactive. Nostrils clear. Oral 

cavity is moist. 


Neck reveals no JVD, carotid bruits, or thyromegaly. 


CHEST EXAMINATION: Trachea is central. Symmetrical expansion.  Bilateral diffuse

 coarse sounds and rhonchi.. 


CARDIAC: Normal S1, S2 with no gallops. No murmurs 


ABDOMEN: Soft. Bowel sounds present.  Nontender.  No organomegaly. No abdominal 

bruits. 


Extremities: reveal no edema.  No clubbing or cyanosis


Right humerus sling in place.


Neurologically awake, alert, oriented x3 with well-coordinated movements.  No 

focal deficits noted


Skin: No rash or skin lesions. 


Psychiatric: Coperative.  Nonsuicidal,


Musculoskeletal: No joint swelling or deformity.  Normal range of motion.








Results


CBC & Chem 7: 


                                 22 13:35





                                 22 13:35


Labs: 


                  Abnormal Lab Results - Last 24 Hours (Table)











  22 Range/Units





  13:35 13:35 


 


WBC  13.8 H   (3.8-10.6)  k/uL


 


RBC  3.86 L   (4.30-5.90)  m/uL


 


Hgb  11.6 L   (13.0-17.5)  gm/dL


 


Hct  36.2 L   (39.0-53.0)  %


 


RDW  16.2 H   (11.5-15.5)  %


 


Neutrophils #  12.2 H   (1.3-7.7)  k/uL


 


Lymphocytes #  0.7 L   (1.0-4.8)  k/uL


 


Sodium   133 L  (137-145)  mmol/L


 


Creatinine   0.57 L  (0.66-1.25)  mg/dL


 


Glucose   125 H  (74-99)  mg/dL


 


Calcium   7.8 L  (8.4-10.2)  mg/dL


 


Total Protein   5.9 L  (6.3-8.2)  g/dL


 


Albumin   3.2 L  (3.5-5.0)  g/dL














Thrombosis Risk Factor Assmnt





- DVT/VTE Prophylaxis


DVT/VTE Prophylaxis: Pharmacologic Prophylaxis ordered





Assessment and Plan


Assessment: 








Acute on chronic hypoxic respiratory failure secondary to COPD exacerbation


Pneumonia with persistent interstitial prominence of both legs and left 

perihilar filtrate.  Underlying mass cannot be excluded.


Chronic hypoxic aspiratory secondary COPD on home oxygen at 4 L via nasal 

cannula.


Pulmonary fibrosis


History of rheumatoid arthritis


Obstructive sleep apnea on CPAP at home


Osteoarthritis


Hyperlipidemia


Anxiety/depression


Chronic CHF with preserved ejection fraction


Prior history of smoking


DVT prophylaxis with heparin subcu





Plan:


Patient will be continued on oxygen supplementation and IV steroids, Solu-Medrol

 60 every 6 hourly.


Continues with the duo nebs.  Continue with antibiotics in the form of 

ceftriaxone azithromycin.


Current home medications and follow-up closely.  Pulmonary was consulted.  

Prognosis guarded at this time.





Time with Patient: Greater than 30

## 2022-11-12 NOTE — P.PN
Subjective


Progress Note Date: 11/12/22





Patient is a 72-year-old male with a known history of COPD on home oxygen, 

osteoarthritis, hyperlipidemia, rheumatoid arthritis, obstructive sleep apnea, 

chronic back pain and anxiety/depression prior history of smoking presents to ER

with complaints of worsening shortness of breath.  Patient was desaturating at 

home and oxygen on turn up to 10 L at home.  Patient has been having cough but 

unable to bring out any sputum.


Patient has Poor functional status.


Patient was recently discharged from the hospital on 10/29/2022.  Patient was 

treated for acute COPD exacerbation and pneumonia and was discharged home on 

Omnicef and azithromycin.  Patient states that he fell on Wednesday and 

fractured his right proximal humerus and was placed on sling while in the ER.  

Patient has been having worsening shortness of breath and pain and came to ER.  

Chest x-ray showed persistent interstitial Prominence throughout both lungs.  

Left perihilar infiltrate or mass persists unchanged.  Correlate clinically and 

progress studies recommended.


EKG showed sinus rhythm.


Laboratory pressure WBC 13.8 hemoglobin 11.6 and platelets 252


Sodium 133 potassium 4.2 chloride 98 bicarb is 30 BUN 19 and creatinine 0.57 and

albumin 3.2


Coronavirus PCR not detected.





11/12/2022


Patient is currently lying in bed.  Awake alert and oriented.  Still having 

shortness of breath but improved clinically.  Cough with whitish sputum 

production.  Patient has been afebrile.  Currently requiring oxygen at 8 L via 

nasal cannula.  No nausea vomiting abdominal pain or diarrhea.


Right humerus sling in place and pain is fairly controlled.


No chest pain.  No headache or dizziness or lightheadedness.


Patient was seen by pulmonary.  Continue on steroids changed to IV.  Patient is 

also on antibiotics at home Mutamycin ceftriaxone.





Current medications reviewed.





Objective





- Vital Signs


Vital signs: 


                                   Vital Signs











Temp  97.6 F   11/12/22 14:00


 


Pulse  109 H  11/12/22 21:05


 


Resp  20   11/12/22 14:00


 


BP  103/62   11/12/22 14:00


 


Pulse Ox  90 L  11/12/22 14:00


 


FiO2  5   11/12/22 07:36








                                 Intake & Output











 11/12/22 11/12/22 11/13/22





 06:59 18:59 06:59


 


Other:   


 


  Voiding Method Toilet  


 


  # Voids 3 2 














- Exam








PHYSICAL EXAMINATION: 


Patient is lying in the bed comfortably, no acute distress, awake alert and 

oriented..  Frail looking.


HEENT: Normocephalic. Neck is supple. Pupils reactive. Nostrils clear. Oral 

cavity is moist. 


Neck reveals no JVD, carotid bruits, or thyromegaly. 


CHEST EXAMINATION: Trachea is central. Symmetrical expansion.  Bilateral diffuse

coarse sounds and rhonchi.. 


CARDIAC: Normal S1, S2 with no gallops. No murmurs 


ABDOMEN: Soft. Bowel sounds present.  Nontender.  No organomegaly. No abdominal 

bruits. 


Extremities: reveal no edema.  No clubbing or cyanosis


Right humerus sling in place.


Neurologically awake, alert, oriented x3 with well-coordinated movements.  No f

ocal deficits noted


Skin: No rash or skin lesions. 


Psychiatric: Coperative.  Nonsuicidal,


Musculoskeletal: No joint swelling or deformity.  Normal range of motion.








- Labs


CBC & Chem 7: 


                                 11/11/22 13:35





                                 11/11/22 13:35


Labs: 


                  Abnormal Lab Results - Last 24 Hours (Table)











  11/11/22 Range/Units





  13:35 


 


Procalcitonin  0.12 H  (0.02-0.09)  ng/mL








                      Microbiology - Last 24 Hours (Table)











 11/11/22 13:30 Blood Culture - Preliminary





 Blood    No Growth after 24 hours


 


 11/11/22 13:45 Blood Culture - Preliminary





 Blood    No Growth after 24 hours














Assessment and Plan


Assessment: 








Acute on chronic hypoxic respiratory failure secondary to COPD exacerbation


Pneumonia with persistent interstitial prominence of both legs and left 

perihilar filtrate.  Underlying mass cannot be excluded.


Chronic hypoxic aspiratory secondary COPD on home oxygen at 4 L via nasal 

cannula.


Pulmonary fibrosis


History of rheumatoid arthritis


Obstructive sleep apnea on CPAP at home


Osteoarthritis


Hyperlipidemia


Anxiety/depression


Chronic CHF with preserved ejection fraction


Prior history of smoking


DVT prophylaxis with heparin subcu





Plan:


Patient will be continued on oxygen supplementation and IV steroids changed to 

Solu-Medrol 40 every 8 hourly.


Continues with the duo nebs.  Continue with antibiotics in the form of 

ceftriaxone azithromycin.


Current home medications and follow-up closely.  Pulmonary is on board.  

Prognosis guarded at this time.





Time with Patient: Greater than 30

## 2022-11-13 LAB
ANION GAP SERPL CALC-SCNC: 7.1 MMOL/L (ref 10–18)
BASOPHILS # BLD AUTO: 0.01 X 10*3/UL (ref 0–0.1)
BASOPHILS NFR BLD AUTO: 0.1 %
BUN SERPL-SCNC: 16.7 MG/DL (ref 9–27)
BUN/CREAT SERPL: 33.4 RATIO (ref 12–20)
CALCIUM SPEC-MCNC: 8.6 MG/DL (ref 8.7–10.3)
CHLORIDE SERPL-SCNC: 101 MMOL/L (ref 96–109)
CO2 SERPL-SCNC: 31.9 MMOL/L (ref 20–27.5)
EOSINOPHIL # BLD AUTO: 0 X 10*3/UL (ref 0.04–0.35)
EOSINOPHIL NFR BLD AUTO: 0 %
ERYTHROCYTE [DISTWIDTH] IN BLOOD BY AUTOMATED COUNT: 3.24 X 10*6/UL (ref 4.4–5.6)
ERYTHROCYTE [DISTWIDTH] IN BLOOD: 16.7 % (ref 11.5–14.5)
GLUCOSE SERPL-MCNC: 126 MG/DL (ref 70–110)
HCT VFR BLD AUTO: 30.9 % (ref 39.6–50)
HGB BLD-MCNC: 9.5 G/DL (ref 13–17)
IMM GRANULOCYTES BLD QL AUTO: 0.7 %
LYMPHOCYTES # SPEC AUTO: 0.61 X 10*3/UL (ref 0.9–5)
LYMPHOCYTES NFR SPEC AUTO: 6.6 %
MCH RBC QN AUTO: 29.3 PG (ref 27–32)
MCHC RBC AUTO-ENTMCNC: 30.7 G/DL (ref 32–37)
MCV RBC AUTO: 95.4 FL (ref 80–97)
MONOCYTES # BLD AUTO: 0.33 X 10*3/UL (ref 0.2–1)
MONOCYTES NFR BLD AUTO: 3.6 %
NEUTROPHILS # BLD AUTO: 8.22 X 10*3/UL (ref 1.8–7.7)
NEUTROPHILS NFR BLD AUTO: 89 %
NRBC BLD AUTO-RTO: 0 /100 WBCS (ref 0–0)
PLATELET # BLD AUTO: 285 X 10*3/UL (ref 140–440)
POTASSIUM SERPL-SCNC: 4.8 MMOL/L (ref 3.5–5.5)
SODIUM SERPL-SCNC: 140 MMOL/L (ref 135–145)
WBC # BLD AUTO: 9.23 X 10*3/UL (ref 4.5–10)

## 2022-11-13 RX ADMIN — FORMOTEROL FUMARATE DIHYDRATE SCH MCG: 20 SOLUTION RESPIRATORY (INHALATION) at 07:54

## 2022-11-13 RX ADMIN — METHYLPREDNISOLONE SODIUM SUCCINATE SCH MG: 40 INJECTION, POWDER, FOR SOLUTION INTRAMUSCULAR; INTRAVENOUS at 15:00

## 2022-11-13 RX ADMIN — METHYLPREDNISOLONE SODIUM SUCCINATE SCH MG: 40 INJECTION, POWDER, FOR SOLUTION INTRAMUSCULAR; INTRAVENOUS at 09:00

## 2022-11-13 RX ADMIN — AMITRIPTYLINE HYDROCHLORIDE SCH MG: 10 TABLET, FILM COATED ORAL at 21:41

## 2022-11-13 RX ADMIN — IPRATROPIUM BROMIDE AND ALBUTEROL SULFATE SCH ML: .5; 3 SOLUTION RESPIRATORY (INHALATION) at 11:43

## 2022-11-13 RX ADMIN — IPRATROPIUM BROMIDE AND ALBUTEROL SULFATE SCH ML: .5; 3 SOLUTION RESPIRATORY (INHALATION) at 15:30

## 2022-11-13 RX ADMIN — PANTOPRAZOLE SODIUM SCH MG: 40 TABLET, DELAYED RELEASE ORAL at 06:22

## 2022-11-13 RX ADMIN — HYDROCODONE BITARTRATE AND ACETAMINOPHEN PRN EACH: 7.5; 325 TABLET ORAL at 17:17

## 2022-11-13 RX ADMIN — METOPROLOL SUCCINATE SCH MG: 25 TABLET, EXTENDED RELEASE ORAL at 07:28

## 2022-11-13 RX ADMIN — BUDESONIDE SCH MG: 1 SUSPENSION RESPIRATORY (INHALATION) at 21:14

## 2022-11-13 RX ADMIN — ATORVASTATIN CALCIUM SCH MG: 40 TABLET, FILM COATED ORAL at 21:41

## 2022-11-13 RX ADMIN — AZITHROMYCIN SCH MG: 500 TABLET, FILM COATED ORAL at 07:28

## 2022-11-13 RX ADMIN — SERTRALINE HYDROCHLORIDE SCH MG: 50 TABLET, FILM COATED ORAL at 07:28

## 2022-11-13 RX ADMIN — HYDROCODONE BITARTRATE AND ACETAMINOPHEN PRN EACH: 7.5; 325 TABLET ORAL at 06:22

## 2022-11-13 RX ADMIN — BUDESONIDE SCH MG: 1 SUSPENSION RESPIRATORY (INHALATION) at 07:54

## 2022-11-13 RX ADMIN — IPRATROPIUM BROMIDE AND ALBUTEROL SULFATE SCH ML: .5; 3 SOLUTION RESPIRATORY (INHALATION) at 21:14

## 2022-11-13 RX ADMIN — CLOPIDOGREL BISULFATE SCH MG: 75 TABLET ORAL at 07:28

## 2022-11-13 RX ADMIN — Medication SCH MCG: at 07:28

## 2022-11-13 RX ADMIN — FORMOTEROL FUMARATE DIHYDRATE SCH MCG: 20 SOLUTION RESPIRATORY (INHALATION) at 21:14

## 2022-11-13 RX ADMIN — IPRATROPIUM BROMIDE AND ALBUTEROL SULFATE SCH ML: .5; 3 SOLUTION RESPIRATORY (INHALATION) at 07:54

## 2022-11-13 NOTE — P.PN
Subjective


Progress Note Date: 11/13/22


Principal diagnosis: 





Acute on chronic hypoxic or sores failure secondary to acute exacerbation of 

COPD and worsening pulmonary fibrosis.  Possible underlying pneumonia.





This is a very pleasant 72-year-old male patient with a known history of 

hyperlipidemia, anxiety, rheumatoid arthritis, pulmonary fibrosis suspect 

secondary to the rheumatoid arthritis, chronic obstructive pulmonary disease 

from chronic tobacco dependence however he quit approximately 6 years ago.  His 

FEV1 value is 46% of predicted.  He's been maintained on Trelegy, albuterol 

nebulized treatments in the outpatient setting.  He presented here to the 

emergency room earlier this week and wastreated for pneumonia.  He presented 

here again yesterday with worsening shortness of breath, cough and congestion. 

He had also been discharged home from here on 10/29/2022 following a COPD 

exacerbation. Since that time he had fallen at home and broken his right 

shoulder.  It is currently in a sling. Chest x-ray shows left perihilar density.

 Underlying mass not excluded. today's chest x-ray shows persistent interstitial

prominence throughout both lung fields.  Left perihilar infiltrate/mass persist 

and is unchanged.  He is seen today in consultation on the regular medical 

floor.  He is currently sitting up in bed.  Awake and alert.  He is dyspneic 

with conversation.  Dyspneic with minimal exertion.  He is quite frail.  He has 

a large protruding umbilical hernia noted. She had been turned up to 10 L high 

flow nasal cannula with O2 saturations at 95%.  Currently down to 8 L at 92%.  

We are accepting of 88% or higher based on his poor lung function.  He is 

currently afebrile.  Hemodynamically stable. He's been initiated on DuoNeb 

inhalations, Pulmicort and Perforomist inhalations, home prednisone dose at 2.5 

mg daily antibiotics in the form of ceftriaxone and azithromycin. White count 

13.8.  Hemoglobin 11.6.  Sodium 133.  Potassium 4.2.  BUN 19.  Creatinine 0.57. 

Glucose 125.  Corona virus by PCR not detected.  Influenza screen negative.  

Pro-calcitonin pending.





Reevaluated today on 11/13/22, patient is feeling better, however he is still 

requiring 7 L of oxygen via nasal cannula, O2 saturation is fluctuating anywhere

between 85% up to 98%.  Clinically however the patient is feeling better, 

breathing a lot easier.  WBC count is 9.2 hemoglobin is 9.5.  Electrolytes are 

normal.  Renal profile is normal.  Pro-calcitonin level was minimally increased 

at 0.12, doubt clinical significance of pro-calcitonin level of 0.12.











Objective





- Vital Signs


Vital signs: 


                                   Vital Signs











Temp  97.9 F   11/13/22 07:50


 


Pulse  104 H  11/13/22 11:53


 


Resp  16   11/13/22 07:50


 


BP  104/53   11/13/22 07:50


 


Pulse Ox  89 L  11/13/22 07:54


 


FiO2  5   11/12/22 07:36








                                 Intake & Output











 11/12/22 11/13/22 11/13/22





 18:59 06:59 18:59


 


Intake Total   50


 


Output Total  150 


 


Balance  -150 50


 


Intake:   


 


  Intake, IV Titration   50





  Amount   


 


    cefTRIAXone 2 gm In   50





    Sodium Chloride 0.9% 50   





    ml @ 100 mls/hr IVPB Q24H   





    [facility] Rx#:[id number]   


 


Output:   


 


  Urine  150 


 


Other:   


 


  Voiding Method   External Catheter


 


  # Voids 2 2 














- Exam





GENERAL EXAM: Alert, very pleasant 72-year-old male, frail, cachectic, on 7 L 

high flow nasal cannula.


HEAD: Normocephalic.


EENT: PERRLA, EOMI, nonicteric, no neck masses no JVD.


CHEST: No chest wall deformity.


LUNGS: Equal air entry with coarse crackles in the posterior bases.


CVS: S1 and S2 normal with no audible murmur, regular rhythm.


ABDOMEN: Large protruding umbilical hernia.otherwise unremarkable.


SKIN: No rashes


CENTRAL NERVOUS SYSTEM: Alert oriented 3 no gross focal deficits.


Psychiatric: Normal mood affect and normal mental status examination.


EXTREMITIES: Fracture right shoulder currently in a sling.  Patient had fracture

of right humerus, rheumatoid deformities noted in hands.








- Labs


CBC & Chem 7: 


                                 11/13/22 03:48





                                 11/13/22 03:48


Labs: 


                  Abnormal Lab Results - Last 24 Hours (Table)











  11/11/22 11/13/22 11/13/22 Range/Units





  13:35 03:48 03:48 


 


RBC   3.24 L   (4.40-5.60)  X 10*6/uL


 


Hgb   9.5 L   (13.0-17.0)  g/dL


 


Hct   30.9 L   (39.6-50.0)  %


 


MCHC   30.7 L   (32.0-37.0)  g/dL


 


RDW   16.7 H   (11.5-14.5)  %


 


Immature Gran #   0.06 H   (0.00-0.04)  X 10*3/uL


 


Neutrophils #   8.22 H   (1.80-7.70)  X 10*3/uL


 


Lymphocytes #   0.61 L   (0.90-5.00)  X 10*3/uL


 


Eosinophils #   0 L   (0.04-0.35)  X 10*3/uL


 


Carbon Dioxide    31.9 H  (20.0-27.5)  mmol/L


 


Anion Gap    7.10 L  (10.00-18.00)  mmol/L


 


Creatinine    0.5 L  (0.6-1.5)  mg/dL


 


BUN/Creatinine Ratio    33.40 H  (12.00-20.00)  Ratio


 


Glucose    126 H  ()  mg/dL


 


Calcium    8.6 L  (8.7-10.3)  mg/dL


 


Procalcitonin  0.12 H    (0.02-0.09)  ng/mL








                      Microbiology - Last 24 Hours (Table)











 11/11/22 13:30 Blood Culture - Preliminary





 Blood    No Growth after 24 hours


 


 11/11/22 13:45 Blood Culture - Preliminary





 Blood    No Growth after 24 hours














Assessment and Plan


Assessment: 


Acute on chronic hypoxemic respiratory failure, secondary to COPD exacerbation, 

possible pneumonia involving left upper lobe


Chronic left upper lobe infiltrate/mass.  Could not rule out neoplasm however 

the patient is to frail for any intervention or biopsies


History of severe oxygen-dependent, steroid dependent COPD.


Pulmonary fibrosis, suspect secondary to rheumatoid arthritis.


Recent fall with fracture to the right upper extremity.


History of CHF.


History of hyperlipidemia.


History of osteoarthritis.


History of rheumatoid arthritis.


History of sleep apnea syndrome, maintained on CPAP.


History of perforated duodenal ulcer.  


History of anxiety/depression.


Previous history of heavy tobacco use.





Recommendation:


Continue to titrate oxygen as tolerated.


Continue bronchodilators.


Continue IV Solu-Medrol.


Patient is not a candidate for any surgical intervention to evaluate the 

abnormality in his left upper lobe.


We'll continue to follow.








Time with Patient: Less than 30

## 2022-11-14 LAB
ANION GAP SERPL CALC-SCNC: 7.5 MMOL/L (ref 10–18)
BASOPHILS # BLD AUTO: 0.01 X 10*3/UL (ref 0–0.1)
BASOPHILS NFR BLD AUTO: 0.1 %
BUN SERPL-SCNC: 15 MG/DL (ref 9–27)
BUN/CREAT SERPL: 30 RATIO (ref 12–20)
CALCIUM SPEC-MCNC: 8.6 MG/DL (ref 8.7–10.3)
CHLORIDE SERPL-SCNC: 101 MMOL/L (ref 96–109)
CO2 SERPL-SCNC: 31.5 MMOL/L (ref 20–27.5)
EOSINOPHIL # BLD AUTO: 0 X 10*3/UL (ref 0.04–0.35)
EOSINOPHIL NFR BLD AUTO: 0 %
ERYTHROCYTE [DISTWIDTH] IN BLOOD BY AUTOMATED COUNT: 3.22 X 10*6/UL (ref 4.4–5.6)
ERYTHROCYTE [DISTWIDTH] IN BLOOD: 17 % (ref 11.5–14.5)
GLUCOSE SERPL-MCNC: 105 MG/DL (ref 70–110)
HCT VFR BLD AUTO: 30.4 % (ref 39.6–50)
HGB BLD-MCNC: 9.4 G/DL (ref 13–17)
IMM GRANULOCYTES BLD QL AUTO: 0.7 %
LYMPHOCYTES # SPEC AUTO: 0.86 X 10*3/UL (ref 0.9–5)
LYMPHOCYTES NFR SPEC AUTO: 8 %
MCH RBC QN AUTO: 29.2 PG (ref 27–32)
MCHC RBC AUTO-ENTMCNC: 30.9 G/DL (ref 32–37)
MCV RBC AUTO: 94.4 FL (ref 80–97)
MONOCYTES # BLD AUTO: 0.47 X 10*3/UL (ref 0.2–1)
MONOCYTES NFR BLD AUTO: 4.4 %
NEUTROPHILS # BLD AUTO: 9.35 X 10*3/UL (ref 1.8–7.7)
NEUTROPHILS NFR BLD AUTO: 86.8 %
NRBC BLD AUTO-RTO: 0 /100 WBCS (ref 0–0)
PLATELET # BLD AUTO: 297 X 10*3/UL (ref 140–440)
POTASSIUM SERPL-SCNC: 4.7 MMOL/L (ref 3.5–5.5)
SODIUM SERPL-SCNC: 140 MMOL/L (ref 135–145)
WBC # BLD AUTO: 10.76 X 10*3/UL (ref 4.5–10)

## 2022-11-14 RX ADMIN — METHYLPREDNISOLONE SODIUM SUCCINATE SCH MG: 40 INJECTION, POWDER, FOR SOLUTION INTRAMUSCULAR; INTRAVENOUS at 18:50

## 2022-11-14 RX ADMIN — Medication SCH MCG: at 08:56

## 2022-11-14 RX ADMIN — METHYLPREDNISOLONE SODIUM SUCCINATE SCH MG: 40 INJECTION, POWDER, FOR SOLUTION INTRAMUSCULAR; INTRAVENOUS at 00:30

## 2022-11-14 RX ADMIN — SERTRALINE HYDROCHLORIDE SCH MG: 50 TABLET, FILM COATED ORAL at 08:56

## 2022-11-14 RX ADMIN — METHYLPREDNISOLONE SODIUM SUCCINATE SCH MG: 40 INJECTION, POWDER, FOR SOLUTION INTRAMUSCULAR; INTRAVENOUS at 23:30

## 2022-11-14 RX ADMIN — METOPROLOL SUCCINATE SCH MG: 25 TABLET, EXTENDED RELEASE ORAL at 08:57

## 2022-11-14 RX ADMIN — IPRATROPIUM BROMIDE AND ALBUTEROL SULFATE SCH ML: .5; 3 SOLUTION RESPIRATORY (INHALATION) at 11:30

## 2022-11-14 RX ADMIN — IPRATROPIUM BROMIDE AND ALBUTEROL SULFATE SCH ML: .5; 3 SOLUTION RESPIRATORY (INHALATION) at 09:23

## 2022-11-14 RX ADMIN — HYDROCODONE BITARTRATE AND ACETAMINOPHEN PRN EACH: 7.5; 325 TABLET ORAL at 21:58

## 2022-11-14 RX ADMIN — METHYLPREDNISOLONE SODIUM SUCCINATE SCH MG: 40 INJECTION, POWDER, FOR SOLUTION INTRAMUSCULAR; INTRAVENOUS at 10:10

## 2022-11-14 RX ADMIN — BUDESONIDE SCH: 1 SUSPENSION RESPIRATORY (INHALATION) at 19:51

## 2022-11-14 RX ADMIN — PANTOPRAZOLE SODIUM SCH MG: 40 TABLET, DELAYED RELEASE ORAL at 06:32

## 2022-11-14 RX ADMIN — BUDESONIDE SCH MG: 1 SUSPENSION RESPIRATORY (INHALATION) at 09:22

## 2022-11-14 RX ADMIN — ATORVASTATIN CALCIUM SCH MG: 40 TABLET, FILM COATED ORAL at 21:46

## 2022-11-14 RX ADMIN — FORMOTEROL FUMARATE DIHYDRATE SCH: 20 SOLUTION RESPIRATORY (INHALATION) at 19:51

## 2022-11-14 RX ADMIN — AMITRIPTYLINE HYDROCHLORIDE SCH MG: 10 TABLET, FILM COATED ORAL at 21:46

## 2022-11-14 RX ADMIN — HYDROCODONE BITARTRATE AND ACETAMINOPHEN PRN EACH: 7.5; 325 TABLET ORAL at 01:04

## 2022-11-14 RX ADMIN — IPRATROPIUM BROMIDE AND ALBUTEROL SULFATE SCH: .5; 3 SOLUTION RESPIRATORY (INHALATION) at 19:51

## 2022-11-14 RX ADMIN — IPRATROPIUM BROMIDE AND ALBUTEROL SULFATE SCH ML: .5; 3 SOLUTION RESPIRATORY (INHALATION) at 15:34

## 2022-11-14 RX ADMIN — CLOPIDOGREL BISULFATE SCH MG: 75 TABLET ORAL at 08:57

## 2022-11-14 RX ADMIN — FORMOTEROL FUMARATE DIHYDRATE SCH MCG: 20 SOLUTION RESPIRATORY (INHALATION) at 09:22

## 2022-11-14 RX ADMIN — HYDROCODONE BITARTRATE AND ACETAMINOPHEN PRN EACH: 7.5; 325 TABLET ORAL at 13:15

## 2022-11-14 NOTE — P.PN
Subjective





Patient is a 72-year-old male with a known history of COPD on home oxygen, 

osteoarthritis, hyperlipidemia, rheumatoid arthritis, obstructive sleep apnea, 

chronic back pain and anxiety/depression prior history of smoking presents to ER

with complaints of worsening shortness of breath.  Patient was desaturating at 

home and oxygen on turn up to 10 L at home.  Patient has been having cough but 

unable to bring out any sputum.


Patient has Poor functional status.


Patient was recently discharged from the hospital on 10/29/2022.  Patient was 

treated for acute COPD exacerbation and pneumonia and was discharged home on 

Omnicef and azithromycin.  Patient states that he fell on Wednesday and 

fractured his right proximal humerus and was placed on sling while in the ER.  

Patient has been having worsening shortness of breath and pain and came to ER.  

Chest x-ray showed persistent interstitial Prominence throughout both lungs.  

Left perihilar infiltrate or mass persists unchanged.  Correlate clinically and 

progress studies recommended.


EKG showed sinus rhythm.


Laboratory pressure WBC 13.8 hemoglobin 11.6 and platelets 252


Sodium 133 potassium 4.2 chloride 98 bicarb is 30 BUN 19 and creatinine 0.57 and

albumin 3.2


Coronavirus PCR not detected.





11/12/2022


Patient is currently lying in bed.  Awake alert and oriented.  Still having 

shortness of breath but improved clinically.  Cough with whitish sputum 

production.  Patient has been afebrile.  Currently requiring oxygen at 8 L via 

nasal cannula.  No nausea vomiting abdominal pain or diarrhea.


Right humerus sling in place and pain is fairly controlled.


No chest pain.  No headache or dizziness or lightheadedness.


Patient was seen by pulmonary.  Continue on steroids changed to IV.  Patient is 

also on antibiotics  azithro. ceftriaxone.





11/13/2022


Patient is currently lying in bed.  Awake alert and oriented.  Breathing status 

is better.  Currently requiring 7 L oxygen via nasal cannula.  No complaints of 

worsening shortness of breath.  Cough without any sputum production.  No 

headache or dizziness lightheadedness.  No complaints of right shoulder pain.  

Sling in place.


Patient has been afebrile.


Laboratory data showed WBC 9.23 hemoglobin 9.4 and platelets 285


Sodium 140 potassium 4.8 chloride 101 bicarb is 31.9 BUN 16.7 creatinine 0.5.  

Calcium 8.6.


Patient is being continued on antibiotics at home of ceftriaxone.


IV steroids and duo nebs.  Pulmonary is on board.





11/14/2020





Patient still wheezing and he still on 9 down to 7 L/m of oxygen.


Hemoglobin stable 9.4, WBC 10 while his with IV Solu-Medrol 40 mg


Also he is on ceftriaxone and normal saline at 50 mL per hour


I talked to the patient about possible lung mass and recommendation of 

pulmonologist to hold off on any biopsy or procedure for now and he is 

agreeable.


Pulmonary team on the case

















Objective





- Vital Signs


Vital signs: 


                                   Vital Signs











Temp  98.0 F   11/14/22 07:37


 


Pulse  91   11/14/22 11:38


 


Resp  22   11/14/22 11:38


 


BP  118/64   11/14/22 07:37


 


Pulse Ox  96   11/14/22 09:24


 


FiO2  5   11/12/22 07:36








                                 Intake & Output











 11/13/22 11/14/22 11/14/22





 18:59 06:59 18:59


 


Intake Total 50  


 


Balance 50  


 


Intake:   


 


  Intake, IV Titration 50  





  Amount   


 


    cefTRIAXone 2 gm In 50  





    Sodium Chloride 0.9% 50   





    ml @ 100 mls/hr IVPB Q24H   





    [facility] Rx#:[id number]   


 


Other:   


 


  Voiding Method External Catheter  


 


  # Voids  3 














- Exam





-GENERAL: The patient is alert and oriented x3, not in any acute distress. Well 

developed, well nourished.  Thin built


HEENT: Pupils are round and equally reacting to light. EOMI. No scleral icterus.

No conjunctival pallor. Normocephalic, atraumatic. No pharyngeal erythema. No 

thyromegaly. 


CARDIOVASCULAR: S1 and S2 present. No murmurs, rubs, or gallops. 


-PULMONARY: Chest is clear to auscultation, no crackles.  Tachypneic which short

of breath.  Bilateral scattered wheezing


ABDOMEN: Soft, nontender, nondistended, normoactive bowel sounds. No palpable 

organomegaly. 


MUSCULOSKELETAL: No joint swelling or deformity. 


EXTREMITIES: No cyanosis, clubbing, or pedal edema. 


NEUROLOGICAL: Gross neurological examination did not reveal any focal deficits. 


SKIN: No rashes. no petechiae.





- Labs


CBC & Chem 7: 


                                 11/14/22 06:01





                                 11/14/22 06:01


Labs: 


                  Abnormal Lab Results - Last 24 Hours (Table)











  11/14/22 11/14/22 Range/Units





  06:01 06:01 


 


WBC  10.76 H   (4.50-10.00)  X 10*3/uL


 


RBC  3.22 L   (4.40-5.60)  X 10*6/uL


 


Hgb  9.4 L   (13.0-17.0)  g/dL


 


Hct  30.4 L   (39.6-50.0)  %


 


MCHC  30.9 L   (32.0-37.0)  g/dL


 


RDW  17.0 H   (11.5-14.5)  %


 


Immature Gran #  0.07 H   (0.00-0.04)  X 10*3/uL


 


Neutrophils #  9.35 H   (1.80-7.70)  X 10*3/uL


 


Lymphocytes #  0.86 L   (0.90-5.00)  X 10*3/uL


 


Eosinophils #  0 L   (0.04-0.35)  X 10*3/uL


 


Carbon Dioxide   31.5 H  (20.0-27.5)  mmol/L


 


Anion Gap   7.50 L  (10.00-18.00)  mmol/L


 


Creatinine   0.5 L  (0.6-1.5)  mg/dL


 


BUN/Creatinine Ratio   30.00 H  (12.00-20.00)  Ratio


 


Calcium   8.6 L  (8.7-10.3)  mg/dL








                      Microbiology - Last 24 Hours (Table)











 11/11/22 13:45 Blood Culture - Preliminary





 Blood    No Growth after 48 hours


 


 11/11/22 13:30 Blood Culture - Preliminary





 Blood    No Growth after 48 hours














Assessment and Plan


Assessment: 





Acute on chronic hypoxic respiratory failure secondary to COPD exacerbation


Pneumonia with persistent interstitial prominence of both legs and left 

perihilar filtrate.  Underlying mass cannot be excluded.


Chronic hypoxic aspiratory secondary COPD on home oxygen at 4 L via nasal 

cannula.


Pulmonary fibrosis


History of rheumatoid arthritis


Obstructive sleep apnea on CPAP at home


Osteoarthritis


Hyperlipidemia


Anxiety/depression


Chronic CHF with preserved ejection fraction


Prior history of smoking


DVT prophylaxis with heparin subcu





Plan: 





Continue with antibiotic ceftriaxone


Continue with Solu-Medrol


Continue with IV fluid


Pulmonary team on the case


Patient was possible lung mass, surgical candidate for pulmonary team, patient 

informed about pulmonary team recommendation and he is agreeable


Labs and medication were reviewed..  Continue same treatment.  Continue with 

symptomatic treatment.  Resume home medication.  Monitor labs and vitals.  DVT 

and GI prophylaxis.  Further recommendations as per clinical course of the 

patient


DVT prophylaxis: Subcutaneous heparin


GI Prophylaxis: Pepcid


PT/OT: Pending


Prognosis is guarded

## 2022-11-14 NOTE — P.PN
Subjective


Progress Note Date: 11/14/22





This is a very pleasant 72-year-old male patient with a known history of 

hyperlipidemia, anxiety, rheumatoid arthritis, pulmonary fibrosis suspect 

secondary to the rheumatoid arthritis, chronic obstructive pulmonary disease 

from chronic tobacco dependence however he quit approximately 6 years ago.  His 

FEV1 value is 46% of predicted.  He's been maintained on Trelegy, albuterol 

nebulized treatments in the outpatient setting.  He presented here to the 

emergency room earlier this week and wastreated for pneumonia.  He presented 

here again yesterday with worsening shortness of breath, cough and congestion. 

He had also been discharged home from here on 10/29/2022 following a COPD 

exacerbation. Since that time he had fallen at home and broken his right 

shoulder.  It is currently in a sling. Chest x-ray shows left perihilar density.

 Underlying mass not excluded. today's chest x-ray shows persistent interstitial

prominence throughout both lung fields.  Left perihilar infiltrate/mass persist 

and is unchanged.  He is seen today in consultation on the regular medical 

floor.  He is currently sitting up in bed.  Awake and alert.  He is dyspneic 

with conversation.  Dyspneic with minimal exertion.  He is quite frail.  He has 

a large protruding umbilical hernia noted. She had been turned up to 10 L high 

flow nasal cannula with O2 saturations at 95%.  Currently down to 8 L at 92%.  

We are accepting of 88% or higher based on his poor lung function.  He is 

currently afebrile.  Hemodynamically stable. He's been initiated on DuoNeb 

inhalations, Pulmicort and Perforomist inhalations, home prednisone dose at 2.5 

mg daily antibiotics in the form of ceftriaxone and azithromycin. White count 

13.8.  Hemoglobin 11.6.  Sodium 133.  Potassium 4.2.  BUN 19.  Creatinine 0.57. 

Glucose 125.  Corona virus by PCR not detected.  Influenza screen negative.  

Pro-calcitonin pending.





Reevaluated today on 11/13/22, patient is feeling better, however he is still 

requiring 7 L of oxygen via nasal cannula, O2 saturation is fluctuating anywhere

between 85% up to 98%.  Clinically however the patient is feeling better, 

breathing a lot easier.  WBC count is 9.2 hemoglobin is 9.5.  Electrolytes are 

normal.  Renal profile is normal.  Pro-calcitonin level was minimally increased 

at 0.12, doubt clinical significance of pro-calcitonin level of 0.12.





The patient is seen today 11/14/2022 in follow-up on the regular medical floor. 

He is currently resting fairly comfortably in bed.  Awake and alert in no acute 

distress.  Family is at the bedside.  If he is currently maintaining O2 

saturations in the low 90s on 7 L high flow nasal cannula.  0.9 normal saline at

30 MLS per hour.  Blood cultures revealing no growth to date.  White count 10.7.

 Hemoglobin 9.4.  Platelets 297.  Sodium 140.  Potassium 4.7.  BUN 15.  

Creatinine 0.5.  Glucose 105.  He is continued on DuoNeb inhalations, Pulmicort 

and Perforomist inhalations, IV Solu-Medrol.  Antibiotics in the form of 

ceftriaxone.  Morphine and Norco for pain control.





Objective





- Vital Signs


Vital signs: 


                                   Vital Signs











Temp  98.0 F   11/14/22 07:37


 


Pulse  90   11/14/22 15:34


 


Resp  22   11/14/22 11:38


 


BP  118/64   11/14/22 07:37


 


Pulse Ox  96   11/14/22 09:24


 


FiO2  5   11/12/22 07:36








                                 Intake & Output











 11/13/22 11/14/22 11/14/22





 18:59 06:59 18:59


 


Intake Total 50  


 


Balance 50  


 


Intake:   


 


  Intake, IV Titration 50  





  Amount   


 


    cefTRIAXone 2 gm In 50  





    Sodium Chloride 0.9% 50   





    ml @ 100 mls/hr IVPB Q24H   





    [facility] Rx#:[id number]   


 


Other:   


 


  Voiding Method External Catheter  


 


  # Voids  3 














- Exam





GENERAL EXAM: Alert, very pleasant 72-year-old male, frail, cachectic, on 7 L 

high flow nasal cannula, fairly comfortable in no apparent distress.


HEAD: Normocephalic.


EYES: Normal reaction of pupils, equal size.


NOSE: Clear with pink turbinates.


THROAT: No erythema or exudates.


NECK: No masses, no JVD.


CHEST: No chest wall deformity.


LUNGS: Equal air entry with coarse crackles in the posterior bases.


CVS: S1 and S2 normal with no audible murmur, regular rhythm.


ABDOMEN: Large protruding umbilical hernia. No hepatosplenomegaly, normal bowel 

sounds, no guarding or rigidity.


SPINE: Kyphosis


SKIN: Ecchymosis of the right upper extremity No rashes


CENTRAL NERVOUS SYSTEM: No focal deficits, tone is normal in all 4 extremities.


EXTREMITIES: Fracture right shoulder currently in a sling. Changes of rheumatoid

arthritis. There is no peripheral edema. 





- Labs


CBC & Chem 7: 


                                 11/14/22 06:01





                                 11/14/22 06:01


Labs: 


                  Abnormal Lab Results - Last 24 Hours (Table)











  11/14/22 11/14/22 Range/Units





  06:01 06:01 


 


WBC  10.76 H   (4.50-10.00)  X 10*3/uL


 


RBC  3.22 L   (4.40-5.60)  X 10*6/uL


 


Hgb  9.4 L   (13.0-17.0)  g/dL


 


Hct  30.4 L   (39.6-50.0)  %


 


MCHC  30.9 L   (32.0-37.0)  g/dL


 


RDW  17.0 H   (11.5-14.5)  %


 


Immature Gran #  0.07 H   (0.00-0.04)  X 10*3/uL


 


Neutrophils #  9.35 H   (1.80-7.70)  X 10*3/uL


 


Lymphocytes #  0.86 L   (0.90-5.00)  X 10*3/uL


 


Eosinophils #  0 L   (0.04-0.35)  X 10*3/uL


 


Carbon Dioxide   31.5 H  (20.0-27.5)  mmol/L


 


Anion Gap   7.50 L  (10.00-18.00)  mmol/L


 


Creatinine   0.5 L  (0.6-1.5)  mg/dL


 


BUN/Creatinine Ratio   30.00 H  (12.00-20.00)  Ratio


 


Calcium   8.6 L  (8.7-10.3)  mg/dL








                      Microbiology - Last 24 Hours (Table)











 11/11/22 13:45 Blood Culture - Preliminary





 Blood    No Growth after 48 hours


 


 11/11/22 13:30 Blood Culture - Preliminary





 Blood    No Growth after 48 hours














Assessment and Plan


Assessment: 





Acute on chronic hypoxemic respiratory failure, secondary to COPD exacerbation, 

and complicated by left-sided pneumonia.





Chronic left upper lobe infiltrate/mass.  Could not rule out neoplasm however 

the patient is to frail for any intervention or biopsies





History of severe oxygen-dependent, steroid dependent COPD.





Pulmonary fibrosis, suspect secondary to rheumatoid arthritis.





Recent fall with fracture to the right upper extremity.





History of CHF.





History of hyperlipidemia.





History of osteoarthritis.





History of rheumatoid arthritis.





History of sleep apnea syndrome, maintained on CPAP.





History of perforated duodenal ulcer.  





History of anxiety/depression.





Previous history of heavy tobacco use.





Plan:





The patient was seen and evaluated


Labs and medications reviewed


Can not rule out neoplasm in the left upper lobe however he is too frail for any

intervention


Could possibly be worked up with a PET scan in the outpatient setting


Continue antibiotics for now


Continue bronchodilators, IV Solu-Medrol


Titrate down the FiO2 as tolerated


DO NOT RESUSCITATE/DO NOT INTUBATE CODE STATUS per his request


Social work for discharge planning, may require subacute rehabilitation versus 

home with home care


We will continue to follow 





I have personally seen and examined the patient, performed the documentation and

the assessment and plan as written.  Number of minutes spent on the visit: 10.

## 2022-11-14 NOTE — P.PN
Subjective


Progress Note Date: 11/13/22





Patient is a 72-year-old male with a known history of COPD on home oxygen, 

osteoarthritis, hyperlipidemia, rheumatoid arthritis, obstructive sleep apnea, 

chronic back pain and anxiety/depression prior history of smoking presents to ER

with complaints of worsening shortness of breath.  Patient was desaturating at 

home and oxygen on turn up to 10 L at home.  Patient has been having cough but 

unable to bring out any sputum.


Patient has Poor functional status.


Patient was recently discharged from the hospital on 10/29/2022.  Patient was 

treated for acute COPD exacerbation and pneumonia and was discharged home on 

Omnicef and azithromycin.  Patient states that he fell on Wednesday and 

fractured his right proximal humerus and was placed on sling while in the ER.  

Patient has been having worsening shortness of breath and pain and came to ER.  

Chest x-ray showed persistent interstitial Prominence throughout both lungs.  

Left perihilar infiltrate or mass persists unchanged.  Correlate clinically and 

progress studies recommended.


EKG showed sinus rhythm.


Laboratory pressure WBC 13.8 hemoglobin 11.6 and platelets 252


Sodium 133 potassium 4.2 chloride 98 bicarb is 30 BUN 19 and creatinine 0.57 and

albumin 3.2


Coronavirus PCR not detected.





11/12/2022


Patient is currently lying in bed.  Awake alert and oriented.  Still having 

shortness of breath but improved clinically.  Cough with whitish sputum 

production.  Patient has been afebrile.  Currently requiring oxygen at 8 L via 

nasal cannula.  No nausea vomiting abdominal pain or diarrhea.


Right humerus sling in place and pain is fairly controlled.


No chest pain.  No headache or dizziness or lightheadedness.


Patient was seen by pulmonary.  Continue on steroids changed to IV.  Patient is 

also on antibiotics  azithro. ceftriaxone.





11/13/2022


Patient is currently lying in bed.  Awake alert and oriented.  Breathing status 

is better.  Currently requiring 7 L oxygen via nasal cannula.  No complaints of 

worsening shortness of breath.  Cough without any sputum production.  No 

headache or dizziness lightheadedness.  No complaints of right shoulder pain.  

Sling in place.


Patient has been afebrile.


Laboratory data showed WBC 9.23 hemoglobin 9.4 and platelets 285


Sodium 140 potassium 4.8 chloride 101 bicarb is 31.9 BUN 16.7 creatinine 0.5.  

Calcium 8.6.


Patient is being continued on antibiotics at home of ceftriaxone.


IV steroids and duo nebs.  Pulmonary is on board.








Current medications reviewed.





Objective





- Vital Signs


Vital signs: 


                                   Vital Signs











Temp  98.3 F   11/13/22 14:00


 


Pulse  108 H  11/13/22 15:38


 


Resp  20   11/13/22 14:00


 


BP  108/64   11/13/22 14:00


 


Pulse Ox  88 L  11/13/22 14:00


 


FiO2  5   11/12/22 07:36








                                 Intake & Output











 11/12/22 11/13/22 11/13/22





 18:59 06:59 18:59


 


Intake Total   50


 


Output Total  150 


 


Balance  -150 50


 


Intake:   


 


  Intake, IV Titration   50





  Amount   


 


    cefTRIAXone 2 gm In   50





    Sodium Chloride 0.9% 50   





    ml @ 100 mls/hr IVPB Q24H   





    [facility] Rx#:[id number]   


 


Output:   


 


  Urine  150 


 


Other:   


 


  Voiding Method   External Catheter


 


  # Voids 2 2 














- Exam








PHYSICAL EXAMINATION: 


Patient is lying in the bed comfortably, no acute distress, awake alert and 

oriented..  Frail looking.


HEENT: Normocephalic. Neck is supple. Pupils reactive. Nostrils clear. Oral 

cavity is moist. 


Neck reveals no JVD, carotid bruits, or thyromegaly. 


CHEST EXAMINATION: Trachea is central. Symmetrical expansion.  Bilateral diffuse

coarse sounds and rhonchi.. 


CARDIAC: Normal S1, S2 with no gallops. No murmurs 


ABDOMEN: Soft. Bowel sounds present.  Nontender.  No organomegaly. No abdominal 

bruits. 


Extremities: reveal no edema.  No clubbing or cyanosis


Right humerus sling in place.


Neurologically awake, alert, oriented x3 with well-coordinated movements.  No 

focal deficits noted


Skin: No rash or skin lesions. 


Psychiatric: Coperative.  Nonsuicidal,


Musculoskeletal: No joint swelling or deformity.  Normal range of motion.








- Labs


CBC & Chem 7: 


                                 11/13/22 03:48





                                 11/13/22 03:48


Labs: 


                  Abnormal Lab Results - Last 24 Hours (Table)











  11/11/22 11/13/22 11/13/22 Range/Units





  13:35 03:48 03:48 


 


RBC   3.24 L   (4.40-5.60)  X 10*6/uL


 


Hgb   9.5 L   (13.0-17.0)  g/dL


 


Hct   30.9 L   (39.6-50.0)  %


 


MCHC   30.7 L   (32.0-37.0)  g/dL


 


RDW   16.7 H   (11.5-14.5)  %


 


Immature Gran #   0.06 H   (0.00-0.04)  X 10*3/uL


 


Neutrophils #   8.22 H   (1.80-7.70)  X 10*3/uL


 


Lymphocytes #   0.61 L   (0.90-5.00)  X 10*3/uL


 


Eosinophils #   0 L   (0.04-0.35)  X 10*3/uL


 


Carbon Dioxide    31.9 H  (20.0-27.5)  mmol/L


 


Anion Gap    7.10 L  (10.00-18.00)  mmol/L


 


Creatinine    0.5 L  (0.6-1.5)  mg/dL


 


BUN/Creatinine Ratio    33.40 H  (12.00-20.00)  Ratio


 


Glucose    126 H  ()  mg/dL


 


Calcium    8.6 L  (8.7-10.3)  mg/dL


 


Procalcitonin  0.12 H    (0.02-0.09)  ng/mL








                      Microbiology - Last 24 Hours (Table)











 11/11/22 13:45 Blood Culture - Preliminary





 Blood    No Growth after 48 hours


 


 11/11/22 13:30 Blood Culture - Preliminary





 Blood    No Growth after 48 hours














Assessment and Plan


Assessment: 








Acute on chronic hypoxic respiratory failure secondary to COPD exacerbation


Pneumonia with persistent interstitial prominence of both legs and left 

perihilar filtrate.  Underlying mass cannot be excluded.


Chronic hypoxic aspiratory secondary COPD on home oxygen at 4 L via nasal 

cannula.


Pulmonary fibrosis


History of rheumatoid arthritis


Obstructive sleep apnea on CPAP at home


Osteoarthritis


Hyperlipidemia


Anxiety/depression


Chronic CHF with preserved ejection fraction


Prior history of smoking


DVT prophylaxis with heparin subcu





Plan:


Patient will be continued on oxygen supplementation and IV steroids changed to 

Solu-Medrol 40 every 8 hourly.


Continues with the duo nebs.  Continue with antibiotics in the form of 

ceftriaxone.


Current home medications and follow-up closely.  Pulmonary is on board.  

Prognosis guarded at this time.





Time with Patient: Greater than 30

## 2022-11-14 NOTE — CDI
Documentation Clarification Form



Date: 11/14/2022 09:43:34 AM

From: Gisselle Sacnhez

Phone: 

MRN: [id number]

Admit Date: 11/11/2022 02:36:00 PM

Patient Name: [place]

Visit Number: [id number]

Discharge Date:  





ATTENTION: The Clinical Documentation Specialists (CDI) and [facility] Coding Staff 
appreciate your assistance in clarifying documentation. Please respond to the 
clarification below the line at the bottom and electronically sign. The CDI & 
[facility] Coding staff will review the response and follow-up if needed. Please note: 
Queries are made part of the Legal Health Record. If you have any questions, 
please contact the author of this message via ITS.



Dr. Crissy Chan







The patient presented with the following clinical indicators. Additional 
clarification regarding the etiology/cause of the clinical indicators is 
requested.



History/Risk Factors:  COPD, Pneumonia, CHF, Rheumatoid Arthritis, Sleep Apnea, 
Chronic hypoxic respiratory failure



Clinical Indicators: 72-year-old male diagnosed with pneumonia 3 days ago. 
Patient feels his symptoms have worsened since that time. He has a productive 
cough. No fever.

At 14:35 ER Reevaluation has documented patient meets sepsis criteria. 

11/11 WBC: 13.8, Lactic acid: 1.0, 

11/11 Vital signs: j105.64 99 20 98.4 90 % 5/L NC

11/11 (17:20) 91/55 92 17 98.1 90% 5L NC

11/11 Blood cultures: Pending, No growth after 48 hours



Treatment

Rocephin 2 gm IVPB Q 24 HRS 11/11, then per orders

.9NS @ 100 Mls/hr11/11-11/12

Solu-Medrol 40 MG IV Q 8 HRS, then taper per orders

Pulmicort 0.5 mg inhalation BID

Zithromycin 500 MG IVPB once, then PO daily dc 11/13

 

In your professional opinion, please clarify if these findings signify one of 
the following conditions:

[  ] Sepsis POA, Ruled in

[ x ] Sepsis ruled out

[  ] Other, please specify _____________

[  ] Unable to determine





SIRS Criteria: 2 or more of the following may indicate SIRS   

-Temperature < 96.8F (36C) or > 101.0F (38.3C)

-Heart Rate > 90 bpm

-Respiratory Rate > 20 breaths/min or PaCO2 < 32 mmHg

-White Blood Cell Count > 12,000 or < 4,000 cells/mm3 or > 10% bands



___________________________________________________________________________

(Template Last Reviewed: February 2021)

___________________________________________________________________________

MTDD

## 2022-11-15 LAB
HYALINE CASTS UR QL AUTO: 3 /LPF (ref 0–2)
PH UR: 7.5 [PH] (ref 5–8)
RBC UR QL: 2 /HPF (ref 0–5)
SP GR UR: 1.01 (ref 1–1.03)
UROBILINOGEN UR QL STRIP: <2 MG/DL (ref ?–2)
WBC # UR AUTO: 7 /HPF (ref 0–5)

## 2022-11-15 RX ADMIN — CLOPIDOGREL BISULFATE SCH MG: 75 TABLET ORAL at 09:55

## 2022-11-15 RX ADMIN — AMITRIPTYLINE HYDROCHLORIDE SCH MG: 10 TABLET, FILM COATED ORAL at 21:13

## 2022-11-15 RX ADMIN — IPRATROPIUM BROMIDE AND ALBUTEROL SULFATE SCH ML: .5; 3 SOLUTION RESPIRATORY (INHALATION) at 20:53

## 2022-11-15 RX ADMIN — Medication SCH MCG: at 09:55

## 2022-11-15 RX ADMIN — IPRATROPIUM BROMIDE AND ALBUTEROL SULFATE SCH ML: .5; 3 SOLUTION RESPIRATORY (INHALATION) at 11:40

## 2022-11-15 RX ADMIN — ATORVASTATIN CALCIUM SCH MG: 40 TABLET, FILM COATED ORAL at 21:13

## 2022-11-15 RX ADMIN — HYDROCODONE BITARTRATE AND ACETAMINOPHEN PRN EACH: 7.5; 325 TABLET ORAL at 16:56

## 2022-11-15 RX ADMIN — PANTOPRAZOLE SODIUM SCH MG: 40 TABLET, DELAYED RELEASE ORAL at 06:28

## 2022-11-15 RX ADMIN — METHYLPREDNISOLONE SODIUM SUCCINATE SCH MG: 40 INJECTION, POWDER, FOR SOLUTION INTRAMUSCULAR; INTRAVENOUS at 16:53

## 2022-11-15 RX ADMIN — FORMOTEROL FUMARATE DIHYDRATE SCH MCG: 20 SOLUTION RESPIRATORY (INHALATION) at 20:53

## 2022-11-15 RX ADMIN — HYDROCODONE BITARTRATE AND ACETAMINOPHEN PRN EACH: 7.5; 325 TABLET ORAL at 22:49

## 2022-11-15 RX ADMIN — METOPROLOL SUCCINATE SCH MG: 25 TABLET, EXTENDED RELEASE ORAL at 09:55

## 2022-11-15 RX ADMIN — HYDROCODONE BITARTRATE AND ACETAMINOPHEN PRN EACH: 7.5; 325 TABLET ORAL at 03:12

## 2022-11-15 RX ADMIN — IPRATROPIUM BROMIDE AND ALBUTEROL SULFATE SCH ML: .5; 3 SOLUTION RESPIRATORY (INHALATION) at 08:24

## 2022-11-15 RX ADMIN — BUDESONIDE SCH MG: 1 SUSPENSION RESPIRATORY (INHALATION) at 08:24

## 2022-11-15 RX ADMIN — IPRATROPIUM BROMIDE AND ALBUTEROL SULFATE SCH ML: .5; 3 SOLUTION RESPIRATORY (INHALATION) at 15:02

## 2022-11-15 RX ADMIN — HYDROCODONE BITARTRATE AND ACETAMINOPHEN PRN EACH: 7.5; 325 TABLET ORAL at 09:56

## 2022-11-15 RX ADMIN — FORMOTEROL FUMARATE DIHYDRATE SCH MCG: 20 SOLUTION RESPIRATORY (INHALATION) at 08:24

## 2022-11-15 RX ADMIN — BUDESONIDE SCH MG: 1 SUSPENSION RESPIRATORY (INHALATION) at 20:53

## 2022-11-15 RX ADMIN — SERTRALINE HYDROCHLORIDE SCH MG: 50 TABLET, FILM COATED ORAL at 09:55

## 2022-11-15 RX ADMIN — METHYLPREDNISOLONE SODIUM SUCCINATE SCH MG: 40 INJECTION, POWDER, FOR SOLUTION INTRAMUSCULAR; INTRAVENOUS at 09:52

## 2022-11-15 NOTE — P.PN
Subjective


Progress Note Date: 11/15/22





This is a very pleasant 72-year-old male patient with a known history of 

hyperlipidemia, anxiety, rheumatoid arthritis, pulmonary fibrosis suspect 

secondary to the rheumatoid arthritis, chronic obstructive pulmonary disease 

from chronic tobacco dependence however he quit approximately 6 years ago.  His 

FEV1 value is 46% of predicted.  He's been maintained on Trelegy, albuterol 

nebulized treatments in the outpatient setting.  He presented here to the 

emergency room earlier this week and wastreated for pneumonia.  He presented 

here again yesterday with worsening shortness of breath, cough and congestion. 

He had also been discharged home from here on 10/29/2022 following a COPD 

exacerbation. Since that time he had fallen at home and broken his right 

shoulder.  It is currently in a sling. Chest x-ray shows left perihilar density.

 Underlying mass not excluded. today's chest x-ray shows persistent interstitial

prominence throughout both lung fields.  Left perihilar infiltrate/mass persist 

and is unchanged.  He is seen today in consultation on the regular medical 

floor.  He is currently sitting up in bed.  Awake and alert.  He is dyspneic 

with conversation.  Dyspneic with minimal exertion.  He is quite frail.  He has 

a large protruding umbilical hernia noted. She had been turned up to 10 L high 

flow nasal cannula with O2 saturations at 95%.  Currently down to 8 L at 92%.  

We are accepting of 88% or higher based on his poor lung function.  He is 

currently afebrile.  Hemodynamically stable. He's been initiated on DuoNeb 

inhalations, Pulmicort and Perforomist inhalations, home prednisone dose at 2.5 

mg daily antibiotics in the form of ceftriaxone and azithromycin. White count 

13.8.  Hemoglobin 11.6.  Sodium 133.  Potassium 4.2.  BUN 19.  Creatinine 0.57. 

Glucose 125.  Corona virus by PCR not detected.  Influenza screen negative.  

Pro-calcitonin pending.





Reevaluated today on 11/13/22, patient is feeling better, however he is still 

requiring 7 L of oxygen via nasal cannula, O2 saturation is fluctuating anywhere

between 85% up to 98%.  Clinically however the patient is feeling better, 

breathing a lot easier.  WBC count is 9.2 hemoglobin is 9.5.  Electrolytes are 

normal.  Renal profile is normal.  Pro-calcitonin level was minimally increased 

at 0.12, doubt clinical significance of pro-calcitonin level of 0.12.





The patient is seen today 11/14/2022 in follow-up on the regular medical floor. 

He is currently resting fairly comfortably in bed.  Awake and alert in no acute 

distress.  Family is at the bedside.  If he is currently maintaining O2 

saturations in the low 90s on 7 L high flow nasal cannula.  0.9 normal saline at

30 MLS per hour.  Blood cultures revealing no growth to date.  White count 10.7.

 Hemoglobin 9.4.  Platelets 297.  Sodium 140.  Potassium 4.7.  BUN 15.  

Creatinine 0.5.  Glucose 105.  He is continued on DuoNeb inhalations, Pulmicort 

and Perforomist inhalations, IV Solu-Medrol.  Antibiotics in the form of 

ceftriaxone.  Morphine and Norco for pain control.





The patient is seen today 11/15/2022 follow-up on the regular medical floor.  He

is awake and alert in no acute distress.  Feeling a bit better today compared to

yesterday. He is currently on 6 L high flow nasal cannula.  He is working with 

PT/OT.  To be up in a chair as tolerated. He is continued on DuoNeb inhalations,

Pulmicort and Perforomist inhalations, IV Solu-Medrol.  Antibiotics in the form 

of ceftriaxone.  Morphine and Norco for pain control.





Objective





- Vital Signs


Vital signs: 


                                   Vital Signs











Temp  97.7 F   11/15/22 08:00


 


Pulse  88   11/15/22 11:43


 


Resp  19   11/15/22 08:00


 


BP  121/73   11/15/22 08:00


 


Pulse Ox  96   11/15/22 09:30


 


FiO2  5   11/12/22 07:36








                                 Intake & Output











 11/14/22 11/15/22 11/15/22





 18:59 06:59 18:59


 


Intake Total 480 530 


 


Output Total  500 


 


Balance 480 30 


 


Intake:   


 


  Intake, IV Titration  50 





  Amount   


 


    cefTRIAXone 2 gm In  50 





    Sodium Chloride 0.9% 50   





    ml @ 100 mls/hr IVPB Q24H   





    [facility] Rx#:[id number]   


 


  Oral 480 480 


 


Output:   


 


  Urine  500 


 


Other:   


 


  Voiding Method External Catheter  External Catheter


 


  # Voids 2 1 


 


  # Bowel Movements 1  














- Exam





GENERAL EXAM: Alert, pleasant 72-year-old male, frail, cachectic, on 6 L high 

flow nasal cannula, fairly comfortable in no apparent distress.


HEAD: Normocephalic.


EYES: Normal reaction of pupils, equal size.


NOSE: Clear with pink turbinates.


THROAT: No erythema or exudates.


NECK: No masses, no JVD.


CHEST: No chest wall deformity.


LUNGS: Equal air entry with coarse crackles in the posterior bases.


CVS: S1 and S2 normal with no audible murmur, regular rhythm.


ABDOMEN: Large protruding umbilical hernia. No hepatosplenomegaly, normal bowel 

sounds, no guarding or rigidity.


SPINE: Kyphosis


SKIN: Ecchymosis of the right upper extremity No rashes


CENTRAL NERVOUS SYSTEM: No focal deficits, tone is normal in all 4 extremities.


EXTREMITIES: Fracture right shoulder currently in a sling. Changes of rheumatoid

arthritis. There is no peripheral edema. 





- Labs


CBC & Chem 7: 


                                 11/14/22 06:01





                                 11/14/22 06:01


Labs: 


                      Microbiology - Last 24 Hours (Table)











 11/11/22 13:45 Blood Culture - Preliminary





 Blood    No Growth after 72 hours


 


 11/11/22 13:30 Blood Culture - Preliminary





 Blood    No Growth after 72 hours














Assessment and Plan


Assessment: 





Acute on chronic hypoxemic respiratory failure, secondary to COPD exacerbation, 

and complicated by left-sided pneumonia.





Chronic left upper lobe infiltrate/mass.  Could not rule out neoplasm however 

the patient is to frail for any intervention or biopsies





History of severe oxygen-dependent, steroid dependent COPD.





Pulmonary fibrosis, suspect secondary to rheumatoid arthritis.





Recent fall with fracture to the right upper extremity.





History of CHF.





History of hyperlipidemia.





History of osteoarthritis.





History of rheumatoid arthritis.





History of sleep apnea syndrome, maintained on CPAP.





History of perforated duodenal ulcer.  





History of anxiety/depression.





Previous history of heavy tobacco use.





Plan:





The patient was seen and evaluated


Medications reviewed


Continue antibiotics 


Continue bronchodilators, IV Solu-Medrol


Titrate down the FiO2 as tolerated, currently on 6 L high flow nasal cannula


Follow-up chest x-ray in a.m.


DO NOT RESUSCITATE/DO NOT INTUBATE CODE STATUS per his request


We will continue to follow 





I have personally seen and examined the patient, performed the documentation and

the assessment and plan as written.  Number of minutes spent on the visit: 10.

## 2022-11-15 NOTE — P.PN
Subjective





Patient is a 72-year-old male with a known history of COPD on home oxygen, 

osteoarthritis, hyperlipidemia, rheumatoid arthritis, obstructive sleep apnea, 

chronic back pain and anxiety/depression prior history of smoking presents to ER

with complaints of worsening shortness of breath.  Patient was desaturating at 

home and oxygen on turn up to 10 L at home.  Patient has been having cough but 

unable to bring out any sputum.


Patient has Poor functional status.


Patient was recently discharged from the hospital on 10/29/2022.  Patient was 

treated for acute COPD exacerbation and pneumonia and was discharged home on 

Omnicef and azithromycin.  Patient states that he fell on Wednesday and 

fractured his right proximal humerus and was placed on sling while in the ER.  

Patient has been having worsening shortness of breath and pain and came to ER.  

Chest x-ray showed persistent interstitial Prominence throughout both lungs.  

Left perihilar infiltrate or mass persists unchanged.  Correlate clinically and 

progress studies recommended.


EKG showed sinus rhythm.


Laboratory pressure WBC 13.8 hemoglobin 11.6 and platelets 252


Sodium 133 potassium 4.2 chloride 98 bicarb is 30 BUN 19 and creatinine 0.57 and

albumin 3.2


Coronavirus PCR not detected.





11/12/2022


Patient is currently lying in bed.  Awake alert and oriented.  Still having 

shortness of breath but improved clinically.  Cough with whitish sputum 

production.  Patient has been afebrile.  Currently requiring oxygen at 8 L via 

nasal cannula.  No nausea vomiting abdominal pain or diarrhea.


Right humerus sling in place and pain is fairly controlled.


No chest pain.  No headache or dizziness or lightheadedness.


Patient was seen by pulmonary.  Continue on steroids changed to IV.  Patient is 

also on antibiotics  azithro. ceftriaxone.





11/13/2022


Patient is currently lying in bed.  Awake alert and oriented.  Breathing status 

is better.  Currently requiring 7 L oxygen via nasal cannula.  No complaints of 

worsening shortness of breath.  Cough without any sputum production.  No 

headache or dizziness lightheadedness.  No complaints of right shoulder pain.  

Sling in place.


Patient has been afebrile.


Laboratory data showed WBC 9.23 hemoglobin 9.4 and platelets 285


Sodium 140 potassium 4.8 chloride 101 bicarb is 31.9 BUN 16.7 creatinine 0.5.  

Calcium 8.6.


Patient is being continued on antibiotics at home of ceftriaxone.


IV steroids and duo nebs.  Pulmonary is on board.





11/14/2020


Patient still wheezing and he still on 9 down to 7 L/m of oxygen.


Hemoglobin stable 9.4, WBC 10 while his with IV Solu-Medrol 40 mg


Also he is on ceftriaxone and normal saline at 50 mL per hour


I talked to the patient about possible lung mass and recommendation of 

pulmonologist to hold off on any biopsy or procedure for now and he is 

agreeable.


Pulmonary team on the case





11/15/2022


Patient seen in bed, is still short of breath and tachypneic but he can talk 

freely.


This morning he was saturating 99% on 11 L oxygen via nasal cannula


Pulmonary on the case


Remains on ceftriaxone, Solu-Medrol 40 mg and normal saline at 30 mL/h.




















Objective





- Vital Signs


Vital signs: 


                                   Vital Signs











Temp  97.7 F   11/15/22 08:00


 


Pulse  96   11/15/22 08:49


 


Resp  19   11/15/22 08:00


 


BP  121/73   11/15/22 08:00


 


Pulse Ox  99   11/15/22 08:28


 


FiO2  5   11/12/22 07:36








                                 Intake & Output











 11/14/22 11/15/22 11/15/22





 18:59 06:59 18:59


 


Intake Total 480 530 


 


Output Total  500 


 


Balance 480 30 


 


Intake:   


 


  Intake, IV Titration  50 





  Amount   


 


    cefTRIAXone 2 gm In  50 





    Sodium Chloride 0.9% 50   





    ml @ 100 mls/hr IVPB Q24H   





    [facility] Rx#:[id number]   


 


  Oral 480 480 


 


Output:   


 


  Urine  500 


 


Other:   


 


  Voiding Method External Catheter  External Catheter


 


  # Voids 2 1 


 


  # Bowel Movements 1  














- Exam





-GENERAL: The patient is alert and oriented x3, not in any acute distress. Well 

developed, well nourished.  Thin built


HEENT: Pupils are round and equally reacting to light. EOMI. No scleral icterus.

No conjunctival pallor. Normocephalic, atraumatic. No pharyngeal erythema. No 

thyromegaly. 


CARDIOVASCULAR: S1 and S2 present. No murmurs, rubs, or gallops. 


-PULMONARY: Chest is clear to auscultation, no crackles.  Tachypneic which short

of breath.  Bilateral scattered wheezing


ABDOMEN: Soft, nontender, nondistended, normoactive bowel sounds. No palpable 

organomegaly. 


MUSCULOSKELETAL: No joint swelling or deformity. 


EXTREMITIES: No cyanosis, clubbing, or pedal edema. 


NEUROLOGICAL: Gross neurological examination did not reveal any focal deficits. 


SKIN: No rashes. no petechiae.





- Labs


CBC & Chem 7: 


                                 11/14/22 06:01





                                 11/14/22 06:01


Labs: 


                  Abnormal Lab Results - Last 24 Hours (Table)











  11/14/22 11/14/22 Range/Units





  06:01 06:01 


 


WBC  10.76 H   (4.50-10.00)  X 10*3/uL


 


RBC  3.22 L   (4.40-5.60)  X 10*6/uL


 


Hgb  9.4 L   (13.0-17.0)  g/dL


 


Hct  30.4 L   (39.6-50.0)  %


 


MCHC  30.9 L   (32.0-37.0)  g/dL


 


RDW  17.0 H   (11.5-14.5)  %


 


Immature Gran #  0.07 H   (0.00-0.04)  X 10*3/uL


 


Neutrophils #  9.35 H   (1.80-7.70)  X 10*3/uL


 


Lymphocytes #  0.86 L   (0.90-5.00)  X 10*3/uL


 


Eosinophils #  0 L   (0.04-0.35)  X 10*3/uL


 


Carbon Dioxide   31.5 H  (20.0-27.5)  mmol/L


 


Anion Gap   7.50 L  (10.00-18.00)  mmol/L


 


Creatinine   0.5 L  (0.6-1.5)  mg/dL


 


BUN/Creatinine Ratio   30.00 H  (12.00-20.00)  Ratio


 


Calcium   8.6 L  (8.7-10.3)  mg/dL








                      Microbiology - Last 24 Hours (Table)











 11/11/22 13:45 Blood Culture - Preliminary





 Blood    No Growth after 72 hours


 


 11/11/22 13:30 Blood Culture - Preliminary





 Blood    No Growth after 72 hours














Assessment and Plan


Assessment: 





Acute on chronic hypoxic respiratory failure secondary to COPD exacerbation


Pneumonia with persistent interstitial prominence of both legs and left 

perihilar filtrate.  Underlying mass cannot be excluded.


Chronic hypoxic aspiratory secondary COPD on home oxygen at 4 L via nasal 

cannula.


Pulmonary fibrosis


History of rheumatoid arthritis


Obstructive sleep apnea on CPAP at home


Osteoarthritis


Hyperlipidemia


Anxiety/depression


Chronic CHF with preserved ejection fraction


Prior history of smoking


DVT prophylaxis with heparin subcu





Plan: 





Continue with antibiotic ceftriaxone


Continue with Solu-Medrol


Continue with IV fluid


Pulmonary team on the case


Patient was possible lung mass, surgical candidate for pulmonary team, patient 

informed about pulmonary team recommendation and he is agreeable


Labs and medication were reviewed..  Continue same treatment.  Continue with 

symptomatic treatment.  Resume home medication.  Monitor labs and vitals.  DVT 

and GI prophylaxis.  Further recommendations as per clinical course of the 

patient


DVT prophylaxis: Subcutaneous heparin


GI Prophylaxis: Pepcid


PT/OT: Pending


Prognosis is guarded

## 2022-11-16 LAB
ANION GAP SERPL CALC-SCNC: 8.8 MMOL/L (ref 10–18)
BASOPHILS # BLD AUTO: 0 K/UL (ref 0–0.2)
BASOPHILS NFR BLD AUTO: 0 %
BUN SERPL-SCNC: 12 MG/DL (ref 9–27)
BUN/CREAT SERPL: 23.72 RATIO (ref 12–20)
CALCIUM SPEC-MCNC: 8.7 MG/DL (ref 8.7–10.3)
CHLORIDE SERPL-SCNC: 98 MMOL/L (ref 96–109)
CO2 SERPL-SCNC: 31.9 MMOL/L (ref 20–27.5)
EOSINOPHIL # BLD AUTO: 0 K/UL (ref 0–0.7)
EOSINOPHIL NFR BLD AUTO: 0 %
ERYTHROCYTE [DISTWIDTH] IN BLOOD BY AUTOMATED COUNT: 3.63 M/UL (ref 4.3–5.9)
ERYTHROCYTE [DISTWIDTH] IN BLOOD: 17.3 % (ref 11.5–15.5)
GLUCOSE SERPL-MCNC: 105 MG/DL (ref 70–110)
HCT VFR BLD AUTO: 33.9 % (ref 39–53)
HGB BLD-MCNC: 10.8 GM/DL (ref 13–17.5)
LYMPHOCYTES # SPEC AUTO: 1.5 K/UL (ref 1–4.8)
LYMPHOCYTES NFR SPEC AUTO: 15 %
MCH RBC QN AUTO: 29.8 PG (ref 25–35)
MCHC RBC AUTO-ENTMCNC: 31.9 G/DL (ref 31–37)
MCV RBC AUTO: 93.3 FL (ref 80–100)
MONOCYTES # BLD AUTO: 0.5 K/UL (ref 0–1)
MONOCYTES NFR BLD AUTO: 5 %
NEUTROPHILS # BLD AUTO: 8.2 K/UL (ref 1.3–7.7)
NEUTROPHILS NFR BLD AUTO: 79 %
PLATELET # BLD AUTO: 356 K/UL (ref 150–450)
POTASSIUM SERPL-SCNC: 4.3 MMOL/L (ref 3.5–5.5)
SODIUM SERPL-SCNC: 138 MMOL/L (ref 135–145)
WBC # BLD AUTO: 10.5 K/UL (ref 3.8–10.6)

## 2022-11-16 RX ADMIN — IPRATROPIUM BROMIDE AND ALBUTEROL SULFATE SCH ML: .5; 3 SOLUTION RESPIRATORY (INHALATION) at 06:57

## 2022-11-16 RX ADMIN — PANTOPRAZOLE SODIUM SCH MG: 40 TABLET, DELAYED RELEASE ORAL at 06:16

## 2022-11-16 RX ADMIN — METHYLPREDNISOLONE SODIUM SUCCINATE SCH MG: 40 INJECTION, POWDER, FOR SOLUTION INTRAMUSCULAR; INTRAVENOUS at 09:40

## 2022-11-16 RX ADMIN — METHYLPREDNISOLONE SODIUM SUCCINATE SCH MG: 40 INJECTION, POWDER, FOR SOLUTION INTRAMUSCULAR; INTRAVENOUS at 16:43

## 2022-11-16 RX ADMIN — METHYLPREDNISOLONE SODIUM SUCCINATE SCH MG: 40 INJECTION, POWDER, FOR SOLUTION INTRAMUSCULAR; INTRAVENOUS at 00:49

## 2022-11-16 RX ADMIN — ATORVASTATIN CALCIUM SCH MG: 40 TABLET, FILM COATED ORAL at 20:43

## 2022-11-16 RX ADMIN — METOPROLOL SUCCINATE SCH MG: 25 TABLET, EXTENDED RELEASE ORAL at 09:39

## 2022-11-16 RX ADMIN — IPRATROPIUM BROMIDE AND ALBUTEROL SULFATE SCH ML: .5; 3 SOLUTION RESPIRATORY (INHALATION) at 21:07

## 2022-11-16 RX ADMIN — FORMOTEROL FUMARATE DIHYDRATE SCH MCG: 20 SOLUTION RESPIRATORY (INHALATION) at 06:57

## 2022-11-16 RX ADMIN — BUDESONIDE SCH MG: 1 SUSPENSION RESPIRATORY (INHALATION) at 21:06

## 2022-11-16 RX ADMIN — Medication PRN MG: at 20:43

## 2022-11-16 RX ADMIN — SERTRALINE HYDROCHLORIDE SCH MG: 50 TABLET, FILM COATED ORAL at 09:39

## 2022-11-16 RX ADMIN — HYDROCODONE BITARTRATE AND ACETAMINOPHEN PRN EACH: 7.5; 325 TABLET ORAL at 04:40

## 2022-11-16 RX ADMIN — HYDROCODONE BITARTRATE AND ACETAMINOPHEN PRN EACH: 7.5; 325 TABLET ORAL at 15:05

## 2022-11-16 RX ADMIN — BUDESONIDE SCH MG: 1 SUSPENSION RESPIRATORY (INHALATION) at 06:58

## 2022-11-16 RX ADMIN — METHYLPREDNISOLONE SODIUM SUCCINATE SCH MG: 40 INJECTION, POWDER, FOR SOLUTION INTRAMUSCULAR; INTRAVENOUS at 23:15

## 2022-11-16 RX ADMIN — CLOPIDOGREL BISULFATE SCH MG: 75 TABLET ORAL at 09:39

## 2022-11-16 RX ADMIN — FORMOTEROL FUMARATE DIHYDRATE SCH MCG: 20 SOLUTION RESPIRATORY (INHALATION) at 21:07

## 2022-11-16 RX ADMIN — IPRATROPIUM BROMIDE AND ALBUTEROL SULFATE SCH ML: .5; 3 SOLUTION RESPIRATORY (INHALATION) at 15:46

## 2022-11-16 RX ADMIN — IPRATROPIUM BROMIDE AND ALBUTEROL SULFATE SCH ML: .5; 3 SOLUTION RESPIRATORY (INHALATION) at 11:50

## 2022-11-16 RX ADMIN — Medication SCH MCG: at 09:39

## 2022-11-16 RX ADMIN — HYDROCODONE BITARTRATE AND ACETAMINOPHEN PRN EACH: 7.5; 325 TABLET ORAL at 20:45

## 2022-11-16 RX ADMIN — AMITRIPTYLINE HYDROCHLORIDE SCH MG: 10 TABLET, FILM COATED ORAL at 20:43

## 2022-11-16 NOTE — P.PN
Subjective


Progress Note Date: 11/16/22





This is a very pleasant 72-year-old male patient with a known history of 

hyperlipidemia, anxiety, rheumatoid arthritis, pulmonary fibrosis suspect 

secondary to the rheumatoid arthritis, chronic obstructive pulmonary disease 

from chronic tobacco dependence however he quit approximately 6 years ago.  His 

FEV1 value is 46% of predicted.  He's been maintained on Trelegy, albuterol 

nebulized treatments in the outpatient setting.  He presented here to the 

emergency room earlier this week and wastreated for pneumonia.  He presented 

here again yesterday with worsening shortness of breath, cough and congestion. 

He had also been discharged home from here on 10/29/2022 following a COPD 

exacerbation. Since that time he had fallen at home and broken his right 

shoulder.  It is currently in a sling. Chest x-ray shows left perihilar density.

 Underlying mass not excluded. today's chest x-ray shows persistent interstitial

prominence throughout both lung fields.  Left perihilar infiltrate/mass persist 

and is unchanged.  He is seen today in consultation on the regular medical 

floor.  He is currently sitting up in bed.  Awake and alert.  He is dyspneic 

with conversation.  Dyspneic with minimal exertion.  He is quite frail.  He has 

a large protruding umbilical hernia noted. She had been turned up to 10 L high 

flow nasal cannula with O2 saturations at 95%.  Currently down to 8 L at 92%.  

We are accepting of 88% or higher based on his poor lung function.  He is 

currently afebrile.  Hemodynamically stable. He's been initiated on DuoNeb 

inhalations, Pulmicort and Perforomist inhalations, home prednisone dose at 2.5 

mg daily antibiotics in the form of ceftriaxone and azithromycin. White count 

13.8.  Hemoglobin 11.6.  Sodium 133.  Potassium 4.2.  BUN 19.  Creatinine 0.57. 

Glucose 125.  Corona virus by PCR not detected.  Influenza screen negative.  

Pro-calcitonin pending.





Reevaluated today on 11/13/22, patient is feeling better, however he is still 

requiring 7 L of oxygen via nasal cannula, O2 saturation is fluctuating anywhere

between 85% up to 98%.  Clinically however the patient is feeling better, 

breathing a lot easier.  WBC count is 9.2 hemoglobin is 9.5.  Electrolytes are 

normal.  Renal profile is normal.  Pro-calcitonin level was minimally increased 

at 0.12, doubt clinical significance of pro-calcitonin level of 0.12.





The patient is seen today 11/14/2022 in follow-up on the regular medical floor. 

He is currently resting fairly comfortably in bed.  Awake and alert in no acute 

distress.  Family is at the bedside.  If he is currently maintaining O2 

saturations in the low 90s on 7 L high flow nasal cannula.  0.9 normal saline at

30 MLS per hour.  Blood cultures revealing no growth to date.  White count 10.7.

 Hemoglobin 9.4.  Platelets 297.  Sodium 140.  Potassium 4.7.  BUN 15.  

Creatinine 0.5.  Glucose 105.  He is continued on DuoNeb inhalations, Pulmicort 

and Perforomist inhalations, IV Solu-Medrol.  Antibiotics in the form of 

ceftriaxone.  Morphine and Norco for pain control.





The patient is seen today 11/15/2022 follow-up on the regular medical floor.  He

is awake and alert in no acute distress.  Feeling a bit better today compared to

yesterday. He is currently on 6 L high flow nasal cannula.  He is working with 

PT/OT.  To be up in a chair as tolerated. He is continued on DuoNeb inhalations,

Pulmicort and Perforomist inhalations, IV Solu-Medrol.  Antibiotics in the form 

of ceftriaxone.  Morphine and Norco for pain control.





The patient is seen today 11/16/2022 in follow-up on the regular medical floor. 

Today he is quite somnolent.  He was restless and somewhat combative throughout 

the night.  He did receive Ambien about 10 PM.  He is arousable.  He is taking 

his medications with applesauce.  He is quite frail and cachectic. Currently on 

7 L high flow nasal cannula with O2 saturations in the upper 90s.  He is 

afebrile.  Hemodynamically stable. Blood cultures reveal no growth.  Urine 

culture pending. white count 10.5.  Hemoglobin 10.8.  Urinalysis with moderate 

bacteria. He is currently on ceftriaxone.  He remains on DuoNeb inhalations, 

Pulmicort and Perforomist inhalations, IV Solu-Medrol.





Objective





- Vital Signs


Vital signs: 


                                   Vital Signs











Temp  97.9 F   11/16/22 08:00


 


Pulse  82   11/16/22 11:58


 


Resp  16   11/16/22 08:00


 


BP  129/84   11/16/22 08:00


 


Pulse Ox  99   11/16/22 08:00


 


FiO2  5   11/12/22 07:36








                                 Intake & Output











 11/15/22 11/16/22 11/16/22





 18:59 06:59 18:59


 


Intake Total  360 


 


Output Total 500  


 


Balance -500 360 


 


Intake:   


 


  Oral  360 


 


Output:   


 


  Urine 500  


 


Other:   


 


  Voiding Method External Catheter  Diaper


 


  # Voids  2 


 


  # Bowel Movements  1 














- Exam





GENERAL EXAM: Arousable 72-year-old male, frail, cachectic, on 7 L high flow 

nasal cannula, fairly comfortable in no apparent distress.


HEAD: Normocephalic.


EYES: Normal reaction of pupils, equal size.


NOSE: Clear with pink turbinates.


THROAT: No erythema or exudates.


NECK: No masses, no JVD.


CHEST: No chest wall deformity.


LUNGS: Equal air entry with coarse crackles in the posterior bases.


CVS: S1 and S2 normal with no audible murmur, regular rhythm.


ABDOMEN: Large protruding umbilical hernia. No hepatosplenomegaly, normal bowel 

sounds, no guarding or rigidity.


SPINE: Kyphosis


SKIN: Ecchymosis of the right upper extremity No rashes


CENTRAL NERVOUS SYSTEM: No focal deficits, tone is normal in all 4 extremities.


EXTREMITIES: Fracture right shoulder currently in a sling. Changes of rheumatoid

arthritis. There is no peripheral edema. 





- Labs


CBC & Chem 7: 


                                 11/16/22 10:11





                                 11/14/22 06:01


Labs: 


                  Abnormal Lab Results - Last 24 Hours (Table)











  11/15/22 11/16/22 Range/Units





  13:30 10:11 


 


RBC   3.63 L  (4.30-5.90)  m/uL


 


Hgb   10.8 L  (13.0-17.5)  gm/dL


 


Hct   33.9 L  (39.0-53.0)  %


 


RDW   17.3 H  (11.5-15.5)  %


 


Neutrophils #   8.2 H  (1.3-7.7)  k/uL


 


Ur Leukocyte Esterase  Large H   (Negative)  


 


Urine WBC  7 H   (0-5)  /hpf


 


Urine Bacteria  Moderate H   (None)  /hpf


 


Hyaline Casts  3 H   (0-2)  /lpf


 


Urine Mucus  Rare H   (None)  /hpf








                      Microbiology - Last 24 Hours (Table)











 11/15/22 18:00 Urine Culture - Preliminary





 Urine,Voided 


 


 11/11/22 13:30 Blood Culture - Preliminary





 Blood    No Growth after 96 hours


 


 11/11/22 13:45 Blood Culture - Preliminary





 Blood    No Growth after 96 hours














Assessment and Plan


Assessment: 





Acute on chronic hypoxemic respiratory failure, secondary to COPD exacerbation, 

and complicated by left-sided pneumonia.





Chronic left upper lobe infiltrate/mass.  Could not rule out neoplasm however 

the patient is to frail for any intervention or biopsies





History of severe oxygen-dependent, steroid dependent COPD.





Pulmonary fibrosis, suspect secondary to rheumatoid arthritis.





Urinary tract infection.  Currently on ceftriaxone.





Recent fall with fracture to the right upper extremity.





History of CHF.





History of hyperlipidemia.





History of osteoarthritis.





History of rheumatoid arthritis.





History of sleep apnea syndrome, maintained on CPAP.





History of perforated duodenal ulcer.  





History of anxiety/depression.





Previous history of heavy tobacco use.





Poor overall functional status based on the above-mentioned multiple 

comorbidities.





Plan:





The patient was seen and evaluated


Chest x-ray, labs and medications reviewed


Continue antibiotics, bronchodilators, IV Solu-Medrol


Titrate down the FiO2 as tolerated, currently on 7 L high flow nasal cannula


Poor prognosis, may consider palliative/hospice


DO NOT RESUSCITATE/DO NOT INTUBATE CODE STATUS per his request


We will continue to follow 





I have personally seen and examined the patient, performed the documentation and

the assessment and plan as written.  Number of minutes spent on the visit: 10.

## 2022-11-16 NOTE — XR
EXAMINATION TYPE: XR chest 1V portable

 

DATE OF EXAM: 11/16/2022 6:32 AM

 

COMPARISON: Chest radiographs from

 

TECHNIQUE: XR chest 1V portable .

 

CLINICAL INDICATION:Male, 72 years old with history of Pneumonia follow up; 

 

FINDINGS: 

Lungs/Pleura: Fibrotic changes with superimposed likely airspace disease. There is no evidence of ple
ural effusion, focal consolidation, or pneumothorax.  

Pulmonary vascularity: Unremarkable.

Heart/mediastinum: Cardiomediastinal silhouette is unremarkable.

Musculoskeletal: Acute fracture of the proximal right humerus. Remote appearing right-sided rib fract
ures.

 

 

IMPRESSION: 

1.  Pulmonary fibrosis with COPD and likely underlying airspace disease.

2.  Acute proximal right humerus fracture. New from 10/29/2022, similar to 11/11/2022.

## 2022-11-16 NOTE — P.PN
Subjective





Patient is a 72-year-old male with a known history of COPD on home oxygen, 

osteoarthritis, hyperlipidemia, rheumatoid arthritis, obstructive sleep apnea, 

chronic back pain and anxiety/depression prior history of smoking presents to ER

with complaints of worsening shortness of breath.  Patient was desaturating at 

home and oxygen on turn up to 10 L at home.  Patient has been having cough but 

unable to bring out any sputum.


Patient has Poor functional status.


Patient was recently discharged from the hospital on 10/29/2022.  Patient was 

treated for acute COPD exacerbation and pneumonia and was discharged home on 

Omnicef and azithromycin.  Patient states that he fell on Wednesday and 

fractured his right proximal humerus and was placed on sling while in the ER.  

Patient has been having worsening shortness of breath and pain and came to ER.  

Chest x-ray showed persistent interstitial Prominence throughout both lungs.  

Left perihilar infiltrate or mass persists unchanged.  Correlate clinically and 

progress studies recommended.


EKG showed sinus rhythm.


Laboratory pressure WBC 13.8 hemoglobin 11.6 and platelets 252


Sodium 133 potassium 4.2 chloride 98 bicarb is 30 BUN 19 and creatinine 0.57 and

albumin 3.2


Coronavirus PCR not detected.





11/12/2022


Patient is currently lying in bed.  Awake alert and oriented.  Still having 

shortness of breath but improved clinically.  Cough with whitish sputum 

production.  Patient has been afebrile.  Currently requiring oxygen at 8 L via 

nasal cannula.  No nausea vomiting abdominal pain or diarrhea.


Right humerus sling in place and pain is fairly controlled.


No chest pain.  No headache or dizziness or lightheadedness.


Patient was seen by pulmonary.  Continue on steroids changed to IV.  Patient is 

also on antibiotics  azithro. ceftriaxone.





11/13/2022


Patient is currently lying in bed.  Awake alert and oriented.  Breathing status 

is better.  Currently requiring 7 L oxygen via nasal cannula.  No complaints of 

worsening shortness of breath.  Cough without any sputum production.  No 

headache or dizziness lightheadedness.  No complaints of right shoulder pain.  

Sling in place.


Patient has been afebrile.


Laboratory data showed WBC 9.23 hemoglobin 9.4 and platelets 285


Sodium 140 potassium 4.8 chloride 101 bicarb is 31.9 BUN 16.7 creatinine 0.5.  

Calcium 8.6.


Patient is being continued on antibiotics at home of ceftriaxone.


IV steroids and duo nebs.  Pulmonary is on board.





11/14/2020


Patient still wheezing and he still on 9 down to 7 L/m of oxygen.


Hemoglobin stable 9.4, WBC 10 while his with IV Solu-Medrol 40 mg


Also he is on ceftriaxone and normal saline at 50 mL per hour


I talked to the patient about possible lung mass and recommendation of 

pulmonologist to hold off on any biopsy or procedure for now and he is 

agreeable.


Pulmonary team on the case





11/15/2022


Patient seen in bed, is still short of breath and tachypneic but he can talk 

freely.


This morning he was saturating 99% on 11 L oxygen via nasal cannula


Pulmonary on the case


Remains on ceftriaxone, Solu-Medrol 40 mg and normal saline at 30 mL/h.





11/16/2022


Patient still tachypneic and still he needs 7 L of oxygen via nasal cannula, 

chest x-ray showing COPD and bilateral interstitial infiltrates was suspicion 

for perihilar mass.Also has evidence of pulmonary fibrosis 


and this was sent for left humerus fracture.  Yesterday the wife was concerned 

about UTI and urine analysis was suspicion for infection site was started on 

ceftriaxone and urine culture is pending.


She requested that the patient started on Ambien we give him half tablets of 2.5

mg and is more sleepy today for recurrent surgery to melatonin.


patient is not improving well and pulmonary team recommended to consider hospice

care/palliative care





























Objective





- Vital Signs


Vital signs: 


                                   Vital Signs











Temp  97.9 F   11/16/22 08:00


 


Pulse  82   11/16/22 11:58


 


Resp  16   11/16/22 08:00


 


BP  129/84   11/16/22 08:00


 


Pulse Ox  99   11/16/22 08:00


 


FiO2  5   11/12/22 07:36








                                 Intake & Output











 11/15/22 11/16/22 11/16/22





 18:59 06:59 18:59


 


Intake Total  360 


 


Output Total 500  


 


Balance -500 360 


 


Intake:   


 


  Oral  360 


 


Output:   


 


  Urine 500  


 


Other:   


 


  Voiding Method External Catheter  Diaper


 


  # Voids  2 


 


  # Bowel Movements  1 














- Exam





-GENERAL: The patient is alert and oriented, more sleepy today not in any acute 

distress. Well developed, well nourished.  Thin built


HEENT: Pupils are round and equally reacting to light. EOMI. No scleral icterus.

No conjunctival pallor. Normocephalic, atraumatic. No pharyngeal erythema. No 

thyromegaly. 


CARDIOVASCULAR: S1 and S2 present. No murmurs, rubs, or gallops. 


-PULMONARY: Chest is clear to auscultation, no crackles.  Tachypneic which short

of breath.  Bilateral scattered wheezing


ABDOMEN: Soft, nontender, nondistended, normoactive bowel sounds. No palpable 

organomegaly. 


MUSCULOSKELETAL: No joint swelling or deformity. 


EXTREMITIES: No cyanosis, clubbing, or pedal edema. 


NEUROLOGICAL: Gross neurological examination did not reveal any focal deficits. 


SKIN: No rashes. no petechiae.





- Labs


CBC & Chem 7: 


                                 11/16/22 10:11





                                 11/14/22 06:01


Labs: 


                  Abnormal Lab Results - Last 24 Hours (Table)











  11/15/22 11/16/22 Range/Units





  13:30 10:11 


 


RBC   3.63 L  (4.30-5.90)  m/uL


 


Hgb   10.8 L  (13.0-17.5)  gm/dL


 


Hct   33.9 L  (39.0-53.0)  %


 


RDW   17.3 H  (11.5-15.5)  %


 


Neutrophils #   8.2 H  (1.3-7.7)  k/uL


 


Ur Leukocyte Esterase  Large H   (Negative)  


 


Urine WBC  7 H   (0-5)  /hpf


 


Urine Bacteria  Moderate H   (None)  /hpf


 


Hyaline Casts  3 H   (0-2)  /lpf


 


Urine Mucus  Rare H   (None)  /hpf








                      Microbiology - Last 24 Hours (Table)











 11/15/22 18:00 Urine Culture - Preliminary





 Urine,Voided 


 


 11/11/22 13:30 Blood Culture - Preliminary





 Blood    No Growth after 96 hours


 


 11/11/22 13:45 Blood Culture - Preliminary





 Blood    No Growth after 96 hours














Assessment and Plan


Assessment: 





Acute on chronic hypoxic respiratory failure secondary to COPD exacerbation


Pneumonia with persistent interstitial prominence of both legs and left 

perihilar filtrate.  Underlying mass cannot be excluded.


Chronic hypoxic aspiratory secondary COPD on home oxygen at 4 L via nasal cannu

la.  


Acute urinary tracts infection suspected


Left proximal humerus fracture 


Pulmonary fibrosis


History of rheumatoid arthritis


Obstructive sleep apnea on CPAP at home


Osteoarthritis


Hyperlipidemia


Anxiety/depression


Chronic CHF with preserved ejection fraction


Prior history of smoking


DVT prophylaxis with heparin subcu





Plan: 





Continue with antibiotic ceftriaxone


Continue with Solu-Medrol


Continue ceftriaxone


Pulmonary team on the case


Patient was possible lung mass, surgical candidate for pulmonary team, patient 

informed about pulmonary team recommendation and he is agreeable.  


Patient may benefit from more palliative approach recommended by pulmonology 

team


Labs and medication were reviewed..  Continue same treatment.  Continue with 

symptomatic treatment.  Resume home medication.  Monitor labs and vitals.  DVT 

and GI prophylaxis.  Further recommendations as per clinical course of the 

patient


DVT prophylaxis: Subcutaneous heparin


GI Prophylaxis: Pepcid


PT/OT: Pending


Prognosis is guarded


No code

## 2022-11-17 RX ADMIN — Medication SCH MCG: at 10:45

## 2022-11-17 RX ADMIN — METOPROLOL SUCCINATE SCH MG: 25 TABLET, EXTENDED RELEASE ORAL at 10:45

## 2022-11-17 RX ADMIN — SERTRALINE HYDROCHLORIDE SCH MG: 50 TABLET, FILM COATED ORAL at 10:45

## 2022-11-17 RX ADMIN — IPRATROPIUM BROMIDE AND ALBUTEROL SULFATE SCH ML: .5; 3 SOLUTION RESPIRATORY (INHALATION) at 15:21

## 2022-11-17 RX ADMIN — Medication PRN MG: at 22:07

## 2022-11-17 RX ADMIN — BUDESONIDE SCH: 1 SUSPENSION RESPIRATORY (INHALATION) at 19:33

## 2022-11-17 RX ADMIN — IPRATROPIUM BROMIDE AND ALBUTEROL SULFATE SCH ML: .5; 3 SOLUTION RESPIRATORY (INHALATION) at 11:11

## 2022-11-17 RX ADMIN — AMITRIPTYLINE HYDROCHLORIDE SCH MG: 10 TABLET, FILM COATED ORAL at 22:07

## 2022-11-17 RX ADMIN — HYDROCODONE BITARTRATE AND ACETAMINOPHEN PRN EACH: 7.5; 325 TABLET ORAL at 17:48

## 2022-11-17 RX ADMIN — HYDROCODONE BITARTRATE AND ACETAMINOPHEN PRN EACH: 7.5; 325 TABLET ORAL at 12:10

## 2022-11-17 RX ADMIN — IPRATROPIUM BROMIDE AND ALBUTEROL SULFATE SCH ML: .5; 3 SOLUTION RESPIRATORY (INHALATION) at 07:27

## 2022-11-17 RX ADMIN — FORMOTEROL FUMARATE DIHYDRATE SCH: 20 SOLUTION RESPIRATORY (INHALATION) at 19:33

## 2022-11-17 RX ADMIN — METHYLPREDNISOLONE SODIUM SUCCINATE SCH MG: 40 INJECTION, POWDER, FOR SOLUTION INTRAMUSCULAR; INTRAVENOUS at 10:45

## 2022-11-17 RX ADMIN — PANTOPRAZOLE SODIUM SCH MG: 40 TABLET, DELAYED RELEASE ORAL at 06:34

## 2022-11-17 RX ADMIN — BUDESONIDE SCH MG: 1 SUSPENSION RESPIRATORY (INHALATION) at 07:27

## 2022-11-17 RX ADMIN — HYDROCODONE BITARTRATE AND ACETAMINOPHEN PRN EACH: 7.5; 325 TABLET ORAL at 02:29

## 2022-11-17 RX ADMIN — METHYLPREDNISOLONE SODIUM SUCCINATE SCH MG: 40 INJECTION, POWDER, FOR SOLUTION INTRAMUSCULAR; INTRAVENOUS at 15:47

## 2022-11-17 RX ADMIN — FORMOTEROL FUMARATE DIHYDRATE SCH MCG: 20 SOLUTION RESPIRATORY (INHALATION) at 07:27

## 2022-11-17 RX ADMIN — ATORVASTATIN CALCIUM SCH MG: 40 TABLET, FILM COATED ORAL at 22:07

## 2022-11-17 RX ADMIN — IPRATROPIUM BROMIDE AND ALBUTEROL SULFATE SCH ML: .5; 3 SOLUTION RESPIRATORY (INHALATION) at 19:33

## 2022-11-17 NOTE — P.PN
Subjective


Progress Note Date: 11/17/22





This is a very pleasant 72-year-old male patient with a known history of 

hyperlipidemia, anxiety, rheumatoid arthritis, pulmonary fibrosis suspect 

secondary to the rheumatoid arthritis, chronic obstructive pulmonary disease 

from chronic tobacco dependence however he quit approximately 6 years ago.  His 

FEV1 value is 46% of predicted.  He's been maintained on Trelegy, albuterol 

nebulized treatments in the outpatient setting.  He presented here to the 

emergency room earlier this week and wastreated for pneumonia.  He presented 

here again yesterday with worsening shortness of breath, cough and congestion. 

He had also been discharged home from here on 10/29/2022 following a COPD 

exacerbation. Since that time he had fallen at home and broken his right 

shoulder.  It is currently in a sling. Chest x-ray shows left perihilar density.

 Underlying mass not excluded. today's chest x-ray shows persistent interstitial

prominence throughout both lung fields.  Left perihilar infiltrate/mass persist 

and is unchanged.  He is seen today in consultation on the regular medical 

floor.  He is currently sitting up in bed.  Awake and alert.  He is dyspneic 

with conversation.  Dyspneic with minimal exertion.  He is quite frail.  He has 

a large protruding umbilical hernia noted. She had been turned up to 10 L high 

flow nasal cannula with O2 saturations at 95%.  Currently down to 8 L at 92%.  

We are accepting of 88% or higher based on his poor lung function.  He is 

currently afebrile.  Hemodynamically stable. He's been initiated on DuoNeb 

inhalations, Pulmicort and Perforomist inhalations, home prednisone dose at 2.5 

mg daily antibiotics in the form of ceftriaxone and azithromycin. White count 

13.8.  Hemoglobin 11.6.  Sodium 133.  Potassium 4.2.  BUN 19.  Creatinine 0.57. 

Glucose 125.  Corona virus by PCR not detected.  Influenza screen negative.  

Pro-calcitonin pending.





Reevaluated today on 11/13/22, patient is feeling better, however he is still 

requiring 7 L of oxygen via nasal cannula, O2 saturation is fluctuating anywhere

between 85% up to 98%.  Clinically however the patient is feeling better, 

breathing a lot easier.  WBC count is 9.2 hemoglobin is 9.5.  Electrolytes are 

normal.  Renal profile is normal.  Pro-calcitonin level was minimally increased 

at 0.12, doubt clinical significance of pro-calcitonin level of 0.12.





The patient is seen today 11/14/2022 in follow-up on the regular medical floor. 

He is currently resting fairly comfortably in bed.  Awake and alert in no acute 

distress.  Family is at the bedside.  If he is currently maintaining O2 

saturations in the low 90s on 7 L high flow nasal cannula.  0.9 normal saline at

30 MLS per hour.  Blood cultures revealing no growth to date.  White count 10.7.

 Hemoglobin 9.4.  Platelets 297.  Sodium 140.  Potassium 4.7.  BUN 15.  

Creatinine 0.5.  Glucose 105.  He is continued on DuoNeb inhalations, Pulmicort 

and Perforomist inhalations, IV Solu-Medrol.  Antibiotics in the form of 

ceftriaxone.  Morphine and Norco for pain control.





The patient is seen today 11/15/2022 follow-up on the regular medical floor.  He

is awake and alert in no acute distress.  Feeling a bit better today compared to

yesterday. He is currently on 6 L high flow nasal cannula.  He is working with 

PT/OT.  To be up in a chair as tolerated. He is continued on DuoNeb inhalations,

Pulmicort and Perforomist inhalations, IV Solu-Medrol.  Antibiotics in the form 

of ceftriaxone.  Morphine and Norco for pain control.





The patient is seen today 11/16/2022 in follow-up on the regular medical floor. 

Today he is quite somnolent.  He was restless and somewhat combative throughout 

the night.  He did receive Ambien about 10 PM.  He is arousable.  He is taking 

his medications with applesauce.  He is quite frail and cachectic. Currently on 

7 L high flow nasal cannula with O2 saturations in the upper 90s.  He is 

afebrile.  Hemodynamically stable. Blood cultures reveal no growth.  Urine 

culture pending. white count 10.5.  Hemoglobin 10.8.  Urinalysis with moderate 

bacteria. He is currently on ceftriaxone.  He remains on DuoNeb inhalations, 

Pulmicort and Perforomist inhalations, IV Solu-Medrol.





The patient is seen today 11/17/2022 in follow-up on the regular medical floor. 

He is much more awake and alert today.  Oriented.  Blood cultures reveal no 

growth.  Urine culture revealed no growth. He remains on DuoNeb inhalations, 

Pulmicort and Perforomist inhalations, IV Solu-Medrol.





Objective





- Vital Signs


Vital signs: 


                                   Vital Signs











Temp  97.8 F   11/17/22 08:00


 


Pulse  96   11/17/22 11:21


 


Resp  17   11/17/22 08:00


 


BP  101/56   11/17/22 08:00


 


Pulse Ox  99   11/17/22 08:00


 


FiO2  5   11/12/22 07:36








                                 Intake & Output











 11/16/22 11/17/22 11/17/22





 18:59 06:59 18:59


 


Intake Total 50 320 


 


Output Total  300 


 


Balance 50 20 


 


Intake:   


 


  Intake, IV Titration 50  





  Amount   


 


    cefTRIAXone 1 gm In 50  





    Sodium Chloride 0.9% 50   





    ml @ 100 mls/hr IVPB Q24H   





    [facility] Rx#:[id number]   


 


  Oral  320 


 


Output:   


 


  Urine  300 


 


Other:   


 


  Voiding Method Diaper  


 


  # Voids  1 














- Exam





GENERAL EXAM: Alert, pleasant 72-year-old male, frail, cachectic, on 7 L high 

flow nasal cannula, fairly comfortable in no apparent distress.


HEAD: Normocephalic.


EYES: Normal reaction of pupils, equal size.


NOSE: Clear with pink turbinates.


THROAT: No erythema or exudates.


NECK: No masses, no JVD.


CHEST: No chest wall deformity.


LUNGS: Equal air entry with coarse crackles in the posterior bases.


CVS: S1 and S2 normal with no audible murmur, regular rhythm.


ABDOMEN: Large protruding umbilical hernia. No hepatosplenomegaly, normal bowel 

sounds, no guarding or rigidity.


SPINE: Kyphosis


SKIN: Ecchymosis of the right upper extremity No rashes


CENTRAL NERVOUS SYSTEM: No focal deficits, tone is normal in all 4 extremities.


EXTREMITIES: Fracture right shoulder currently in a sling. Changes of rheumatoid

arthritis. There is no peripheral edema. 





- Labs


CBC & Chem 7: 


                                 11/16/22 10:11





                                 11/16/22 10:04


Labs: 


                  Abnormal Lab Results - Last 24 Hours (Table)











  11/16/22 Range/Units





  10:04 


 


Carbon Dioxide  31.9 H  (20.0-27.5)  mmol/L


 


Anion Gap  8.80 L  (10.00-18.00)  mmol/L


 


Creatinine  0.5 L  (0.6-1.5)  mg/dL


 


BUN/Creatinine Ratio  23.72 H  (12.00-20.00)  Ratio








                      Microbiology - Last 24 Hours (Table)











 11/11/22 13:30 Blood Culture - Preliminary





 Blood    No Growth after 120 hours


 


 11/11/22 13:45 Blood Culture - Preliminary





 Blood    No Growth after 120 hours














Assessment and Plan


Assessment: 





Acute on chronic hypoxemic respiratory failure, secondary to COPD exacerbation, 

and complicated by left-sided pneumonia.





Chronic left upper lobe infiltrate/mass.  Could not rule out neoplasm however 

the patient is to frail for any intervention or biopsies





History of severe oxygen-dependent, steroid dependent COPD.





Pulmonary fibrosis, suspect secondary to rheumatoid arthritis.





Urinary tract infection.  Currently on ceftriaxone.





Recent fall with fracture to the right upper extremity.





History of CHF.





History of hyperlipidemia.





History of osteoarthritis.





History of rheumatoid arthritis.





History of sleep apnea syndrome, maintained on CPAP.





History of perforated duodenal ulcer.  





History of anxiety/depression.





Previous history of heavy tobacco use.





Poor overall functional status based on the above-mentioned multiple 

comorbidities.





Plan:





The patient was seen and evaluated


More awake and alert today


Labs and medications reviewed


Discontinue IV Solu-Medrol, initiate prednisone taper


Titrate down the FiO2 as tolerated, currently on 7 L high flow nasal cannula


Poor prognosis, may consider palliative/hospice


DO NOT RESUSCITATE/DO NOT INTUBATE CODE STATUS per his request








I have personally seen and examined the patient, performed the documentation and

the assessment and plan as written.  Number of minutes spent on the visit: 10.

## 2022-11-17 NOTE — P.PN
Subjective





Patient is a 72-year-old male with a known history of COPD on home oxygen, 

osteoarthritis, hyperlipidemia, rheumatoid arthritis, obstructive sleep apnea, 

chronic back pain and anxiety/depression prior history of smoking presents to ER

with complaints of worsening shortness of breath.  Patient was desaturating at 

home and oxygen on turn up to 10 L at home.  Patient has been having cough but 

unable to bring out any sputum.


Patient has Poor functional status.


Patient was recently discharged from the hospital on 10/29/2022.  Patient was 

treated for acute COPD exacerbation and pneumonia and was discharged home on 

Omnicef and azithromycin.  Patient states that he fell on Wednesday and 

fractured his right proximal humerus and was placed on sling while in the ER.  

Patient has been having worsening shortness of breath and pain and came to ER.  

Chest x-ray showed persistent interstitial Prominence throughout both lungs.  

Left perihilar infiltrate or mass persists unchanged.  Correlate clinically and 

progress studies recommended.


EKG showed sinus rhythm.


Laboratory pressure WBC 13.8 hemoglobin 11.6 and platelets 252


Sodium 133 potassium 4.2 chloride 98 bicarb is 30 BUN 19 and creatinine 0.57 and

albumin 3.2


Coronavirus PCR not detected.





11/12/2022


Patient is currently lying in bed.  Awake alert and oriented.  Still having 

shortness of breath but improved clinically.  Cough with whitish sputum 

production.  Patient has been afebrile.  Currently requiring oxygen at 8 L via 

nasal cannula.  No nausea vomiting abdominal pain or diarrhea.


Right humerus sling in place and pain is fairly controlled.


No chest pain.  No headache or dizziness or lightheadedness.


Patient was seen by pulmonary.  Continue on steroids changed to IV.  Patient is 

also on antibiotics  azithro. ceftriaxone.





11/13/2022


Patient is currently lying in bed.  Awake alert and oriented.  Breathing status 

is better.  Currently requiring 7 L oxygen via nasal cannula.  No complaints of 

worsening shortness of breath.  Cough without any sputum production.  No 

headache or dizziness lightheadedness.  No complaints of right shoulder pain.  

Sling in place.


Patient has been afebrile.


Laboratory data showed WBC 9.23 hemoglobin 9.4 and platelets 285


Sodium 140 potassium 4.8 chloride 101 bicarb is 31.9 BUN 16.7 creatinine 0.5.  

Calcium 8.6.


Patient is being continued on antibiotics at home of ceftriaxone.


IV steroids and duo nebs.  Pulmonary is on board.





11/14/2020


Patient still wheezing and he still on 9 down to 7 L/m of oxygen.


Hemoglobin stable 9.4, WBC 10 while his with IV Solu-Medrol 40 mg


Also he is on ceftriaxone and normal saline at 50 mL per hour


I talked to the patient about possible lung mass and recommendation of 

pulmonologist to hold off on any biopsy or procedure for now and he is 

agreeable.


Pulmonary team on the case





11/15/2022


Patient seen in bed, is still short of breath and tachypneic but he can talk 

freely.


This morning he was saturating 99% on 11 L oxygen via nasal cannula


Pulmonary on the case


Remains on ceftriaxone, Solu-Medrol 40 mg and normal saline at 30 mL/h.





11/16/2022


Patient still tachypneic and still he needs 7 L of oxygen via nasal cannula, 

chest x-ray showing COPD and bilateral interstitial infiltrates was suspicion 

for perihilar mass.Also has evidence of pulmonary fibrosis 


and this was sent for left humerus fracture.  Yesterday the wife was concerned 

about UTI and urine analysis was suspicion for infection site was started on 

ceftriaxone and urine culture is pending.


She requested that the patient started on Ambien we give him half tablets of 2.5

mg and is more sleepy today for recurrent surgery to melatonin.


patient is not improving well and pulmonary team recommended to consider hospice

care/palliative care





11/17/2022


Patient is not a more sleepy as he used to be after we stopped his Ambien.


Patient also have no pain, no chest pain or arm pain, he can use both hand 

equally and strongly.


History of respiratory distress.  He still on 7 L/m of oxygen this morning, but 

he was saturating 99% so she had marked might be him and down.


rest of labs, vitals and medications reviewed


I discussed the plan of care with the wife Tricia including the recommendation 

for palliative care and hospice for pulmonary team, the wife does not want to 

start hospice now and he wants to take the patient home when he is ready.  Over 

the wife will consider palliative care consult as an outpatient, patient will be

discussed with 


We will keep the treatment, he was ceftriaxone, steroids, currently on IV Solu-

Medrol 40 mg

















.





Objective





- Vital Signs


Vital signs: 


                                   Vital Signs











Temp  97.8 F   11/17/22 08:00


 


Pulse  96   11/17/22 11:21


 


Resp  17   11/17/22 08:00


 


BP  101/56   11/17/22 08:00


 


Pulse Ox  99   11/17/22 08:00


 


FiO2  5   11/12/22 07:36








                                 Intake & Output











 11/16/22 11/17/22 11/17/22





 18:59 06:59 18:59


 


Intake Total 50 320 


 


Output Total  300 


 


Balance 50 20 


 


Weight   56.109 kg


 


Intake:   


 


  Intake, IV Titration 50  





  Amount   


 


    cefTRIAXone 1 gm In 50  





    Sodium Chloride 0.9% 50   





    ml @ 100 mls/hr IVPB Q24H   





    [facility] Rx#:[id number]   


 


  Oral  320 


 


Output:   


 


  Urine  300 


 


Other:   


 


  Voiding Method Diaper  Diaper


 


  # Voids  1 














- Exam





-GENERAL: The patient is alert and oriented, more sleepy today not in any acute 

distress. Well developed, well nourished.  Thin built


HEENT: Pupils are round and equally reacting to light. EOMI. No scleral icterus.

No conjunctival pallor. Normocephalic, atraumatic. No pharyngeal erythema. No 

thyromegaly. 


CARDIOVASCULAR: S1 and S2 present. No murmurs, rubs, or gallops. 


-PULMONARY: Chest is clear to auscultation, no crackles.  Tachypneic which short

of breath.  Bilateral scattered wheezing


ABDOMEN: Soft, nontender, nondistended, normoactive bowel sounds. No palpable 

organomegaly. 


MUSCULOSKELETAL: No joint swelling or deformity. 


EXTREMITIES: No cyanosis, clubbing, or pedal edema. 


NEUROLOGICAL: Gross neurological examination did not reveal any focal deficits. 


SKIN: No rashes. no petechiae.





- Labs


CBC & Chem 7: 


                                 11/16/22 10:11





                                 11/16/22 10:04


Labs: 


                  Abnormal Lab Results - Last 24 Hours (Table)











  11/16/22 Range/Units





  10:04 


 


Carbon Dioxide  31.9 H  (20.0-27.5)  mmol/L


 


Anion Gap  8.80 L  (10.00-18.00)  mmol/L


 


Creatinine  0.5 L  (0.6-1.5)  mg/dL


 


BUN/Creatinine Ratio  23.72 H  (12.00-20.00)  Ratio








                      Microbiology - Last 24 Hours (Table)











 11/11/22 13:30 Blood Culture - Preliminary





 Blood    No Growth after 120 hours


 


 11/11/22 13:45 Blood Culture - Preliminary





 Blood    No Growth after 120 hours














Assessment and Plan


Assessment: 





Acute on chronic hypoxic respiratory failure secondary to COPD exacerbation


Pneumonia with persistent interstitial prominence of both legs and left 

perihilar filtrate.  Underlying mass cannot be excluded.


Chronic hypoxic aspiratory secondary COPD on home oxygen at 4 L via nasal 

cannula.  


Acute urinary tracts infection suspected


Left proximal humerus fracture 


Pulmonary fibrosis


History of rheumatoid arthritis


Obstructive sleep apnea on CPAP at home


Osteoarthritis


Hyperlipidemia


Anxiety/depression


Chronic CHF with preserved ejection fraction


Prior history of smoking


DVT prophylaxis with heparin subcu





Plan: 





i discussed the case with the wife, she wants to do palliative consult as 

outpatient 


Continue with antibiotic ceftriaxone


Continue with Solu-Medrol


Continue ceftriaxone


Pulmonary team on the case


Patient was possible lung mass, surgical candidate for pulmonary team, patient 

informed about pulmonary team recommendation and he is agreeable.  


Patient may benefit from more palliative approach recommended by pulmonology 

team


Labs and medication were reviewed..  Continue same treatment.  Continue with 

symptomatic treatment.  Resume home medication.  Monitor labs and vitals.  DVT 

and GI prophylaxis.  Further recommendations as per clinical course of the 

patient


DVT prophylaxis: Subcutaneous heparin


GI Prophylaxis: Pepcid


PT/OT: Pending


Prognosis is guarded


No code

## 2022-11-18 LAB
PH UR: 8 [PH] (ref 5–8)
RBC UR QL: 9 /HPF (ref 0–5)
SP GR UR: 1.02 (ref 1–1.03)
SQUAMOUS UR QL AUTO: <1 /HPF (ref 0–4)
UROBILINOGEN UR QL STRIP: <2 MG/DL (ref ?–2)
WBC # UR AUTO: 73 /HPF (ref 0–5)

## 2022-11-18 RX ADMIN — METHYLPREDNISOLONE SODIUM SUCCINATE SCH: 40 INJECTION, POWDER, FOR SOLUTION INTRAMUSCULAR; INTRAVENOUS at 11:12

## 2022-11-18 RX ADMIN — IPRATROPIUM BROMIDE AND ALBUTEROL SULFATE SCH ML: .5; 3 SOLUTION RESPIRATORY (INHALATION) at 11:42

## 2022-11-18 RX ADMIN — HYDROCODONE BITARTRATE AND ACETAMINOPHEN PRN EACH: 7.5; 325 TABLET ORAL at 11:37

## 2022-11-18 RX ADMIN — Medication SCH MCG: at 09:54

## 2022-11-18 RX ADMIN — AMITRIPTYLINE HYDROCHLORIDE SCH MG: 10 TABLET, FILM COATED ORAL at 21:09

## 2022-11-18 RX ADMIN — METOPROLOL SUCCINATE SCH MG: 25 TABLET, EXTENDED RELEASE ORAL at 09:54

## 2022-11-18 RX ADMIN — BUDESONIDE SCH MG: 1 SUSPENSION RESPIRATORY (INHALATION) at 08:18

## 2022-11-18 RX ADMIN — IPRATROPIUM BROMIDE AND ALBUTEROL SULFATE SCH ML: .5; 3 SOLUTION RESPIRATORY (INHALATION) at 08:18

## 2022-11-18 RX ADMIN — SERTRALINE HYDROCHLORIDE SCH MG: 50 TABLET, FILM COATED ORAL at 09:54

## 2022-11-18 RX ADMIN — IPRATROPIUM BROMIDE AND ALBUTEROL SULFATE SCH: .5; 3 SOLUTION RESPIRATORY (INHALATION) at 21:29

## 2022-11-18 RX ADMIN — METHYLPREDNISOLONE SODIUM SUCCINATE SCH: 40 INJECTION, POWDER, FOR SOLUTION INTRAMUSCULAR; INTRAVENOUS at 02:22

## 2022-11-18 RX ADMIN — IPRATROPIUM BROMIDE AND ALBUTEROL SULFATE SCH ML: .5; 3 SOLUTION RESPIRATORY (INHALATION) at 15:52

## 2022-11-18 RX ADMIN — BUDESONIDE AND FORMOTEROL FUMARATE DIHYDRATE SCH: 160; 4.5 AEROSOL RESPIRATORY (INHALATION) at 21:29

## 2022-11-18 RX ADMIN — FORMOTEROL FUMARATE DIHYDRATE SCH MCG: 20 SOLUTION RESPIRATORY (INHALATION) at 08:18

## 2022-11-18 RX ADMIN — HYDROCODONE BITARTRATE AND ACETAMINOPHEN PRN EACH: 7.5; 325 TABLET ORAL at 17:43

## 2022-11-18 RX ADMIN — PANTOPRAZOLE SODIUM SCH MG: 40 TABLET, DELAYED RELEASE ORAL at 06:08

## 2022-11-18 RX ADMIN — CLOPIDOGREL BISULFATE SCH MG: 75 TABLET ORAL at 09:54

## 2022-11-18 NOTE — P.PN
Subjective


Progress Note Date: 11/18/22





This is a very pleasant 72-year-old male patient with a known history of 

hyperlipidemia, anxiety, rheumatoid arthritis, pulmonary fibrosis suspect 

secondary to the rheumatoid arthritis, chronic obstructive pulmonary disease 

from chronic tobacco dependence however he quit approximately 6 years ago.  His 

FEV1 value is 46% of predicted.  He's been maintained on Trelegy, albuterol 

nebulized treatments in the outpatient setting.  He presented here to the 

emergency room earlier this week and wastreated for pneumonia.  He presented 

here again yesterday with worsening shortness of breath, cough and congestion. 

He had also been discharged home from here on 10/29/2022 following a COPD 

exacerbation. Since that time he had fallen at home and broken his right 

shoulder.  It is currently in a sling. Chest x-ray shows left perihilar density.

 Underlying mass not excluded. today's chest x-ray shows persistent interstitial

prominence throughout both lung fields.  Left perihilar infiltrate/mass persist 

and is unchanged.  He is seen today in consultation on the regular medical 

floor.  He is currently sitting up in bed.  Awake and alert.  He is dyspneic 

with conversation.  Dyspneic with minimal exertion.  He is quite frail.  He has 

a large protruding umbilical hernia noted. She had been turned up to 10 L high 

flow nasal cannula with O2 saturations at 95%.  Currently down to 8 L at 92%.  

We are accepting of 88% or higher based on his poor lung function.  He is 

currently afebrile.  Hemodynamically stable. He's been initiated on DuoNeb 

inhalations, Pulmicort and Perforomist inhalations, home prednisone dose at 2.5 

mg daily antibiotics in the form of ceftriaxone and azithromycin. White count 

13.8.  Hemoglobin 11.6.  Sodium 133.  Potassium 4.2.  BUN 19.  Creatinine 0.57. 

Glucose 125.  Corona virus by PCR not detected.  Influenza screen negative.  

Pro-calcitonin pending.





Reevaluated today on 11/13/22, patient is feeling better, however he is still 

requiring 7 L of oxygen via nasal cannula, O2 saturation is fluctuating anywhere

between 85% up to 98%.  Clinically however the patient is feeling better, 

breathing a lot easier.  WBC count is 9.2 hemoglobin is 9.5.  Electrolytes are 

normal.  Renal profile is normal.  Pro-calcitonin level was minimally increased 

at 0.12, doubt clinical significance of pro-calcitonin level of 0.12.





The patient is seen today 11/14/2022 in follow-up on the regular medical floor. 

He is currently resting fairly comfortably in bed.  Awake and alert in no acute 

distress.  Family is at the bedside.  If he is currently maintaining O2 

saturations in the low 90s on 7 L high flow nasal cannula.  0.9 normal saline at

30 MLS per hour.  Blood cultures revealing no growth to date.  White count 10.7.

 Hemoglobin 9.4.  Platelets 297.  Sodium 140.  Potassium 4.7.  BUN 15.  

Creatinine 0.5.  Glucose 105.  He is continued on DuoNeb inhalations, Pulmicort 

and Perforomist inhalations, IV Solu-Medrol.  Antibiotics in the form of 

ceftriaxone.  Morphine and Norco for pain control.





The patient is seen today 11/15/2022 follow-up on the regular medical floor.  He

is awake and alert in no acute distress.  Feeling a bit better today compared to

yesterday. He is currently on 6 L high flow nasal cannula.  He is working with 

PT/OT.  To be up in a chair as tolerated. He is continued on DuoNeb inhalations,

Pulmicort and Perforomist inhalations, IV Solu-Medrol.  Antibiotics in the form 

of ceftriaxone.  Morphine and Norco for pain control.





The patient is seen today 11/16/2022 in follow-up on the regular medical floor. 

Today he is quite somnolent.  He was restless and somewhat combative throughout 

the night.  He did receive Ambien about 10 PM.  He is arousable.  He is taking 

his medications with applesauce.  He is quite frail and cachectic. Currently on 

7 L high flow nasal cannula with O2 saturations in the upper 90s.  He is 

afebrile.  Hemodynamically stable. Blood cultures reveal no growth.  Urine 

culture pending. white count 10.5.  Hemoglobin 10.8.  Urinalysis with moderate 

bacteria. He is currently on ceftriaxone.  He remains on DuoNeb inhalations, 

Pulmicort and Perforomist inhalations, IV Solu-Medrol.





The patient is seen today 11/17/2022 in follow-up on the regular medical floor. 

He is much more awake and alert today.  Oriented.  Blood cultures reveal no 

growth.  Urine culture revealed no growth. He remains on DuoNeb inhalations, 

Pulmicort and Perforomist inhalations, IV Solu-Medrol.





The patient is seen today 11/18/2022 in follow-up on the regular medical floor. 

He is awake and alert in no acute distress this morning.  He is maintaining O2 

saturations in the mid 90s on 6 L/m per nasal cannula.  He is afebrile.  

Hemodynamically stable.  He does remain quite weak and frail.  Blood cultures 

reveal no growth.  Urine culture pending.  No new labs today.  He is continued 

on DuoNeb inhalations, Pulmicort and Perforomist inhalations, IV Solu-Medrol.





Objective





- Vital Signs


Vital signs: 


                                   Vital Signs











Temp  97.4 F L  11/18/22 08:00


 


Pulse  89   11/18/22 11:53


 


Resp  16   11/18/22 08:00


 


BP  113/68   11/18/22 08:00


 


Pulse Ox  95   11/18/22 08:18


 


FiO2  5   11/12/22 07:36








                                 Intake & Output











 11/17/22 11/18/22 11/18/22





 18:59 06:59 18:59


 


Intake Total 50  


 


Balance 50  


 


Weight 56.109 kg  


 


Intake:   


 


  Intake, IV Titration 50  





  Amount   


 


    cefTRIAXone 1 gm In 50  





    Sodium Chloride 0.9% 50   





    ml @ 100 mls/hr IVPB Q24H   





    [facility] Rx#:[id number]   


 


Other:   


 


  Voiding Method Diaper  Diaper


 


  # Voids  5 














- Exam





GENERAL EXAM: Alert, pleasant 72-year-old male, frail, cachectic, on 6 L high 

flow nasal cannula, fairly comfortable in no apparent distress.


HEAD: Normocephalic.


EYES: Normal reaction of pupils, equal size.


NOSE: Clear with pink turbinates.


THROAT: No erythema or exudates.


NECK: No masses, no JVD.


CHEST: No chest wall deformity.


LUNGS: Equal air entry with coarse crackles in the posterior bases.


CVS: S1 and S2 normal with no audible murmur, regular rhythm.


ABDOMEN: Large protruding umbilical hernia. No hepatosplenomegaly, normal bowel 

sounds, no guarding or rigidity.


SPINE: Kyphosis


SKIN: Ecchymosis of the right upper extremity No rashes


CENTRAL NERVOUS SYSTEM: No focal deficits, tone is normal in all 4 extremities.


EXTREMITIES: Fracture right shoulder currently in a sling. Changes of rheumatoid

arthritis. There is no peripheral edema. 





- Labs


CBC & Chem 7: 


                                 11/16/22 10:11





                                 11/16/22 10:04


Labs: 


                      Microbiology - Last 24 Hours (Table)











 11/11/22 13:45 Blood Culture - Final





 Blood    No Growth after 144 hours


 


 11/11/22 13:30 Blood Culture - Final





 Blood    No Growth after 144 hours














Assessment and Plan


Assessment: 





Acute on chronic hypoxemic respiratory failure, secondary to COPD exacerbation, 

and complicated by left-sided pneumonia.





Chronic left upper lobe infiltrate/mass.  Could not rule out neoplasm however 

the patient is to frail for any intervention or biopsies





History of severe oxygen-dependent, steroid dependent COPD.





Pulmonary fibrosis, suspect secondary to rheumatoid arthritis.





Urinary tract infection.  Currently on ceftriaxone.





Recent fall with fracture to the right upper extremity.





History of CHF.





History of hyperlipidemia.





History of osteoarthritis.





History of rheumatoid arthritis.





History of sleep apnea syndrome, maintained on CPAP.





History of perforated duodenal ulcer.  





History of anxiety/depression.





Previous history of heavy tobacco use.





Poor overall functional status based on the above-mentioned multiple 

comorbidities.





Plan:





The patient was seen and evaluated


More awake and alert today


Medications reviewed


Discontinue Pulmicort and Perforomist, resume Symbicort


Discontinue IV Solu-Medrol, initiate prednisone taper


Titrate down the FiO2 as tolerated, currently on 6 L high flow nasal cannula


Plan is for home with palliative care





I have personally seen and examined the patient, performed the documentation and

the assessment and plan as written.  Number of minutes spent on the visit: 10.

## 2022-11-18 NOTE — P.CONS
History of Present Illness





- Reason for Consult


Consult date: 22





- History of Present Illness


Patient is a 72-year-old  male with a past medical he significant for 

COPD presenting to the hospital about a week ago on 2022 and this patient 

was in the hospital with difficulty in breathing, patient symptom has been 

getting worse for few days before presentation to the hospital patient also have

a cough productive of some greenish sputum denies any hemoptysis and no pleuriti

c chest pain patient on presentation to the hospital was afebrile and no fever 

has been recorded subsequently patient is currently on 6 L nasal cannula patient

did have a white count of 13.8 on admission subsequently has normalized 

creatinine has been normal liver enzymes are normal patient did have a positive 

UA on 15 November which has been finalized with VRE that has prompted this 

infectious disease consultation patient currently denies having difficulty 

urination however has been complaining of some burning of urination but no 

suprapubic or flank pain denies having any nausea no vomiting








Past Medical History


Past Medical History: Heart Failure, COPD, Hyperlipidemia, Osteoarthritis (OA), 

Pneumonia, Rheumatoid Arthritis (RA), Sleep Apnea/CPAP/BIPAP


Additional Past Medical History / Comment(s): COPD, chronic hypoxic respiratory 

failure previous history of pneumoperitoneum related to a perforated duodenal 

ulcer, repair of an umbilical hernia, chronic back pain, back stenosis,


History of Any Multi-Drug Resistant Organisms: None Reported


Past Surgical History: Orthopedic Surgery


Additional Past Surgical History / Comment(s): colonoscopy, repair of a 

perforated duodenal ulcer and repair of an incarcerated umbilical hernia, left 

knee arthroscopy


Past Anesthesia/Blood Transfusion Reactions: No Reported Reaction


Smoking Status: Former smoker





- Past Family History


  ** Father


Family Medical History: Cancer


Additional Family Medical History / Comment(s): Father  of leukemia.





  ** Mother


Family Medical History: Cancer, CVA/TIA, Dementia, Diabetes Mellitus


Additional Family Medical History / Comment(s): Mother is 88yrs old with history

of dementia and colon cancer.





  ** Sister(s)


Additional Family Medical History / Comment(s): Patient 3 sons with no major 

medical problems.





Medications and Allergies


                                Home Medications











 Medication  Instructions  Recorded  Confirmed  Type


 


Metoprolol Succinate [Toprol XL] 25 mg PO DAILY 20 History


 


Sertraline [Zoloft] 150 mg PO DAILY 20 History


 


Amitriptyline HCl [Elavil] 10 mg PO HS 21 History


 


Clopidogrel [Plavix] 75 mg PO DAILY #14 tab 21 Rx


 


Fluticasone Nasal Spray [Flonase 1 spr EA NOSTRIL BID PRN 21 

History





Nasal Spray]    


 


Alendronate Sodium [Fosamax] 70 mg PO MO 22 History


 


Atorvastatin [Lipitor] 40 mg PO HS 22 History


 


Omeprazole 40 mg PO DAILY 22 History


 


predniSONE 2.5 mg PO DAILY 22 History


 


Budesonide [Pulmicort] 0.5 mg INHALATION RT-BID 10/26/22 11/11/22 History


 


Cholecalciferol (Vitamin D3) 75 mcg PO DAILY 22 History





[Vitamin D3 (3000 Iu)]    


 


Docusate [Colace] 100 mg PO DAILY PRN 22 History


 


Albuterol Inhaler [Ventolin Hfa 1 - 2 puff INHALATION Q6H PRN #1 22  Rx





Inhaler] each   


 


Budesonide-Formot 160-4.5 Mcg 2 puff INHALATION RT-BID #1 each 22  Rx





[Symbicort 160-4.5 Mcg Inhaler]    


 


Docusate [Colace] 100 mg PO BID PRN 10 Days #20 cap 22  Rx


 


HYDROcodone/APAP 5-325MG [Norco 1 tab PO Q8HR PRN 3 Days #10 tab 22  Rx





5-325]    


 


predniSONE 10 mg PO AS DIRECTED #50 tab 22  Rx








                                    Allergies











Allergy/AdvReac Type Severity Reaction Status Date / Time


 


amoxicillin AdvReac  Nausea & Verified 22 16:08





   Vomiting  














Physical Exam


Vitals: 


                                   Vital Signs











  Temp Pulse Pulse Resp BP Pulse Ox


 


 22 20:00  97.5 F L   97  19  109/62  92 L


 


 22 19:45    97  19  


 


 22 16:02   88    


 


 22 15:52   91    


 


 22 14:58    93    96


 


 22 14:44       93 L


 


 22 14:00  97.8 F   95  17  111/68  93 L


 


 22 13:52    93    93 L


 


 22 13:49       85 L


 


 22 11:53   89    


 


 22 11:42   91    


 


 22 10:52    83   


 


 22 08:39   81    


 


 22 08:29   83    


 


 22 08:18   84     95


 


 22 08:00  97.4 F L   76  16  113/68  98


 


 22 02:00  97.3 F L   84  18  116/63  97








                                Intake and Output











 22





 06:59 14:59 22:59


 


Other:   


 


  Voiding Method  Diaper External Catheter


 


  # Voids 5  6


 


  Weight  56.109 kg 














Results


CBC & Chem 7: 


                                 22 10:11





                                 22 10:04


Labs: 


                  Abnormal Lab Results - Last 24 Hours (Table)











  22 Range/Units





  20:00 


 


Urine Blood  Small H  (Negative)  


 


Ur Leukocyte Esterase  Large H  (Negative)  


 


Urine RBC  9 H  (0-5)  /hpf


 


Urine WBC  73 H  (0-5)  /hpf


 


Uric Acid Crystals  Rare H  (None)  /hpf


 


Urine Bacteria  Many H  (None)  /hpf


 


Urine Mucus  Occasional H  (None)  /hpf








                      Microbiology - Last 24 Hours (Table)











 11/15/22 18:00 Urine Culture - Final





 Urine,Voided    Enterococcus faecium VRE














Assessment and Plan


Plan: 


1patient with a positive UA subsequent urine culture positive for VRE in this 

patient does give a history of burning of urine but no significant suprapubic or

flank pain question of possible cystitis.


2we will repeat his urine culture.


3we will start the patient on daptomycin 4 mg/kg daily while waiting for repeat

urine to be finalized.


We will follow on clinical condition and cultures to further adjust medication 

if needed


Thank you for this consultation will follow this patient along with you





Time with Patient: Greater than 30

## 2022-11-19 RX ADMIN — SERTRALINE HYDROCHLORIDE SCH MG: 50 TABLET, FILM COATED ORAL at 07:29

## 2022-11-19 RX ADMIN — IPRATROPIUM BROMIDE AND ALBUTEROL SULFATE SCH ML: .5; 3 SOLUTION RESPIRATORY (INHALATION) at 11:05

## 2022-11-19 RX ADMIN — HYDROCODONE BITARTRATE AND ACETAMINOPHEN PRN EACH: 7.5; 325 TABLET ORAL at 22:06

## 2022-11-19 RX ADMIN — BUDESONIDE AND FORMOTEROL FUMARATE DIHYDRATE SCH PUFF: 160; 4.5 AEROSOL RESPIRATORY (INHALATION) at 07:14

## 2022-11-19 RX ADMIN — BUDESONIDE AND FORMOTEROL FUMARATE DIHYDRATE SCH: 160; 4.5 AEROSOL RESPIRATORY (INHALATION) at 21:14

## 2022-11-19 RX ADMIN — AMITRIPTYLINE HYDROCHLORIDE SCH MG: 10 TABLET, FILM COATED ORAL at 20:58

## 2022-11-19 RX ADMIN — PANTOPRAZOLE SODIUM SCH MG: 40 TABLET, DELAYED RELEASE ORAL at 07:33

## 2022-11-19 RX ADMIN — HYDROCODONE BITARTRATE AND ACETAMINOPHEN PRN EACH: 7.5; 325 TABLET ORAL at 08:24

## 2022-11-19 RX ADMIN — Medication SCH MCG: at 07:29

## 2022-11-19 RX ADMIN — IPRATROPIUM BROMIDE AND ALBUTEROL SULFATE SCH ML: .5; 3 SOLUTION RESPIRATORY (INHALATION) at 15:33

## 2022-11-19 RX ADMIN — IPRATROPIUM BROMIDE AND ALBUTEROL SULFATE SCH ML: .5; 3 SOLUTION RESPIRATORY (INHALATION) at 07:12

## 2022-11-19 RX ADMIN — METOPROLOL SUCCINATE SCH MG: 25 TABLET, EXTENDED RELEASE ORAL at 07:29

## 2022-11-19 RX ADMIN — CLOPIDOGREL BISULFATE SCH MG: 75 TABLET ORAL at 07:29

## 2022-11-19 RX ADMIN — IPRATROPIUM BROMIDE AND ALBUTEROL SULFATE SCH: .5; 3 SOLUTION RESPIRATORY (INHALATION) at 21:14

## 2022-11-19 NOTE — P.DS
Providers


Date of admission: 


11/11/22 14:36





Attending physician: 


Crissy Chan





Consults: 





                                        





11/11/22 14:36


Consult Physician Routine 


   Consulting Provider: Merari Thakur


   Consult Reason/Comments: dyspnea


   Do you want consulting provider notified?: Yes











Primary care physician: 


Amalia Bazan





[facility] Course: 





Discussed her this not as progress note








Patient is a 72-year-old male with a known history of COPD on home oxygen, 

osteoarthritis, hyperlipidemia, rheumatoid arthritis, obstructive sleep apnea, 

chronic back pain and anxiety/depression prior history of smoking presents to ER

with complaints of worsening shortness of breath.  Patient was desaturating at 

home and oxygen on turn up to 10 L at home.  Patient has been having cough but 

unable to bring out any sputum.


Patient has Poor functional status.


Patient was recently discharged from the hospital on 10/29/2022.  Patient was 

treated for acute COPD exacerbation and pneumonia and was discharged home on 

Omnicef and azithromycin.  Patient states that he fell on Wednesday and 

fractured his right proximal humerus and was placed on sling while in the ER.  

Patient has been having worsening shortness of breath and pain and came to ER.  

Chest x-ray showed persistent interstitial Prominence throughout both lungs.  

Left perihilar infiltrate or mass persists unchanged.  Correlate clinically and 

progress studies recommended.


EKG showed sinus rhythm.


Laboratory pressure WBC 13.8 hemoglobin 11.6 and platelets 252


Sodium 133 potassium 4.2 chloride 98 bicarb is 30 BUN 19 and creatinine 0.57 and

albumin 3.2


Coronavirus PCR not detected.





11/12/2022


Patient is currently lying in bed.  Awake alert and oriented.  Still having 

shortness of breath but improved clinically.  Cough with whitish sputum 

production.  Patient has been afebrile.  Currently requiring oxygen at 8 L via 

nasal cannula.  No nausea vomiting abdominal pain or diarrhea.


Right humerus sling in place and pain is fairly controlled.


No chest pain.  No headache or dizziness or lightheadedness.


Patient was seen by pulmonary.  Continue on steroids changed to IV.  Patient is 

also on antibiotics  azithro. ceftriaxone.





11/13/2022


Patient is currently lying in bed.  Awake alert and oriented.  Breathing status 

is better.  Currently requiring 7 L oxygen via nasal cannula.  No complaints of 

worsening shortness of breath.  Cough without any sputum production.  No 

headache or dizziness lightheadedness.  No complaints of right shoulder pain.  

Sling in place.


Patient has been afebrile.


Laboratory data showed WBC 9.23 hemoglobin 9.4 and platelets 285


Sodium 140 potassium 4.8 chloride 101 bicarb is 31.9 BUN 16.7 creatinine 0.5.  

Calcium 8.6.


Patient is being continued on antibiotics at home of ceftriaxone.


IV steroids and duo nebs.  Pulmonary is on board.





11/14/2020


Patient still wheezing and he still on 9 down to 7 L/m of oxygen.


Hemoglobin stable 9.4, WBC 10 while his with IV Solu-Medrol 40 mg


Also he is on ceftriaxone and normal saline at 50 mL per hour


I talked to the patient about possible lung mass and recommendation of 

pulmonologist to hold off on any biopsy or procedure for now and he is 

agreeable.


Pulmonary team on the case





11/15/2022


Patient seen in bed, is still short of breath and tachypneic but he can talk 

freely.


This morning he was saturating 99% on 11 L oxygen via nasal cannula


Pulmonary on the case


Remains on ceftriaxone, Solu-Medrol 40 mg and normal saline at 30 mL/h.





11/16/2022


Patient still tachypneic and still he needs 7 L of oxygen via nasal cannula, 

chest x-ray showing COPD and bilateral interstitial infiltrates was suspicion 

for perihilar mass.Also has evidence of pulmonary fibrosis 


and this was sent for left humerus fracture.  Yesterday the wife was concerned 

about UTI and urine analysis was suspicion for infection site was started on c

eftriaxone and urine culture is pending.


She requested that the patient started on Ambien we give him half tablets of 2.5

mg and is more sleepy today for recurrent surgery to melatonin.


patient is not improving well and pulmonary team recommended to consider hospice

care/palliative care





11/17/2022


Patient is not a more sleepy as he used to be after we stopped his Ambien.


Patient also have no pain, no chest pain or arm pain, he can use both hand 

equally and strongly.


History of respiratory distress.  He still on 7 L/m of oxygen this morning, but 

he was saturating 99% so she had marked might be him and down.


rest of labs, vitals and medications reviewed


I discussed the plan of care with the wife Tricia including the recommendation 

for palliative care and hospice for pulmonary team, the wife does not want to 

start hospice now and he wants to take the patient home when he is ready.  Over 

the wife will consider palliative care consult as an outpatient, patient will be

discussed with 


We will keep the treatment, he was ceftriaxone, steroids, currently on IV Solu-

Medrol 40 mg





11/18/2022


Patient did not show much improvement in his breathing pattern and is requiring 

5-7 L/m of oxygen via nasal cannula, currently on 6 L with saturations in the 

mid 90s percent.


Pulmonary input is appreciated, patient was recommended to be discharged home on

oral prednisone, palliative/hospice care is suggested for the patient and I 

discussed with patient and wife over the phone and patient and wife agreed to go

home today and start Palliative consult as an outpatient.


Also appreciated as pulmonary fibrosis and left perihilar pneumonia or mass, 

patient is not very good surgical candidate for biopsy alternatively patient may

benefit from but this Is an Outpatient


Ho.  However Overall Patient's Prognosis Is Very Guarded, and Pulled on the Long

Groin


wever patient was complaining of from dysuria and why she would this echo 

requested to be checked and treated for UTI his urine culture came back positive

for VRE, patient with no other urinary complaints, no fever or leukocytosis 

however his abdomen symptoms therefore we consulted ID team was started the 

patient on daptomycin with repeat urine culture . 


patient also has right humerus fracture on sling.  He has strong and the  

and sensation is intact.


-Patient is on home oxygen already 

















- Exam


-GENERAL: The patient is alert and oriented, more sleepy today not in any acute 

distress. Well developed, well nourished.  Thin built


HEENT: Pupils are round and equally reacting to light. EOMI. No scleral icterus.

No conjunctival pallor. Normocephalic, atraumatic. No pharyngeal erythema. No 

thyromegaly. 


CARDIOVASCULAR: S1 and S2 present. No murmurs, rubs, or gallops. 


-PULMONARY: Chest is clear to auscultation, no crackles.  Tachypneic which short

of breath.  Bilateral scattered wheezing


ABDOMEN: Soft, nontender, nondistended, normoactive bowel sounds. No palpable 

organomegaly. 


MUSCULOSKELETAL: No joint swelling or deformity. 


-EXTREMITIES: No cyanosis, clubbing, or pedal edema.  Right arm in a sling


NEUROLOGICAL: Gross neurological examination did not reveal any focal deficits. 


SKIN: No rashes. no petechiae.

















Assessment and Plan


Assessment: 


 VRE UTI


Acute on chronic hypoxic respiratory failure secondary to COPD exacerbation and 

pulmonary fibrosis, not improving 


Pneumonia with persistent interstitial prominence of both legs and left 

perihilar filtrate.  Underlying mass cannot be excluded  which could be 

cancerous as well, patient informed 


Chronic hypoxic aspiratory secondary COPD on home oxygen at 4 L via nasal 

cannula.  


Acute urinary tracts infection suspected


Left proximal humerus fracture 


Pulmonary fibrosis


History of rheumatoid arthritis


Obstructive sleep apnea on CPAP at home


Osteoarthritis


Hyperlipidemia


Anxiety/depression


Chronic CHF with preserved ejection fraction


Prior history of smoking








Plan: 





i discussed the case with the wife, she wants to do palliative consult as 

outpatient 


Continue with andaptomycin and repeat urine culture


Continue with prednisone


Pulmonary team on the case


Patient was possible lung mass, surgical candidate for pulmonary team, patient 

informed about pulmonary team recommendation and he is agreeable.  


Patient may benefit from more palliative approach recommended by pulmonology 

team


Labs and medication were reviewed..  Continue same treatment.  Continue with 

symptomatic treatment.  Resume home medication.  Monitor labs and vitals.  DVT 

and GI prophylaxis.  Further recommendations as per clinical course of the 

patient


DVT prophylaxis: Subcutaneous heparin


GI Prophylaxis: Pepcid


Prognosis is guarded


No code








Plan - Discharge Summary


Discharge Rx Participant: Yes


New Discharge Prescriptions: 


New


   predniSONE 10 mg PO AS DIRECTED #50 tab


   Budesonide-Formot 160-4.5 Mcg [Symbicort 160-4.5 Mcg Inhaler] 2 puff 

INHALATION RT-BID #1 each


   Albuterol Inhaler [Ventolin Hfa Inhaler] 1 - 2 puff INHALATION Q6H PRN #1 

each


     PRN Reason: Shortness Of Breath Or Wheezing


   Docusate [Colace] 100 mg PO BID PRN 10 Days #20 cap


     PRN Reason: Constipation


   HYDROcodone/APAP 5-325MG [Norco 5-325] 1 tab PO Q8HR PRN 3 Days #10 tab


     PRN Reason: Severe Breakthrough Pain





Continue


   Metoprolol Succinate [Toprol XL] 25 mg PO DAILY


   Sertraline [Zoloft] 150 mg PO DAILY


   Clopidogrel [Plavix] 75 mg PO DAILY #14 tab


   Fluticasone Nasal Spray [Flonase Nasal Spray] 1 spr EA NOSTRIL BID PRN


     PRN Reason: Congestion


   Alendronate Sodium [Fosamax] 70 mg PO MO


   Atorvastatin [Lipitor] 40 mg PO HS


   predniSONE 2.5 mg PO DAILY


   Amitriptyline HCl [Elavil] 10 mg PO HS


   Omeprazole 40 mg PO DAILY


   Budesonide [Pulmicort] 0.5 mg INHALATION RT-BID


   Docusate [Colace] 100 mg PO DAILY PRN


     PRN Reason: Constipation


   Cholecalciferol (Vitamin D3) [Vitamin D3 (3000 Iu)] 75 mcg PO DAILY





Discontinued


   L.acidoph,Paracasei, B.lactis [Probiotic] 1 cap PO DAILY


Discharge Medication List





Metoprolol Succinate [Toprol XL] 25 mg PO DAILY 05/12/20 [History]


Sertraline [Zoloft] 150 mg PO DAILY 05/12/20 [History]


Amitriptyline HCl [Elavil] 10 mg PO HS 04/06/21 [History]


Clopidogrel [Plavix] 75 mg PO DAILY #14 tab 04/13/21 [Rx]


Fluticasone Nasal Spray [Flonase Nasal Spray] 1 spr EA NOSTRIL BID PRN 11/08/21 

[History]


Alendronate Sodium [Fosamax] 70 mg PO MO 07/02/22 [History]


Atorvastatin [Lipitor] 40 mg PO HS 07/02/22 [History]


Omeprazole 40 mg PO DAILY 07/02/22 [History]


predniSONE 2.5 mg PO DAILY 07/02/22 [History]


Budesonide [Pulmicort] 0.5 mg INHALATION RT-BID 10/26/22 [History]


Cholecalciferol (Vitamin D3) [Vitamin D3 (3000 Iu)] 75 mcg PO DAILY 11/11/22 

[History]


Docusate [Colace] 100 mg PO DAILY PRN 11/11/22 [History]


Albuterol Inhaler [Ventolin Hfa Inhaler] 1 - 2 puff INHALATION Q6H PRN #1 each 

11/18/22 [Rx]


Budesonide-Formot 160-4.5 Mcg [Symbicort 160-4.5 Mcg Inhaler] 2 puff INHALATION 

RT-BID #1 each 11/18/22 [Rx]


Docusate [Colace] 100 mg PO BID PRN 10 Days #20 cap 11/18/22 [Rx]


HYDROcodone/APAP 5-325MG [Norco 5-325] 1 tab PO Q8HR PRN 3 Days #10 tab 11/18/22

[Rx]


predniSONE 10 mg PO AS DIRECTED #50 tab 11/18/22 [Rx]








Follow up Appointment(s)/Referral(s): 


Merari Thakur MD [STAFF PHYSICIAN] - 11/22/22 10:30 am


Amalia Bazan MD [Primary Care Provider] - 1-2 days


[facility], [NON-STAFF] - 1-2 Days


[facility] [NON-STAFF] - As Needed


Activity/Diet/Wound Care/Special Instructions: 


Wheelchair van will  at 16:30. Call [phone] if any changes are 

needed. 


Discharge Disposition: HOME WITH HOME HEALTH SERVICES

## 2022-11-19 NOTE — P.PN
Subjective


Progress Note Date: 11/19/22





This is a very pleasant 72-year-old male patient with a known history of 

hyperlipidemia, anxiety, rheumatoid arthritis, pulmonary fibrosis suspect 

secondary to the rheumatoid arthritis, chronic obstructive pulmonary disease 

from chronic tobacco dependence however he quit approximately 6 years ago.  His 

FEV1 value is 46% of predicted.  He's been maintained on Trelegy, albuterol 

nebulized treatments in the outpatient setting.  He presented here to the 

emergency room earlier this week and wastreated for pneumonia.  He presented 

here again yesterday with worsening shortness of breath, cough and congestion. 

He had also been discharged home from here on 10/29/2022 following a COPD 

exacerbation. Since that time he had fallen at home and broken his right 

shoulder.  It is currently in a sling. Chest x-ray shows left perihilar density.

 Underlying mass not excluded. today's chest x-ray shows persistent interstitial

prominence throughout both lung fields.  Left perihilar infiltrate/mass persist 

and is unchanged.  He is seen today in consultation on the regular medical 

floor.  He is currently sitting up in bed.  Awake and alert.  He is dyspneic 

with conversation.  Dyspneic with minimal exertion.  He is quite frail.  He has 

a large protruding umbilical hernia noted. She had been turned up to 10 L high 

flow nasal cannula with O2 saturations at 95%.  Currently down to 8 L at 92%.  

We are accepting of 88% or higher based on his poor lung function.  He is 

currently afebrile.  Hemodynamically stable. He's been initiated on DuoNeb 

inhalations, Pulmicort and Perforomist inhalations, home prednisone dose at 2.5 

mg daily antibiotics in the form of ceftriaxone and azithromycin. White count 

13.8.  Hemoglobin 11.6.  Sodium 133.  Potassium 4.2.  BUN 19.  Creatinine 0.57. 

Glucose 125.  Corona virus by PCR not detected.  Influenza screen negative.  

Pro-calcitonin pending.





Reevaluated today on 11/13/22, patient is feeling better, however he is still 

requiring 7 L of oxygen via nasal cannula, O2 saturation is fluctuating anywhere

between 85% up to 98%.  Clinically however the patient is feeling better, 

breathing a lot easier.  WBC count is 9.2 hemoglobin is 9.5.  Electrolytes are 

normal.  Renal profile is normal.  Pro-calcitonin level was minimally increased 

at 0.12, doubt clinical significance of pro-calcitonin level of 0.12.





The patient is seen today 11/14/2022 in follow-up on the regular medical floor. 

He is currently resting fairly comfortably in bed.  Awake and alert in no acute 

distress.  Family is at the bedside.  If he is currently maintaining O2 

saturations in the low 90s on 7 L high flow nasal cannula.  0.9 normal saline at

30 MLS per hour.  Blood cultures revealing no growth to date.  White count 10.7.

 Hemoglobin 9.4.  Platelets 297.  Sodium 140.  Potassium 4.7.  BUN 15.  

Creatinine 0.5.  Glucose 105.  He is continued on DuoNeb inhalations, Pulmicort 

and Perforomist inhalations, IV Solu-Medrol.  Antibiotics in the form of 

ceftriaxone.  Morphine and Norco for pain control.





The patient is seen today 11/15/2022 follow-up on the regular medical floor.  He

is awake and alert in no acute distress.  Feeling a bit better today compared to

yesterday. He is currently on 6 L high flow nasal cannula.  He is working with 

PT/OT.  To be up in a chair as tolerated. He is continued on DuoNeb inhalations,

Pulmicort and Perforomist inhalations, IV Solu-Medrol.  Antibiotics in the form 

of ceftriaxone.  Morphine and Norco for pain control.





The patient is seen today 11/16/2022 in follow-up on the regular medical floor. 

Today he is quite somnolent.  He was restless and somewhat combative throughout 

the night.  He did receive Ambien about 10 PM.  He is arousable.  He is taking 

his medications with applesauce.  He is quite frail and cachectic. Currently on 

7 L high flow nasal cannula with O2 saturations in the upper 90s.  He is 

afebrile.  Hemodynamically stable. Blood cultures reveal no growth.  Urine 

culture pending. white count 10.5.  Hemoglobin 10.8.  Urinalysis with moderate 

bacteria. He is currently on ceftriaxone.  He remains on DuoNeb inhalations, 

Pulmicort and Perforomist inhalations, IV Solu-Medrol.





The patient is seen today 11/17/2022 in follow-up on the regular medical floor. 

He is much more awake and alert today.  Oriented.  Blood cultures reveal no 

growth.  Urine culture revealed no growth. He remains on DuoNeb inhalations, 

Pulmicort and Perforomist inhalations, IV Solu-Medrol.





The patient is seen today 11/18/2022 in follow-up on the regular medical floor. 

He is awake and alert in no acute distress this morning.  He is maintaining O2 

saturations in the mid 90s on 6 L/m per nasal cannula.  He is afebrile.  

Hemodynamically stable.  He does remain quite weak and frail.  Blood cultures 

reveal no growth.  Urine culture pending.  No new labs today.  He is continued 

on DuoNeb inhalations, Pulmicort and Perforomist inhalations, IV Solu-Medrol.





The patient is seen today 11/19/2022 in follow-up on the regular medical floor. 

He is currently awake and alert, oriented.  Occasional periods of confusion.  

Maintaining O2 saturations in the 90s on 6 L high flow nasal cannula.  He's 

afebrile.  He remains quite weak and debilitated.  Blood cultures revealed no 

growth.  Urine culture was positive for enterococcus fascia VRE.  He is 

currently on daptomycin.  He remains on bronchodilators, prednisone taper.





Objective





- Vital Signs


Vital signs: 


                                   Vital Signs











Temp  98.0 F   11/19/22 14:00


 


Pulse  93   11/19/22 14:00


 


Resp  20   11/19/22 14:00


 


BP  94/53   11/19/22 14:00


 


Pulse Ox  91 L  11/19/22 14:00


 


FiO2  5   11/12/22 07:36








                                 Intake & Output











 11/18/22 11/19/22 11/19/22





 18:59 06:59 18:59


 


Output Total  550 


 


Balance  -550 


 


Weight 56.109 kg  


 


Output:   


 


  Urine  550 


 


Other:   


 


  Voiding Method Diaper External Catheter External Catheter


 


  # Voids 6  














- Exam





GENERAL EXAM: Alert, pleasant 72-year-old male, frail, cachectic, on 6 L high 

flow nasal cannula, fairly comfortable in no apparent distress.


HEAD: Normocephalic.


EYES: Normal reaction of pupils, equal size.


NOSE: Clear with pink turbinates.


THROAT: No erythema or exudates.


NECK: No masses, no JVD.


CHEST: No chest wall deformity.


LUNGS: Equal air entry with coarse crackles in the posterior bases.


CVS: S1 and S2 normal with no audible murmur, regular rhythm.


ABDOMEN: Large protruding umbilical hernia. No hepatosplenomegaly, normal bowel 

sounds, no guarding or rigidity.


SPINE: Kyphosis


SKIN: Ecchymosis of the right upper extremity No rashes


CENTRAL NERVOUS SYSTEM: No focal deficits, tone is normal in all 4 extremities.


EXTREMITIES: Fracture right shoulder currently in a sling. Changes of rheumatoid

arthritis. There is no peripheral edema. 





- Labs


CBC & Chem 7: 


                                 11/16/22 10:11





                                 11/16/22 10:04


Labs: 


                  Abnormal Lab Results - Last 24 Hours (Table)











  11/18/22 Range/Units





  20:00 


 


Urine Blood  Small H  (Negative)  


 


Ur Leukocyte Esterase  Large H  (Negative)  


 


Urine RBC  9 H  (0-5)  /hpf


 


Urine WBC  73 H  (0-5)  /hpf


 


Uric Acid Crystals  Rare H  (None)  /hpf


 


Urine Bacteria  Many H  (None)  /hpf


 


Urine Mucus  Occasional H  (None)  /hpf








                      Microbiology - Last 24 Hours (Table)











 11/18/22 20:00 Urine Culture - Preliminary





 Urine,Voided 


 


 11/15/22 18:00 Urine Culture - Final





 Urine,Voided    Enterococcus faecium VRE














Assessment and Plan


Assessment: 





Acute on chronic hypoxemic respiratory failure, secondary to COPD exacerbation, 

and complicated by left-sided pneumonia.





Chronic left upper lobe infiltrate/mass.  Could not rule out neoplasm however 

the patient is to frail for any intervention or biopsies





History of severe oxygen-dependent, steroid dependent COPD.





Pulmonary fibrosis, suspect secondary to rheumatoid arthritis.





Urinary tract infection secondary to enterococcus faecium VRE, currently on 

daptomycin.





Recent fall with fracture to the right upper extremity.





History of CHF.





History of hyperlipidemia.





History of osteoarthritis.





History of rheumatoid arthritis.





History of sleep apnea syndrome, maintained on CPAP.





History of perforated duodenal ulcer.  





History of anxiety/depression.





Previous history of heavy tobacco use.





Poor overall functional status based on the above-mentioned multiple 

comorbidities.





Plan:





The patient was seen and evaluated


More awake and alert today


Medications reviewed


Titrate down the FiO2 as tolerated, currently on 6 L high flow nasal cannula


Plan is for home with palliative care





I have personally seen and examined the patient, performed the documentation and

the assessment and plan as written.  Number of minutes spent on the visit: 10.

## 2022-11-20 RX ADMIN — BUDESONIDE AND FORMOTEROL FUMARATE DIHYDRATE SCH PUFF: 160; 4.5 AEROSOL RESPIRATORY (INHALATION) at 21:14

## 2022-11-20 RX ADMIN — METOPROLOL SUCCINATE SCH MG: 25 TABLET, EXTENDED RELEASE ORAL at 08:54

## 2022-11-20 RX ADMIN — METHYLPREDNISOLONE SODIUM SUCCINATE SCH MG: 125 INJECTION, POWDER, FOR SOLUTION INTRAMUSCULAR; INTRAVENOUS at 14:16

## 2022-11-20 RX ADMIN — IPRATROPIUM BROMIDE AND ALBUTEROL SULFATE SCH ML: .5; 3 SOLUTION RESPIRATORY (INHALATION) at 07:34

## 2022-11-20 RX ADMIN — SERTRALINE HYDROCHLORIDE SCH MG: 50 TABLET, FILM COATED ORAL at 08:54

## 2022-11-20 RX ADMIN — BUDESONIDE AND FORMOTEROL FUMARATE DIHYDRATE SCH PUFF: 160; 4.5 AEROSOL RESPIRATORY (INHALATION) at 07:34

## 2022-11-20 RX ADMIN — IPRATROPIUM BROMIDE AND ALBUTEROL SULFATE SCH ML: .5; 3 SOLUTION RESPIRATORY (INHALATION) at 11:35

## 2022-11-20 RX ADMIN — HYDROCODONE BITARTRATE AND ACETAMINOPHEN PRN EACH: 7.5; 325 TABLET ORAL at 08:53

## 2022-11-20 RX ADMIN — Medication SCH MCG: at 08:54

## 2022-11-20 RX ADMIN — AMITRIPTYLINE HYDROCHLORIDE SCH: 10 TABLET, FILM COATED ORAL at 22:09

## 2022-11-20 RX ADMIN — IPRATROPIUM BROMIDE AND ALBUTEROL SULFATE SCH ML: .5; 3 SOLUTION RESPIRATORY (INHALATION) at 15:58

## 2022-11-20 RX ADMIN — IPRATROPIUM BROMIDE AND ALBUTEROL SULFATE SCH ML: .5; 3 SOLUTION RESPIRATORY (INHALATION) at 21:14

## 2022-11-20 RX ADMIN — PANTOPRAZOLE SODIUM SCH MG: 40 TABLET, DELAYED RELEASE ORAL at 06:32

## 2022-11-20 RX ADMIN — METHYLPREDNISOLONE SODIUM SUCCINATE SCH: 125 INJECTION, POWDER, FOR SOLUTION INTRAMUSCULAR; INTRAVENOUS at 18:34

## 2022-11-20 RX ADMIN — CLOPIDOGREL BISULFATE SCH MG: 75 TABLET ORAL at 08:54

## 2022-11-20 NOTE — PN
PROGRESS NOTE



DATE OF SERVICE:  11/19/2022



SUBJECTIVE:

This 72-year-old gentleman, who was admitted with acute hypoxic respiratory 
failure

also had multiple other medical issues.  Family is planning palliative care at 
home

probably on Monday.  No chest pain.  No palpitation.



PHYSICAL EXAMINATION:

VITAL SIGNS:  Pulse is 93, blood pressure n, respirations 20.

HEENT:  Conjunctivae normal.

NECK:  No JVD.

CARDIOVASCULAR:  S1, S2.

RESPIRATIONS:  Few scattered rhonchi.

ABDOMEN:  Soft.

NERVOUS SYSTEM:  No focal deficits.



LABS:

Reviewed.



ASSESSMENT:

1. Acute urinary tract infection with Vancomycin-resistant Enterococci.

2. Chronic obstructive pulmonary disease acute exacerbation.

3. History of pneumonia.

4. Multiple medical issues.

5. NO CODE, NO CPR.





RECOMMENDATIONS:

Recommended to continue current medical management and symptomatic treatment.  
Continue

with bronchodilators, rest of medications, and plan for palliative care on 
Monday.





MMODL / IJN: [id number] / Job#: [id number]

MTDD

## 2022-11-20 NOTE — P.PN
Subjective


Progress Note Date: 11/19/22


Principal diagnosis: 





VRE urinary tract infection


Patient is a 72-year-old  male with multiple comorbidities including 

end-stage COPD presented to the hospital with increasing shortness of breath 

patient also has some difficulty urination as well as burning positive UA and 

urine has been finalized with VRE.


On today's evaluation that is 11/19/2022 the patient denies having any fever or 

any chills he is breathing comfortably on nasal cannula oxygen denies any chest 

pain no worsening cough or sputum production no abdominal pain no diarrhea








Objective





- Vital Signs


Vital signs: 


                                   Vital Signs











Temp  98.2 F   11/19/22 07:27


 


Pulse  88   11/19/22 11:14


 


Resp  17   11/19/22 07:27


 


BP  94/59   11/19/22 07:27


 


Pulse Ox  95   11/19/22 07:27


 


FiO2  5   11/12/22 07:36








                                 Intake & Output











 11/18/22 11/19/22 11/19/22





 18:59 06:59 18:59


 


Output Total  550 


 


Balance  -550 


 


Weight 56.109 kg  


 


Output:   


 


  Urine  550 


 


Other:   


 


  Voiding Method Diaper External Catheter External Catheter


 


  # Voids 6  














- Exam


GENERAL DESCRIPTION: An elderly male lying in bed in no distress





RESPIRATORY SYSTEM: Unlabored breathing , decreased breath sounds at bases





HEART: S1 S2 regular rate and rhythm ,





ABDOMEN: Soft , no tenderness





EXTREMITIES: No edema feet








- Labs


CBC & Chem 7: 


                                 11/16/22 10:11





                                 11/16/22 10:04


Labs: 


                  Abnormal Lab Results - Last 24 Hours (Table)











  11/18/22 Range/Units





  20:00 


 


Urine Blood  Small H  (Negative)  


 


Ur Leukocyte Esterase  Large H  (Negative)  


 


Urine RBC  9 H  (0-5)  /hpf


 


Urine WBC  73 H  (0-5)  /hpf


 


Uric Acid Crystals  Rare H  (None)  /hpf


 


Urine Bacteria  Many H  (None)  /hpf


 


Urine Mucus  Occasional H  (None)  /hpf








                      Microbiology - Last 24 Hours (Table)











 11/18/22 20:00 Urine Culture - Preliminary





 Urine,Voided 


 


 11/15/22 18:00 Urine Culture - Final





 Urine,Voided    Enterococcus faecium VRE














Assessment and Plan


(1) Urinary tract infection


Current Visit: Yes   Status: Acute   Code(s): N39.0 - URINARY TRACT INFECTION, 

SITE NOT SPECIFIED   SNOMED Code(s): 08505912


   


Plan: 


1patient with a positive UA subsequent urine culture positive for VRE in this 

patient does give a history of burning of urine but no significant suprapubic or

flank pain question of possible cystitis.


2repeat UA is positive and urine culture are currently pending.


3patient to continue with daptomycin 4 mg/kg daily while waiting for repeat 

urine to be finalized.





Time with Patient: Less than 30

## 2022-11-20 NOTE — P.PN
Subjective


Progress Note Date: 11/20/22


Principal diagnosis: 





VRE urinary tract infection


Patient is a 72-year-old  male with multiple comorbidities including 

end-stage COPD presented to the hospital with increasing shortness of breath 

patient also has some difficulty urination as well as burning positive UA and 

urine has been finalized with VRE.


On today's evaluation that is 11/20/2022 the patient remains to be afebrile, the

patient is sleepy and lethargic today and is currently on 7L nasal cannula 

oxygen, the patient was unable to provide any history and no vomiting or 

diarrhea has been reported





Objective





- Vital Signs


Vital signs: 


                                   Vital Signs











Temp  97.8 F   11/20/22 20:00


 


Pulse  90   11/20/22 21:25


 


Resp  22   11/20/22 20:00


 


BP  89/58   11/20/22 20:00


 


Pulse Ox  96   11/20/22 15:59


 


FiO2  5   11/12/22 07:36








                                 Intake & Output











 11/20/22 11/20/22 11/21/22





 06:59 18:59 06:59


 


Output Total 600  


 


Balance -600  


 


Output:   


 


  Urine 600  


 


Other:   


 


  Voiding Method External Catheter External Catheter 


 


  # Voids  2 














- Exam


GENERAL DESCRIPTION: An elderly male lying in bed in no distress





RESPIRATORY SYSTEM: Unlabored breathing , decreased breath sounds at bases





HEART: S1 S2 regular rate and rhythm ,





ABDOMEN: Soft , no tenderness





EXTREMITIES: No edema feet








- Labs


CBC & Chem 7: 


                                 11/16/22 10:11





                                 11/16/22 10:04


Labs: 


                      Microbiology - Last 24 Hours (Table)











 11/18/22 20:00 Urine Culture - Preliminary





 Urine,Voided    Group D Enterococcus














Assessment and Plan


(1) Urinary tract infection


Current Visit: Yes   Status: Acute   Code(s): N39.0 - URINARY TRACT INFECTION, 

SITE NOT SPECIFIED   SNOMED Code(s): 79097479


   


Plan: 


1patient with a positive UA subsequent urine culture positive for VRE in this 

patient does give a history of burning of urine but no significant suprapubic or

flank pain question of possible cystitis.


2repeat UA is positive and urine culture are currently pending.


3patient did has slight worsening of his respiratory status and family's 

concern of possible hospice for now patient to continue with daptomycin 4 mg/kg 

daily however antibiotic can be safely discontinue once switch to hospice


Time with Patient: Less than 30

## 2022-11-20 NOTE — P.PN
Subjective


Progress Note Date: 11/20/22


Principal diagnosis: 





Pneumonia/sepsis.





The patient is seen today 11/16/2022 in follow-up on the regular medical floor. 

Today he is quite somnolent.  He was restless and somewhat combative throughout 

the night.  He did receive Ambien about 10 PM.  He is arousable.  He is taking 

his medications with applesauce.  He is quite frail and cachectic. Currently on 

7 L high flow nasal cannula with O2 saturations in the upper 90s.  He is 

afebrile.  Hemodynamically stable. Blood cultures reveal no growth.  Urine 

culture pending. white count 10.5.  Hemoglobin 10.8.  Urinalysis with moderate 

bacteria. He is currently on ceftriaxone.  He remains on DuoNeb inhalations, 

Pulmicort and Perforomist inhalations, IV Solu-Medrol.





The patient is seen today 11/17/2022 in follow-up on the regular medical floor. 

He is much more awake and alert today.  Oriented.  Blood cultures reveal no key

wth.  Urine culture revealed no growth. He remains on DuoNeb inhalations, 

Pulmicort and Perforomist inhalations, IV Solu-Medrol.





The patient is seen today 11/18/2022 in follow-up on the regular medical floor. 

He is awake and alert in no acute distress this morning.  He is maintaining O2 

saturations in the mid 90s on 6 L/m per nasal cannula.  He is afebrile.  

Hemodynamically stable.  He does remain quite weak and frail.  Blood cultures 

reveal no growth.  Urine culture pending.  No new labs today.  He is continued 

on DuoNeb inhalations, Pulmicort and Perforomist inhalations, IV Solu-Medrol.





The patient is seen today 11/19/2022 in follow-up on the regular medical floor. 

He is currently awake and alert, oriented.  Occasional periods of confusion.  

Maintaining O2 saturations in the 90s on 6 L high flow nasal cannula.  He's 

afebrile.  He remains quite weak and debilitated.  Blood cultures revealed no 

growth.  Urine culture was positive for enterococcus fascia VRE.  He is 

currently on daptomycin.  He remains on bronchodilators, prednisone taper.





Progress note dated 11/20/2022.





The patient is seen today in room 455.  He remains on nasal cannula at 6 L.  The

patient feels about the same today as he has a last couple of days.  He is a bit

confused.  He is very debilitated.  He denies any worsening shortness of breath.

 He denies any pain.  Urine culture was positive for enterococcus faecium.  No 

recent laboratory data to speak of.  No recent chest x-ray.  Labs, x-rays, and 

medications are reviewed.





Objective





- Vital Signs


Vital signs: 


                                   Vital Signs











Temp  97.3 F L  11/20/22 14:00


 


Pulse  88   11/20/22 16:13


 


Resp  21   11/20/22 14:00


 


BP  84/47   11/20/22 14:00


 


Pulse Ox  96   11/20/22 15:59


 


FiO2  5   11/12/22 07:36








                                 Intake & Output











 11/19/22 11/20/22 11/20/22





 18:59 06:59 18:59


 


Output Total 350 600 


 


Balance -350 -600 


 


Output:   


 


  Urine 350 600 


 


Other:   


 


  Voiding Method External Catheter External Catheter External Catheter














- Exam





No acute distress, a bit confused, very frail, in no distress.  Currently on 6 L

of oxygen.





HEENT examination is grossly unremarkable.  





Neck supple.  Full range of motion.  No adenopathy thyromegaly or neck vein 

distention.





Cardiovascular examination reveals regular rhythm rate.  S1-S2 normal.  No S3 or

S4.  No discernible murmur noted.  Heart rate 92 bpm.  Heart sounds are distant.





Lungs reveal scattered bilateral rhonchi.  Mild expiratory wheezes.  No 

crackles.  Breath sounds equal bilaterally.





Abdomen soft bowel sounds are heard.  No masses or tenderness.





Extremities are intact.  No cyanosis clubbing or edema.  Also, ecchymoses of the

right upper extremity.





Skin reveals multiple areas of ecchymoses.





Neurologic examination is brief but nonfocal.





- Labs


CBC & Chem 7: 


                                 11/16/22 10:11





                                 11/16/22 10:04


Labs: 


                      Microbiology - Last 24 Hours (Table)











 11/18/22 20:00 Urine Culture - Preliminary





 Urine,Voided    Group D Enterococcus














Assessment and Plan


Assessment: 





Acute on chronic hypoxemic respiratory failure, secondary to COPD exacerbation, 

and complicated by left-sided pneumonia.





Chronic left upper lobe infiltrate/mass.  Could not rule out neoplasm however 

the patient is to frail for any intervention or biopsies.





History of severe oxygen-dependent, steroid dependent COPD.





Pulmonary fibrosis, suspect secondary to rheumatoid arthritis.





Urinary tract infection secondary to enterococcus faecium VRE, currently on 

daptomycin.





Recent fall with fracture to the right upper extremity.





History of CHF.





History of hyperlipidemia.





History of osteoarthritis.





History of rheumatoid arthritis.





History of sleep apnea syndrome, maintained on CPAP.





History of perforated duodenal ulcer.  





History of anxiety/depression.





Previous history of heavy tobacco use.





Poor overall functional status based on the above-mentioned multiple sara

rbidities.


Plan: 





Plan dated 11/20/2022.





The patient is doing about the same.  He is currently on 6 L of oxygen.  The 

patient was initially sleeping when I first went into the room.  He arouses, and

is very talkative.  He does seem a bit confused.  Plan is for home with 

palliative care.  Hopeful discharge in the next 24-48 hours.  Additional 

recommendations and suggestions are forthcoming.  The patient is currently on 

daptomycin for his urinary tract infection.  Signed Medrol can be discontinued 

in favor of prednisone 30 mg a day.


Time with Patient: Less than 30

## 2022-11-21 VITALS
RESPIRATION RATE: 20 BRPM | DIASTOLIC BLOOD PRESSURE: 55 MMHG | SYSTOLIC BLOOD PRESSURE: 93 MMHG | HEART RATE: 87 BPM | TEMPERATURE: 98 F

## 2022-11-21 RX ADMIN — CLOPIDOGREL BISULFATE SCH MG: 75 TABLET ORAL at 07:54

## 2022-11-21 RX ADMIN — BUDESONIDE AND FORMOTEROL FUMARATE DIHYDRATE SCH PUFF: 160; 4.5 AEROSOL RESPIRATORY (INHALATION) at 07:37

## 2022-11-21 RX ADMIN — MORPHINE SULFATE PRN MG: 4 INJECTION, SOLUTION INTRAMUSCULAR; INTRAVENOUS at 09:30

## 2022-11-21 RX ADMIN — IPRATROPIUM BROMIDE AND ALBUTEROL SULFATE SCH ML: .5; 3 SOLUTION RESPIRATORY (INHALATION) at 07:37

## 2022-11-21 RX ADMIN — SERTRALINE HYDROCHLORIDE SCH MG: 50 TABLET, FILM COATED ORAL at 07:54

## 2022-11-21 RX ADMIN — Medication SCH MCG: at 07:54

## 2022-11-21 RX ADMIN — PANTOPRAZOLE SODIUM SCH MG: 40 TABLET, DELAYED RELEASE ORAL at 06:49

## 2022-11-21 RX ADMIN — METHYLPREDNISOLONE SODIUM SUCCINATE SCH: 125 INJECTION, POWDER, FOR SOLUTION INTRAMUSCULAR; INTRAVENOUS at 00:38

## 2022-11-21 RX ADMIN — METHYLPREDNISOLONE SODIUM SUCCINATE SCH: 125 INJECTION, POWDER, FOR SOLUTION INTRAMUSCULAR; INTRAVENOUS at 06:54

## 2022-11-21 NOTE — PN
PROGRESS NOTE



DATE OF SERVICE:  11/20/2022



SUBJECTIVE:

This 72-year-old gentleman who was admitted with acute UTI, also had COPD 
exacerbation.

Patient has significant difficulty in breathing also, anxiety episodes, also.  
The

family is originally planning palliative care, but at this time family seems to 
be

wanting hospice care at home Patient is also hypotensive.



PAST MEDICAL HISTORY:

Reviewed.



REVIEW OF SYSTEMS:

Could not be taken.



CURRENT MEDICATIONS:

Reviewed include DuoNeb.  Dose and rest of medication noted.



PHYSICAL EXAMINATION:

VITAL SIGNS:  The pulse is 90, blood pressure 94/59, respiratory rate 20.

HEENT:  Conjunctivae normal.

NECK:  No JVD.

CARDIOVASCULAR: S1, S2 muffled.

RESPIRATIONS:  Bilateral scattered rhonchi and crackles.  Expiratory wheezing.

ABDOMEN:  Soft.

NERVOUS SYSTEM:  No focal deficits.



LABS:

Noted.



ASSESSMENT:

1. Acute urinary tract infection with vancomycin-resistant enterococci.

2. Chronic obstructive pulmonary disease acute exacerbation.

3. History of pneumonia.

4. Multiple medical issues.

5. NO CODE, NO CPR, NO VENT.



RECOMMENDATIONS:

This patient with multiple medical issues as mentioned earlier, I would 
recommend

continue the current medications.  I would recommend to hold the blood pressure

medications and also initiate bolus, steroids, bronchodilators.  Continue the 
rest of

medication.  The patient is currently NO CODE, NO CPR, NO VENT.  As mentioned 
earlier,

patient with the family would like to take the patient home with home hospice on

Monday.  We will talk with  and discharge planning team.  Prognosis

extremely guarded.  Further recommendations to follow.





ALEXANDER / BLAS: [id number] / Job#: [id number]

SHANIQUA

## 2022-11-22 NOTE — P.DS
Providers


Date of admission: 


11/11/22 14:36





Expected date of discharge: 11/21/22


Attending physician: 


Crissy Chan





Consults: 





                                        





11/11/22 14:36


Consult Physician Routine 


   Consulting Provider: Merari Thakur


   Consult Reason/Comments: dyspnea


   Do you want consulting provider notified?: Yes





11/18/22 16:01


Consult Physician Urgent 


   Consulting Provider: Abisai Rogers


   Consult Reason/Comments: vre in urine culture


   Do you want consulting provider notified?: Yes











Primary care physician: 


Amalia Bazan





Hospital Course: 











Final diagnosis





Acute urinary tract infection, with vancomycin-resistant enterococci, present on

admission


Chronic obstructive pulmonary disease, acute exacerbation


History of pneumonia


History of heart failure


Hyperlipidemia


Osteoarthritis


Rheumatoid arthritis


Sleep apnea with a CPAP


Anxiety/depression


No code











Discharge disposition


Patient is being discharged in a stable condition with guarded prognosis to with

hospice services.  Patient will follow-up with Dr. Bazan  in the outpatient 

setting upon discharge.  Patient is to follow with pulmonary in the outpatient 

setting.  Patient will continue a prednisone taper and also breathing 

treatments.  Total time taken is greater than 35 minutes.





Hospital course


This is a 72-year-old male who was recently admitted with acute urinary tract 

infection also COPD acute exacerbation of being closely monitored.  Patient had 

prolonged hospitalization with multiple medical consultations following.  

Patient has worsening shortness of breath and family discussed palliative versus

hospice and would like to go home with hospice and being arranged for today.  

[facility] is following and working on discharge planning. Currently no 

reports of chest pain, worsening shortness of breath, or palpitations.  Patient 

is afebrile.  No reports of nausea or vomiting and patient is tolerating diet.  

Patient will be discharged home with hospice services today.  Guarded prognosis.





Physical exam:








Gen: This is a 72-year-old male alert and oriented 3, ill-appearing, elderly 

appearing male


HEENT: Head is atraumatic, normocephalic. Pupils equal, round. Sclerae is 

anicteric. 


NECK: Supple. No JVD. No lymphadenopathy. No thyromegaly. 


LUNGS: Diminished breath sounds bilaterally with some scattered rhonchi and 

crackles noted.  Expiratory wheezing noted as well  No intercostal retractions.


HEART: S1, S2 are muffled


ABDOMEN: Soft. Bowel sounds are present. No masses.  No tenderness.


EXTREMITIES: No pedal edema.  No calf tenderness.


NEUROLOGICAL: Patient is awake, alert and oriented x3. Cranial nerves 2 through 

12 are grossly intact. 





Please refer to medication reconciliation sheet for a list of medications.





The impression and plan of care has been dictated by Tiffany Salinas, Nurse 

Practitioner as directed.





Dr. Fitz MD


I have performed a history and examination and MDM of this patient, discussed 

the same with the dictator, and  agree with the dictator's assessment and plan 

as written ,documented as a scribe. Based on total visit time,  I have performed

more than 50% of the visit.  


Patient Condition at Discharge: Fair





Plan - Discharge Summary


Discharge Rx Participant: Yes


New Discharge Prescriptions: 


New


   predniSONE 10 mg PO AS DIRECTED #50 tab


   Budesonide-Formot 160-4.5 Mcg [Symbicort 160-4.5 Mcg Inhaler] 2 puff 

INHALATION RT-BID #1 each


   Albuterol Inhaler [Ventolin Hfa Inhaler] 1 - 2 puff INHALATION Q6H PRN #1 

each


     PRN Reason: Shortness Of Breath Or Wheezing


   Docusate [Colace] 100 mg PO BID PRN 10 Days #20 cap


     PRN Reason: Constipation


   HYDROcodone/APAP 5-325MG [Norco 5-325] 1 tab PO Q8HR PRN 3 Days #10 tab


     PRN Reason: Severe Breakthrough Pain





Continue


   Metoprolol Succinate [Toprol XL] 25 mg PO DAILY


   Sertraline [Zoloft] 150 mg PO DAILY


   Clopidogrel [Plavix] 75 mg PO DAILY #14 tab


   Fluticasone Nasal Spray [Flonase Nasal Spray] 1 spr EA NOSTRIL BID PRN


     PRN Reason: Congestion


   Alendronate Sodium [Fosamax] 70 mg PO MO


   Atorvastatin [Lipitor] 40 mg PO HS


   predniSONE 2.5 mg PO DAILY


   Amitriptyline HCl [Elavil] 10 mg PO HS


   Omeprazole 40 mg PO DAILY


   Budesonide [Pulmicort] 0.5 mg INHALATION RT-BID


   Docusate [Colace] 100 mg PO DAILY PRN


     PRN Reason: Constipation


   Cholecalciferol (Vitamin D3) [Vitamin D3 (3000 Iu)] 75 mcg PO DAILY





Discontinued


   L.acidoph,Paracasei, B.lactis [Probiotic] 1 cap PO DAILY


Discharge Medication List





Metoprolol Succinate [Toprol XL] 25 mg PO DAILY 05/12/20 [History]


Sertraline [Zoloft] 150 mg PO DAILY 05/12/20 [History]


Amitriptyline HCl [Elavil] 10 mg PO HS 04/06/21 [History]


Clopidogrel [Plavix] 75 mg PO DAILY #14 tab 04/13/21 [Rx]


Fluticasone Nasal Spray [Flonase Nasal Spray] 1 spr EA NOSTRIL BID PRN 11/08/21 

[History]


Alendronate Sodium [Fosamax] 70 mg PO MO 07/02/22 [History]


Atorvastatin [Lipitor] 40 mg PO HS 07/02/22 [History]


Omeprazole 40 mg PO DAILY 07/02/22 [History]


predniSONE 2.5 mg PO DAILY 07/02/22 [History]


Budesonide [Pulmicort] 0.5 mg INHALATION RT-BID 10/26/22 [History]


Cholecalciferol (Vitamin D3) [Vitamin D3 (3000 Iu)] 75 mcg PO DAILY 11/11/22 

[History]


Docusate [Colace] 100 mg PO DAILY PRN 11/11/22 [History]


Albuterol Inhaler [Ventolin Hfa Inhaler] 1 - 2 puff INHALATION Q6H PRN #1 each 

11/18/22 [Rx]


Budesonide-Formot 160-4.5 Mcg [Symbicort 160-4.5 Mcg Inhaler] 2 puff INHALATION 

RT-BID #1 each 11/18/22 [Rx]


Docusate [Colace] 100 mg PO BID PRN 10 Days #20 cap 11/18/22 [Rx]


HYDROcodone/APAP 5-325MG [Norco 5-325] 1 tab PO Q8HR PRN 3 Days #10 tab 11/18/22

[Rx]


predniSONE 10 mg PO AS DIRECTED #50 tab 11/18/22 [Rx]








Follow up Appointment(s)/Referral(s): 


Merari Thakur MD [STAFF PHYSICIAN] - 1 Week


Amalia Bazan MD [Primary Care Provider] - 1-2 days


Cate Perez [NON-STAFF] - As Needed


Patient Instructions/Handouts:  Hospice (DC)


Activity/Diet/Wound Care/Special Instructions: 


 


Discharge Disposition: HOME WITH HOSPICE